# Patient Record
Sex: MALE | Race: WHITE | Employment: OTHER | URBAN - METROPOLITAN AREA
[De-identification: names, ages, dates, MRNs, and addresses within clinical notes are randomized per-mention and may not be internally consistent; named-entity substitution may affect disease eponyms.]

---

## 2017-01-02 ENCOUNTER — TELEPHONE (OUTPATIENT)
Dept: FAMILY MEDICINE CLINIC | Age: 67
End: 2017-01-02

## 2017-01-04 ENCOUNTER — OFFICE VISIT (OUTPATIENT)
Dept: FAMILY MEDICINE CLINIC | Age: 67
End: 2017-01-04

## 2017-01-04 VITALS
WEIGHT: 315 LBS | BODY MASS INDEX: 40.43 KG/M2 | DIASTOLIC BLOOD PRESSURE: 80 MMHG | HEIGHT: 74 IN | HEART RATE: 68 BPM | SYSTOLIC BLOOD PRESSURE: 150 MMHG | RESPIRATION RATE: 14 BRPM

## 2017-01-04 DIAGNOSIS — E11.42 DIABETIC POLYNEUROPATHY ASSOCIATED WITH TYPE 2 DIABETES MELLITUS (HCC): ICD-10-CM

## 2017-01-04 DIAGNOSIS — E11.42 DIABETIC PERIPHERAL NEUROPATHY (HCC): ICD-10-CM

## 2017-01-04 DIAGNOSIS — E66.01 MORBID OBESITY WITH BMI OF 45.0-49.9, ADULT (HCC): ICD-10-CM

## 2017-01-04 DIAGNOSIS — I42.9 SECONDARY CARDIOMYOPATHY, UNSPECIFIED: ICD-10-CM

## 2017-01-04 DIAGNOSIS — E66.01 OBESITY, CLASS III, BMI 40-49.9 (MORBID OBESITY) (HCC): ICD-10-CM

## 2017-01-04 DIAGNOSIS — I15.0 RENOVASCULAR HYPERTENSION: ICD-10-CM

## 2017-01-04 DIAGNOSIS — I42.9 CARDIOMYOPATHY (HCC): ICD-10-CM

## 2017-01-04 DIAGNOSIS — G89.29 CHRONIC MIDLINE LOW BACK PAIN WITH SCIATICA, SCIATICA LATERALITY UNSPECIFIED: ICD-10-CM

## 2017-01-04 DIAGNOSIS — M54.40 CHRONIC MIDLINE LOW BACK PAIN WITH SCIATICA, SCIATICA LATERALITY UNSPECIFIED: ICD-10-CM

## 2017-01-04 LAB
GLUCOSE BLD-MCNC: 142 MG/DL
HBA1C MFR BLD: 6 %

## 2017-01-04 PROCEDURE — 99213 OFFICE O/P EST LOW 20 MIN: CPT | Performed by: FAMILY MEDICINE

## 2017-01-04 PROCEDURE — 82962 GLUCOSE BLOOD TEST: CPT | Performed by: FAMILY MEDICINE

## 2017-01-04 PROCEDURE — 83036 HEMOGLOBIN GLYCOSYLATED A1C: CPT | Performed by: FAMILY MEDICINE

## 2017-01-04 ASSESSMENT — PATIENT HEALTH QUESTIONNAIRE - PHQ9
SUM OF ALL RESPONSES TO PHQ QUESTIONS 1-9: 0
2. FEELING DOWN, DEPRESSED OR HOPELESS: 0
SUM OF ALL RESPONSES TO PHQ9 QUESTIONS 1 & 2: 0
1. LITTLE INTEREST OR PLEASURE IN DOING THINGS: 0

## 2017-01-04 ASSESSMENT — ENCOUNTER SYMPTOMS
SHORTNESS OF BREATH: 0
ABDOMINAL PAIN: 0
ORTHOPNEA: 0
BACK PAIN: 0
NAUSEA: 0
EYE PAIN: 0
HOARSE VOICE: 0
CHEST TIGHTNESS: 0
COUGH: 0
CONSTIPATION: 0
TROUBLE SWALLOWING: 0
SORE THROAT: 0
BLOOD IN STOOL: 0

## 2017-01-19 DIAGNOSIS — I42.9 CARDIOMYOPATHY (HCC): ICD-10-CM

## 2017-01-20 ENCOUNTER — NURSE TRIAGE (OUTPATIENT)
Dept: ADMINISTRATIVE | Age: 67
End: 2017-01-20

## 2017-01-20 RX ORDER — PANTOPRAZOLE SODIUM 40 MG/1
40 TABLET, DELAYED RELEASE ORAL DAILY
Qty: 90 TABLET | Refills: 1 | Status: SHIPPED | OUTPATIENT
Start: 2017-01-20 | End: 2017-06-06 | Stop reason: SDUPTHER

## 2017-01-20 RX ORDER — ATORVASTATIN CALCIUM 10 MG/1
10 TABLET, FILM COATED ORAL DAILY
Qty: 90 TABLET | Refills: 1 | Status: SHIPPED | OUTPATIENT
Start: 2017-01-20 | End: 2017-06-06 | Stop reason: SDUPTHER

## 2017-01-20 RX ORDER — TERBINAFINE HYDROCHLORIDE 250 MG/1
250 TABLET ORAL DAILY
Qty: 30 TABLET | Refills: 1 | Status: SHIPPED | OUTPATIENT
Start: 2017-01-20 | End: 2017-04-10

## 2017-01-20 RX ORDER — AMLODIPINE BESYLATE 5 MG/1
TABLET ORAL
Qty: 90 TABLET | Refills: 1 | Status: SHIPPED | OUTPATIENT
Start: 2017-01-20 | End: 2017-06-06 | Stop reason: SDUPTHER

## 2017-01-25 ENCOUNTER — CARE COORDINATION (OUTPATIENT)
Dept: CARE COORDINATION | Age: 67
End: 2017-01-25

## 2017-02-21 ENCOUNTER — CARE COORDINATION (OUTPATIENT)
Dept: CARE COORDINATION | Age: 67
End: 2017-02-21

## 2017-03-24 ENCOUNTER — NURSE TRIAGE (OUTPATIENT)
Dept: ADMINISTRATIVE | Age: 67
End: 2017-03-24

## 2017-03-24 ENCOUNTER — CARE COORDINATION (OUTPATIENT)
Dept: CARE COORDINATION | Age: 67
End: 2017-03-24

## 2017-03-27 ENCOUNTER — CARE COORDINATION (OUTPATIENT)
Dept: CARE COORDINATION | Age: 67
End: 2017-03-27

## 2017-04-04 ENCOUNTER — NURSE TRIAGE (OUTPATIENT)
Dept: ADMINISTRATIVE | Age: 67
End: 2017-04-04

## 2017-04-10 ENCOUNTER — OFFICE VISIT (OUTPATIENT)
Dept: FAMILY MEDICINE CLINIC | Age: 67
End: 2017-04-10

## 2017-04-10 VITALS
DIASTOLIC BLOOD PRESSURE: 70 MMHG | HEART RATE: 80 BPM | HEIGHT: 74 IN | RESPIRATION RATE: 18 BRPM | BODY MASS INDEX: 40.43 KG/M2 | SYSTOLIC BLOOD PRESSURE: 130 MMHG | WEIGHT: 315 LBS

## 2017-04-10 DIAGNOSIS — I15.0 RENOVASCULAR HYPERTENSION: Primary | ICD-10-CM

## 2017-04-10 DIAGNOSIS — G89.29 CHRONIC MIDLINE LOW BACK PAIN WITH SCIATICA, SCIATICA LATERALITY UNSPECIFIED: ICD-10-CM

## 2017-04-10 DIAGNOSIS — M15.9 PRIMARY OSTEOARTHRITIS INVOLVING MULTIPLE JOINTS: ICD-10-CM

## 2017-04-10 DIAGNOSIS — I25.10 ASHD (ARTERIOSCLEROTIC HEART DISEASE): ICD-10-CM

## 2017-04-10 DIAGNOSIS — E78.00 HYPERCHOLESTEREMIA: ICD-10-CM

## 2017-04-10 DIAGNOSIS — M54.40 CHRONIC MIDLINE LOW BACK PAIN WITH SCIATICA, SCIATICA LATERALITY UNSPECIFIED: ICD-10-CM

## 2017-04-10 PROCEDURE — 99213 OFFICE O/P EST LOW 20 MIN: CPT | Performed by: FAMILY MEDICINE

## 2017-04-10 ASSESSMENT — ENCOUNTER SYMPTOMS
ABDOMINAL PAIN: 0
NAUSEA: 0
BACK PAIN: 0
BLOOD IN STOOL: 0
CHEST TIGHTNESS: 0
ORTHOPNEA: 0
SHORTNESS OF BREATH: 0
TROUBLE SWALLOWING: 0
COUGH: 0
EYE PAIN: 0
SORE THROAT: 0
CONSTIPATION: 0

## 2017-04-13 ENCOUNTER — NURSE TRIAGE (OUTPATIENT)
Dept: ADMINISTRATIVE | Age: 67
End: 2017-04-13

## 2017-04-17 ENCOUNTER — NURSE TRIAGE (OUTPATIENT)
Dept: ADMINISTRATIVE | Age: 67
End: 2017-04-17

## 2017-04-18 ENCOUNTER — CARE COORDINATION (OUTPATIENT)
Dept: CARE COORDINATION | Age: 67
End: 2017-04-18

## 2017-04-24 ENCOUNTER — TELEPHONE (OUTPATIENT)
Dept: INTERNAL MEDICINE | Age: 67
End: 2017-04-24

## 2017-04-25 ENCOUNTER — CARE COORDINATION (OUTPATIENT)
Dept: CARE COORDINATION | Age: 67
End: 2017-04-25

## 2017-05-08 ENCOUNTER — CARE COORDINATION (OUTPATIENT)
Dept: CARE COORDINATION | Age: 67
End: 2017-05-08

## 2017-05-11 ENCOUNTER — CARE COORDINATION (OUTPATIENT)
Dept: CARE COORDINATION | Age: 67
End: 2017-05-11

## 2017-05-14 ENCOUNTER — NURSE TRIAGE (OUTPATIENT)
Dept: ADMINISTRATIVE | Age: 67
End: 2017-05-14

## 2017-05-15 ENCOUNTER — CARE COORDINATION (OUTPATIENT)
Dept: CARE COORDINATION | Age: 67
End: 2017-05-15

## 2017-05-17 ENCOUNTER — CARE COORDINATION (OUTPATIENT)
Dept: FAMILY MEDICINE CLINIC | Age: 67
End: 2017-05-17

## 2017-05-21 ENCOUNTER — NURSE TRIAGE (OUTPATIENT)
Dept: ADMINISTRATIVE | Age: 67
End: 2017-05-21

## 2017-05-23 ENCOUNTER — NURSE TRIAGE (OUTPATIENT)
Dept: ADMINISTRATIVE | Age: 67
End: 2017-05-23

## 2017-05-24 ENCOUNTER — CARE COORDINATION (OUTPATIENT)
Dept: FAMILY MEDICINE CLINIC | Age: 67
End: 2017-05-24

## 2017-05-28 ENCOUNTER — NURSE TRIAGE (OUTPATIENT)
Dept: ADMINISTRATIVE | Age: 67
End: 2017-05-28

## 2017-05-31 ENCOUNTER — CARE COORDINATION (OUTPATIENT)
Dept: CARE COORDINATION | Age: 67
End: 2017-05-31

## 2017-06-06 ENCOUNTER — OFFICE VISIT (OUTPATIENT)
Dept: FAMILY MEDICINE CLINIC | Age: 67
End: 2017-06-06

## 2017-06-06 VITALS
RESPIRATION RATE: 14 BRPM | WEIGHT: 315 LBS | SYSTOLIC BLOOD PRESSURE: 120 MMHG | BODY MASS INDEX: 40.43 KG/M2 | HEIGHT: 74 IN | DIASTOLIC BLOOD PRESSURE: 72 MMHG | HEART RATE: 76 BPM

## 2017-06-06 DIAGNOSIS — E78.00 HYPERCHOLESTEREMIA: ICD-10-CM

## 2017-06-06 DIAGNOSIS — E11.42 DIABETIC PERIPHERAL NEUROPATHY (HCC): ICD-10-CM

## 2017-06-06 DIAGNOSIS — E66.01 MORBID OBESITY DUE TO EXCESS CALORIES (HCC): ICD-10-CM

## 2017-06-06 DIAGNOSIS — I15.0 RENOVASCULAR HYPERTENSION: ICD-10-CM

## 2017-06-06 DIAGNOSIS — I25.708 CORONARY ARTERY DISEASE OF BYPASS GRAFT OF NATIVE HEART WITH STABLE ANGINA PECTORIS (HCC): ICD-10-CM

## 2017-06-06 DIAGNOSIS — M54.6 ACUTE BILATERAL THORACIC BACK PAIN: Primary | ICD-10-CM

## 2017-06-06 DIAGNOSIS — K21.9 GASTROESOPHAGEAL REFLUX DISEASE WITHOUT ESOPHAGITIS: ICD-10-CM

## 2017-06-06 LAB
GLUCOSE BLD-MCNC: 76 MG/DL
HBA1C MFR BLD: 7.2 %

## 2017-06-06 PROCEDURE — 99496 TRANSJ CARE MGMT HIGH F2F 7D: CPT | Performed by: FAMILY MEDICINE

## 2017-06-06 PROCEDURE — 83036 HEMOGLOBIN GLYCOSYLATED A1C: CPT | Performed by: FAMILY MEDICINE

## 2017-06-06 PROCEDURE — 82962 GLUCOSE BLOOD TEST: CPT | Performed by: FAMILY MEDICINE

## 2017-06-06 RX ORDER — PANTOPRAZOLE SODIUM 40 MG/1
40 TABLET, DELAYED RELEASE ORAL DAILY
Qty: 90 TABLET | Refills: 1 | Status: SHIPPED | OUTPATIENT
Start: 2017-06-06 | End: 2017-12-08 | Stop reason: SDUPTHER

## 2017-06-06 RX ORDER — AMLODIPINE BESYLATE 5 MG/1
TABLET ORAL
Qty: 90 TABLET | Refills: 1 | Status: SHIPPED | OUTPATIENT
Start: 2017-06-06 | End: 2017-12-08 | Stop reason: SDUPTHER

## 2017-06-06 RX ORDER — ENALAPRIL MALEATE 10 MG/1
TABLET ORAL
Qty: 180 TABLET | Refills: 1 | Status: SHIPPED | OUTPATIENT
Start: 2017-06-06 | End: 2018-01-31 | Stop reason: SDUPTHER

## 2017-06-06 RX ORDER — ATORVASTATIN CALCIUM 10 MG/1
10 TABLET, FILM COATED ORAL DAILY
Qty: 90 TABLET | Refills: 1 | Status: SHIPPED | OUTPATIENT
Start: 2017-06-06 | End: 2017-12-08 | Stop reason: SDUPTHER

## 2017-06-06 ASSESSMENT — ENCOUNTER SYMPTOMS
SHORTNESS OF BREATH: 0
TROUBLE SWALLOWING: 0
NAUSEA: 0
EYE PAIN: 0
CONSTIPATION: 0
BACK PAIN: 1
BLOOD IN STOOL: 0
CHEST TIGHTNESS: 0
COUGH: 0
ABDOMINAL PAIN: 0
SORE THROAT: 0

## 2017-06-06 ASSESSMENT — PATIENT HEALTH QUESTIONNAIRE - PHQ9
2. FEELING DOWN, DEPRESSED OR HOPELESS: 0
1. LITTLE INTEREST OR PLEASURE IN DOING THINGS: 0
SUM OF ALL RESPONSES TO PHQ QUESTIONS 1-9: 0
SUM OF ALL RESPONSES TO PHQ9 QUESTIONS 1 & 2: 0

## 2017-06-07 ENCOUNTER — CARE COORDINATOR VISIT (OUTPATIENT)
Dept: FAMILY MEDICINE CLINIC | Age: 67
End: 2017-06-07

## 2017-06-08 ENCOUNTER — NURSE TRIAGE (OUTPATIENT)
Dept: ADMINISTRATIVE | Age: 67
End: 2017-06-08

## 2017-06-12 ENCOUNTER — NURSE TRIAGE (OUTPATIENT)
Dept: ADMINISTRATIVE | Age: 67
End: 2017-06-12

## 2017-06-14 ENCOUNTER — CARE COORDINATOR VISIT (OUTPATIENT)
Dept: FAMILY MEDICINE CLINIC | Age: 67
End: 2017-06-14

## 2017-06-20 ENCOUNTER — CARE COORDINATION (OUTPATIENT)
Dept: FAMILY MEDICINE CLINIC | Age: 67
End: 2017-06-20

## 2017-06-23 ENCOUNTER — NURSE TRIAGE (OUTPATIENT)
Dept: ADMINISTRATIVE | Age: 67
End: 2017-06-23

## 2017-06-29 ENCOUNTER — CARE COORDINATION (OUTPATIENT)
Dept: CARE COORDINATION | Age: 67
End: 2017-06-29

## 2017-06-29 RX ORDER — FUROSEMIDE 20 MG/1
TABLET ORAL
Qty: 90 TABLET | Refills: 1 | Status: CANCELLED | OUTPATIENT
Start: 2017-06-29

## 2017-06-30 ENCOUNTER — TELEPHONE (OUTPATIENT)
Dept: FAMILY MEDICINE CLINIC | Age: 67
End: 2017-06-30

## 2017-06-30 DIAGNOSIS — M54.6 THORACIC SPINE PAIN: Primary | ICD-10-CM

## 2017-07-01 RX ORDER — FUROSEMIDE 20 MG/1
TABLET ORAL
Qty: 90 TABLET | Refills: 1 | Status: SHIPPED | OUTPATIENT
Start: 2017-07-01 | End: 2018-01-10 | Stop reason: SDUPTHER

## 2017-07-05 RX ORDER — HYDROCODONE BITARTRATE AND ACETAMINOPHEN 5; 325 MG/1; MG/1
1 TABLET ORAL EVERY 6 HOURS PRN
Qty: 40 TABLET | Refills: 0 | Status: SHIPPED | OUTPATIENT
Start: 2017-07-05 | End: 2017-07-12

## 2017-07-06 ENCOUNTER — CARE COORDINATION (OUTPATIENT)
Dept: CARE COORDINATION | Age: 67
End: 2017-07-06

## 2017-07-06 ENCOUNTER — NURSE TRIAGE (OUTPATIENT)
Dept: ADMINISTRATIVE | Age: 67
End: 2017-07-06

## 2017-07-10 ENCOUNTER — CARE COORDINATION (OUTPATIENT)
Dept: CARE COORDINATION | Age: 67
End: 2017-07-10

## 2017-07-11 ENCOUNTER — TELEPHONE (OUTPATIENT)
Dept: FAMILY MEDICINE CLINIC | Age: 67
End: 2017-07-11

## 2017-07-19 ENCOUNTER — OFFICE VISIT (OUTPATIENT)
Dept: CARDIOLOGY CLINIC | Age: 67
End: 2017-07-19
Payer: MEDICARE

## 2017-07-19 VITALS
DIASTOLIC BLOOD PRESSURE: 70 MMHG | HEART RATE: 73 BPM | WEIGHT: 315 LBS | SYSTOLIC BLOOD PRESSURE: 156 MMHG | BODY MASS INDEX: 40.43 KG/M2 | HEIGHT: 74 IN

## 2017-07-19 DIAGNOSIS — I10 ESSENTIAL HYPERTENSION: ICD-10-CM

## 2017-07-19 DIAGNOSIS — Z95.1 S/P CABG (CORONARY ARTERY BYPASS GRAFT): Primary | ICD-10-CM

## 2017-07-19 DIAGNOSIS — E11.40 TYPE 2 DIABETES MELLITUS WITH DIABETIC NEUROPATHY, UNSPECIFIED LONG TERM INSULIN USE STATUS: ICD-10-CM

## 2017-07-19 DIAGNOSIS — E78.5 DYSLIPIDEMIA: ICD-10-CM

## 2017-07-19 DIAGNOSIS — I25.5 ISCHEMIC CARDIOMYOPATHY: ICD-10-CM

## 2017-07-19 PROCEDURE — 99213 OFFICE O/P EST LOW 20 MIN: CPT | Performed by: PHYSICIAN ASSISTANT

## 2017-07-19 PROCEDURE — 93000 ELECTROCARDIOGRAM COMPLETE: CPT | Performed by: PHYSICIAN ASSISTANT

## 2017-07-19 RX ORDER — ISOSORBIDE MONONITRATE 30 MG/1
30 TABLET, EXTENDED RELEASE ORAL DAILY
Qty: 30 TABLET | Refills: 3 | Status: ON HOLD | OUTPATIENT
Start: 2017-07-19 | End: 2020-02-13

## 2017-07-19 RX ORDER — HYDROCODONE BITARTRATE AND ACETAMINOPHEN 5; 325 MG/1; MG/1
1 TABLET ORAL EVERY 6 HOURS PRN
COMMUNITY
End: 2017-08-05 | Stop reason: CLARIF

## 2017-07-20 ENCOUNTER — NURSE TRIAGE (OUTPATIENT)
Dept: ADMINISTRATIVE | Age: 67
End: 2017-07-20

## 2017-07-26 RX ORDER — INSULIN GLARGINE 100 [IU]/ML
INJECTION, SOLUTION SUBCUTANEOUS
Qty: 15 PEN | Refills: 1 | Status: ON HOLD | OUTPATIENT
Start: 2017-07-26 | End: 2017-08-08 | Stop reason: HOSPADM

## 2017-08-05 ENCOUNTER — APPOINTMENT (OUTPATIENT)
Dept: GENERAL RADIOLOGY | Age: 67
DRG: 247 | End: 2017-08-05
Payer: MEDICARE

## 2017-08-05 ENCOUNTER — HOSPITAL ENCOUNTER (INPATIENT)
Age: 67
LOS: 3 days | Discharge: HOME | DRG: 247 | End: 2017-08-08
Attending: EMERGENCY MEDICINE | Admitting: FAMILY MEDICINE
Payer: MEDICARE

## 2017-08-05 DIAGNOSIS — R07.9 CHEST PAIN WITH HIGH RISK FOR CARDIAC ETIOLOGY: Primary | ICD-10-CM

## 2017-08-05 LAB
ANION GAP SERPL CALCULATED.3IONS-SCNC: 9 MEQ/L (ref 8–16)
BASOPHILS # BLD: 0.5 %
BASOPHILS ABSOLUTE: 0 THOU/MM3 (ref 0–0.1)
BUN BLDV-MCNC: 19 MG/DL (ref 7–22)
CALCIUM SERPL-MCNC: 9 MG/DL (ref 8.5–10.5)
CHLORIDE BLD-SCNC: 105 MEQ/L (ref 98–111)
CO2: 25 MEQ/L (ref 23–33)
CREAT SERPL-MCNC: 0.9 MG/DL (ref 0.4–1.2)
EOSINOPHIL # BLD: 4 %
EOSINOPHILS ABSOLUTE: 0.3 THOU/MM3 (ref 0–0.4)
GFR SERPL CREATININE-BSD FRML MDRD: 84 ML/MIN/1.73M2
GLUCOSE BLD-MCNC: 151 MG/DL (ref 70–108)
GLUCOSE BLD-MCNC: 178 MG/DL (ref 70–108)
GLUCOSE BLD-MCNC: 185 MG/DL (ref 70–108)
GLUCOSE BLD-MCNC: 202 MG/DL (ref 70–108)
HCT VFR BLD CALC: 33.4 % (ref 42–52)
HEMOGLOBIN: 11.6 GM/DL (ref 14–18)
LYMPHOCYTES # BLD: 27.2 %
LYMPHOCYTES ABSOLUTE: 1.8 THOU/MM3 (ref 1–4.8)
MCH RBC QN AUTO: 31.3 PG (ref 27–31)
MCHC RBC AUTO-ENTMCNC: 34.6 GM/DL (ref 33–37)
MCV RBC AUTO: 90.5 FL (ref 80–94)
MONOCYTES # BLD: 9.2 %
MONOCYTES ABSOLUTE: 0.6 THOU/MM3 (ref 0.4–1.3)
NUCLEATED RED BLOOD CELLS: 0 /100 WBC
OSMOLALITY CALCULATION: 285.5 MOSMOL/KG (ref 275–300)
PDW BLD-RTO: 13.3 % (ref 11.5–14.5)
PLATELET # BLD: 143 THOU/MM3 (ref 130–400)
PMV BLD AUTO: 7.5 MCM (ref 7.4–10.4)
POTASSIUM SERPL-SCNC: 4.2 MEQ/L (ref 3.5–5.2)
PRO-BNP: 241.1 PG/ML (ref 0–900)
RBC # BLD: 3.69 MILL/MM3 (ref 4.7–6.1)
RBC # BLD: NORMAL 10*6/UL
SEG NEUTROPHILS: 59.1 %
SEGMENTED NEUTROPHILS ABSOLUTE COUNT: 3.8 THOU/MM3 (ref 1.8–7.7)
SODIUM BLD-SCNC: 139 MEQ/L (ref 135–145)
TROPONIN T: < 0.01 NG/ML
WBC # BLD: 6.5 THOU/MM3 (ref 4.8–10.8)

## 2017-08-05 PROCEDURE — 36415 COLL VENOUS BLD VENIPUNCTURE: CPT

## 2017-08-05 PROCEDURE — 71010 XR CHEST PORTABLE: CPT

## 2017-08-05 PROCEDURE — 6360000002 HC RX W HCPCS: Performed by: EMERGENCY MEDICINE

## 2017-08-05 PROCEDURE — 6370000000 HC RX 637 (ALT 250 FOR IP): Performed by: FAMILY MEDICINE

## 2017-08-05 PROCEDURE — 96375 TX/PRO/DX INJ NEW DRUG ADDON: CPT

## 2017-08-05 PROCEDURE — 82948 REAGENT STRIP/BLOOD GLUCOSE: CPT

## 2017-08-05 PROCEDURE — 2500000003 HC RX 250 WO HCPCS: Performed by: EMERGENCY MEDICINE

## 2017-08-05 PROCEDURE — 80048 BASIC METABOLIC PNL TOTAL CA: CPT

## 2017-08-05 PROCEDURE — 2580000003 HC RX 258: Performed by: EMERGENCY MEDICINE

## 2017-08-05 PROCEDURE — 83880 ASSAY OF NATRIURETIC PEPTIDE: CPT

## 2017-08-05 PROCEDURE — 84484 ASSAY OF TROPONIN QUANT: CPT

## 2017-08-05 PROCEDURE — 1200000003 HC TELEMETRY R&B

## 2017-08-05 PROCEDURE — 6360000002 HC RX W HCPCS: Performed by: INTERNAL MEDICINE

## 2017-08-05 PROCEDURE — 85025 COMPLETE CBC W/AUTO DIFF WBC: CPT

## 2017-08-05 PROCEDURE — 6370000000 HC RX 637 (ALT 250 FOR IP): Performed by: INTERNAL MEDICINE

## 2017-08-05 PROCEDURE — 96365 THER/PROPH/DIAG IV INF INIT: CPT

## 2017-08-05 PROCEDURE — 93005 ELECTROCARDIOGRAM TRACING: CPT

## 2017-08-05 PROCEDURE — 99285 EMERGENCY DEPT VISIT HI MDM: CPT

## 2017-08-05 PROCEDURE — 99222 1ST HOSP IP/OBS MODERATE 55: CPT | Performed by: FAMILY MEDICINE

## 2017-08-05 PROCEDURE — 96366 THER/PROPH/DIAG IV INF ADDON: CPT

## 2017-08-05 PROCEDURE — 6360000002 HC RX W HCPCS: Performed by: FAMILY MEDICINE

## 2017-08-05 RX ORDER — DEXTROSE MONOHYDRATE 50 MG/ML
100 INJECTION, SOLUTION INTRAVENOUS PRN
Status: DISCONTINUED | OUTPATIENT
Start: 2017-08-05 | End: 2017-08-08 | Stop reason: HOSPADM

## 2017-08-05 RX ORDER — NITROGLYCERIN 20 MG/100ML
10 INJECTION INTRAVENOUS CONTINUOUS
Status: DISCONTINUED | OUTPATIENT
Start: 2017-08-05 | End: 2017-08-07

## 2017-08-05 RX ORDER — ASPIRIN 81 MG/1
324 TABLET, CHEWABLE ORAL ONCE
Status: DISCONTINUED | OUTPATIENT
Start: 2017-08-05 | End: 2017-08-05

## 2017-08-05 RX ORDER — NITROGLYCERIN 0.4 MG/1
0.4 TABLET SUBLINGUAL EVERY 5 MIN PRN
Status: DISCONTINUED | OUTPATIENT
Start: 2017-08-05 | End: 2017-08-08 | Stop reason: HOSPADM

## 2017-08-05 RX ORDER — HYDROCODONE BITARTRATE AND ACETAMINOPHEN 5; 325 MG/1; MG/1
1 TABLET ORAL EVERY 6 HOURS PRN
Status: DISCONTINUED | OUTPATIENT
Start: 2017-08-05 | End: 2017-08-08 | Stop reason: HOSPADM

## 2017-08-05 RX ORDER — AMLODIPINE BESYLATE 5 MG/1
5 TABLET ORAL DAILY
Status: DISCONTINUED | OUTPATIENT
Start: 2017-08-05 | End: 2017-08-08 | Stop reason: HOSPADM

## 2017-08-05 RX ORDER — ATORVASTATIN CALCIUM 10 MG/1
10 TABLET, FILM COATED ORAL NIGHTLY
Status: DISCONTINUED | OUTPATIENT
Start: 2017-08-05 | End: 2017-08-08 | Stop reason: HOSPADM

## 2017-08-05 RX ORDER — ACETAMINOPHEN 500 MG
500 TABLET ORAL NIGHTLY PRN
Status: DISCONTINUED | OUTPATIENT
Start: 2017-08-05 | End: 2017-08-08 | Stop reason: HOSPADM

## 2017-08-05 RX ORDER — ENALAPRIL MALEATE 10 MG/1
10 TABLET ORAL DAILY
Status: DISCONTINUED | OUTPATIENT
Start: 2017-08-05 | End: 2017-08-08 | Stop reason: HOSPADM

## 2017-08-05 RX ORDER — DIPHENHYDRAMINE HCL 25 MG
25 TABLET ORAL NIGHTLY PRN
Status: DISCONTINUED | OUTPATIENT
Start: 2017-08-05 | End: 2017-08-08 | Stop reason: HOSPADM

## 2017-08-05 RX ORDER — FUROSEMIDE 40 MG/1
20 TABLET ORAL DAILY
Status: DISCONTINUED | OUTPATIENT
Start: 2017-08-05 | End: 2017-08-05

## 2017-08-05 RX ORDER — INSULIN GLARGINE 100 [IU]/ML
20 INJECTION, SOLUTION SUBCUTANEOUS NIGHTLY
Status: DISCONTINUED | OUTPATIENT
Start: 2017-08-05 | End: 2017-08-08 | Stop reason: HOSPADM

## 2017-08-05 RX ORDER — NICOTINE POLACRILEX 4 MG
15 LOZENGE BUCCAL PRN
Status: DISCONTINUED | OUTPATIENT
Start: 2017-08-05 | End: 2017-08-08 | Stop reason: HOSPADM

## 2017-08-05 RX ORDER — LOPERAMIDE HYDROCHLORIDE 2 MG/1
2 CAPSULE ORAL 4 TIMES DAILY PRN
Status: DISCONTINUED | OUTPATIENT
Start: 2017-08-05 | End: 2017-08-08 | Stop reason: HOSPADM

## 2017-08-05 RX ORDER — ASPIRIN 325 MG
325 TABLET ORAL DAILY
Status: DISCONTINUED | OUTPATIENT
Start: 2017-08-05 | End: 2017-08-07

## 2017-08-05 RX ORDER — SODIUM CHLORIDE 9 MG/ML
INJECTION, SOLUTION INTRAVENOUS CONTINUOUS
Status: DISCONTINUED | OUTPATIENT
Start: 2017-08-05 | End: 2017-08-05

## 2017-08-05 RX ORDER — CARVEDILOL 3.12 MG/1
3.12 TABLET ORAL 2 TIMES DAILY WITH MEALS
Status: DISCONTINUED | OUTPATIENT
Start: 2017-08-05 | End: 2017-08-08 | Stop reason: HOSPADM

## 2017-08-05 RX ORDER — DILTIAZEM HYDROCHLORIDE 120 MG/1
120 CAPSULE, COATED, EXTENDED RELEASE ORAL DAILY
Status: DISCONTINUED | OUTPATIENT
Start: 2017-08-05 | End: 2017-08-05

## 2017-08-05 RX ORDER — PANTOPRAZOLE SODIUM 40 MG/1
40 TABLET, DELAYED RELEASE ORAL DAILY
Status: DISCONTINUED | OUTPATIENT
Start: 2017-08-05 | End: 2017-08-08 | Stop reason: HOSPADM

## 2017-08-05 RX ORDER — MORPHINE SULFATE 2 MG/ML
2 INJECTION, SOLUTION INTRAMUSCULAR; INTRAVENOUS ONCE
Status: COMPLETED | OUTPATIENT
Start: 2017-08-05 | End: 2017-08-05

## 2017-08-05 RX ORDER — FUROSEMIDE 10 MG/ML
60 INJECTION INTRAMUSCULAR; INTRAVENOUS 2 TIMES DAILY
Status: COMPLETED | OUTPATIENT
Start: 2017-08-05 | End: 2017-08-06

## 2017-08-05 RX ORDER — ONDANSETRON 2 MG/ML
4 INJECTION INTRAMUSCULAR; INTRAVENOUS EVERY 30 MIN PRN
Status: DISCONTINUED | OUTPATIENT
Start: 2017-08-05 | End: 2017-08-08 | Stop reason: HOSPADM

## 2017-08-05 RX ORDER — DEXTROSE MONOHYDRATE 25 G/50ML
12.5 INJECTION, SOLUTION INTRAVENOUS PRN
Status: DISCONTINUED | OUTPATIENT
Start: 2017-08-05 | End: 2017-08-08 | Stop reason: HOSPADM

## 2017-08-05 RX ADMIN — METFORMIN HYDROCHLORIDE 500 MG: 500 TABLET ORAL at 09:46

## 2017-08-05 RX ADMIN — ONDANSETRON 4 MG: 2 INJECTION INTRAMUSCULAR; INTRAVENOUS at 04:10

## 2017-08-05 RX ADMIN — ATORVASTATIN CALCIUM 10 MG: 10 TABLET, FILM COATED ORAL at 21:51

## 2017-08-05 RX ADMIN — Medication 1 UNITS: at 21:51

## 2017-08-05 RX ADMIN — MORPHINE SULFATE 2 MG: 2 INJECTION, SOLUTION INTRAMUSCULAR; INTRAVENOUS at 04:10

## 2017-08-05 RX ADMIN — FUROSEMIDE 20 MG: 40 TABLET ORAL at 09:46

## 2017-08-05 RX ADMIN — ASPIRIN 325 MG: 325 TABLET ORAL at 09:51

## 2017-08-05 RX ADMIN — CARVEDILOL 3.12 MG: 3.12 TABLET, FILM COATED ORAL at 17:16

## 2017-08-05 RX ADMIN — Medication 2 UNITS: at 12:02

## 2017-08-05 RX ADMIN — INSULIN GLARGINE 20 UNITS: 100 INJECTION, SOLUTION SUBCUTANEOUS at 21:51

## 2017-08-05 RX ADMIN — NITROGLYCERIN 10 MCG/MIN: 20 INJECTION INTRAVENOUS at 04:10

## 2017-08-05 RX ADMIN — SODIUM CHLORIDE: 9 INJECTION, SOLUTION INTRAVENOUS at 06:50

## 2017-08-05 RX ADMIN — FUROSEMIDE 60 MG: 10 INJECTION, SOLUTION INTRAMUSCULAR; INTRAVENOUS at 17:16

## 2017-08-05 RX ADMIN — PANTOPRAZOLE SODIUM 40 MG: 40 TABLET, DELAYED RELEASE ORAL at 09:46

## 2017-08-05 RX ADMIN — ENOXAPARIN SODIUM 40 MG: 40 INJECTION SUBCUTANEOUS at 12:01

## 2017-08-05 RX ADMIN — ENALAPRIL MALEATE 10 MG: 10 TABLET ORAL at 09:46

## 2017-08-05 RX ADMIN — Medication 2 UNITS: at 17:19

## 2017-08-05 RX ADMIN — AMLODIPINE BESYLATE 5 MG: 5 TABLET ORAL at 09:51

## 2017-08-05 ASSESSMENT — ENCOUNTER SYMPTOMS
DIARRHEA: 0
BLOOD IN STOOL: 0
VOMITING: 0
BACK PAIN: 1
ABDOMINAL PAIN: 0
RHINORRHEA: 0
COUGH: 0
WHEEZING: 0
NAUSEA: 0
SHORTNESS OF BREATH: 0
SORE THROAT: 0

## 2017-08-05 ASSESSMENT — PAIN SCALES - GENERAL
PAINLEVEL_OUTOF10: 1
PAINLEVEL_OUTOF10: 1
PAINLEVEL_OUTOF10: 0

## 2017-08-05 ASSESSMENT — PAIN DESCRIPTION - PAIN TYPE: TYPE: ACUTE PAIN

## 2017-08-05 ASSESSMENT — PAIN DESCRIPTION - LOCATION: LOCATION: CHEST

## 2017-08-05 ASSESSMENT — PAIN DESCRIPTION - FREQUENCY: FREQUENCY: CONTINUOUS

## 2017-08-05 ASSESSMENT — PAIN DESCRIPTION - DESCRIPTORS: DESCRIPTORS: PRESSURE

## 2017-08-05 ASSESSMENT — PAIN DESCRIPTION - ORIENTATION: ORIENTATION: LEFT

## 2017-08-06 LAB
ANION GAP SERPL CALCULATED.3IONS-SCNC: 13 MEQ/L (ref 8–16)
BUN BLDV-MCNC: 20 MG/DL (ref 7–22)
CALCIUM SERPL-MCNC: 9 MG/DL (ref 8.5–10.5)
CHLORIDE BLD-SCNC: 100 MEQ/L (ref 98–111)
CO2: 25 MEQ/L (ref 23–33)
CREAT SERPL-MCNC: 1 MG/DL (ref 0.4–1.2)
EKG ATRIAL RATE: 78 BPM
EKG P AXIS: 74 DEGREES
EKG P-R INTERVAL: 194 MS
EKG Q-T INTERVAL: 398 MS
EKG QRS DURATION: 132 MS
EKG QTC CALCULATION (BAZETT): 453 MS
EKG R AXIS: -29 DEGREES
EKG T AXIS: 86 DEGREES
EKG VENTRICULAR RATE: 78 BPM
GFR SERPL CREATININE-BSD FRML MDRD: 74 ML/MIN/1.73M2
GLUCOSE BLD-MCNC: 131 MG/DL (ref 70–108)
GLUCOSE BLD-MCNC: 154 MG/DL (ref 70–108)
GLUCOSE BLD-MCNC: 165 MG/DL (ref 70–108)
GLUCOSE BLD-MCNC: 192 MG/DL (ref 70–108)
GLUCOSE BLD-MCNC: 197 MG/DL (ref 70–108)
POTASSIUM SERPL-SCNC: 4.4 MEQ/L (ref 3.5–5.2)
SODIUM BLD-SCNC: 138 MEQ/L (ref 135–145)

## 2017-08-06 PROCEDURE — 6360000002 HC RX W HCPCS: Performed by: PHYSICIAN ASSISTANT

## 2017-08-06 PROCEDURE — 1200000003 HC TELEMETRY R&B

## 2017-08-06 PROCEDURE — 36415 COLL VENOUS BLD VENIPUNCTURE: CPT

## 2017-08-06 PROCEDURE — 6370000000 HC RX 637 (ALT 250 FOR IP): Performed by: INTERNAL MEDICINE

## 2017-08-06 PROCEDURE — 93005 ELECTROCARDIOGRAM TRACING: CPT

## 2017-08-06 PROCEDURE — 99231 SBSQ HOSP IP/OBS SF/LOW 25: CPT | Performed by: FAMILY MEDICINE

## 2017-08-06 PROCEDURE — 6370000000 HC RX 637 (ALT 250 FOR IP): Performed by: FAMILY MEDICINE

## 2017-08-06 PROCEDURE — 82948 REAGENT STRIP/BLOOD GLUCOSE: CPT

## 2017-08-06 PROCEDURE — 6360000002 HC RX W HCPCS: Performed by: FAMILY MEDICINE

## 2017-08-06 PROCEDURE — 80048 BASIC METABOLIC PNL TOTAL CA: CPT

## 2017-08-06 PROCEDURE — 6360000002 HC RX W HCPCS: Performed by: INTERNAL MEDICINE

## 2017-08-06 PROCEDURE — 99232 SBSQ HOSP IP/OBS MODERATE 35: CPT | Performed by: PHYSICIAN ASSISTANT

## 2017-08-06 RX ADMIN — ENOXAPARIN SODIUM 40 MG: 40 INJECTION SUBCUTANEOUS at 08:21

## 2017-08-06 RX ADMIN — FUROSEMIDE 60 MG: 10 INJECTION, SOLUTION INTRAMUSCULAR; INTRAVENOUS at 08:22

## 2017-08-06 RX ADMIN — Medication 2 UNITS: at 17:35

## 2017-08-06 RX ADMIN — ENALAPRIL MALEATE 10 MG: 10 TABLET ORAL at 08:21

## 2017-08-06 RX ADMIN — ATORVASTATIN CALCIUM 10 MG: 10 TABLET, FILM COATED ORAL at 21:14

## 2017-08-06 RX ADMIN — AMLODIPINE BESYLATE 5 MG: 5 TABLET ORAL at 08:21

## 2017-08-06 RX ADMIN — CARVEDILOL 3.12 MG: 3.12 TABLET, FILM COATED ORAL at 17:35

## 2017-08-06 RX ADMIN — INSULIN GLARGINE 20 UNITS: 100 INJECTION, SOLUTION SUBCUTANEOUS at 21:15

## 2017-08-06 RX ADMIN — CARVEDILOL 3.12 MG: 3.12 TABLET, FILM COATED ORAL at 08:21

## 2017-08-06 RX ADMIN — ASPIRIN 325 MG: 325 TABLET ORAL at 08:21

## 2017-08-06 RX ADMIN — METFORMIN HYDROCHLORIDE 500 MG: 500 TABLET ORAL at 08:21

## 2017-08-06 RX ADMIN — Medication 2 UNITS: at 12:08

## 2017-08-06 RX ADMIN — Medication 1 UNITS: at 21:15

## 2017-08-06 RX ADMIN — PANTOPRAZOLE SODIUM 40 MG: 40 TABLET, DELAYED RELEASE ORAL at 08:21

## 2017-08-06 RX ADMIN — FUROSEMIDE 60 MG: 10 INJECTION, SOLUTION INTRAMUSCULAR; INTRAVENOUS at 17:34

## 2017-08-06 ASSESSMENT — PAIN SCALES - GENERAL
PAINLEVEL_OUTOF10: 0
PAINLEVEL_OUTOF10: 0

## 2017-08-07 ENCOUNTER — APPOINTMENT (OUTPATIENT)
Dept: CARDIAC CATH/INVASIVE PROCEDURES | Age: 67
DRG: 247 | End: 2017-08-07
Payer: MEDICARE

## 2017-08-07 LAB
ABO: NORMAL
ANION GAP SERPL CALCULATED.3IONS-SCNC: 14 MEQ/L (ref 8–16)
ANTIBODY SCREEN: NORMAL
BUN BLDV-MCNC: 24 MG/DL (ref 7–22)
CALCIUM SERPL-MCNC: 9 MG/DL (ref 8.5–10.5)
CHLORIDE BLD-SCNC: 99 MEQ/L (ref 98–111)
CO2: 26 MEQ/L (ref 23–33)
CREAT SERPL-MCNC: 1.1 MG/DL (ref 0.4–1.2)
EKG ATRIAL RATE: 65 BPM
EKG ATRIAL RATE: 68 BPM
EKG ATRIAL RATE: 69 BPM
EKG P AXIS: 29 DEGREES
EKG P AXIS: 85 DEGREES
EKG P AXIS: 9 DEGREES
EKG P-R INTERVAL: 192 MS
EKG P-R INTERVAL: 198 MS
EKG P-R INTERVAL: 202 MS
EKG Q-T INTERVAL: 422 MS
EKG Q-T INTERVAL: 428 MS
EKG Q-T INTERVAL: 440 MS
EKG QRS DURATION: 116 MS
EKG QRS DURATION: 118 MS
EKG QRS DURATION: 132 MS
EKG QTC CALCULATION (BAZETT): 445 MS
EKG QTC CALCULATION (BAZETT): 452 MS
EKG QTC CALCULATION (BAZETT): 467 MS
EKG R AXIS: -28 DEGREES
EKG R AXIS: -33 DEGREES
EKG R AXIS: -34 DEGREES
EKG T AXIS: 35 DEGREES
EKG T AXIS: 70 DEGREES
EKG T AXIS: 75 DEGREES
EKG VENTRICULAR RATE: 65 BPM
EKG VENTRICULAR RATE: 68 BPM
EKG VENTRICULAR RATE: 69 BPM
GFR SERPL CREATININE-BSD FRML MDRD: 66 ML/MIN/1.73M2
GLUCOSE BLD-MCNC: 158 MG/DL (ref 70–108)
GLUCOSE BLD-MCNC: 160 MG/DL (ref 70–108)
GLUCOSE BLD-MCNC: 160 MG/DL (ref 70–108)
GLUCOSE BLD-MCNC: 195 MG/DL (ref 70–108)
GLUCOSE BLD-MCNC: 207 MG/DL (ref 70–108)
HCT VFR BLD CALC: 33.8 % (ref 42–52)
HEMOGLOBIN: 12 GM/DL (ref 14–18)
INR BLD: 1.03 (ref 0.85–1.13)
MCH RBC QN AUTO: 31.9 PG (ref 27–31)
MCHC RBC AUTO-ENTMCNC: 35.3 GM/DL (ref 33–37)
MCV RBC AUTO: 90.4 FL (ref 80–94)
PDW BLD-RTO: 13.1 % (ref 11.5–14.5)
PLATELET # BLD: 146 THOU/MM3 (ref 130–400)
PMV BLD AUTO: 7.9 MCM (ref 7.4–10.4)
POTASSIUM SERPL-SCNC: 4 MEQ/L (ref 3.5–5.2)
RBC # BLD: 3.74 MILL/MM3 (ref 4.7–6.1)
RH FACTOR: NORMAL
SODIUM BLD-SCNC: 139 MEQ/L (ref 135–145)
WBC # BLD: 7 THOU/MM3 (ref 4.8–10.8)

## 2017-08-07 PROCEDURE — C1887 CATHETER, GUIDING: HCPCS

## 2017-08-07 PROCEDURE — C1769 GUIDE WIRE: HCPCS

## 2017-08-07 PROCEDURE — B2181ZZ FLUOROSCOPY OF LEFT INTERNAL MAMMARY BYPASS GRAFT USING LOW OSMOLAR CONTRAST: ICD-10-PCS | Performed by: NUCLEAR MEDICINE

## 2017-08-07 PROCEDURE — 2500000003 HC RX 250 WO HCPCS: Performed by: EMERGENCY MEDICINE

## 2017-08-07 PROCEDURE — 85610 PROTHROMBIN TIME: CPT

## 2017-08-07 PROCEDURE — 2580000003 HC RX 258

## 2017-08-07 PROCEDURE — 93459 L HRT ART/GRFT ANGIO: CPT | Performed by: NUCLEAR MEDICINE

## 2017-08-07 PROCEDURE — A6258 TRANSPARENT FILM >16<=48 IN: HCPCS

## 2017-08-07 PROCEDURE — 6370000000 HC RX 637 (ALT 250 FOR IP): Performed by: INTERNAL MEDICINE

## 2017-08-07 PROCEDURE — 027034Z DILATION OF CORONARY ARTERY, ONE ARTERY WITH DRUG-ELUTING INTRALUMINAL DEVICE, PERCUTANEOUS APPROACH: ICD-10-PCS | Performed by: INTERNAL MEDICINE

## 2017-08-07 PROCEDURE — 92928 PRQ TCAT PLMT NTRAC ST 1 LES: CPT | Performed by: NUCLEAR MEDICINE

## 2017-08-07 PROCEDURE — 93005 ELECTROCARDIOGRAM TRACING: CPT

## 2017-08-07 PROCEDURE — 86900 BLOOD TYPING SEROLOGIC ABO: CPT

## 2017-08-07 PROCEDURE — 6370000000 HC RX 637 (ALT 250 FOR IP): Performed by: FAMILY MEDICINE

## 2017-08-07 PROCEDURE — 80048 BASIC METABOLIC PNL TOTAL CA: CPT

## 2017-08-07 PROCEDURE — 36415 COLL VENOUS BLD VENIPUNCTURE: CPT

## 2017-08-07 PROCEDURE — 2500000003 HC RX 250 WO HCPCS

## 2017-08-07 PROCEDURE — 2720000010 HC SURG SUPPLY STERILE

## 2017-08-07 PROCEDURE — B2121ZZ FLUOROSCOPY OF SINGLE CORONARY ARTERY BYPASS GRAFT USING LOW OSMOLAR CONTRAST: ICD-10-PCS | Performed by: NUCLEAR MEDICINE

## 2017-08-07 PROCEDURE — A4216 STERILE WATER/SALINE, 10 ML: HCPCS

## 2017-08-07 PROCEDURE — B2151ZZ FLUOROSCOPY OF LEFT HEART USING LOW OSMOLAR CONTRAST: ICD-10-PCS | Performed by: NUCLEAR MEDICINE

## 2017-08-07 PROCEDURE — B2111ZZ FLUOROSCOPY OF MULTIPLE CORONARY ARTERIES USING LOW OSMOLAR CONTRAST: ICD-10-PCS | Performed by: NUCLEAR MEDICINE

## 2017-08-07 PROCEDURE — 86850 RBC ANTIBODY SCREEN: CPT

## 2017-08-07 PROCEDURE — 82948 REAGENT STRIP/BLOOD GLUCOSE: CPT

## 2017-08-07 PROCEDURE — 99231 SBSQ HOSP IP/OBS SF/LOW 25: CPT | Performed by: FAMILY MEDICINE

## 2017-08-07 PROCEDURE — 2580000003 HC RX 258: Performed by: PHYSICIAN ASSISTANT

## 2017-08-07 PROCEDURE — 6370000000 HC RX 637 (ALT 250 FOR IP)

## 2017-08-07 PROCEDURE — 85027 COMPLETE CBC AUTOMATED: CPT

## 2017-08-07 PROCEDURE — 6360000002 HC RX W HCPCS

## 2017-08-07 PROCEDURE — 92928 PRQ TCAT PLMT NTRAC ST 1 LES: CPT | Performed by: INTERNAL MEDICINE

## 2017-08-07 PROCEDURE — 86901 BLOOD TYPING SEROLOGIC RH(D): CPT

## 2017-08-07 PROCEDURE — 4A023N7 MEASUREMENT OF CARDIAC SAMPLING AND PRESSURE, LEFT HEART, PERCUTANEOUS APPROACH: ICD-10-PCS | Performed by: NUCLEAR MEDICINE

## 2017-08-07 PROCEDURE — 1200000003 HC TELEMETRY R&B

## 2017-08-07 PROCEDURE — 2780000010 HC IMPLANT OTHER

## 2017-08-07 PROCEDURE — C1874 STENT, COATED/COV W/DEL SYS: HCPCS

## 2017-08-07 PROCEDURE — C1894 INTRO/SHEATH, NON-LASER: HCPCS

## 2017-08-07 PROCEDURE — 6370000000 HC RX 637 (ALT 250 FOR IP): Performed by: NUCLEAR MEDICINE

## 2017-08-07 PROCEDURE — C1725 CATH, TRANSLUMIN NON-LASER: HCPCS

## 2017-08-07 PROCEDURE — 2580000003 HC RX 258: Performed by: INTERNAL MEDICINE

## 2017-08-07 RX ORDER — SODIUM CHLORIDE 0.9 % (FLUSH) 0.9 %
10 SYRINGE (ML) INJECTION PRN
Status: DISCONTINUED | OUTPATIENT
Start: 2017-08-07 | End: 2017-08-08 | Stop reason: HOSPADM

## 2017-08-07 RX ORDER — MORPHINE SULFATE 2 MG/ML
2 INJECTION, SOLUTION INTRAMUSCULAR; INTRAVENOUS
Status: ACTIVE | OUTPATIENT
Start: 2017-08-07 | End: 2017-08-07

## 2017-08-07 RX ORDER — DIPHENHYDRAMINE HYDROCHLORIDE 50 MG/ML
25 INJECTION INTRAMUSCULAR; INTRAVENOUS ONCE
Status: DISCONTINUED | OUTPATIENT
Start: 2017-08-07 | End: 2017-08-08 | Stop reason: HOSPADM

## 2017-08-07 RX ORDER — ISOSORBIDE MONONITRATE 30 MG/1
30 TABLET, EXTENDED RELEASE ORAL DAILY
Status: DISCONTINUED | OUTPATIENT
Start: 2017-08-07 | End: 2017-08-08 | Stop reason: HOSPADM

## 2017-08-07 RX ORDER — SODIUM CHLORIDE 0.9 % (FLUSH) 0.9 %
10 SYRINGE (ML) INJECTION PRN
Status: DISCONTINUED | OUTPATIENT
Start: 2017-08-07 | End: 2017-08-07 | Stop reason: SDUPTHER

## 2017-08-07 RX ORDER — ACETAMINOPHEN 325 MG/1
650 TABLET ORAL EVERY 4 HOURS PRN
Status: DISCONTINUED | OUTPATIENT
Start: 2017-08-07 | End: 2017-08-07 | Stop reason: SDUPTHER

## 2017-08-07 RX ORDER — SODIUM CHLORIDE 9 MG/ML
75 INJECTION, SOLUTION INTRAVENOUS CONTINUOUS
Status: ACTIVE | OUTPATIENT
Start: 2017-08-07 | End: 2017-08-08

## 2017-08-07 RX ORDER — SODIUM CHLORIDE 0.9 % (FLUSH) 0.9 %
10 SYRINGE (ML) INJECTION EVERY 12 HOURS SCHEDULED
Status: DISCONTINUED | OUTPATIENT
Start: 2017-08-07 | End: 2017-08-07 | Stop reason: SDUPTHER

## 2017-08-07 RX ORDER — ACETAMINOPHEN 500 MG
1000 TABLET ORAL PRN
COMMUNITY

## 2017-08-07 RX ORDER — SODIUM CHLORIDE 9 MG/ML
INJECTION, SOLUTION INTRAVENOUS CONTINUOUS
Status: DISCONTINUED | OUTPATIENT
Start: 2017-08-07 | End: 2017-08-07 | Stop reason: SDUPTHER

## 2017-08-07 RX ORDER — ATROPINE SULFATE 0.4 MG/ML
0.5 AMPUL (ML) INJECTION
Status: ACTIVE | OUTPATIENT
Start: 2017-08-07 | End: 2017-08-07

## 2017-08-07 RX ORDER — SODIUM CHLORIDE 0.9 % (FLUSH) 0.9 %
10 SYRINGE (ML) INJECTION EVERY 12 HOURS SCHEDULED
Status: DISCONTINUED | OUTPATIENT
Start: 2017-08-07 | End: 2017-08-08 | Stop reason: HOSPADM

## 2017-08-07 RX ADMIN — ASPIRIN 325 MG: 325 TABLET ORAL at 07:25

## 2017-08-07 RX ADMIN — Medication 2 UNITS: at 20:30

## 2017-08-07 RX ADMIN — ENALAPRIL MALEATE 10 MG: 10 TABLET ORAL at 09:48

## 2017-08-07 RX ADMIN — TICAGRELOR 90 MG: 90 TABLET ORAL at 20:32

## 2017-08-07 RX ADMIN — AMLODIPINE BESYLATE 5 MG: 5 TABLET ORAL at 09:48

## 2017-08-07 RX ADMIN — INSULIN GLARGINE 20 UNITS: 100 INJECTION, SOLUTION SUBCUTANEOUS at 20:29

## 2017-08-07 RX ADMIN — Medication 2 UNITS: at 12:59

## 2017-08-07 RX ADMIN — ISOSORBIDE MONONITRATE 30 MG: 30 TABLET ORAL at 19:24

## 2017-08-07 RX ADMIN — Medication 2 UNITS: at 17:21

## 2017-08-07 RX ADMIN — ATORVASTATIN CALCIUM 10 MG: 10 TABLET, FILM COATED ORAL at 20:33

## 2017-08-07 RX ADMIN — PANTOPRAZOLE SODIUM 40 MG: 40 TABLET, DELAYED RELEASE ORAL at 09:48

## 2017-08-07 RX ADMIN — SODIUM CHLORIDE: 9 INJECTION, SOLUTION INTRAVENOUS at 09:48

## 2017-08-07 RX ADMIN — SODIUM CHLORIDE 75 ML/HR: 9 INJECTION, SOLUTION INTRAVENOUS at 20:29

## 2017-08-07 RX ADMIN — CARVEDILOL 3.12 MG: 3.12 TABLET, FILM COATED ORAL at 09:48

## 2017-08-07 RX ADMIN — CARVEDILOL 3.12 MG: 3.12 TABLET, FILM COATED ORAL at 17:21

## 2017-08-07 RX ADMIN — NITROGLYCERIN 15 MCG/MIN: 20 INJECTION INTRAVENOUS at 09:48

## 2017-08-07 ASSESSMENT — PAIN SCALES - GENERAL
PAINLEVEL_OUTOF10: 0
PAINLEVEL_OUTOF10: 0

## 2017-08-08 ENCOUNTER — TELEPHONE (OUTPATIENT)
Dept: CARDIOLOGY CLINIC | Age: 67
End: 2017-08-08

## 2017-08-08 VITALS
SYSTOLIC BLOOD PRESSURE: 132 MMHG | BODY MASS INDEX: 40.43 KG/M2 | TEMPERATURE: 97.9 F | OXYGEN SATURATION: 97 % | WEIGHT: 315 LBS | RESPIRATION RATE: 20 BRPM | HEIGHT: 74 IN | DIASTOLIC BLOOD PRESSURE: 60 MMHG | HEART RATE: 66 BPM

## 2017-08-08 LAB
GLUCOSE BLD-MCNC: 179 MG/DL (ref 70–108)
GLUCOSE BLD-MCNC: 208 MG/DL (ref 70–108)

## 2017-08-08 PROCEDURE — 6360000002 HC RX W HCPCS: Performed by: FAMILY MEDICINE

## 2017-08-08 PROCEDURE — 82948 REAGENT STRIP/BLOOD GLUCOSE: CPT

## 2017-08-08 PROCEDURE — 99238 HOSP IP/OBS DSCHRG MGMT 30/<: CPT | Performed by: FAMILY MEDICINE

## 2017-08-08 PROCEDURE — 6370000000 HC RX 637 (ALT 250 FOR IP): Performed by: INTERNAL MEDICINE

## 2017-08-08 PROCEDURE — 99232 SBSQ HOSP IP/OBS MODERATE 35: CPT | Performed by: NURSE PRACTITIONER

## 2017-08-08 PROCEDURE — 6370000000 HC RX 637 (ALT 250 FOR IP): Performed by: FAMILY MEDICINE

## 2017-08-08 PROCEDURE — 6370000000 HC RX 637 (ALT 250 FOR IP): Performed by: NUCLEAR MEDICINE

## 2017-08-08 RX ORDER — INSULIN GLARGINE 100 [IU]/ML
20 INJECTION, SOLUTION SUBCUTANEOUS NIGHTLY
Qty: 1 VIAL | Refills: 3 | Status: SHIPPED | OUTPATIENT
Start: 2017-08-08 | End: 2017-08-08 | Stop reason: HOSPADM

## 2017-08-08 RX ORDER — CARVEDILOL 3.12 MG/1
3.12 TABLET ORAL 2 TIMES DAILY WITH MEALS
Qty: 60 TABLET | Refills: 11 | Status: SHIPPED | OUTPATIENT
Start: 2017-08-08 | End: 2018-08-31 | Stop reason: SDUPTHER

## 2017-08-08 RX ADMIN — CARVEDILOL 3.12 MG: 3.12 TABLET, FILM COATED ORAL at 08:22

## 2017-08-08 RX ADMIN — ISOSORBIDE MONONITRATE 30 MG: 30 TABLET ORAL at 08:22

## 2017-08-08 RX ADMIN — PANTOPRAZOLE SODIUM 40 MG: 40 TABLET, DELAYED RELEASE ORAL at 08:22

## 2017-08-08 RX ADMIN — TICAGRELOR 90 MG: 90 TABLET ORAL at 08:22

## 2017-08-08 RX ADMIN — AMLODIPINE BESYLATE 5 MG: 5 TABLET ORAL at 08:22

## 2017-08-08 RX ADMIN — Medication 4 UNITS: at 14:35

## 2017-08-08 RX ADMIN — ENALAPRIL MALEATE 10 MG: 10 TABLET ORAL at 08:22

## 2017-08-08 RX ADMIN — ENOXAPARIN SODIUM 40 MG: 40 INJECTION SUBCUTANEOUS at 09:26

## 2017-08-08 RX ADMIN — Medication 2 UNITS: at 08:23

## 2017-08-08 ASSESSMENT — PAIN SCALES - GENERAL
PAINLEVEL_OUTOF10: 0

## 2017-08-09 ENCOUNTER — TELEPHONE (OUTPATIENT)
Dept: PHARMACY | Age: 67
End: 2017-08-09

## 2017-08-09 ENCOUNTER — CARE COORDINATION (OUTPATIENT)
Dept: CASE MANAGEMENT | Age: 67
End: 2017-08-09

## 2017-08-09 ENCOUNTER — HOSPITAL ENCOUNTER (OUTPATIENT)
Age: 67
Discharge: HOME | End: 2017-08-09
Payer: MEDICARE

## 2017-08-09 DIAGNOSIS — R07.9 CHEST PAIN WITH HIGH RISK FOR CARDIAC ETIOLOGY: ICD-10-CM

## 2017-08-09 LAB
BUN BLDV-MCNC: 29 MG/DL (ref 7–22)
CREAT SERPL-MCNC: 1 MG/DL (ref 0.4–1.2)
GFR SERPL CREATININE-BSD FRML MDRD: 74 ML/MIN/1.73M2

## 2017-08-09 PROCEDURE — 84520 ASSAY OF UREA NITROGEN: CPT

## 2017-08-09 PROCEDURE — 82565 ASSAY OF CREATININE: CPT

## 2017-08-09 PROCEDURE — 36415 COLL VENOUS BLD VENIPUNCTURE: CPT

## 2017-08-11 ENCOUNTER — APPOINTMENT (OUTPATIENT)
Dept: GENERAL RADIOLOGY | Age: 67
End: 2017-08-11
Payer: MEDICARE

## 2017-08-11 ENCOUNTER — HOSPITAL ENCOUNTER (EMERGENCY)
Age: 67
Discharge: HOME | End: 2017-08-11
Payer: MEDICARE

## 2017-08-11 ENCOUNTER — NURSE TRIAGE (OUTPATIENT)
Dept: ADMINISTRATIVE | Age: 67
End: 2017-08-11

## 2017-08-11 ENCOUNTER — CARE COORDINATION (OUTPATIENT)
Dept: CASE MANAGEMENT | Age: 67
End: 2017-08-11

## 2017-08-11 VITALS
SYSTOLIC BLOOD PRESSURE: 139 MMHG | TEMPERATURE: 98.2 F | OXYGEN SATURATION: 98 % | HEART RATE: 68 BPM | RESPIRATION RATE: 18 BRPM | DIASTOLIC BLOOD PRESSURE: 76 MMHG

## 2017-08-11 DIAGNOSIS — R07.9 CHEST PAIN, UNSPECIFIED TYPE: Primary | ICD-10-CM

## 2017-08-11 LAB
ALBUMIN SERPL-MCNC: 3.7 G/DL (ref 3.5–5.1)
ALP BLD-CCNC: 67 U/L (ref 38–126)
ALT SERPL-CCNC: 13 U/L (ref 11–66)
ANION GAP SERPL CALCULATED.3IONS-SCNC: 9 MEQ/L (ref 8–16)
APTT: 37.5 SECONDS (ref 22–38)
AST SERPL-CCNC: 15 U/L (ref 5–40)
BASOPHILS # BLD: 0.4 %
BASOPHILS ABSOLUTE: 0 THOU/MM3 (ref 0–0.1)
BILIRUB SERPL-MCNC: 0.4 MG/DL (ref 0.3–1.2)
BILIRUBIN DIRECT: < 0.2 MG/DL (ref 0–0.3)
BUN BLDV-MCNC: 35 MG/DL (ref 7–22)
CALCIUM SERPL-MCNC: 9.2 MG/DL (ref 8.5–10.5)
CHLORIDE BLD-SCNC: 107 MEQ/L (ref 98–111)
CO2: 26 MEQ/L (ref 23–33)
CREAT SERPL-MCNC: 1.2 MG/DL (ref 0.4–1.2)
EKG ATRIAL RATE: 70 BPM
EKG ATRIAL RATE: 71 BPM
EKG P AXIS: 72 DEGREES
EKG P AXIS: 91 DEGREES
EKG P-R INTERVAL: 194 MS
EKG P-R INTERVAL: 204 MS
EKG Q-T INTERVAL: 416 MS
EKG Q-T INTERVAL: 422 MS
EKG QRS DURATION: 140 MS
EKG QRS DURATION: 140 MS
EKG QTC CALCULATION (BAZETT): 449 MS
EKG QTC CALCULATION (BAZETT): 458 MS
EKG R AXIS: -25 DEGREES
EKG R AXIS: -29 DEGREES
EKG T AXIS: 81 DEGREES
EKG T AXIS: 84 DEGREES
EKG VENTRICULAR RATE: 70 BPM
EKG VENTRICULAR RATE: 71 BPM
EOSINOPHIL # BLD: 3.6 %
EOSINOPHILS ABSOLUTE: 0.2 THOU/MM3 (ref 0–0.4)
GFR SERPL CREATININE-BSD FRML MDRD: 60 ML/MIN/1.73M2
GLUCOSE BLD-MCNC: 170 MG/DL (ref 70–108)
GLUCOSE BLD-MCNC: 198 MG/DL (ref 70–108)
GLUCOSE BLD-MCNC: 223 MG/DL
GLUCOSE BLD-MCNC: 223 MG/DL (ref 70–108)
HCT VFR BLD CALC: 30.8 % (ref 42–52)
HEMOGLOBIN: 10.6 GM/DL (ref 14–18)
INR BLD: 0.97 (ref 0.85–1.13)
LIPASE: 34 U/L (ref 5.6–51.3)
LYMPHOCYTES # BLD: 27 %
LYMPHOCYTES ABSOLUTE: 1.6 THOU/MM3 (ref 1–4.8)
MAGNESIUM: 2.2 MG/DL (ref 1.6–2.4)
MCH RBC QN AUTO: 31.4 PG (ref 27–31)
MCHC RBC AUTO-ENTMCNC: 34.6 GM/DL (ref 33–37)
MCV RBC AUTO: 90.9 FL (ref 80–94)
MONOCYTES # BLD: 8.9 %
MONOCYTES ABSOLUTE: 0.5 THOU/MM3 (ref 0.4–1.3)
NUCLEATED RED BLOOD CELLS: 0 /100 WBC
OSMOLALITY CALCULATION: 296.6 MOSMOL/KG (ref 275–300)
PDW BLD-RTO: 13.7 % (ref 11.5–14.5)
PLATELET # BLD: 154 THOU/MM3 (ref 130–400)
PMV BLD AUTO: 7.6 MCM (ref 7.4–10.4)
POTASSIUM SERPL-SCNC: 4.4 MEQ/L (ref 3.5–5.2)
POTASSIUM SERPL-SCNC: 5.4 MEQ/L (ref 3.5–5.2)
PRO-BNP: 309.8 PG/ML (ref 0–900)
RBC # BLD: 3.39 MILL/MM3 (ref 4.7–6.1)
RBC # BLD: NORMAL 10*6/UL
SEG NEUTROPHILS: 60.1 %
SEGMENTED NEUTROPHILS ABSOLUTE COUNT: 3.6 THOU/MM3 (ref 1.8–7.7)
SODIUM BLD-SCNC: 142 MEQ/L (ref 135–145)
TOTAL PROTEIN: 6.3 G/DL (ref 6.1–8)
TROPONIN T: < 0.01 NG/ML
TROPONIN T: < 0.01 NG/ML
WBC # BLD: 6 THOU/MM3 (ref 4.8–10.8)

## 2017-08-11 PROCEDURE — 83880 ASSAY OF NATRIURETIC PEPTIDE: CPT

## 2017-08-11 PROCEDURE — 96374 THER/PROPH/DIAG INJ IV PUSH: CPT

## 2017-08-11 PROCEDURE — 6370000000 HC RX 637 (ALT 250 FOR IP): Performed by: NURSE PRACTITIONER

## 2017-08-11 PROCEDURE — 36415 COLL VENOUS BLD VENIPUNCTURE: CPT

## 2017-08-11 PROCEDURE — 85610 PROTHROMBIN TIME: CPT

## 2017-08-11 PROCEDURE — 84484 ASSAY OF TROPONIN QUANT: CPT

## 2017-08-11 PROCEDURE — 85730 THROMBOPLASTIN TIME PARTIAL: CPT

## 2017-08-11 PROCEDURE — 83690 ASSAY OF LIPASE: CPT

## 2017-08-11 PROCEDURE — 85025 COMPLETE CBC W/AUTO DIFF WBC: CPT

## 2017-08-11 PROCEDURE — 83735 ASSAY OF MAGNESIUM: CPT

## 2017-08-11 PROCEDURE — 80053 COMPREHEN METABOLIC PANEL: CPT

## 2017-08-11 PROCEDURE — 84132 ASSAY OF SERUM POTASSIUM: CPT

## 2017-08-11 PROCEDURE — 93005 ELECTROCARDIOGRAM TRACING: CPT

## 2017-08-11 PROCEDURE — 2580000003 HC RX 258: Performed by: NURSE PRACTITIONER

## 2017-08-11 PROCEDURE — 82948 REAGENT STRIP/BLOOD GLUCOSE: CPT

## 2017-08-11 PROCEDURE — 82248 BILIRUBIN DIRECT: CPT

## 2017-08-11 PROCEDURE — 71010 XR CHEST PORTABLE: CPT

## 2017-08-11 PROCEDURE — 99285 EMERGENCY DEPT VISIT HI MDM: CPT

## 2017-08-11 PROCEDURE — 96375 TX/PRO/DX INJ NEW DRUG ADDON: CPT

## 2017-08-11 RX ORDER — HYDROCODONE BITARTRATE AND ACETAMINOPHEN 5; 325 MG/1; MG/1
1 TABLET ORAL ONCE
Status: COMPLETED | OUTPATIENT
Start: 2017-08-11 | End: 2017-08-11

## 2017-08-11 RX ORDER — DEXTROSE MONOHYDRATE 25 G/50ML
12.5 INJECTION, SOLUTION INTRAVENOUS ONCE
Status: COMPLETED | OUTPATIENT
Start: 2017-08-11 | End: 2017-08-11

## 2017-08-11 RX ADMIN — DEXTROSE MONOHYDRATE 12.5 G: 25 INJECTION, SOLUTION INTRAVENOUS at 20:35

## 2017-08-11 RX ADMIN — HYDROCODONE BITARTRATE AND ACETAMINOPHEN 1 TABLET: 5; 325 TABLET ORAL at 20:34

## 2017-08-11 RX ADMIN — INSULIN HUMAN 10 UNITS: 100 INJECTION, SOLUTION PARENTERAL at 20:36

## 2017-08-11 ASSESSMENT — ENCOUNTER SYMPTOMS
RHINORRHEA: 0
DIARRHEA: 0
COUGH: 0
BACK PAIN: 0
EYE REDNESS: 0
SHORTNESS OF BREATH: 1
VOMITING: 0
WHEEZING: 0
EYE DISCHARGE: 0
NAUSEA: 0
SORE THROAT: 0
ABDOMINAL PAIN: 0

## 2017-08-11 ASSESSMENT — PAIN SCALES - GENERAL
PAINLEVEL_OUTOF10: 1
PAINLEVEL_OUTOF10: 1
PAINLEVEL_OUTOF10: 3
PAINLEVEL_OUTOF10: 1

## 2017-08-11 ASSESSMENT — PAIN DESCRIPTION - DESCRIPTORS: DESCRIPTORS: DULL

## 2017-08-11 ASSESSMENT — PAIN DESCRIPTION - ORIENTATION
ORIENTATION: RIGHT
ORIENTATION: RIGHT

## 2017-08-11 ASSESSMENT — PAIN DESCRIPTION - LOCATION
LOCATION: CHEST

## 2017-08-11 ASSESSMENT — PAIN DESCRIPTION - FREQUENCY: FREQUENCY: CONTINUOUS

## 2017-08-14 ENCOUNTER — CARE COORDINATION (OUTPATIENT)
Dept: CARE COORDINATION | Age: 67
End: 2017-08-14

## 2017-08-14 ENCOUNTER — OFFICE VISIT (OUTPATIENT)
Dept: FAMILY MEDICINE CLINIC | Age: 67
End: 2017-08-14

## 2017-08-14 ENCOUNTER — CARE COORDINATION (OUTPATIENT)
Dept: FAMILY MEDICINE CLINIC | Age: 67
End: 2017-08-14

## 2017-08-14 VITALS
BODY MASS INDEX: 40.43 KG/M2 | WEIGHT: 315 LBS | SYSTOLIC BLOOD PRESSURE: 118 MMHG | HEART RATE: 80 BPM | HEIGHT: 74 IN | RESPIRATION RATE: 12 BRPM | DIASTOLIC BLOOD PRESSURE: 54 MMHG

## 2017-08-14 DIAGNOSIS — E11.59 TYPE 2 DIABETES MELLITUS WITH OTHER CIRCULATORY COMPLICATION, UNSPECIFIED LONG TERM INSULIN USE STATUS: ICD-10-CM

## 2017-08-14 DIAGNOSIS — H61.23 EXCESSIVE CERUMEN IN BOTH EAR CANALS: ICD-10-CM

## 2017-08-14 DIAGNOSIS — I25.708 CORONARY ARTERY DISEASE OF BYPASS GRAFT OF NATIVE HEART WITH STABLE ANGINA PECTORIS (HCC): Primary | ICD-10-CM

## 2017-08-14 DIAGNOSIS — M75.51 CHRONIC SHOULDER BURSITIS, RIGHT: ICD-10-CM

## 2017-08-14 DIAGNOSIS — E11.42 DIABETIC PERIPHERAL NEUROPATHY (HCC): ICD-10-CM

## 2017-08-14 DIAGNOSIS — Z98.61 S/P PERCUTANEOUS TRANSLUMINAL CORONARY ANGIOPLASTY: ICD-10-CM

## 2017-08-14 PROCEDURE — 99496 TRANSJ CARE MGMT HIGH F2F 7D: CPT | Performed by: FAMILY MEDICINE

## 2017-08-14 RX ORDER — NITROGLYCERIN 0.4 MG/1
0.4 TABLET SUBLINGUAL EVERY 5 MIN PRN
Qty: 25 TABLET | Refills: 3 | Status: SHIPPED | OUTPATIENT
Start: 2017-08-14 | End: 2019-08-14 | Stop reason: SDUPTHER

## 2017-08-14 ASSESSMENT — ENCOUNTER SYMPTOMS
COUGH: 0
BACK PAIN: 0
CONSTIPATION: 0
CHEST TIGHTNESS: 0
EYE PAIN: 0
BLOOD IN STOOL: 0
SHORTNESS OF BREATH: 0
NAUSEA: 0
ABDOMINAL PAIN: 0
SORE THROAT: 0
TROUBLE SWALLOWING: 0

## 2017-08-18 ENCOUNTER — CARE COORDINATION (OUTPATIENT)
Dept: CASE MANAGEMENT | Age: 67
End: 2017-08-18

## 2017-08-20 ENCOUNTER — NURSE TRIAGE (OUTPATIENT)
Dept: ADMINISTRATIVE | Age: 67
End: 2017-08-20

## 2017-08-21 ENCOUNTER — CARE COORDINATION (OUTPATIENT)
Dept: CARE COORDINATION | Age: 67
End: 2017-08-21

## 2017-08-22 ENCOUNTER — CARE COORDINATION (OUTPATIENT)
Dept: CASE MANAGEMENT | Age: 67
End: 2017-08-22

## 2017-08-25 ENCOUNTER — CARE COORDINATION (OUTPATIENT)
Dept: CARE COORDINATION | Age: 67
End: 2017-08-25

## 2017-08-26 ENCOUNTER — NURSE TRIAGE (OUTPATIENT)
Dept: ADMINISTRATIVE | Age: 67
End: 2017-08-26

## 2017-08-28 ENCOUNTER — CARE COORDINATION (OUTPATIENT)
Dept: CARE COORDINATION | Age: 67
End: 2017-08-28

## 2017-08-29 ENCOUNTER — NURSE TRIAGE (OUTPATIENT)
Dept: ADMINISTRATIVE | Age: 67
End: 2017-08-29

## 2017-09-07 ENCOUNTER — CARE COORDINATION (OUTPATIENT)
Dept: CARE COORDINATION | Age: 67
End: 2017-09-07

## 2017-09-11 ENCOUNTER — TELEPHONE (OUTPATIENT)
Dept: FAMILY MEDICINE CLINIC | Age: 67
End: 2017-09-11

## 2017-09-13 ENCOUNTER — OFFICE VISIT (OUTPATIENT)
Dept: FAMILY MEDICINE CLINIC | Age: 67
End: 2017-09-13

## 2017-09-13 VITALS
RESPIRATION RATE: 18 BRPM | SYSTOLIC BLOOD PRESSURE: 132 MMHG | BODY MASS INDEX: 40.43 KG/M2 | WEIGHT: 315 LBS | HEIGHT: 74 IN | HEART RATE: 64 BPM | DIASTOLIC BLOOD PRESSURE: 64 MMHG

## 2017-09-13 DIAGNOSIS — E11.42 DIABETIC PERIPHERAL NEUROPATHY (HCC): ICD-10-CM

## 2017-09-13 DIAGNOSIS — I25.5 ISCHEMIC CARDIOMYOPATHY: ICD-10-CM

## 2017-09-13 DIAGNOSIS — I25.708 CORONARY ARTERY DISEASE OF BYPASS GRAFT OF NATIVE HEART WITH STABLE ANGINA PECTORIS (HCC): ICD-10-CM

## 2017-09-13 DIAGNOSIS — E78.00 HYPERCHOLESTEREMIA: ICD-10-CM

## 2017-09-13 PROCEDURE — 99213 OFFICE O/P EST LOW 20 MIN: CPT | Performed by: FAMILY MEDICINE

## 2017-09-13 ASSESSMENT — ENCOUNTER SYMPTOMS
COUGH: 0
CHEST TIGHTNESS: 0
ORTHOPNEA: 0
CONSTIPATION: 0
ABDOMINAL PAIN: 0
TROUBLE SWALLOWING: 0
SORE THROAT: 0
BLOOD IN STOOL: 0
EYE PAIN: 0
SHORTNESS OF BREATH: 0
NAUSEA: 0
BACK PAIN: 0

## 2017-10-05 RX ORDER — INSULIN ASPART 100 [IU]/ML
INJECTION, SOLUTION INTRAVENOUS; SUBCUTANEOUS
Qty: 15 PEN | Refills: 3 | Status: SHIPPED | OUTPATIENT
Start: 2017-10-05 | End: 2018-11-05 | Stop reason: SDUPTHER

## 2017-10-09 ENCOUNTER — CARE COORDINATION (OUTPATIENT)
Dept: CARE COORDINATION | Age: 67
End: 2017-10-09

## 2017-10-09 NOTE — CARE COORDINATION
Ambulatory Care Coordination Note  10/9/2017  CM Risk Score: 2  Mallory Mortality Risk Score: 5.13    ACC: Alvino Arroyo, RN    Summary Note: spoke with [facility]. Discussed that his blood sugars are running around . We discussed the importance of eating at least 3 meals per day as he states he is just not hungry. States he has lost 22# but that is therapuetic weight loss for him. Discussed importance of monitoring his blood sugars before meals and taking that meal time insulin. States he is eating his meals and keeps candy by his bedside incase he goes too low. Discussed the importance of eating before he does to bed. States that he will keep an eye on them. Educated him to call me if he continues to have any low readings so that further interventions can be put in. He states he doesn't want his medication adjusted at this time as he feels he can he manage his blood sugars with just diet. Educated him to use Zone Management tools as references. Diabetes Assessment    Medic Alert ID:  No  Meal Planning:  Avoidance of concentrated sweets   How often do you test your blood sugar?:  Meals   Do you have barriers with adherence to non-pharmacologic self-management interventions?  (Nutrition/Exercise/Self-Monitoring):  Yes   Have you ever had to go to the ED for symptoms of low blood sugar?:  No       Symptoms of Low Blood Sugar                Care Coordination Interventions    Program Enrollment:  Rising Risk  Referral from Primary Care Provider:  Yes  Suggested Interventions and Community Resources  Fall Risk Prevention:  Completed  Meals on Wheels:  Completed  Medication Assistance Program:  Completed  Pharmacist:  Declined  Senior Services:  Completed  Social Work:  Completed  Other Services:  Completed  Transportation Support:  Completed  Zone Management Tools:  Completed  Other Services or Interventions:  [place] on [facility], Meals on [employer], SW, ACJFS         Goals Addressed             Most Recent    Guilherme Lopez VI test strips (ROYA CONTOUR TEST) strip Use to check blood sugar 3x daily, Dx: E11.9 7/1/17  Yes Naveen Solano MD   amLODIPine (NORVASC) 5 MG tablet TAKE ONE TABLET BY MOUTH ONCE DAILY 6/6/17  Yes Naveen Solano MD   atorvastatin (LIPITOR) 10 MG tablet Take 1 tablet by mouth daily 6/6/17  Yes Naveen Solano MD   enalapril (VASOTEC) 10 MG tablet TAKE ONE TABLET BY MOUTH TWICE DAILY 6/6/17  Yes Naveen Solano MD   pantoprazole (PROTONIX) 40 MG tablet Take 1 tablet by mouth daily 6/6/17  Yes Naveen Solano MD   loperamide (IMODIUM) 2 MG capsule Take 2 mg by mouth as needed for Diarrhea    Yes Historical Provider, MD       Future Appointments  Date Time Provider Anel Long   11/9/2017 12:15 PM Jaz De La Torre MD [address] [facility] [place]   12/13/2017 2:10 PM Naveen Solano MD [address] [place] [address]

## 2017-10-18 ENCOUNTER — CARE COORDINATION (OUTPATIENT)
Dept: CARE COORDINATION | Age: 67
End: 2017-10-18

## 2017-10-18 NOTE — CARE COORDINATION
Ambulatory Care Coordination Note  10/18/2017  CM Risk Score: 2  Mallory Mortality Risk Score: 5.13    ACC: Kristen Cuevas, RN    Summary Note: spoke with [facility]. He called and wanted to know if instant coffee or regular coffee was better for him. Discussed blood sugars trends and states he does have some lows 1-2 x month with a reading of 60. Discussed not taking insulin when blood sugars are that low until he eats and he checks it again. Verbalized understanding. Discussed getting flu shot and he states he will get it done at PCP office visit. States most recent blood sugar was 106. Continues with Meals on Wheels. Able to afford medications right now with insurance coverage. Discussed to use Zone management tool to use as resource and to call PCP office for treatment before seeking ED. Diabetes Assessment    Medic Alert ID:  No  Meal Planning:  Avoidance of concentrated sweets   How often do you test your blood sugar?:  Meals   Do you have barriers with adherence to non-pharmacologic self-management interventions?  (Nutrition/Exercise/Self-Monitoring):  Yes   Have you ever had to go to the ED for symptoms of low blood sugar?:  No       Increase or Decrease trend in Blood Sugars   Do you have hyperglycemia symptoms?:  No   Do you have hypoglycemia symptoms?:  Yes   Frequency of Episodes:  2 Per:  Month   Last Blood Sugar Value:  106   Blood Sugar Monitoring Regimen:  Before Meals, At Bedtime   Blood Sugar Trends:  Fluctuating                   Care Coordination Interventions    Program Enrollment:  Rising Risk  Referral from Primary Care Provider:  Yes  Suggested Interventions and Community Resources  Fall Risk Prevention:  Completed  Meals on Wheels:  Completed  Medication Assistance Program:  Completed  Pharmacist:  Declined  Senior Services:  Completed  Social Work:  Completed  Other Services:  Completed  Transportation Support:  Completed  Zone Management Tools:  Completed  Other Services or

## 2017-10-21 ENCOUNTER — NURSE TRIAGE (OUTPATIENT)
Dept: ADMINISTRATIVE | Age: 67
End: 2017-10-21

## 2017-10-21 NOTE — TELEPHONE ENCOUNTER
Caller states that he states has been having nausea since he got up at 1030A, Vomited about a cup worth. Of liquid this morning, Took some beef broth and it seemed better. Seems to happen each time he eats pork. Denies chest or shortness of breath, has no transportation get some crackers or 7 up type thing to settle his stomach,. No meds of any kind or tums. Thinks that he might be alble to call someone to get these simple things for him.  Will come to the Er if he is not able to settle this down, or started into chest pain,

## 2017-11-16 ENCOUNTER — CARE COORDINATION (OUTPATIENT)
Dept: CARE COORDINATION | Age: 67
End: 2017-11-16

## 2017-11-16 NOTE — CARE COORDINATION
Ambulatory Care Coordination Note  11/16/2017  CM Risk Score: 2  Mallory Mortality Risk Score: 5.13    ACC: Deondre Parmar RN    Summary Note: spoke with [facility]. States he is at baseline. Called to reschedule his appt with PCP due to being out of town December 9th through the 23rd. Rescheduled appt with PCP office. States his blood sugars are running good. Encouraged him to call [place] [facility] to see if they would help him with some craw space issues. States he is going to call them and [facility] regarding fire wood. Encouraged him to call PCP office or myself with chronic conditions before seeking ED treatment for issues we can manage at home. Diabetes Assessment    Medic Alert ID:  No  Meal Planning:  Avoidance of concentrated sweets   How often do you test your blood sugar?:  Meals   Do you have barriers with adherence to non-pharmacologic self-management interventions? (Nutrition/Exercise/Self-Monitoring):  Yes   Have you ever had to go to the ED for symptoms of low blood sugar?:  No       No patient-reported symptoms                Care Coordination Interventions    Program Enrollment:  Rising Risk  Referral from Primary Care Provider:  Yes  Suggested Interventions and Community Resources  Fall Risk Prevention:  Completed  Meals on Wheels:  Completed  Medication Assistance Program:  Completed  Pharmacist:  Declined  Senior Services:  Completed  Social Work:  Completed  Other Services:  Completed  Transportation Support:  Completed  Zone Management Tools:  Completed  Other Services or Interventions:  [place] [facility], Meals on [employer], EITAN, ACJFS         Goals Addressed             Most Recent     Conditions and Symptoms   On track (11/16/2017)             I will notify my provider of any symptoms that indicate a worsening of my condition.     Barriers: financial  Plan for overcoming my barriers: HH support, CC support  Confidence: 9/10  Anticipated Goal Completion Date: 1-18-18         Self Monitoring   On track (11/16/2017)             Self-Monitored Blood Glucose - I will check my blood sugar Other: AC,HS and before meals    Patient Reported Blood Glucose No flowsheet data found. Barriers: none  Plan for overcoming my barriers: N/A  Confidence: 8/10  Anticipated Goal Completion Date: 1-18-8              Prior to Admission medications    Medication Sig Start Date End Date Taking?  Authorizing Provider   NOVOLOG FLEXPEN 100 UNIT/ML injection pen INJECT 12 UNITS SUBCUTANEOUSLY 3 TIMES A DAY BEFORE MEALS 10/5/17  Yes Shana Rodriguez MD   nitroGLYCERIN (NITROSTAT) 0.4 MG SL tablet Place 1 tablet under the tongue every 5 minutes as needed for Chest pain 8/14/17  Yes Shana Rodriguez MD   aspirin 81 MG tablet Take 1 tablet by mouth daily 8/8/17  Yes Mary Torres MD   carvedilol (COREG) 3.125 MG tablet Take 1 tablet by mouth 2 times daily (with meals) 8/8/17  Yes Mary Torres MD   ticagrelor (BRILINTA) 90 MG TABS tablet Take 1 tablet by mouth 2 times daily 8/8/17  Yes Mary Torres MD   insulin glargine (LANTUS SOLOSTAR) 100 UNIT/ML injection pen Inject 20 Units into the skin nightly 8/8/17  Yes Mary Torres MD   acetaminophen (TYLENOL) 500 MG tablet Take 500 mg by mouth as needed for Pain   Yes Historical Provider, MD   Multiple Vitamins-Minerals (DAILY MULTI VITAMIN/MINERALS PO) Take 1 tablet by mouth daily   Yes Historical Provider, MD   isosorbide mononitrate (IMDUR) 30 MG extended release tablet Take 1 tablet by mouth daily 7/19/17  Yes Rodrigo Lutz PA-C   furosemide (LASIX) 20 MG tablet TAKE ONE TABLET BY MOUTH ONCE DAILY 7/1/17  Yes Shana Rodriguez MD   glucose blood VI test strips (ROYA CONTOUR TEST) strip Use to check blood sugar 3x daily, Dx: E11.9 7/1/17  Yes Shana Rodriguez MD   amLODIPine (NORVASC) 5 MG tablet TAKE ONE TABLET BY MOUTH ONCE DAILY 6/6/17  Yes Shana Rodriguez MD   atorvastatin (LIPITOR) 10 MG tablet Take 1 tablet by mouth daily 6/6/17  Yes Kaycee Butler MD [place]   enalapril (VASOTEC) 10 MG tablet TAKE ONE TABLET BY MOUTH TWICE DAILY 6/6/17  Yes Saw Diallo MD   pantoprazole (PROTONIX) 40 MG tablet Take 1 tablet by mouth daily 6/6/17  Yes Saw Diallo MD   loperamide (IMODIUM) 2 MG capsule Take 2 mg by mouth as needed for Diarrhea    Yes Historical Provider, MD       Future Appointments  Date Time Provider Anel Long   12/27/2017 12:00 PM Saw Diallo  [facility]   1/10/2018 12:00 PM Darcy Desir MD [address] [facility] [place]

## 2017-11-27 ENCOUNTER — CARE COORDINATION (OUTPATIENT)
Dept: CARE COORDINATION | Age: 67
End: 2017-11-27

## 2017-11-27 NOTE — CARE COORDINATION
Ambulatory Care Coordination Note  11/27/2017  CM Risk Score: 2  Mallory Mortality Risk Score: 5.13    ACC: Zac Jauregui, RN    Summary Note: spoke with [facility]. He called stating he fell at his sisters house and fell in the driveway while there. States he landed on his rt shoulder. Denies serious injury but states it does hurt. States he is taking Ibuprofen and is applying Icy Hot to try to help. Discussed that is pain did not resolve or improve to call myself or PCP office. Educated him that it should start feeling each day and if it doesn't improve he needs to call PCP office to seek treatment. States his blood sugars are running good with readings of 106-108. Discussed food options as he received several pounds of free food from the food panty. Diabetes Assessment    Medic Alert ID:  No  Meal Planning:  Avoidance of concentrated sweets   How often do you test your blood sugar?:  Meals   Do you have barriers with adherence to non-pharmacologic self-management interventions? (Nutrition/Exercise/Self-Monitoring):  Yes   Have you ever had to go to the ED for symptoms of low blood sugar?:  No                    Care Coordination Interventions    Program Enrollment:  Rising Risk  Referral from Primary Care Provider:  Yes  Suggested Interventions and Community Resources  Fall Risk Prevention:  Completed  Meals on Wheels:  Completed  Medication Assistance Program:  Completed  Pharmacist:  Declined  Senior Services:  Completed  Social Work:  Completed  Other Services:  Completed  Transportation Support:  Completed  Zone Management Tools:  Completed  Other Services or Interventions:  [place] [facility], Meals on [employer], SW, ACJFS         Goals Addressed             Most Recent     Conditions and Symptoms   On track (11/27/2017)             I will notify my provider of any symptoms that indicate a worsening of my condition.     Barriers: financial  Plan for overcoming my barriers: HH support, CC support  Confidence:

## 2017-12-07 ENCOUNTER — NURSE TRIAGE (OUTPATIENT)
Dept: ADMINISTRATIVE | Age: 67
End: 2017-12-07

## 2017-12-08 DIAGNOSIS — K21.9 GASTROESOPHAGEAL REFLUX DISEASE WITHOUT ESOPHAGITIS: ICD-10-CM

## 2017-12-08 DIAGNOSIS — E78.00 HYPERCHOLESTEREMIA: ICD-10-CM

## 2017-12-08 DIAGNOSIS — I15.0 RENOVASCULAR HYPERTENSION: ICD-10-CM

## 2017-12-08 RX ORDER — AMLODIPINE BESYLATE 5 MG/1
TABLET ORAL
Qty: 90 TABLET | Refills: 1 | Status: SHIPPED | OUTPATIENT
Start: 2017-12-08 | End: 2018-06-12 | Stop reason: SDUPTHER

## 2017-12-08 RX ORDER — PANTOPRAZOLE SODIUM 40 MG/1
40 TABLET, DELAYED RELEASE ORAL DAILY
Qty: 90 TABLET | Refills: 1 | Status: SHIPPED | OUTPATIENT
Start: 2017-12-08 | End: 2018-06-12 | Stop reason: SDUPTHER

## 2017-12-08 RX ORDER — ATORVASTATIN CALCIUM 10 MG/1
10 TABLET, FILM COATED ORAL DAILY
Qty: 90 TABLET | Refills: 1 | Status: SHIPPED | OUTPATIENT
Start: 2017-12-08 | End: 2018-06-12 | Stop reason: SDUPTHER

## 2017-12-08 NOTE — TELEPHONE ENCOUNTER
Lazarus Reyes called requesting a refill of their:    pantoprazole (PROTONIX) 40 MG tablet qd  atorvastatin (LIPITOR) 10 MG tablet qd  amLODIPine (NORVASC) 5 MG tablet qd    Send 90 day supply to [facility] on [address]    Pt is leaving tomorrow morning for 2 weeks. He would like to  TODAY.

## 2017-12-27 ENCOUNTER — TELEPHONE (OUTPATIENT)
Dept: FAMILY MEDICINE CLINIC | Age: 67
End: 2017-12-27

## 2017-12-27 NOTE — TELEPHONE ENCOUNTER
Pt informed of missed appointment and has been fighting with diarrhea and forgot about appointment. Will call back when he feels better.

## 2018-01-02 ENCOUNTER — CARE COORDINATION (OUTPATIENT)
Dept: CARE COORDINATION | Age: 68
End: 2018-01-02

## 2018-01-02 NOTE — CARE COORDINATION
notify my provider of any symptoms that indicate a worsening of my condition. Barriers: financial  Plan for overcoming my barriers: HH support, CC support  Confidence: 9/10  Anticipated Goal Completion Date: 1-18-18         Self Monitoring   On track (1/2/2018)             Self-Monitored Blood Glucose - I will check my blood sugar Other: AC,HS and before meals    Patient Reported Blood Glucose No flowsheet data found. Barriers: none  Plan for overcoming my barriers: N/A  Confidence: 8/10  Anticipated Goal Completion Date: 1-18-8              Prior to Admission medications    Medication Sig Start Date End Date Taking?  Authorizing Provider   pantoprazole (PROTONIX) 40 MG tablet Take 1 tablet by mouth daily 12/8/17   Matilde Lr MD   amLODIPine (NORVASC) 5 MG tablet TAKE ONE TABLET BY MOUTH ONCE DAILY 12/8/17   Matilde Lr MD   atorvastatin (LIPITOR) 10 MG tablet Take 1 tablet by mouth daily 12/8/17   Matilde Lr MD   NOVOLOG FLEXPEN 100 UNIT/ML injection pen INJECT 12 UNITS SUBCUTANEOUSLY 3 TIMES A DAY BEFORE MEALS 10/5/17   Matilde Lr MD   nitroGLYCERIN (NITROSTAT) 0.4 MG SL tablet Place 1 tablet under the tongue every 5 minutes as needed for Chest pain 8/14/17   Matilde Lr MD   aspirin 81 MG tablet Take 1 tablet by mouth daily 8/8/17   Miles Coyle MD   carvedilol (COREG) 3.125 MG tablet Take 1 tablet by mouth 2 times daily (with meals) 8/8/17   Miles Coyle MD   ticagrelor (BRILINTA) 90 MG TABS tablet Take 1 tablet by mouth 2 times daily 8/8/17   Miles Coyle MD   insulin glargine (LANTUS SOLOSTAR) 100 UNIT/ML injection pen Inject 20 Units into the skin nightly 8/8/17   Miles Coyle MD   acetaminophen (TYLENOL) 500 MG tablet Take 500 mg by mouth as needed for Pain    Historical Provider, MD   Multiple Vitamins-Minerals (DAILY MULTI VITAMIN/MINERALS PO) Take 1 tablet by mouth daily    Historical Provider, MD   isosorbide mononitrate (IMDUR) 30 MG extended release tablet Take 1 tablet by mouth daily 7/19/17   Earle Bloch, PA-C   furosemide (LASIX) 20 MG tablet TAKE ONE TABLET BY MOUTH ONCE DAILY 7/1/17   Alfredo Mitchell MD   glucose blood VI test strips (ROYA CONTOUR TEST) strip Use to check blood sugar 3x daily, Dx: E11.9 7/1/17   Alfredo Mitchell MD   enalapril (VASOTEC) 10 MG tablet TAKE ONE TABLET BY MOUTH TWICE DAILY 6/6/17   Alfredo Mitchell MD   loperamide (IMODIUM) 2 MG capsule Take 2 mg by mouth as needed for Diarrhea     Historical Provider, MD       Future Appointments  Date Time Provider Anel Mercedes   1/10/2018 12:00 PM Fabricio Fletcher MD [address] [facility]

## 2018-01-08 ENCOUNTER — TELEPHONE (OUTPATIENT)
Dept: FAMILY MEDICINE CLINIC | Age: 68
End: 2018-01-08

## 2018-01-08 ENCOUNTER — CARE COORDINATION (OUTPATIENT)
Dept: CARE COORDINATION | Age: 68
End: 2018-01-08

## 2018-01-08 RX ORDER — AMOXICILLIN AND CLAVULANATE POTASSIUM 875; 125 MG/1; MG/1
1 TABLET, FILM COATED ORAL 2 TIMES DAILY
Qty: 20 TABLET | Refills: 0 | Status: SHIPPED | OUTPATIENT
Start: 2018-01-08 | End: 2018-01-18

## 2018-01-08 NOTE — CARE COORDINATION
Appt made for this week Thursday for mouse bite. His pharmacy is [facility] [email or web address]. PCP states he will be ordering antibiotic and tetanus shot. Please advise. Thank you.

## 2018-01-08 NOTE — TELEPHONE ENCOUNTER
Sharla Sánchez RN 1 hour ago (10:03 AM)      Appt made for this week Thursday for mouse bite. His pharmacy is [facility] [address]. PCP states he will be ordering antibiotic and tetanus shot. Please advise. Thank you. Unsigned   Documentation       Sharla Sánchez, RN contacted Miriam Malcolm. Mo 1 hour ago (9:57 AM)      Sharla Sánchez RN 1 hour ago (9:55 AM)      Janet Minaia called and states he was sitting in his recliner when a mouse climbed up his pants and bit him in the leg. States the bite is behind his leg so he is unable to see it. Did see active bleeding. States he dressed it and put antibiotic ointment on it. Spoke with PCP and Dilma Cabrera. States to make PCP appt. Appt made for this week Thursday. Informed him that an antibiotic was going to be called in as well as a tetanus shot that he will need to get while at pharmacy.

## 2018-01-09 ENCOUNTER — TELEPHONE (OUTPATIENT)
Dept: FAMILY MEDICINE CLINIC | Age: 68
End: 2018-01-09

## 2018-01-10 ENCOUNTER — OFFICE VISIT (OUTPATIENT)
Dept: CARDIOLOGY CLINIC | Age: 68
End: 2018-01-10
Payer: MEDICARE

## 2018-01-10 VITALS
DIASTOLIC BLOOD PRESSURE: 68 MMHG | BODY MASS INDEX: 40.43 KG/M2 | HEART RATE: 72 BPM | SYSTOLIC BLOOD PRESSURE: 124 MMHG | HEIGHT: 74 IN | WEIGHT: 315 LBS

## 2018-01-10 DIAGNOSIS — E78.01 FAMILIAL HYPERCHOLESTEROLEMIA: ICD-10-CM

## 2018-01-10 DIAGNOSIS — I25.708 CORONARY ARTERY DISEASE OF BYPASS GRAFT OF NATIVE HEART WITH STABLE ANGINA PECTORIS (HCC): ICD-10-CM

## 2018-01-10 DIAGNOSIS — I10 ESSENTIAL HYPERTENSION: Primary | ICD-10-CM

## 2018-01-10 PROCEDURE — 4040F PNEUMOC VAC/ADMIN/RCVD: CPT | Performed by: NUCLEAR MEDICINE

## 2018-01-10 PROCEDURE — G8484 FLU IMMUNIZE NO ADMIN: HCPCS | Performed by: NUCLEAR MEDICINE

## 2018-01-10 PROCEDURE — 1123F ACP DISCUSS/DSCN MKR DOCD: CPT | Performed by: NUCLEAR MEDICINE

## 2018-01-10 PROCEDURE — G8598 ASA/ANTIPLAT THER USED: HCPCS | Performed by: NUCLEAR MEDICINE

## 2018-01-10 PROCEDURE — 1036F TOBACCO NON-USER: CPT | Performed by: NUCLEAR MEDICINE

## 2018-01-10 PROCEDURE — G8427 DOCREV CUR MEDS BY ELIG CLIN: HCPCS | Performed by: NUCLEAR MEDICINE

## 2018-01-10 PROCEDURE — G8417 CALC BMI ABV UP PARAM F/U: HCPCS | Performed by: NUCLEAR MEDICINE

## 2018-01-10 PROCEDURE — 99213 OFFICE O/P EST LOW 20 MIN: CPT | Performed by: NUCLEAR MEDICINE

## 2018-01-10 PROCEDURE — 3017F COLORECTAL CA SCREEN DOC REV: CPT | Performed by: NUCLEAR MEDICINE

## 2018-01-10 RX ORDER — FUROSEMIDE 20 MG/1
TABLET ORAL
Qty: 90 TABLET | Refills: 1
Start: 2018-01-10 | End: 2018-01-15 | Stop reason: SDUPTHER

## 2018-01-10 NOTE — PROGRESS NOTES
[facility] [address]  [address]  [place] [address]  Dept: [phone]  Dept Fax: [phone]  Loc: [phone]    Visit Date: 1/10/2018    Alice Hall is a 69 y.o. male who presents today for:  Chief Complaint   Patient presents with    3 Month Follow-Up    Hypertension    Coronary Artery Disease    Hyperlipidemia     Stent to native LAD in summer 2017  Previous CABG   No chest pain  No changes in breathing  Obesity   BP is stable     HPI:  HPI  Past Medical History:   Diagnosis Date    ASHD (arteriosclerotic heart disease) 2005 2006    stent  baki    Depression     Diabetic peripheral neuropathy ([facility] [address] 2014    Fracture Oct 1984    left lower extremity     HTN (hypertension)     Hypercholesteremia     IDDM (insulin dependent diabetes mellitus) ([facility] [address]     Low back pain     Obesity     Osteoarthritis       Past Surgical History:   Procedure Laterality Date    AMPUTATION Left 9/10/14    partial versus complete left hallux amputation, left great toe amputation    APPENDECTOMY      CARDIAC SURGERY      CERVICAL DISC SURGERY  2000    fusion of C5-C6    CHOLECYSTECTOMY      COLONOSCOPY  2007 and 2011    colonn polyps  lorenzo    CORONARY ANGIOPLASTY WITH STENT PLACEMENT  08/07/2017    Dr Kayce [facility]redondo [facility]   lad    CORONARY ARTERY BYPASS GRAFT  2015 august     dox    EKG 12-LEAD  8/14/2015         FRACTURE SURGERY      left leg    JOINT REPLACEMENT      bilateral knees gurvinder    ROTATOR CUFF REPAIR      SPINE SURGERY  2010    fumich    TONSILLECTOMY      VASCULAR SURGERY      cabg harvests from left leg     Family History   Problem Relation Age of Onset    Cancer Mother      uterine     Social History   Substance Use Topics    Smoking status: Never Smoker    Smokeless tobacco: Never Used    Alcohol use Yes      Comment: rarely      Current Outpatient Prescriptions   Medication Sig Dispense Refill    furosemide (LASIX) 20 MG tablet TAKE 2 medications. All patient questions answered. Pt voiced understanding. Instructed to continue current medications, diet and exercise. Continue risk factor modification and medical management. Patient agreed with treatment plan. Follow up as directed.     Electronically signed by Yuridia Hudson MD on 1/10/2018 at 12:29 PM

## 2018-01-11 ENCOUNTER — OFFICE VISIT (OUTPATIENT)
Dept: FAMILY MEDICINE CLINIC | Age: 68
End: 2018-01-11

## 2018-01-11 VITALS
HEIGHT: 74 IN | RESPIRATION RATE: 16 BRPM | SYSTOLIC BLOOD PRESSURE: 102 MMHG | WEIGHT: 315 LBS | BODY MASS INDEX: 40.43 KG/M2 | DIASTOLIC BLOOD PRESSURE: 64 MMHG | HEART RATE: 72 BPM

## 2018-01-11 DIAGNOSIS — G89.29 CHRONIC MIDLINE LOW BACK PAIN WITH SCIATICA, SCIATICA LATERALITY UNSPECIFIED: ICD-10-CM

## 2018-01-11 DIAGNOSIS — E11.42 DIABETIC PERIPHERAL NEUROPATHY (HCC): ICD-10-CM

## 2018-01-11 DIAGNOSIS — I25.708 CORONARY ARTERY DISEASE OF BYPASS GRAFT OF NATIVE HEART WITH STABLE ANGINA PECTORIS (HCC): ICD-10-CM

## 2018-01-11 DIAGNOSIS — S81.852A ANIMAL BITE OF LEFT LOWER LEG, INITIAL ENCOUNTER: ICD-10-CM

## 2018-01-11 DIAGNOSIS — M54.40 CHRONIC MIDLINE LOW BACK PAIN WITH SCIATICA, SCIATICA LATERALITY UNSPECIFIED: ICD-10-CM

## 2018-01-11 DIAGNOSIS — I15.9 SECONDARY HYPERTENSION: ICD-10-CM

## 2018-01-11 LAB
CREATININE URINE POCT: ABNORMAL
GLUCOSE BLD-MCNC: 56 MG/DL
GLUCOSE BLD-MCNC: 57 MG/DL
HBA1C MFR BLD: 6.7 %
MICROALBUMIN/CREAT 24H UR: 100 MG/G{CREAT}
MICROALBUMIN/CREAT UR-RTO: ABNORMAL

## 2018-01-11 PROCEDURE — 83036 HEMOGLOBIN GLYCOSYLATED A1C: CPT | Performed by: FAMILY MEDICINE

## 2018-01-11 PROCEDURE — 99213 OFFICE O/P EST LOW 20 MIN: CPT | Performed by: FAMILY MEDICINE

## 2018-01-11 PROCEDURE — 82962 GLUCOSE BLOOD TEST: CPT | Performed by: FAMILY MEDICINE

## 2018-01-11 PROCEDURE — 82044 UR ALBUMIN SEMIQUANTITATIVE: CPT | Performed by: FAMILY MEDICINE

## 2018-01-11 ASSESSMENT — ENCOUNTER SYMPTOMS
NAUSEA: 0
CHEST TIGHTNESS: 0
EYE PAIN: 0
SHORTNESS OF BREATH: 0
ABDOMINAL PAIN: 0
COUGH: 0
TROUBLE SWALLOWING: 0
BACK PAIN: 0
SORE THROAT: 0
BLOOD IN STOOL: 0
CONSTIPATION: 0

## 2018-01-11 NOTE — PROGRESS NOTES
Re-checked blood sugar which resulted at 57. Patient states his vision is better and feels better. No complaints. Dr Anupam Stenr informed and per verbal order gave patient a Mug Rootbeer to drink to help raise sugar.

## 2018-01-11 NOTE — PROGRESS NOTES
cough, chest tightness and shortness of breath. Cardiovascular: Negative for chest pain, palpitations and leg swelling. Gastrointestinal: Negative for abdominal pain, blood in stool, constipation and nausea. Genitourinary: Negative for difficulty urinating, frequency and urgency. Musculoskeletal: Negative for arthralgias, back pain, joint swelling and neck stiffness. Skin: Negative for rash. Neurological: Negative for dizziness, focal weakness, seizures, loss of consciousness, weakness, light-headedness and headaches. Hematological: Negative for adenopathy. Does not bruise/bleed easily. Psychiatric/Behavioral: Negative for behavioral problems, dysphoric mood and sleep disturbance. /64 (Site: Right Arm, Position: Sitting, Cuff Size: Large Adult)   Pulse 72   Resp 16   Ht 6' 2\" (1.88 m)   Wt (!) 353 lb 6 oz (160.3 kg)   BMI 45.37 kg/m²   Objective:   Physical Exam   Constitutional: He is oriented to person, place, and time. He appears well-developed and well-nourished. HENT:   Head: Normocephalic and atraumatic. Right Ear: External ear normal.   Left Ear: External ear normal.   Nose: Nose normal.   Mouth/Throat: Oropharynx is clear and moist.   Eyes: Conjunctivae and EOM are normal. Pupils are equal, round, and reactive to light. Fundi nl   Neck: Normal range of motion. Neck supple. No thyromegaly present. Cardiovascular: Normal rate, regular rhythm, normal heart sounds and intact distal pulses. Pulmonary/Chest: Effort normal and breath sounds normal. He has no wheezes. He has no rales. Abdominal: Soft. Bowel sounds are normal. He exhibits no mass. There is no tenderness. Musculoskeletal: Normal range of motion. Lymphadenopathy:     He has no cervical adenopathy. Neurological: He is alert and oriented to person, place, and time. He has normal reflexes. No cranial nerve deficit. Pain in lower back area.  In addition still is neuropathy of feet ankle lower leg   Skin: acetaminophen (TYLENOL) 500 MG tablet Take 500 mg by mouth as needed for Pain      Multiple Vitamins-Minerals (DAILY MULTI VITAMIN/MINERALS PO) Take 1 tablet by mouth daily      isosorbide mononitrate (IMDUR) 30 MG extended release tablet Take 1 tablet by mouth daily 30 tablet 3    glucose blood VI test strips (ROYA CONTOUR TEST) strip Use to check blood sugar 3x daily, Dx: E11.9 300 each 3    enalapril (VASOTEC) 10 MG tablet TAKE ONE TABLET BY MOUTH TWICE DAILY 180 tablet 1     No current facility-administered medications for this visit. Results for orders placed or performed in visit on 01/11/18   POCT glycosylated hemoglobin (Hb A1C)   Result Value Ref Range    Hemoglobin A1C 6.7 %   POCT Glucose   Result Value Ref Range    Glucose 56 mg/dL   POCT Glucose   Result Value Ref Range    Glucose 57 mg/dL     Denisse Carty received counseling on the following healthy behaviors: nutrition and exercise    Patient given educational materials on Diabetes    I have instructed Denisse Carty to complete a self tracking handout on Blood Sugars  and Blood Pressures  and instructed them to bring it with them to his next appointment. Discussed use, benefit, and side effects of prescribed medications. Barriers to medication compliance addressed. All patient questions answered. Pt voiced understanding.           See in  3mths   Bite  stabkle   Stop  augmentin after  7 days

## 2018-01-15 ENCOUNTER — NURSE TRIAGE (OUTPATIENT)
Dept: ADMINISTRATIVE | Age: 68
End: 2018-01-15

## 2018-01-15 ENCOUNTER — TELEPHONE (OUTPATIENT)
Dept: FAMILY MEDICINE CLINIC | Age: 68
End: 2018-01-15

## 2018-01-15 ENCOUNTER — CARE COORDINATION (OUTPATIENT)
Dept: CARE COORDINATION | Age: 68
End: 2018-01-15

## 2018-01-15 RX ORDER — FUROSEMIDE 20 MG/1
TABLET ORAL
Qty: 180 TABLET | Refills: 1 | Status: SHIPPED | OUTPATIENT
Start: 2018-01-15 | End: 2018-07-31 | Stop reason: SDUPTHER

## 2018-01-15 NOTE — TELEPHONE ENCOUNTER
Date of last visit:  1/11/2018  Date of next visit:  4/13/2018    Requested Prescriptions     Signed Prescriptions Disp Refills    furosemide (LASIX) 20 MG tablet 180 tablet 1     Sig: TAKE 2 TABLET BY MOUTH ONCE DAILY     Authorizing Provider: Latoya Elise     Ordering User: Zachariah Davis

## 2018-01-16 NOTE — TELEPHONE ENCOUNTER
Reason for Disposition   Low blood sugar definition and treatment, questions about    Answer Assessment - Initial Assessment Questions  1. SYMPTOMS: \"What symptoms are you concerned about? \"      BS 73 now. (BS Was 62 earlier, so he eat some Carbohydrates)    2. ONSET:  \"When did the symptoms start? \"      Now   3. BLOOD GLUCOSE: \"What is your blood glucose level? \"       73  4. USUAL RANGE: \"What is your blood glucose level usually? \" (e.g., usual fasting morning value, usual evening value)      Usually pretty controlled. 5. TYPE 1 or 2:  \"Do you know what type of diabetes you have? \"  (e.g., Type 1, Type 2, Gestational; doesn't know)      unknown   6. INSULIN: \"Do you take insulin? \"       Yes   7. DIABETES PILLS: \"Do you take any pills for your diabetes? \"      Yes.   8. OTHER SYMPTOMS: \"Do you have any symptoms? \" (e.g., fever, frequent urination, difficulty breathing, vomiting)      Blurred Vision   9. LOW BLOOD GLUCOSE TREATMENT: \"What have you done so far to treat the low blood glucose level? \"     Peanut butter and juice. 10. ALONE: \"Are you alone right now or is someone with you? \"       Yes. 11. PREGNANCY: \"Is there any chance you are pregnant? \" \"When was your last menstrual period? \"      Male    Protocols used: DIABETES - [facility]OW [facility]LOOD [facility]

## 2018-01-22 ENCOUNTER — CARE COORDINATION (OUTPATIENT)
Dept: CARE COORDINATION | Age: 68
End: 2018-01-22

## 2018-01-31 DIAGNOSIS — I15.0 RENOVASCULAR HYPERTENSION: ICD-10-CM

## 2018-01-31 NOTE — TELEPHONE ENCOUNTER
Date of last visit:  1/11/2018  Date of next visit:  4/13/2018    Requested Prescriptions     Pending Prescriptions Disp Refills    enalapril (VASOTEC) 10 MG tablet [Pharmacy Med Name: ENALAPRIL 10MG      TAB] 180 tablet 1     Sig: TAKE ONE TABLET BY MOUTH TWICE DAILY

## 2018-02-01 RX ORDER — ENALAPRIL MALEATE 10 MG/1
TABLET ORAL
Qty: 180 TABLET | Refills: 1 | Status: SHIPPED | OUTPATIENT
Start: 2018-02-01 | End: 2018-09-24 | Stop reason: SDUPTHER

## 2018-02-09 ENCOUNTER — NURSE TRIAGE (OUTPATIENT)
Dept: ADMINISTRATIVE | Age: 68
End: 2018-02-09

## 2018-02-12 ENCOUNTER — NURSE TRIAGE (OUTPATIENT)
Dept: ADMINISTRATIVE | Age: 68
End: 2018-02-12

## 2018-02-13 ENCOUNTER — CARE COORDINATION (OUTPATIENT)
Dept: CARE COORDINATION | Age: 68
End: 2018-02-13

## 2018-02-13 NOTE — CARE COORDINATION
Ambulatory Care Coordination Note  2/13/2018  CM Risk Score: 9  Mallory Mortality Risk Score: 5.13    ACC: Jareth Abbasi, RN    Summary Note: spoke with [facility]. He called explaining that he was bending over picking up something and strained his back. States he took some aspirin and fell asleep. When he woke up, he was cold and clammy and sweaty. States he checked his blood sugar when he woke up and it was 59. States he ate some crackers and drank some juice and got it back up to 97. Discussed his eating patterns during the day and he states he has been eating 2-3 meals per day. States that he doesn't usually have low blood sugars and his normal range is 130. Discussed that if he continues to see a low blood sugar trend, to contact me and let me know so I can notify PCP of trend and possible need for medication adjustment. Verbalized understanding. Educated on the importance of diet and exercise and taking his medication as ordered to help manage his blood sugars. Diabetes Assessment    Medic Alert ID:  No  Meal Planning:  Avoidance of concentrated sweets   How often do you test your blood sugar?:  Meals   Do you have barriers with adherence to non-pharmacologic self-management interventions?  (Nutrition/Exercise/Self-Monitoring):  Yes   Have you ever had to go to the ED for symptoms of low blood sugar?:  No       Do you have hyperglycemia symptoms?:  No   Do you have hypoglycemia symptoms?:  Yes   Frequency of Episodes:  1 Per:  Month   Last Blood Sugar Value:  59   Blood Sugar Monitoring Regimen:  Morning Fasting, Before Meals, At Bedtime   Blood Sugar Trends:  Fluctuating                Care Coordination Interventions    Program Enrollment:  Complex Care  Referral from Primary Care Provider:  Yes  Suggested Interventions and Community Resources  Fall Risk Prevention:  Completed  Meals on Wheels:  Completed  Medication Assistance Program:  Completed  Pharmacist:  [facility]  Senior Services: Completed  Social Work:  Completed  Other Services:  Completed  Transportation Support:  Completed  Zone Management Tools:  Completed  Other Services or Interventions:  [place] on Aging, Meals on [employer], SW, ACJFS         Goals Addressed             Most Recent     Conditions and Symptoms   On track (2/13/2018)             I will notify my provider of any symptoms that indicate a worsening of my condition. Barriers: financial  Plan for overcoming my barriers: HH support, CC support  Confidence: 9/10  Anticipated Goal Completion Date: 1-18-18         Self Monitoring   On track (2/13/2018)             Self-Monitored Blood Glucose - I will check my blood sugar Other: AC,HS and before meals    Patient Reported Blood Glucose No flowsheet data found. Barriers: none  Plan for overcoming my barriers: N/A  Confidence: 8/10  Anticipated Goal Completion Date: 1-18-8              Prior to Admission medications    Medication Sig Start Date End Date Taking?  Authorizing Provider   enalapril (VASOTEC) 10 MG tablet TAKE ONE TABLET BY MOUTH TWICE DAILY 2/1/18  Yes Stephenie Tran MD   metFORMIN (GLUCOPHAGE) 500 MG tablet Take 2 tablets by mouth 2 times daily (with meals) 2/1/18  Yes Stephenie Tran MD   furosemide (LASIX) 20 MG tablet TAKE 2 TABLET BY MOUTH ONCE DAILY 1/15/18  Yes Stephenie Tran MD   pantoprazole (PROTONIX) 40 MG tablet Take 1 tablet by mouth daily 12/8/17  Yes Stephenie Tran MD   amLODIPine (NORVASC) 5 MG tablet TAKE ONE TABLET BY MOUTH ONCE DAILY 12/8/17  Yes Stephenie Tran MD   atorvastatin (LIPITOR) 10 MG tablet Take 1 tablet by mouth daily 12/8/17  Yes Stephenie Tran MD   NOVOLOG FLEXPEN 100 UNIT/ML injection pen INJECT 12 UNITS SUBCUTANEOUSLY 3 TIMES A DAY BEFORE MEALS 10/5/17  Yes Stephenie Tran MD   nitroGLYCERIN (NITROSTAT) 0.4 MG SL tablet Place 1 tablet under the tongue every 5 minutes as needed for Chest pain 8/14/17  Yes Stephenie Tran MD   aspirin 81 MG tablet Take 1 tablet by mouth daily 8/8/17  Yes Deepali Tay MD   carvedilol (COREG) 3.125 MG tablet Take 1 tablet by mouth 2 times daily (with meals) 8/8/17  Yes Deepali Tay MD   ticagrelor (BRILINTA) 90 MG TABS tablet Take 1 tablet by mouth 2 times daily 8/8/17  Yes Deepali Tay MD   insulin glargine (LANTUS SOLOSTAR) 100 UNIT/ML injection pen Inject 20 Units into the skin nightly 8/8/17  Yes Deepali Tay MD   acetaminophen (TYLENOL) 500 MG tablet Take 500 mg by mouth as needed for Pain   Yes Historical Provider, MD   Multiple Vitamins-Minerals (DAILY MULTI VITAMIN/MINERALS PO) Take 1 tablet by mouth daily   Yes Historical Provider, MD   isosorbide mononitrate (IMDUR) 30 MG extended release tablet Take 1 tablet by mouth daily 7/19/17  Yes Lisbeth Lutz PA-C   glucose blood VI test strips (ROYA CONTOUR TEST) strip Use to check blood sugar 3x daily, Dx: E11.9 7/1/17  Yes Amador Avila MD       Future Appointments  Date Time Provider Anel Long   4/13/2018 2:10 PM Amador Avila MD [facility]   9/12/2018 11:00 AM Cindy Alanis MD [address] [facility]

## 2018-02-19 ENCOUNTER — CARE COORDINATION (OUTPATIENT)
Dept: CARE COORDINATION | Age: 68
End: 2018-02-19

## 2018-02-19 NOTE — CARE COORDINATION
Julia Mae called and states he has a small area on the side of his rt leg that is burning. Was not able to describe the area stating he cannot see it. I encouraged him to get a mirror and see if he could see it that way. He states it is not open, no draining, no fever, denies warmth. States he is putting a \"medical\" maryan on it from the hospital and it is making it feel better. His main question for me was whether or not he should wear his boots right now. I discussed with him to monitor the area for the next 24-48 hours, avoid wearing his boots and if it changed or worsened to call me back and I would seek PCP treatment. Sean verbalized understanding and states he will call me back to let me know if it's gone or not. I reminded him to call me if it changed or became worse.

## 2018-02-22 ENCOUNTER — CARE COORDINATION (OUTPATIENT)
Dept: CARE COORDINATION | Age: 68
End: 2018-02-22

## 2018-03-01 ENCOUNTER — HOSPITAL ENCOUNTER (EMERGENCY)
Age: 68
Discharge: HOME | End: 2018-03-01
Attending: EMERGENCY MEDICINE
Payer: MEDICARE

## 2018-03-01 ENCOUNTER — APPOINTMENT (OUTPATIENT)
Dept: GENERAL RADIOLOGY | Age: 68
End: 2018-03-01
Payer: MEDICARE

## 2018-03-01 ENCOUNTER — NURSE TRIAGE (OUTPATIENT)
Dept: ADMINISTRATIVE | Age: 68
End: 2018-03-01

## 2018-03-01 VITALS
HEART RATE: 77 BPM | OXYGEN SATURATION: 99 % | DIASTOLIC BLOOD PRESSURE: 85 MMHG | SYSTOLIC BLOOD PRESSURE: 149 MMHG | TEMPERATURE: 98.1 F | HEIGHT: 74 IN | BODY MASS INDEX: 40.43 KG/M2 | RESPIRATION RATE: 20 BRPM | WEIGHT: 315 LBS

## 2018-03-01 DIAGNOSIS — R07.89 CHEST WALL PAIN: Primary | ICD-10-CM

## 2018-03-01 DIAGNOSIS — I10 PRIMARY HYPERTENSION: ICD-10-CM

## 2018-03-01 LAB
ALBUMIN SERPL-MCNC: 3.9 G/DL (ref 3.5–5.1)
ALP BLD-CCNC: 68 U/L (ref 38–126)
ALT SERPL-CCNC: 21 U/L (ref 11–66)
ANION GAP SERPL CALCULATED.3IONS-SCNC: 13 MEQ/L (ref 8–16)
AST SERPL-CCNC: 17 U/L (ref 5–40)
BASOPHILS # BLD: 0.5 %
BASOPHILS ABSOLUTE: 0 THOU/MM3 (ref 0–0.1)
BILIRUB SERPL-MCNC: 0.5 MG/DL (ref 0.3–1.2)
BUN BLDV-MCNC: 31 MG/DL (ref 7–22)
CALCIUM SERPL-MCNC: 9.5 MG/DL (ref 8.5–10.5)
CHLORIDE BLD-SCNC: 102 MEQ/L (ref 98–111)
CO2: 26 MEQ/L (ref 23–33)
CREAT SERPL-MCNC: 1.1 MG/DL (ref 0.4–1.2)
EKG ATRIAL RATE: 80 BPM
EKG P AXIS: -9 DEGREES
EKG P-R INTERVAL: 188 MS
EKG Q-T INTERVAL: 394 MS
EKG QRS DURATION: 122 MS
EKG QTC CALCULATION (BAZETT): 454 MS
EKG R AXIS: -43 DEGREES
EKG T AXIS: 80 DEGREES
EKG VENTRICULAR RATE: 80 BPM
EOSINOPHIL # BLD: 3.2 %
EOSINOPHILS ABSOLUTE: 0.2 THOU/MM3 (ref 0–0.4)
GFR SERPL CREATININE-BSD FRML MDRD: 66 ML/MIN/1.73M2
GLUCOSE BLD-MCNC: 132 MG/DL (ref 70–108)
HCT VFR BLD CALC: 34.8 % (ref 42–52)
HEMOGLOBIN: 11.9 GM/DL (ref 14–18)
LYMPHOCYTES # BLD: 24.2 %
LYMPHOCYTES ABSOLUTE: 1.8 THOU/MM3 (ref 1–4.8)
MCH RBC QN AUTO: 30.6 PG (ref 27–31)
MCHC RBC AUTO-ENTMCNC: 34 GM/DL (ref 33–37)
MCV RBC AUTO: 90 FL (ref 80–94)
MONOCYTES # BLD: 8.2 %
MONOCYTES ABSOLUTE: 0.6 THOU/MM3 (ref 0.4–1.3)
NUCLEATED RED BLOOD CELLS: 0 /100 WBC
OSMOLALITY CALCULATION: 289.7 MOSMOL/KG (ref 275–300)
PDW BLD-RTO: 14 % (ref 11.5–14.5)
PLATELET # BLD: 171 THOU/MM3 (ref 130–400)
PMV BLD AUTO: 7.8 FL (ref 7.4–10.4)
POTASSIUM SERPL-SCNC: 5.4 MEQ/L (ref 3.5–5.2)
RBC # BLD: 3.87 MILL/MM3 (ref 4.7–6.1)
SEG NEUTROPHILS: 63.9 %
SEGMENTED NEUTROPHILS ABSOLUTE COUNT: 4.8 THOU/MM3 (ref 1.8–7.7)
SODIUM BLD-SCNC: 141 MEQ/L (ref 135–145)
TOTAL PROTEIN: 6.6 G/DL (ref 6.1–8)
TROPONIN T: < 0.01 NG/ML
WBC # BLD: 7.5 THOU/MM3 (ref 4.8–10.8)

## 2018-03-01 PROCEDURE — 99285 EMERGENCY DEPT VISIT HI MDM: CPT

## 2018-03-01 PROCEDURE — 71046 X-RAY EXAM CHEST 2 VIEWS: CPT

## 2018-03-01 PROCEDURE — 84484 ASSAY OF TROPONIN QUANT: CPT

## 2018-03-01 PROCEDURE — 80053 COMPREHEN METABOLIC PANEL: CPT

## 2018-03-01 PROCEDURE — 85025 COMPLETE CBC W/AUTO DIFF WBC: CPT

## 2018-03-01 PROCEDURE — 36415 COLL VENOUS BLD VENIPUNCTURE: CPT

## 2018-03-01 PROCEDURE — 93005 ELECTROCARDIOGRAM TRACING: CPT | Performed by: EMERGENCY MEDICINE

## 2018-03-01 RX ORDER — CYCLOBENZAPRINE HCL 10 MG
10 TABLET ORAL 3 TIMES DAILY PRN
Qty: 20 TABLET | Refills: 0 | Status: SHIPPED | OUTPATIENT
Start: 2018-03-01 | End: 2018-03-11

## 2018-03-01 ASSESSMENT — ENCOUNTER SYMPTOMS
NAUSEA: 0
ABDOMINAL PAIN: 0
ABDOMINAL DISTENTION: 0
EYE DISCHARGE: 0
COUGH: 0
VOMITING: 0
EYE ITCHING: 0
WHEEZING: 0
RHINORRHEA: 0
SHORTNESS OF BREATH: 0
DIARRHEA: 0

## 2018-03-02 PROCEDURE — 93010 ELECTROCARDIOGRAM REPORT: CPT | Performed by: NUCLEAR MEDICINE

## 2018-03-02 NOTE — TELEPHONE ENCOUNTER
Reason for Disposition   [1] Chest pain lasts > 5 minutes AND [2] age > 48    Answer Assessment - Initial Assessment Questions  1. LOCATION: \"Where does it hurt? \"        Upper chest left side. Cramping feeling. 2. RADIATION: \"Does the pain go anywhere else? \" (e.g., into neck, jaw, arms, back)      No radiation. 3. ONSET: \"When did the chest pain begin? \" (Minutes, hours or days)      Onset 20 minutes. 4. PATTERN \"Does the pain come and go, or has it been constant since it started? \"  \"Does it get worse with exertion? \"       Constant. 5. DURATION: \"How long does it last\" (e.g., seconds, minutes, hours)      15 minutes. 6. SEVERITY: \"How bad is the pain? \"  (e.g., Scale 1-10; mild, moderate, or severe)     - MILD (1-3): doesn't interfere with normal activities      - MODERATE (4-7): interferes with normal activities or awakens from sleep     - SEVERE (8-10): excruciating pain, unable to do any normal activities        Pain is 2 out 10 scale. 7. CARDIAC RISK FACTORS: \"Do you have any history of heart problems or risk factors for heart disease? \" (e.g., prior heart attack, angina; high blood pressure, diabetes, being overweight, high cholesterol, smoking, or strong family history of heart disease)      Double bypass and five stents. 8. PULMONARY RISK FACTORS: \"Do you have any history of lung disease? \"  (e.g., blood clots in lung, asthma, emphysema, birth control pills)      No.   9. CAUSE: \"What do you think is causing the chest pain? \"      Unknown   10. OTHER SYMPTOMS: \"Do you have any other symptoms? \" (e.g., dizziness, nausea, vomiting, sweating, fever, difficulty breathing, cough)      Dizziness when standing up. 11. PREGNANCY: \"Is there any chance you are pregnant? \" \"When was your last menstrual period? \"     Male    Protocols used: CHEST PAIN-ADULT-

## 2018-03-02 NOTE — ED PROVIDER NOTES
stiffness. Skin: Negative for rash and wound. Neurological: Negative for dizziness and weakness. Psychiatric/Behavioral: Negative for agitation and suicidal ideas. PAST MEDICAL HISTORY    has a past medical history of ASHD (arteriosclerotic heart disease); Depression; Diabetic peripheral neuropathy ([facility] [address] Fracture; HTN (hypertension); Hypercholesteremia; IDDM (insulin dependent diabetes mellitus) ([facility] [address] Low back pain; Obesity; and Osteoarthritis. SURGICAL HISTORY      has a past surgical history that includes Cholecystectomy; Rotator cuff repair; Cervical disc surgery (2000); Appendectomy; Spine surgery (2010); Colonoscopy (2007 and 2011); joint replacement; Tonsillectomy; amputation (Left, 9/10/14); EKG 12 Lead (8/14/2015); Coronary artery bypass graft (2015 august ); Cardiac surgery; vascular surgery; fracture surgery; and Coronary angioplasty with stent (08/07/2017).     CURRENT MEDICATIONS       Previous Medications    ACETAMINOPHEN (TYLENOL) 500 MG TABLET    Take 500 mg by mouth as needed for Pain    AMLODIPINE (NORVASC) 5 MG TABLET    TAKE ONE TABLET BY MOUTH ONCE DAILY    ASPIRIN 81 MG TABLET    Take 1 tablet by mouth daily    ATORVASTATIN (LIPITOR) 10 MG TABLET    Take 1 tablet by mouth daily    CARVEDILOL (COREG) 3.125 MG TABLET    Take 1 tablet by mouth 2 times daily (with meals)    ENALAPRIL (VASOTEC) 10 MG TABLET    TAKE ONE TABLET BY MOUTH TWICE DAILY    FUROSEMIDE (LASIX) 20 MG TABLET    TAKE 2 TABLET BY MOUTH ONCE DAILY    GLUCOSE BLOOD VI TEST STRIPS (ROYA CONTOUR TEST) STRIP    Use to check blood sugar 3x daily, Dx: E11.9    INSULIN GLARGINE (LANTUS SOLOSTAR) 100 UNIT/ML INJECTION PEN    Inject 20 Units into the skin nightly    ISOSORBIDE MONONITRATE (IMDUR) 30 MG EXTENDED RELEASE TABLET    Take 1 tablet by mouth daily    METFORMIN (GLUCOPHAGE) 500 MG TABLET    Take 2 tablets by mouth 2 times daily (with meals)    MULTIPLE VITAMINS-MINERALS (DAILY MULTI VITAMIN/MINERALS PO) following:     Est, Glom Filt Rate 66 (*)     All other components within normal limits   TROPONIN   ANION GAP   OSMOLALITY       EMERGENCY DEPARTMENT COURSE:   Vitals:    Vitals:    03/01/18 2116 03/01/18 2210 03/01/18 2254   BP: (!) 141/90 (!) 149/85    Pulse: 83 83 77   Resp: 20  20   Temp: 98.1 °F (36.7 °C)     TempSrc: Oral     SpO2: 99% 98% 99%   Weight: (!) 347 lb (157.4 kg)     Height: 6' 2\" (1.88 m)         9:27 PM    Patient is seen and evaluated in a timely fashion. 10:58 PM    Pain is not getting worse on re-evaluation. EKG has no acute changes. Normal troponin. Chest xray has no acute changes. Given he has reproducible and persistent left chest wall pain for two days, EKG showing no acute change, and normal troponin, no suspicion this is ACS. Discharged with PCP follow up in 3-5 days. Blood pressure screening not performed due to known history of HTN. CRITICAL CARE:   None    CONSULTS:  None    PROCEDURES:  None    FINAL IMPRESSION      1. Chest wall pain    2.  Primary hypertension          DISPOSITION/PLAN   Home    PATIENT REFERRED TO:  Ernesto Louis MD  [address]  [phone]    In 3 days        DISCHARGE MEDICATIONS:  New Prescriptions    CYCLOBENZAPRINE (FLEXERIL) 10 MG TABLET    Take 1 tablet by mouth 3 times daily as needed for Muscle spasms       (Please note that portions of this note were completed with a voice recognition program.  Efforts were made to edit the dictations but occasionally words are mis-transcribed.)    MD Ange Palomo MD  03/01/18 6640

## 2018-03-02 NOTE — ED TRIAGE NOTES
Pt presents to ER with chest pain that started yesterday and hypertension. Pt states at home his SBP was 190s.  Pt describes the pain as a cramp located in his left side of his chest.

## 2018-03-05 ENCOUNTER — CARE COORDINATION (OUTPATIENT)
Dept: CARE COORDINATION | Age: 68
End: 2018-03-05

## 2018-03-05 NOTE — CARE COORDINATION
Follow up call made to [facility] from ED visit. States he is feeling better. States he bent down to get something and feels like he pulled a muscle in his chest.  States it didn't go away for 2 days. States he called the Call A Nurse and was told because of his heart history he needed to go to ED for evaluation. States his blood pressure was around 190/90 at home but was WNL in the ED. Denied shortness of breath. States he was started on Flexeril and has been taking that medication since discharge. Denies further chest pain. Encouraged and offered PCP appt for follow up which he declined. Educated him on the importance of early symptom recognition and reporting to prevent hospital admissions and ED visits.

## 2018-03-06 ENCOUNTER — CARE COORDINATION (OUTPATIENT)
Dept: CARE COORDINATION | Age: 68
End: 2018-03-06

## 2018-03-06 RX ORDER — INSULIN GLARGINE 100 [IU]/ML
INJECTION, SOLUTION SUBCUTANEOUS
Qty: 15 PEN | Refills: 1 | Status: SHIPPED | OUTPATIENT
Start: 2018-03-06 | End: 2018-07-31 | Stop reason: SDUPTHER

## 2018-03-12 ENCOUNTER — CARE COORDINATION (OUTPATIENT)
Dept: CARE COORDINATION | Age: 68
End: 2018-03-12

## 2018-03-12 NOTE — CARE COORDINATION
Sean called and states he has lost 28# in 3 weeks. States he went from 368# to 340#. States that his belt is much looser fitting and he had to make new holes in his belt to make it tighter. States he has been vomiting more than usual as well. I asked him if he feels like there might be something wrong with his scale and he believes it to be accurate. I asked him if he feels physically like he has lost that much weight and he states \"No, not really\". States blood sugars are running . Stopped taking Flexeril before completing all of the treatment because it made him feel dizzy. He is worried about this weight loss and would like PCP suggestions? Please advise. Thank you.

## 2018-03-14 ENCOUNTER — NURSE ONLY (OUTPATIENT)
Dept: FAMILY MEDICINE CLINIC | Age: 68
End: 2018-03-14

## 2018-03-14 ENCOUNTER — NURSE TRIAGE (OUTPATIENT)
Dept: ADMINISTRATIVE | Age: 68
End: 2018-03-14

## 2018-03-14 VITALS — BODY MASS INDEX: 43.91 KG/M2 | WEIGHT: 315 LBS

## 2018-03-15 ENCOUNTER — CARE COORDINATION (OUTPATIENT)
Dept: CARE COORDINATION | Age: 68
End: 2018-03-15

## 2018-03-15 NOTE — CARE COORDINATION
Lisann  called and states he had some questions about his blood sugars. [facility] and ate a cheesebuger with onion rings. States he went to get up from chair and felt dizzy. Reports that staff gave him some OJ thinking he had a low blood sugar and his blood sugar went up to 210. Dicussed that his eating options were not the best and discussed better food options to avoid the highs and lows. He verbalized understanding.

## 2018-03-24 ENCOUNTER — NURSE TRIAGE (OUTPATIENT)
Dept: ADMINISTRATIVE | Age: 68
End: 2018-03-24

## 2018-04-10 ENCOUNTER — CARE COORDINATION (OUTPATIENT)
Dept: CARE COORDINATION | Age: 68
End: 2018-04-10

## 2018-04-11 ENCOUNTER — CARE COORDINATION (OUTPATIENT)
Dept: CARE COORDINATION | Age: 68
End: 2018-04-11

## 2018-04-13 ENCOUNTER — OFFICE VISIT (OUTPATIENT)
Dept: FAMILY MEDICINE CLINIC | Age: 68
End: 2018-04-13

## 2018-04-13 VITALS
SYSTOLIC BLOOD PRESSURE: 110 MMHG | HEART RATE: 72 BPM | DIASTOLIC BLOOD PRESSURE: 66 MMHG | WEIGHT: 315 LBS | HEIGHT: 74 IN | RESPIRATION RATE: 10 BRPM | BODY MASS INDEX: 40.43 KG/M2

## 2018-04-13 DIAGNOSIS — E78.00 HYPERCHOLESTEREMIA: ICD-10-CM

## 2018-04-13 DIAGNOSIS — I25.708 CORONARY ARTERY DISEASE OF BYPASS GRAFT OF NATIVE HEART WITH STABLE ANGINA PECTORIS (HCC): ICD-10-CM

## 2018-04-13 DIAGNOSIS — M54.40 CHRONIC MIDLINE LOW BACK PAIN WITH SCIATICA, SCIATICA LATERALITY UNSPECIFIED: ICD-10-CM

## 2018-04-13 DIAGNOSIS — G89.29 CHRONIC MIDLINE LOW BACK PAIN WITH SCIATICA, SCIATICA LATERALITY UNSPECIFIED: ICD-10-CM

## 2018-04-13 DIAGNOSIS — I15.9 SECONDARY HYPERTENSION: ICD-10-CM

## 2018-04-13 DIAGNOSIS — E11.42 DIABETIC PERIPHERAL NEUROPATHY (HCC): ICD-10-CM

## 2018-04-13 DIAGNOSIS — I25.10 ASHD (ARTERIOSCLEROTIC HEART DISEASE): ICD-10-CM

## 2018-04-13 PROCEDURE — 99213 OFFICE O/P EST LOW 20 MIN: CPT | Performed by: FAMILY MEDICINE

## 2018-04-13 ASSESSMENT — ENCOUNTER SYMPTOMS
BACK PAIN: 0
SHORTNESS OF BREATH: 0
CHEST TIGHTNESS: 0
CONSTIPATION: 0
TROUBLE SWALLOWING: 0
NAUSEA: 0
ABDOMINAL PAIN: 0
COUGH: 0
SORE THROAT: 0
EYE PAIN: 0
BLOOD IN STOOL: 0

## 2018-04-24 ENCOUNTER — NURSE TRIAGE (OUTPATIENT)
Dept: ADMINISTRATIVE | Age: 68
End: 2018-04-24

## 2018-04-25 RX ORDER — BLOOD SUGAR DIAGNOSTIC
STRIP MISCELLANEOUS
Qty: 100 EACH | Refills: 11 | Status: SHIPPED | OUTPATIENT
Start: 2018-04-25 | End: 2019-06-18 | Stop reason: SDUPTHER

## 2018-05-15 ENCOUNTER — CARE COORDINATION (OUTPATIENT)
Dept: CARE COORDINATION | Age: 68
End: 2018-05-15

## 2018-06-03 ENCOUNTER — NURSE TRIAGE (OUTPATIENT)
Dept: ADMINISTRATIVE | Age: 68
End: 2018-06-03

## 2018-06-08 ENCOUNTER — NURSE TRIAGE (OUTPATIENT)
Dept: ADMINISTRATIVE | Age: 68
End: 2018-06-08

## 2018-06-12 DIAGNOSIS — I15.0 RENOVASCULAR HYPERTENSION: ICD-10-CM

## 2018-06-12 DIAGNOSIS — E78.00 HYPERCHOLESTEREMIA: ICD-10-CM

## 2018-06-12 DIAGNOSIS — K21.9 GASTROESOPHAGEAL REFLUX DISEASE WITHOUT ESOPHAGITIS: ICD-10-CM

## 2018-06-13 RX ORDER — PANTOPRAZOLE SODIUM 40 MG/1
40 TABLET, DELAYED RELEASE ORAL DAILY
Qty: 90 TABLET | Refills: 1 | Status: SHIPPED | OUTPATIENT
Start: 2018-06-13 | End: 2018-12-12 | Stop reason: SDUPTHER

## 2018-06-13 RX ORDER — AMLODIPINE BESYLATE 5 MG/1
TABLET ORAL
Qty: 90 TABLET | Refills: 1 | Status: SHIPPED | OUTPATIENT
Start: 2018-06-13 | End: 2018-12-12 | Stop reason: SDUPTHER

## 2018-06-13 RX ORDER — ATORVASTATIN CALCIUM 10 MG/1
10 TABLET, FILM COATED ORAL DAILY
Qty: 90 TABLET | Refills: 1 | Status: SHIPPED | OUTPATIENT
Start: 2018-06-13 | End: 2019-07-01 | Stop reason: SDUPTHER

## 2018-06-15 ENCOUNTER — NURSE TRIAGE (OUTPATIENT)
Dept: ADMINISTRATIVE | Age: 68
End: 2018-06-15

## 2018-06-21 ENCOUNTER — CARE COORDINATION (OUTPATIENT)
Dept: CARE COORDINATION | Age: 68
End: 2018-06-21

## 2018-07-05 ENCOUNTER — NURSE TRIAGE (OUTPATIENT)
Dept: ADMINISTRATIVE | Age: 68
End: 2018-07-05

## 2018-07-10 ENCOUNTER — CARE COORDINATION (OUTPATIENT)
Dept: CARE COORDINATION | Age: 68
End: 2018-07-10

## 2018-07-13 ENCOUNTER — OFFICE VISIT (OUTPATIENT)
Dept: FAMILY MEDICINE CLINIC | Age: 68
End: 2018-07-13

## 2018-07-13 VITALS
WEIGHT: 315 LBS | RESPIRATION RATE: 14 BRPM | SYSTOLIC BLOOD PRESSURE: 128 MMHG | DIASTOLIC BLOOD PRESSURE: 64 MMHG | HEIGHT: 74 IN | HEART RATE: 76 BPM | BODY MASS INDEX: 40.43 KG/M2

## 2018-07-13 DIAGNOSIS — I25.708 CORONARY ARTERY DISEASE OF BYPASS GRAFT OF NATIVE HEART WITH STABLE ANGINA PECTORIS (HCC): Primary | ICD-10-CM

## 2018-07-13 DIAGNOSIS — F33.41 RECURRENT MAJOR DEPRESSIVE DISORDER, IN PARTIAL REMISSION (HCC): ICD-10-CM

## 2018-07-13 DIAGNOSIS — I25.5 ISCHEMIC CARDIOMYOPATHY: ICD-10-CM

## 2018-07-13 DIAGNOSIS — H61.23 BILATERAL HEARING LOSS DUE TO CERUMEN IMPACTION: ICD-10-CM

## 2018-07-13 DIAGNOSIS — E78.00 HYPERCHOLESTEREMIA: ICD-10-CM

## 2018-07-13 DIAGNOSIS — H60.503 ACUTE OTITIS EXTERNA OF BOTH EARS, UNSPECIFIED TYPE: ICD-10-CM

## 2018-07-13 DIAGNOSIS — I70.90 ARTERIOSCLEROTIC VASCULAR DISEASE: ICD-10-CM

## 2018-07-13 DIAGNOSIS — E11.42 DIABETIC PERIPHERAL NEUROPATHY (HCC): ICD-10-CM

## 2018-07-13 DIAGNOSIS — I15.9 SECONDARY HYPERTENSION: ICD-10-CM

## 2018-07-13 LAB
GLUCOSE BLD-MCNC: 104 MG/DL
HBA1C MFR BLD: 6.3 %

## 2018-07-13 PROCEDURE — 83036 HEMOGLOBIN GLYCOSYLATED A1C: CPT | Performed by: FAMILY MEDICINE

## 2018-07-13 PROCEDURE — 99213 OFFICE O/P EST LOW 20 MIN: CPT | Performed by: FAMILY MEDICINE

## 2018-07-13 PROCEDURE — 1101F PT FALLS ASSESS-DOCD LE1/YR: CPT | Performed by: FAMILY MEDICINE

## 2018-07-13 PROCEDURE — 82962 GLUCOSE BLOOD TEST: CPT | Performed by: FAMILY MEDICINE

## 2018-07-13 PROCEDURE — 69210 REMOVE IMPACTED EAR WAX UNI: CPT | Performed by: FAMILY MEDICINE

## 2018-07-13 ASSESSMENT — ENCOUNTER SYMPTOMS
CONSTIPATION: 0
SORE THROAT: 0
SHORTNESS OF BREATH: 0
CHEST TIGHTNESS: 0
COUGH: 0
TROUBLE SWALLOWING: 0
NAUSEA: 0
EYE PAIN: 0
ORTHOPNEA: 0
BLOOD IN STOOL: 0
ABDOMINAL PAIN: 0
BACK PAIN: 0

## 2018-07-13 NOTE — PROGRESS NOTES
back pain     Obesity     Osteoarthritis        Review of Systems   Constitutional: Negative for fatigue and fever. HENT: Negative for congestion, ear pain, postnasal drip, sore throat and trouble swallowing. Eyes: Negative for pain. Respiratory: Negative for cough, chest tightness and shortness of breath. Cardiovascular: Negative for chest pain, palpitations, orthopnea and leg swelling. Gastrointestinal: Negative for abdominal pain, blood in stool, constipation and nausea. Genitourinary: Negative for difficulty urinating, frequency and urgency. Musculoskeletal: Negative for arthralgias, back pain, joint swelling and neck stiffness. Skin: Negative for rash. Neurological: Negative for dizziness, weakness and headaches. Hematological: Negative for adenopathy. Does not bruise/bleed easily. Psychiatric/Behavioral: Negative for behavioral problems, dysphoric mood and sleep disturbance. /64 (Site: Right Arm, Position: Sitting, Cuff Size: Large Adult)   Pulse 76   Resp 14   Ht 6' 2\" (1.88 m)   Wt (!) 343 lb 6 oz (155.8 kg)   BMI 44.09 kg/m²   Objective:   Physical Exam   Constitutional: He is oriented to person, place, and time. He appears well-developed and well-nourished. HENT:   Head: Normocephalic and atraumatic. Right Ear: External ear normal.   Left Ear: External ear normal.   Nose: Nose normal.   Mouth/Throat: Oropharynx is clear and moist.     Cerumen  Impaction   Both  ears   Eyes: Conjunctivae and EOM are normal. Pupils are equal, round, and reactive to light. Fundi nl   Neck: Normal range of motion. Neck supple. No thyromegaly present. Cardiovascular: Normal rate, regular rhythm, normal heart sounds and intact distal pulses. Pulmonary/Chest: Effort normal and breath sounds normal. He has no wheezes. He has no rales. Abdominal: Soft. Bowel sounds are normal. He exhibits no mass. There is no tenderness. Musculoskeletal: Normal range of motion. Lymphadenopathy:     He has no cervical adenopathy. Neurological: He is alert and oriented to person, place, and time. He has normal reflexes. No cranial nerve deficit. Skin: Skin is warm and dry. No rash noted. Psychiatric: He has a normal mood and affect. Nursing note and vitals reviewed. irrigatio  Of both  Ears     Assessment:       Diagnosis Orders   1. Coronary artery disease of bypass graft of native heart with stable angina pectoris ([facility]     2. IDDM (insulin dependent diabetes mellitus) ([facility]     3. Ischemic cardiomyopathy     4. Diabetic peripheral neuropathy ([facility]     5. Secondary hypertension     6. Hypercholesteremia     7. Recurrent major depressive disorder, in partial remission (HCC)     8. Class 3 obesity without serious comorbidity with body mass index (BMI) of 40.0 to 44.9 in adult, unspecified obesity type ([facility]     9. Arteriosclerotic vascular disease     10.  Bilateral hearing loss due to cerumen impaction  DE REMOVAL IMPACTED CERUMEN INSTRUMENTATION UNILAT         Plan:      Current Outpatient Prescriptions   Medication Sig Dispense Refill    metFORMIN (GLUCOPHAGE) 500 MG tablet Take 2 tablets by mouth daily (with breakfast) 360 tablet 1    atorvastatin (LIPITOR) 10 MG tablet Take 1 tablet by mouth daily 90 tablet 1    pantoprazole (PROTONIX) 40 MG tablet Take 1 tablet by mouth daily 90 tablet 1    ACCU-CHEK SMARTVIEW strip Use to test Blood Sugar 3x daily, Dx: E11.9 100 each 11    LANTUS SOLOSTAR 100 UNIT/ML injection pen INJECT 30 UNITS SUBCUTANEOUSLY ONCE DAILY 15 pen 1    enalapril (VASOTEC) 10 MG tablet TAKE ONE TABLET BY MOUTH TWICE DAILY 180 tablet 1    furosemide (LASIX) 20 MG tablet TAKE 2 TABLET BY MOUTH ONCE DAILY 180 tablet 1    NOVOLOG FLEXPEN 100 UNIT/ML injection pen INJECT 12 UNITS SUBCUTANEOUSLY 3 TIMES A DAY BEFORE MEALS 15 Pen 3    nitroGLYCERIN (NITROSTAT) 0.4 MG SL tablet Place 1 tablet under the tongue every 5 minutes as needed for Chest pain 25 tablet 3    aspirin 81 MG tablet Take 1 tablet by mouth daily      carvedilol (COREG) 3.125 MG tablet Take 1 tablet by mouth 2 times daily (with meals) 60 tablet 11    ticagrelor (BRILINTA) 90 MG TABS tablet Take 1 tablet by mouth 2 times daily 60 tablet 11    acetaminophen (TYLENOL) 500 MG tablet Take 500 mg by mouth as needed for Pain      Multiple Vitamins-Minerals (DAILY MULTI VITAMIN/MINERALS PO) Take 1 tablet by mouth daily      isosorbide mononitrate (IMDUR) 30 MG extended release tablet Take 1 tablet by mouth daily 30 tablet 3    glucose blood VI test strips (ROYA CONTOUR TEST) strip Use to check blood sugar 3x daily, Dx: E11.9 300 each 3    amLODIPine (NORVASC) 5 MG tablet TAKE ONE TABLET BY MOUTH ONCE DAILY 90 tablet 1     No current facility-administered medications for this visit. Orders Placed This Encounter   Procedures    IA REMOVAL IMPACTED CERUMEN INSTRUMENTATION UNILAT     No results found for this visit on 07/13/18. Eboni Zarcoi received counseling on the following healthy behaviors: nutrition and exercise    Patient given educational materials on Diabetes and Hyperlipidemia    I have instructed Eboni Holm to complete a self tracking handout on Blood Sugars  and instructed them to bring it with them to his next appointment. Discussed use, benefit, and side effects of prescribed medications. Barriers to medication compliance addressed. All patient questions answered. Pt voiced understanding.

## 2018-07-31 NOTE — TELEPHONE ENCOUNTER
The pharmacy is requesting a refill of the below medication which has been pended for you:     Requested Prescriptions     Pending Prescriptions Disp Refills    LANTUS SOLOSTAR 100 UNIT/ML injection pen [Pharmacy Med Name: Nevin Brodya     INJ]  1     Sig: INJECT 30 UNITS SUBCUTANEOUSLY ONCE DAILY    furosemide (LASIX) 20 MG tablet [Pharmacy Med Name: FUROSEMIDE 20MG     TAB] 180 tablet 1     Sig: TAKE TWO TABLETS BY MOUTH ONCE DAILY       Last Appointment Date: 7/13/2018  Next Appointment Date: 10/15/2018    Allergies   Allergen Reactions    Horse-Derived Products

## 2018-08-01 RX ORDER — FUROSEMIDE 20 MG/1
TABLET ORAL
Qty: 180 TABLET | Refills: 1 | Status: SHIPPED | OUTPATIENT
Start: 2018-08-01 | End: 2019-02-12 | Stop reason: SDUPTHER

## 2018-08-01 RX ORDER — INSULIN GLARGINE 100 [IU]/ML
INJECTION, SOLUTION SUBCUTANEOUS
Qty: 15 PEN | Refills: 1 | Status: SHIPPED | OUTPATIENT
Start: 2018-08-01 | End: 2018-12-11 | Stop reason: SDUPTHER

## 2018-08-09 ENCOUNTER — CARE COORDINATION (OUTPATIENT)
Dept: CARE COORDINATION | Age: 68
End: 2018-08-09

## 2018-08-09 NOTE — CARE COORDINATION
Yes Chaney Nageotte, MD   furosemide (LASIX) 20 MG tablet TAKE TWO TABLETS BY MOUTH ONCE DAILY 8/1/18  Yes Chaney Nageotte, MD   metFORMIN (GLUCOPHAGE) 500 MG tablet Take 2 tablets by mouth daily (with breakfast) 7/13/18  Yes Chaney Nageotte, MD   neomycin-polymyxin-hydrocortisone (CORTISPORIN) 3.5-00891-3 otic solution Use  4  gtts  Qid  Both  Ears   For  5 days 7/13/18  Yes Chaney Nageotte, MD   amLODIPine (NORVASC) 5 MG tablet TAKE ONE TABLET BY MOUTH ONCE DAILY 6/13/18  Yes Chaney Nageotte, MD   atorvastatin (LIPITOR) 10 MG tablet Take 1 tablet by mouth daily 6/13/18  Yes Chaney Nageotte, MD   pantoprazole (PROTONIX) 40 MG tablet Take 1 tablet by mouth daily 6/13/18  Yes Chaney Nageotte, MD   ACCU-CHEK SMARTVIEW strip Use to test Blood Sugar 3x daily, Dx: E11.9 4/25/18  Yes Chaney Nageotte, MD   enalapril (VASOTEC) 10 MG tablet TAKE ONE TABLET BY MOUTH TWICE DAILY 2/1/18  Yes Chaney Nageotte, MD   NOVOLOG FLEXPEN 100 UNIT/ML injection pen INJECT 12 UNITS SUBCUTANEOUSLY 3 TIMES A DAY BEFORE MEALS 10/5/17  Yes Chaney Nageotte, MD   nitroGLYCERIN (NITROSTAT) 0.4 MG SL tablet Place 1 tablet under the tongue every 5 minutes as needed for Chest pain 8/14/17  Yes Chaney Nageotte, MD   aspirin 81 MG tablet Take 1 tablet by mouth daily 8/8/17  Yes Jose Alfredo Wallace MD   carvedilol (COREG) 3.125 MG tablet Take 1 tablet by mouth 2 times daily (with meals) 8/8/17  Yes Jose Alfredo Wallace MD   ticagrelor (BRILINTA) 90 MG TABS tablet Take 1 tablet by mouth 2 times daily 8/8/17  Yes Jose Alfredo Wallace MD   acetaminophen (TYLENOL) 500 MG tablet Take 500 mg by mouth as needed for Pain   Yes Historical Provider, MD   Multiple Vitamins-Minerals (DAILY MULTI VITAMIN/MINERALS PO) Take 1 tablet by mouth daily   Yes Historical Provider, MD   isosorbide mononitrate (IMDUR) 30 MG extended release tablet Take 1 tablet by mouth daily 7/19/17  Yes Rodrigo Lutz PA-C   glucose blood VI test strips (ROYA

## 2018-08-18 ENCOUNTER — NURSE TRIAGE (OUTPATIENT)
Dept: ADMINISTRATIVE | Age: 68
End: 2018-08-18

## 2018-08-20 ENCOUNTER — CARE COORDINATION (OUTPATIENT)
Dept: CARE COORDINATION | Age: 68
End: 2018-08-20

## 2018-09-01 RX ORDER — CARVEDILOL 3.12 MG/1
3.12 TABLET ORAL 2 TIMES DAILY WITH MEALS
Qty: 60 TABLET | Refills: 11 | Status: ON HOLD | OUTPATIENT
Start: 2018-09-01 | End: 2019-01-01

## 2018-09-04 NOTE — TELEPHONE ENCOUNTER
Date of last visit:  7-  Date of next visit:  10/15/2018    Requested Prescriptions     Pending Prescriptions Disp Refills    carvedilol (COREG) 3.125 MG tablet [Pharmacy Med Name: CARVEDILOL 3.125MG  TAB] 60 tablet 11     Sig: TAKE ONE TABLET BY MOUTH TWICE DAILY WITH MEALS    BRILINTA 90 MG TABS tablet [Pharmacy Med Name: Wynette Brendan       TAB] 60 tablet 11     Sig: TAKE ONE TABLET BY MOUTH TWICE DAILY

## 2018-09-05 RX ORDER — TICAGRELOR 90 MG/1
TABLET ORAL
Qty: 60 TABLET | Refills: 11 | Status: SHIPPED | OUTPATIENT
Start: 2018-09-05 | End: 2019-10-21 | Stop reason: SDUPTHER

## 2018-09-05 RX ORDER — CARVEDILOL 3.12 MG/1
TABLET ORAL
Qty: 60 TABLET | Refills: 11 | Status: ON HOLD | OUTPATIENT
Start: 2018-09-05 | End: 2019-01-01 | Stop reason: HOSPADM

## 2018-09-06 ENCOUNTER — CARE COORDINATION (OUTPATIENT)
Dept: CARE COORDINATION | Age: 68
End: 2018-09-06

## 2018-09-06 NOTE — CARE COORDINATION
SW, ACJFS         Goals Addressed             Most Recent     Conditions and Symptoms   On track (9/6/2018)             I will notify my provider of any symptoms that indicate a worsening of my condition. Barriers: financial  Plan for overcoming my barriers: HH support, CC support  Confidence: 9/10  Anticipated Goal Completion Date: 1-18-18         Self Monitoring   On track (9/6/2018)             Self-Monitored Blood Glucose - I will check my blood sugar Other: AC,HS and before meals    Patient Reported Blood Glucose No flowsheet data found. Barriers: none  Plan for overcoming my barriers: N/A  Confidence: 8/10  Anticipated Goal Completion Date: 1-18-8              Prior to Admission medications    Medication Sig Start Date End Date Taking?  Authorizing Provider   carvedilol (COREG) 3.125 MG tablet TAKE ONE TABLET BY MOUTH TWICE DAILY WITH MEALS 9/5/18  Yes Robert Guzmán MD   BRILINTA 90 MG TABS tablet TAKE ONE TABLET BY MOUTH TWICE DAILY 9/5/18  Yes Robert Guzmán MD   carvedilol (COREG) 3.125 MG tablet Take 1 tablet by mouth 2 times daily (with meals) 9/1/18  Yes Robert Guzmán MD   ticagrelor (BRILINTA) 90 MG TABS tablet Take 1 tablet by mouth 2 times daily 9/1/18  Yes Robert Guzmán MD   metFORMIN (GLUCOPHAGE) 500 MG tablet TAKE TWO TABLETS BY MOUTH TWICE DAILY WITH MEALS 8/8/18  Yes Robert Guzmán MD   LANTUS SOLOSTAR 100 UNIT/ML injection pen INJECT 30 UNITS SUBCUTANEOUSLY ONCE DAILY 8/1/18  Yes Robert Guzmán MD   furosemide (LASIX) 20 MG tablet TAKE TWO TABLETS BY MOUTH ONCE DAILY 8/1/18  Yes Robert Guzmán MD   metFORMIN (GLUCOPHAGE) 500 MG tablet Take 2 tablets by mouth daily (with breakfast) 7/13/18  Yes Robert Guzmán MD   neomycin-polymyxin-hydrocortisone (CORTISPORIN) 3.5-72872-1 otic solution Use  4  gtts  Qid  Both  Ears   For  5 days 7/13/18  Yes Robert Guzmán MD   amLODIPine (NORVASC) 5 MG tablet TAKE ONE TABLET BY MOUTH ONCE DAILY 6/13/18  Yes Joyce Avitia MD   atorvastatin (LIPITOR) 10 MG tablet Take 1 tablet by mouth daily 6/13/18  Yes Joyce Avitia MD   pantoprazole (PROTONIX) 40 MG tablet Take 1 tablet by mouth daily 6/13/18  Yes Joyce Avitia MD   ACCU-CHEK SMARTVIEW strip Use to test Blood Sugar 3x daily, Dx: E11.9 4/25/18  Yes Joyce Avitia MD   enalapril (VASOTEC) 10 MG tablet TAKE ONE TABLET BY MOUTH TWICE DAILY 2/1/18  Yes Joyce Avitia MD   NOVOLOG FLEXPEN 100 UNIT/ML injection pen INJECT 12 UNITS SUBCUTANEOUSLY 3 TIMES A DAY BEFORE MEALS 10/5/17  Yes Joyce Avitia MD   nitroGLYCERIN (NITROSTAT) 0.4 MG SL tablet Place 1 tablet under the tongue every 5 minutes as needed for Chest pain 8/14/17  Yes Joyce Avitia MD   aspirin 81 MG tablet Take 1 tablet by mouth daily 8/8/17  Yes Perri Stauffer MD   acetaminophen (TYLENOL) 500 MG tablet Take 500 mg by mouth as needed for Pain   Yes Historical Provider, MD   Multiple Vitamins-Minerals (DAILY MULTI VITAMIN/MINERALS PO) Take 1 tablet by mouth daily   Yes Historical Provider, MD   isosorbide mononitrate (IMDUR) 30 MG extended release tablet Take 1 tablet by mouth daily 7/19/17  Yes Prieto Lutz PA-C   glucose blood VI test strips (ROYA CONTOUR TEST) strip Use to check blood sugar 3x daily, Dx: E11.9 7/1/17  Yes Joyce Avitia MD       Future Appointments  Date Time Provider Anel Quiñonesi   9/12/2018 11:00 AM Radha Martinez MD [id number]   10/15/2018 2:00 PM Joyce Avitia MD [facility]

## 2018-09-14 ENCOUNTER — NURSE TRIAGE (OUTPATIENT)
Dept: ADMINISTRATIVE | Age: 68
End: 2018-09-14

## 2018-09-14 ENCOUNTER — TELEPHONE (OUTPATIENT)
Dept: FAMILY MEDICINE CLINIC | Age: 68
End: 2018-09-14

## 2018-09-14 NOTE — TELEPHONE ENCOUNTER
Reason for Disposition   [1] MODERATE dizziness (e.g., interferes with normal activities) AND [2] has NOT been evaluated by physician for this  (Exception: dizziness caused by heat exposure, sudden standing, or poor fluid intake)    Answer Assessment - Initial Assessment Questions  1. DESCRIPTION: \"Describe your dizziness. \"      Lightheaded in the store this morning,had to sit in the [place] for a little while,ate a candy bar thinking that the blood sugar was low, but did not improve,  2. LIGHTHEADED: \"Do you feel lightheaded? \" (e.g., somewhat faint, woozy, weak upon standing)   yes  3. VERTIGO: \"Do you feel like either you or the room is spinning or tilting? \" (i.e. vertigo)      No   4. SEVERITY: \"How bad is it? \"  \"Do you feel like you are going to faint? \" \"Can you stand and walk? \"    - MILD - walking normally    - MODERATE - interferes with normal activities (e.g., work, school)     - SEVERE - unable to stand, requires support to walk, feels like passing out now. Mod Checked blood sugar this morning, was 97, BP was 113/67- states that the 113 is low, but 67 is about normal  5. ONSET:  \"When did the dizziness begin? \"     This morning  6. AGGRAVATING FACTORS: \"Does anything make it worse? \" (e.g., standing, change in head position)      None  7. HEART RATE: \"Can you tell me your heart rate? \" \"How many beats in 15 seconds? \"  (Note: not all patients can do this)        70  8. CAUSE: \"What do you think is causing the dizziness? \"     none  9. RECURRENT SYMPTOM: \"Have you had dizziness before? \" If so, ask: \"When was the last time? \" \"What happened that time? \"      none  10. OTHER SYMPTOMS: \"Do you have any other symptoms? \" (e.g., fever, chest pain, vomiting, diarrhea, bleeding)       None- breathing is a little fast when he gets up and gets around  11. PREGNANCY: \"Is there any chance you are pregnant? \" \"When was your last menstrual period? \"        na    Protocols used: [facility]

## 2018-09-14 NOTE — TELEPHONE ENCOUNTER
Patient stated is Pulse is 70, Blood Pressure 113/67, and Blood Sugar is 97- he wanted to know if he was ok. Explained vitals and Sugar were normal, he was having slight dizziness but he decreased his fluid intake. Encouraged the need to drink more fluids and stay hydrated- stated if he gets any worse he will go to the ER.

## 2018-09-24 ENCOUNTER — CARE COORDINATION (OUTPATIENT)
Dept: CARE COORDINATION | Age: 68
End: 2018-09-24

## 2018-09-24 DIAGNOSIS — I15.0 RENOVASCULAR HYPERTENSION: ICD-10-CM

## 2018-09-25 RX ORDER — ENALAPRIL MALEATE 10 MG/1
TABLET ORAL
Qty: 180 TABLET | Refills: 1 | Status: ON HOLD | OUTPATIENT
Start: 2018-09-25 | End: 2019-01-01

## 2018-09-28 ENCOUNTER — CARE COORDINATION (OUTPATIENT)
Dept: CARE COORDINATION | Age: 68
End: 2018-09-28

## 2018-09-29 ENCOUNTER — CARE COORDINATION (OUTPATIENT)
Dept: CASE MANAGEMENT | Age: 68
End: 2018-09-29

## 2018-10-02 ENCOUNTER — CARE COORDINATION (OUTPATIENT)
Dept: CARE COORDINATION | Age: 68
End: 2018-10-02

## 2018-10-04 ENCOUNTER — CARE COORDINATION (OUTPATIENT)
Dept: CARE COORDINATION | Age: 68
End: 2018-10-04

## 2018-10-09 ENCOUNTER — CARE COORDINATION (OUTPATIENT)
Dept: CARE COORDINATION | Age: 68
End: 2018-10-09

## 2018-10-09 NOTE — CARE COORDINATION
Writer called pt to see if he could meet me at the [address] on 10/10 at 11:00 but pt has [employer] Whe[employer] delivered at that time so we will hopefully meet at 11:30 if he Magda lala is fixed.

## 2018-10-10 ENCOUNTER — CARE COORDINATION (OUTPATIENT)
Dept: CARE COORDINATION | Age: 68
End: 2018-10-10

## 2018-10-11 ENCOUNTER — CARE COORDINATION (OUTPATIENT)
Dept: CARE COORDINATION | Age: 68
End: 2018-10-11

## 2018-10-11 NOTE — CARE COORDINATION
Writer spoke with pt today on the phone to encourage him to call the bank to see about his mortgage payment $480 that is being automatically deducted from his SS check every month. Also encouraged him to contact a  incase he has to file bankrupty. Pt discussed what furniture and household items he wants when he moves. I believe pt is looking forward to this even though it will be really hard for him to leave his home that he has lived in 40yrs.

## 2018-10-12 ENCOUNTER — CARE COORDINATION (OUTPATIENT)
Dept: CARE COORDINATION | Age: 68
End: 2018-10-12

## 2018-10-13 NOTE — CARE COORDINATION
Writer spoke with pt today. He reported he dropped off more paperwork to Yaima Michelle at the [employer] and that he spoke with someone in regards to his house pmt. He stated he informed them that he prefers the deed in [place] and if they won't do that he will have to file bankrupty. He stated he is going though things in his home and informed neighbors of his decision to move. Pt reported that he had eaten 7 bananas since 4pm. Writer discussed with pt the amt of carbs in that many bananas. Pt stated he didn't have much else to eat. Discussed the fact that pt would have an additional $400 if he could get rid of house pmt and move to apartment. Will f/u next week.

## 2018-10-14 ENCOUNTER — NURSE TRIAGE (OUTPATIENT)
Dept: ADMINISTRATIVE | Age: 68
End: 2018-10-14

## 2018-10-15 ENCOUNTER — OFFICE VISIT (OUTPATIENT)
Dept: FAMILY MEDICINE CLINIC | Age: 68
End: 2018-10-15

## 2018-10-15 VITALS
HEIGHT: 74 IN | SYSTOLIC BLOOD PRESSURE: 112 MMHG | HEART RATE: 72 BPM | WEIGHT: 315 LBS | DIASTOLIC BLOOD PRESSURE: 64 MMHG | BODY MASS INDEX: 40.43 KG/M2 | RESPIRATION RATE: 14 BRPM

## 2018-10-15 DIAGNOSIS — I15.9 SECONDARY HYPERTENSION: ICD-10-CM

## 2018-10-15 DIAGNOSIS — E78.00 HYPERCHOLESTEREMIA: ICD-10-CM

## 2018-10-15 DIAGNOSIS — F33.41 RECURRENT MAJOR DEPRESSIVE DISORDER, IN PARTIAL REMISSION (HCC): ICD-10-CM

## 2018-10-15 DIAGNOSIS — I25.10 ASHD (ARTERIOSCLEROTIC HEART DISEASE): ICD-10-CM

## 2018-10-15 DIAGNOSIS — M54.40 CHRONIC MIDLINE LOW BACK PAIN WITH SCIATICA, SCIATICA LATERALITY UNSPECIFIED: ICD-10-CM

## 2018-10-15 DIAGNOSIS — G89.29 CHRONIC MIDLINE LOW BACK PAIN WITH SCIATICA, SCIATICA LATERALITY UNSPECIFIED: ICD-10-CM

## 2018-10-15 DIAGNOSIS — I25.708 CORONARY ARTERY DISEASE OF BYPASS GRAFT OF NATIVE HEART WITH STABLE ANGINA PECTORIS (HCC): ICD-10-CM

## 2018-10-15 DIAGNOSIS — E11.42 DIABETIC PERIPHERAL NEUROPATHY (HCC): ICD-10-CM

## 2018-10-15 PROCEDURE — 1101F PT FALLS ASSESS-DOCD LE1/YR: CPT | Performed by: FAMILY MEDICINE

## 2018-10-15 PROCEDURE — 99213 OFFICE O/P EST LOW 20 MIN: CPT | Performed by: FAMILY MEDICINE

## 2018-10-15 ASSESSMENT — PATIENT HEALTH QUESTIONNAIRE - PHQ9
2. FEELING DOWN, DEPRESSED OR HOPELESS: 0
SUM OF ALL RESPONSES TO PHQ9 QUESTIONS 1 & 2: 0
1. LITTLE INTEREST OR PLEASURE IN DOING THINGS: 0
SUM OF ALL RESPONSES TO PHQ QUESTIONS 1-9: 0
SUM OF ALL RESPONSES TO PHQ QUESTIONS 1-9: 0

## 2018-10-15 ASSESSMENT — ENCOUNTER SYMPTOMS
SORE THROAT: 0
CONSTIPATION: 0
NAUSEA: 0
COUGH: 0
SHORTNESS OF BREATH: 0
TROUBLE SWALLOWING: 0
ABDOMINAL PAIN: 0
BLOOD IN STOOL: 0
CHEST TIGHTNESS: 0
ORTHOPNEA: 0
BACK PAIN: 0
EYE PAIN: 0

## 2018-10-18 LAB
ABSOLUTE BASO #: 0.1 K/UL (ref 0–0.1)
ABSOLUTE EOS #: 0.2 K/UL (ref 0.1–0.4)
ABSOLUTE LYMPH #: 2.1 K/UL (ref 0.8–5.2)
ABSOLUTE MONO #: 0.5 K/UL (ref 0.1–0.9)
ABSOLUTE NEUT #: 3.9 K/UL (ref 1.3–9.1)
ALT SERPL-CCNC: 13 U/L (ref 5–50)
ANION GAP SERPL CALCULATED.3IONS-SCNC: 16 MEQ/L (ref 10–19)
AST SERPL-CCNC: 13 U/L (ref 9–50)
AVERAGE GLUCOSE: 131 MG/DL (ref 66–114)
BASOPHILS RELATIVE PERCENT: 0.7 %
BUN BLDV-MCNC: 24 MG/DL (ref 8–23)
CALCIUM SERPL-MCNC: 9.3 MG/DL (ref 8.5–10.5)
CHLORIDE BLD-SCNC: 105 MEQ/L (ref 95–107)
CHOLESTEROL/HDL RATIO: 3.3
CHOLESTEROL: 119 MG/DL
CO2: 23 MEQ/L (ref 19–31)
CREAT SERPL-MCNC: 1.2 MG/DL (ref 0.8–1.4)
EGFR AFRICAN AMERICAN: 70.6 ML/MIN/1.73 M2
EGFR IF NONAFRICAN AMERICAN: 60.9 ML/MIN/1.73 M2
EOSINOPHILS RELATIVE PERCENT: 2.9 %
GLUCOSE: 125 MG/DL (ref 70–99)
HBA1C MFR BLD: 6.2 % (ref 4.2–5.8)
HCT VFR BLD CALC: 31.8 % (ref 41.4–51)
HDLC SERPL-MCNC: 35.6 MG/DL
HEMOGLOBIN: 11.2 G/DL (ref 13.8–17)
LDL CHOLESTEROL CALCULATED: 56 MG/DL
LDL/HDL RATIO: 1.6
LYMPHOCYTE %: 31 %
MCH RBC QN AUTO: 31.8 PG (ref 27–34)
MCHC RBC AUTO-ENTMCNC: 35.2 G/DL (ref 31–36)
MCV RBC AUTO: 90.3 FL (ref 80–100)
MONOCYTES # BLD: 7.7 %
NEUTROPHILS RELATIVE PERCENT: 57.6 %
PDW BLD-RTO: 12.7 % (ref 10.8–14.8)
PLATELETS: 168 K/UL (ref 150–450)
POTASSIUM SERPL-SCNC: 4.6 MEQ/L (ref 3.5–5.4)
RBC: 3.52 M/UL (ref 4–5.5)
SODIUM BLD-SCNC: 144 MEQ/L (ref 135–146)
TRIGL SERPL-MCNC: 135 MG/DL
VLDLC SERPL CALC-MCNC: 27 MG/DL
WBC: 6.8 K/UL (ref 3.7–10.8)

## 2018-10-29 ENCOUNTER — CARE COORDINATION (OUTPATIENT)
Dept: CARE COORDINATION | Age: 68
End: 2018-10-29

## 2018-11-02 ENCOUNTER — CARE COORDINATION (OUTPATIENT)
Dept: CARE COORDINATION | Age: 68
End: 2018-11-02

## 2018-11-02 NOTE — CARE COORDINATION
Writer called pt to see if he moved yet  Pt stated he has to wait till 11/14 to get his check. He stated he called SS and asked them to send him his check directly, which they will do \"temporarily. \"  He stated this way they won't take his house payment out and he will be able to pay for his first months rent. Pt is in process of changing [place]. Pt plans to move to the [address] around 11/14. He states his daughter volunteered to help and get him a recliner. Pt appreciative.

## 2018-11-04 RX ORDER — INSULIN ASPART 100 [IU]/ML
INJECTION, SOLUTION INTRAVENOUS; SUBCUTANEOUS
Refills: 11 | Status: CANCELLED | OUTPATIENT
Start: 2018-11-04

## 2018-11-05 NOTE — TELEPHONE ENCOUNTER
Pt called said that he is completely out of Novolog.     Leo Cadet)    Told pt to check pharmacy this afternoon after 3 pm

## 2018-11-07 ENCOUNTER — CARE COORDINATION (OUTPATIENT)
Dept: CARE COORDINATION | Age: 68
End: 2018-11-07

## 2018-11-08 ENCOUNTER — CARE COORDINATION (OUTPATIENT)
Dept: CARE COORDINATION | Age: 68
End: 2018-11-08

## 2018-11-08 NOTE — CARE COORDINATION
Ambulatory Care Coordination Note  11/8/2018  CM Risk Score: 2  Mallory Mortality Risk Score: 4.18    ACC: Marcie Rollins, TOMA    Summary Note: Spoke with Xiomy Riojas. Discussed his upcoming move into the [facility] apartments. He is starting to accept the idea of his move and realizes it is for the better. States he will be happy to have working appliances, heat and most importantly running water which is something he has not had since June. [facility] his family is supporting him and helping him with this move. He is very grateful for the assistance from [employer], [address]. [facility] he is planning on moving in next week. [facility] he has called [employer] and informed them of his address change. States physically he is feeling okay although does admit to some back pain as he is packing and moving boxes. Encouraged him to call with any questions or concerns. Will continue to follow up. Diabetes Assessment    Medic Alert ID:  No  Meal Planning:  Avoidance of concentrated sweets   How often do you test your blood sugar?:  Meals   Do you have barriers with adherence to non-pharmacologic self-management interventions?  (Nutrition/Exercise/Self-Monitoring):  Yes   Have you ever had to go to the ED for symptoms of low blood sugar?:  No       No patient-reported symptoms                Care Coordination Interventions    Program Enrollment:  Complex Care  Referral from Primary Care Provider:  Yes  Suggested Interventions and Community Resources  Fall Risk Prevention:  Completed  Meals on Wheels:  Completed  Medication Assistance Program:  Completed  Pharmacist:  Declined  Senior Services:  Completed  Social Work:  Completed  Other Services:  Completed  Transportation Support:  Completed  Zone Management Tools:  Completed  Other Services or Interventions:  [place] on Aging, Meals on [employer], SW, ACJFS         Goals Addressed             Most Recent     Conditions and Symptoms   On track (11/8/2018)             I will

## 2018-11-16 ENCOUNTER — CARE COORDINATION (OUTPATIENT)
Dept: CARE COORDINATION | Age: 68
End: 2018-11-16

## 2018-11-16 NOTE — CARE COORDINATION
Writer called pt today to see if he is doing ok with the move. Pt stated he was just then picking up a truck. Writer asked if he had help lined up and he stated he does. He stated he will Be moving tomorrow and Sunday. Then asked if he needed anything and pt stated, \"I think I have everything I need. \" Writer praised him for the follow through and encouraged him to call with any needs. Pt was appreciative of phone call.

## 2018-11-20 ENCOUNTER — OFFICE VISIT (OUTPATIENT)
Dept: FAMILY MEDICINE CLINIC | Age: 68
End: 2018-11-20

## 2018-11-20 VITALS
DIASTOLIC BLOOD PRESSURE: 72 MMHG | WEIGHT: 315 LBS | HEIGHT: 74 IN | RESPIRATION RATE: 18 BRPM | BODY MASS INDEX: 40.43 KG/M2 | HEART RATE: 64 BPM | SYSTOLIC BLOOD PRESSURE: 136 MMHG

## 2018-11-20 DIAGNOSIS — M53.3 SACROILIAC JOINT DYSFUNCTION OF RIGHT SIDE: Primary | ICD-10-CM

## 2018-11-20 DIAGNOSIS — I10 ESSENTIAL HYPERTENSION: ICD-10-CM

## 2018-11-20 PROCEDURE — 1101F PT FALLS ASSESS-DOCD LE1/YR: CPT | Performed by: EMERGENCY MEDICINE

## 2018-11-20 PROCEDURE — 99213 OFFICE O/P EST LOW 20 MIN: CPT | Performed by: EMERGENCY MEDICINE

## 2018-11-20 PROCEDURE — 96372 THER/PROPH/DIAG INJ SC/IM: CPT | Performed by: EMERGENCY MEDICINE

## 2018-11-20 RX ORDER — TIZANIDINE 4 MG/1
4 TABLET ORAL 3 TIMES DAILY PRN
Qty: 24 TABLET | Refills: 0 | Status: SHIPPED | OUTPATIENT
Start: 2018-11-20 | End: 2019-08-21 | Stop reason: ALTCHOICE

## 2018-11-20 RX ORDER — HYDROCODONE BITARTRATE AND ACETAMINOPHEN 5; 325 MG/1; MG/1
1 TABLET ORAL EVERY 6 HOURS PRN
Qty: 20 TABLET | Refills: 0 | Status: SHIPPED | OUTPATIENT
Start: 2018-11-20 | End: 2018-11-25

## 2018-11-20 RX ORDER — KETOROLAC TROMETHAMINE 30 MG/ML
30 INJECTION, SOLUTION INTRAMUSCULAR; INTRAVENOUS ONCE
Status: COMPLETED | OUTPATIENT
Start: 2018-11-20 | End: 2018-11-20

## 2018-11-20 RX ADMIN — KETOROLAC TROMETHAMINE 30 MG: 30 INJECTION, SOLUTION INTRAMUSCULAR; INTRAVENOUS at 16:59

## 2018-11-20 ASSESSMENT — ENCOUNTER SYMPTOMS
COUGH: 0
TROUBLE SWALLOWING: 0
WHEEZING: 0
BACK PAIN: 1
CONSTIPATION: 0
CHEST TIGHTNESS: 0
DIARRHEA: 0
SINUS PRESSURE: 0
SHORTNESS OF BREATH: 0
SORE THROAT: 0
ABDOMINAL PAIN: 0
VOICE CHANGE: 0
NAUSEA: 0
VOMITING: 0
RHINORRHEA: 0

## 2018-11-20 NOTE — PROGRESS NOTES
Visit Date: 11/20/2018    Subjective:    Kevin Herrera is a 77 y.o.male who presents today for:  Chief Complaint   Patient presents with    Back Pain     lower right side         HPI:     HPI     3 days right low back pain, patient been doing moving from [facility]. No fall , no injury    CurrentHome Medications:  Current Outpatient Prescriptions   Medication Sig Dispense Refill    tiZANidine (ZANAFLEX) 4 MG tablet Take 1 tablet by mouth 3 times daily as needed (lumbar pain) 24 tablet 0    HYDROcodone-acetaminophen (NORCO) 5-325 MG per tablet Take 1 tablet by mouth every 6 hours as needed for Pain for up to 5 days. Intended supply: 5 days. Take lowest dose possible to manage pain.  20 tablet 0    insulin aspart (NOVOLOG FLEXPEN) 100 UNIT/ML injection pen INJECT 12 UNITS SUBCUTANEOUSLY 3 TIMES A DAY BEFORE MEALS 15 pen 3    enalapril (VASOTEC) 10 MG tablet TAKE ONE TABLET BY MOUTH TWICE DAILY 180 tablet 1    carvedilol (COREG) 3.125 MG tablet TAKE ONE TABLET BY MOUTH TWICE DAILY WITH MEALS 60 tablet 11    BRILINTA 90 MG TABS tablet TAKE ONE TABLET BY MOUTH TWICE DAILY 60 tablet 11    carvedilol (COREG) 3.125 MG tablet Take 1 tablet by mouth 2 times daily (with meals) 60 tablet 11    ticagrelor (BRILINTA) 90 MG TABS tablet Take 1 tablet by mouth 2 times daily 60 tablet 11    metFORMIN (GLUCOPHAGE) 500 MG tablet TAKE TWO TABLETS BY MOUTH TWICE DAILY WITH MEALS 360 tablet 1    LANTUS SOLOSTAR 100 UNIT/ML injection pen INJECT 30 UNITS SUBCUTANEOUSLY ONCE DAILY 15 pen 1    furosemide (LASIX) 20 MG tablet TAKE TWO TABLETS BY MOUTH ONCE DAILY 180 tablet 1    neomycin-polymyxin-hydrocortisone (CORTISPORIN) 3.5-28911-4 otic solution Use  4  gtts  Qid  Both  Ears   For  5 days 1 Bottle 0    amLODIPine (NORVASC) 5 MG tablet TAKE ONE TABLET BY MOUTH ONCE DAILY 90 tablet 1    atorvastatin (LIPITOR) 10 MG tablet Take 1 tablet by mouth daily 90 tablet 1    pantoprazole (PROTONIX) 40 MG tablet Take 1 11/20/18 136/72   10/15/18 112/64   07/13/18 128/64     Wt Readings from Last 3 Encounters:   11/20/18 (!) 358 lb (162.4 kg)   10/15/18 (!) 355 lb 2 oz (161.1 kg)   07/13/18 (!) 343 lb 6 oz (155.8 kg)       Physical Exam   Constitutional: He is oriented to person, place, and time. He appears well-developed and well-nourished. He is cooperative. HENT:   Head: Normocephalic and atraumatic. Right Ear: External ear normal.   Left Ear: External ear normal.   Nose: Nose normal.   Mouth/Throat: Oropharynx is clear and moist.   Eyes: Pupils are equal, round, and reactive to light. Conjunctivae and EOM are normal. No scleral icterus. Neck: Normal range of motion. Neck supple. No JVD present. No thyromegaly present. Cardiovascular: Normal rate, regular rhythm and intact distal pulses. Exam reveals no friction rub. No murmur heard. Pulmonary/Chest: Effort normal and breath sounds normal. He has no wheezes. He has no rales. He exhibits no tenderness. Abdominal: Soft. Bowel sounds are normal. He exhibits no mass. There is no tenderness. Musculoskeletal: He exhibits tenderness (right lumbar and SI joint). He exhibits no edema. Lymphadenopathy:     He has no cervical adenopathy. Neurological: He is alert and oriented to person, place, and time. Skin: No rash noted. Vitals reviewed. Assessment:         Diagnosis Orders   1. Sacroiliac joint dysfunction of right side  HYDROcodone-acetaminophen (NORCO) 5-325 MG per tablet   2. Essential hypertension         Plan:      Medications Prescribed:  Orders Placed This Encounter   Medications    ketorolac (TORADOL) injection 30 mg    tiZANidine (ZANAFLEX) 4 MG tablet     Sig: Take 1 tablet by mouth 3 times daily as needed (lumbar pain)     Dispense:  24 tablet     Refill:  0    HYDROcodone-acetaminophen (NORCO) 5-325 MG per tablet     Sig: Take 1 tablet by mouth every 6 hours as needed for Pain for up to 5 days. Intended supply: 5 days.  Take lowest dose possible to manage pain. Dispense:  20 tablet     Refill:  0     Orders Placed:  No orders of the defined types were placed in this encounter. Controlled Substances Monitoring:     RX Monitoring 11/20/2018   Attestation The Prescription Monitoring Report for this patient was reviewed today. Documentation No signs of potential drug abuse or diversion identified. Return for Dr Rozina Taylor. Discussed use, benefit, and side effects of prescribedmedications. All patient questions answered. Pt voiced understanding. Instructedto continue current medications, diet and exercise. Patient agreed with treatmentplan.

## 2018-11-26 ENCOUNTER — CARE COORDINATION (OUTPATIENT)
Dept: CARE COORDINATION | Age: 68
End: 2018-11-26

## 2018-12-10 ENCOUNTER — CARE COORDINATION (OUTPATIENT)
Dept: CARE COORDINATION | Age: 68
End: 2018-12-10

## 2018-12-10 NOTE — CARE COORDINATION
Writer visited pt in his home on 12/5. He stated he is very happy there. His family helped him move in and  bought him a recliner, TV,  a new mattress along with a kitchen table, new curtains, a shower curtain and bathroom rug. Demario Rosales Pt was excited to show me the dishes in his cupboard that his wife used and he \"cleaned em all up. \"  He also found some family pictures that he said he is \"anxious to hang. \"  Pt stated it is \"very quiet\" in the building and he feels safe. He is on the 2nd floor of the [address] [address], [place]. Pt will call with any new concerns.

## 2018-12-12 DIAGNOSIS — K21.9 GASTROESOPHAGEAL REFLUX DISEASE WITHOUT ESOPHAGITIS: ICD-10-CM

## 2018-12-12 DIAGNOSIS — I15.0 RENOVASCULAR HYPERTENSION: ICD-10-CM

## 2018-12-13 RX ORDER — INSULIN GLARGINE 100 [IU]/ML
INJECTION, SOLUTION SUBCUTANEOUS
Qty: 15 PEN | Refills: 1 | Status: SHIPPED | OUTPATIENT
Start: 2018-12-13 | End: 2019-10-29 | Stop reason: SDUPTHER

## 2018-12-13 RX ORDER — AMLODIPINE BESYLATE 5 MG/1
TABLET ORAL
Qty: 90 TABLET | Refills: 1 | Status: ON HOLD | OUTPATIENT
Start: 2018-12-13 | End: 2020-02-13

## 2018-12-13 RX ORDER — PANTOPRAZOLE SODIUM 40 MG/1
40 TABLET, DELAYED RELEASE ORAL DAILY
Qty: 90 TABLET | Refills: 1 | Status: SHIPPED | OUTPATIENT
Start: 2018-12-13 | End: 2019-08-21

## 2018-12-17 ENCOUNTER — NURSE TRIAGE (OUTPATIENT)
Dept: ADMINISTRATIVE | Age: 68
End: 2018-12-17

## 2018-12-19 ENCOUNTER — APPOINTMENT (OUTPATIENT)
Dept: GENERAL RADIOLOGY | Age: 68
DRG: 460 | End: 2018-12-19
Payer: MEDICARE

## 2018-12-19 ENCOUNTER — HOSPITAL ENCOUNTER (INPATIENT)
Age: 68
LOS: 12 days | Discharge: HOME HEALTH | DRG: 460 | End: 2019-01-01
Attending: FAMILY MEDICINE | Admitting: FAMILY MEDICINE
Payer: MEDICARE

## 2018-12-19 ENCOUNTER — APPOINTMENT (OUTPATIENT)
Dept: CT IMAGING | Age: 68
DRG: 460 | End: 2018-12-19
Payer: MEDICARE

## 2018-12-19 DIAGNOSIS — M54.6 ACUTE MIDLINE THORACIC BACK PAIN: Primary | ICD-10-CM

## 2018-12-19 DIAGNOSIS — S23.3XXA SPRAIN OF LIGAMENTS OF THORACIC SPINE, INITIAL ENCOUNTER: ICD-10-CM
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
## 2018-12-19 DIAGNOSIS — I15.0 RENOVASCULAR HYPERTENSION: ICD-10-CM

## 2018-12-19 PROBLEM — M54.9 INTRACTABLE BACK PAIN: Status: ACTIVE | Noted: 2018-12-19

## 2018-12-19 LAB
ALBUMIN SERPL-MCNC: 3.9 G/DL (ref 3.5–5.1)
ALP BLD-CCNC: 66 U/L (ref 38–126)
ALT SERPL-CCNC: 34 U/L (ref 11–66)
ANION GAP SERPL CALCULATED.3IONS-SCNC: 11 MEQ/L (ref 8–16)
AST SERPL-CCNC: 18 U/L (ref 5–40)
BACTERIA: ABNORMAL
BASOPHILS # BLD: 0.4 %
BASOPHILS ABSOLUTE: 0 THOU/MM3 (ref 0–0.1)
BILIRUB SERPL-MCNC: 0.4 MG/DL (ref 0.3–1.2)
BILIRUBIN URINE: NEGATIVE
BLOOD, URINE: ABNORMAL
BUN BLDV-MCNC: 32 MG/DL (ref 7–22)
CALCIUM SERPL-MCNC: 9.2 MG/DL (ref 8.5–10.5)
CASTS: ABNORMAL /LPF
CHARACTER, URINE: CLEAR
CHLORIDE BLD-SCNC: 103 MEQ/L (ref 98–111)
CO2: 25 MEQ/L (ref 23–33)
COLOR: YELLOW
CREAT SERPL-MCNC: 1.1 MG/DL (ref 0.4–1.2)
CRYSTALS: ABNORMAL
EOSINOPHIL # BLD: 1.9 %
EOSINOPHILS ABSOLUTE: 0.1 THOU/MM3 (ref 0–0.4)
EPITHELIAL CELLS, UA: ABNORMAL /HPF
ERYTHROCYTE [DISTWIDTH] IN BLOOD BY AUTOMATED COUNT: 12.9 % (ref 11.5–14.5)
ERYTHROCYTE [DISTWIDTH] IN BLOOD BY AUTOMATED COUNT: 44.5 FL (ref 35–45)
GFR SERPL CREATININE-BSD FRML MDRD: 66 ML/MIN/1.73M2
GLUCOSE BLD-MCNC: 196 MG/DL (ref 70–108)
GLUCOSE, URINE: 100 MG/DL
HCT VFR BLD CALC: 32.4 % (ref 42–52)
HEMOGLOBIN: 10.8 GM/DL (ref 14–18)
IMMATURE GRANS (ABS): 0.02 THOU/MM3 (ref 0–0.07)
IMMATURE GRANULOCYTES: 0.3 %
KETONES, URINE: NEGATIVE
LEUKOCYTE ESTERASE, URINE: NEGATIVE
LYMPHOCYTES # BLD: 17.1 %
LYMPHOCYTES ABSOLUTE: 1.3 THOU/MM3 (ref 1–4.8)
MAGNESIUM: 2 MG/DL (ref 1.6–2.4)
MCH RBC QN AUTO: 31.7 PG (ref 26–33)
MCHC RBC AUTO-ENTMCNC: 33.3 GM/DL (ref 32.2–35.5)
MCV RBC AUTO: 95 FL (ref 80–94)
MONOCYTES # BLD: 7.9 %
MONOCYTES ABSOLUTE: 0.6 THOU/MM3 (ref 0.4–1.3)
NITRITE, URINE: NEGATIVE
NUCLEATED RED BLOOD CELLS: 0 /100 WBC
OSMOLALITY CALCULATION: 289.9 MOSMOL/KG (ref 275–300)
PH UA: 5.5
PLATELET # BLD: 141 THOU/MM3 (ref 130–400)
PMV BLD AUTO: 9.9 FL (ref 9.4–12.4)
POTASSIUM SERPL-SCNC: 4.8 MEQ/L (ref 3.5–5.2)
PROTEIN UA: 30 MG/DL
RBC # BLD: 3.41 MILL/MM3 (ref 4.7–6.1)
RBC URINE: ABNORMAL /HPF
RENAL EPITHELIAL, UA: ABNORMAL
SEG NEUTROPHILS: 72.4 %
SEGMENTED NEUTROPHILS ABSOLUTE COUNT: 5.4 THOU/MM3 (ref 1.8–7.7)
SODIUM BLD-SCNC: 139 MEQ/L (ref 135–145)
SPECIFIC GRAVITY UA: 1.01 (ref 1–1.03)
TOTAL PROTEIN: 6.3 G/DL (ref 6.1–8)
TROPONIN T: < 0.01 NG/ML
UROBILINOGEN, URINE: 0.2 EU/DL
WBC # BLD: 7.4 THOU/MM3 (ref 4.8–10.8)
WBC UA: ABNORMAL /HPF
YEAST: ABNORMAL

## 2018-12-19 PROCEDURE — 72128 CT CHEST SPINE W/O DYE: CPT

## 2018-12-19 PROCEDURE — 73523 X-RAY EXAM HIPS BI 5/> VIEWS: CPT

## 2018-12-19 PROCEDURE — 96374 THER/PROPH/DIAG INJ IV PUSH: CPT

## 2018-12-19 PROCEDURE — 80053 COMPREHEN METABOLIC PANEL: CPT

## 2018-12-19 PROCEDURE — 84484 ASSAY OF TROPONIN QUANT: CPT

## 2018-12-19 PROCEDURE — 6370000000 HC RX 637 (ALT 250 FOR IP): Performed by: PHYSICIAN ASSISTANT

## 2018-12-19 PROCEDURE — 81001 URINALYSIS AUTO W/SCOPE: CPT

## 2018-12-19 PROCEDURE — 85025 COMPLETE CBC W/AUTO DIFF WBC: CPT

## 2018-12-19 PROCEDURE — G0378 HOSPITAL OBSERVATION PER HR: HCPCS

## 2018-12-19 PROCEDURE — 71045 X-RAY EXAM CHEST 1 VIEW: CPT

## 2018-12-19 PROCEDURE — 72125 CT NECK SPINE W/O DYE: CPT

## 2018-12-19 PROCEDURE — 36415 COLL VENOUS BLD VENIPUNCTURE: CPT

## 2018-12-19 PROCEDURE — 70450 CT HEAD/BRAIN W/O DYE: CPT

## 2018-12-19 PROCEDURE — 83735 ASSAY OF MAGNESIUM: CPT

## 2018-12-19 PROCEDURE — 72131 CT LUMBAR SPINE W/O DYE: CPT

## 2018-12-19 PROCEDURE — 99285 EMERGENCY DEPT VISIT HI MDM: CPT

## 2018-12-19 PROCEDURE — 6360000002 HC RX W HCPCS: Performed by: PHYSICIAN ASSISTANT

## 2018-12-19 PROCEDURE — 93005 ELECTROCARDIOGRAM TRACING: CPT | Performed by: PHYSICIAN ASSISTANT

## 2018-12-19 RX ORDER — METHOCARBAMOL 500 MG/1
1000 TABLET, FILM COATED ORAL ONCE
Status: DISCONTINUED | OUTPATIENT
Start: 2018-12-19 | End: 2018-12-19

## 2018-12-19 RX ORDER — LIDOCAINE 4 G/G
1 PATCH TOPICAL DAILY
Status: DISCONTINUED | OUTPATIENT
Start: 2018-12-20 | End: 2018-12-19

## 2018-12-19 RX ORDER — CARVEDILOL 3.12 MG/1
3.12 TABLET ORAL 2 TIMES DAILY WITH MEALS
Status: DISCONTINUED | OUTPATIENT
Start: 2018-12-20 | End: 2018-12-31

## 2018-12-19 RX ORDER — ASPIRIN 81 MG/1
81 TABLET, CHEWABLE ORAL DAILY
Status: DISCONTINUED | OUTPATIENT
Start: 2018-12-20 | End: 2018-12-23

## 2018-12-19 RX ORDER — INSULIN GLARGINE 100 [IU]/ML
30 INJECTION, SOLUTION SUBCUTANEOUS DAILY
Status: DISCONTINUED | OUTPATIENT
Start: 2018-12-20 | End: 2019-01-01 | Stop reason: HOSPADM

## 2018-12-19 RX ORDER — ENALAPRIL MALEATE 10 MG/1
10 TABLET ORAL 2 TIMES DAILY
Status: DISCONTINUED | OUTPATIENT
Start: 2018-12-20 | End: 2018-12-31

## 2018-12-19 RX ORDER — ISOSORBIDE MONONITRATE 30 MG/1
30 TABLET, EXTENDED RELEASE ORAL DAILY
Status: DISCONTINUED | OUTPATIENT
Start: 2018-12-20 | End: 2019-01-01 | Stop reason: HOSPADM

## 2018-12-19 RX ORDER — ORPHENADRINE CITRATE 30 MG/ML
60 INJECTION INTRAMUSCULAR; INTRAVENOUS ONCE
Status: DISCONTINUED | OUTPATIENT
Start: 2018-12-19 | End: 2018-12-19

## 2018-12-19 RX ORDER — FUROSEMIDE 20 MG/1
20 TABLET ORAL DAILY
Status: DISCONTINUED | OUTPATIENT
Start: 2018-12-20 | End: 2018-12-20

## 2018-12-19 RX ORDER — CYCLOBENZAPRINE HCL 10 MG
10 TABLET ORAL 3 TIMES DAILY
Status: DISCONTINUED | OUTPATIENT
Start: 2018-12-20 | End: 2019-01-01 | Stop reason: HOSPADM

## 2018-12-19 RX ORDER — AMLODIPINE BESYLATE 5 MG/1
5 TABLET ORAL DAILY
Status: DISCONTINUED | OUTPATIENT
Start: 2018-12-20 | End: 2019-01-01 | Stop reason: HOSPADM

## 2018-12-19 RX ORDER — ATORVASTATIN CALCIUM 10 MG/1
10 TABLET, FILM COATED ORAL NIGHTLY
Status: DISCONTINUED | OUTPATIENT
Start: 2018-12-20 | End: 2019-01-01 | Stop reason: HOSPADM

## 2018-12-19 RX ORDER — ORPHENADRINE CITRATE 100 MG/1
100 TABLET, EXTENDED RELEASE ORAL ONCE
Status: COMPLETED | OUTPATIENT
Start: 2018-12-19 | End: 2018-12-19

## 2018-12-19 RX ORDER — LIDOCAINE 4 G/G
1 PATCH TOPICAL ONCE
Status: COMPLETED | OUTPATIENT
Start: 2018-12-19 | End: 2018-12-20

## 2018-12-19 RX ORDER — MULTIVITAMIN WITH FOLIC ACID 400 MCG
1 TABLET ORAL DAILY
Status: DISCONTINUED | OUTPATIENT
Start: 2018-12-20 | End: 2019-01-01 | Stop reason: HOSPADM

## 2018-12-19 RX ORDER — FENTANYL CITRATE 50 UG/ML
50 INJECTION, SOLUTION INTRAMUSCULAR; INTRAVENOUS ONCE
Status: COMPLETED | OUTPATIENT
Start: 2018-12-19 | End: 2018-12-19

## 2018-12-19 RX ORDER — ACETAMINOPHEN 500 MG
500 TABLET ORAL EVERY 6 HOURS PRN
Status: DISCONTINUED | OUTPATIENT
Start: 2018-12-19 | End: 2019-01-01 | Stop reason: HOSPADM

## 2018-12-19 RX ORDER — PANTOPRAZOLE SODIUM 40 MG/1
40 TABLET, DELAYED RELEASE ORAL
Status: DISCONTINUED | OUTPATIENT
Start: 2018-12-20 | End: 2019-01-01 | Stop reason: HOSPADM

## 2018-12-19 RX ADMIN — FENTANYL CITRATE 50 MCG: 50 INJECTION INTRAMUSCULAR; INTRAVENOUS at 21:54

## 2018-12-19 RX ADMIN — ORPHENADRINE CITRATE 100 MG: 100 TABLET, EXTENDED RELEASE ORAL at 20:54

## 2018-12-19 ASSESSMENT — PAIN DESCRIPTION - ORIENTATION
ORIENTATION: MID

## 2018-12-19 ASSESSMENT — ENCOUNTER SYMPTOMS
BACK PAIN: 1
WHEEZING: 0
COUGH: 0
RHINORRHEA: 0
VOMITING: 0
SHORTNESS OF BREATH: 0
EYE DISCHARGE: 0
DIARRHEA: 0
ABDOMINAL PAIN: 0
SORE THROAT: 0
EYE REDNESS: 0
NAUSEA: 0

## 2018-12-19 ASSESSMENT — PAIN DESCRIPTION - LOCATION
LOCATION: BACK

## 2018-12-19 ASSESSMENT — PAIN DESCRIPTION - FREQUENCY
FREQUENCY: CONTINUOUS

## 2018-12-19 ASSESSMENT — PAIN SCALES - GENERAL
PAINLEVEL_OUTOF10: 10
PAINLEVEL_OUTOF10: 9
PAINLEVEL_OUTOF10: 10

## 2018-12-19 ASSESSMENT — PAIN DESCRIPTION - PAIN TYPE
TYPE: ACUTE PAIN

## 2018-12-19 ASSESSMENT — PAIN DESCRIPTION - DESCRIPTORS
DESCRIPTORS: STABBING
DESCRIPTORS: STABBING

## 2018-12-20 ENCOUNTER — APPOINTMENT (OUTPATIENT)
Dept: MRI IMAGING | Age: 68
DRG: 460 | End: 2018-12-20
Payer: MEDICARE

## 2018-12-20 PROBLEM — M54.14 THORACIC RADICULOPATHY: Status: ACTIVE | Noted: 2018-12-20

## 2018-12-20 LAB
EKG ATRIAL RATE: 74 BPM
EKG ATRIAL RATE: 80 BPM
EKG P AXIS: 80 DEGREES
EKG P-R INTERVAL: 212 MS
EKG Q-T INTERVAL: 412 MS
EKG Q-T INTERVAL: 418 MS
EKG QRS DURATION: 126 MS
EKG QRS DURATION: 128 MS
EKG QTC CALCULATION (BAZETT): 466 MS
EKG QTC CALCULATION (BAZETT): 475 MS
EKG R AXIS: -33 DEGREES
EKG R AXIS: -36 DEGREES
EKG T AXIS: 51 DEGREES
EKG T AXIS: 74 DEGREES
EKG VENTRICULAR RATE: 75 BPM
EKG VENTRICULAR RATE: 80 BPM
GLUCOSE BLD-MCNC: 138 MG/DL (ref 70–108)
GLUCOSE BLD-MCNC: 181 MG/DL (ref 70–108)
GLUCOSE BLD-MCNC: 189 MG/DL (ref 70–108)
GLUCOSE BLD-MCNC: 221 MG/DL (ref 70–108)

## 2018-12-20 PROCEDURE — 6370000000 HC RX 637 (ALT 250 FOR IP): Performed by: FAMILY MEDICINE

## 2018-12-20 PROCEDURE — 72146 MRI CHEST SPINE W/O DYE: CPT

## 2018-12-20 PROCEDURE — 93010 ELECTROCARDIOGRAM REPORT: CPT | Performed by: INTERNAL MEDICINE

## 2018-12-20 PROCEDURE — 6360000002 HC RX W HCPCS: Performed by: FAMILY MEDICINE

## 2018-12-20 PROCEDURE — 1200000000 HC SEMI PRIVATE

## 2018-12-20 PROCEDURE — 82948 REAGENT STRIP/BLOOD GLUCOSE: CPT

## 2018-12-20 PROCEDURE — 1200000003 HC TELEMETRY R&B

## 2018-12-20 RX ORDER — FUROSEMIDE 10 MG/ML
40 INJECTION INTRAMUSCULAR; INTRAVENOUS DAILY
Status: DISCONTINUED | OUTPATIENT
Start: 2018-12-20 | End: 2019-01-01 | Stop reason: HOSPADM

## 2018-12-20 RX ADMIN — THERA TABS 1 TABLET: TAB at 11:34

## 2018-12-20 RX ADMIN — INSULIN LISPRO 12 UNITS: 100 INJECTION, SOLUTION INTRAVENOUS; SUBCUTANEOUS at 17:31

## 2018-12-20 RX ADMIN — Medication 2 UNITS: at 02:12

## 2018-12-20 RX ADMIN — ASPIRIN 81 MG 81 MG: 81 TABLET ORAL at 11:37

## 2018-12-20 RX ADMIN — HYDROMORPHONE HYDROCHLORIDE 1 MG: 1 INJECTION, SOLUTION INTRAMUSCULAR; INTRAVENOUS; SUBCUTANEOUS at 23:50

## 2018-12-20 RX ADMIN — HYDROMORPHONE HYDROCHLORIDE 1 MG: 1 INJECTION, SOLUTION INTRAMUSCULAR; INTRAVENOUS; SUBCUTANEOUS at 15:49

## 2018-12-20 RX ADMIN — ENALAPRIL MALEATE 10 MG: 10 TABLET ORAL at 11:33

## 2018-12-20 RX ADMIN — TICAGRELOR 90 MG: 90 TABLET ORAL at 20:54

## 2018-12-20 RX ADMIN — CYCLOBENZAPRINE HYDROCHLORIDE 10 MG: 10 TABLET, FILM COATED ORAL at 20:54

## 2018-12-20 RX ADMIN — AMLODIPINE BESYLATE 5 MG: 5 TABLET ORAL at 11:33

## 2018-12-20 RX ADMIN — FUROSEMIDE 40 MG: 10 INJECTION, SOLUTION INTRAMUSCULAR; INTRAVENOUS at 11:36

## 2018-12-20 RX ADMIN — INSULIN LISPRO 12 UNITS: 100 INJECTION, SOLUTION INTRAVENOUS; SUBCUTANEOUS at 11:38

## 2018-12-20 RX ADMIN — CARVEDILOL 3.12 MG: 3.12 TABLET, FILM COATED ORAL at 11:33

## 2018-12-20 RX ADMIN — HYDROMORPHONE HYDROCHLORIDE 1 MG: 1 INJECTION, SOLUTION INTRAMUSCULAR; INTRAVENOUS; SUBCUTANEOUS at 10:17

## 2018-12-20 RX ADMIN — TICAGRELOR 90 MG: 90 TABLET ORAL at 11:37

## 2018-12-20 RX ADMIN — PANTOPRAZOLE SODIUM 40 MG: 40 TABLET, DELAYED RELEASE ORAL at 06:35

## 2018-12-20 RX ADMIN — ISOSORBIDE MONONITRATE 30 MG: 30 TABLET ORAL at 11:33

## 2018-12-20 RX ADMIN — METFORMIN HYDROCHLORIDE 1000 MG: 500 TABLET ORAL at 17:31

## 2018-12-20 RX ADMIN — CYCLOBENZAPRINE HYDROCHLORIDE 10 MG: 10 TABLET, FILM COATED ORAL at 17:31

## 2018-12-20 RX ADMIN — ENALAPRIL MALEATE 10 MG: 10 TABLET ORAL at 02:08

## 2018-12-20 RX ADMIN — CYCLOBENZAPRINE HYDROCHLORIDE 10 MG: 10 TABLET, FILM COATED ORAL at 11:37

## 2018-12-20 RX ADMIN — HYDROMORPHONE HYDROCHLORIDE 1 MG: 1 INJECTION, SOLUTION INTRAMUSCULAR; INTRAVENOUS; SUBCUTANEOUS at 20:54

## 2018-12-20 RX ADMIN — ATORVASTATIN CALCIUM 10 MG: 10 TABLET, FILM COATED ORAL at 20:54

## 2018-12-20 RX ADMIN — METFORMIN HYDROCHLORIDE 1000 MG: 500 TABLET ORAL at 11:33

## 2018-12-20 RX ADMIN — CARVEDILOL 3.12 MG: 3.12 TABLET, FILM COATED ORAL at 17:31

## 2018-12-20 RX ADMIN — TICAGRELOR 90 MG: 90 TABLET ORAL at 02:09

## 2018-12-20 RX ADMIN — INSULIN GLARGINE 30 UNITS: 100 INJECTION, SOLUTION SUBCUTANEOUS at 11:36

## 2018-12-20 RX ADMIN — INSULIN LISPRO 2 UNITS: 100 INJECTION, SOLUTION INTRAVENOUS; SUBCUTANEOUS at 08:10

## 2018-12-20 RX ADMIN — INSULIN LISPRO 2 UNITS: 100 INJECTION, SOLUTION INTRAVENOUS; SUBCUTANEOUS at 11:36

## 2018-12-20 ASSESSMENT — PAIN DESCRIPTION - PROGRESSION
CLINICAL_PROGRESSION: GRADUALLY WORSENING
CLINICAL_PROGRESSION: GRADUALLY IMPROVING
CLINICAL_PROGRESSION: GRADUALLY WORSENING

## 2018-12-20 ASSESSMENT — PAIN DESCRIPTION - LOCATION
LOCATION: BACK

## 2018-12-20 ASSESSMENT — PAIN DESCRIPTION - PAIN TYPE
TYPE: ACUTE PAIN

## 2018-12-20 ASSESSMENT — PAIN DESCRIPTION - ORIENTATION
ORIENTATION: UPPER
ORIENTATION: MID
ORIENTATION: UPPER

## 2018-12-20 ASSESSMENT — PAIN SCALES - GENERAL
PAINLEVEL_OUTOF10: 5
PAINLEVEL_OUTOF10: 2
PAINLEVEL_OUTOF10: 7
PAINLEVEL_OUTOF10: 3
PAINLEVEL_OUTOF10: 9
PAINLEVEL_OUTOF10: 4
PAINLEVEL_OUTOF10: 6
PAINLEVEL_OUTOF10: 10

## 2018-12-20 ASSESSMENT — PAIN DESCRIPTION - DESCRIPTORS
DESCRIPTORS: ACHING
DESCRIPTORS: STABBING
DESCRIPTORS: ACHING

## 2018-12-20 ASSESSMENT — PAIN DESCRIPTION - FREQUENCY: FREQUENCY: CONTINUOUS

## 2018-12-20 ASSESSMENT — PAIN DESCRIPTION - ONSET: ONSET: ON-GOING

## 2018-12-21 LAB
GLUCOSE BLD-MCNC: 131 MG/DL (ref 70–108)
GLUCOSE BLD-MCNC: 154 MG/DL (ref 70–108)
GLUCOSE BLD-MCNC: 159 MG/DL (ref 70–108)
GLUCOSE BLD-MCNC: 90 MG/DL (ref 70–108)

## 2018-12-21 PROCEDURE — 97530 THERAPEUTIC ACTIVITIES: CPT

## 2018-12-21 PROCEDURE — 1200000000 HC SEMI PRIVATE

## 2018-12-21 PROCEDURE — 2580000003 HC RX 258: Performed by: FAMILY MEDICINE

## 2018-12-21 PROCEDURE — 6370000000 HC RX 637 (ALT 250 FOR IP): Performed by: FAMILY MEDICINE

## 2018-12-21 PROCEDURE — G8978 MOBILITY CURRENT STATUS: HCPCS

## 2018-12-21 PROCEDURE — 82948 REAGENT STRIP/BLOOD GLUCOSE: CPT

## 2018-12-21 PROCEDURE — 97110 THERAPEUTIC EXERCISES: CPT

## 2018-12-21 PROCEDURE — 97161 PT EVAL LOW COMPLEX 20 MIN: CPT

## 2018-12-21 PROCEDURE — G8987 SELF CARE CURRENT STATUS: HCPCS

## 2018-12-21 PROCEDURE — G8979 MOBILITY GOAL STATUS: HCPCS

## 2018-12-21 PROCEDURE — 1200000003 HC TELEMETRY R&B

## 2018-12-21 PROCEDURE — 2709999900 HC NON-CHARGEABLE SUPPLY

## 2018-12-21 PROCEDURE — 97535 SELF CARE MNGMENT TRAINING: CPT

## 2018-12-21 PROCEDURE — 6360000002 HC RX W HCPCS: Performed by: FAMILY MEDICINE

## 2018-12-21 PROCEDURE — 97166 OT EVAL MOD COMPLEX 45 MIN: CPT

## 2018-12-21 PROCEDURE — G8988 SELF CARE GOAL STATUS: HCPCS

## 2018-12-21 RX ORDER — NICOTINE POLACRILEX 4 MG
15 LOZENGE BUCCAL PRN
Status: DISCONTINUED | OUTPATIENT
Start: 2018-12-21 | End: 2019-01-01 | Stop reason: HOSPADM

## 2018-12-21 RX ORDER — OXYCODONE AND ACETAMINOPHEN 7.5; 325 MG/1; MG/1
1 TABLET ORAL EVERY 6 HOURS
Status: DISCONTINUED | OUTPATIENT
Start: 2018-12-21 | End: 2019-01-01 | Stop reason: HOSPADM

## 2018-12-21 RX ORDER — OXYCODONE HYDROCHLORIDE AND ACETAMINOPHEN 5; 325 MG/1; MG/1
1 TABLET ORAL EVERY 4 HOURS PRN
Status: DISCONTINUED | OUTPATIENT
Start: 2018-12-21 | End: 2019-01-01 | Stop reason: HOSPADM

## 2018-12-21 RX ORDER — 0.9 % SODIUM CHLORIDE 0.9 %
250 INTRAVENOUS SOLUTION INTRAVENOUS ONCE
Status: COMPLETED | OUTPATIENT
Start: 2018-12-21 | End: 2018-12-21

## 2018-12-21 RX ORDER — DEXTROSE MONOHYDRATE 50 MG/ML
100 INJECTION, SOLUTION INTRAVENOUS PRN
Status: DISCONTINUED | OUTPATIENT
Start: 2018-12-21 | End: 2019-01-01 | Stop reason: HOSPADM

## 2018-12-21 RX ORDER — DEXTROSE MONOHYDRATE 25 G/50ML
12.5 INJECTION, SOLUTION INTRAVENOUS PRN
Status: DISCONTINUED | OUTPATIENT
Start: 2018-12-21 | End: 2019-01-01 | Stop reason: HOSPADM

## 2018-12-21 RX ADMIN — CYCLOBENZAPRINE HYDROCHLORIDE 10 MG: 10 TABLET, FILM COATED ORAL at 20:45

## 2018-12-21 RX ADMIN — SODIUM CHLORIDE 250 ML: 9 INJECTION, SOLUTION INTRAVENOUS at 14:15

## 2018-12-21 RX ADMIN — CYCLOBENZAPRINE HYDROCHLORIDE 10 MG: 10 TABLET, FILM COATED ORAL at 10:35

## 2018-12-21 RX ADMIN — METFORMIN HYDROCHLORIDE 1000 MG: 500 TABLET ORAL at 10:36

## 2018-12-21 RX ADMIN — INSULIN LISPRO 12 UNITS: 100 INJECTION, SOLUTION INTRAVENOUS; SUBCUTANEOUS at 12:09

## 2018-12-21 RX ADMIN — ASPIRIN 81 MG 81 MG: 81 TABLET ORAL at 10:35

## 2018-12-21 RX ADMIN — INSULIN LISPRO 12 UNITS: 100 INJECTION, SOLUTION INTRAVENOUS; SUBCUTANEOUS at 17:18

## 2018-12-21 RX ADMIN — AMLODIPINE BESYLATE 5 MG: 5 TABLET ORAL at 10:36

## 2018-12-21 RX ADMIN — CARVEDILOL 3.12 MG: 3.12 TABLET, FILM COATED ORAL at 10:35

## 2018-12-21 RX ADMIN — PANTOPRAZOLE SODIUM 40 MG: 40 TABLET, DELAYED RELEASE ORAL at 06:12

## 2018-12-21 RX ADMIN — HYDROMORPHONE HYDROCHLORIDE 1 MG: 1 INJECTION, SOLUTION INTRAMUSCULAR; INTRAVENOUS; SUBCUTANEOUS at 06:12

## 2018-12-21 RX ADMIN — TICAGRELOR 90 MG: 90 TABLET ORAL at 10:35

## 2018-12-21 RX ADMIN — FUROSEMIDE 40 MG: 10 INJECTION, SOLUTION INTRAMUSCULAR; INTRAVENOUS at 10:35

## 2018-12-21 RX ADMIN — INSULIN GLARGINE 30 UNITS: 100 INJECTION, SOLUTION SUBCUTANEOUS at 10:37

## 2018-12-21 RX ADMIN — METFORMIN HYDROCHLORIDE 1000 MG: 500 TABLET ORAL at 17:23

## 2018-12-21 RX ADMIN — ENALAPRIL MALEATE 10 MG: 10 TABLET ORAL at 10:36

## 2018-12-21 RX ADMIN — THERA TABS 1 TABLET: TAB at 10:36

## 2018-12-21 RX ADMIN — INSULIN LISPRO 2 UNITS: 100 INJECTION, SOLUTION INTRAVENOUS; SUBCUTANEOUS at 07:38

## 2018-12-21 RX ADMIN — ENOXAPARIN SODIUM 40 MG: 40 INJECTION SUBCUTANEOUS at 10:35

## 2018-12-21 RX ADMIN — TICAGRELOR 90 MG: 90 TABLET ORAL at 20:45

## 2018-12-21 RX ADMIN — INSULIN LISPRO 12 UNITS: 100 INJECTION, SOLUTION INTRAVENOUS; SUBCUTANEOUS at 07:39

## 2018-12-21 RX ADMIN — ENALAPRIL MALEATE 10 MG: 10 TABLET ORAL at 20:45

## 2018-12-21 RX ADMIN — OXYCODONE HYDROCHLORIDE AND ACETAMINOPHEN 1 TABLET: 7.5; 325 TABLET ORAL at 20:00

## 2018-12-21 RX ADMIN — OXYCODONE HYDROCHLORIDE AND ACETAMINOPHEN 1 TABLET: 7.5; 325 TABLET ORAL at 10:35

## 2018-12-21 RX ADMIN — INSULIN LISPRO 2 UNITS: 100 INJECTION, SOLUTION INTRAVENOUS; SUBCUTANEOUS at 17:22

## 2018-12-21 RX ADMIN — ISOSORBIDE MONONITRATE 30 MG: 30 TABLET ORAL at 10:35

## 2018-12-21 RX ADMIN — ATORVASTATIN CALCIUM 10 MG: 10 TABLET, FILM COATED ORAL at 20:45

## 2018-12-21 RX ADMIN — ENOXAPARIN SODIUM 40 MG: 40 INJECTION SUBCUTANEOUS at 20:45

## 2018-12-21 ASSESSMENT — PAIN DESCRIPTION - LOCATION
LOCATION: BACK
LOCATION: BACK

## 2018-12-21 ASSESSMENT — PAIN SCALES - GENERAL
PAINLEVEL_OUTOF10: 1
PAINLEVEL_OUTOF10: 7
PAINLEVEL_OUTOF10: 5
PAINLEVEL_OUTOF10: 0
PAINLEVEL_OUTOF10: 7
PAINLEVEL_OUTOF10: 5
PAINLEVEL_OUTOF10: 1
PAINLEVEL_OUTOF10: 3

## 2018-12-21 ASSESSMENT — PAIN DESCRIPTION - FREQUENCY
FREQUENCY: CONTINUOUS
FREQUENCY: CONTINUOUS

## 2018-12-21 ASSESSMENT — PAIN DESCRIPTION - ORIENTATION
ORIENTATION: MID
ORIENTATION: MID

## 2018-12-21 ASSESSMENT — PAIN DESCRIPTION - PAIN TYPE
TYPE: ACUTE PAIN
TYPE: ACUTE PAIN

## 2018-12-21 ASSESSMENT — PAIN DESCRIPTION - DESCRIPTORS
DESCRIPTORS: ACHING
DESCRIPTORS: ACHING

## 2018-12-22 PROBLEM — S23.3XXA SPRAIN OF LIGAMENTS OF THORACIC SPINE: Status: ACTIVE | Noted: 2018-12-22

## 2018-12-22 LAB
GLUCOSE BLD-MCNC: 107 MG/DL (ref 70–108)
GLUCOSE BLD-MCNC: 114 MG/DL (ref 70–108)
GLUCOSE BLD-MCNC: 148 MG/DL (ref 70–108)
GLUCOSE BLD-MCNC: 63 MG/DL (ref 70–108)
GLUCOSE BLD-MCNC: 69 MG/DL (ref 70–108)
GLUCOSE BLD-MCNC: 89 MG/DL (ref 70–108)

## 2018-12-22 PROCEDURE — 97110 THERAPEUTIC EXERCISES: CPT

## 2018-12-22 PROCEDURE — 6360000002 HC RX W HCPCS: Performed by: FAMILY MEDICINE

## 2018-12-22 PROCEDURE — 97116 GAIT TRAINING THERAPY: CPT

## 2018-12-22 PROCEDURE — 6370000000 HC RX 637 (ALT 250 FOR IP): Performed by: FAMILY MEDICINE

## 2018-12-22 PROCEDURE — 97530 THERAPEUTIC ACTIVITIES: CPT

## 2018-12-22 PROCEDURE — 1200000003 HC TELEMETRY R&B

## 2018-12-22 PROCEDURE — 1200000000 HC SEMI PRIVATE

## 2018-12-22 PROCEDURE — 82948 REAGENT STRIP/BLOOD GLUCOSE: CPT

## 2018-12-22 RX ADMIN — INSULIN LISPRO 12 UNITS: 100 INJECTION, SOLUTION INTRAVENOUS; SUBCUTANEOUS at 12:10

## 2018-12-22 RX ADMIN — AMLODIPINE BESYLATE 5 MG: 5 TABLET ORAL at 08:48

## 2018-12-22 RX ADMIN — ENALAPRIL MALEATE 10 MG: 10 TABLET ORAL at 20:26

## 2018-12-22 RX ADMIN — OXYCODONE HYDROCHLORIDE AND ACETAMINOPHEN 1 TABLET: 7.5; 325 TABLET ORAL at 01:40

## 2018-12-22 RX ADMIN — CYCLOBENZAPRINE HYDROCHLORIDE 10 MG: 10 TABLET, FILM COATED ORAL at 20:26

## 2018-12-22 RX ADMIN — INSULIN LISPRO 12 UNITS: 100 INJECTION, SOLUTION INTRAVENOUS; SUBCUTANEOUS at 08:49

## 2018-12-22 RX ADMIN — ATORVASTATIN CALCIUM 10 MG: 10 TABLET, FILM COATED ORAL at 20:26

## 2018-12-22 RX ADMIN — ENALAPRIL MALEATE 10 MG: 10 TABLET ORAL at 08:48

## 2018-12-22 RX ADMIN — INSULIN GLARGINE 30 UNITS: 100 INJECTION, SOLUTION SUBCUTANEOUS at 08:56

## 2018-12-22 RX ADMIN — Medication 1 UNITS: at 20:24

## 2018-12-22 RX ADMIN — TICAGRELOR 90 MG: 90 TABLET ORAL at 08:47

## 2018-12-22 RX ADMIN — ENOXAPARIN SODIUM 40 MG: 40 INJECTION SUBCUTANEOUS at 08:48

## 2018-12-22 RX ADMIN — METFORMIN HYDROCHLORIDE 1000 MG: 500 TABLET ORAL at 17:57

## 2018-12-22 RX ADMIN — PANTOPRAZOLE SODIUM 40 MG: 40 TABLET, DELAYED RELEASE ORAL at 06:11

## 2018-12-22 RX ADMIN — TICAGRELOR 90 MG: 90 TABLET ORAL at 20:26

## 2018-12-22 RX ADMIN — ENOXAPARIN SODIUM 40 MG: 40 INJECTION SUBCUTANEOUS at 20:25

## 2018-12-22 RX ADMIN — CARVEDILOL 3.12 MG: 3.12 TABLET, FILM COATED ORAL at 17:57

## 2018-12-22 RX ADMIN — CARVEDILOL 3.12 MG: 3.12 TABLET, FILM COATED ORAL at 08:48

## 2018-12-22 RX ADMIN — FUROSEMIDE 40 MG: 10 INJECTION, SOLUTION INTRAMUSCULAR; INTRAVENOUS at 08:49

## 2018-12-22 RX ADMIN — CYCLOBENZAPRINE HYDROCHLORIDE 10 MG: 10 TABLET, FILM COATED ORAL at 14:58

## 2018-12-22 RX ADMIN — OXYCODONE HYDROCHLORIDE AND ACETAMINOPHEN 1 TABLET: 7.5; 325 TABLET ORAL at 08:47

## 2018-12-22 RX ADMIN — ISOSORBIDE MONONITRATE 30 MG: 30 TABLET ORAL at 08:48

## 2018-12-22 RX ADMIN — ASPIRIN 81 MG 81 MG: 81 TABLET ORAL at 08:48

## 2018-12-22 RX ADMIN — OXYCODONE HYDROCHLORIDE AND ACETAMINOPHEN 1 TABLET: 7.5; 325 TABLET ORAL at 20:25

## 2018-12-22 RX ADMIN — CYCLOBENZAPRINE HYDROCHLORIDE 10 MG: 10 TABLET, FILM COATED ORAL at 08:48

## 2018-12-22 RX ADMIN — OXYCODONE HYDROCHLORIDE AND ACETAMINOPHEN 1 TABLET: 7.5; 325 TABLET ORAL at 14:58

## 2018-12-22 RX ADMIN — THERA TABS 1 TABLET: TAB at 08:47

## 2018-12-22 RX ADMIN — METFORMIN HYDROCHLORIDE 1000 MG: 500 TABLET ORAL at 08:48

## 2018-12-22 ASSESSMENT — PAIN DESCRIPTION - FREQUENCY
FREQUENCY: CONTINUOUS
FREQUENCY: INTERMITTENT

## 2018-12-22 ASSESSMENT — PAIN SCALES - GENERAL
PAINLEVEL_OUTOF10: 0
PAINLEVEL_OUTOF10: 6
PAINLEVEL_OUTOF10: 0
PAINLEVEL_OUTOF10: 2
PAINLEVEL_OUTOF10: 6
PAINLEVEL_OUTOF10: 8
PAINLEVEL_OUTOF10: 3
PAINLEVEL_OUTOF10: 2
PAINLEVEL_OUTOF10: 6
PAINLEVEL_OUTOF10: 2
PAINLEVEL_OUTOF10: 0
PAINLEVEL_OUTOF10: 2

## 2018-12-22 ASSESSMENT — PAIN DESCRIPTION - LOCATION
LOCATION: BACK
LOCATION: BACK

## 2018-12-22 ASSESSMENT — PAIN DESCRIPTION - ONSET
ONSET: ON-GOING
ONSET: ON-GOING

## 2018-12-22 ASSESSMENT — PAIN DESCRIPTION - PROGRESSION
CLINICAL_PROGRESSION: NOT CHANGED
CLINICAL_PROGRESSION: NOT CHANGED

## 2018-12-22 ASSESSMENT — PAIN DESCRIPTION - DESCRIPTORS
DESCRIPTORS: ACHING
DESCRIPTORS: ACHING

## 2018-12-22 ASSESSMENT — PAIN DESCRIPTION - PAIN TYPE
TYPE: ACUTE PAIN
TYPE: ACUTE PAIN

## 2018-12-22 ASSESSMENT — PAIN DESCRIPTION - ORIENTATION
ORIENTATION: MID
ORIENTATION: MID

## 2018-12-23 LAB
GLUCOSE BLD-MCNC: 118 MG/DL (ref 70–108)
GLUCOSE BLD-MCNC: 164 MG/DL (ref 70–108)
GLUCOSE BLD-MCNC: 170 MG/DL (ref 70–108)
GLUCOSE BLD-MCNC: 170 MG/DL (ref 70–108)

## 2018-12-23 PROCEDURE — 1200000003 HC TELEMETRY R&B

## 2018-12-23 PROCEDURE — 6370000000 HC RX 637 (ALT 250 FOR IP): Performed by: FAMILY MEDICINE

## 2018-12-23 PROCEDURE — 82948 REAGENT STRIP/BLOOD GLUCOSE: CPT

## 2018-12-23 PROCEDURE — 99223 1ST HOSP IP/OBS HIGH 75: CPT | Performed by: INTERNAL MEDICINE

## 2018-12-23 PROCEDURE — 6360000002 HC RX W HCPCS: Performed by: FAMILY MEDICINE

## 2018-12-23 PROCEDURE — 1200000000 HC SEMI PRIVATE

## 2018-12-23 RX ADMIN — ATORVASTATIN CALCIUM 10 MG: 10 TABLET, FILM COATED ORAL at 20:19

## 2018-12-23 RX ADMIN — INSULIN LISPRO 2 UNITS: 100 INJECTION, SOLUTION INTRAVENOUS; SUBCUTANEOUS at 11:20

## 2018-12-23 RX ADMIN — CARVEDILOL 3.12 MG: 3.12 TABLET, FILM COATED ORAL at 08:17

## 2018-12-23 RX ADMIN — INSULIN LISPRO 2 UNITS: 100 INJECTION, SOLUTION INTRAVENOUS; SUBCUTANEOUS at 16:11

## 2018-12-23 RX ADMIN — CYCLOBENZAPRINE HYDROCHLORIDE 10 MG: 10 TABLET, FILM COATED ORAL at 20:19

## 2018-12-23 RX ADMIN — PANTOPRAZOLE SODIUM 40 MG: 40 TABLET, DELAYED RELEASE ORAL at 08:17

## 2018-12-23 RX ADMIN — THERA TABS 1 TABLET: TAB at 08:17

## 2018-12-23 RX ADMIN — OXYCODONE HYDROCHLORIDE AND ACETAMINOPHEN 1 TABLET: 7.5; 325 TABLET ORAL at 08:17

## 2018-12-23 RX ADMIN — FUROSEMIDE 40 MG: 10 INJECTION, SOLUTION INTRAMUSCULAR; INTRAVENOUS at 08:16

## 2018-12-23 RX ADMIN — OXYCODONE HYDROCHLORIDE AND ACETAMINOPHEN 1 TABLET: 7.5; 325 TABLET ORAL at 12:59

## 2018-12-23 RX ADMIN — ENALAPRIL MALEATE 10 MG: 10 TABLET ORAL at 20:19

## 2018-12-23 RX ADMIN — METFORMIN HYDROCHLORIDE 1000 MG: 500 TABLET ORAL at 16:10

## 2018-12-23 RX ADMIN — CARVEDILOL 3.12 MG: 3.12 TABLET, FILM COATED ORAL at 16:09

## 2018-12-23 RX ADMIN — CYCLOBENZAPRINE HYDROCHLORIDE 10 MG: 10 TABLET, FILM COATED ORAL at 08:17

## 2018-12-23 RX ADMIN — ENOXAPARIN SODIUM 40 MG: 40 INJECTION SUBCUTANEOUS at 08:16

## 2018-12-23 RX ADMIN — ISOSORBIDE MONONITRATE 30 MG: 30 TABLET ORAL at 08:17

## 2018-12-23 RX ADMIN — OXYCODONE HYDROCHLORIDE AND ACETAMINOPHEN 1 TABLET: 7.5; 325 TABLET ORAL at 01:31

## 2018-12-23 RX ADMIN — ENALAPRIL MALEATE 10 MG: 10 TABLET ORAL at 08:17

## 2018-12-23 RX ADMIN — ENOXAPARIN SODIUM 40 MG: 40 INJECTION SUBCUTANEOUS at 20:18

## 2018-12-23 RX ADMIN — METFORMIN HYDROCHLORIDE 1000 MG: 500 TABLET ORAL at 08:17

## 2018-12-23 RX ADMIN — INSULIN GLARGINE 30 UNITS: 100 INJECTION, SOLUTION SUBCUTANEOUS at 08:21

## 2018-12-23 RX ADMIN — AMLODIPINE BESYLATE 5 MG: 5 TABLET ORAL at 08:17

## 2018-12-23 RX ADMIN — CYCLOBENZAPRINE HYDROCHLORIDE 10 MG: 10 TABLET, FILM COATED ORAL at 12:59

## 2018-12-23 RX ADMIN — OXYCODONE HYDROCHLORIDE AND ACETAMINOPHEN 1 TABLET: 7.5; 325 TABLET ORAL at 20:19

## 2018-12-23 RX ADMIN — Medication 1 UNITS: at 20:19

## 2018-12-23 ASSESSMENT — PAIN - FUNCTIONAL ASSESSMENT: PAIN_FUNCTIONAL_ASSESSMENT: 0-10

## 2018-12-23 ASSESSMENT — PAIN SCALES - GENERAL
PAINLEVEL_OUTOF10: 2
PAINLEVEL_OUTOF10: 2
PAINLEVEL_OUTOF10: 3
PAINLEVEL_OUTOF10: 4
PAINLEVEL_OUTOF10: 0
PAINLEVEL_OUTOF10: 1
PAINLEVEL_OUTOF10: 1
PAINLEVEL_OUTOF10: 0

## 2018-12-23 ASSESSMENT — PAIN DESCRIPTION - LOCATION: LOCATION: BACK

## 2018-12-23 ASSESSMENT — PAIN DESCRIPTION - ORIENTATION: ORIENTATION: MID

## 2018-12-23 ASSESSMENT — PAIN DESCRIPTION - DESCRIPTORS
DESCRIPTORS: ACHING
DESCRIPTORS: STABBING

## 2018-12-23 ASSESSMENT — PAIN DESCRIPTION - ONSET: ONSET: ON-GOING

## 2018-12-23 ASSESSMENT — PAIN DESCRIPTION - PAIN TYPE: TYPE: ACUTE PAIN

## 2018-12-23 ASSESSMENT — PAIN DESCRIPTION - PROGRESSION: CLINICAL_PROGRESSION: GRADUALLY IMPROVING

## 2018-12-23 ASSESSMENT — PAIN DESCRIPTION - FREQUENCY: FREQUENCY: INTERMITTENT

## 2018-12-24 PROBLEM — I87.2 VENOUS STASIS DERMATITIS OF BOTH LOWER EXTREMITIES: Status: ACTIVE | Noted: 2018-12-24

## 2018-12-24 LAB
GLUCOSE BLD-MCNC: 123 MG/DL (ref 70–108)
GLUCOSE BLD-MCNC: 124 MG/DL (ref 70–108)
GLUCOSE BLD-MCNC: 144 MG/DL (ref 70–108)
GLUCOSE BLD-MCNC: 178 MG/DL (ref 70–108)

## 2018-12-24 PROCEDURE — 97110 THERAPEUTIC EXERCISES: CPT

## 2018-12-24 PROCEDURE — 1200000003 HC TELEMETRY R&B

## 2018-12-24 PROCEDURE — 97535 SELF CARE MNGMENT TRAINING: CPT

## 2018-12-24 PROCEDURE — 1200000000 HC SEMI PRIVATE

## 2018-12-24 PROCEDURE — 97116 GAIT TRAINING THERAPY: CPT

## 2018-12-24 PROCEDURE — 6370000000 HC RX 637 (ALT 250 FOR IP): Performed by: FAMILY MEDICINE

## 2018-12-24 PROCEDURE — 82948 REAGENT STRIP/BLOOD GLUCOSE: CPT

## 2018-12-24 PROCEDURE — 6360000002 HC RX W HCPCS: Performed by: FAMILY MEDICINE

## 2018-12-24 PROCEDURE — 97530 THERAPEUTIC ACTIVITIES: CPT

## 2018-12-24 RX ADMIN — OXYCODONE HYDROCHLORIDE AND ACETAMINOPHEN 1 TABLET: 7.5; 325 TABLET ORAL at 15:34

## 2018-12-24 RX ADMIN — CYCLOBENZAPRINE HYDROCHLORIDE 10 MG: 10 TABLET, FILM COATED ORAL at 21:09

## 2018-12-24 RX ADMIN — AMLODIPINE BESYLATE 5 MG: 5 TABLET ORAL at 08:41

## 2018-12-24 RX ADMIN — OXYCODONE HYDROCHLORIDE AND ACETAMINOPHEN 1 TABLET: 7.5; 325 TABLET ORAL at 08:42

## 2018-12-24 RX ADMIN — ATORVASTATIN CALCIUM 10 MG: 10 TABLET, FILM COATED ORAL at 21:10

## 2018-12-24 RX ADMIN — ENOXAPARIN SODIUM 40 MG: 40 INJECTION SUBCUTANEOUS at 08:42

## 2018-12-24 RX ADMIN — INSULIN LISPRO 2 UNITS: 100 INJECTION, SOLUTION INTRAVENOUS; SUBCUTANEOUS at 08:42

## 2018-12-24 RX ADMIN — ENALAPRIL MALEATE 10 MG: 10 TABLET ORAL at 21:09

## 2018-12-24 RX ADMIN — ENOXAPARIN SODIUM 40 MG: 40 INJECTION SUBCUTANEOUS at 21:09

## 2018-12-24 RX ADMIN — ISOSORBIDE MONONITRATE 30 MG: 30 TABLET ORAL at 08:41

## 2018-12-24 RX ADMIN — CYCLOBENZAPRINE HYDROCHLORIDE 10 MG: 10 TABLET, FILM COATED ORAL at 08:42

## 2018-12-24 RX ADMIN — METFORMIN HYDROCHLORIDE 1000 MG: 500 TABLET ORAL at 08:41

## 2018-12-24 RX ADMIN — THERA TABS 1 TABLET: TAB at 08:41

## 2018-12-24 RX ADMIN — CARVEDILOL 3.12 MG: 3.12 TABLET, FILM COATED ORAL at 08:41

## 2018-12-24 RX ADMIN — PANTOPRAZOLE SODIUM 40 MG: 40 TABLET, DELAYED RELEASE ORAL at 05:05

## 2018-12-24 RX ADMIN — OXYCODONE HYDROCHLORIDE AND ACETAMINOPHEN 1 TABLET: 7.5; 325 TABLET ORAL at 21:09

## 2018-12-24 RX ADMIN — INSULIN GLARGINE 30 UNITS: 100 INJECTION, SOLUTION SUBCUTANEOUS at 08:42

## 2018-12-24 RX ADMIN — OXYCODONE HYDROCHLORIDE AND ACETAMINOPHEN 1 TABLET: 7.5; 325 TABLET ORAL at 01:51

## 2018-12-24 RX ADMIN — FUROSEMIDE 40 MG: 10 INJECTION, SOLUTION INTRAMUSCULAR; INTRAVENOUS at 08:47

## 2018-12-24 RX ADMIN — ENALAPRIL MALEATE 10 MG: 10 TABLET ORAL at 08:41

## 2018-12-24 RX ADMIN — CYCLOBENZAPRINE HYDROCHLORIDE 10 MG: 10 TABLET, FILM COATED ORAL at 15:34

## 2018-12-24 ASSESSMENT — PAIN DESCRIPTION - ORIENTATION
ORIENTATION: UPPER

## 2018-12-24 ASSESSMENT — PAIN DESCRIPTION - LOCATION
LOCATION: BACK

## 2018-12-24 ASSESSMENT — PAIN DESCRIPTION - PAIN TYPE
TYPE: ACUTE PAIN

## 2018-12-24 ASSESSMENT — PAIN DESCRIPTION - DESCRIPTORS
DESCRIPTORS: ACHING;DISCOMFORT
DESCRIPTORS: ACHING;DISCOMFORT

## 2018-12-24 ASSESSMENT — PAIN SCALES - GENERAL
PAINLEVEL_OUTOF10: 5
PAINLEVEL_OUTOF10: 3
PAINLEVEL_OUTOF10: 0
PAINLEVEL_OUTOF10: 4
PAINLEVEL_OUTOF10: 3
PAINLEVEL_OUTOF10: 2
PAINLEVEL_OUTOF10: 3
PAINLEVEL_OUTOF10: 2
PAINLEVEL_OUTOF10: 3
PAINLEVEL_OUTOF10: 2

## 2018-12-24 ASSESSMENT — PAIN DESCRIPTION - FREQUENCY
FREQUENCY: INTERMITTENT
FREQUENCY: INTERMITTENT

## 2018-12-25 LAB
GLUCOSE BLD-MCNC: 131 MG/DL (ref 70–108)
GLUCOSE BLD-MCNC: 146 MG/DL (ref 70–108)
GLUCOSE BLD-MCNC: 156 MG/DL (ref 70–108)
GLUCOSE BLD-MCNC: 156 MG/DL (ref 70–108)

## 2018-12-25 PROCEDURE — 6370000000 HC RX 637 (ALT 250 FOR IP): Performed by: FAMILY MEDICINE

## 2018-12-25 PROCEDURE — 6360000002 HC RX W HCPCS: Performed by: FAMILY MEDICINE

## 2018-12-25 PROCEDURE — 1200000003 HC TELEMETRY R&B

## 2018-12-25 PROCEDURE — 1200000000 HC SEMI PRIVATE

## 2018-12-25 PROCEDURE — 82948 REAGENT STRIP/BLOOD GLUCOSE: CPT

## 2018-12-25 RX ADMIN — CYCLOBENZAPRINE HYDROCHLORIDE 10 MG: 10 TABLET, FILM COATED ORAL at 15:36

## 2018-12-25 RX ADMIN — AMLODIPINE BESYLATE 5 MG: 5 TABLET ORAL at 08:04

## 2018-12-25 RX ADMIN — INSULIN GLARGINE 30 UNITS: 100 INJECTION, SOLUTION SUBCUTANEOUS at 09:14

## 2018-12-25 RX ADMIN — OXYCODONE HYDROCHLORIDE AND ACETAMINOPHEN 1 TABLET: 7.5; 325 TABLET ORAL at 21:20

## 2018-12-25 RX ADMIN — ENALAPRIL MALEATE 10 MG: 10 TABLET ORAL at 08:04

## 2018-12-25 RX ADMIN — METFORMIN HYDROCHLORIDE 1000 MG: 500 TABLET ORAL at 08:03

## 2018-12-25 RX ADMIN — ATORVASTATIN CALCIUM 10 MG: 10 TABLET, FILM COATED ORAL at 21:20

## 2018-12-25 RX ADMIN — CARVEDILOL 3.12 MG: 3.12 TABLET, FILM COATED ORAL at 16:29

## 2018-12-25 RX ADMIN — Medication 1 UNITS: at 21:25

## 2018-12-25 RX ADMIN — ENALAPRIL MALEATE 10 MG: 10 TABLET ORAL at 21:20

## 2018-12-25 RX ADMIN — CYCLOBENZAPRINE HYDROCHLORIDE 10 MG: 10 TABLET, FILM COATED ORAL at 08:04

## 2018-12-25 RX ADMIN — METFORMIN HYDROCHLORIDE 1000 MG: 500 TABLET ORAL at 16:29

## 2018-12-25 RX ADMIN — ENOXAPARIN SODIUM 40 MG: 40 INJECTION SUBCUTANEOUS at 09:15

## 2018-12-25 RX ADMIN — OXYCODONE HYDROCHLORIDE AND ACETAMINOPHEN 1 TABLET: 7.5; 325 TABLET ORAL at 08:04

## 2018-12-25 RX ADMIN — THERA TABS 1 TABLET: TAB at 08:04

## 2018-12-25 RX ADMIN — PANTOPRAZOLE SODIUM 40 MG: 40 TABLET, DELAYED RELEASE ORAL at 06:25

## 2018-12-25 RX ADMIN — INSULIN LISPRO 2 UNITS: 100 INJECTION, SOLUTION INTRAVENOUS; SUBCUTANEOUS at 11:38

## 2018-12-25 RX ADMIN — OXYCODONE HYDROCHLORIDE AND ACETAMINOPHEN 1 TABLET: 7.5; 325 TABLET ORAL at 02:02

## 2018-12-25 RX ADMIN — INSULIN LISPRO 2 UNITS: 100 INJECTION, SOLUTION INTRAVENOUS; SUBCUTANEOUS at 16:28

## 2018-12-25 RX ADMIN — OXYCODONE HYDROCHLORIDE AND ACETAMINOPHEN 1 TABLET: 7.5; 325 TABLET ORAL at 15:36

## 2018-12-25 RX ADMIN — FUROSEMIDE 40 MG: 10 INJECTION, SOLUTION INTRAMUSCULAR; INTRAVENOUS at 09:15

## 2018-12-25 RX ADMIN — CYCLOBENZAPRINE HYDROCHLORIDE 10 MG: 10 TABLET, FILM COATED ORAL at 21:20

## 2018-12-25 RX ADMIN — ENOXAPARIN SODIUM 40 MG: 40 INJECTION SUBCUTANEOUS at 21:20

## 2018-12-25 RX ADMIN — ISOSORBIDE MONONITRATE 30 MG: 30 TABLET ORAL at 08:03

## 2018-12-25 RX ADMIN — CARVEDILOL 3.12 MG: 3.12 TABLET, FILM COATED ORAL at 08:04

## 2018-12-25 ASSESSMENT — PAIN SCALES - GENERAL
PAINLEVEL_OUTOF10: 0
PAINLEVEL_OUTOF10: 2
PAINLEVEL_OUTOF10: 2
PAINLEVEL_OUTOF10: 0
PAINLEVEL_OUTOF10: 1

## 2018-12-25 ASSESSMENT — PAIN DESCRIPTION - LOCATION: LOCATION: BACK

## 2018-12-25 ASSESSMENT — PAIN DESCRIPTION - DESCRIPTORS: DESCRIPTORS: DISCOMFORT

## 2018-12-25 ASSESSMENT — PAIN DESCRIPTION - PAIN TYPE: TYPE: ACUTE PAIN

## 2018-12-25 ASSESSMENT — PAIN DESCRIPTION - ORIENTATION: ORIENTATION: UPPER

## 2018-12-26 LAB
GLUCOSE BLD-MCNC: 127 MG/DL (ref 70–108)
GLUCOSE BLD-MCNC: 136 MG/DL (ref 70–108)
GLUCOSE BLD-MCNC: 153 MG/DL (ref 70–108)
GLUCOSE BLD-MCNC: 160 MG/DL (ref 70–108)
LV EF: 55 %
LVEF MODALITY: NORMAL

## 2018-12-26 PROCEDURE — 93306 TTE W/DOPPLER COMPLETE: CPT

## 2018-12-26 PROCEDURE — 6370000000 HC RX 637 (ALT 250 FOR IP): Performed by: FAMILY MEDICINE

## 2018-12-26 PROCEDURE — 1200000003 HC TELEMETRY R&B

## 2018-12-26 PROCEDURE — 82948 REAGENT STRIP/BLOOD GLUCOSE: CPT

## 2018-12-26 PROCEDURE — 6360000002 HC RX W HCPCS: Performed by: FAMILY MEDICINE

## 2018-12-26 PROCEDURE — 97116 GAIT TRAINING THERAPY: CPT

## 2018-12-26 PROCEDURE — 97535 SELF CARE MNGMENT TRAINING: CPT

## 2018-12-26 PROCEDURE — 97110 THERAPEUTIC EXERCISES: CPT

## 2018-12-26 RX ADMIN — Medication 1 UNITS: at 20:08

## 2018-12-26 RX ADMIN — METFORMIN HYDROCHLORIDE 1000 MG: 500 TABLET ORAL at 08:54

## 2018-12-26 RX ADMIN — METFORMIN HYDROCHLORIDE 1000 MG: 500 TABLET ORAL at 16:29

## 2018-12-26 RX ADMIN — ATORVASTATIN CALCIUM 10 MG: 10 TABLET, FILM COATED ORAL at 20:04

## 2018-12-26 RX ADMIN — FUROSEMIDE 40 MG: 10 INJECTION, SOLUTION INTRAMUSCULAR; INTRAVENOUS at 08:54

## 2018-12-26 RX ADMIN — CARVEDILOL 3.12 MG: 3.12 TABLET, FILM COATED ORAL at 08:54

## 2018-12-26 RX ADMIN — INSULIN LISPRO 2 UNITS: 100 INJECTION, SOLUTION INTRAVENOUS; SUBCUTANEOUS at 09:04

## 2018-12-26 RX ADMIN — CARVEDILOL 3.12 MG: 3.12 TABLET, FILM COATED ORAL at 16:29

## 2018-12-26 RX ADMIN — ENOXAPARIN SODIUM 40 MG: 40 INJECTION SUBCUTANEOUS at 20:04

## 2018-12-26 RX ADMIN — OXYCODONE HYDROCHLORIDE AND ACETAMINOPHEN 1 TABLET: 7.5; 325 TABLET ORAL at 08:54

## 2018-12-26 RX ADMIN — PANTOPRAZOLE SODIUM 40 MG: 40 TABLET, DELAYED RELEASE ORAL at 08:54

## 2018-12-26 RX ADMIN — OXYCODONE HYDROCHLORIDE AND ACETAMINOPHEN 1 TABLET: 7.5; 325 TABLET ORAL at 03:16

## 2018-12-26 RX ADMIN — ENALAPRIL MALEATE 10 MG: 10 TABLET ORAL at 08:55

## 2018-12-26 RX ADMIN — CYCLOBENZAPRINE HYDROCHLORIDE 10 MG: 10 TABLET, FILM COATED ORAL at 08:54

## 2018-12-26 RX ADMIN — ENOXAPARIN SODIUM 40 MG: 40 INJECTION SUBCUTANEOUS at 08:54

## 2018-12-26 RX ADMIN — CYCLOBENZAPRINE HYDROCHLORIDE 10 MG: 10 TABLET, FILM COATED ORAL at 13:30

## 2018-12-26 RX ADMIN — CYCLOBENZAPRINE HYDROCHLORIDE 10 MG: 10 TABLET, FILM COATED ORAL at 22:20

## 2018-12-26 RX ADMIN — AMLODIPINE BESYLATE 5 MG: 5 TABLET ORAL at 08:54

## 2018-12-26 RX ADMIN — INSULIN GLARGINE 30 UNITS: 100 INJECTION, SOLUTION SUBCUTANEOUS at 09:04

## 2018-12-26 RX ADMIN — THERA TABS 1 TABLET: TAB at 08:54

## 2018-12-26 RX ADMIN — ENALAPRIL MALEATE 10 MG: 10 TABLET ORAL at 20:05

## 2018-12-26 RX ADMIN — ISOSORBIDE MONONITRATE 30 MG: 30 TABLET ORAL at 08:54

## 2018-12-26 RX ADMIN — OXYCODONE HYDROCHLORIDE AND ACETAMINOPHEN 1 TABLET: 7.5; 325 TABLET ORAL at 15:59

## 2018-12-26 ASSESSMENT — PAIN SCALES - GENERAL
PAINLEVEL_OUTOF10: 1
PAINLEVEL_OUTOF10: 3
PAINLEVEL_OUTOF10: 0
PAINLEVEL_OUTOF10: 1

## 2018-12-26 ASSESSMENT — PAIN DESCRIPTION - ORIENTATION
ORIENTATION: UPPER;RIGHT
ORIENTATION: LOWER
ORIENTATION: LOWER

## 2018-12-26 ASSESSMENT — PAIN DESCRIPTION - LOCATION
LOCATION: BACK
LOCATION: BACK;SHOULDER

## 2018-12-26 ASSESSMENT — PAIN DESCRIPTION - PAIN TYPE
TYPE: ACUTE PAIN

## 2018-12-26 ASSESSMENT — PAIN DESCRIPTION - DESCRIPTORS
DESCRIPTORS: ACHING;DULL
DESCRIPTORS: ACHING
DESCRIPTORS: SORE

## 2018-12-27 ENCOUNTER — CARE COORDINATION (OUTPATIENT)
Dept: CARE COORDINATION | Age: 68
End: 2018-12-27

## 2018-12-27 LAB
GLUCOSE BLD-MCNC: 137 MG/DL (ref 70–108)
GLUCOSE BLD-MCNC: 147 MG/DL (ref 70–108)
GLUCOSE BLD-MCNC: 153 MG/DL (ref 70–108)
GLUCOSE BLD-MCNC: 165 MG/DL (ref 70–108)

## 2018-12-27 PROCEDURE — 97535 SELF CARE MNGMENT TRAINING: CPT

## 2018-12-27 PROCEDURE — 1200000003 HC TELEMETRY R&B

## 2018-12-27 PROCEDURE — 82948 REAGENT STRIP/BLOOD GLUCOSE: CPT

## 2018-12-27 PROCEDURE — 6370000000 HC RX 637 (ALT 250 FOR IP): Performed by: FAMILY MEDICINE

## 2018-12-27 PROCEDURE — 97110 THERAPEUTIC EXERCISES: CPT

## 2018-12-27 PROCEDURE — 97116 GAIT TRAINING THERAPY: CPT

## 2018-12-27 PROCEDURE — 6360000002 HC RX W HCPCS: Performed by: FAMILY MEDICINE

## 2018-12-27 PROCEDURE — 97530 THERAPEUTIC ACTIVITIES: CPT

## 2018-12-27 RX ADMIN — AMLODIPINE BESYLATE 5 MG: 5 TABLET ORAL at 09:09

## 2018-12-27 RX ADMIN — OXYCODONE HYDROCHLORIDE AND ACETAMINOPHEN 1 TABLET: 7.5; 325 TABLET ORAL at 09:09

## 2018-12-27 RX ADMIN — CARVEDILOL 3.12 MG: 3.12 TABLET, FILM COATED ORAL at 20:14

## 2018-12-27 RX ADMIN — OXYCODONE HYDROCHLORIDE AND ACETAMINOPHEN 1 TABLET: 7.5; 325 TABLET ORAL at 21:02

## 2018-12-27 RX ADMIN — FUROSEMIDE 40 MG: 10 INJECTION, SOLUTION INTRAMUSCULAR; INTRAVENOUS at 09:14

## 2018-12-27 RX ADMIN — CYCLOBENZAPRINE HYDROCHLORIDE 10 MG: 10 TABLET, FILM COATED ORAL at 09:09

## 2018-12-27 RX ADMIN — CARVEDILOL 3.12 MG: 3.12 TABLET, FILM COATED ORAL at 09:09

## 2018-12-27 RX ADMIN — ENOXAPARIN SODIUM 40 MG: 40 INJECTION SUBCUTANEOUS at 09:09

## 2018-12-27 RX ADMIN — CYCLOBENZAPRINE HYDROCHLORIDE 10 MG: 10 TABLET, FILM COATED ORAL at 21:02

## 2018-12-27 RX ADMIN — ENALAPRIL MALEATE 10 MG: 10 TABLET ORAL at 22:04

## 2018-12-27 RX ADMIN — ENALAPRIL MALEATE 10 MG: 10 TABLET ORAL at 11:47

## 2018-12-27 RX ADMIN — INSULIN LISPRO 2 UNITS: 100 INJECTION, SOLUTION INTRAVENOUS; SUBCUTANEOUS at 11:47

## 2018-12-27 RX ADMIN — CYCLOBENZAPRINE HYDROCHLORIDE 10 MG: 10 TABLET, FILM COATED ORAL at 14:31

## 2018-12-27 RX ADMIN — ATORVASTATIN CALCIUM 10 MG: 10 TABLET, FILM COATED ORAL at 22:05

## 2018-12-27 RX ADMIN — THERA TABS 1 TABLET: TAB at 09:09

## 2018-12-27 RX ADMIN — PANTOPRAZOLE SODIUM 40 MG: 40 TABLET, DELAYED RELEASE ORAL at 09:09

## 2018-12-27 RX ADMIN — OXYCODONE HYDROCHLORIDE AND ACETAMINOPHEN 1 TABLET: 7.5; 325 TABLET ORAL at 01:55

## 2018-12-27 RX ADMIN — INSULIN GLARGINE 30 UNITS: 100 INJECTION, SOLUTION SUBCUTANEOUS at 09:10

## 2018-12-27 RX ADMIN — Medication 1 UNITS: at 21:02

## 2018-12-27 RX ADMIN — OXYCODONE HYDROCHLORIDE AND ACETAMINOPHEN 1 TABLET: 7.5; 325 TABLET ORAL at 14:31

## 2018-12-27 RX ADMIN — METFORMIN HYDROCHLORIDE 1000 MG: 500 TABLET ORAL at 09:09

## 2018-12-27 RX ADMIN — ISOSORBIDE MONONITRATE 30 MG: 30 TABLET ORAL at 09:09

## 2018-12-27 ASSESSMENT — PAIN SCALES - GENERAL
PAINLEVEL_OUTOF10: 3
PAINLEVEL_OUTOF10: 6
PAINLEVEL_OUTOF10: 0
PAINLEVEL_OUTOF10: 2
PAINLEVEL_OUTOF10: 2
PAINLEVEL_OUTOF10: 4
PAINLEVEL_OUTOF10: 4

## 2018-12-27 ASSESSMENT — PAIN DESCRIPTION - ORIENTATION: ORIENTATION: LOWER

## 2018-12-27 ASSESSMENT — PAIN DESCRIPTION - FREQUENCY
FREQUENCY: INTERMITTENT
FREQUENCY: INTERMITTENT

## 2018-12-27 ASSESSMENT — PAIN DESCRIPTION - DESCRIPTORS
DESCRIPTORS: ACHING
DESCRIPTORS: DISCOMFORT
DESCRIPTORS: ACHING;DISCOMFORT
DESCRIPTORS: ACHING

## 2018-12-27 ASSESSMENT — PAIN DESCRIPTION - PAIN TYPE
TYPE: ACUTE PAIN

## 2018-12-27 ASSESSMENT — PAIN DESCRIPTION - LOCATION
LOCATION: BACK

## 2018-12-27 NOTE — CARE COORDINATION
Spoke with [employer]. He remains admitted to hospital and is scheduled to have surgery tomorrow. Discussed his plans for discharge and I offered support as he needs it. States he will call me with any issues or concerns.

## 2018-12-28 ENCOUNTER — ANESTHESIA (OUTPATIENT)
Dept: OPERATING ROOM | Age: 68
DRG: 460 | End: 2018-12-28
Payer: MEDICARE

## 2018-12-28 ENCOUNTER — APPOINTMENT (OUTPATIENT)
Dept: GENERAL RADIOLOGY | Age: 68
DRG: 460 | End: 2018-12-28
Payer: MEDICARE

## 2018-12-28 ENCOUNTER — ANESTHESIA EVENT (OUTPATIENT)
Dept: OPERATING ROOM | Age: 68
DRG: 460 | End: 2018-12-28
Payer: MEDICARE

## 2018-12-28 VITALS
RESPIRATION RATE: 39 BRPM | TEMPERATURE: 98.1 F | OXYGEN SATURATION: 100 % | DIASTOLIC BLOOD PRESSURE: 60 MMHG | SYSTOLIC BLOOD PRESSURE: 110 MMHG

## 2018-12-28 LAB
GLUCOSE BLD-MCNC: 119 MG/DL (ref 70–108)
GLUCOSE BLD-MCNC: 139 MG/DL (ref 70–108)
GLUCOSE BLD-MCNC: 145 MG/DL (ref 70–108)
GLUCOSE BLD-MCNC: 168 MG/DL (ref 70–108)

## 2018-12-28 PROCEDURE — 2580000003 HC RX 258: Performed by: PHYSICIAN ASSISTANT

## 2018-12-28 PROCEDURE — 6370000000 HC RX 637 (ALT 250 FOR IP): Performed by: FAMILY MEDICINE

## 2018-12-28 PROCEDURE — P9045 ALBUMIN (HUMAN), 5%, 250 ML: HCPCS

## 2018-12-28 PROCEDURE — C1713 ANCHOR/SCREW BN/BN,TIS/BN: HCPCS | Performed by: ORTHOPAEDIC SURGERY

## 2018-12-28 PROCEDURE — 72072 X-RAY EXAM THORAC SPINE 3VWS: CPT

## 2018-12-28 PROCEDURE — 6360000002 HC RX W HCPCS: Performed by: ANESTHESIOLOGY

## 2018-12-28 PROCEDURE — 6360000002 HC RX W HCPCS: Performed by: PHYSICIAN ASSISTANT

## 2018-12-28 PROCEDURE — 2580000003 HC RX 258: Performed by: ORTHOPAEDIC SURGERY

## 2018-12-28 PROCEDURE — 3700000001 HC ADD 15 MINUTES (ANESTHESIA): Performed by: ORTHOPAEDIC SURGERY

## 2018-12-28 PROCEDURE — 97116 GAIT TRAINING THERAPY: CPT

## 2018-12-28 PROCEDURE — 6360000002 HC RX W HCPCS

## 2018-12-28 PROCEDURE — 2500000003 HC RX 250 WO HCPCS: Performed by: ORTHOPAEDIC SURGERY

## 2018-12-28 PROCEDURE — 0RG7071 FUSION OF 2 TO 7 THORACIC VERTEBRAL JOINTS WITH AUTOLOGOUS TISSUE SUBSTITUTE, POSTERIOR APPROACH, POSTERIOR COLUMN, OPEN APPROACH: ICD-10-PCS | Performed by: ORTHOPAEDIC SURGERY

## 2018-12-28 PROCEDURE — 82948 REAGENT STRIP/BLOOD GLUCOSE: CPT

## 2018-12-28 PROCEDURE — 2500000003 HC RX 250 WO HCPCS

## 2018-12-28 PROCEDURE — 97110 THERAPEUTIC EXERCISES: CPT

## 2018-12-28 PROCEDURE — 97530 THERAPEUTIC ACTIVITIES: CPT

## 2018-12-28 PROCEDURE — 6360000002 HC RX W HCPCS: Performed by: ORTHOPAEDIC SURGERY

## 2018-12-28 PROCEDURE — 6370000000 HC RX 637 (ALT 250 FOR IP): Performed by: ORTHOPAEDIC SURGERY

## 2018-12-28 PROCEDURE — 3600000015 HC SURGERY LEVEL 5 ADDTL 15MIN: Performed by: ORTHOPAEDIC SURGERY

## 2018-12-28 PROCEDURE — 7100000000 HC PACU RECOVERY - FIRST 15 MIN: Performed by: ORTHOPAEDIC SURGERY

## 2018-12-28 PROCEDURE — 2709999900 HC NON-CHARGEABLE SUPPLY: Performed by: ORTHOPAEDIC SURGERY

## 2018-12-28 PROCEDURE — 6360000002 HC RX W HCPCS: Performed by: FAMILY MEDICINE

## 2018-12-28 PROCEDURE — 7100000001 HC PACU RECOVERY - ADDTL 15 MIN: Performed by: ORTHOPAEDIC SURGERY

## 2018-12-28 PROCEDURE — 3700000000 HC ANESTHESIA ATTENDED CARE: Performed by: ORTHOPAEDIC SURGERY

## 2018-12-28 PROCEDURE — 2580000003 HC RX 258

## 2018-12-28 PROCEDURE — 3600000005 HC SURGERY LEVEL 5 BASE: Performed by: ORTHOPAEDIC SURGERY

## 2018-12-28 PROCEDURE — 2720000010 HC SURG SUPPLY STERILE: Performed by: ORTHOPAEDIC SURGERY

## 2018-12-28 PROCEDURE — 1200000003 HC TELEMETRY R&B

## 2018-12-28 PROCEDURE — 3209999900 FLUORO FOR SURGICAL PROCEDURES

## 2018-12-28 DEVICE — TIM-TISSUE CANCELLOUS 30.0CC CRUSHED: Type: IMPLANTABLE DEVICE | Site: SPINE LUMBAR | Status: FUNCTIONAL

## 2018-12-28 DEVICE — ROD 8690100 CDH PBNT 5.5X100 TI
Type: IMPLANTABLE DEVICE | Site: SPINE LUMBAR | Status: FUNCTIONAL
Brand: CD HORIZON® SPINAL SYSTEM

## 2018-12-28 DEVICE — ALLOGRAFT STRP DBM PLF GRFT 2.5CM X 5CM: Type: IMPLANTABLE DEVICE | Site: SPINE LUMBAR | Status: FUNCTIONAL

## 2018-12-28 RX ORDER — FENTANYL CITRATE 50 UG/ML
50 INJECTION, SOLUTION INTRAMUSCULAR; INTRAVENOUS EVERY 5 MIN PRN
Status: DISCONTINUED | OUTPATIENT
Start: 2018-12-28 | End: 2018-12-28 | Stop reason: HOSPADM

## 2018-12-28 RX ORDER — SODIUM CHLORIDE 0.9 % (FLUSH) 0.9 %
10 SYRINGE (ML) INJECTION EVERY 12 HOURS SCHEDULED
Status: DISCONTINUED | OUTPATIENT
Start: 2018-12-28 | End: 2019-01-01 | Stop reason: HOSPADM

## 2018-12-28 RX ORDER — SODIUM CHLORIDE 9 MG/ML
INJECTION, SOLUTION INTRAVENOUS CONTINUOUS PRN
Status: DISCONTINUED | OUTPATIENT
Start: 2018-12-28 | End: 2018-12-28 | Stop reason: SDUPTHER

## 2018-12-28 RX ORDER — ONDANSETRON 2 MG/ML
4 INJECTION INTRAMUSCULAR; INTRAVENOUS
Status: DISCONTINUED | OUTPATIENT
Start: 2018-12-28 | End: 2018-12-28 | Stop reason: HOSPADM

## 2018-12-28 RX ORDER — SODIUM CHLORIDE 9 MG/ML
INJECTION, SOLUTION INTRAVENOUS CONTINUOUS
Status: DISCONTINUED | OUTPATIENT
Start: 2018-12-28 | End: 2018-12-30

## 2018-12-28 RX ORDER — FENTANYL CITRATE 50 UG/ML
INJECTION, SOLUTION INTRAMUSCULAR; INTRAVENOUS PRN
Status: DISCONTINUED | OUTPATIENT
Start: 2018-12-28 | End: 2018-12-28 | Stop reason: SDUPTHER

## 2018-12-28 RX ORDER — CEFAZOLIN SODIUM 1 G/3ML
INJECTION, POWDER, FOR SOLUTION INTRAMUSCULAR; INTRAVENOUS PRN
Status: DISCONTINUED | OUTPATIENT
Start: 2018-12-28 | End: 2018-12-28 | Stop reason: SDUPTHER

## 2018-12-28 RX ORDER — SODIUM CHLORIDE 0.9 % (FLUSH) 0.9 %
10 SYRINGE (ML) INJECTION PRN
Status: DISCONTINUED | OUTPATIENT
Start: 2018-12-28 | End: 2019-01-01 | Stop reason: HOSPADM

## 2018-12-28 RX ORDER — BISACODYL 10 MG
10 SUPPOSITORY, RECTAL RECTAL DAILY PRN
Status: DISCONTINUED | OUTPATIENT
Start: 2018-12-28 | End: 2019-01-01 | Stop reason: HOSPADM

## 2018-12-28 RX ORDER — LIDOCAINE HYDROCHLORIDE 20 MG/ML
INJECTION, SOLUTION EPIDURAL; INFILTRATION; INTRACAUDAL; PERINEURAL PRN
Status: DISCONTINUED | OUTPATIENT
Start: 2018-12-28 | End: 2018-12-28 | Stop reason: SDUPTHER

## 2018-12-28 RX ORDER — ONDANSETRON 2 MG/ML
4 INJECTION INTRAMUSCULAR; INTRAVENOUS EVERY 6 HOURS PRN
Status: DISCONTINUED | OUTPATIENT
Start: 2018-12-28 | End: 2019-01-01 | Stop reason: HOSPADM

## 2018-12-28 RX ORDER — GLYCOPYRROLATE 1 MG/5 ML
SYRINGE (ML) INTRAVENOUS PRN
Status: DISCONTINUED | OUTPATIENT
Start: 2018-12-28 | End: 2018-12-28 | Stop reason: SDUPTHER

## 2018-12-28 RX ORDER — ONDANSETRON 2 MG/ML
INJECTION INTRAMUSCULAR; INTRAVENOUS PRN
Status: DISCONTINUED | OUTPATIENT
Start: 2018-12-28 | End: 2018-12-28 | Stop reason: SDUPTHER

## 2018-12-28 RX ORDER — MIDAZOLAM HYDROCHLORIDE 1 MG/ML
INJECTION INTRAMUSCULAR; INTRAVENOUS PRN
Status: DISCONTINUED | OUTPATIENT
Start: 2018-12-28 | End: 2018-12-28 | Stop reason: SDUPTHER

## 2018-12-28 RX ORDER — DEXAMETHASONE SODIUM PHOSPHATE 4 MG/ML
INJECTION, SOLUTION INTRA-ARTICULAR; INTRALESIONAL; INTRAMUSCULAR; INTRAVENOUS; SOFT TISSUE PRN
Status: DISCONTINUED | OUTPATIENT
Start: 2018-12-28 | End: 2018-12-28 | Stop reason: SDUPTHER

## 2018-12-28 RX ORDER — DOCUSATE SODIUM 100 MG/1
100 CAPSULE, LIQUID FILLED ORAL 2 TIMES DAILY
Status: DISCONTINUED | OUTPATIENT
Start: 2018-12-28 | End: 2019-01-01 | Stop reason: HOSPADM

## 2018-12-28 RX ORDER — NEOSTIGMINE METHYLSULFATE 5 MG/5 ML
SYRINGE (ML) INTRAVENOUS PRN
Status: DISCONTINUED | OUTPATIENT
Start: 2018-12-28 | End: 2018-12-28 | Stop reason: SDUPTHER

## 2018-12-28 RX ORDER — MEPERIDINE HYDROCHLORIDE 25 MG/ML
12.5 INJECTION INTRAMUSCULAR; INTRAVENOUS; SUBCUTANEOUS EVERY 5 MIN PRN
Status: DISCONTINUED | OUTPATIENT
Start: 2018-12-28 | End: 2018-12-28 | Stop reason: HOSPADM

## 2018-12-28 RX ORDER — ACETAMINOPHEN 650 MG/1
650 SUPPOSITORY RECTAL EVERY 4 HOURS PRN
Status: DISCONTINUED | OUTPATIENT
Start: 2018-12-28 | End: 2019-01-01 | Stop reason: HOSPADM

## 2018-12-28 RX ORDER — LABETALOL HYDROCHLORIDE 5 MG/ML
5 INJECTION, SOLUTION INTRAVENOUS EVERY 10 MIN PRN
Status: DISCONTINUED | OUTPATIENT
Start: 2018-12-28 | End: 2018-12-28 | Stop reason: HOSPADM

## 2018-12-28 RX ORDER — ALBUMIN, HUMAN INJ 5% 5 %
SOLUTION INTRAVENOUS PRN
Status: DISCONTINUED | OUTPATIENT
Start: 2018-12-28 | End: 2018-12-28 | Stop reason: SDUPTHER

## 2018-12-28 RX ORDER — KETOROLAC TROMETHAMINE 30 MG/ML
INJECTION, SOLUTION INTRAMUSCULAR; INTRAVENOUS PRN
Status: DISCONTINUED | OUTPATIENT
Start: 2018-12-28 | End: 2018-12-28 | Stop reason: SDUPTHER

## 2018-12-28 RX ORDER — PROPOFOL 10 MG/ML
INJECTION, EMULSION INTRAVENOUS CONTINUOUS PRN
Status: DISCONTINUED | OUTPATIENT
Start: 2018-12-28 | End: 2018-12-28 | Stop reason: SDUPTHER

## 2018-12-28 RX ORDER — PROPOFOL 10 MG/ML
INJECTION, EMULSION INTRAVENOUS PRN
Status: DISCONTINUED | OUTPATIENT
Start: 2018-12-28 | End: 2018-12-28 | Stop reason: SDUPTHER

## 2018-12-28 RX ORDER — VANCOMYCIN HYDROCHLORIDE 1 G/20ML
INJECTION, POWDER, LYOPHILIZED, FOR SOLUTION INTRAVENOUS PRN
Status: DISCONTINUED | OUTPATIENT
Start: 2018-12-28 | End: 2018-12-28 | Stop reason: HOSPADM

## 2018-12-28 RX ORDER — ROCURONIUM BROMIDE 10 MG/ML
INJECTION, SOLUTION INTRAVENOUS PRN
Status: DISCONTINUED | OUTPATIENT
Start: 2018-12-28 | End: 2018-12-28 | Stop reason: SDUPTHER

## 2018-12-28 RX ADMIN — OXYCODONE HYDROCHLORIDE AND ACETAMINOPHEN 1 TABLET: 7.5; 325 TABLET ORAL at 02:07

## 2018-12-28 RX ADMIN — ENALAPRIL MALEATE 10 MG: 10 TABLET ORAL at 09:06

## 2018-12-28 RX ADMIN — KETOROLAC TROMETHAMINE 30 MG: 30 INJECTION, SOLUTION INTRAMUSCULAR; INTRAVENOUS at 14:08

## 2018-12-28 RX ADMIN — CYCLOBENZAPRINE HYDROCHLORIDE 10 MG: 10 TABLET, FILM COATED ORAL at 21:47

## 2018-12-28 RX ADMIN — FENTANYL CITRATE 100 MCG: 50 INJECTION INTRAMUSCULAR; INTRAVENOUS at 13:30

## 2018-12-28 RX ADMIN — AMLODIPINE BESYLATE 5 MG: 5 TABLET ORAL at 09:06

## 2018-12-28 RX ADMIN — ISOSORBIDE MONONITRATE 30 MG: 30 TABLET ORAL at 09:06

## 2018-12-28 RX ADMIN — ALBUMIN (HUMAN) 25 G: 12.5 INJECTION, SOLUTION INTRAVENOUS at 14:11

## 2018-12-28 RX ADMIN — DEXAMETHASONE SODIUM PHOSPHATE 8 MG: 4 INJECTION, SOLUTION INTRAMUSCULAR; INTRAVENOUS at 14:08

## 2018-12-28 RX ADMIN — CARVEDILOL 3.12 MG: 3.12 TABLET, FILM COATED ORAL at 09:06

## 2018-12-28 RX ADMIN — ROCURONIUM BROMIDE 50 MG: 10 INJECTION INTRAVENOUS at 13:36

## 2018-12-28 RX ADMIN — Medication 3 G: at 21:48

## 2018-12-28 RX ADMIN — ATORVASTATIN CALCIUM 10 MG: 10 TABLET, FILM COATED ORAL at 21:47

## 2018-12-28 RX ADMIN — PROPOFOL 200 MG: 10 INJECTION, EMULSION INTRAVENOUS at 13:35

## 2018-12-28 RX ADMIN — HYDROMORPHONE HYDROCHLORIDE 0.25 MG: 1 INJECTION, SOLUTION INTRAMUSCULAR; INTRAVENOUS; SUBCUTANEOUS at 16:39

## 2018-12-28 RX ADMIN — OXYCODONE HYDROCHLORIDE AND ACETAMINOPHEN 1 TABLET: 7.5; 325 TABLET ORAL at 21:47

## 2018-12-28 RX ADMIN — ONDANSETRON HYDROCHLORIDE 4 MG: 4 INJECTION, SOLUTION INTRAMUSCULAR; INTRAVENOUS at 15:41

## 2018-12-28 RX ADMIN — Medication 10 ML: at 21:48

## 2018-12-28 RX ADMIN — FENTANYL CITRATE 50 MCG: 50 INJECTION INTRAMUSCULAR; INTRAVENOUS at 14:52

## 2018-12-28 RX ADMIN — FUROSEMIDE 40 MG: 10 INJECTION, SOLUTION INTRAMUSCULAR; INTRAVENOUS at 09:10

## 2018-12-28 RX ADMIN — PROPOFOL 100 MCG/KG/MIN: 10 INJECTION, EMULSION INTRAVENOUS at 14:32

## 2018-12-28 RX ADMIN — PHENYLEPHRINE HYDROCHLORIDE 300 MCG: 10 INJECTION INTRAVENOUS at 13:45

## 2018-12-28 RX ADMIN — INSULIN GLARGINE 15 UNITS: 100 INJECTION, SOLUTION SUBCUTANEOUS at 08:59

## 2018-12-28 RX ADMIN — SODIUM CHLORIDE: 9 INJECTION, SOLUTION INTRAVENOUS at 13:30

## 2018-12-28 RX ADMIN — Medication 0.8 MG: at 15:46

## 2018-12-28 RX ADMIN — CEFAZOLIN 3000 MG: 1 INJECTION, POWDER, FOR SOLUTION INTRAMUSCULAR; INTRAVENOUS; PARENTERAL at 14:10

## 2018-12-28 RX ADMIN — CARVEDILOL 3.12 MG: 3.12 TABLET, FILM COATED ORAL at 21:47

## 2018-12-28 RX ADMIN — MIDAZOLAM HYDROCHLORIDE 2 MG: 1 INJECTION, SOLUTION INTRAMUSCULAR; INTRAVENOUS at 13:30

## 2018-12-28 RX ADMIN — SODIUM CHLORIDE: 9 INJECTION, SOLUTION INTRAVENOUS at 14:40

## 2018-12-28 RX ADMIN — ENALAPRIL MALEATE 10 MG: 10 TABLET ORAL at 21:47

## 2018-12-28 RX ADMIN — LIDOCAINE HYDROCHLORIDE 80 MG: 20 INJECTION, SOLUTION EPIDURAL; INFILTRATION; INTRACAUDAL; PERINEURAL at 13:31

## 2018-12-28 RX ADMIN — THERA TABS 1 TABLET: TAB at 09:06

## 2018-12-28 RX ADMIN — SODIUM CHLORIDE: 9 INJECTION, SOLUTION INTRAVENOUS at 13:50

## 2018-12-28 RX ADMIN — FENTANYL CITRATE 100 MCG: 50 INJECTION INTRAMUSCULAR; INTRAVENOUS at 13:55

## 2018-12-28 RX ADMIN — Medication 1 UNITS: at 21:47

## 2018-12-28 RX ADMIN — INSULIN LISPRO 2 UNITS: 100 INJECTION, SOLUTION INTRAVENOUS; SUBCUTANEOUS at 09:00

## 2018-12-28 RX ADMIN — HYDROMORPHONE HYDROCHLORIDE 1 MG: 1 INJECTION, SOLUTION INTRAMUSCULAR; INTRAVENOUS; SUBCUTANEOUS at 17:47

## 2018-12-28 RX ADMIN — Medication 5 MG: at 15:46

## 2018-12-28 RX ADMIN — ONDANSETRON HYDROCHLORIDE 4 MG: 4 INJECTION, SOLUTION INTRAMUSCULAR; INTRAVENOUS at 14:08

## 2018-12-28 ASSESSMENT — PULMONARY FUNCTION TESTS
PIF_VALUE: 18
PIF_VALUE: 18
PIF_VALUE: 26
PIF_VALUE: 19
PIF_VALUE: 18
PIF_VALUE: 19
PIF_VALUE: 18
PIF_VALUE: 5
PIF_VALUE: 19
PIF_VALUE: 19
PIF_VALUE: 17
PIF_VALUE: 18
PIF_VALUE: 19
PIF_VALUE: 18
PIF_VALUE: 19
PIF_VALUE: 19
PIF_VALUE: 18
PIF_VALUE: 0
PIF_VALUE: 18
PIF_VALUE: 18
PIF_VALUE: 17
PIF_VALUE: 18
PIF_VALUE: 24
PIF_VALUE: 19
PIF_VALUE: 18
PIF_VALUE: 17
PIF_VALUE: 18
PIF_VALUE: 19
PIF_VALUE: 18
PIF_VALUE: 18
PIF_VALUE: 22
PIF_VALUE: 18
PIF_VALUE: 19
PIF_VALUE: 18
PIF_VALUE: 18
PIF_VALUE: 20
PIF_VALUE: 21
PIF_VALUE: 18
PIF_VALUE: 19
PIF_VALUE: 23
PIF_VALUE: 18
PIF_VALUE: 18
PIF_VALUE: 19
PIF_VALUE: 18
PIF_VALUE: 16
PIF_VALUE: 18
PIF_VALUE: 18
PIF_VALUE: 17
PIF_VALUE: 18
PIF_VALUE: 18
PIF_VALUE: 22
PIF_VALUE: 21
PIF_VALUE: 18
PIF_VALUE: 17
PIF_VALUE: 19
PIF_VALUE: 38
PIF_VALUE: 18
PIF_VALUE: 17
PIF_VALUE: 21
PIF_VALUE: 18
PIF_VALUE: 18
PIF_VALUE: 19
PIF_VALUE: 25
PIF_VALUE: 17
PIF_VALUE: 1
PIF_VALUE: 18
PIF_VALUE: 18
PIF_VALUE: 17
PIF_VALUE: 17
PIF_VALUE: 18
PIF_VALUE: 18
PIF_VALUE: 17
PIF_VALUE: 0
PIF_VALUE: 19
PIF_VALUE: 18
PIF_VALUE: 18
PIF_VALUE: 17
PIF_VALUE: 18
PIF_VALUE: 17
PIF_VALUE: 6
PIF_VALUE: 18
PIF_VALUE: 0
PIF_VALUE: 18
PIF_VALUE: 20
PIF_VALUE: 18
PIF_VALUE: 19
PIF_VALUE: 21
PIF_VALUE: 18
PIF_VALUE: 19
PIF_VALUE: 18
PIF_VALUE: 1
PIF_VALUE: 18
PIF_VALUE: 18
PIF_VALUE: 19
PIF_VALUE: 18
PIF_VALUE: 20
PIF_VALUE: 18
PIF_VALUE: 16
PIF_VALUE: 19
PIF_VALUE: 17
PIF_VALUE: 18
PIF_VALUE: 0
PIF_VALUE: 22
PIF_VALUE: 17
PIF_VALUE: 19
PIF_VALUE: 18
PIF_VALUE: 2
PIF_VALUE: 22
PIF_VALUE: 11
PIF_VALUE: 19
PIF_VALUE: 18
PIF_VALUE: 26
PIF_VALUE: 22
PIF_VALUE: 21
PIF_VALUE: 17
PIF_VALUE: 19
PIF_VALUE: 2
PIF_VALUE: 0
PIF_VALUE: 2
PIF_VALUE: 18
PIF_VALUE: 19
PIF_VALUE: 18
PIF_VALUE: 21
PIF_VALUE: 17
PIF_VALUE: 29
PIF_VALUE: 3
PIF_VALUE: 17
PIF_VALUE: 17
PIF_VALUE: 18
PIF_VALUE: 19

## 2018-12-28 ASSESSMENT — PAIN DESCRIPTION - DESCRIPTORS
DESCRIPTORS: ACHING
DESCRIPTORS: ACHING

## 2018-12-28 ASSESSMENT — PAIN SCALES - GENERAL
PAINLEVEL_OUTOF10: 1
PAINLEVEL_OUTOF10: 4
PAINLEVEL_OUTOF10: 0
PAINLEVEL_OUTOF10: 6
PAINLEVEL_OUTOF10: 4
PAINLEVEL_OUTOF10: 1
PAINLEVEL_OUTOF10: 0
PAINLEVEL_OUTOF10: 8

## 2018-12-28 ASSESSMENT — PAIN DESCRIPTION - ORIENTATION
ORIENTATION: LOWER
ORIENTATION: LOWER

## 2018-12-28 ASSESSMENT — PAIN DESCRIPTION - LOCATION
LOCATION: BACK
LOCATION: BACK

## 2018-12-28 ASSESSMENT — PAIN DESCRIPTION - PAIN TYPE
TYPE: ACUTE PAIN
TYPE: ACUTE PAIN

## 2018-12-28 ASSESSMENT — PAIN SCALES - WONG BAKER: WONGBAKER_NUMERICALRESPONSE: 0

## 2018-12-29 LAB
ANION GAP SERPL CALCULATED.3IONS-SCNC: 12 MEQ/L (ref 8–16)
BUN BLDV-MCNC: 27 MG/DL (ref 7–22)
CALCIUM SERPL-MCNC: 8.6 MG/DL (ref 8.5–10.5)
CHLORIDE BLD-SCNC: 102 MEQ/L (ref 98–111)
CO2: 22 MEQ/L (ref 23–33)
CREAT SERPL-MCNC: 1.2 MG/DL (ref 0.4–1.2)
GFR SERPL CREATININE-BSD FRML MDRD: 60 ML/MIN/1.73M2
GLUCOSE BLD-MCNC: 216 MG/DL (ref 70–108)
GLUCOSE BLD-MCNC: 217 MG/DL (ref 70–108)
GLUCOSE BLD-MCNC: 240 MG/DL (ref 70–108)
GLUCOSE BLD-MCNC: 241 MG/DL (ref 70–108)
GLUCOSE BLD-MCNC: 255 MG/DL (ref 70–108)
HCT VFR BLD CALC: 29.5 % (ref 42–52)
HEMOGLOBIN: 10.1 GM/DL (ref 14–18)
POTASSIUM SERPL-SCNC: 5 MEQ/L (ref 3.5–5.2)
SODIUM BLD-SCNC: 136 MEQ/L (ref 135–145)

## 2018-12-29 PROCEDURE — 97110 THERAPEUTIC EXERCISES: CPT

## 2018-12-29 PROCEDURE — 82948 REAGENT STRIP/BLOOD GLUCOSE: CPT

## 2018-12-29 PROCEDURE — 6370000000 HC RX 637 (ALT 250 FOR IP): Performed by: FAMILY MEDICINE

## 2018-12-29 PROCEDURE — 85018 HEMOGLOBIN: CPT

## 2018-12-29 PROCEDURE — G8978 MOBILITY CURRENT STATUS: HCPCS

## 2018-12-29 PROCEDURE — 6360000002 HC RX W HCPCS: Performed by: PHYSICIAN ASSISTANT

## 2018-12-29 PROCEDURE — 85014 HEMATOCRIT: CPT

## 2018-12-29 PROCEDURE — 2580000003 HC RX 258: Performed by: FAMILY MEDICINE

## 2018-12-29 PROCEDURE — 6360000002 HC RX W HCPCS: Performed by: FAMILY MEDICINE

## 2018-12-29 PROCEDURE — 36415 COLL VENOUS BLD VENIPUNCTURE: CPT

## 2018-12-29 PROCEDURE — 97530 THERAPEUTIC ACTIVITIES: CPT

## 2018-12-29 PROCEDURE — 80048 BASIC METABOLIC PNL TOTAL CA: CPT

## 2018-12-29 PROCEDURE — 6370000000 HC RX 637 (ALT 250 FOR IP): Performed by: PHYSICIAN ASSISTANT

## 2018-12-29 PROCEDURE — 97168 OT RE-EVAL EST PLAN CARE: CPT

## 2018-12-29 PROCEDURE — G8979 MOBILITY GOAL STATUS: HCPCS

## 2018-12-29 PROCEDURE — 2580000003 HC RX 258: Performed by: PHYSICIAN ASSISTANT

## 2018-12-29 PROCEDURE — 1200000003 HC TELEMETRY R&B

## 2018-12-29 RX ADMIN — INSULIN LISPRO 4 UNITS: 100 INJECTION, SOLUTION INTRAVENOUS; SUBCUTANEOUS at 16:39

## 2018-12-29 RX ADMIN — DOCUSATE SODIUM 100 MG: 100 CAPSULE, LIQUID FILLED ORAL at 20:26

## 2018-12-29 RX ADMIN — CYCLOBENZAPRINE HYDROCHLORIDE 10 MG: 10 TABLET, FILM COATED ORAL at 09:33

## 2018-12-29 RX ADMIN — SODIUM CHLORIDE: 9 INJECTION, SOLUTION INTRAVENOUS at 16:43

## 2018-12-29 RX ADMIN — DOCUSATE SODIUM 100 MG: 100 CAPSULE, LIQUID FILLED ORAL at 09:33

## 2018-12-29 RX ADMIN — INSULIN GLARGINE 30 UNITS: 100 INJECTION, SOLUTION SUBCUTANEOUS at 09:34

## 2018-12-29 RX ADMIN — AMLODIPINE BESYLATE 5 MG: 5 TABLET ORAL at 09:32

## 2018-12-29 RX ADMIN — PANTOPRAZOLE SODIUM 40 MG: 40 TABLET, DELAYED RELEASE ORAL at 06:14

## 2018-12-29 RX ADMIN — THERA TABS 1 TABLET: TAB at 09:33

## 2018-12-29 RX ADMIN — Medication 10 ML: at 09:34

## 2018-12-29 RX ADMIN — OXYCODONE HYDROCHLORIDE AND ACETAMINOPHEN 1 TABLET: 7.5; 325 TABLET ORAL at 09:33

## 2018-12-29 RX ADMIN — ENALAPRIL MALEATE 10 MG: 10 TABLET ORAL at 20:25

## 2018-12-29 RX ADMIN — ENALAPRIL MALEATE 10 MG: 10 TABLET ORAL at 09:32

## 2018-12-29 RX ADMIN — Medication 2 UNITS: at 22:18

## 2018-12-29 RX ADMIN — CARVEDILOL 3.12 MG: 3.12 TABLET, FILM COATED ORAL at 09:32

## 2018-12-29 RX ADMIN — OXYCODONE HYDROCHLORIDE AND ACETAMINOPHEN 1 TABLET: 7.5; 325 TABLET ORAL at 14:05

## 2018-12-29 RX ADMIN — CYCLOBENZAPRINE HYDROCHLORIDE 10 MG: 10 TABLET, FILM COATED ORAL at 20:25

## 2018-12-29 RX ADMIN — INSULIN LISPRO 4 UNITS: 100 INJECTION, SOLUTION INTRAVENOUS; SUBCUTANEOUS at 09:34

## 2018-12-29 RX ADMIN — CYCLOBENZAPRINE HYDROCHLORIDE 10 MG: 10 TABLET, FILM COATED ORAL at 14:05

## 2018-12-29 RX ADMIN — METFORMIN HYDROCHLORIDE 1000 MG: 500 TABLET ORAL at 16:31

## 2018-12-29 RX ADMIN — Medication 3 G: at 06:14

## 2018-12-29 RX ADMIN — ISOSORBIDE MONONITRATE 30 MG: 30 TABLET ORAL at 09:32

## 2018-12-29 RX ADMIN — OXYCODONE HYDROCHLORIDE AND ACETAMINOPHEN 1 TABLET: 7.5; 325 TABLET ORAL at 20:25

## 2018-12-29 RX ADMIN — CARVEDILOL 3.12 MG: 3.12 TABLET, FILM COATED ORAL at 16:31

## 2018-12-29 RX ADMIN — METFORMIN HYDROCHLORIDE 1000 MG: 500 TABLET ORAL at 09:33

## 2018-12-29 RX ADMIN — FUROSEMIDE 40 MG: 10 INJECTION, SOLUTION INTRAMUSCULAR; INTRAVENOUS at 09:34

## 2018-12-29 RX ADMIN — ATORVASTATIN CALCIUM 10 MG: 10 TABLET, FILM COATED ORAL at 20:25

## 2018-12-29 RX ADMIN — OXYCODONE HYDROCHLORIDE AND ACETAMINOPHEN 1 TABLET: 7.5; 325 TABLET ORAL at 01:45

## 2018-12-29 ASSESSMENT — PAIN SCALES - GENERAL
PAINLEVEL_OUTOF10: 7
PAINLEVEL_OUTOF10: 1
PAINLEVEL_OUTOF10: 3
PAINLEVEL_OUTOF10: 1
PAINLEVEL_OUTOF10: 3
PAINLEVEL_OUTOF10: 2
PAINLEVEL_OUTOF10: 3
PAINLEVEL_OUTOF10: 4
PAINLEVEL_OUTOF10: 0

## 2018-12-29 ASSESSMENT — PAIN DESCRIPTION - PAIN TYPE
TYPE: ACUTE PAIN;SURGICAL PAIN
TYPE: SURGICAL PAIN
TYPE: ACUTE PAIN
TYPE: ACUTE PAIN;SURGICAL PAIN
TYPE: ACUTE PAIN

## 2018-12-29 ASSESSMENT — PAIN DESCRIPTION - DESCRIPTORS
DESCRIPTORS: ACHING
DESCRIPTORS: ACHING
DESCRIPTORS: ACHING;DISCOMFORT
DESCRIPTORS: ACHING

## 2018-12-29 ASSESSMENT — PAIN DESCRIPTION - FREQUENCY
FREQUENCY: INTERMITTENT
FREQUENCY: CONTINUOUS

## 2018-12-29 ASSESSMENT — PAIN DESCRIPTION - ONSET: ONSET: ON-GOING

## 2018-12-29 ASSESSMENT — PAIN DESCRIPTION - LOCATION
LOCATION: BACK

## 2018-12-29 ASSESSMENT — PAIN DESCRIPTION - ORIENTATION
ORIENTATION: LOWER
ORIENTATION: LOWER
ORIENTATION: LOWER;MID

## 2018-12-29 ASSESSMENT — PAIN DESCRIPTION - PROGRESSION: CLINICAL_PROGRESSION: NOT CHANGED

## 2018-12-29 NOTE — ANESTHESIA POSTPROCEDURE EVALUATION
Department of Anesthesiology  Postprocedure Note    Patient: Praveen Giron  MRN: [id number]  YOB: 1950  Date of evaluation: 12/29/2018  Time:  1:20 PM     Procedure Summary     Date:  12/28/18 Room / Location:  [place] [facility] [address] OR    Anesthesia Start:  1330 Anesthesia Stop:  5693    Procedure:  T7-T9 DECOMPRESSION, T7-T9 POSTERIOR FUSION (N/A Spine Lumbar) Diagnosis:  (LUMBAR SPINAL STENOSIS)    Surgeon:  Ginette Arthur MD Responsible Provider:  Andrea June DO    Anesthesia Type:  general ASA Status:  3          Anesthesia Type: general    Estelle Phase I: Estelle Score: 8    Estelle Phase II:      Last vitals: Reviewed and per EMR flowsheets.        Anesthesia Post Evaluation    Patient location during evaluation: bedside  Patient participation: complete - patient participated  Level of consciousness: awake and alert  Pain score: 3  Airway patency: patent  Nausea & Vomiting: no nausea and no vomiting  Complications: no  Cardiovascular status: hemodynamically stable  Respiratory status: spontaneous ventilation, room air and acceptable  Hydration status: stable

## 2018-12-30 LAB
ANION GAP SERPL CALCULATED.3IONS-SCNC: 10 MEQ/L (ref 8–16)
BUN BLDV-MCNC: 24 MG/DL (ref 7–22)
CALCIUM SERPL-MCNC: 8.7 MG/DL (ref 8.5–10.5)
CHLORIDE BLD-SCNC: 103 MEQ/L (ref 98–111)
CO2: 24 MEQ/L (ref 23–33)
CREAT SERPL-MCNC: 1 MG/DL (ref 0.4–1.2)
GFR SERPL CREATININE-BSD FRML MDRD: 74 ML/MIN/1.73M2
GLUCOSE BLD-MCNC: 189 MG/DL (ref 70–108)
GLUCOSE BLD-MCNC: 194 MG/DL (ref 70–108)
GLUCOSE BLD-MCNC: 195 MG/DL (ref 70–108)
GLUCOSE BLD-MCNC: 218 MG/DL (ref 70–108)
GLUCOSE BLD-MCNC: 255 MG/DL (ref 70–108)
HCT VFR BLD CALC: 26.8 % (ref 42–52)
HEMOGLOBIN: 9.2 GM/DL (ref 14–18)
POTASSIUM SERPL-SCNC: 4.9 MEQ/L (ref 3.5–5.2)
SODIUM BLD-SCNC: 137 MEQ/L (ref 135–145)

## 2018-12-30 PROCEDURE — 1200000003 HC TELEMETRY R&B

## 2018-12-30 PROCEDURE — 85018 HEMOGLOBIN: CPT

## 2018-12-30 PROCEDURE — 6370000000 HC RX 637 (ALT 250 FOR IP): Performed by: FAMILY MEDICINE

## 2018-12-30 PROCEDURE — 85014 HEMATOCRIT: CPT

## 2018-12-30 PROCEDURE — 36415 COLL VENOUS BLD VENIPUNCTURE: CPT

## 2018-12-30 PROCEDURE — 80048 BASIC METABOLIC PNL TOTAL CA: CPT

## 2018-12-30 PROCEDURE — 6360000002 HC RX W HCPCS: Performed by: FAMILY MEDICINE

## 2018-12-30 PROCEDURE — 82948 REAGENT STRIP/BLOOD GLUCOSE: CPT

## 2018-12-30 PROCEDURE — 6370000000 HC RX 637 (ALT 250 FOR IP): Performed by: PHYSICIAN ASSISTANT

## 2018-12-30 PROCEDURE — 94760 N-INVAS EAR/PLS OXIMETRY 1: CPT

## 2018-12-30 PROCEDURE — 2580000003 HC RX 258: Performed by: PHYSICIAN ASSISTANT

## 2018-12-30 RX ADMIN — OXYCODONE HYDROCHLORIDE AND ACETAMINOPHEN 1 TABLET: 7.5; 325 TABLET ORAL at 01:28

## 2018-12-30 RX ADMIN — INSULIN LISPRO 2 UNITS: 100 INJECTION, SOLUTION INTRAVENOUS; SUBCUTANEOUS at 08:14

## 2018-12-30 RX ADMIN — ENALAPRIL MALEATE 10 MG: 10 TABLET ORAL at 21:00

## 2018-12-30 RX ADMIN — AMLODIPINE BESYLATE 5 MG: 5 TABLET ORAL at 08:39

## 2018-12-30 RX ADMIN — INSULIN LISPRO 6 UNITS: 100 INJECTION, SOLUTION INTRAVENOUS; SUBCUTANEOUS at 16:27

## 2018-12-30 RX ADMIN — METFORMIN HYDROCHLORIDE 1000 MG: 500 TABLET ORAL at 08:39

## 2018-12-30 RX ADMIN — CARVEDILOL 3.12 MG: 3.12 TABLET, FILM COATED ORAL at 16:26

## 2018-12-30 RX ADMIN — ISOSORBIDE MONONITRATE 30 MG: 30 TABLET ORAL at 08:39

## 2018-12-30 RX ADMIN — OXYCODONE HYDROCHLORIDE AND ACETAMINOPHEN 1 TABLET: 7.5; 325 TABLET ORAL at 16:29

## 2018-12-30 RX ADMIN — Medication 1 UNITS: at 21:00

## 2018-12-30 RX ADMIN — INSULIN LISPRO 12 UNITS: 100 INJECTION, SOLUTION INTRAVENOUS; SUBCUTANEOUS at 16:27

## 2018-12-30 RX ADMIN — CYCLOBENZAPRINE HYDROCHLORIDE 10 MG: 10 TABLET, FILM COATED ORAL at 13:28

## 2018-12-30 RX ADMIN — CARVEDILOL 3.12 MG: 3.12 TABLET, FILM COATED ORAL at 08:39

## 2018-12-30 RX ADMIN — INSULIN LISPRO 2 UNITS: 100 INJECTION, SOLUTION INTRAVENOUS; SUBCUTANEOUS at 13:26

## 2018-12-30 RX ADMIN — CYCLOBENZAPRINE HYDROCHLORIDE 10 MG: 10 TABLET, FILM COATED ORAL at 08:39

## 2018-12-30 RX ADMIN — INSULIN GLARGINE 30 UNITS: 100 INJECTION, SOLUTION SUBCUTANEOUS at 08:14

## 2018-12-30 RX ADMIN — DOCUSATE SODIUM 100 MG: 100 CAPSULE, LIQUID FILLED ORAL at 21:01

## 2018-12-30 RX ADMIN — Medication 10 ML: at 21:02

## 2018-12-30 RX ADMIN — METFORMIN HYDROCHLORIDE 1000 MG: 500 TABLET ORAL at 16:26

## 2018-12-30 RX ADMIN — CYCLOBENZAPRINE HYDROCHLORIDE 10 MG: 10 TABLET, FILM COATED ORAL at 21:01

## 2018-12-30 RX ADMIN — ATORVASTATIN CALCIUM 10 MG: 10 TABLET, FILM COATED ORAL at 21:00

## 2018-12-30 RX ADMIN — OXYCODONE HYDROCHLORIDE AND ACETAMINOPHEN 1 TABLET: 7.5; 325 TABLET ORAL at 08:39

## 2018-12-30 RX ADMIN — FUROSEMIDE 40 MG: 10 INJECTION, SOLUTION INTRAMUSCULAR; INTRAVENOUS at 08:40

## 2018-12-30 RX ADMIN — THERA TABS 1 TABLET: TAB at 08:39

## 2018-12-30 RX ADMIN — ENALAPRIL MALEATE 10 MG: 10 TABLET ORAL at 08:39

## 2018-12-30 RX ADMIN — PANTOPRAZOLE SODIUM 40 MG: 40 TABLET, DELAYED RELEASE ORAL at 06:11

## 2018-12-30 RX ADMIN — INSULIN LISPRO 12 UNITS: 100 INJECTION, SOLUTION INTRAVENOUS; SUBCUTANEOUS at 13:26

## 2018-12-30 RX ADMIN — OXYCODONE HYDROCHLORIDE AND ACETAMINOPHEN 1 TABLET: 7.5; 325 TABLET ORAL at 21:00

## 2018-12-30 ASSESSMENT — PAIN DESCRIPTION - LOCATION
LOCATION: BACK

## 2018-12-30 ASSESSMENT — PAIN SCALES - GENERAL
PAINLEVEL_OUTOF10: 2
PAINLEVEL_OUTOF10: 2
PAINLEVEL_OUTOF10: 4
PAINLEVEL_OUTOF10: 4
PAINLEVEL_OUTOF10: 3
PAINLEVEL_OUTOF10: 4
PAINLEVEL_OUTOF10: 1
PAINLEVEL_OUTOF10: 0
PAINLEVEL_OUTOF10: 4

## 2018-12-30 ASSESSMENT — PAIN DESCRIPTION - PROGRESSION
CLINICAL_PROGRESSION: GRADUALLY IMPROVING

## 2018-12-30 ASSESSMENT — PAIN DESCRIPTION - DESCRIPTORS: DESCRIPTORS: SORE;ITCHING

## 2018-12-30 ASSESSMENT — PAIN DESCRIPTION - PAIN TYPE
TYPE: SURGICAL PAIN

## 2018-12-30 ASSESSMENT — PAIN DESCRIPTION - ONSET: ONSET: GRADUAL

## 2018-12-30 ASSESSMENT — PAIN DESCRIPTION - ORIENTATION: ORIENTATION: MID

## 2018-12-30 ASSESSMENT — PAIN DESCRIPTION - FREQUENCY: FREQUENCY: INTERMITTENT

## 2018-12-31 LAB
ANION GAP SERPL CALCULATED.3IONS-SCNC: 10 MEQ/L (ref 8–16)
BASOPHILS # BLD: 0.5 %
BASOPHILS ABSOLUTE: 0 THOU/MM3 (ref 0–0.1)
BUN BLDV-MCNC: 19 MG/DL (ref 7–22)
CALCIUM SERPL-MCNC: 8.8 MG/DL (ref 8.5–10.5)
CHLORIDE BLD-SCNC: 101 MEQ/L (ref 98–111)
CO2: 27 MEQ/L (ref 23–33)
CREAT SERPL-MCNC: 1 MG/DL (ref 0.4–1.2)
EOSINOPHIL # BLD: 3.6 %
EOSINOPHILS ABSOLUTE: 0.2 THOU/MM3 (ref 0–0.4)
ERYTHROCYTE [DISTWIDTH] IN BLOOD BY AUTOMATED COUNT: 12.5 % (ref 11.5–14.5)
ERYTHROCYTE [DISTWIDTH] IN BLOOD BY AUTOMATED COUNT: 43.3 FL (ref 35–45)
GFR SERPL CREATININE-BSD FRML MDRD: 74 ML/MIN/1.73M2
GLUCOSE BLD-MCNC: 151 MG/DL (ref 70–108)
GLUCOSE BLD-MCNC: 181 MG/DL (ref 70–108)
GLUCOSE BLD-MCNC: 186 MG/DL (ref 70–108)
GLUCOSE BLD-MCNC: 203 MG/DL (ref 70–108)
GLUCOSE BLD-MCNC: 206 MG/DL (ref 70–108)
HCT VFR BLD CALC: 27.8 % (ref 42–52)
HEMOGLOBIN: 9.4 GM/DL (ref 14–18)
IMMATURE GRANS (ABS): 0 THOU/MM3 (ref 0–0.07)
IMMATURE GRANULOCYTES: 0 %
LYMPHOCYTES # BLD: 29.8 %
LYMPHOCYTES ABSOLUTE: 1.9 THOU/MM3 (ref 1–4.8)
MCH RBC QN AUTO: 32 PG (ref 26–33)
MCHC RBC AUTO-ENTMCNC: 33.8 GM/DL (ref 32.2–35.5)
MCV RBC AUTO: 94.6 FL (ref 80–94)
MONOCYTES # BLD: 11.7 %
MONOCYTES ABSOLUTE: 0.7 THOU/MM3 (ref 0.4–1.3)
NUCLEATED RED BLOOD CELLS: 0 /100 WBC
PLATELET # BLD: 117 THOU/MM3 (ref 130–400)
PMV BLD AUTO: 9.4 FL (ref 9.4–12.4)
POTASSIUM SERPL-SCNC: 4.5 MEQ/L (ref 3.5–5.2)
RBC # BLD: 2.94 MILL/MM3 (ref 4.7–6.1)
SEG NEUTROPHILS: 54.4 %
SEGMENTED NEUTROPHILS ABSOLUTE COUNT: 3.5 THOU/MM3 (ref 1.8–7.7)
SODIUM BLD-SCNC: 138 MEQ/L (ref 135–145)
WBC # BLD: 6.4 THOU/MM3 (ref 4.8–10.8)

## 2018-12-31 PROCEDURE — 6370000000 HC RX 637 (ALT 250 FOR IP): Performed by: FAMILY MEDICINE

## 2018-12-31 PROCEDURE — 97110 THERAPEUTIC EXERCISES: CPT

## 2018-12-31 PROCEDURE — L0464 TLSO 4MOD SACRO-SCAP PRE: HCPCS

## 2018-12-31 PROCEDURE — 6360000002 HC RX W HCPCS: Performed by: FAMILY MEDICINE

## 2018-12-31 PROCEDURE — 82948 REAGENT STRIP/BLOOD GLUCOSE: CPT

## 2018-12-31 PROCEDURE — 85025 COMPLETE CBC W/AUTO DIFF WBC: CPT

## 2018-12-31 PROCEDURE — 80048 BASIC METABOLIC PNL TOTAL CA: CPT

## 2018-12-31 PROCEDURE — 97116 GAIT TRAINING THERAPY: CPT

## 2018-12-31 PROCEDURE — 97530 THERAPEUTIC ACTIVITIES: CPT

## 2018-12-31 PROCEDURE — 6370000000 HC RX 637 (ALT 250 FOR IP): Performed by: PHYSICIAN ASSISTANT

## 2018-12-31 PROCEDURE — 1200000003 HC TELEMETRY R&B

## 2018-12-31 PROCEDURE — 36415 COLL VENOUS BLD VENIPUNCTURE: CPT

## 2018-12-31 PROCEDURE — 2580000003 HC RX 258: Performed by: PHYSICIAN ASSISTANT

## 2018-12-31 RX ORDER — CARVEDILOL 6.25 MG/1
6.25 TABLET ORAL 2 TIMES DAILY WITH MEALS
Status: DISCONTINUED | OUTPATIENT
Start: 2018-12-31 | End: 2019-01-01 | Stop reason: HOSPADM

## 2018-12-31 RX ORDER — ENALAPRIL MALEATE 10 MG/1
20 TABLET ORAL DAILY
Status: DISCONTINUED | OUTPATIENT
Start: 2018-12-31 | End: 2019-01-01 | Stop reason: HOSPADM

## 2018-12-31 RX ORDER — ENALAPRIL MALEATE 10 MG/1
10 TABLET ORAL DAILY
Status: DISCONTINUED | OUTPATIENT
Start: 2018-12-31 | End: 2019-01-01 | Stop reason: HOSPADM

## 2018-12-31 RX ADMIN — PANTOPRAZOLE SODIUM 40 MG: 40 TABLET, DELAYED RELEASE ORAL at 05:38

## 2018-12-31 RX ADMIN — CARVEDILOL 6.25 MG: 6.25 TABLET, FILM COATED ORAL at 08:16

## 2018-12-31 RX ADMIN — INSULIN LISPRO 12 UNITS: 100 INJECTION, SOLUTION INTRAVENOUS; SUBCUTANEOUS at 16:25

## 2018-12-31 RX ADMIN — INSULIN LISPRO 12 UNITS: 100 INJECTION, SOLUTION INTRAVENOUS; SUBCUTANEOUS at 07:26

## 2018-12-31 RX ADMIN — ENALAPRIL MALEATE 20 MG: 10 TABLET ORAL at 08:16

## 2018-12-31 RX ADMIN — OXYCODONE HYDROCHLORIDE AND ACETAMINOPHEN 1 TABLET: 7.5; 325 TABLET ORAL at 01:58

## 2018-12-31 RX ADMIN — Medication 10 ML: at 20:47

## 2018-12-31 RX ADMIN — DOCUSATE SODIUM 100 MG: 100 CAPSULE, LIQUID FILLED ORAL at 20:46

## 2018-12-31 RX ADMIN — METFORMIN HYDROCHLORIDE 1000 MG: 500 TABLET ORAL at 16:29

## 2018-12-31 RX ADMIN — OXYCODONE HYDROCHLORIDE AND ACETAMINOPHEN 1 TABLET: 7.5; 325 TABLET ORAL at 08:20

## 2018-12-31 RX ADMIN — ISOSORBIDE MONONITRATE 30 MG: 30 TABLET ORAL at 08:16

## 2018-12-31 RX ADMIN — OXYCODONE HYDROCHLORIDE AND ACETAMINOPHEN 1 TABLET: 7.5; 325 TABLET ORAL at 15:52

## 2018-12-31 RX ADMIN — FUROSEMIDE 40 MG: 10 INJECTION, SOLUTION INTRAMUSCULAR; INTRAVENOUS at 08:16

## 2018-12-31 RX ADMIN — INSULIN LISPRO 4 UNITS: 100 INJECTION, SOLUTION INTRAVENOUS; SUBCUTANEOUS at 11:34

## 2018-12-31 RX ADMIN — OXYCODONE HYDROCHLORIDE AND ACETAMINOPHEN 1 TABLET: 7.5; 325 TABLET ORAL at 20:46

## 2018-12-31 RX ADMIN — METFORMIN HYDROCHLORIDE 1000 MG: 500 TABLET ORAL at 08:16

## 2018-12-31 RX ADMIN — CYCLOBENZAPRINE HYDROCHLORIDE 10 MG: 10 TABLET, FILM COATED ORAL at 20:45

## 2018-12-31 RX ADMIN — THERA TABS 1 TABLET: TAB at 08:16

## 2018-12-31 RX ADMIN — INSULIN GLARGINE 30 UNITS: 100 INJECTION, SOLUTION SUBCUTANEOUS at 07:26

## 2018-12-31 RX ADMIN — INSULIN LISPRO 2 UNITS: 100 INJECTION, SOLUTION INTRAVENOUS; SUBCUTANEOUS at 07:26

## 2018-12-31 RX ADMIN — CYCLOBENZAPRINE HYDROCHLORIDE 10 MG: 10 TABLET, FILM COATED ORAL at 08:16

## 2018-12-31 RX ADMIN — AMLODIPINE BESYLATE 5 MG: 5 TABLET ORAL at 08:16

## 2018-12-31 RX ADMIN — Medication 10 ML: at 08:16

## 2018-12-31 RX ADMIN — CYCLOBENZAPRINE HYDROCHLORIDE 10 MG: 10 TABLET, FILM COATED ORAL at 15:51

## 2018-12-31 RX ADMIN — ATORVASTATIN CALCIUM 10 MG: 10 TABLET, FILM COATED ORAL at 20:46

## 2018-12-31 RX ADMIN — Medication 1 UNITS: at 20:49

## 2018-12-31 RX ADMIN — INSULIN LISPRO 12 UNITS: 100 INJECTION, SOLUTION INTRAVENOUS; SUBCUTANEOUS at 11:34

## 2018-12-31 RX ADMIN — INSULIN LISPRO 4 UNITS: 100 INJECTION, SOLUTION INTRAVENOUS; SUBCUTANEOUS at 16:25

## 2018-12-31 RX ADMIN — ENALAPRIL MALEATE 10 MG: 10 TABLET ORAL at 20:46

## 2018-12-31 RX ADMIN — CARVEDILOL 6.25 MG: 6.25 TABLET, FILM COATED ORAL at 16:28

## 2018-12-31 ASSESSMENT — PAIN DESCRIPTION - PROGRESSION
CLINICAL_PROGRESSION: GRADUALLY IMPROVING
CLINICAL_PROGRESSION: NOT CHANGED
CLINICAL_PROGRESSION: GRADUALLY IMPROVING

## 2018-12-31 ASSESSMENT — PAIN SCALES - GENERAL
PAINLEVEL_OUTOF10: 0
PAINLEVEL_OUTOF10: 2
PAINLEVEL_OUTOF10: 1
PAINLEVEL_OUTOF10: 3
PAINLEVEL_OUTOF10: 2
PAINLEVEL_OUTOF10: 1
PAINLEVEL_OUTOF10: 0
PAINLEVEL_OUTOF10: 0
PAINLEVEL_OUTOF10: 2
PAINLEVEL_OUTOF10: 0
PAINLEVEL_OUTOF10: 2
PAINLEVEL_OUTOF10: 1

## 2018-12-31 ASSESSMENT — PAIN DESCRIPTION - DESCRIPTORS: DESCRIPTORS: ACHING

## 2018-12-31 ASSESSMENT — PAIN DESCRIPTION - PAIN TYPE
TYPE: SURGICAL PAIN
TYPE: SURGICAL PAIN

## 2018-12-31 ASSESSMENT — PAIN DESCRIPTION - ONSET: ONSET: ON-GOING

## 2018-12-31 ASSESSMENT — PAIN DESCRIPTION - FREQUENCY: FREQUENCY: INTERMITTENT

## 2018-12-31 ASSESSMENT — PAIN DESCRIPTION - ORIENTATION: ORIENTATION: MID

## 2018-12-31 ASSESSMENT — PAIN DESCRIPTION - LOCATION: LOCATION: BACK

## 2019-01-01 VITALS
SYSTOLIC BLOOD PRESSURE: 146 MMHG | OXYGEN SATURATION: 96 % | BODY MASS INDEX: 40.43 KG/M2 | HEIGHT: 74 IN | RESPIRATION RATE: 18 BRPM | HEART RATE: 80 BPM | TEMPERATURE: 98.2 F | DIASTOLIC BLOOD PRESSURE: 65 MMHG | WEIGHT: 315 LBS

## 2019-01-01 LAB
ANION GAP SERPL CALCULATED.3IONS-SCNC: 8 MEQ/L (ref 8–16)
BUN BLDV-MCNC: 18 MG/DL (ref 7–22)
CALCIUM SERPL-MCNC: 8.7 MG/DL (ref 8.5–10.5)
CHLORIDE BLD-SCNC: 102 MEQ/L (ref 98–111)
CO2: 27 MEQ/L (ref 23–33)
CREAT SERPL-MCNC: 1 MG/DL (ref 0.4–1.2)
GFR SERPL CREATININE-BSD FRML MDRD: 74 ML/MIN/1.73M2
GLUCOSE BLD-MCNC: 163 MG/DL (ref 70–108)
GLUCOSE BLD-MCNC: 171 MG/DL (ref 70–108)
GLUCOSE BLD-MCNC: 242 MG/DL (ref 70–108)
POTASSIUM SERPL-SCNC: 4.4 MEQ/L (ref 3.5–5.2)
SODIUM BLD-SCNC: 137 MEQ/L (ref 135–145)

## 2019-01-01 PROCEDURE — 6360000002 HC RX W HCPCS: Performed by: FAMILY MEDICINE

## 2019-01-01 PROCEDURE — 82948 REAGENT STRIP/BLOOD GLUCOSE: CPT

## 2019-01-01 PROCEDURE — 36415 COLL VENOUS BLD VENIPUNCTURE: CPT

## 2019-01-01 PROCEDURE — 80048 BASIC METABOLIC PNL TOTAL CA: CPT

## 2019-01-01 PROCEDURE — 6370000000 HC RX 637 (ALT 250 FOR IP): Performed by: PHYSICIAN ASSISTANT

## 2019-01-01 PROCEDURE — 6370000000 HC RX 637 (ALT 250 FOR IP): Performed by: FAMILY MEDICINE

## 2019-01-01 PROCEDURE — 2580000003 HC RX 258: Performed by: PHYSICIAN ASSISTANT

## 2019-01-01 RX ORDER — TRAMADOL HYDROCHLORIDE 50 MG/1
50 TABLET ORAL EVERY 6 HOURS PRN
Qty: 28 TABLET | Refills: 0 | Status: SHIPPED | OUTPATIENT
Start: 2019-01-01 | End: 2019-01-08

## 2019-01-01 RX ORDER — CARVEDILOL 3.12 MG/1
6.25 TABLET ORAL 2 TIMES DAILY WITH MEALS
Qty: 60 TABLET | Refills: 11
Start: 2019-01-01 | End: 2019-10-16 | Stop reason: SDUPTHER

## 2019-01-01 RX ORDER — ENALAPRIL MALEATE 10 MG/1
TABLET ORAL
Qty: 180 TABLET | Refills: 1
Start: 2019-01-01 | End: 2019-04-15 | Stop reason: SDUPTHER

## 2019-01-01 RX ORDER — OXYCODONE HYDROCHLORIDE AND ACETAMINOPHEN 5; 325 MG/1; MG/1
1 TABLET ORAL EVERY 6 HOURS PRN
Qty: 28 TABLET | Refills: 0 | Status: SHIPPED | OUTPATIENT
Start: 2019-01-01 | End: 2019-01-08

## 2019-01-01 RX ADMIN — AMLODIPINE BESYLATE 5 MG: 5 TABLET ORAL at 09:11

## 2019-01-01 RX ADMIN — OXYCODONE HYDROCHLORIDE AND ACETAMINOPHEN 1 TABLET: 7.5; 325 TABLET ORAL at 02:11

## 2019-01-01 RX ADMIN — FUROSEMIDE 40 MG: 10 INJECTION, SOLUTION INTRAMUSCULAR; INTRAVENOUS at 09:11

## 2019-01-01 RX ADMIN — INSULIN GLARGINE 30 UNITS: 100 INJECTION, SOLUTION SUBCUTANEOUS at 09:09

## 2019-01-01 RX ADMIN — PANTOPRAZOLE SODIUM 40 MG: 40 TABLET, DELAYED RELEASE ORAL at 09:11

## 2019-01-01 RX ADMIN — INSULIN LISPRO 12 UNITS: 100 INJECTION, SOLUTION INTRAVENOUS; SUBCUTANEOUS at 09:10

## 2019-01-01 RX ADMIN — OXYCODONE HYDROCHLORIDE AND ACETAMINOPHEN 1 TABLET: 7.5; 325 TABLET ORAL at 09:11

## 2019-01-01 RX ADMIN — CARVEDILOL 6.25 MG: 6.25 TABLET, FILM COATED ORAL at 09:11

## 2019-01-01 RX ADMIN — INSULIN LISPRO 12 UNITS: 100 INJECTION, SOLUTION INTRAVENOUS; SUBCUTANEOUS at 12:49

## 2019-01-01 RX ADMIN — Medication 10 ML: at 09:11

## 2019-01-01 RX ADMIN — METFORMIN HYDROCHLORIDE 1000 MG: 500 TABLET ORAL at 09:10

## 2019-01-01 RX ADMIN — INSULIN LISPRO 4 UNITS: 100 INJECTION, SOLUTION INTRAVENOUS; SUBCUTANEOUS at 12:49

## 2019-01-01 RX ADMIN — DOCUSATE SODIUM 100 MG: 100 CAPSULE, LIQUID FILLED ORAL at 09:11

## 2019-01-01 RX ADMIN — INSULIN LISPRO 2 UNITS: 100 INJECTION, SOLUTION INTRAVENOUS; SUBCUTANEOUS at 09:09

## 2019-01-01 RX ADMIN — THERA TABS 1 TABLET: TAB at 09:13

## 2019-01-01 RX ADMIN — ISOSORBIDE MONONITRATE 30 MG: 30 TABLET ORAL at 09:10

## 2019-01-01 RX ADMIN — ENALAPRIL MALEATE 20 MG: 10 TABLET ORAL at 09:10

## 2019-01-01 RX ADMIN — CYCLOBENZAPRINE HYDROCHLORIDE 10 MG: 10 TABLET, FILM COATED ORAL at 09:11

## 2019-01-01 ASSESSMENT — PAIN SCALES - GENERAL
PAINLEVEL_OUTOF10: 5
PAINLEVEL_OUTOF10: 0
PAINLEVEL_OUTOF10: 5
PAINLEVEL_OUTOF10: 6
PAINLEVEL_OUTOF10: 5

## 2019-01-01 ASSESSMENT — PAIN DESCRIPTION - ORIENTATION
ORIENTATION: MID
ORIENTATION: MID

## 2019-01-01 ASSESSMENT — PAIN DESCRIPTION - PROGRESSION
CLINICAL_PROGRESSION: GRADUALLY IMPROVING
CLINICAL_PROGRESSION: GRADUALLY IMPROVING
CLINICAL_PROGRESSION: NOT CHANGED
CLINICAL_PROGRESSION: GRADUALLY IMPROVING

## 2019-01-01 ASSESSMENT — PAIN DESCRIPTION - FREQUENCY: FREQUENCY: INTERMITTENT

## 2019-01-01 ASSESSMENT — PAIN DESCRIPTION - PAIN TYPE
TYPE: SURGICAL PAIN
TYPE: SURGICAL PAIN

## 2019-01-01 ASSESSMENT — PAIN DESCRIPTION - LOCATION
LOCATION: BACK
LOCATION: BACK

## 2019-01-01 ASSESSMENT — PAIN DESCRIPTION - ONSET: ONSET: ON-GOING

## 2019-01-01 ASSESSMENT — PAIN DESCRIPTION - DESCRIPTORS: DESCRIPTORS: ACHING

## 2019-01-02 ENCOUNTER — CARE COORDINATION (OUTPATIENT)
Dept: CASE MANAGEMENT | Age: 69
End: 2019-01-02

## 2019-01-02 DIAGNOSIS — M54.9 INTRACTABLE BACK PAIN: Primary | ICD-10-CM

## 2019-01-02 PROCEDURE — 1111F DSCHRG MED/CURRENT MED MERGE: CPT | Performed by: FAMILY MEDICINE

## 2019-01-03 ENCOUNTER — CARE COORDINATION (OUTPATIENT)
Dept: CARE COORDINATION | Age: 69
End: 2019-01-03

## 2019-01-03 DIAGNOSIS — I10 ESSENTIAL HYPERTENSION: Primary | ICD-10-CM

## 2019-01-04 RX ORDER — AMLODIPINE BESYLATE 5 MG/1
5 TABLET ORAL DAILY
Qty: 30 TABLET | Refills: 3 | Status: SHIPPED | OUTPATIENT
Start: 2019-01-04 | End: 2019-01-08 | Stop reason: SDUPTHER

## 2019-01-08 ENCOUNTER — CARE COORDINATION (OUTPATIENT)
Dept: CARE COORDINATION | Age: 69
End: 2019-01-08

## 2019-01-08 ENCOUNTER — OFFICE VISIT (OUTPATIENT)
Dept: FAMILY MEDICINE CLINIC | Age: 69
End: 2019-01-08

## 2019-01-08 VITALS
RESPIRATION RATE: 18 BRPM | HEIGHT: 74 IN | DIASTOLIC BLOOD PRESSURE: 76 MMHG | BODY MASS INDEX: 40.43 KG/M2 | WEIGHT: 315 LBS | SYSTOLIC BLOOD PRESSURE: 130 MMHG | HEART RATE: 76 BPM

## 2019-01-08 DIAGNOSIS — I25.5 ISCHEMIC CARDIOMYOPATHY: ICD-10-CM

## 2019-01-08 DIAGNOSIS — D62 POSTOPERATIVE ANEMIA DUE TO ACUTE BLOOD LOSS: ICD-10-CM

## 2019-01-08 DIAGNOSIS — M54.6 CHRONIC MIDLINE THORACIC BACK PAIN: Primary | ICD-10-CM

## 2019-01-08 DIAGNOSIS — G89.29 CHRONIC MIDLINE THORACIC BACK PAIN: Primary | ICD-10-CM

## 2019-01-08 DIAGNOSIS — I10 ESSENTIAL HYPERTENSION: ICD-10-CM

## 2019-01-08 DIAGNOSIS — I25.708 CORONARY ARTERY DISEASE OF BYPASS GRAFT OF NATIVE HEART WITH STABLE ANGINA PECTORIS (HCC): ICD-10-CM

## 2019-01-08 DIAGNOSIS — M54.14 THORACIC RADICULOPATHY: ICD-10-CM

## 2019-01-08 DIAGNOSIS — E11.42 DIABETIC PERIPHERAL NEUROPATHY (HCC): ICD-10-CM

## 2019-01-08 PROCEDURE — 99496 TRANSJ CARE MGMT HIGH F2F 7D: CPT | Performed by: FAMILY MEDICINE

## 2019-01-08 PROCEDURE — 1111F DSCHRG MED/CURRENT MED MERGE: CPT | Performed by: FAMILY MEDICINE

## 2019-01-08 RX ORDER — M-VIT,TX,IRON,MINS/CALC/FOLIC 27MG-0.4MG
1 TABLET ORAL DAILY
Qty: 30 TABLET | Refills: 11 | Status: ON HOLD | OUTPATIENT
Start: 2019-01-08 | End: 2022-02-08

## 2019-01-08 RX ORDER — FERROUS SULFATE 325(65) MG
325 TABLET ORAL 2 TIMES DAILY
Qty: 60 TABLET | Refills: 2 | Status: SHIPPED | OUTPATIENT
Start: 2019-01-08 | End: 2021-04-23 | Stop reason: ALTCHOICE

## 2019-01-08 ASSESSMENT — ENCOUNTER SYMPTOMS
TROUBLE SWALLOWING: 0
ABDOMINAL PAIN: 0
CHEST TIGHTNESS: 0
CONSTIPATION: 0
SORE THROAT: 0
COUGH: 0
BACK PAIN: 1
EYE PAIN: 0
SHORTNESS OF BREATH: 0
NAUSEA: 0
BLOOD IN STOOL: 0

## 2019-01-13 ENCOUNTER — NURSE TRIAGE (OUTPATIENT)
Dept: ADMINISTRATIVE | Age: 69
End: 2019-01-13

## 2019-01-19 ENCOUNTER — NURSE TRIAGE (OUTPATIENT)
Dept: ADMINISTRATIVE | Age: 69
End: 2019-01-19

## 2019-01-30 ENCOUNTER — NURSE TRIAGE (OUTPATIENT)
Dept: ADMINISTRATIVE | Age: 69
End: 2019-01-30

## 2019-01-30 ENCOUNTER — CARE COORDINATION (OUTPATIENT)
Dept: CARE COORDINATION | Age: 69
End: 2019-01-30

## 2019-02-06 ENCOUNTER — OFFICE VISIT (OUTPATIENT)
Dept: CARDIOLOGY CLINIC | Age: 69
End: 2019-02-06
Payer: MEDICARE

## 2019-02-06 VITALS
WEIGHT: 315 LBS | DIASTOLIC BLOOD PRESSURE: 74 MMHG | HEIGHT: 74 IN | SYSTOLIC BLOOD PRESSURE: 126 MMHG | BODY MASS INDEX: 40.43 KG/M2 | HEART RATE: 60 BPM

## 2019-02-06 DIAGNOSIS — I10 ESSENTIAL HYPERTENSION: ICD-10-CM

## 2019-02-06 DIAGNOSIS — E78.01 FAMILIAL HYPERCHOLESTEROLEMIA: ICD-10-CM

## 2019-02-06 DIAGNOSIS — I25.810 CORONARY ARTERY DISEASE INVOLVING CORONARY BYPASS GRAFT OF NATIVE HEART WITHOUT ANGINA PECTORIS: Primary | ICD-10-CM

## 2019-02-06 PROCEDURE — G8598 ASA/ANTIPLAT THER USED: HCPCS | Performed by: NUCLEAR MEDICINE

## 2019-02-06 PROCEDURE — G8427 DOCREV CUR MEDS BY ELIG CLIN: HCPCS | Performed by: NUCLEAR MEDICINE

## 2019-02-06 PROCEDURE — 1036F TOBACCO NON-USER: CPT | Performed by: NUCLEAR MEDICINE

## 2019-02-06 PROCEDURE — 4040F PNEUMOC VAC/ADMIN/RCVD: CPT | Performed by: NUCLEAR MEDICINE

## 2019-02-06 PROCEDURE — G8484 FLU IMMUNIZE NO ADMIN: HCPCS | Performed by: NUCLEAR MEDICINE

## 2019-02-06 PROCEDURE — G8417 CALC BMI ABV UP PARAM F/U: HCPCS | Performed by: NUCLEAR MEDICINE

## 2019-02-06 PROCEDURE — 1101F PT FALLS ASSESS-DOCD LE1/YR: CPT | Performed by: NUCLEAR MEDICINE

## 2019-02-06 PROCEDURE — 99213 OFFICE O/P EST LOW 20 MIN: CPT | Performed by: NUCLEAR MEDICINE

## 2019-02-06 PROCEDURE — 3017F COLORECTAL CA SCREEN DOC REV: CPT | Performed by: NUCLEAR MEDICINE

## 2019-02-06 PROCEDURE — 1123F ACP DISCUSS/DSCN MKR DOCD: CPT | Performed by: NUCLEAR MEDICINE

## 2019-02-07 ENCOUNTER — CARE COORDINATION (OUTPATIENT)
Dept: CARE COORDINATION | Age: 69
End: 2019-02-07

## 2019-02-08 ENCOUNTER — APPOINTMENT (OUTPATIENT)
Dept: INTERVENTIONAL RADIOLOGY/VASCULAR | Age: 69
End: 2019-02-08
Payer: MEDICARE

## 2019-02-08 ENCOUNTER — NURSE TRIAGE (OUTPATIENT)
Dept: ADMINISTRATIVE | Age: 69
End: 2019-02-08

## 2019-02-08 ENCOUNTER — HOSPITAL ENCOUNTER (EMERGENCY)
Age: 69
Discharge: HOME | End: 2019-02-09
Attending: EMERGENCY MEDICINE
Payer: MEDICARE

## 2019-02-08 ENCOUNTER — APPOINTMENT (OUTPATIENT)
Dept: GENERAL RADIOLOGY | Age: 69
End: 2019-02-08
Payer: MEDICARE

## 2019-02-08 DIAGNOSIS — R60.9 PERIPHERAL EDEMA: Primary | ICD-10-CM

## 2019-02-08 DIAGNOSIS — M79.601 PAIN AND SWELLING OF RIGHT UPPER EXTREMITY: ICD-10-CM

## 2019-02-08 DIAGNOSIS — M79.89 PAIN AND SWELLING OF RIGHT UPPER EXTREMITY: ICD-10-CM

## 2019-02-08 DIAGNOSIS — R03.0 ELEVATED BLOOD PRESSURE READING: ICD-10-CM

## 2019-02-08 LAB
ALBUMIN SERPL-MCNC: 4 G/DL (ref 3.5–5.1)
ALP BLD-CCNC: 82 U/L (ref 38–126)
ALT SERPL-CCNC: 24 U/L (ref 11–66)
ANION GAP SERPL CALCULATED.3IONS-SCNC: 13 MEQ/L (ref 8–16)
APTT: 38.5 SECONDS (ref 22–38)
AST SERPL-CCNC: 19 U/L (ref 5–40)
BASOPHILS # BLD: 0.5 %
BASOPHILS ABSOLUTE: 0 THOU/MM3 (ref 0–0.1)
BILIRUB SERPL-MCNC: 0.3 MG/DL (ref 0.3–1.2)
BUN BLDV-MCNC: 30 MG/DL (ref 7–22)
CALCIUM SERPL-MCNC: 9.1 MG/DL (ref 8.5–10.5)
CHLORIDE BLD-SCNC: 100 MEQ/L (ref 98–111)
CO2: 24 MEQ/L (ref 23–33)
CREAT SERPL-MCNC: 1.2 MG/DL (ref 0.4–1.2)
EKG ATRIAL RATE: 70 BPM
EKG P AXIS: 70 DEGREES
EKG P-R INTERVAL: 196 MS
EKG Q-T INTERVAL: 416 MS
EKG QRS DURATION: 130 MS
EKG QTC CALCULATION (BAZETT): 449 MS
EKG R AXIS: -25 DEGREES
EKG T AXIS: 31 DEGREES
EKG VENTRICULAR RATE: 70 BPM
EOSINOPHIL # BLD: 3.3 %
EOSINOPHILS ABSOLUTE: 0.2 THOU/MM3 (ref 0–0.4)
ERYTHROCYTE [DISTWIDTH] IN BLOOD BY AUTOMATED COUNT: 13.2 % (ref 11.5–14.5)
ERYTHROCYTE [DISTWIDTH] IN BLOOD BY AUTOMATED COUNT: 45.2 FL (ref 35–45)
GFR SERPL CREATININE-BSD FRML MDRD: 60 ML/MIN/1.73M2
GLUCOSE BLD-MCNC: 139 MG/DL (ref 70–108)
HCT VFR BLD CALC: 32 % (ref 42–52)
HEMOGLOBIN: 10.6 GM/DL (ref 14–18)
IMMATURE GRANS (ABS): 0.01 THOU/MM3 (ref 0–0.07)
IMMATURE GRANULOCYTES: 0.2 %
INR BLD: 1 (ref 0.85–1.13)
LYMPHOCYTES # BLD: 30.3 %
LYMPHOCYTES ABSOLUTE: 2 THOU/MM3 (ref 1–4.8)
MCH RBC QN AUTO: 31.1 PG (ref 26–33)
MCHC RBC AUTO-ENTMCNC: 33.1 GM/DL (ref 32.2–35.5)
MCV RBC AUTO: 93.8 FL (ref 80–94)
MONOCYTES # BLD: 8.6 %
MONOCYTES ABSOLUTE: 0.6 THOU/MM3 (ref 0.4–1.3)
NUCLEATED RED BLOOD CELLS: 0 /100 WBC
OSMOLALITY CALCULATION: 282.3 MOSMOL/KG (ref 275–300)
PLATELET # BLD: 162 THOU/MM3 (ref 130–400)
PMV BLD AUTO: 9.6 FL (ref 9.4–12.4)
POTASSIUM SERPL-SCNC: 4.6 MEQ/L (ref 3.5–5.2)
PRO-BNP: 216.3 PG/ML (ref 0–900)
RBC # BLD: 3.41 MILL/MM3 (ref 4.7–6.1)
SEG NEUTROPHILS: 57.1 %
SEGMENTED NEUTROPHILS ABSOLUTE COUNT: 3.8 THOU/MM3 (ref 1.8–7.7)
SODIUM BLD-SCNC: 137 MEQ/L (ref 135–145)
TOTAL PROTEIN: 6.7 G/DL (ref 6.1–8)
TROPONIN T: < 0.01 NG/ML
WBC # BLD: 6.6 THOU/MM3 (ref 4.8–10.8)

## 2019-02-08 PROCEDURE — 93971 EXTREMITY STUDY: CPT

## 2019-02-08 PROCEDURE — 85730 THROMBOPLASTIN TIME PARTIAL: CPT

## 2019-02-08 PROCEDURE — 93005 ELECTROCARDIOGRAM TRACING: CPT | Performed by: EMERGENCY MEDICINE

## 2019-02-08 PROCEDURE — 71045 X-RAY EXAM CHEST 1 VIEW: CPT

## 2019-02-08 PROCEDURE — 80053 COMPREHEN METABOLIC PANEL: CPT

## 2019-02-08 PROCEDURE — 36415 COLL VENOUS BLD VENIPUNCTURE: CPT

## 2019-02-08 PROCEDURE — 84484 ASSAY OF TROPONIN QUANT: CPT

## 2019-02-08 PROCEDURE — 93970 EXTREMITY STUDY: CPT

## 2019-02-08 PROCEDURE — 83880 ASSAY OF NATRIURETIC PEPTIDE: CPT

## 2019-02-08 PROCEDURE — 85025 COMPLETE CBC W/AUTO DIFF WBC: CPT

## 2019-02-08 PROCEDURE — 85610 PROTHROMBIN TIME: CPT

## 2019-02-08 PROCEDURE — 99285 EMERGENCY DEPT VISIT HI MDM: CPT

## 2019-02-08 ASSESSMENT — ENCOUNTER SYMPTOMS
DIARRHEA: 0
NAUSEA: 0
EYE DISCHARGE: 0
VOMITING: 0
ABDOMINAL DISTENTION: 0
EYE ITCHING: 0
SHORTNESS OF BREATH: 0
ABDOMINAL PAIN: 0
WHEEZING: 0
RHINORRHEA: 0
COUGH: 0

## 2019-02-09 VITALS
SYSTOLIC BLOOD PRESSURE: 151 MMHG | DIASTOLIC BLOOD PRESSURE: 67 MMHG | WEIGHT: 315 LBS | RESPIRATION RATE: 19 BRPM | OXYGEN SATURATION: 97 % | TEMPERATURE: 97.7 F | HEART RATE: 80 BPM | BODY MASS INDEX: 40.43 KG/M2 | HEIGHT: 74 IN

## 2019-02-09 PROCEDURE — 6360000002 HC RX W HCPCS: Performed by: EMERGENCY MEDICINE

## 2019-02-09 PROCEDURE — 96374 THER/PROPH/DIAG INJ IV PUSH: CPT

## 2019-02-09 PROCEDURE — 93010 ELECTROCARDIOGRAM REPORT: CPT | Performed by: INTERNAL MEDICINE

## 2019-02-09 RX ORDER — FUROSEMIDE 10 MG/ML
60 INJECTION INTRAMUSCULAR; INTRAVENOUS ONCE
Status: COMPLETED | OUTPATIENT
Start: 2019-02-09 | End: 2019-02-09

## 2019-02-09 RX ADMIN — FUROSEMIDE 60 MG: 10 INJECTION, SOLUTION INTRAMUSCULAR; INTRAVENOUS at 01:08

## 2019-02-12 ENCOUNTER — CARE COORDINATION (OUTPATIENT)
Dept: CARE COORDINATION | Age: 69
End: 2019-02-12

## 2019-02-12 ENCOUNTER — OFFICE VISIT (OUTPATIENT)
Dept: FAMILY MEDICINE CLINIC | Age: 69
End: 2019-02-12

## 2019-02-12 VITALS
WEIGHT: 315 LBS | SYSTOLIC BLOOD PRESSURE: 124 MMHG | BODY MASS INDEX: 40.43 KG/M2 | RESPIRATION RATE: 14 BRPM | HEIGHT: 74 IN | DIASTOLIC BLOOD PRESSURE: 76 MMHG | HEART RATE: 72 BPM

## 2019-02-12 DIAGNOSIS — S23.3XXS SPRAIN OF LIGAMENTS OF THORACIC SPINE, SEQUELA: ICD-10-CM

## 2019-02-12 DIAGNOSIS — I87.2 VENOUS STASIS DERMATITIS OF BOTH LOWER EXTREMITIES: ICD-10-CM

## 2019-02-12 DIAGNOSIS — M15.9 PRIMARY OSTEOARTHRITIS INVOLVING MULTIPLE JOINTS: ICD-10-CM

## 2019-02-12 DIAGNOSIS — F33.41 RECURRENT MAJOR DEPRESSIVE DISORDER, IN PARTIAL REMISSION (HCC): ICD-10-CM

## 2019-02-12 DIAGNOSIS — E11.42 DIABETIC PERIPHERAL NEUROPATHY (HCC): ICD-10-CM

## 2019-02-12 DIAGNOSIS — I25.708 CORONARY ARTERY DISEASE OF BYPASS GRAFT OF NATIVE HEART WITH STABLE ANGINA PECTORIS (HCC): ICD-10-CM

## 2019-02-12 DIAGNOSIS — E78.00 HYPERCHOLESTEREMIA: ICD-10-CM

## 2019-02-12 DIAGNOSIS — I10 ESSENTIAL HYPERTENSION: ICD-10-CM

## 2019-02-12 PROBLEM — M54.9 INTRACTABLE BACK PAIN: Status: RESOLVED | Noted: 2018-12-19 | Resolved: 2019-02-12

## 2019-02-12 PROBLEM — M54.14 THORACIC RADICULOPATHY: Status: RESOLVED | Noted: 2018-12-20 | Resolved: 2019-02-12

## 2019-02-12 PROCEDURE — 99213 OFFICE O/P EST LOW 20 MIN: CPT | Performed by: FAMILY MEDICINE

## 2019-02-12 PROCEDURE — 1101F PT FALLS ASSESS-DOCD LE1/YR: CPT | Performed by: FAMILY MEDICINE

## 2019-02-12 PROCEDURE — 3017F COLORECTAL CA SCREEN DOC REV: CPT | Performed by: FAMILY MEDICINE

## 2019-02-12 PROCEDURE — 1036F TOBACCO NON-USER: CPT | Performed by: FAMILY MEDICINE

## 2019-02-12 PROCEDURE — G8427 DOCREV CUR MEDS BY ELIG CLIN: HCPCS | Performed by: FAMILY MEDICINE

## 2019-02-12 PROCEDURE — G8417 CALC BMI ABV UP PARAM F/U: HCPCS | Performed by: FAMILY MEDICINE

## 2019-02-12 PROCEDURE — 1123F ACP DISCUSS/DSCN MKR DOCD: CPT | Performed by: FAMILY MEDICINE

## 2019-02-12 PROCEDURE — G8598 ASA/ANTIPLAT THER USED: HCPCS | Performed by: FAMILY MEDICINE

## 2019-02-12 PROCEDURE — 4040F PNEUMOC VAC/ADMIN/RCVD: CPT | Performed by: FAMILY MEDICINE

## 2019-02-12 RX ORDER — FUROSEMIDE 20 MG/1
TABLET ORAL
Qty: 180 TABLET | Refills: 1 | Status: SHIPPED | OUTPATIENT
Start: 2019-02-12 | End: 2019-04-30 | Stop reason: DRUGHIGH

## 2019-02-12 ASSESSMENT — ENCOUNTER SYMPTOMS
ABDOMINAL PAIN: 0
CONSTIPATION: 0
BLOOD IN STOOL: 0
COUGH: 0
CHEST TIGHTNESS: 0
TROUBLE SWALLOWING: 0
SORE THROAT: 0
BACK PAIN: 0
SHORTNESS OF BREATH: 0
ORTHOPNEA: 0
EYE PAIN: 0
NAUSEA: 0

## 2019-02-22 ENCOUNTER — SOCIAL WORK (OUTPATIENT)
Dept: CARE COORDINATION | Age: 69
End: 2019-02-22

## 2019-03-04 ENCOUNTER — NURSE TRIAGE (OUTPATIENT)
Dept: ADMINISTRATIVE | Age: 69
End: 2019-03-04

## 2019-03-07 ENCOUNTER — CARE COORDINATION (OUTPATIENT)
Dept: CARE COORDINATION | Age: 69
End: 2019-03-07

## 2019-03-11 ENCOUNTER — NURSE TRIAGE (OUTPATIENT)
Dept: ADMINISTRATIVE | Age: 69
End: 2019-03-11

## 2019-03-24 ENCOUNTER — NURSE TRIAGE (OUTPATIENT)
Dept: ADMINISTRATIVE | Age: 69
End: 2019-03-24

## 2019-04-01 ENCOUNTER — CARE COORDINATION (OUTPATIENT)
Dept: CARE COORDINATION | Age: 69
End: 2019-04-01

## 2019-04-09 ENCOUNTER — NURSE TRIAGE (OUTPATIENT)
Dept: ADMINISTRATIVE | Age: 69
End: 2019-04-09

## 2019-04-14 DIAGNOSIS — I15.0 RENOVASCULAR HYPERTENSION: ICD-10-CM

## 2019-04-15 ENCOUNTER — OFFICE VISIT (OUTPATIENT)
Dept: FAMILY MEDICINE CLINIC | Age: 69
End: 2019-04-15

## 2019-04-15 VITALS
DIASTOLIC BLOOD PRESSURE: 64 MMHG | HEIGHT: 74 IN | RESPIRATION RATE: 14 BRPM | HEART RATE: 72 BPM | BODY MASS INDEX: 40.43 KG/M2 | SYSTOLIC BLOOD PRESSURE: 124 MMHG | WEIGHT: 315 LBS

## 2019-04-15 DIAGNOSIS — I15.0 RENOVASCULAR HYPERTENSION: ICD-10-CM

## 2019-04-15 DIAGNOSIS — I87.2 VENOUS INSUFFICIENCY OF BOTH LOWER EXTREMITIES: Primary | ICD-10-CM

## 2019-04-15 PROCEDURE — G8598 ASA/ANTIPLAT THER USED: HCPCS | Performed by: FAMILY MEDICINE

## 2019-04-15 PROCEDURE — 4040F PNEUMOC VAC/ADMIN/RCVD: CPT | Performed by: FAMILY MEDICINE

## 2019-04-15 PROCEDURE — 3017F COLORECTAL CA SCREEN DOC REV: CPT | Performed by: FAMILY MEDICINE

## 2019-04-15 PROCEDURE — G8417 CALC BMI ABV UP PARAM F/U: HCPCS | Performed by: FAMILY MEDICINE

## 2019-04-15 PROCEDURE — 1123F ACP DISCUSS/DSCN MKR DOCD: CPT | Performed by: FAMILY MEDICINE

## 2019-04-15 PROCEDURE — 99213 OFFICE O/P EST LOW 20 MIN: CPT | Performed by: FAMILY MEDICINE

## 2019-04-15 PROCEDURE — G8427 DOCREV CUR MEDS BY ELIG CLIN: HCPCS | Performed by: FAMILY MEDICINE

## 2019-04-15 PROCEDURE — 1036F TOBACCO NON-USER: CPT | Performed by: FAMILY MEDICINE

## 2019-04-15 RX ORDER — ENALAPRIL MALEATE 10 MG/1
TABLET ORAL
Qty: 180 TABLET | Refills: 1 | Status: SHIPPED | OUTPATIENT
Start: 2019-04-15 | End: 2019-07-24 | Stop reason: SDUPTHER

## 2019-04-15 RX ORDER — METOLAZONE 2.5 MG/1
2.5 TABLET ORAL DAILY
Qty: 5 TABLET | Refills: 0 | Status: SHIPPED | OUTPATIENT
Start: 2019-04-15 | End: 2019-04-30 | Stop reason: SDUPTHER

## 2019-04-15 ASSESSMENT — ENCOUNTER SYMPTOMS
TROUBLE SWALLOWING: 0
SHORTNESS OF BREATH: 0
SORE THROAT: 0
CONSTIPATION: 0
COUGH: 0
ORTHOPNEA: 0
CHEST TIGHTNESS: 0
ABDOMINAL PAIN: 0
BACK PAIN: 0
BLOOD IN STOOL: 0
NAUSEA: 0
EYE PAIN: 0

## 2019-04-15 NOTE — PATIENT INSTRUCTIONS
Lasix  3  Tabs  For  5  Days and  Labs  On  4-19-19       Lasix  60 mg  For  5  days and   zaroxlyn  for  5  Days   Labs  On  Fridays

## 2019-04-15 NOTE — TELEPHONE ENCOUNTER
Date of last visit:  2/12/2019  Date of next visit:  4/15/2019    Requested Prescriptions     Pending Prescriptions Disp Refills    enalapril (VASOTEC) 10 MG tablet [Pharmacy Med Name: ENALAPRIL 10MG      TAB] 180 tablet 1     Sig: TAKE 1 TABLET BY MOUTH TWICE DAILY

## 2019-04-15 NOTE — PROGRESS NOTES
Subjective:      Patient ID: Rosetta Friedman is a 77 y.o. male. Venous  insuff and not  Using  Wraps      ashd  Stable  And  gerd stable      obesity  Noted     Diabetes   He presents for his follow-up diabetic visit. He has type 2 diabetes mellitus. His disease course has been stable. There are no hypoglycemic associated symptoms. Pertinent negatives for hypoglycemia include no dizziness or headaches. There are no diabetic associated symptoms. Pertinent negatives for diabetes include no chest pain, no fatigue and no weakness. There are no hypoglycemic complications. Symptoms are stable. There are no diabetic complications. Current diabetic treatment includes insulin injections and oral agent (monotherapy). He is compliant with treatment all of the time. His weight is stable. He is following a diabetic diet. Meal planning includes avoidance of concentrated sweets. There is no change in his home blood glucose trend. His breakfast blood glucose is taken between 8-9 am. His breakfast blood glucose range is generally 130-140 mg/dl. An ACE inhibitor/angiotensin II receptor blocker is being taken. Hypertension   This is a chronic problem. The current episode started more than 1 year ago. The problem has been resolved since onset. The problem is controlled. Associated symptoms include peripheral edema. Pertinent negatives include no chest pain, headaches, orthopnea, palpitations or shortness of breath. The current treatment provides significant improvement. There are no compliance problems.       Past Medical History:   Diagnosis Date    ASHD (arteriosclerotic heart disease) 2005 2006    stent  baki    Depression     Diabetic peripheral neuropathy [facility]) 2014    Fracture Oct 1984    left lower extremity     HTN (hypertension)     Hypercholesteremia     [facility] dependent [facility] mellitus) [facility]     Low back pain     Morbid obesity with BMI of 45.0-49.9, adult ([facility]     Osteoarthritis      Review of Systems   Constitutional: Negative for fatigue and fever. HENT: Negative for congestion, ear pain, postnasal drip, sore throat and trouble swallowing. Eyes: Negative for pain. Respiratory: Negative for cough, chest tightness and shortness of breath. Cardiovascular: Positive for leg swelling. Negative for chest pain, palpitations and orthopnea. Gastrointestinal: Negative for abdominal pain, blood in stool, constipation and nausea. Genitourinary: Negative for difficulty urinating, frequency and urgency. Musculoskeletal: Negative for arthralgias, back pain, joint swelling and neck stiffness. Skin: Negative for rash. Neurological: Negative for dizziness, weakness and headaches. Hematological: Negative for adenopathy. Does not bruise/bleed easily. Psychiatric/Behavioral: Negative for behavioral problems, dysphoric mood and sleep disturbance. /64 (Site: Right Upper Arm, Position: Sitting, Cuff Size: Large Adult)   Pulse 72   Resp 14   Ht 6' 2\" (1.88 m)   Wt (!) 381 lb (172.8 kg)   BMI 48.92 kg/m²   Objective:   Physical Exam   Constitutional: He is oriented to person, place, and time. He appears well-developed and well-nourished. HENT:   Head: Normocephalic and atraumatic. Right Ear: External ear normal.   Left Ear: External ear normal.   Nose: Nose normal.   Mouth/Throat: Oropharynx is clear and moist.   Eyes: Pupils are equal, round, and reactive to light. Conjunctivae and EOM are normal.   Fundi nl   Neck: Normal range of motion. Neck supple. No thyromegaly present. Cardiovascular: Normal rate, S1 normal, S2 normal, normal heart sounds and intact distal pulses. An irregular rhythm present. No murmur heard. Pulmonary/Chest: Effort normal and breath sounds normal. He has no wheezes. He has no rales. Abdominal: Soft. Bowel sounds are normal. He exhibits no mass. There is no tenderness. Musculoskeletal: Normal range of motion. Bilateral lower leg edema right worse left. Legs both are approximately 3-4+ with swelling skin on the right leg with a lot of increased area of about 5 x 5 cm around the inner aspect of the leg with a thickening and lichenification changes present there is some redness similar on the left leg much less just some redness been present   Lymphadenopathy:     He has no cervical adenopathy. Neurological: He is alert and oriented to person, place, and time. He has normal reflexes. No cranial nerve deficit. Skin: Skin is warm and dry. No rash noted. Psychiatric: He has a normal mood and affect. Nursing note and vitals reviewed. Assessment:       Diagnosis Orders   1. Venous insufficiency of both lower extremities  metolazone (ZAROXOLYN) 2.5 MG tablet    BUN & Creatinine    Electrolyte Panel   2.  Renovascular hypertension  enalapril (VASOTEC) 10 MG tablet         Plan:      Orders Placed This Encounter   Procedures    BUN & Creatinine     Standing Status:   Future     Standing Expiration Date:   4/14/2020    Electrolyte Panel     Standing Status:   Future     Standing Expiration Date:   4/14/2020         Current Outpatient Medications   Medication Sig Dispense Refill    enalapril (VASOTEC) 10 MG tablet TAKE  two TABLET BY MOUTH TWICE DAILY 180 tablet 1    metolazone (ZAROXOLYN) 2.5 MG tablet Take 1 tablet by mouth daily 5 tablet 0    metFORMIN (GLUCOPHAGE) 500 MG tablet TAKE TWO TABLETS BY MOUTH TWICE DAILY WITH MEALS 360 tablet 1    furosemide (LASIX) 20 MG tablet TAKE TWO TABLETS BY MOUTH ONCE DAILY 180 tablet 1    ferrous sulfate 325 (65 Fe) MG tablet Take 1 tablet by mouth 2 times daily 60 tablet 2    Multiple Vitamins-Minerals (THERAPEUTIC MULTIVITAMIN-MINERALS) tablet Take 1 tablet by mouth daily 30 tablet 11    carvedilol (COREG) 3.125 MG tablet Take 2 tablets by mouth 2 times daily (with meals) 60 tablet 11    LANTUS SOLOSTAR 100 UNIT/ML injection pen INJECT 30 UNITS SUBCUTANEOUSLY ONCE DAILY 15 pen 1    amLODIPine (NORVASC) 5 MG tablet TAKE ONE TABLET BY MOUTH ONCE DAILY 90 tablet 1    pantoprazole (PROTONIX) 40 MG tablet Take 1 tablet by mouth daily 90 tablet 1    tiZANidine (ZANAFLEX) 4 MG tablet Take 1 tablet by mouth 3 times daily as needed (lumbar pain) 24 tablet 0    insulin aspart (NOVOLOG FLEXPEN) 100 UNIT/ML injection pen INJECT 12 UNITS SUBCUTANEOUSLY 3 TIMES A DAY BEFORE MEALS 15 pen 3    BRILINTA 90 MG TABS tablet TAKE ONE TABLET BY MOUTH TWICE DAILY 60 tablet 11    atorvastatin (LIPITOR) 10 MG tablet Take 1 tablet by mouth daily 90 tablet 1    ACCU-CHEK SMARTVIEW strip Use to test Blood Sugar 3x daily, Dx: E11.9 100 each 11    nitroGLYCERIN (NITROSTAT) 0.4 MG SL tablet Place 1 tablet under the tongue every 5 minutes as needed for Chest pain 25 tablet 3    aspirin 81 MG tablet Take 1 tablet by mouth daily      acetaminophen (TYLENOL) 500 MG tablet Take 500 mg by mouth as needed for Pain      isosorbide mononitrate (IMDUR) 30 MG extended release tablet Take 1 tablet by mouth daily 30 tablet 3    glucose blood VI test strips (ROYA CONTOUR TEST) strip Use to check blood sugar 3x daily, Dx: E11.9 300 each 3     No current facility-administered medications for this visit.             Lasix  60 mg  For  5  days and   zaroxlyn  for  5  Days   Labs  On  Fridays and   See in one  Week   Arcelia Heath MD

## 2019-04-16 LAB
ANION GAP SERPL CALCULATED.3IONS-SCNC: 9 MMOL/L (ref 4–12)
BUN BLDV-MCNC: 36 MG/DL (ref 5–27)
CHLORIDE BLD-SCNC: 104 MMOL/L (ref 98–109)
CO2: 27 MMOL/L (ref 22–32)
CREAT SERPL-MCNC: 1.24 MG/DL (ref 0.6–1.3)
EGFR AFRICAN AMERICAN: >60 ML/MIN/1.73SQ.M
EGFR IF NONAFRICAN AMERICAN: 58 ML/MIN/1.73SQ.M
POTASSIUM SERPL-SCNC: 4.4 MMOL/L (ref 3.5–5)
SODIUM BLD-SCNC: 140 MMOL/L (ref 134–146)

## 2019-04-16 RX ORDER — ENALAPRIL MALEATE 10 MG/1
TABLET ORAL
Qty: 180 TABLET | Refills: 1 | Status: SHIPPED | OUTPATIENT
Start: 2019-04-16 | End: 2019-06-18

## 2019-04-17 ENCOUNTER — TELEPHONE (OUTPATIENT)
Dept: FAMILY MEDICINE CLINIC | Age: 69
End: 2019-04-17

## 2019-04-23 ENCOUNTER — NURSE TRIAGE (OUTPATIENT)
Dept: ADMINISTRATIVE | Age: 69
End: 2019-04-23

## 2019-04-24 NOTE — TELEPHONE ENCOUNTER
Summary: diabetic - grapefruit    Being a diabetic, can he eat a grapefruit for a nite snack          Reason for Disposition   Health Information question, no triage required and triager able to answer question    Answer Assessment - Initial Assessment Questions  1. REASON FOR CALL or QUESTION: \"What is your reason for calling today? \" or \"How can I best help you? \" or \"What question do you have that I can help answer? \"      Kevin Bui wanted to know if he eating a grapefruit would be a good night time snack for a diabetic?     Protocols used: INFORMATION ONLY CALL-ADULT-

## 2019-04-30 ENCOUNTER — OFFICE VISIT (OUTPATIENT)
Dept: FAMILY MEDICINE CLINIC | Age: 69
End: 2019-04-30

## 2019-04-30 VITALS
BODY MASS INDEX: 40.43 KG/M2 | HEIGHT: 74 IN | DIASTOLIC BLOOD PRESSURE: 84 MMHG | SYSTOLIC BLOOD PRESSURE: 132 MMHG | RESPIRATION RATE: 13 BRPM | WEIGHT: 315 LBS | HEART RATE: 72 BPM

## 2019-04-30 DIAGNOSIS — I87.2 VENOUS INSUFFICIENCY OF BOTH LOWER EXTREMITIES: ICD-10-CM

## 2019-04-30 DIAGNOSIS — I87.2 VENOUS STASIS DERMATITIS OF BOTH LOWER EXTREMITIES: ICD-10-CM

## 2019-04-30 PROCEDURE — G8417 CALC BMI ABV UP PARAM F/U: HCPCS | Performed by: FAMILY MEDICINE

## 2019-04-30 PROCEDURE — G8427 DOCREV CUR MEDS BY ELIG CLIN: HCPCS | Performed by: FAMILY MEDICINE

## 2019-04-30 PROCEDURE — 1123F ACP DISCUSS/DSCN MKR DOCD: CPT | Performed by: FAMILY MEDICINE

## 2019-04-30 PROCEDURE — 1036F TOBACCO NON-USER: CPT | Performed by: FAMILY MEDICINE

## 2019-04-30 PROCEDURE — G8598 ASA/ANTIPLAT THER USED: HCPCS | Performed by: FAMILY MEDICINE

## 2019-04-30 PROCEDURE — 99213 OFFICE O/P EST LOW 20 MIN: CPT | Performed by: FAMILY MEDICINE

## 2019-04-30 PROCEDURE — 4040F PNEUMOC VAC/ADMIN/RCVD: CPT | Performed by: FAMILY MEDICINE

## 2019-04-30 PROCEDURE — 3017F COLORECTAL CA SCREEN DOC REV: CPT | Performed by: FAMILY MEDICINE

## 2019-04-30 RX ORDER — FUROSEMIDE 20 MG/1
TABLET ORAL
Qty: 180 TABLET | Refills: 1 | Status: SHIPPED | OUTPATIENT
Start: 2019-04-30 | End: 2019-06-07 | Stop reason: DRUGHIGH

## 2019-04-30 RX ORDER — CALCIPOTRIENE 50 UG/G
CREAM TOPICAL
COMMUNITY
Start: 2019-04-16 | End: 2020-02-13

## 2019-04-30 RX ORDER — METOLAZONE 2.5 MG/1
TABLET ORAL
Qty: 20 TABLET | Refills: 2 | Status: SHIPPED | OUTPATIENT
Start: 2019-04-30 | End: 2019-05-22 | Stop reason: DRUGHIGH

## 2019-04-30 ASSESSMENT — ENCOUNTER SYMPTOMS
CONSTIPATION: 0
BACK PAIN: 0
CHEST TIGHTNESS: 0
ORTHOPNEA: 0
EYE PAIN: 0
BLOOD IN STOOL: 0
COUGH: 0
SORE THROAT: 0
NAUSEA: 0
TROUBLE SWALLOWING: 0
SHORTNESS OF BREATH: 0
ABDOMINAL PAIN: 0

## 2019-04-30 NOTE — TELEPHONE ENCOUNTER
Date of last visit:  4/30/2019  Date of next visit:  5/30/2019    Requested Prescriptions     Pending Prescriptions Disp Refills    insulin aspart (NOVOLOG FLEXPEN) 100 UNIT/ML injection pen [Pharmacy Med Name: Linh Gaytan 100UNIT/ML INJ] 15 pen 3     Sig: INJECT 12 UNITS SUBCUTANEOUSLY 3 [address]

## 2019-04-30 NOTE — PROGRESS NOTES
Subjective:      Patient ID: Arturo Mendez is a 77 y.o. male. Back  Now    Better  As  Upper  Back  Better and  Still low  Back pain       niddm  Stable   Chronic       ashd stble       Obesity  stable         Hypertension   This is a chronic problem. The current episode started more than 1 year ago. Pertinent negatives include no chest pain, headaches, orthopnea, palpitations, peripheral edema or shortness of breath. There are no compliance problems. Past Medical History:   Diagnosis Date    ASHD (arteriosclerotic heart disease) 2005 2006    stent  baki    Depression     Diabetic peripheral neuropathy [facility]) 2014    Fracture Oct 1984    left lower extremity     HTN (hypertension)     Hypercholesteremia     [facility]DM (insulin dependent diabetes mellitus) [facility] [address]     Low back pain     [facility]bid [facility] with BMI of 45.0-49.9, [facility]     Osteoarthritis      Review of Systems   Constitutional: Negative for fatigue and fever. HENT: Negative for congestion, ear pain, postnasal drip, sore throat and trouble swallowing. Eyes: Negative for pain. Respiratory: Negative for cough, chest tightness and shortness of breath. Cardiovascular: Negative for chest pain, palpitations, orthopnea and leg swelling. Gastrointestinal: Negative for abdominal pain, blood in stool, constipation and nausea. Genitourinary: Negative for difficulty urinating, frequency and urgency. Musculoskeletal: Negative for arthralgias, back pain, joint swelling and neck stiffness. Skin: Negative for rash. Neurological: Negative for dizziness, weakness and headaches. Hematological: Negative for adenopathy. Does not bruise/bleed easily. Psychiatric/Behavioral: Negative for behavioral problems, dysphoric mood and sleep disturbance.      /84 (Site: Right Upper Arm, Position: Sitting, Cuff Size: Large Adult)   Pulse 72   Resp 13   Ht 6' 2\" (1.88 m)   Wt (!) 373 lb 6 oz (169.4 kg)   BMI 47.94 kg/m²   Objective: Physical Exam   Constitutional: He is oriented to person, place, and time. He appears well-developed and well-nourished. HENT:   Head: Normocephalic and atraumatic. Right Ear: External ear normal.   Left Ear: External ear normal.   Nose: Nose normal.   Mouth/Throat: Oropharynx is clear and moist.   Eyes: Pupils are equal, round, and reactive to light. Conjunctivae and EOM are normal.   Fundi nl   Neck: Normal range of motion. Neck supple. No thyromegaly present. Cardiovascular: Normal rate, regular rhythm, normal heart sounds and intact distal pulses. Pulmonary/Chest: Effort normal and breath sounds normal. He has no wheezes. He has no rales. Abdominal: Soft. Bowel sounds are normal. He exhibits no mass. There is no tenderness. Musculoskeletal: Normal range of motion. He exhibits edema. 3 to  4 +  edema   Lymphadenopathy:     He has no cervical adenopathy. Neurological: He is alert and oriented to person, place, and time. He has normal reflexes. No cranial nerve deficit. Skin: Skin is warm and dry. No rash noted. Psychiatric: He has a normal mood and affect. Nursing note and vitals reviewed. Assessment:       Diagnosis Orders   1. IDDM (insulin dependent diabetes mellitus) ([facility]     2. Venous insufficiency of both lower extremities     3.  Venous stasis dermatitis of both lower extremities  furosemide (LASIX) 20 MG tablet         Plan:      Current Outpatient Medications   Medication Sig Dispense Refill    calcipotriene (DOVONEX) 0.005 % cream       furosemide (LASIX) 20 MG tablet TAKE  3  TABLETS BY MOUTH ONCE DAILY 180 tablet 1    metolazone (ZAROXOLYN) 2.5 MG tablet One  Pill on  Mondays and    thursdays 20 tablet 2    enalapril (VASOTEC) 10 MG tablet TAKE 1 TABLET BY MOUTH TWICE DAILY 180 tablet 1    enalapril (VASOTEC) 10 MG tablet TAKE  two TABLET BY MOUTH TWICE DAILY 180 tablet 1    metFORMIN (GLUCOPHAGE) 500 MG tablet TAKE TWO TABLETS BY MOUTH TWICE DAILY WITH MEALS 360 4/29/2020        see in  [address] [facility] MD [place]

## 2019-05-06 ENCOUNTER — CARE COORDINATION (OUTPATIENT)
Dept: CARE COORDINATION | Age: 69
End: 2019-05-06

## 2019-05-13 ENCOUNTER — NURSE TRIAGE (OUTPATIENT)
Dept: ADMINISTRATIVE | Age: 69
End: 2019-05-13

## 2019-05-14 ENCOUNTER — CARE COORDINATION (OUTPATIENT)
Dept: CARE COORDINATION | Age: 69
End: 2019-05-14

## 2019-05-14 NOTE — TELEPHONE ENCOUNTER
Summary: blisters on legs    Has some spots on his leg that feel like blisters but they are hard.  At times he gets certain spots that turn into blisters and when they pop open they are sores.  Please advise. Reason for Disposition   [1] Looks infected (spreading redness, pus) AND [2] no fever    Answer Assessment - Initial Assessment Questions  1. APPEARANCE of LESION: \"What does it look like? \"       Looks like a bunch of blisters. Not fluid filled but hard. Soraay Kramer stated he has been treated for cellulitis in the past.   2. SIZE: \"How big is it? \" (e.g., compare to size of pinhead, tip of pen, eraser, coin, pea, grape, ping pong ball; or size in cms or inches)        3 in wide, 2/3 around the right shine       2 in wide. 6 in long. On left shine running diagonal.   3. COLOR: \"What color is it? \" \"Is there more than one color? \"      dark purple. 4. SHAPE: \"What shape is it? \" (e.g., round, irregular)      All irregular in shape. 5. RAISED: \"Does it stick up above the skin or is it flat? \" (e.g., raised or elevated)      Raised. 6. TENDER: \"Does it hurt when you touch it? \"  (Scale 1-10; or mild, moderate, severe)     No pain   7. LOCATION: \"Where is it located? \"       Bilateral legs. 8. ONSET: \"When did it first appear? \"       Onset two months or longer. 9. NUMBER: \"Is there just one? \" or \"Are there others? \"      Several   10. CAUSE: \"What do you think it is? \"       Unknown   11. OTHER SYMPTOMS: \"Do you have any other symptoms? \" (e.g., fever)        No fever. 12. PREGNANCY: \"Is there any chance you are pregnant? \" \"When was your last menstrual period? \"        Male.     Protocols used: SKIN LESION - MOLES OR GROWTHS-ADULT-

## 2019-05-15 ENCOUNTER — OFFICE VISIT (OUTPATIENT)
Dept: FAMILY MEDICINE CLINIC | Age: 69
End: 2019-05-15

## 2019-05-15 VITALS
WEIGHT: 315 LBS | HEIGHT: 74 IN | DIASTOLIC BLOOD PRESSURE: 78 MMHG | RESPIRATION RATE: 16 BRPM | SYSTOLIC BLOOD PRESSURE: 128 MMHG | BODY MASS INDEX: 40.43 KG/M2 | HEART RATE: 66 BPM

## 2019-05-15 DIAGNOSIS — L30.9 DERMATITIS: Primary | ICD-10-CM

## 2019-05-15 DIAGNOSIS — E66.01 CLASS 3 SEVERE OBESITY WITHOUT SERIOUS COMORBIDITY WITH BODY MASS INDEX (BMI) OF 45.0 TO 49.9 IN ADULT, UNSPECIFIED OBESITY TYPE (HCC): ICD-10-CM

## 2019-05-15 DIAGNOSIS — I87.2 VENOUS INSUFFICIENCY OF BOTH LOWER EXTREMITIES: ICD-10-CM

## 2019-05-15 LAB
CHP ED QC CHECK: ABNORMAL
GLUCOSE BLD-MCNC: 146 MG/DL

## 2019-05-15 PROCEDURE — 4040F PNEUMOC VAC/ADMIN/RCVD: CPT | Performed by: FAMILY MEDICINE

## 2019-05-15 PROCEDURE — 1036F TOBACCO NON-USER: CPT | Performed by: FAMILY MEDICINE

## 2019-05-15 PROCEDURE — 99213 OFFICE O/P EST LOW 20 MIN: CPT | Performed by: FAMILY MEDICINE

## 2019-05-15 PROCEDURE — 1123F ACP DISCUSS/DSCN MKR DOCD: CPT | Performed by: FAMILY MEDICINE

## 2019-05-15 PROCEDURE — 82962 GLUCOSE BLOOD TEST: CPT | Performed by: FAMILY MEDICINE

## 2019-05-15 PROCEDURE — G8417 CALC BMI ABV UP PARAM F/U: HCPCS | Performed by: FAMILY MEDICINE

## 2019-05-15 PROCEDURE — G8427 DOCREV CUR MEDS BY ELIG CLIN: HCPCS | Performed by: FAMILY MEDICINE

## 2019-05-15 PROCEDURE — G8598 ASA/ANTIPLAT THER USED: HCPCS | Performed by: FAMILY MEDICINE

## 2019-05-15 PROCEDURE — 3017F COLORECTAL CA SCREEN DOC REV: CPT | Performed by: FAMILY MEDICINE

## 2019-05-15 RX ORDER — BETAMETHASONE DIPROPIONATE 0.5 MG/G
CREAM TOPICAL
Qty: 45 G | Refills: 1 | Status: SHIPPED | OUTPATIENT
Start: 2019-05-15 | End: 2019-06-14

## 2019-05-15 ASSESSMENT — ENCOUNTER SYMPTOMS
ABDOMINAL PAIN: 0
CHEST TIGHTNESS: 0
SHORTNESS OF BREATH: 0
NAUSEA: 0
TROUBLE SWALLOWING: 0
BLOOD IN STOOL: 0
COUGH: 0
CONSTIPATION: 0
BACK PAIN: 0
EYE PAIN: 0
SORE THROAT: 0

## 2019-05-15 NOTE — PROGRESS NOTES
BP Readings from Last 2 Encounters:   05/15/19 128/78   04/30/19 132/84     Hemoglobin A1C (%)   Date Value   10/17/2018 6.2 (H)     LDL Calculated (mg/dL)   Date Value   10/17/2018 56              Tobacco use:  Patient  reports that he has never smoked. He has never used smokeless tobacco.    If Smoker - Cessation materials given? No    Is Daily aspirin on medication list?:  Yes    Diabetic retinal exam done? No   If yes, document in Health Maintenance. Monofilament placed on counter? No    Shoes and socks removed? No    BP taken with correct size cuff? Yes   Repeated if > 140/90 Yes     Is patient taking any medications for diabetes    Yes   If yes, see medication list    Is the patient reporting any side effects of diabetic medications? No    Microalbumin performed if applicable? No      Is patient taking any over the counter medications    Yes   If yes, see medication list      Patient Self-Management Goal for Chronic Condition  Goal: I will take all medications as prescribed by my doctor, and I will call the office if I am having any medication problems. Barriers to success: stress  Plan for overcoming my barriers: N/A     Confidence: 6/10  Date goal set: 5/15/19  Date goal attained:     Have you seen any other physician or provider since your last visit no    Have you had any other diagnostic tests since your last visit? no    Have you changed or stopped any medications since your last visit including any over-the-counter medicines, vitamins, or herbal medicines? no     Are you taking all your prescribed medications?  Yes    If NO, why?

## 2019-05-15 NOTE — PROGRESS NOTES
Swelling  Of  Leg   Bilateral  Noted    Skin: Positive for rash. Neurological: Negative for dizziness, weakness and headaches. Hematological: Negative for adenopathy. Does not bruise/bleed easily. Psychiatric/Behavioral: Negative for behavioral problems, dysphoric mood and sleep disturbance. /78 (Site: Right Upper Arm, Position: Sitting, Cuff Size: Large Adult)   Pulse 66   Resp 16   Ht 6' 2\" (1.88 m)   Wt (!) 376 lb 2 oz (170.6 kg)   BMI 48.29 kg/m²   Objective:   Physical Exam   Cardiovascular: Normal rate, regular rhythm, normal heart sounds and intact distal pulses. No murmur heard. Pulmonary/Chest: Effort normal and breath sounds normal. No respiratory distress. Abdominal: Soft. Bowel sounds are normal. He exhibits no distension. Skin: Rash noted. right lower leg area about 3+ peripheral  Edema   for the most circumferential rash about with of 5-6 cm. Width and  Of the left leg smaller patch 3-5 cm not as raised some skin breakdown but there is no cellulitis the  Peripheral   Edema  3  To  4 +       Assessment:       Diagnosis Orders   1. Dermatitis     2. IDDM (insulin dependent diabetes mellitus) (HCC)  POCT Glucose    POCT glycosylated hemoglobin (Hb A1C)   3. Venous insufficiency of both lower extremities     4. Class 3 severe obesity without serious comorbidity with body mass index (BMI) of 45.0 to 49.9 in adult, unspecified obesity type ([facility]           Plan:      Current Outpatient Medications   Medication Sig Dispense Refill    augmented betamethasone dipropionate (DIPROLENE AF) 0.05 % cream Apply topically 2 times daily. 45 g 1    mupirocin (BACTROBAN) 2 % ointment Apply 2 times daily.   To  Left lower  Leg 15 g 1    insulin aspart (NOVOLOG FLEXPEN) 100 UNIT/ML injection pen INJECT 12 UNITS SUBCUTANEOUSLY 3 TIMES DAILY BEFORE MEALS 15 pen 3    calcipotriene (DOVONEX) 0.005 % cream       furosemide (LASIX) 20 MG tablet TAKE  3  TABLETS BY MOUTH ONCE DAILY 180 tablet 1    metolazone (ZAROXOLYN) 2.5 MG tablet One  Pill on  Mondays and    thursdays 20 tablet 2    enalapril (VASOTEC) 10 MG tablet TAKE 1 TABLET BY MOUTH TWICE DAILY 180 tablet 1    enalapril (VASOTEC) 10 MG tablet TAKE  two TABLET BY MOUTH TWICE DAILY 180 tablet 1    metFORMIN (GLUCOPHAGE) 500 MG tablet TAKE TWO TABLETS BY MOUTH TWICE DAILY WITH MEALS 360 tablet 1    ferrous sulfate 325 (65 Fe) MG tablet Take 1 tablet by mouth 2 times daily 60 tablet 2    Multiple Vitamins-Minerals (THERAPEUTIC MULTIVITAMIN-MINERALS) tablet Take 1 tablet by mouth daily 30 tablet 11    carvedilol (COREG) 3.125 MG tablet Take 2 tablets by mouth 2 times daily (with meals) 60 tablet 11    LANTUS SOLOSTAR 100 UNIT/ML injection pen INJECT 30 UNITS SUBCUTANEOUSLY ONCE DAILY 15 pen 1    amLODIPine (NORVASC) 5 MG tablet TAKE ONE TABLET BY MOUTH ONCE DAILY 90 tablet 1    pantoprazole (PROTONIX) 40 MG tablet Take 1 tablet by mouth daily 90 tablet 1    tiZANidine (ZANAFLEX) 4 MG tablet Take 1 tablet by mouth 3 times daily as needed (lumbar pain) 24 tablet 0    BRILINTA 90 MG TABS tablet TAKE ONE TABLET BY MOUTH TWICE DAILY 60 tablet 11    atorvastatin (LIPITOR) 10 MG tablet Take 1 tablet by mouth daily 90 tablet 1    ACCU-CHEK SMARTVIEW strip Use to test Blood Sugar 3x daily, Dx: E11.9 100 each 11    nitroGLYCERIN (NITROSTAT) 0.4 MG SL tablet Place 1 tablet under the tongue every 5 minutes as needed for Chest pain 25 tablet 3    aspirin 81 MG tablet Take 1 tablet by mouth daily      acetaminophen (TYLENOL) 500 MG tablet Take 500 mg by mouth as needed for Pain      isosorbide mononitrate (IMDUR) 30 MG extended release tablet Take 1 tablet by mouth daily 30 tablet 3    glucose blood VI test strips (ROYA CONTOUR TEST) strip Use to check blood sugar 3x daily, Dx: E11.9 300 each 3     No current facility-administered medications for this visit.           Orders Placed This Encounter   Procedures   Taz Eduardo MD, Dermatology, [address] [facility]     Referral Priority:   Routine     Referral Type:   Eval and Treat     Referral Reason:   Specialty Services Required     Referred to Provider:   Estella Wahl MD     Requested Specialty:   Dermatology     Number of Visits Requested:   1    POCT Glucose    POCT glycosylated hemoglobin (Hb A1C)     Results for orders placed or performed in visit on 05/15/19   POCT Glucose   Result Value Ref Range    Glucose 146 mg/dL    QC OK?        Ricardo Nicolas MD

## 2019-05-16 NOTE — PROGRESS NOTES
Appt with Dr Hayes Rhodes on Friday 5- at 8:30am, Eleonora Anderson to bring to appt: Drivers License, Jacque Tony, List of Meds, Co-Pay and arrive 15 mins early to fill out paperwork. Eleonora Anderson informed by Phone.

## 2019-05-21 ENCOUNTER — CARE COORDINATION (OUTPATIENT)
Dept: CARE COORDINATION | Age: 69
End: 2019-05-21

## 2019-05-21 ENCOUNTER — TELEPHONE (OUTPATIENT)
Dept: FAMILY MEDICINE CLINIC | Age: 69
End: 2019-05-21

## 2019-05-21 NOTE — CARE COORDINATION
Carlos A Gama called and states he is having chest pain going around his chest cavity, abdominal cavity and both legs. States he has had it for the past 2 days. Denies shortness of breath. States it goes away when he sits down. Informed him that I felt like he needed to be evaluated in the ED due to his history of heart issues. He states he doesn't think it is heart related but just muscle spasms. States he has not taken anything for the discomfort. I again encouraged him to go to ED. He states he knows his body and if he starts to develop worsening of symptoms, shortness of breath or change in chest pain he will go to ED. I encouraged him not to wait because he may be having heart related issues and waiting could delay needed treatment. He verbalized understanding.

## 2019-05-21 NOTE — TELEPHONE ENCOUNTER
Pt called C/O spasms around chest cavity and SOB. Also having some problems with walking. Advised him would probably be a good idea that he get this checked out at ER.

## 2019-05-22 ENCOUNTER — TELEPHONE (OUTPATIENT)
Dept: FAMILY MEDICINE CLINIC | Age: 69
End: 2019-05-22

## 2019-05-22 DIAGNOSIS — I87.2 VENOUS INSUFFICIENCY OF BOTH LOWER EXTREMITIES: ICD-10-CM

## 2019-05-22 LAB
ANION GAP SERPL CALCULATED.3IONS-SCNC: 13 MMOL/L (ref 4–12)
AVERAGE GLUCOSE: 166 MG/DL (ref 66–114)
BUN BLDV-MCNC: 52 MG/DL (ref 5–27)
CHLORIDE BLD-SCNC: 100 MMOL/L (ref 98–109)
CO2: 26 MMOL/L (ref 22–32)
CREAT SERPL-MCNC: 1.8 MG/DL (ref 0.6–1.3)
EGFR AFRICAN AMERICAN: 45 ML/MIN/1.73SQ.M
EGFR IF NONAFRICAN AMERICAN: 37 ML/MIN/1.73SQ.M
HBA1C MFR BLD: 7.4 %
POTASSIUM SERPL-SCNC: 4.7 MMOL/L (ref 3.5–5)
SODIUM BLD-SCNC: 139 MMOL/L (ref 134–146)

## 2019-05-22 RX ORDER — METOLAZONE 2.5 MG/1
TABLET ORAL
Qty: 20 TABLET | Refills: 2 | Status: SHIPPED | OUTPATIENT
Start: 2019-05-22 | End: 2020-02-13

## 2019-05-29 ENCOUNTER — NURSE TRIAGE (OUTPATIENT)
Dept: ADMINISTRATIVE | Age: 69
End: 2019-05-29

## 2019-05-30 ENCOUNTER — NURSE TRIAGE (OUTPATIENT)
Dept: ADMINISTRATIVE | Age: 69
End: 2019-05-30

## 2019-05-30 NOTE — TELEPHONE ENCOUNTER
Wanted to let you know that his sugar is better today - up to 121. Update on blood sugar. Reason for Disposition   Health Information question, no triage required and triager able to answer question    Answer Assessment - Initial Assessment Questions  1. REASON FOR CALL or QUESTION: \"What is your reason for calling today? \" or \"How can I best help you? \" or \"What question do you have that I can help answer? \"      Update on the blood sugar    Protocols used: INFORMATION ONLY CALL-ADULT-

## 2019-05-30 NOTE — TELEPHONE ENCOUNTER
Sugar bottomed out to 78.  Vision went blurry.  Got it back up and then fell asleep for about an hour or an hour and a half.  Is that normal?  He is out of test strips and can't recheck it.  Feels ok now.  Please advise. Reason for Disposition   Low blood sugar definition and treatment, questions about    Answer Assessment - Initial Assessment Questions  1. SYMPTOMS: \"What symptoms are you concerned about? \"      Low blood sugar  2. ONSET:  \"When did the symptoms start? \"      About one and 1/2 hour ago  3. BLOOD GLUCOSE: \"What is your blood glucose level? \"       Was 78  4. USUAL RANGE: \"What is your blood glucose level usually? \" (e.g., usual fasting morning value, usual evening value)      Usually higher than this  5. TYPE 1 or 2:  \"Do you know what type of diabetes you have? \"  (e.g., Type 1, Type 2, Gestational; doesn't know)       Type 2  6. INSULIN: \"Do you take insulin? \"       charted  7. DIABETES PILLS: \"Do you take any pills for your diabetes? \"      yes  8. OTHER SYMPTOMS: \"Do you have any symptoms? \" (e.g., fever, frequent urination, difficulty breathing, vomiting)      denies  9. LOW BLOOD GLUCOSE TREATMENT: \"What have you done so far to treat the low blood glucose level? \"      Had protein to eat, fluids  10. ALONE: Mary Putty you alone right now or is someone with you? \"         alone  11. PREGNANCY: \"Is there any chance you are pregnant? \" \"When was your last menstrual period? \"        male    Protocols used: DIABETES - LOW BLOOD SUGAR-ADULT-

## 2019-06-06 ENCOUNTER — TELEPHONE (OUTPATIENT)
Dept: FAMILY MEDICINE CLINIC | Age: 69
End: 2019-06-06

## 2019-06-06 DIAGNOSIS — I87.2 VENOUS STASIS DERMATITIS OF BOTH LOWER EXTREMITIES: ICD-10-CM

## 2019-06-07 RX ORDER — FUROSEMIDE 20 MG/1
TABLET ORAL
Qty: 180 TABLET | Refills: 1 | Status: SHIPPED | OUTPATIENT
Start: 2019-06-07 | End: 2019-07-08 | Stop reason: SDUPTHER

## 2019-06-07 NOTE — TELEPHONE ENCOUNTER
Jamir Briggs informed by Phone. Explained he should use the Cream that was prescribed by Dr Brett Sultana NP and he stated understanding.

## 2019-06-18 ENCOUNTER — OFFICE VISIT (OUTPATIENT)
Dept: FAMILY MEDICINE CLINIC | Age: 69
End: 2019-06-18

## 2019-06-18 VITALS
RESPIRATION RATE: 14 BRPM | HEIGHT: 74 IN | HEART RATE: 60 BPM | DIASTOLIC BLOOD PRESSURE: 78 MMHG | BODY MASS INDEX: 40.43 KG/M2 | WEIGHT: 315 LBS | SYSTOLIC BLOOD PRESSURE: 128 MMHG

## 2019-06-18 DIAGNOSIS — I25.708 CORONARY ARTERY DISEASE OF BYPASS GRAFT OF NATIVE HEART WITH STABLE ANGINA PECTORIS (HCC): ICD-10-CM

## 2019-06-18 DIAGNOSIS — I87.2 VENOUS STASIS DERMATITIS OF BOTH LOWER EXTREMITIES: Primary | ICD-10-CM

## 2019-06-18 DIAGNOSIS — I10 ESSENTIAL HYPERTENSION: ICD-10-CM

## 2019-06-18 PROCEDURE — 4040F PNEUMOC VAC/ADMIN/RCVD: CPT | Performed by: FAMILY MEDICINE

## 2019-06-18 PROCEDURE — G8417 CALC BMI ABV UP PARAM F/U: HCPCS | Performed by: FAMILY MEDICINE

## 2019-06-18 PROCEDURE — G8598 ASA/ANTIPLAT THER USED: HCPCS | Performed by: FAMILY MEDICINE

## 2019-06-18 PROCEDURE — G8427 DOCREV CUR MEDS BY ELIG CLIN: HCPCS | Performed by: FAMILY MEDICINE

## 2019-06-18 PROCEDURE — 99213 OFFICE O/P EST LOW 20 MIN: CPT | Performed by: FAMILY MEDICINE

## 2019-06-18 PROCEDURE — 3017F COLORECTAL CA SCREEN DOC REV: CPT | Performed by: FAMILY MEDICINE

## 2019-06-18 PROCEDURE — 1036F TOBACCO NON-USER: CPT | Performed by: FAMILY MEDICINE

## 2019-06-18 PROCEDURE — 1123F ACP DISCUSS/DSCN MKR DOCD: CPT | Performed by: FAMILY MEDICINE

## 2019-06-18 RX ORDER — BLOOD SUGAR DIAGNOSTIC
STRIP MISCELLANEOUS
Qty: 100 EACH | Refills: 11 | Status: SHIPPED | OUTPATIENT
Start: 2019-06-18 | End: 2020-01-21

## 2019-06-18 RX ORDER — CEPHALEXIN 500 MG/1
500 CAPSULE ORAL 3 TIMES DAILY
COMMUNITY
Start: 2019-06-17 | End: 2019-06-27

## 2019-06-18 ASSESSMENT — ENCOUNTER SYMPTOMS
SHORTNESS OF BREATH: 0
SORE THROAT: 0
COUGH: 0
BACK PAIN: 1
ORTHOPNEA: 0
TROUBLE SWALLOWING: 0
CONSTIPATION: 0
BLOOD IN STOOL: 0
CHEST TIGHTNESS: 0
NAUSEA: 0
ABDOMINAL PAIN: 0
EYE PAIN: 0

## 2019-06-18 NOTE — PROGRESS NOTES
Subjective:      Patient ID: Birtha Habermann is a 77 y.o. male. ashd  Stable      back pain  Stable   T6  Area      chf  And  Venous  Stasis  Of the  Legs   Legs  Better and lasix   At  80  Mg      lipids  stable    Hypertension   This is a chronic problem. The current episode started more than 1 year ago. The problem has been resolved since onset. The problem is controlled. Pertinent negatives include no chest pain, headaches, orthopnea, palpitations, peripheral edema or shortness of breath. The current treatment provides significant improvement. There are no compliance problems. Diabetes   He presents for his follow-up diabetic visit. He has type 2 diabetes mellitus. His disease course has been stable. Pertinent negatives for hypoglycemia include no dizziness or headaches. Pertinent negatives for diabetes include no chest pain, no fatigue and no weakness. Symptoms are stable. Current diabetic treatment includes insulin injections. His weight is stable. Meal planning includes avoidance of concentrated sweets. His breakfast blood glucose is taken between 7-8 am. His breakfast blood glucose range is generally 110-130 mg/dl. An ACE inhibitor/angiotensin II receptor blocker is being taken. Past Medical History:   Diagnosis Date    ASHD (arteriosclerotic heart disease) 2005 2006    stent  baki    Depression     Diabetic peripheral neuropathy [facility]) 2014    Fracture Oct 1984    left lower extremity     HTN (hypertension)     Hypercholesteremia     [facility]DM [facility]insulin dependent [facility] mellitus) [facility]     Low back pain     Morbid obesity with BMI of 45.0-49.9, adult ([facility]     Osteoarthritis      Review of Systems   Constitutional: Negative for fatigue and fever. HENT: Negative for congestion, ear pain, postnasal drip, sore throat and trouble swallowing. Eyes: Negative for pain. Respiratory: Negative for cough, chest tightness and shortness of breath. Cardiovascular: Positive for leg swelling. Negative for chest pain, palpitations and orthopnea. Leg  Swelling  Better and  Rash    Gastrointestinal: Negative for abdominal pain, blood in stool, constipation and nausea. Genitourinary: Negative for difficulty urinating, frequency and urgency. Musculoskeletal: Positive for back pain. Negative for arthralgias, joint swelling and neck stiffness. Skin: Negative for rash. Neurological: Negative for dizziness, weakness and headaches. Hematological: Negative for adenopathy. Does not bruise/bleed easily. Psychiatric/Behavioral: Negative for behavioral problems, dysphoric mood and sleep disturbance. /78 (Site: Right Upper Arm, Position: Sitting, Cuff Size: Medium Adult)   Pulse 60   Resp 14   Ht 6' 2\" (1.88 m)   Wt (!) 370 lb 8 oz (168.1 kg)   BMI 47.57 kg/m²   Objective:   Physical Exam   Constitutional: He is oriented to person, place, and time. He appears well-developed and well-nourished. HENT:   Head: Normocephalic and atraumatic. Right Ear: External ear normal.   Left Ear: External ear normal.   Nose: Nose normal.   Mouth/Throat: Oropharynx is clear and moist.   Eyes: Pupils are equal, round, and reactive to light. Conjunctivae and EOM are normal.   Fundi nl   Neck: Normal range of motion. Neck supple. No thyromegaly present. Cardiovascular: Normal rate, regular rhythm, normal heart sounds and intact distal pulses. Pulmonary/Chest: Effort normal and breath sounds normal. He has no wheezes. He has no rales. Abdominal: Soft. Bowel sounds are normal. He exhibits no mass. There is no tenderness. Musculoskeletal: Normal range of motion. Mid    Back pain  Better    Lymphadenopathy:     He has no cervical adenopathy. Neurological: He is alert and oriented to person, place, and time. He has normal reflexes. No cranial nerve deficit. Skin: Skin is warm and dry. No rash noted.     Leg  dermatitis   50%  Better  As  Less red and  Raised    Psychiatric: He has a normal mood and affect. Nursing note and vitals reviewed. Assessment:       Diagnosis Orders   1. Venous stasis dermatitis of both lower extremities     2. IDDM (insulin dependent diabetes mellitus) ([facility])  ACCU-CHEK SMARTVIEW strip   3. Coronary artery disease of bypass graft of native heart with stable angina pectoris ([facility]     4.  Essential hypertension           Plan:     Current Outpatient Medications   Medication Sig Dispense Refill    cephALEXin (KEFLEX) 500 MG capsule Take 500 mg by mouth 3 times daily       ACCU-CHEK SMARTVIEW strip Use to test Blood Sugar 3x daily, Dx: E11.9 100 each 11    furosemide (LASIX) 20 MG tablet TAKE  4  TABLETS BY MOUTH ONCE DAILY 180 tablet 1    metolazone (ZAROXOLYN) 2.5 MG tablet One  Pill on  Mondays only 20 tablet 2    insulin aspart (NOVOLOG FLEXPEN) 100 UNIT/ML injection pen INJECT 12 UNITS SUBCUTANEOUSLY 3 TIMES DAILY BEFORE MEALS 15 pen 3    calcipotriene (DOVONEX) 0.005 % cream       enalapril (VASOTEC) 10 MG tablet TAKE  two TABLET BY MOUTH TWICE DAILY 180 tablet 1    metFORMIN (GLUCOPHAGE) 500 MG tablet TAKE TWO TABLETS BY MOUTH TWICE DAILY WITH MEALS 360 tablet 1    ferrous sulfate 325 (65 Fe) MG tablet Take 1 tablet by mouth 2 times daily 60 tablet 2    Multiple Vitamins-Minerals (THERAPEUTIC MULTIVITAMIN-MINERALS) tablet Take 1 tablet by mouth daily 30 tablet 11    carvedilol (COREG) 3.125 MG tablet Take 2 tablets by mouth 2 times daily (with meals) 60 tablet 11    LANTUS SOLOSTAR 100 UNIT/ML injection pen INJECT 30 UNITS SUBCUTANEOUSLY ONCE DAILY 15 pen 1    amLODIPine (NORVASC) 5 MG tablet TAKE ONE TABLET BY MOUTH ONCE DAILY 90 tablet 1    pantoprazole (PROTONIX) 40 MG tablet Take 1 tablet by mouth daily 90 tablet 1    tiZANidine (ZANAFLEX) 4 MG tablet Take 1 tablet by mouth 3 times daily as needed (lumbar pain) 24 tablet 0    BRILINTA 90 MG TABS tablet TAKE ONE TABLET BY MOUTH TWICE DAILY 60 tablet 11    atorvastatin (LIPITOR) 10 MG tablet Take 1 tablet by mouth daily 90 tablet 1    nitroGLYCERIN (NITROSTAT) 0.4 MG SL tablet Place 1 tablet under the tongue every 5 minutes as needed for Chest pain 25 tablet 3    aspirin 81 MG tablet Take 1 tablet by mouth daily      acetaminophen (TYLENOL) 500 MG tablet Take 500 mg by mouth as needed for Pain      isosorbide mononitrate (IMDUR) 30 MG extended release tablet Take 1 tablet by mouth daily 30 tablet 3     No current facility-administered medications for this visit. No orders of the defined types were placed in this encounter. No results found for this visit on 06/18/19. Fabienne Dunlap received counseling on the following healthy behaviors: nutrition and exercise    Patient given educational materials on Diabetes and Hyperlipidemia    I have instructed Fabienne Dunlap to complete a self tracking handout on Blood Sugars  and Blood Pressures  and instructed them to bring it with them to his next appointment. Discussed use, benefit, and side effects of prescribed medications. Barriers to medication compliance addressed. All patient questions answered. Pt voiced understanding.         better and  Keep  Lasix  At  Dose  Of  80 mg  Alberta Avelar MD

## 2019-06-19 ENCOUNTER — TELEPHONE (OUTPATIENT)
Dept: FAMILY MEDICINE CLINIC | Age: 69
End: 2019-06-19

## 2019-06-19 LAB
ANION GAP SERPL CALCULATED.3IONS-SCNC: 13 MMOL/L (ref 4–12)
BUN BLDV-MCNC: 48 MG/DL (ref 5–27)
CHLORIDE BLD-SCNC: 100 MMOL/L (ref 98–109)
CO2: 26 MMOL/L (ref 22–32)
CREAT SERPL-MCNC: 1.64 MG/DL (ref 0.6–1.3)
EGFR AFRICAN AMERICAN: 51 ML/MIN/1.73SQ.M
EGFR IF NONAFRICAN AMERICAN: 42 ML/MIN/1.73SQ.M
POTASSIUM SERPL-SCNC: 5.1 MMOL/L (ref 3.5–5)
SODIUM BLD-SCNC: 139 MMOL/L (ref 134–146)

## 2019-06-28 ENCOUNTER — TELEPHONE (OUTPATIENT)
Dept: FAMILY MEDICINE CLINIC | Age: 69
End: 2019-06-28

## 2019-07-01 DIAGNOSIS — E78.00 HYPERCHOLESTEREMIA: ICD-10-CM

## 2019-07-02 RX ORDER — ATORVASTATIN CALCIUM 10 MG/1
TABLET, FILM COATED ORAL
Qty: 90 TABLET | Refills: 1 | Status: SHIPPED | OUTPATIENT
Start: 2019-07-02 | End: 2019-12-30 | Stop reason: SDUPTHER

## 2019-07-08 ENCOUNTER — TELEPHONE (OUTPATIENT)
Dept: FAMILY MEDICINE CLINIC | Age: 69
End: 2019-07-08

## 2019-07-08 DIAGNOSIS — I87.2 VENOUS STASIS DERMATITIS OF BOTH LOWER EXTREMITIES: ICD-10-CM

## 2019-07-08 RX ORDER — FUROSEMIDE 40 MG/1
TABLET ORAL
Qty: 180 TABLET | Refills: 1 | Status: SHIPPED | OUTPATIENT
Start: 2019-07-08 | End: 2020-01-15 | Stop reason: SDUPTHER

## 2019-07-08 NOTE — TELEPHONE ENCOUNTER
Elizabeth Michelle called requesting a refill of their:    furosemide (LASIX) 20 MG tablet 4 tabs daily    Send 30 day supply to [facility] on [address]    Pt is completely out and does not have any for today. He is asking if he would be able to  today.

## 2019-07-22 ENCOUNTER — CARE COORDINATION (OUTPATIENT)
Dept: CARE COORDINATION | Age: 69
End: 2019-07-22

## 2019-07-24 DIAGNOSIS — I15.0 RENOVASCULAR HYPERTENSION: ICD-10-CM

## 2019-07-24 RX ORDER — ENALAPRIL MALEATE 10 MG/1
TABLET ORAL
Qty: 180 TABLET | Refills: 0 | Status: SHIPPED | OUTPATIENT
Start: 2019-07-24 | End: 2019-09-09 | Stop reason: SDUPTHER

## 2019-07-24 NOTE — TELEPHONE ENCOUNTER
The pharmacy is  requesting a refill of the below medication which has been pended for you:     Requested Prescriptions     Pending Prescriptions Disp Refills    enalapril (VASOTEC) 10 MG tablet [Pharmacy Med Name: ENALAPRIL 10MG      TAB] 180 tablet 1     Sig: TAKE 2 TABLETS BY MOUTH TWICE DAILY       Last Appointment Date: 6/18/2019  Next Appointment Date: 8/21/2019    Allergies   Allergen Reactions    Horse-Derived Products        Per verbal order Dr Walker Cons sent over Rx to pharmacy

## 2019-08-13 DIAGNOSIS — I25.708 CORONARY ARTERY DISEASE OF BYPASS GRAFT OF NATIVE HEART WITH STABLE ANGINA PECTORIS (HCC): ICD-10-CM

## 2019-08-14 RX ORDER — NITROGLYCERIN 0.4 MG/1
0.4 TABLET SUBLINGUAL EVERY 5 MIN PRN
Qty: 25 TABLET | Refills: 3 | Status: SHIPPED | OUTPATIENT
Start: 2019-08-14 | End: 2022-09-02

## 2019-08-21 ENCOUNTER — OFFICE VISIT (OUTPATIENT)
Dept: FAMILY MEDICINE CLINIC | Age: 69
End: 2019-08-21

## 2019-08-21 ENCOUNTER — CARE COORDINATION (OUTPATIENT)
Dept: CARE COORDINATION | Age: 69
End: 2019-08-21

## 2019-08-21 VITALS
BODY MASS INDEX: 40.43 KG/M2 | RESPIRATION RATE: 14 BRPM | SYSTOLIC BLOOD PRESSURE: 138 MMHG | HEART RATE: 72 BPM | HEIGHT: 74 IN | WEIGHT: 315 LBS | DIASTOLIC BLOOD PRESSURE: 80 MMHG

## 2019-08-21 DIAGNOSIS — I25.708 CORONARY ARTERY DISEASE OF BYPASS GRAFT OF NATIVE HEART WITH STABLE ANGINA PECTORIS (HCC): ICD-10-CM

## 2019-08-21 DIAGNOSIS — I10 ESSENTIAL HYPERTENSION: Primary | ICD-10-CM

## 2019-08-21 DIAGNOSIS — Z98.61 S/P PERCUTANEOUS TRANSLUMINAL CORONARY ANGIOPLASTY: ICD-10-CM

## 2019-08-21 DIAGNOSIS — M15.9 PRIMARY OSTEOARTHRITIS INVOLVING MULTIPLE JOINTS: ICD-10-CM

## 2019-08-21 DIAGNOSIS — E11.42 DIABETIC PERIPHERAL NEUROPATHY (HCC): ICD-10-CM

## 2019-08-21 DIAGNOSIS — E78.00 HYPERCHOLESTEREMIA: ICD-10-CM

## 2019-08-21 DIAGNOSIS — I70.90 ARTERIOSCLEROTIC VASCULAR DISEASE: ICD-10-CM

## 2019-08-21 DIAGNOSIS — I87.2 VENOUS STASIS DERMATITIS OF BOTH LOWER EXTREMITIES: ICD-10-CM

## 2019-08-21 PROCEDURE — 4040F PNEUMOC VAC/ADMIN/RCVD: CPT | Performed by: FAMILY MEDICINE

## 2019-08-21 PROCEDURE — 99213 OFFICE O/P EST LOW 20 MIN: CPT | Performed by: FAMILY MEDICINE

## 2019-08-21 PROCEDURE — G8598 ASA/ANTIPLAT THER USED: HCPCS | Performed by: FAMILY MEDICINE

## 2019-08-21 PROCEDURE — 1036F TOBACCO NON-USER: CPT | Performed by: FAMILY MEDICINE

## 2019-08-21 PROCEDURE — G8427 DOCREV CUR MEDS BY ELIG CLIN: HCPCS | Performed by: FAMILY MEDICINE

## 2019-08-21 PROCEDURE — 1123F ACP DISCUSS/DSCN MKR DOCD: CPT | Performed by: FAMILY MEDICINE

## 2019-08-21 PROCEDURE — G8417 CALC BMI ABV UP PARAM F/U: HCPCS | Performed by: FAMILY MEDICINE

## 2019-08-21 PROCEDURE — 3017F COLORECTAL CA SCREEN DOC REV: CPT | Performed by: FAMILY MEDICINE

## 2019-08-21 ASSESSMENT — ENCOUNTER SYMPTOMS
SORE THROAT: 0
EYE PAIN: 0
NAUSEA: 0
BACK PAIN: 0
TROUBLE SWALLOWING: 0
ABDOMINAL PAIN: 0
CONSTIPATION: 0
SHORTNESS OF BREATH: 0
BLOOD IN STOOL: 0
CHEST TIGHTNESS: 0
COUGH: 0

## 2019-08-21 NOTE — PROGRESS NOTES
for pain. Respiratory: Negative for cough, chest tightness and shortness of breath. Cardiovascular: Negative for chest pain, palpitations and leg swelling. Gastrointestinal: Negative for abdominal pain, blood in stool, constipation and nausea. Genitourinary: Negative for difficulty urinating, frequency and urgency. Musculoskeletal: Negative for arthralgias, back pain, joint swelling and neck stiffness. Skin: Negative for rash. Right  Belt  Line  Skin  Inflamed    Neurological: Negative for dizziness, weakness and headaches. Hematological: Negative for adenopathy. Does not bruise/bleed easily. Psychiatric/Behavioral: Negative for behavioral problems, dysphoric mood and sleep disturbance. /80 (Site: Right Upper Arm, Position: Sitting, Cuff Size: Medium Adult)   Pulse 72   Resp 14   Ht 6' 2\" (1.88 m)   Wt (!) 377 lb (171 kg)   BMI 48.40 kg/m²   Objective:   Physical Exam   Constitutional: He is oriented to person, place, and time. He appears well-developed and well-nourished. HENT:   Head: Normocephalic and atraumatic. Right Ear: External ear normal.   Left Ear: External ear normal.   Nose: Nose normal.   Mouth/Throat: Oropharynx is clear and moist.   Eyes: Pupils are equal, round, and reactive to light. Conjunctivae and EOM are normal.   Fundi nl   Neck: Normal range of motion. Neck supple. No thyromegaly present. Cardiovascular: Normal rate, regular rhythm, normal heart sounds and intact distal pulses. Pulmonary/Chest: Effort normal and breath sounds normal. He has no wheezes. He has no rales. Abdominal: Soft. Bowel sounds are normal. He exhibits no mass. There is no tenderness. Musculoskeletal: Normal range of motion. He exhibits edema. bilateral  Leg  Swelling  Noted    Lymphadenopathy:     He has no cervical adenopathy. Neurological: He is alert and oriented to person, place, and time. He has normal reflexes. No cranial nerve deficit.    Skin: Skin is warm and dry. No rash noted. Psychiatric: He has a normal mood and affect. Nursing note and vitals reviewed. Assessment:       Diagnosis Orders   1. Essential hypertension     2. Coronary artery disease of bypass graft of native heart with stable angina pectoris ([facility]     3. S/P percutaneous transluminal coronary angioplasty     4. IDDM (insulin dependent diabetes mellitus) [facility]     5. Venous stasis dermatitis of both lower extremities     6. Diabetic peripheral neuropathy ([facility]     7. Hypercholesteremia     8. Primary osteoarthritis involving multiple joints     9. Arteriosclerotic vascular disease     10.  Toe amputation status, left ([facility])           Plan:      Current Outpatient Medications   Medication Sig Dispense Refill    nitroGLYCERIN (NITROSTAT) 0.4 MG SL tablet Place 1 tablet under the tongue every 5 minutes as needed for Chest pain 25 tablet 3    enalapril (VASOTEC) 10 MG tablet TAKE 2 TABLETS BY MOUTH TWICE DAILY 180 tablet 0    furosemide (LASIX) 40 MG tablet TAKE  2  TABLETS BY MOUTH ONCE DAILY (Patient taking differently: TAKE  2  TABLETS BY MOUTH TWICE DAILY) 180 tablet 1    atorvastatin (LIPITOR) 10 MG tablet TAKE 1 TABLET BY MOUTH ONCE DAILY 90 tablet 1    ACCU-CHEK SMARTVIEW strip Use to test Blood Sugar 3x daily, Dx: E11.9 100 each 11    metolazone (ZAROXOLYN) 2.5 MG tablet One  Pill on  Mondays only 20 tablet 2    insulin aspart (NOVOLOG FLEXPEN) 100 UNIT/ML injection pen INJECT 12 UNITS SUBCUTANEOUSLY 3 TIMES DAILY BEFORE MEALS 15 pen 3    calcipotriene (DOVONEX) 0.005 % cream       metFORMIN (GLUCOPHAGE) 500 MG tablet TAKE TWO TABLETS BY MOUTH TWICE DAILY WITH MEALS 360 tablet 1    ferrous sulfate 325 (65 Fe) MG tablet Take 1 tablet by mouth 2 times daily 60 tablet 2    Multiple Vitamins-Minerals (THERAPEUTIC MULTIVITAMIN-MINERALS) tablet Take 1 tablet by mouth daily 30 tablet 11    carvedilol (COREG) 3.125 MG tablet Take 2 tablets by mouth 2 times daily (with meals) 60 tablet

## 2019-08-21 NOTE — CARE COORDINATION
Chay Pradhan called with question regarding increased weakness in his hands with fine tremors. Chay Pradhan has PCP appt today and states he will discuss it with PCP today.

## 2019-08-27 LAB
AVERAGE GLUCOSE: 174 MG/DL
HBA1C MFR BLD: 7.7 % (ref 4.4–6.4)

## 2019-09-09 ENCOUNTER — TELEPHONE (OUTPATIENT)
Dept: FAMILY MEDICINE CLINIC | Age: 69
End: 2019-09-09

## 2019-09-09 DIAGNOSIS — I15.0 RENOVASCULAR HYPERTENSION: ICD-10-CM

## 2019-09-09 RX ORDER — ENALAPRIL MALEATE 10 MG/1
TABLET ORAL
Qty: 180 TABLET | Refills: 0 | Status: SHIPPED | OUTPATIENT
Start: 2019-09-09 | End: 2019-10-29 | Stop reason: SDUPTHER

## 2019-10-16 RX ORDER — CARVEDILOL 3.12 MG/1
6.25 TABLET ORAL 2 TIMES DAILY WITH MEALS
Qty: 120 TABLET | Refills: 1 | Status: SHIPPED | OUTPATIENT
Start: 2019-10-16 | End: 2020-01-08 | Stop reason: SDUPTHER

## 2019-10-18 ENCOUNTER — CARE COORDINATION (OUTPATIENT)
Dept: CARE COORDINATION | Age: 69
End: 2019-10-18

## 2019-10-22 ENCOUNTER — HOSPITAL ENCOUNTER (OUTPATIENT)
Dept: PHYSICAL THERAPY | Age: 69
Setting detail: THERAPIES SERIES
Discharge: HOME | End: 2019-10-22
Payer: MEDICARE

## 2019-10-22 PROCEDURE — 97162 PT EVAL MOD COMPLEX 30 MIN: CPT

## 2019-10-22 PROCEDURE — 97140 MANUAL THERAPY 1/> REGIONS: CPT

## 2019-10-22 ASSESSMENT — PAIN DESCRIPTION - LOCATION: LOCATION: LEG

## 2019-10-22 ASSESSMENT — PAIN DESCRIPTION - PAIN TYPE: TYPE: CHRONIC PAIN

## 2019-10-22 ASSESSMENT — PAIN SCALES - GENERAL: PAINLEVEL_OUTOF10: 5

## 2019-10-22 ASSESSMENT — PAIN DESCRIPTION - DESCRIPTORS: DESCRIPTORS: NUMBNESS;TINGLING

## 2019-10-22 ASSESSMENT — PAIN DESCRIPTION - ORIENTATION: ORIENTATION: RIGHT;LEFT

## 2019-10-23 ENCOUNTER — HOSPITAL ENCOUNTER (OUTPATIENT)
Dept: PHYSICAL THERAPY | Age: 69
Setting detail: THERAPIES SERIES
Discharge: HOME | End: 2019-10-23
Payer: MEDICARE

## 2019-10-23 PROCEDURE — 97530 THERAPEUTIC ACTIVITIES: CPT

## 2019-10-23 PROCEDURE — 97140 MANUAL THERAPY 1/> REGIONS: CPT

## 2019-10-23 ASSESSMENT — PAIN SCALES - GENERAL: PAINLEVEL_OUTOF10: 3

## 2019-10-23 ASSESSMENT — PAIN DESCRIPTION - ORIENTATION: ORIENTATION: LEFT;RIGHT

## 2019-10-23 ASSESSMENT — PAIN DESCRIPTION - PAIN TYPE: TYPE: CHRONIC PAIN

## 2019-10-23 ASSESSMENT — PAIN DESCRIPTION - DESCRIPTORS: DESCRIPTORS: TINGLING;NUMBNESS

## 2019-10-23 ASSESSMENT — PAIN DESCRIPTION - LOCATION: LOCATION: FOOT

## 2019-10-29 DIAGNOSIS — I15.0 RENOVASCULAR HYPERTENSION: ICD-10-CM

## 2019-10-30 RX ORDER — ENALAPRIL MALEATE 10 MG/1
TABLET ORAL
Qty: 360 TABLET | Refills: 1 | Status: ON HOLD
Start: 2019-10-30 | End: 2020-02-16 | Stop reason: HOSPADM

## 2019-11-08 ENCOUNTER — APPOINTMENT (OUTPATIENT)
Dept: PHYSICAL THERAPY | Age: 69
End: 2019-11-08
Payer: MEDICARE

## 2019-11-11 ENCOUNTER — HOSPITAL ENCOUNTER (OUTPATIENT)
Dept: PHYSICAL THERAPY | Age: 69
Setting detail: THERAPIES SERIES
End: 2019-11-11
Payer: MEDICARE

## 2019-11-14 ENCOUNTER — HOSPITAL ENCOUNTER (OUTPATIENT)
Dept: PHYSICAL THERAPY | Age: 69
Setting detail: THERAPIES SERIES
Discharge: HOME | End: 2019-11-14
Payer: MEDICARE

## 2019-11-14 PROCEDURE — 97140 MANUAL THERAPY 1/> REGIONS: CPT

## 2019-11-14 ASSESSMENT — PAIN DESCRIPTION - PAIN TYPE: TYPE: CHRONIC PAIN

## 2019-11-14 ASSESSMENT — PAIN DESCRIPTION - LOCATION: LOCATION: FOOT

## 2019-11-14 ASSESSMENT — PAIN DESCRIPTION - DESCRIPTORS: DESCRIPTORS: CONSTANT;TINGLING;NUMBNESS

## 2019-11-14 ASSESSMENT — PAIN SCALES - GENERAL: PAINLEVEL_OUTOF10: 3

## 2019-11-14 ASSESSMENT — PAIN DESCRIPTION - ORIENTATION: ORIENTATION: LEFT;RIGHT

## 2019-11-19 ENCOUNTER — HOSPITAL ENCOUNTER (OUTPATIENT)
Dept: PHYSICAL THERAPY | Age: 69
Setting detail: THERAPIES SERIES
Discharge: HOME | End: 2019-11-19
Payer: MEDICARE

## 2019-11-19 PROCEDURE — 97140 MANUAL THERAPY 1/> REGIONS: CPT

## 2019-11-19 ASSESSMENT — PAIN DESCRIPTION - DESCRIPTORS: DESCRIPTORS: CONSTANT;NUMBNESS;TINGLING

## 2019-11-19 ASSESSMENT — PAIN DESCRIPTION - ORIENTATION: ORIENTATION: LEFT;RIGHT

## 2019-11-19 ASSESSMENT — PAIN DESCRIPTION - PAIN TYPE: TYPE: CHRONIC PAIN

## 2019-11-19 ASSESSMENT — PAIN SCALES - GENERAL: PAINLEVEL_OUTOF10: 2

## 2019-11-19 ASSESSMENT — PAIN DESCRIPTION - LOCATION: LOCATION: FOOT

## 2019-11-22 ENCOUNTER — APPOINTMENT (OUTPATIENT)
Dept: PHYSICAL THERAPY | Age: 69
End: 2019-11-22
Payer: MEDICARE

## 2019-11-26 ENCOUNTER — HOSPITAL ENCOUNTER (OUTPATIENT)
Dept: PHYSICAL THERAPY | Age: 69
Setting detail: THERAPIES SERIES
Discharge: HOME | End: 2019-11-26
Payer: MEDICARE

## 2019-11-26 PROCEDURE — 97140 MANUAL THERAPY 1/> REGIONS: CPT

## 2019-11-26 ASSESSMENT — PAIN SCALES - GENERAL: PAINLEVEL_OUTOF10: 4

## 2019-11-26 ASSESSMENT — PAIN DESCRIPTION - ORIENTATION: ORIENTATION: LOWER

## 2019-11-26 ASSESSMENT — PAIN DESCRIPTION - DESCRIPTORS: DESCRIPTORS: THROBBING;SHOOTING;CONSTANT

## 2019-11-26 ASSESSMENT — PAIN DESCRIPTION - LOCATION: LOCATION: BACK

## 2019-11-26 ASSESSMENT — PAIN DESCRIPTION - PAIN TYPE: TYPE: CHRONIC PAIN

## 2019-12-03 ENCOUNTER — OFFICE VISIT (OUTPATIENT)
Dept: FAMILY MEDICINE CLINIC | Age: 69
End: 2019-12-03

## 2019-12-03 ENCOUNTER — HOSPITAL ENCOUNTER (OUTPATIENT)
Dept: PHYSICAL THERAPY | Age: 69
Setting detail: THERAPIES SERIES
Discharge: HOME | End: 2019-12-03
Payer: MEDICARE

## 2019-12-03 VITALS
HEART RATE: 68 BPM | RESPIRATION RATE: 10 BRPM | WEIGHT: 315 LBS | SYSTOLIC BLOOD PRESSURE: 120 MMHG | DIASTOLIC BLOOD PRESSURE: 72 MMHG | HEIGHT: 74 IN | BODY MASS INDEX: 40.43 KG/M2

## 2019-12-03 DIAGNOSIS — M15.9 PRIMARY OSTEOARTHRITIS INVOLVING MULTIPLE JOINTS: ICD-10-CM

## 2019-12-03 DIAGNOSIS — I10 ESSENTIAL HYPERTENSION: ICD-10-CM

## 2019-12-03 DIAGNOSIS — F33.41 RECURRENT MAJOR DEPRESSIVE DISORDER, IN PARTIAL REMISSION (HCC): ICD-10-CM

## 2019-12-03 DIAGNOSIS — E11.42 TYPE 2 DIABETES MELLITUS WITH DIABETIC POLYNEUROPATHY, WITH LONG-TERM CURRENT USE OF INSULIN (HCC): Primary | ICD-10-CM

## 2019-12-03 DIAGNOSIS — E11.42 DIABETIC PERIPHERAL NEUROPATHY (HCC): ICD-10-CM

## 2019-12-03 DIAGNOSIS — I87.2 VENOUS STASIS DERMATITIS OF BOTH LOWER EXTREMITIES: ICD-10-CM

## 2019-12-03 DIAGNOSIS — Z79.4 TYPE 2 DIABETES MELLITUS WITH DIABETIC POLYNEUROPATHY, WITH LONG-TERM CURRENT USE OF INSULIN (HCC): Primary | ICD-10-CM

## 2019-12-03 DIAGNOSIS — E78.00 HYPERCHOLESTEREMIA: ICD-10-CM

## 2019-12-03 DIAGNOSIS — I25.708 CORONARY ARTERY DISEASE OF BYPASS GRAFT OF NATIVE HEART WITH STABLE ANGINA PECTORIS (HCC): ICD-10-CM

## 2019-12-03 PROCEDURE — G8417 CALC BMI ABV UP PARAM F/U: HCPCS | Performed by: FAMILY MEDICINE

## 2019-12-03 PROCEDURE — 3017F COLORECTAL CA SCREEN DOC REV: CPT | Performed by: FAMILY MEDICINE

## 2019-12-03 PROCEDURE — 4040F PNEUMOC VAC/ADMIN/RCVD: CPT | Performed by: FAMILY MEDICINE

## 2019-12-03 PROCEDURE — 1036F TOBACCO NON-USER: CPT | Performed by: FAMILY MEDICINE

## 2019-12-03 PROCEDURE — 1123F ACP DISCUSS/DSCN MKR DOCD: CPT | Performed by: FAMILY MEDICINE

## 2019-12-03 PROCEDURE — G8427 DOCREV CUR MEDS BY ELIG CLIN: HCPCS | Performed by: FAMILY MEDICINE

## 2019-12-03 PROCEDURE — 97140 MANUAL THERAPY 1/> REGIONS: CPT

## 2019-12-03 PROCEDURE — G8598 ASA/ANTIPLAT THER USED: HCPCS | Performed by: FAMILY MEDICINE

## 2019-12-03 PROCEDURE — 99213 OFFICE O/P EST LOW 20 MIN: CPT | Performed by: FAMILY MEDICINE

## 2019-12-03 ASSESSMENT — ENCOUNTER SYMPTOMS
NAUSEA: 0
SORE THROAT: 0
BACK PAIN: 1
TROUBLE SWALLOWING: 0
COUGH: 0
SHORTNESS OF BREATH: 0
CONSTIPATION: 0
BLOOD IN STOOL: 0
CHEST TIGHTNESS: 0
ABDOMINAL PAIN: 0
EYE PAIN: 0

## 2019-12-03 ASSESSMENT — PAIN SCALES - GENERAL: PAINLEVEL_OUTOF10: 4

## 2019-12-03 ASSESSMENT — PAIN DESCRIPTION - DESCRIPTORS: DESCRIPTORS: CONSTANT;ACHING;THROBBING

## 2019-12-03 ASSESSMENT — PAIN DESCRIPTION - LOCATION: LOCATION: BACK

## 2019-12-03 ASSESSMENT — PAIN DESCRIPTION - ORIENTATION: ORIENTATION: LOWER

## 2019-12-03 ASSESSMENT — PAIN DESCRIPTION - PAIN TYPE: TYPE: CHRONIC PAIN

## 2019-12-06 LAB
AVERAGE GLUCOSE: 180 MG/DL
HBA1C MFR BLD: 7.9 % (ref 4.4–6.4)

## 2019-12-11 ENCOUNTER — HOSPITAL ENCOUNTER (OUTPATIENT)
Dept: PHYSICAL THERAPY | Age: 69
Setting detail: THERAPIES SERIES
Discharge: HOME | End: 2019-12-11
Payer: MEDICARE

## 2019-12-11 PROCEDURE — 97140 MANUAL THERAPY 1/> REGIONS: CPT

## 2019-12-11 ASSESSMENT — PAIN DESCRIPTION - LOCATION: LOCATION: BACK

## 2019-12-11 ASSESSMENT — PAIN DESCRIPTION - PAIN TYPE: TYPE: CHRONIC PAIN

## 2019-12-11 ASSESSMENT — PAIN DESCRIPTION - DESCRIPTORS: DESCRIPTORS: CONSTANT;ACHING;THROBBING

## 2019-12-11 ASSESSMENT — PAIN DESCRIPTION - ORIENTATION: ORIENTATION: LOWER

## 2019-12-11 ASSESSMENT — PAIN SCALES - GENERAL: PAINLEVEL_OUTOF10: 3

## 2019-12-17 ENCOUNTER — APPOINTMENT (OUTPATIENT)
Dept: PHYSICAL THERAPY | Age: 69
End: 2019-12-17
Payer: MEDICARE

## 2019-12-19 ENCOUNTER — OFFICE VISIT (OUTPATIENT)
Dept: PHYSICAL MEDICINE AND REHAB | Age: 69
End: 2019-12-19
Payer: MEDICARE

## 2019-12-19 ENCOUNTER — TELEPHONE (OUTPATIENT)
Dept: PHYSICAL MEDICINE AND REHAB | Age: 69
End: 2019-12-19

## 2019-12-19 VITALS
SYSTOLIC BLOOD PRESSURE: 126 MMHG | HEIGHT: 74 IN | BODY MASS INDEX: 40.43 KG/M2 | WEIGHT: 315 LBS | DIASTOLIC BLOOD PRESSURE: 84 MMHG

## 2019-12-19 DIAGNOSIS — G89.4 CHRONIC PAIN SYNDROME: ICD-10-CM

## 2019-12-19 DIAGNOSIS — M47.816 LUMBAR SPONDYLOSIS: Primary | ICD-10-CM

## 2019-12-19 DIAGNOSIS — M47.816 LUMBAR FACET ARTHROPATHY: ICD-10-CM

## 2019-12-19 DIAGNOSIS — G62.9 NEUROPATHY: ICD-10-CM

## 2019-12-19 PROCEDURE — 4040F PNEUMOC VAC/ADMIN/RCVD: CPT | Performed by: NURSE PRACTITIONER

## 2019-12-19 PROCEDURE — G8427 DOCREV CUR MEDS BY ELIG CLIN: HCPCS | Performed by: NURSE PRACTITIONER

## 2019-12-19 PROCEDURE — 99205 OFFICE O/P NEW HI 60 MIN: CPT | Performed by: NURSE PRACTITIONER

## 2019-12-19 PROCEDURE — G8484 FLU IMMUNIZE NO ADMIN: HCPCS | Performed by: NURSE PRACTITIONER

## 2019-12-19 PROCEDURE — G8417 CALC BMI ABV UP PARAM F/U: HCPCS | Performed by: NURSE PRACTITIONER

## 2019-12-19 PROCEDURE — 3017F COLORECTAL CA SCREEN DOC REV: CPT | Performed by: NURSE PRACTITIONER

## 2019-12-19 PROCEDURE — 1123F ACP DISCUSS/DSCN MKR DOCD: CPT | Performed by: NURSE PRACTITIONER

## 2019-12-19 PROCEDURE — G8598 ASA/ANTIPLAT THER USED: HCPCS | Performed by: NURSE PRACTITIONER

## 2019-12-19 PROCEDURE — 1036F TOBACCO NON-USER: CPT | Performed by: NURSE PRACTITIONER

## 2019-12-19 ASSESSMENT — ENCOUNTER SYMPTOMS
RHINORRHEA: 0
ABDOMINAL PAIN: 0
COUGH: 0
PHOTOPHOBIA: 0
COLOR CHANGE: 0
EYE PAIN: 0
CHEST TIGHTNESS: 0
SORE THROAT: 0
SINUS PRESSURE: 0
WHEEZING: 0
BACK PAIN: 1
SHORTNESS OF BREATH: 0
VOMITING: 0
DIARRHEA: 0
NAUSEA: 0
CONSTIPATION: 0

## 2019-12-20 ENCOUNTER — HOSPITAL ENCOUNTER (OUTPATIENT)
Dept: PHYSICAL THERAPY | Age: 69
Setting detail: THERAPIES SERIES
Discharge: HOME | End: 2019-12-20
Payer: MEDICARE

## 2019-12-20 ENCOUNTER — TELEPHONE (OUTPATIENT)
Dept: CARDIOLOGY CLINIC | Age: 69
End: 2019-12-20

## 2019-12-20 PROCEDURE — 97140 MANUAL THERAPY 1/> REGIONS: CPT

## 2019-12-20 ASSESSMENT — PAIN DESCRIPTION - ORIENTATION: ORIENTATION: LOWER

## 2019-12-20 ASSESSMENT — PAIN DESCRIPTION - LOCATION: LOCATION: KNEE

## 2019-12-20 ASSESSMENT — PAIN DESCRIPTION - PAIN TYPE: TYPE: CHRONIC PAIN

## 2019-12-20 ASSESSMENT — PAIN SCALES - GENERAL: PAINLEVEL_OUTOF10: 5

## 2019-12-20 ASSESSMENT — PAIN DESCRIPTION - DESCRIPTORS: DESCRIPTORS: CONSTANT;ACHING;THROBBING;SORE

## 2019-12-23 ENCOUNTER — HOSPITAL ENCOUNTER (OUTPATIENT)
Dept: PHYSICAL THERAPY | Age: 69
Setting detail: THERAPIES SERIES
Discharge: HOME | End: 2019-12-23
Payer: MEDICARE

## 2019-12-23 PROCEDURE — 97140 MANUAL THERAPY 1/> REGIONS: CPT

## 2019-12-23 ASSESSMENT — PAIN SCALES - GENERAL: PAINLEVEL_OUTOF10: 5

## 2019-12-23 ASSESSMENT — PAIN DESCRIPTION - LOCATION: LOCATION: KNEE;BACK

## 2019-12-23 ASSESSMENT — PAIN DESCRIPTION - PAIN TYPE: TYPE: CHRONIC PAIN

## 2019-12-23 ASSESSMENT — PAIN DESCRIPTION - ORIENTATION: ORIENTATION: LOWER

## 2019-12-23 ASSESSMENT — PAIN DESCRIPTION - DESCRIPTORS: DESCRIPTORS: CONSTANT;ACHING;SORE

## 2019-12-30 DIAGNOSIS — E78.00 HYPERCHOLESTEREMIA: ICD-10-CM

## 2019-12-30 RX ORDER — ATORVASTATIN CALCIUM 10 MG/1
TABLET, FILM COATED ORAL
Qty: 30 TABLET | Refills: 0 | Status: SHIPPED | OUTPATIENT
Start: 2019-12-30 | End: 2020-02-19 | Stop reason: SDUPTHER

## 2020-01-08 ENCOUNTER — HOSPITAL ENCOUNTER (OUTPATIENT)
Dept: PHYSICAL THERAPY | Age: 70
Setting detail: THERAPIES SERIES
Discharge: HOME | End: 2020-01-08
Payer: MEDICARE

## 2020-01-08 PROCEDURE — 97140 MANUAL THERAPY 1/> REGIONS: CPT

## 2020-01-08 ASSESSMENT — PAIN DESCRIPTION - LOCATION: LOCATION: BACK;KNEE

## 2020-01-08 ASSESSMENT — PAIN DESCRIPTION - PAIN TYPE: TYPE: CHRONIC PAIN

## 2020-01-08 ASSESSMENT — PAIN SCALES - GENERAL: PAINLEVEL_OUTOF10: 4

## 2020-01-08 ASSESSMENT — PAIN DESCRIPTION - DESCRIPTORS: DESCRIPTORS: CONSTANT;ACHING

## 2020-01-08 NOTE — TELEPHONE ENCOUNTER
Date of last visit:  12/3/2019  Date of next visit:  3/3/2020    Requested Prescriptions     Pending Prescriptions Disp Refills    carvedilol (COREG) 3.125 MG tablet 120 tablet 5     Sig: Take 2 tablets by mouth 2 times daily (with meals)

## 2020-01-08 NOTE — PROGRESS NOTES
[address] [facility]  LYMPHEDEMA SERVICES  DAILY NOTE    Date: 2020   Patient Name: Michele Payne        CSN: [id number]   : 1950  (68 y.o.)  Gender: male   Referring Practitioner: Aneta Duval CNP  Diagnosis: VENOUS STASIS DERMATITIS (I87.2); LOWER LEG BILATERAL LYMPHEDEMA (I89.0)  Referral Date : 19        PT Visit Information  PT Insurance Information: [employer] MEDICARE (NO VISIT LIMIT: BASED ON MEDICAL NECESSITY)  Total # of Visits to Date: 10  Plan of Care/Certification Expiration Date: 20  No Show: 0  Canceled Appointment: 0  Progress Note Counter: 10    Restrictions/Precautions  Fall risk, Universal precautions,Patient ambulates with support of straight cane. Pain  Patient Currently in Pain: Yes  Pain Assessment  Pain Assessment: 0-10  Pain Level: 4  Pain Type: Chronic pain  Pain Location: Back, Knee  Pain Descriptors: Constant, Aching    SUBJECTIVE     2020  The patient presented to the Lymphedema clinic on this date with compression bandages on bilateral lower extremities. He denied pain from the bandages.      TREATMENT SESSION  Manual Lymph Drainage  Bilateral Lower Extremity Manual Lymph Drainage (MLD):     Trunk:   Effleurage, Profundus, Terminus, Axillary Lnn and (IA) Anterior Inguinal to Axillary (3 steps)       Lower Extremity:  Thigh (Anterior, Posterior, Lateral, Medial to Lateral), Knee (Anterior, Posterior, Lateral, Medial, Lower Leg (Anterior,Posterior), Ankle (Anterior, Posterior, Lateral, Medial and Foot/Toes (Anterior, Posterior)       Rework  Lower Extremity (Toes to Groin), Axillary Lnn, (IA) Anterior Inguinal to Axillary, Terminus, Profundus and Effleurage    Skin Care Measures  Washed involved extremity/body part and applied Original Eucerin lotion to moisturize skin  -Gentle removal of thickened, dry skin (Primarily on the posterior and medial aspect of the right lower leg.)  Compression Bandaging  Location: Bilateral lower extremity (Metatarsal heads to knee joint line)  Compression Gradient: Moderate to Minimal  Supplies (per involved extremity):   Stockinette: Size: 4 inches, Thickness: Thick, soft   Transelast none   10 cm Artiflex Cotton 1 roll   15 cm Artiflex Cotton none   4 cm Rosidal K none   6 cm Rosidal K 1 roll   8 cm Rosidal K 1 roll   10 cm Rosidal K none   12 cm Rosidal K none   Rosidal Soft 1 roll   -Medigrip: Size E compression stockinette from Metatarsal heads to knee joint line and second layer Size G compression stockinette from ankle to knee joint line. Decongestive/Therapeutic Exercise  The patient was able to complete Decongestive Therapy exercises (x 10 reps) including bilateral ankle pumps (x 2 sets). The exercises were completed with compression bandages on, without complaints of pain from the patient. The Decongestive Therapy exercises assist with decreasing the swelling by increasing the muscle pumping action of the lymph collectors and by increasing the fluid uptake in the lymphatic system. Patient Education  Education Provided:   -12/23/2019: Reviewed goals. -12/20/2019: Reviewed plan of care (waiting on new compression garments) and recommendations for sequential compression pump for home use to keep the swelling down in bilateral lower extremities and lower truncal region. -12/11/2019: Reviewed precautions, recommendations for compression garments with velcro. -12/03/2019: Reviewed goals (Short term goal # 1 met).  -11/26/2019: Reviewed goals and precautions. -11/19/2019: Reviewed plan of care and goals. -11/14/2019: Reviewed plan of care and precautions.  -10/23/2019: Reviewed plan of care, goals, compression bandaging precautions and exercises.    Learner:patient  Method: demonstration, explanation and handout       Outcome: acknowledged understanding of goals/plan of care/ compression garment precautions/exericses, demonstrated understanding and asked questions     GOALS   SHORT-TERM GOALS:  to be met in 6 weeks

## 2020-01-09 RX ORDER — CARVEDILOL 3.12 MG/1
6.25 TABLET ORAL 2 TIMES DAILY WITH MEALS
Qty: 120 TABLET | Refills: 5 | Status: SHIPPED | OUTPATIENT
Start: 2020-01-09 | End: 2020-06-10 | Stop reason: SDUPTHER

## 2020-01-15 RX ORDER — FUROSEMIDE 40 MG/1
TABLET ORAL
Qty: 360 TABLET | Refills: 1 | Status: ON HOLD
Start: 2020-01-15 | End: 2020-02-16 | Stop reason: HOSPADM

## 2020-01-15 NOTE — TELEPHONE ENCOUNTER
Tresa Colvin called requesting a refill of their:     furosemide (LASIX) 20 MG tablet 4 tabs daily     Leo Cummins

## 2020-01-16 ENCOUNTER — APPOINTMENT (OUTPATIENT)
Dept: PHYSICAL THERAPY | Age: 70
End: 2020-01-16
Payer: MEDICARE

## 2020-01-20 ENCOUNTER — PREP FOR PROCEDURE (OUTPATIENT)
Dept: PHYSICAL MEDICINE AND REHAB | Age: 70
End: 2020-01-20

## 2020-01-20 NOTE — H&P
Earnestine Malloy is an 68 y.o.  male. ChiefComplaint: Low back pain             Lumbar CT 10/2019                                                        The patient is allergic to horse-derived products.     PastMedical History  Keely Ocampo  has a past medical history of ASHD (arteriosclerotic heart disease), Depression, Diabetic peripheral neuropathy ([facility] Fracture, HTN (hypertension), Hypercholesteremia, IDDM (insulin dependent diabetes mellitus) ([facility] Low back pain, Morbid obesity with BMI of 45.0-49.9, adult ([facility] and Osteoarthritis.     Past Surgical History  The patient  has a past surgical history that includes Cholecystectomy; Rotator cuff repair; Cervical disc surgery (2000); Appendectomy; Spine surgery (2010); Colonoscopy (2007 and 2011); joint replacement; Tonsillectomy; amputation (Left, 9/10/14); EKG 12 Lead (8/14/2015); Coronary artery bypass graft (2015 august ); Cardiac surgery; vascular surgery; fracture surgery; Coronary angioplasty with stent (08/07/2017); and POSTERIOR FUSION THORACIC SPINE (N/A, 12/28/2018).    Family History  This patient's family history includes Cancer in his mother.  [address]  reports that he has never smoked. He has never used smokeless tobacco. He reports current alcohol use.  He reports that he does not use drugs.     Medications    Current Medication      Current Outpatient Medications:     enalapril (VASOTEC) 10 MG tablet, TAKE 2 TABLETS BY MOUTH TWICE DAILY, Disp: 360 tablet, Rfl: 1    insulin glargine (LANTUS SOLOSTAR) 100 UNIT/ML injection pen, INJECT 30 UNITS SUBCUTANEOUSLY ONCE DAILY, Disp: 15 pen, Rfl: 1    ticagrelor (BRILINTA) 90 MG TABS tablet, TAKE ONE TABLET BY MOUTH TWICE DAILY, Disp: 60 tablet, Rfl: 11    carvedilol (COREG) 3.125 MG tablet, Take 2 tablets by mouth 2 times daily (with meals), Disp: 120 tablet, Rfl: 1    nitroGLYCERIN (NITROSTAT) 0.4 MG SL tablet, Place 1 tablet under the tongue every 5 minutes as needed for Chest Negative for chest pain and palpitations. CABG HTN MI  Has BLE lymphedema   Gastrointestinal: Negative for abdominal pain, constipation, diarrhea, nausea and vomiting. Endocrine: Negative for cold intolerance, heat intolerance, polydipsia, polyphagia and polyuria. DM   Genitourinary: Negative for decreased urine volume, difficulty urinating, frequency and hematuria. Musculoskeletal: Positive for arthralgias, back pain, gait problem, myalgias, neck pain and neck stiffness. Negative for joint swelling. Skin: Negative for color change and rash. Allergic/Immunologic: Negative for food allergies and immunocompromised state. Neurological: Positive for weakness and numbness. Negative for dizziness, tremors, syncope, facial asymmetry, speech difficulty and light-headedness. Hematological: Does not bruise/bleed easily. Psychiatric/Behavioral: Negative for agitation, behavioral problems, confusion, decreased concentration, dysphoric mood, hallucinations, self-injury, sleep disturbance and suicidal ideas. The patient is not nervous/anxious and is not hyperactive. Depression          Objective:      Vitals                              Weight: (!) 373 lb 7.4 oz (169.4 kg)   Height: 6' 2.02\" (1.88 m)            Physical Exam  Vitals signs and nursing note reviewed. Constitutional:       General: He is not in acute distress. Appearance: He is well-developed. He is not diaphoretic. HENT:      Head: Normocephalic and atraumatic. Right Ear: External ear normal.      Left Ear: External ear normal.      Nose: Nose normal.      Mouth/Throat:      Pharynx: No oropharyngeal exudate. Eyes:      General: No scleral icterus. Right eye: No discharge. Left eye: No discharge. Conjunctiva/sclera: Conjunctivae normal.      Pupils: Pupils are equal, round, and reactive to light.    Neck:      Musculoskeletal: Full passive range of motion without pain, normal range of motion and neck supple. Normal range of motion. No edema, erythema, neck rigidity or muscular tenderness. Thyroid: No thyromegaly. Cardiovascular:      Rate and Rhythm: Normal rate and regular rhythm. Heart sounds: Normal heart sounds. No murmur. No friction rub. No gallop. Pulmonary:      Effort: Pulmonary effort is normal. No respiratory distress. Breath sounds: Normal breath sounds. No wheezing or rales. Chest:      Chest wall: No tenderness. Abdominal:      General: Bowel sounds are normal. There is no distension. Palpations: Abdomen is soft. Tenderness: There is no tenderness. There is no guarding or rebound. Musculoskeletal:         General: Tenderness present. Right shoulder: He exhibits tenderness. Left shoulder: He exhibits tenderness. Right hip: He exhibits decreased range of motion, decreased strength, tenderness and bony tenderness. Left hip: He exhibits decreased range of motion, decreased strength, tenderness and bony tenderness. Right knee: He exhibits decreased range of motion. Tenderness found. Left knee: He exhibits decreased range of motion. Tenderness found. Right ankle: He exhibits swelling. Left ankle: He exhibits swelling. Cervical back: He exhibits decreased range of motion, tenderness and bony tenderness. Thoracic back: He exhibits decreased range of motion, tenderness and bony tenderness. Lumbar back: He exhibits decreased range of motion, tenderness, bony tenderness, pain and spasm. Back:       Right upper leg: He exhibits tenderness. Left upper leg: He exhibits tenderness. Right lower leg: Edema present. Left lower leg: Edema present. Right foot: Tenderness and swelling present. Left foot: Tenderness and swelling present.       Comments: H/O Cervical surgery C5-6    T6-T9 PSF Lumbar Nj Decompression L2-S1  Right shoulder rotator cuff surgery  BTKR  Had 3 steel rods in LLE but

## 2020-01-20 NOTE — H&P (VIEW-ONLY)
Rachid Lou is an 68 y.o.  male. ChiefComplaint: Low back pain             Lumbar CT 10/2019                                                        The patient is allergic to horse-derived products.     PastMedical History  Tony Torre  has a past medical history of ASHD (arteriosclerotic heart disease), Depression, Diabetic peripheral neuropathy ([facility] [address] Fracture, HTN (hypertension), Hypercholesteremia, IDDM (insulin dependent diabetes mellitus) ([facility] [address] Low back pain, Morbid obesity with BMI of 45.0-49.9, adult ([facility] [address] and Osteoarthritis.     Past Surgical History  The patient  has a past surgical history that includes Cholecystectomy; Rotator cuff repair; Cervical disc surgery (2000); Appendectomy; Spine surgery (2010); Colonoscopy (2007 and 2011); joint replacement; Tonsillectomy; amputation (Left, 9/10/14); EKG 12 Lead (8/14/2015); Coronary artery bypass graft (2015 august ); Cardiac surgery; vascular surgery; fracture surgery; Coronary angioplasty with stent (08/07/2017); and POSTERIOR FUSION THORACIC SPINE (N/A, 12/28/2018).    Family History  This patient's family history includes Cancer in his mother.  [address]  reports that he has never smoked. He has never used smokeless tobacco. He reports current alcohol use.  He reports that he does not use drugs.     Medications    Current Medication      Current Outpatient Medications:     enalapril (VASOTEC) 10 MG tablet, TAKE 2 TABLETS BY MOUTH TWICE DAILY, Disp: 360 tablet, Rfl: 1    insulin glargine (LANTUS SOLOSTAR) 100 UNIT/ML injection pen, INJECT 30 UNITS SUBCUTANEOUSLY ONCE DAILY, Disp: 15 pen, Rfl: 1    ticagrelor (BRILINTA) 90 MG TABS tablet, TAKE ONE TABLET BY MOUTH TWICE DAILY, Disp: 60 tablet, Rfl: 11    carvedilol (COREG) 3.125 MG tablet, Take 2 tablets by mouth 2 times daily (with meals), Disp: 120 tablet, Rfl: 1    nitroGLYCERIN (NITROSTAT) 0.4 MG SL tablet, Place 1 tablet under the tongue every 5 minutes as needed for Chest Negative for chest pain and palpitations. CABG HTN MI  Has BLE lymphedema   Gastrointestinal: Negative for abdominal pain, constipation, diarrhea, nausea and vomiting. Endocrine: Negative for cold intolerance, heat intolerance, polydipsia, polyphagia and polyuria. DM   Genitourinary: Negative for decreased urine volume, difficulty urinating, frequency and hematuria. Musculoskeletal: Positive for arthralgias, back pain, gait problem, myalgias, neck pain and neck stiffness. Negative for joint swelling. Skin: Negative for color change and rash. Allergic/Immunologic: Negative for food allergies and immunocompromised state. Neurological: Positive for weakness and numbness. Negative for dizziness, tremors, syncope, facial asymmetry, speech difficulty and light-headedness. Hematological: Does not bruise/bleed easily. Psychiatric/Behavioral: Negative for agitation, behavioral problems, confusion, decreased concentration, dysphoric mood, hallucinations, self-injury, sleep disturbance and suicidal ideas. The patient is not nervous/anxious and is not hyperactive. Depression          Objective:      Vitals                              Weight: (!) 373 lb 7.4 oz (169.4 kg)   Height: 6' 2.02\" (1.88 m)            Physical Exam  Vitals signs and nursing note reviewed. Constitutional:       General: He is not in acute distress. Appearance: He is well-developed. He is not diaphoretic. HENT:      Head: Normocephalic and atraumatic. Right Ear: External ear normal.      Left Ear: External ear normal.      Nose: Nose normal.      Mouth/Throat:      Pharynx: No oropharyngeal exudate. Eyes:      General: No scleral icterus. Right eye: No discharge. Left eye: No discharge. Conjunctiva/sclera: Conjunctivae normal.      Pupils: Pupils are equal, round, and reactive to light.    Neck:      Musculoskeletal: Full passive range of motion without pain, normal range of motion and neck supple. Normal range of motion. No edema, erythema, neck rigidity or muscular tenderness. Thyroid: No thyromegaly. Cardiovascular:      Rate and Rhythm: Normal rate and regular rhythm. Heart sounds: Normal heart sounds. No murmur. No friction rub. No gallop. Pulmonary:      Effort: Pulmonary effort is normal. No respiratory distress. Breath sounds: Normal breath sounds. No wheezing or rales. Chest:      Chest wall: No tenderness. Abdominal:      General: Bowel sounds are normal. There is no distension. Palpations: Abdomen is soft. Tenderness: There is no tenderness. There is no guarding or rebound. Musculoskeletal:         General: Tenderness present. Right shoulder: He exhibits tenderness. Left shoulder: He exhibits tenderness. Right hip: He exhibits decreased range of motion, decreased strength, tenderness and bony tenderness. Left hip: He exhibits decreased range of motion, decreased strength, tenderness and bony tenderness. Right knee: He exhibits decreased range of motion. Tenderness found. Left knee: He exhibits decreased range of motion. Tenderness found. Right ankle: He exhibits swelling. Left ankle: He exhibits swelling. Cervical back: He exhibits decreased range of motion, tenderness and bony tenderness. Thoracic back: He exhibits decreased range of motion, tenderness and bony tenderness. Lumbar back: He exhibits decreased range of motion, tenderness, bony tenderness, pain and spasm. Back:       Right upper leg: He exhibits tenderness. Left upper leg: He exhibits tenderness. Right lower leg: Edema present. Left lower leg: Edema present. Right foot: Tenderness and swelling present. Left foot: Tenderness and swelling present.       Comments: H/O Cervical surgery C5-6    T6-T9 PSF Lumbar Nj Decompression L2-S1  Right shoulder rotator cuff surgery  BTKR  Had 3 steel rods in LLE but hardware was removed. BLE lymphedema    Skin:     General: Skin is warm. Coloration: Skin is not pale. Findings: No erythema or rash. Neurological:      Mental Status: He is alert and oriented to person, place, and time. He is not disoriented. Cranial Nerves: No cranial nerve deficit. Sensory: No sensory deficit. Motor: No atrophy or abnormal muscle tone. Coordination: Coordination normal.      Gait: Gait normal.      Deep Tendon Reflexes: Reflexes are normal and symmetric. Babinski sign absent on the right side. Psychiatric:         Attention and Perception: Attention normal. He is attentive. Mood and Affect: Mood normal. Mood is not anxious or depressed. Affect is not labile, blunt, angry or inappropriate. Speech: Speech normal. He is communicative. Speech is not rapid and pressured, delayed, slurred or tangential.         Behavior: Behavior normal. Behavior is not agitated, slowed, aggressive, withdrawn, hyperactive or combative. Thought Content: Thought content normal. Thought content is not paranoid or delusional. Thought content does not include homicidal or suicidal ideation. Thought content does not include homicidal or suicidal plan. Cognition and Memory: Cognition normal. Memory is not impaired. He does not exhibit impaired recent memory or impaired remote memory. Judgment: Judgment normal. Judgment is not impulsive or inappropriate.         ANNETTA test:+  Yeoman's test:+  Gaenslen test: +  Assessment:      1. Lumbar spondylosis    2. Lumbar facet arthropathy    3. Neuropathy    4.  Chronic pain syndrome          Plan:  Lumbar Facet MBB#1 @ L3-4,4-5,5-S1 Bilateral       ABHISHEK Deng - CNP  1/20/2020

## 2020-01-21 NOTE — PROGRESS NOTES
NPO after midnight  [employer] and drivers license  Wear comfortable clean clothing  Do not bring jewelry  Shower night before and morning of surgery with a liquid antibacterial soap  Bring list of medications with dosage and how often taken  Follow all instructions given by your physician   needed at discharge  Please call Dr. Liborio Waite office if you develop and illness, have a fever, or start antibiotics or steroids  Call [phone] for any questions

## 2020-01-21 NOTE — PROGRESS NOTES
NPO after midnight  [employer] and drivers license  Wear comfortable clean clothing  Do not bring jewelry  Shower night before and morning of surgery with a liquid antibacterial soap  Bring list of medications with dosage and how often taken  Follow all instructions given by your physician   needed at discharge  Please call Dr. Goff Ice office if you develop and illness, have a fever, or start antibiotics or steroids  Call [phone] for any questions

## 2020-01-23 ENCOUNTER — HOSPITAL ENCOUNTER (OUTPATIENT)
Dept: PHYSICAL THERAPY | Age: 70
Setting detail: THERAPIES SERIES
Discharge: HOME | End: 2020-01-23
Payer: MEDICARE

## 2020-01-23 PROCEDURE — 97760 ORTHOTIC MGMT&TRAING 1ST ENC: CPT

## 2020-01-23 PROCEDURE — 97140 MANUAL THERAPY 1/> REGIONS: CPT

## 2020-01-23 ASSESSMENT — PAIN DESCRIPTION - ORIENTATION: ORIENTATION: LOWER

## 2020-01-23 ASSESSMENT — PAIN DESCRIPTION - DESCRIPTORS: DESCRIPTORS: ACHING

## 2020-01-23 ASSESSMENT — PAIN DESCRIPTION - PAIN TYPE: TYPE: CHRONIC PAIN

## 2020-01-23 ASSESSMENT — PAIN SCALES - GENERAL: PAINLEVEL_OUTOF10: 1

## 2020-01-23 ASSESSMENT — PAIN DESCRIPTION - LOCATION: LOCATION: BACK

## 2020-01-23 NOTE — PROGRESS NOTES
application, laundering, wearing schedule. -OTHER: Patient was able to ambulate with compression garments on and he denied pain. \"They feel pretty good\" per patient. He was able to ambulate without loss of balance or reports of pain. Decongestive/Therapeutic Exercise  The patient was able to complete Decongestive Therapy exercises (x 10 reps) including bilateral ankle pumps (x 2 sets). The exercises were completed with compression bandages on, without complaints of pain from the patient. The Decongestive Therapy exercises assist with decreasing the swelling by increasing the muscle pumping action of the lymph collectors and by increasing the fluid uptake in the lymphatic system. Patient Education  Education Provided:   -01/23/2020: Refer to Compression garment assessment/fitting above. -12/23/2019: Reviewed goals. -12/20/2019: Reviewed plan of care (waiting on new compression garments) and recommendations for sequential compression pump for home use to keep the swelling down in bilateral lower extremities and lower truncal region. -12/11/2019: Reviewed precautions, recommendations for compression garments with velcro. -12/03/2019: Reviewed goals (Short term goal # 1 met).  -11/26/2019: Reviewed goals and precautions. -11/19/2019: Reviewed plan of care and goals. -11/14/2019: Reviewed plan of care and precautions.  -10/23/2019: Reviewed plan of care, goals, compression bandaging precautions and exercises.    Learner:patient  Method: demonstration, explanation and handout       Outcome: acknowledged understanding of goals/plan of care/ compression garment precautions/exericses, demonstrated understanding and asked questions     GOALS   SHORT-TERM GOALS:  to be met in 6 weeks   X  MET  (Bilateral LE)  Patient will demonstrate a decrease in circumferential measurements of the affected extremity by 10 cm working towards the lymphedema swelling stabilizing and patient being able to get measured and

## 2020-01-28 ENCOUNTER — APPOINTMENT (OUTPATIENT)
Dept: GENERAL RADIOLOGY | Age: 70
End: 2020-01-28
Attending: PAIN MEDICINE
Payer: MEDICARE

## 2020-01-28 ENCOUNTER — HOSPITAL ENCOUNTER (OUTPATIENT)
Age: 70
Setting detail: OUTPATIENT SURGERY
Discharge: HOME | End: 2020-01-28
Attending: PAIN MEDICINE | Admitting: PAIN MEDICINE
Payer: MEDICARE

## 2020-01-28 ENCOUNTER — ANESTHESIA EVENT (OUTPATIENT)
Dept: OPERATING ROOM | Age: 70
End: 2020-01-28
Payer: MEDICARE

## 2020-01-28 ENCOUNTER — ANESTHESIA (OUTPATIENT)
Dept: OPERATING ROOM | Age: 70
End: 2020-01-28
Payer: MEDICARE

## 2020-01-28 VITALS
DIASTOLIC BLOOD PRESSURE: 56 MMHG | WEIGHT: 315 LBS | OXYGEN SATURATION: 96 % | HEIGHT: 74 IN | TEMPERATURE: 97.3 F | RESPIRATION RATE: 18 BRPM | BODY MASS INDEX: 40.43 KG/M2 | SYSTOLIC BLOOD PRESSURE: 116 MMHG | HEART RATE: 75 BPM

## 2020-01-28 VITALS
RESPIRATION RATE: 4 BRPM | OXYGEN SATURATION: 99 % | DIASTOLIC BLOOD PRESSURE: 60 MMHG | SYSTOLIC BLOOD PRESSURE: 132 MMHG

## 2020-01-28 LAB — GLUCOSE BLD-MCNC: 262 MG/DL (ref 70–108)

## 2020-01-28 PROCEDURE — 2709999900 HC NON-CHARGEABLE SUPPLY: Performed by: PAIN MEDICINE

## 2020-01-28 PROCEDURE — 3209999900 FLUORO FOR SURGICAL PROCEDURES

## 2020-01-28 PROCEDURE — 6360000002 HC RX W HCPCS: Performed by: PAIN MEDICINE

## 2020-01-28 PROCEDURE — 3600000054 HC PAIN LEVEL 3 BASE: Performed by: PAIN MEDICINE

## 2020-01-28 PROCEDURE — 3700000001 HC ADD 15 MINUTES (ANESTHESIA): Performed by: PAIN MEDICINE

## 2020-01-28 PROCEDURE — 3700000000 HC ANESTHESIA ATTENDED CARE: Performed by: PAIN MEDICINE

## 2020-01-28 PROCEDURE — 64493 INJ PARAVERT F JNT L/S 1 LEV: CPT | Performed by: PAIN MEDICINE

## 2020-01-28 PROCEDURE — 64494 INJ PARAVERT F JNT L/S 2 LEV: CPT | Performed by: PAIN MEDICINE

## 2020-01-28 PROCEDURE — 7100000010 HC PHASE II RECOVERY - FIRST 15 MIN: Performed by: PAIN MEDICINE

## 2020-01-28 PROCEDURE — 82948 REAGENT STRIP/BLOOD GLUCOSE: CPT

## 2020-01-28 PROCEDURE — 3600000055 HC PAIN LEVEL 3 ADDL 15 MIN: Performed by: PAIN MEDICINE

## 2020-01-28 PROCEDURE — 7100000011 HC PHASE II RECOVERY - ADDTL 15 MIN: Performed by: PAIN MEDICINE

## 2020-01-28 PROCEDURE — 64495 INJ PARAVERT F JNT L/S 3 LEV: CPT | Performed by: PAIN MEDICINE

## 2020-01-28 PROCEDURE — 2500000003 HC RX 250 WO HCPCS: Performed by: PAIN MEDICINE

## 2020-01-28 PROCEDURE — 6360000002 HC RX W HCPCS: Performed by: ANESTHESIOLOGY

## 2020-01-28 RX ORDER — LIDOCAINE HYDROCHLORIDE 10 MG/ML
INJECTION, SOLUTION INFILTRATION; PERINEURAL PRN
Status: DISCONTINUED | OUTPATIENT
Start: 2020-01-28 | End: 2020-01-28 | Stop reason: ALTCHOICE

## 2020-01-28 RX ORDER — METHYLPREDNISOLONE ACETATE 80 MG/ML
INJECTION, SUSPENSION INTRA-ARTICULAR; INTRALESIONAL; INTRAMUSCULAR; SOFT TISSUE PRN
Status: DISCONTINUED | OUTPATIENT
Start: 2020-01-28 | End: 2020-01-28 | Stop reason: ALTCHOICE

## 2020-01-28 RX ORDER — BUPIVACAINE HYDROCHLORIDE 2.5 MG/ML
INJECTION, SOLUTION EPIDURAL; INFILTRATION; INTRACAUDAL PRN
Status: DISCONTINUED | OUTPATIENT
Start: 2020-01-28 | End: 2020-01-28 | Stop reason: ALTCHOICE

## 2020-01-28 RX ORDER — PROPOFOL 10 MG/ML
INJECTION, EMULSION INTRAVENOUS PRN
Status: DISCONTINUED | OUTPATIENT
Start: 2020-01-28 | End: 2020-01-28 | Stop reason: SDUPTHER

## 2020-01-28 RX ADMIN — PROPOFOL 20 MG: 10 INJECTION, EMULSION INTRAVENOUS at 12:30

## 2020-01-28 RX ADMIN — PROPOFOL 20 MG: 10 INJECTION, EMULSION INTRAVENOUS at 12:33

## 2020-01-28 RX ADMIN — PROPOFOL 40 MG: 10 INJECTION, EMULSION INTRAVENOUS at 12:27

## 2020-01-28 RX ADMIN — PROPOFOL 20 MG: 10 INJECTION, EMULSION INTRAVENOUS at 12:35

## 2020-01-28 ASSESSMENT — PAIN SCALES - GENERAL: PAINLEVEL_OUTOF10: 0

## 2020-01-28 ASSESSMENT — PULMONARY FUNCTION TESTS
PIF_VALUE: 0

## 2020-01-28 ASSESSMENT — PAIN DESCRIPTION - DESCRIPTORS: DESCRIPTORS: ACHING;CONSTANT

## 2020-01-28 ASSESSMENT — PAIN - FUNCTIONAL ASSESSMENT: PAIN_FUNCTIONAL_ASSESSMENT: 0-10

## 2020-01-28 NOTE — ANESTHESIA POSTPROCEDURE EVALUATION
Department of Anesthesiology  Postprocedure Note    Patient: Andrey Martinez  MRN: [id number]  YOB: 1950  Date of evaluation: 1/28/2020  Time:  12:49 PM     Procedure Summary     Date:  01/28/20 Room / Location:  [address] [place] [address] 03 / 138 [facility]    Anesthesia Start:  4934 Anesthesia Stop:  7924    Procedure:  Lumbar Facet MBB @ L3-4,4-5,5-S1 bilateral #1 LUMBAR FACET (Bilateral Back) Diagnosis:  (Lumbar spondylosis)    Surgeon:  Sena Ba MD Responsible Provider:  José Manuel Ortiz DO    Anesthesia Type:  MAC ASA Status:  3          Anesthesia Type: MAC    Estelle Phase I:      Estelle Phase II: Estelle Score: 9    Last vitals: Reviewed and per EMR flowsheets.        Anesthesia Post Evaluation    Patient location during evaluation: floor  Patient participation: complete - patient participated  Level of consciousness: awake  Airway patency: patent  Nausea & Vomiting: no vomiting and no nausea  Cardiovascular status: hemodynamically stable  Respiratory status: acceptable  Hydration status: stable

## 2020-01-28 NOTE — ANESTHESIA PRE PROCEDURE
Department of Anesthesiology  Preprocedure Note       Name:  Bianca Lopez   Age:  68 y.o.  :  1950                                          MRN:  [id number]         Date:  2020      Surgeon: Benetta Nissen):  Gurjit Martinez MD    Procedure: Lumbar Facet MBB @ L3-4,4-5,5-S1 bilateral #1 LUMBAR FACET (Bilateral Back)    Medications prior to admission:   Prior to Admission medications    Medication Sig Start Date End Date Taking?  Authorizing Provider   furosemide (LASIX) 40 MG tablet TAKE  2  TABLETS BY MOUTH TWICE DAILY 1/15/20  Yes Anjel Ford MD   carvedilol (COREG) 3.125 MG tablet Take 2 tablets by mouth 2 times daily (with meals) 20  Yes Anjel Ford MD   atorvastatin (LIPITOR) 10 MG tablet TAKE 1 TABLET BY MOUTH ONCE DAILY 19  Yes Brittany Benson MD   enalapril (VASOTEC) 10 MG tablet TAKE 2 TABLETS BY MOUTH TWICE DAILY 10/30/19  Yes Anjel Ford MD   insulin glargine (LANTUS SOLOSTAR) 100 UNIT/ML injection pen INJECT 30 UNITS SUBCUTANEOUSLY ONCE DAILY  Patient taking differently: Inject into the skin nightly INJECT 30 UNITS SUBCUTANEOUSLY ONCE DAILY 10/30/19  Yes Anjel Ford MD   ticagrelor (BRILINTA) 90 MG TABS tablet TAKE ONE TABLET BY MOUTH TWICE DAILY 10/22/19  Yes Anjel Ford MD   metolazone (ZAROXOLYN) 2.5 MG tablet One  Pill on   only 19  Yes Anjel Ford MD   insulin aspart (NOVOLOG FLEXPEN) 100 UNIT/ML injection pen INJECT 12 UNITS SUBCUTANEOUSLY 3 TIMES DAILY BEFORE MEALS 19  Yes Anjel Ford MD   calcipotriene (DOVONEX) 0.005 % cream  19  Yes Historical Provider, MD   metFORMIN (GLUCOPHAGE) 500 MG tablet TAKE TWO TABLETS BY MOUTH TWICE DAILY WITH MEALS 3/25/19  Yes Anjel Ford MD   ferrous sulfate 325 (65 Fe) MG tablet Take 1 tablet by mouth 2 times daily 19  Yes Anjel Ford MD   Multiple Vitamins-Minerals (THERAPEUTIC MULTIVITAMIN-MINERALS) tablet Take 1 tablet by mouth daily

## 2020-01-28 NOTE — PROGRESS NOTES
Blood sugar 262. Pt states that he vomited liquid up at 0400 this morning but has not since that time. Dr. Gt Constantino notified.

## 2020-01-29 ENCOUNTER — CARE COORDINATION (OUTPATIENT)
Dept: CARE COORDINATION | Age: 70
End: 2020-01-29

## 2020-01-29 NOTE — CARE COORDINATION
Dr. Shawanda Estevez called and states he has been having a cough that he can't get rid of. He is asking for recommendations for over the counter cough syrup he should take? He states he is worried to take just anything because of his heart history and blood sugars. Please advise.   Thank you

## 2020-01-30 NOTE — CARE COORDINATION
delsym should be ok but can check with pharmacist as should say sugar free and Otc no issue  .  Can call in tessalon perles if like

## 2020-02-06 ENCOUNTER — OFFICE VISIT (OUTPATIENT)
Dept: CARDIOLOGY CLINIC | Age: 70
End: 2020-02-06
Payer: MEDICARE

## 2020-02-06 ENCOUNTER — CARE COORDINATION (OUTPATIENT)
Dept: CARE COORDINATION | Age: 70
End: 2020-02-06

## 2020-02-06 VITALS
HEART RATE: 71 BPM | WEIGHT: 315 LBS | BODY MASS INDEX: 40.43 KG/M2 | DIASTOLIC BLOOD PRESSURE: 74 MMHG | HEIGHT: 74 IN | SYSTOLIC BLOOD PRESSURE: 138 MMHG

## 2020-02-06 PROCEDURE — G8417 CALC BMI ABV UP PARAM F/U: HCPCS | Performed by: NUCLEAR MEDICINE

## 2020-02-06 PROCEDURE — G8484 FLU IMMUNIZE NO ADMIN: HCPCS | Performed by: NUCLEAR MEDICINE

## 2020-02-06 PROCEDURE — G8427 DOCREV CUR MEDS BY ELIG CLIN: HCPCS | Performed by: NUCLEAR MEDICINE

## 2020-02-06 PROCEDURE — 1036F TOBACCO NON-USER: CPT | Performed by: NUCLEAR MEDICINE

## 2020-02-06 PROCEDURE — 4040F PNEUMOC VAC/ADMIN/RCVD: CPT | Performed by: NUCLEAR MEDICINE

## 2020-02-06 PROCEDURE — 99213 OFFICE O/P EST LOW 20 MIN: CPT | Performed by: NUCLEAR MEDICINE

## 2020-02-06 PROCEDURE — 93000 ELECTROCARDIOGRAM COMPLETE: CPT | Performed by: NUCLEAR MEDICINE

## 2020-02-06 PROCEDURE — 1123F ACP DISCUSS/DSCN MKR DOCD: CPT | Performed by: NUCLEAR MEDICINE

## 2020-02-06 PROCEDURE — 3017F COLORECTAL CA SCREEN DOC REV: CPT | Performed by: NUCLEAR MEDICINE

## 2020-02-06 NOTE — PROGRESS NOTES
Pt here for a 1 yr f/u    EKG done today    Pt c/o chest cramps, sob and lightheadedness    Pt denies PHIL and palpitations     Pt did not bring a medication list. Pt states nothing has changed.

## 2020-02-06 NOTE — PROGRESS NOTES
performed by Remington Velazquez MD at [address]  2010    fumich    TONSILLECTOMY      VASCULAR SURGERY      cabg harvests from left leg     Family History   Problem Relation Age of Onset    Cancer Mother         uterine     Social History     Tobacco Use    Smoking status: Never Smoker    Smokeless tobacco: Never Used   Substance Use Topics    Alcohol use: Yes     Comment: rarely      Current Outpatient Medications   Medication Sig Dispense Refill    insulin aspart (NOVOLOG FLEXPEN) 100 UNIT/ML injection pen INJECT 12 UNITS SUBCUTANEOUSLY 3 TIMES DAILY BEFORE MEALS 15 pen 3    furosemide (LASIX) 40 MG tablet TAKE  2  TABLETS BY MOUTH TWICE DAILY 360 tablet 1    carvedilol (COREG) 3.125 MG tablet Take 2 tablets by mouth 2 times daily (with meals) 120 tablet 5    atorvastatin (LIPITOR) 10 MG tablet TAKE 1 TABLET BY MOUTH ONCE DAILY 30 tablet 0    enalapril (VASOTEC) 10 MG tablet TAKE 2 TABLETS BY MOUTH TWICE DAILY 360 tablet 1    insulin glargine (LANTUS SOLOSTAR) 100 UNIT/ML injection pen INJECT 30 UNITS SUBCUTANEOUSLY ONCE DAILY (Patient taking differently: Inject into the skin nightly INJECT 30 UNITS SUBCUTANEOUSLY ONCE DAILY) 15 pen 1    ticagrelor (BRILINTA) 90 MG TABS tablet TAKE ONE TABLET BY MOUTH TWICE DAILY 60 tablet 11    nitroGLYCERIN (NITROSTAT) 0.4 MG SL tablet Place 1 tablet under the tongue every 5 minutes as needed for Chest pain 25 tablet 3    metolazone (ZAROXOLYN) 2.5 MG tablet One  Pill on  Mondays only 20 tablet 2    calcipotriene (DOVONEX) 0.005 % cream       metFORMIN (GLUCOPHAGE) 500 MG tablet TAKE TWO TABLETS BY MOUTH TWICE DAILY WITH MEALS 360 tablet 1    ferrous sulfate 325 (65 Fe) MG tablet Take 1 tablet by mouth 2 times daily 60 tablet 2    amLODIPine (NORVASC) 5 MG tablet TAKE ONE TABLET BY MOUTH ONCE DAILY 90 tablet 1    aspirin 81 MG tablet Take 1 tablet by mouth daily      acetaminophen (TYLENOL) 500 MG tablet Take 500 mg good peripheral pulses  Neurological:   Awake, alert, oriented. No obvious focal deficits  Musculoskelatal:  No obvious deformities    Assessment:      Diagnosis Orders   1. 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 Essential hypertension  EKG 12 Lead   3. SOB (shortness of breath)  EKG 12 Lead   some symptoms as above  Higher risk patient   Limited patient     Plan:  No follow-ups on file. As above  Non invasive cardiac testing will be ordered to further evaluate for any ischemic or structural heart disease as a cause of the patient symptoms. We will proceed with a Stress Cardiolite test and echo soon. Continue risk factor modification and medical management  Thank you for allowing me to participate in the care of your patient. Please don't hesitate to contact me regarding any further issues related to the patient care    Orders Placed:  Orders Placed This Encounter   Procedures    EKG 12 Lead     Order Specific Question:   Reason for Exam?     Answer: Other       Medications Prescribed:  No orders of the defined types were placed in this encounter. Discussed use, benefit, and side effects of prescribed medications. All patient questions answered. Pt voicedunderstanding. Instructed to continue current medications, diet and exercise. Continue risk factor modification and medical management. Patient agreed with treatment plan. Follow up as directed.     Electronically signedby Yolie Galvez MD on 2/6/2020 at 10:19 AM

## 2020-02-13 ENCOUNTER — HOSPITAL ENCOUNTER (INPATIENT)
Age: 70
LOS: 3 days | Discharge: HOME | DRG: 683 | End: 2020-02-16
Attending: FAMILY MEDICINE | Admitting: FAMILY MEDICINE
Payer: MEDICARE

## 2020-02-13 PROBLEM — N17.9 ACUTE KIDNEY INJURY (HCC): Status: ACTIVE | Noted: 2020-02-13

## 2020-02-13 PROBLEM — N17.9 AKI (ACUTE KIDNEY INJURY) (HCC): Status: ACTIVE | Noted: 2020-02-13

## 2020-02-13 LAB
ALBUMIN SERPL-MCNC: 4.4 G/DL (ref 3.5–5.1)
ALP BLD-CCNC: 64 U/L (ref 38–126)
ALT SERPL-CCNC: 12 U/L (ref 11–66)
ANION GAP SERPL CALCULATED.3IONS-SCNC: 16 MEQ/L (ref 8–16)
ANION GAP SERPL CALCULATED.3IONS-SCNC: 17 MEQ/L (ref 8–16)
AST SERPL-CCNC: 10 U/L (ref 5–40)
BASOPHILS # BLD: 0.6 %
BASOPHILS ABSOLUTE: 0 THOU/MM3 (ref 0–0.1)
BILIRUB SERPL-MCNC: 0.5 MG/DL (ref 0.3–1.2)
BUN BLDV-MCNC: 100 MG/DL (ref 7–22)
BUN BLDV-MCNC: 109 MG/DL (ref 7–22)
CALCIUM SERPL-MCNC: 9.2 MG/DL (ref 8.5–10.5)
CALCIUM SERPL-MCNC: 9.9 MG/DL (ref 8.5–10.5)
CHLORIDE BLD-SCNC: 96 MEQ/L (ref 98–111)
CHLORIDE BLD-SCNC: 97 MEQ/L (ref 98–111)
CO2: 21 MEQ/L (ref 23–33)
CO2: 22 MEQ/L (ref 23–33)
CREAT SERPL-MCNC: 2.4 MG/DL (ref 0.4–1.2)
CREAT SERPL-MCNC: 2.6 MG/DL (ref 0.4–1.2)
EOSINOPHIL # BLD: 2.9 %
EOSINOPHILS ABSOLUTE: 0.2 THOU/MM3 (ref 0–0.4)
ERYTHROCYTE [DISTWIDTH] IN BLOOD BY AUTOMATED COUNT: 12.6 % (ref 11.5–14.5)
ERYTHROCYTE [DISTWIDTH] IN BLOOD BY AUTOMATED COUNT: 41.8 FL (ref 35–45)
GFR SERPL CREATININE-BSD FRML MDRD: 24 ML/MIN/1.73M2
GFR SERPL CREATININE-BSD FRML MDRD: 27 ML/MIN/1.73M2
GLUCOSE BLD-MCNC: 202 MG/DL (ref 70–108)
GLUCOSE BLD-MCNC: 206 MG/DL (ref 70–108)
GLUCOSE BLD-MCNC: 241 MG/DL (ref 70–108)
GLUCOSE BLD-MCNC: 272 MG/DL (ref 70–108)
HCT VFR BLD CALC: 34.8 % (ref 42–52)
HEMOGLOBIN: 11.7 GM/DL (ref 14–18)
IMMATURE GRANS (ABS): 0.02 THOU/MM3 (ref 0–0.07)
IMMATURE GRANULOCYTES: 0.2 %
LYMPHOCYTES # BLD: 21.8 %
LYMPHOCYTES ABSOLUTE: 1.8 THOU/MM3 (ref 1–4.8)
MCH RBC QN AUTO: 31 PG (ref 26–33)
MCHC RBC AUTO-ENTMCNC: 33.6 GM/DL (ref 32.2–35.5)
MCV RBC AUTO: 92.3 FL (ref 80–94)
MONOCYTES # BLD: 8.5 %
MONOCYTES ABSOLUTE: 0.7 THOU/MM3 (ref 0.4–1.3)
NUCLEATED RED BLOOD CELLS: 0 /100 WBC
OSMOLALITY CALCULATION: 310.3 MOSMOL/KG (ref 275–300)
PLATELET # BLD: 160 THOU/MM3 (ref 130–400)
PMV BLD AUTO: 9.9 FL (ref 9.4–12.4)
POTASSIUM REFLEX MAGNESIUM: 5.6 MEQ/L (ref 3.5–5.2)
POTASSIUM SERPL-SCNC: 5.8 MEQ/L (ref 3.5–5.2)
PRO-BNP: 154.1 PG/ML (ref 0–900)
RBC # BLD: 3.77 MILL/MM3 (ref 4.7–6.1)
SEG NEUTROPHILS: 66 %
SEGMENTED NEUTROPHILS ABSOLUTE COUNT: 5.5 THOU/MM3 (ref 1.8–7.7)
SODIUM BLD-SCNC: 134 MEQ/L (ref 135–145)
SODIUM BLD-SCNC: 135 MEQ/L (ref 135–145)
TOTAL PROTEIN: 7.3 G/DL (ref 6.1–8)
TROPONIN T: 0.02 NG/ML
TROPONIN T: < 0.01 NG/ML
WBC # BLD: 8.3 THOU/MM3 (ref 4.8–10.8)

## 2020-02-13 PROCEDURE — 6370000000 HC RX 637 (ALT 250 FOR IP): Performed by: INTERNAL MEDICINE

## 2020-02-13 PROCEDURE — 6360000002 HC RX W HCPCS: Performed by: FAMILY MEDICINE

## 2020-02-13 PROCEDURE — 1200000003 HC TELEMETRY R&B

## 2020-02-13 PROCEDURE — 96361 HYDRATE IV INFUSION ADD-ON: CPT

## 2020-02-13 PROCEDURE — 80053 COMPREHEN METABOLIC PANEL: CPT

## 2020-02-13 PROCEDURE — 2580000003 HC RX 258: Performed by: FAMILY MEDICINE

## 2020-02-13 PROCEDURE — 80048 BASIC METABOLIC PNL TOTAL CA: CPT

## 2020-02-13 PROCEDURE — 83880 ASSAY OF NATRIURETIC PEPTIDE: CPT

## 2020-02-13 PROCEDURE — 82948 REAGENT STRIP/BLOOD GLUCOSE: CPT

## 2020-02-13 PROCEDURE — 96374 THER/PROPH/DIAG INJ IV PUSH: CPT

## 2020-02-13 PROCEDURE — 6370000000 HC RX 637 (ALT 250 FOR IP): Performed by: FAMILY MEDICINE

## 2020-02-13 PROCEDURE — 85025 COMPLETE CBC W/AUTO DIFF WBC: CPT

## 2020-02-13 PROCEDURE — 84484 ASSAY OF TROPONIN QUANT: CPT

## 2020-02-13 PROCEDURE — 99284 EMERGENCY DEPT VISIT MOD MDM: CPT

## 2020-02-13 PROCEDURE — 93005 ELECTROCARDIOGRAM TRACING: CPT | Performed by: FAMILY MEDICINE

## 2020-02-13 PROCEDURE — 36415 COLL VENOUS BLD VENIPUNCTURE: CPT

## 2020-02-13 RX ORDER — SODIUM CHLORIDE 0.9 % (FLUSH) 0.9 %
10 SYRINGE (ML) INJECTION PRN
Status: DISCONTINUED | OUTPATIENT
Start: 2020-02-13 | End: 2020-02-16 | Stop reason: HOSPADM

## 2020-02-13 RX ORDER — SODIUM CHLORIDE 0.9 % (FLUSH) 0.9 %
10 SYRINGE (ML) INJECTION EVERY 12 HOURS SCHEDULED
Status: DISCONTINUED | OUTPATIENT
Start: 2020-02-13 | End: 2020-02-16 | Stop reason: HOSPADM

## 2020-02-13 RX ORDER — CARVEDILOL 6.25 MG/1
6.25 TABLET ORAL 2 TIMES DAILY WITH MEALS
Status: DISCONTINUED | OUTPATIENT
Start: 2020-02-13 | End: 2020-02-16 | Stop reason: HOSPADM

## 2020-02-13 RX ORDER — 0.9 % SODIUM CHLORIDE 0.9 %
1000 INTRAVENOUS SOLUTION INTRAVENOUS ONCE
Status: COMPLETED | OUTPATIENT
Start: 2020-02-13 | End: 2020-02-13

## 2020-02-13 RX ORDER — SODIUM CHLORIDE 9 MG/ML
INJECTION, SOLUTION INTRAVENOUS CONTINUOUS
Status: DISCONTINUED | OUTPATIENT
Start: 2020-02-13 | End: 2020-02-16

## 2020-02-13 RX ORDER — AMLODIPINE BESYLATE 5 MG/1
5 TABLET ORAL DAILY
Status: DISCONTINUED | OUTPATIENT
Start: 2020-02-13 | End: 2020-02-13

## 2020-02-13 RX ORDER — NITROGLYCERIN 0.4 MG/1
0.4 TABLET SUBLINGUAL EVERY 5 MIN PRN
Status: DISCONTINUED | OUTPATIENT
Start: 2020-02-13 | End: 2020-02-16 | Stop reason: HOSPADM

## 2020-02-13 RX ORDER — ENALAPRIL MALEATE 10 MG/1
10 TABLET ORAL 2 TIMES DAILY
Status: DISCONTINUED | OUTPATIENT
Start: 2020-02-13 | End: 2020-02-13

## 2020-02-13 RX ORDER — ACETAMINOPHEN 500 MG
500 TABLET ORAL EVERY 6 HOURS PRN
Status: DISCONTINUED | OUTPATIENT
Start: 2020-02-13 | End: 2020-02-16 | Stop reason: HOSPADM

## 2020-02-13 RX ORDER — DEXTROSE MONOHYDRATE 50 MG/ML
100 INJECTION, SOLUTION INTRAVENOUS PRN
Status: DISCONTINUED | OUTPATIENT
Start: 2020-02-13 | End: 2020-02-16 | Stop reason: HOSPADM

## 2020-02-13 RX ORDER — NICOTINE POLACRILEX 4 MG
15 LOZENGE BUCCAL PRN
Status: DISCONTINUED | OUTPATIENT
Start: 2020-02-13 | End: 2020-02-16 | Stop reason: HOSPADM

## 2020-02-13 RX ORDER — ONDANSETRON 2 MG/ML
4 INJECTION INTRAMUSCULAR; INTRAVENOUS ONCE
Status: COMPLETED | OUTPATIENT
Start: 2020-02-13 | End: 2020-02-13

## 2020-02-13 RX ORDER — DEXTROSE MONOHYDRATE 25 G/50ML
12.5 INJECTION, SOLUTION INTRAVENOUS PRN
Status: DISCONTINUED | OUTPATIENT
Start: 2020-02-13 | End: 2020-02-16 | Stop reason: HOSPADM

## 2020-02-13 RX ORDER — INSULIN GLARGINE 100 [IU]/ML
30 INJECTION, SOLUTION SUBCUTANEOUS NIGHTLY
Status: DISCONTINUED | OUTPATIENT
Start: 2020-02-14 | End: 2020-02-16 | Stop reason: HOSPADM

## 2020-02-13 RX ORDER — SODIUM POLYSTYRENE SULFONATE 15 G/60ML
15 SUSPENSION ORAL; RECTAL ONCE
Status: COMPLETED | OUTPATIENT
Start: 2020-02-13 | End: 2020-02-13

## 2020-02-13 RX ORDER — FERROUS SULFATE 325(65) MG
325 TABLET ORAL 2 TIMES DAILY
Status: DISCONTINUED | OUTPATIENT
Start: 2020-02-13 | End: 2020-02-16 | Stop reason: HOSPADM

## 2020-02-13 RX ORDER — ISOSORBIDE MONONITRATE 30 MG/1
30 TABLET, EXTENDED RELEASE ORAL DAILY
Status: DISCONTINUED | OUTPATIENT
Start: 2020-02-14 | End: 2020-02-13

## 2020-02-13 RX ORDER — ATORVASTATIN CALCIUM 10 MG/1
10 TABLET, FILM COATED ORAL NIGHTLY
Status: DISCONTINUED | OUTPATIENT
Start: 2020-02-13 | End: 2020-02-16 | Stop reason: HOSPADM

## 2020-02-13 RX ORDER — ONDANSETRON 2 MG/ML
4 INJECTION INTRAMUSCULAR; INTRAVENOUS EVERY 6 HOURS PRN
Status: DISCONTINUED | OUTPATIENT
Start: 2020-02-13 | End: 2020-02-16 | Stop reason: HOSPADM

## 2020-02-13 RX ORDER — POTASSIUM CHLORIDE 7.45 MG/ML
10 INJECTION INTRAVENOUS PRN
Status: DISCONTINUED | OUTPATIENT
Start: 2020-02-13 | End: 2020-02-16 | Stop reason: HOSPADM

## 2020-02-13 RX ORDER — ASPIRIN 81 MG/1
81 TABLET ORAL DAILY
Status: DISCONTINUED | OUTPATIENT
Start: 2020-02-14 | End: 2020-02-16 | Stop reason: HOSPADM

## 2020-02-13 RX ADMIN — SODIUM CHLORIDE: 9 INJECTION, SOLUTION INTRAVENOUS at 17:50

## 2020-02-13 RX ADMIN — INSULIN LISPRO 2 UNITS: 100 INJECTION, SOLUTION INTRAVENOUS; SUBCUTANEOUS at 21:30

## 2020-02-13 RX ADMIN — SODIUM CHLORIDE 1000 ML: 9 INJECTION, SOLUTION INTRAVENOUS at 15:33

## 2020-02-13 RX ADMIN — CARVEDILOL 6.25 MG: 6.25 TABLET, FILM COATED ORAL at 21:27

## 2020-02-13 RX ADMIN — ONDANSETRON 4 MG: 2 INJECTION INTRAMUSCULAR; INTRAVENOUS at 15:26

## 2020-02-13 RX ADMIN — FERROUS SULFATE TAB 325 MG (65 MG ELEMENTAL FE) 325 MG: 325 (65 FE) TAB at 21:27

## 2020-02-13 RX ADMIN — TICAGRELOR 90 MG: 90 TABLET ORAL at 21:29

## 2020-02-13 RX ADMIN — SODIUM POLYSTYRENE SULFONATE 15 G: 15 SUSPENSION ORAL; RECTAL at 23:50

## 2020-02-13 RX ADMIN — ATORVASTATIN CALCIUM 10 MG: 10 TABLET, FILM COATED ORAL at 21:27

## 2020-02-13 ASSESSMENT — PAIN SCALES - GENERAL: PAINLEVEL_OUTOF10: 0

## 2020-02-13 NOTE — ED PROVIDER NOTES
[address] COMPLAINT   Chief Complaint   Patient presents with    Dizziness    Emesis        HPI   Nigel Dumont is a 68 y.o. male who presents after near-syncope. Context was pt was getting physical therapy with [facility]. He was being worked on in his legs for a while while supine. Once he got up, he felt severely lightheaded, pale and almost passed out. He did vomit some, and stated he vomited 2 other times this past week. No exacerbating factors noted. His symptoms did get somewhat worse when standing up. He denies headache, vision loss or  Neck pain. Also denies chest pain, abdominal pain or sob. REVIEW OF SYSTEMS   Cardiac: No Chest Pain, denies palpitations  Neurologic: +near syncope; +dizziness; denies vision loss or Headache  Respiratory: No SOB  GI: No abdominal pain or vomiting   General: Denies Fever  All other review of systems otherwise negative.      PAST MEDICAL & SURGICAL HISTORY   Past Medical History:   Diagnosis Date    ASHD (arteriosclerotic heart disease) 2005 2006    stent  baki    Depression     Diabetic peripheral neuropathy ([facility] [address] 2014    Fracture Oct 1984    left lower extremity     HTN (hypertension)     Hypercholesteremia     IDDM (insulin dependent diabetes mellitus) ([facility] [address]     Low back pain     Morbid obesity with BMI of 45.0-49.9, adult ([facility] [address]     Osteoarthritis      Past Surgical History:   Procedure Laterality Date    AMPUTATION Left 9/10/14    partial versus complete left hallux amputation, left great toe amputation    APPENDECTOMY      CARDIAC SURGERY      CERVICAL DISC SURGERY  2000    fusion of C5-C6    CHOLECYSTECTOMY      COLONOSCOPY  2007 and 2011    colonn polyps  lorenzo    CORONARY ANGIOPLASTY WITH STENT PLACEMENT  08/07/2017    [facility] Nayla [facility]   lad    CORONARY ARTERY BYPASS GRAFT  2015 august     dox    EKG 12-LEAD  8/14/2015         FRACTURE SURGERY      left leg    JOINT REPLACEMENT      bilateral knees gurvinder  PAIN MANAGEMENT PROCEDURE Bilateral 1/28/2020    Lumbar Facet MBB @ L3-4,4-5,5-S1 bilateral #1 LUMBAR FACET performed by Sena Ba MD at [address] [facility] N/A 12/28/2018    T7-T9 DECOMPRESSION, T7-T9 POSTERIOR FUSION performed by Rohit Cornejo MD at [place] [address]      VASCULAR SURGERY      cabg harvests from left leg      CURRENT MEDICATIONS   Current Outpatient Rx   Medication Sig Dispense Refill    insulin aspart (NOVOLOG FLEXPEN) 100 UNIT/ML injection pen INJECT 12 UNITS SUBCUTANEOUSLY 3 TIMES DAILY BEFORE MEALS 15 pen 3    furosemide (LASIX) 40 MG tablet TAKE  2  TABLETS BY MOUTH TWICE DAILY 360 tablet 1    enalapril (VASOTEC) 10 MG tablet TAKE 2 TABLETS BY MOUTH TWICE DAILY 360 tablet 1    insulin glargine (LANTUS SOLOSTAR) 100 UNIT/ML injection pen INJECT 30 UNITS SUBCUTANEOUSLY ONCE DAILY (Patient taking differently: Inject into the skin nightly INJECT 30 UNITS SUBCUTANEOUSLY ONCE DAILY) 15 pen 1    ticagrelor (BRILINTA) 90 MG TABS tablet TAKE ONE TABLET BY MOUTH TWICE DAILY 60 tablet 11    metFORMIN (GLUCOPHAGE) 500 MG tablet TAKE TWO TABLETS BY MOUTH TWICE DAILY WITH MEALS 360 tablet 1    ferrous sulfate 325 (65 Fe) MG tablet Take 1 tablet by mouth 2 times daily 60 tablet 2    Multiple Vitamins-Minerals (THERAPEUTIC MULTIVITAMIN-MINERALS) tablet Take 1 tablet by mouth daily 30 tablet 11    aspirin 81 MG tablet Take 1 tablet by mouth daily      isosorbide mononitrate (IMDUR) 30 MG extended release tablet Take 1 tablet by mouth daily 30 tablet 3    carvedilol (COREG) 3.125 MG tablet Take 2 tablets by mouth 2 times daily (with meals) 120 tablet 5    atorvastatin (LIPITOR) 10 MG tablet TAKE 1 TABLET BY MOUTH ONCE DAILY 30 tablet 0    nitroGLYCERIN (NITROSTAT) 0.4 MG SL tablet Place 1 tablet under the tongue every 5 minutes as needed for Chest pain 25 tablet 3   

## 2020-02-14 ENCOUNTER — APPOINTMENT (OUTPATIENT)
Dept: ULTRASOUND IMAGING | Age: 70
DRG: 683 | End: 2020-02-14
Payer: MEDICARE

## 2020-02-14 LAB
ANION GAP SERPL CALCULATED.3IONS-SCNC: 13 MEQ/L (ref 8–16)
ANION GAP SERPL CALCULATED.3IONS-SCNC: 15 MEQ/L (ref 8–16)
BASOPHILS # BLD: 0.5 %
BASOPHILS ABSOLUTE: 0 THOU/MM3 (ref 0–0.1)
BUN BLDV-MCNC: 85 MG/DL (ref 7–22)
CALCIUM SERPL-MCNC: 9 MG/DL (ref 8.5–10.5)
CHLORIDE BLD-SCNC: 100 MEQ/L (ref 98–111)
CHLORIDE BLD-SCNC: 99 MEQ/L (ref 98–111)
CO2: 23 MEQ/L (ref 23–33)
CO2: 25 MEQ/L (ref 23–33)
CREAT SERPL-MCNC: 2 MG/DL (ref 0.4–1.2)
CREATININE URINE: 50.8 MG/DL
EKG ATRIAL RATE: 73 BPM
EKG P AXIS: 73 DEGREES
EKG P-R INTERVAL: 214 MS
EKG Q-T INTERVAL: 418 MS
EKG QRS DURATION: 140 MS
EKG QTC CALCULATION (BAZETT): 460 MS
EKG R AXIS: -38 DEGREES
EKG T AXIS: 68 DEGREES
EKG VENTRICULAR RATE: 73 BPM
EOSINOPHIL # BLD: 3 %
EOSINOPHILS ABSOLUTE: 0.2 THOU/MM3 (ref 0–0.4)
ERYTHROCYTE [DISTWIDTH] IN BLOOD BY AUTOMATED COUNT: 12.4 % (ref 11.5–14.5)
ERYTHROCYTE [DISTWIDTH] IN BLOOD BY AUTOMATED COUNT: 42 FL (ref 35–45)
GFR SERPL CREATININE-BSD FRML MDRD: 33 ML/MIN/1.73M2
GLUCOSE BLD-MCNC: 196 MG/DL (ref 70–108)
GLUCOSE BLD-MCNC: 199 MG/DL (ref 70–108)
GLUCOSE BLD-MCNC: 240 MG/DL (ref 70–108)
GLUCOSE BLD-MCNC: 268 MG/DL (ref 70–108)
GLUCOSE BLD-MCNC: 333 MG/DL (ref 70–108)
HCT VFR BLD CALC: 32.5 % (ref 42–52)
HEMOGLOBIN: 10.8 GM/DL (ref 14–18)
IMMATURE GRANS (ABS): 0.03 THOU/MM3 (ref 0–0.07)
IMMATURE GRANULOCYTES: 0.4 %
LV EF: 58 %
LVEF MODALITY: NORMAL
LYMPHOCYTES # BLD: 26.8 %
LYMPHOCYTES ABSOLUTE: 2 THOU/MM3 (ref 1–4.8)
MCH RBC QN AUTO: 30.9 PG (ref 26–33)
MCHC RBC AUTO-ENTMCNC: 33.2 GM/DL (ref 32.2–35.5)
MCV RBC AUTO: 92.9 FL (ref 80–94)
MONOCYTES # BLD: 8 %
MONOCYTES ABSOLUTE: 0.6 THOU/MM3 (ref 0.4–1.3)
NUCLEATED RED BLOOD CELLS: 0 /100 WBC
PLATELET # BLD: 154 THOU/MM3 (ref 130–400)
PMV BLD AUTO: 10 FL (ref 9.4–12.4)
POTASSIUM REFLEX MAGNESIUM: 5 MEQ/L (ref 3.5–5.2)
POTASSIUM REFLEX MAGNESIUM: 5.5 MEQ/L (ref 3.5–5.2)
PROT/CREAT RATIO, UR: 0.08
PROTEIN, URINE: 4.2 MG/DL
RBC # BLD: 3.5 MILL/MM3 (ref 4.7–6.1)
SEG NEUTROPHILS: 61.3 %
SEGMENTED NEUTROPHILS ABSOLUTE COUNT: 4.7 THOU/MM3 (ref 1.8–7.7)
SODIUM BLD-SCNC: 137 MEQ/L (ref 135–145)
SODIUM BLD-SCNC: 138 MEQ/L (ref 135–145)
WBC # BLD: 7.6 THOU/MM3 (ref 4.8–10.8)

## 2020-02-14 PROCEDURE — 36415 COLL VENOUS BLD VENIPUNCTURE: CPT

## 2020-02-14 PROCEDURE — 93005 ELECTROCARDIOGRAM TRACING: CPT | Performed by: FAMILY MEDICINE

## 2020-02-14 PROCEDURE — 93010 ELECTROCARDIOGRAM REPORT: CPT | Performed by: INTERNAL MEDICINE

## 2020-02-14 PROCEDURE — 82570 ASSAY OF URINE CREATININE: CPT

## 2020-02-14 PROCEDURE — 99223 1ST HOSP IP/OBS HIGH 75: CPT | Performed by: INTERNAL MEDICINE

## 2020-02-14 PROCEDURE — 84156 ASSAY OF PROTEIN URINE: CPT

## 2020-02-14 PROCEDURE — 80048 BASIC METABOLIC PNL TOTAL CA: CPT

## 2020-02-14 PROCEDURE — 6360000002 HC RX W HCPCS: Performed by: FAMILY MEDICINE

## 2020-02-14 PROCEDURE — 96372 THER/PROPH/DIAG INJ SC/IM: CPT

## 2020-02-14 PROCEDURE — 76770 US EXAM ABDO BACK WALL COMP: CPT

## 2020-02-14 PROCEDURE — 2580000003 HC RX 258: Performed by: FAMILY MEDICINE

## 2020-02-14 PROCEDURE — 6370000000 HC RX 637 (ALT 250 FOR IP): Performed by: FAMILY MEDICINE

## 2020-02-14 PROCEDURE — 85025 COMPLETE CBC W/AUTO DIFF WBC: CPT

## 2020-02-14 PROCEDURE — 90686 IIV4 VACC NO PRSV 0.5 ML IM: CPT | Performed by: FAMILY MEDICINE

## 2020-02-14 PROCEDURE — 93306 TTE W/DOPPLER COMPLETE: CPT

## 2020-02-14 PROCEDURE — G0008 ADMIN INFLUENZA VIRUS VAC: HCPCS | Performed by: FAMILY MEDICINE

## 2020-02-14 PROCEDURE — 99222 1ST HOSP IP/OBS MODERATE 55: CPT | Performed by: INTERNAL MEDICINE

## 2020-02-14 PROCEDURE — 1200000003 HC TELEMETRY R&B

## 2020-02-14 PROCEDURE — 80051 ELECTROLYTE PANEL: CPT

## 2020-02-14 PROCEDURE — 82948 REAGENT STRIP/BLOOD GLUCOSE: CPT

## 2020-02-14 RX ADMIN — TICAGRELOR 90 MG: 90 TABLET ORAL at 20:45

## 2020-02-14 RX ADMIN — ATORVASTATIN CALCIUM 10 MG: 10 TABLET, FILM COATED ORAL at 20:45

## 2020-02-14 RX ADMIN — FERROUS SULFATE TAB 325 MG (65 MG ELEMENTAL FE) 325 MG: 325 (65 FE) TAB at 20:45

## 2020-02-14 RX ADMIN — TICAGRELOR 90 MG: 90 TABLET ORAL at 08:38

## 2020-02-14 RX ADMIN — ENOXAPARIN SODIUM 40 MG: 30 INJECTION SUBCUTANEOUS at 20:45

## 2020-02-14 RX ADMIN — CARVEDILOL 6.25 MG: 6.25 TABLET, FILM COATED ORAL at 08:34

## 2020-02-14 RX ADMIN — ASPIRIN 81 MG: 81 TABLET ORAL at 08:38

## 2020-02-14 RX ADMIN — INSULIN GLARGINE 30 UNITS: 100 INJECTION, SOLUTION SUBCUTANEOUS at 20:46

## 2020-02-14 RX ADMIN — CARVEDILOL 6.25 MG: 6.25 TABLET, FILM COATED ORAL at 17:52

## 2020-02-14 RX ADMIN — INSULIN LISPRO 2 UNITS: 100 INJECTION, SOLUTION INTRAVENOUS; SUBCUTANEOUS at 12:39

## 2020-02-14 RX ADMIN — INSULIN LISPRO 2 UNITS: 100 INJECTION, SOLUTION INTRAVENOUS; SUBCUTANEOUS at 20:45

## 2020-02-14 RX ADMIN — SODIUM CHLORIDE: 9 INJECTION, SOLUTION INTRAVENOUS at 07:14

## 2020-02-14 RX ADMIN — INSULIN LISPRO 3 UNITS: 100 INJECTION, SOLUTION INTRAVENOUS; SUBCUTANEOUS at 17:52

## 2020-02-14 RX ADMIN — SODIUM CHLORIDE: 9 INJECTION, SOLUTION INTRAVENOUS at 20:46

## 2020-02-14 RX ADMIN — INSULIN LISPRO 1 UNITS: 100 INJECTION, SOLUTION INTRAVENOUS; SUBCUTANEOUS at 08:40

## 2020-02-14 RX ADMIN — FERROUS SULFATE TAB 325 MG (65 MG ELEMENTAL FE) 325 MG: 325 (65 FE) TAB at 08:34

## 2020-02-14 RX ADMIN — INFLUENZA A VIRUS A/BRISBANE/02/2018 IVR-190 (H1N1) ANTIGEN (PROPIOLACTONE INACTIVATED), INFLUENZA A VIRUS A/KANSAS/14/2017 X-327 (H3N2) ANTIGEN (PROPIOLACTONE INACTIVATED), INFLUENZA B VIRUS B/MARYLAND/15/2016 ANTIGEN (PROPIOLACTONE INACTIVATED), INFLUENZA B VIRUS B/PHUKET/3073/2013 BVR-1B ANTIGEN (PROPIOLACTONE INACTIVATED) 0.5 ML: 15; 15; 15; 15 INJECTION, SUSPENSION INTRAMUSCULAR at 08:38

## 2020-02-14 ASSESSMENT — PAIN SCALES - GENERAL
PAINLEVEL_OUTOF10: 0
PAINLEVEL_OUTOF10: 0

## 2020-02-14 NOTE — PROGRESS NOTES
Pt admitted to  6K23 from ED. Complaints: Dizziness. IV none infusing into the forearm right, condition patent and no redness. IV site free of s/s of infection or infiltration. Vital signs obtained. Assessment and data collection initiated. Two nurse skin assessment performed by Colin Stein and \[facility] [place] [facility]\"". Oriented to room. Policies and procedures for 6K explained. Neema CHUA discussed hourly rounding with patient addressing 5 P's. Fall prevention and safety brochure discussed with patient. Bed alarm on. Call light in reach. Explained patients right to have family, representative or physician notified of their admission. Patient has Requested for physician to be notified. Patient has Declined for family/representative to be notified. All questions answered with no further questions at this time.

## 2020-02-14 NOTE — PROGRESS NOTES
Nutrition Assessment (Low Risk)    Type and Reason for Visit: Initial, Positive Nutrition Screen, Patient Education(unplanned weight loss, poor apppetite)    Nutrition Recommendations:   Continue current diet. Nutrition Assessment:  Patient assessed for nutritional risk. Deemed to be at low risk at this time. Pt reports good appetite prior to admit and now. Diuretic increased ~ 3 months ago so weight loss was intentional.  Diet recommendations reinforced. BUN 85, Creatinine 2, Glucose 199, HgbA1c 7.9%. Hx: CHF, DB, CABG, chronic lymphedema, suspected CKD per nephrologist's note. Will continue to monitor for changes in status.       Malnutrition Assessment:  · Malnutrition Status: No malnutrition    Nutrition Risk Level   Risk Level: Low    Nutrition Diagnosis:   · Problem: Food and nutrition-related knowledge deficit  · Etiology: Lack of prior nutrition-related education    Signs and symptoms: Patient report of    Nutrition Intervention:  Food and/or Delivery: Continue current diet  Nutrition Education/Counseling/Coordination of Care:  Continued Inpatient Monitoring, Education Completed, Coordination of Care(Carb controlled, cardiac, renal, CHF diet recommendations, handouts provided as well 2/14/20)      Electronically signed by Rin Stallings RD, LD on 2/14/20 at 3:33 PM    Contact Number: [phone]

## 2020-02-14 NOTE — PROGRESS NOTES
See history and physical     IMPRESSION     acute kidney injury     moderate dehydration     ashd with elevated troponin     htn     niddm with long term insulin       venous insufficiency      chronic  Back pain    PLAN   Will admit and hydrate and  Use sliding scale  Of insulin.  Hold ace and diuretic and ask renal and  cardio to see

## 2020-02-14 NOTE — CARE COORDINATION
2/14/20, 8:56 AM  DISCHARGE PLANNING EVALUATION:    Malachi Gonzalez       Admitted from: ER 2/13/2020/ [address] day: 1   Location: [facility]- Reason for admit: RAUL (acute kidney injury) ([facility] [facility] [N17.9]  Acute kidney injury ([facility] [address] [N17.9] Status: IP  Admit order signed?: yes  PMH:  has a past medical history of ASHD (arteriosclerotic heart disease), Depression, Diabetic peripheral neuropathy ([facility] [address] Fracture, HTN (hypertension), Hypercholesteremia, IDDM (insulin dependent diabetes mellitus) ([facility] [address] Low back pain, Morbid obesity with BMI of 45.0-49.9, adult ([facility] [address] and Osteoarthritis. Medications:  Scheduled Meds:   aspirin  81 mg Oral Daily    atorvastatin  10 mg Oral Nightly    carvedilol  6.25 mg Oral BID WC    ferrous sulfate  325 mg Oral BID    insulin glargine  30 Units Subcutaneous Nightly    ticagrelor  90 mg Oral BID    sodium chloride flush  10 mL Intravenous 2 times per day    enoxaparin  30 mg Subcutaneous Q24H    insulin lispro  0-6 Units Subcutaneous TID WC    insulin lispro  0-3 Units Subcutaneous Nightly     Continuous Infusions:   sodium chloride 75 mL/hr at 02/14/20 0714    dextrose        Pertinent Info/Orders/Treatment Plan:  K+ 5.5, creat 2.0. Nephrology consult pending. Patient had one elev trop, cardiology consult pending. Diet: DIET CARDIAC; No Added Salt (3-4 GM); Low Potassium   Smoking status:  reports that he has never smoked.  He has never used smokeless tobacco.   PCP: Gia Steele MD  Readmission 30 days or less: no  Readmission Risk Score: 13%    Discharge Planning Evaluation  Current Residence:  Private Residence  Living Arrangements:  Alone   Support Systems:  Family Members  Current Services PTA:     Potential Assistance Needed:  N/A  Potential Assistance Purchasing Medications:  No  Does patient want to participate in local refill/ meds to beds program?  Yes  Type of Home Care Services:  None  Patient expects to be discharged to:  Home alone  Expected

## 2020-02-14 NOTE — FLOWSHEET NOTE
Pt was alone at the time of the visit. He was dealing with acute kidney injury. He was in good mood and was hopeful. He told me about his wife death nine years ago and how he cared for her. He wanted prayer to cope and heal and he was anointed. 02/14/20 1809   Encounter Summary   Services provided to: Patient   Referral/Consult From: [facility] Family members   Place [facility]   Continue Visiting Yes  (2/14 )   Complexity of Encounter Moderate   Length of Encounter 15 minutes   Spiritual/Religion   Type Spiritual support   Assessment Approachable;Calm   Intervention Nurtured hope;Prayer; Active listening;Empowerment;Sustaining presence/ Ministry of presence   Outcome Connection/belonging;Expressed gratitude;Encouraged; Hopeful;Receptive; Expressed regrets   Sacraments   Sacrament of Sick-Anointing Anointed

## 2020-02-14 NOTE — CONSULTS
[facility]    [address]  2/14/2020 9:00 AM    Pt Name:    Deepali Daniel  MRN:     [id number]   [id number]  YOB: 1950  Admit Date:    2/13/2020  2:57 PM  Primary Care Physician:  Azam Walton MD        Reason for Consult:  RAUL    History:   The patient is a 68 y.o. male with history of diabetes, hypertension, coronary artery disease status post CABG, lymphedema, probable CKD presented to the hospital with presyncopal episode. Patient was at his outpatient lymphedema clinic where he states he stood up and almost passed out he came to the emergency department. No documented hypotensive episodes. Labs in the ER showed a BUN of 109 and a serum creatinine of 2.6 and a serum potassium of 5.6. His labs from May and June showed a serum creatinine of 1.6 to 1.8 mg/dL. He has never seen a nephrologist before    He has increased his Lasix in the past 3 months to 80 mg twice daily to help his chronic lymphedema which he states has helped. He does have intermittent chronic diarrhea. Denies NSAID use or recent antibiotics or IV contrast.  Was started on IV fluids, his ACE inhibitor and diuretics were held, and renal function is improving. Potassium still borderline elevated at 5.5.     Past Medical History:  Past Medical History:   Diagnosis Date    ASHD (arteriosclerotic heart disease) 2005 2006    stent  baki    Depression     Diabetic peripheral neuropathy ([facility] [address] [facility] 2014    Fracture Oct 1984    left lower extremity     HTN (hypertension)     Hypercholesteremia     IDDM (insulin dependent diabetes mellitus) ([facility] [facility]     Low back pain     Morbid obesity with BMI of 45.0-49.9, adult ([facility] [address] [facility]     Osteoarthritis        Past Surgical History:  Past Surgical History:   Procedure Laterality Date    AMPUTATION Left 9/10/14    partial versus complete left hallux amputation, left great toe amputation    APPENDECTOMY [facility] SURGERY  2000    fusion of C5-C6    CHOLECYSTECTOMY      COLONOSCOPY  2007 and 2011    colonn polyps  lorenzo    CORONARY ANGIOPLASTY WITH STENT PLACEMENT  08/07/2017    Dr Karen Monroe @ [facility]   lad    CORONARY ARTERY BYPASS GRAFT  2015 august     dox    EKG 12-LEAD  8/14/2015         FRACTURE SURGERY      left leg    JOINT REPLACEMENT      bilateral knees gurvinder    PAIN MANAGEMENT PROCEDURE Bilateral 1/28/2020    Lumbar Facet MBB @ L3-4,4-5,5-S1 bilateral #1 LUMBAR FACET performed by Magdalena Banuelos MD at [facility] N/A 12/28/2018    T7-T9 DECOMPRESSION, T7-T9 POSTERIOR FUSION performed by Jacqueline Obregon MD at [address] [place] [address] SURGERY  2010    fumich    TONSILLECTOMY      VASCULAR SURGERY      cabg harvests from left leg       Family History:  Family History   Problem Relation Age of Onset    Cancer Mother         uterine       Social History:  Social History     Socioeconomic History    Marital status:       Spouse name: Not on file    Number of children: 2    Years of education: Not on file    Highest education level: Not on file   Occupational History    Not on file   Social Needs    Financial resource strain: Not on file    Food insecurity:     Worry: Not on file     Inability: Not on file    Transportation needs:     Medical: Not on file     Non-medical: Not on file   Tobacco Use    Smoking status: Never Smoker    Smokeless tobacco: Never Used   Substance and Sexual Activity    Alcohol use: Yes     Comment: rarely    Drug use: No    Sexual activity: Never   Lifestyle    Physical activity:     Days per week: Not on file     Minutes per session: Not on file    Stress: Not on file   Relationships    Social connections:     Talks on phone: Not on file     Gets together: Not on file     Attends Pent[employer]al service: Not on file     Active member of club or organization: Not on file     Attends meetings of clubs or organizations: Not on file     Relationship status: Not on file    Intimate partner violence:     Fear of current or ex partner: Not on file     Emotionally abused: Not on file     Physically abused: Not on file     Forced sexual activity: Not on file   Other Topics Concern    Not on file   Social History Narrative    Not on file       Home Meds:  Prior to Admission medications    Medication Sig Start Date End Date Taking?  Authorizing Provider   insulin aspart (NOVOLOG FLEXPEN) 100 UNIT/ML injection pen INJECT 12 UNITS SUBCUTANEOUSLY 3 TIMES DAILY BEFORE MEALS 2/4/20  Yes Azam Walton MD   furosemide (LASIX) 40 MG tablet TAKE  2  TABLETS BY MOUTH TWICE DAILY 1/15/20  Yes Azam Walton MD   carvedilol (COREG) 3.125 MG tablet Take 2 tablets by mouth 2 times daily (with meals) 1/9/20  Yes Azam Walton MD   atorvastatin (LIPITOR) 10 MG tablet TAKE 1 TABLET BY MOUTH ONCE DAILY 12/30/19  Yes Julio Cesar Moya MD   enalapril (VASOTEC) 10 MG tablet TAKE 2 TABLETS BY MOUTH TWICE DAILY 10/30/19  Yes Azam Walton MD   insulin glargine (LANTUS SOLOSTAR) 100 UNIT/ML injection pen INJECT 30 UNITS SUBCUTANEOUSLY ONCE DAILY  Patient taking differently: Inject into the skin nightly INJECT 30 UNITS SUBCUTANEOUSLY ONCE DAILY 10/30/19  Yes Azam Walton MD   ticagrelor (BRILINTA) 90 MG TABS tablet TAKE ONE TABLET BY MOUTH TWICE DAILY 10/22/19  Yes Azam Walton MD   metFORMIN (GLUCOPHAGE) 500 MG tablet TAKE TWO TABLETS BY MOUTH TWICE DAILY WITH MEALS 3/25/19  Yes Azam Walton MD   ferrous sulfate 325 (65 Fe) MG tablet Take 1 tablet by mouth 2 times daily 1/8/19  Yes Azam Walton MD   Multiple Vitamins-Minerals (THERAPEUTIC MULTIVITAMIN-MINERALS) tablet Take 1 tablet by mouth daily 1/8/19 2/13/20 Yes Azam Walton MD   aspirin 81 MG tablet Take 1 tablet by mouth daily 8/8/17  Yes Julio Cesar Moya MD   nitroGLYCERIN (NITROSTAT) 0.4 MG SL tablet Place 1 tablet under the tongue every 5 minutes as needed for Chest pain 8/14/19   Clearnce Merlin, MD   acetaminophen (TYLENOL) 500 MG tablet Take 500 mg by mouth as needed for Pain    Historical Provider, MD       Review of Systems:  Constitutional: no fever or chills +presyncopal episode  Head: No headaches  Eyes: no blurry vision, no discharge  Ears: no ear pain or hearing changes  Nose: no runny nose or epistaxis  Respiratory: +dyspnea on exertion  Cardiovascular: no chest pain, chronic lymphedema  GI: no nausea, no vomiting, chronic diarrhea  : denies any hematuria, no flank pain  Skin: no rash  Musculoskeletal: no joint pain, moves all ext  Neuro: no tremor, no slurred speech  Psychiatric: stable mood, no depression or insomnia    Current Meds:  Infusion:    sodium chloride 75 mL/hr at 02/14/20 0714    dextrose       Meds:    aspirin  81 mg Oral Daily    atorvastatin  10 mg Oral Nightly    carvedilol  6.25 mg Oral BID WC    ferrous sulfate  325 mg Oral BID    insulin glargine  30 Units Subcutaneous Nightly    ticagrelor  90 mg Oral BID    sodium chloride flush  10 mL Intravenous 2 times per day    enoxaparin  30 mg Subcutaneous Q24H    insulin lispro  0-6 Units Subcutaneous TID WC    insulin lispro  0-3 Units Subcutaneous Nightly     Meds prn: acetaminophen, nitroGLYCERIN, sodium chloride flush, potassium chloride, magnesium hydroxide, ondansetron, glucose, dextrose, glucagon (rDNA), dextrose     Allergies/Intolerances: ALLERGIES: Horse-derived products    24HR INTAKE/OUTPUT:      Intake/Output Summary (Last 24 hours) at 2/14/2020 0900  Last data filed at 2/14/2020 0530  Gross per 24 hour   Intake 4104.3 ml   Output 2275 ml   Net 1829.3 ml     I/O last 3 completed shifts: In: 4104.3 [P.O.:2300; I.V.:804.3; IV Piggyback:1000]  Out: 2275 [Urine:2275]  No intake/output data recorded.   Admission weight: (!) 336 lb (152.4 kg)  Wt Readings from Last 3 Encounters:   02/14/20 (!) 345 lb 9.6 oz (156.8 kg)   02/06/20 (!) 348 lb 6.4 oz (158 kg)   01/28/20 (!) 365 lb (165.6 kg)     Body mass index is 44.37 kg/m². Physical Examination:  VITALS:  BP (!) 111/57   Pulse 70   Temp 98.3 °F (36.8 °C) (Oral)   Resp 18   Ht 6' 2\" (1.88 m)   Wt (!) 345 lb 9.6 oz (156.8 kg)   SpO2 98%   BMI 44.37 kg/m²   Weight:   Wt Readings from Last 3 Encounters:   02/14/20 (!) 345 lb 9.6 oz (156.8 kg)   02/06/20 (!) 348 lb 6.4 oz (158 kg)   01/28/20 (!) 365 lb (165.6 kg)     Constitutional and General Appearance: alert and cooperative with exam, appears comfortable, no distress, obese  Eyes: no icteric sclera, no pallor conjunctiva, no discharge seen from either eye  Ears and Nose: normal external appearance of left and right ear and nose. No active drainage from nose. Oral: moist oral mucus membranes  Neck: No jugular venous distention, appears symmetric, good ROM  Lungs: Air entry B/L, no crackles or rales, no use of accessory muscles  Heart: regular rate, S1, S2  Extremities: chronic lymphedema  GI: soft, non-tender, no guarding  Skin: no rash seen on exposed extremities  Musculo: moves all extremities  Neuro: no slurred speech, no facial drooping, no asterixis  Psychiatric: Awake, alert, Oriented    Lab Data  CBC:   Recent Labs     02/13/20  1515 02/14/20  0625   WBC 8.3 7.6   HGB 11.7* 10.8*   HCT 34.8* 32.5*    154     BMP:  Recent Labs     02/13/20  1515 02/13/20 2034 02/14/20  0625    134* 138   K 5.6* 5.8* 5.5*   CL 97* 96* 100   CO2 21* 22* 23   * 100* 85*   CREATININE 2.6* 2.4* 2.0*   GLUCOSE 202* 241* 199*   CALCIUM 9.9 9.2 9.0     PTH: @PTH@  TSH: No results for input(s): TSH in the last 72 hours. HgBa1c: No results for input(s): LABA1C in the last 72 hours.   Hepatic:   Recent Labs     02/13/20  1515   LABALBU 4.4   AST 10   ALT 12   BILITOT 0.5   ALKPHOS 64     ABGs: No results found for: PHART, PO2ART, WRL8ACF  Troponin: No results for input(s): TROPONINI in the last 72 hours.  BNP: No results for input(s): BNP in the last 72 hours. Old labs reviewed. Impression and Plan:  1. RAUL on probable CKD III due to volume depletion from diuretics, diarrhea compounded by ACE-I: improving  2. Hyperkalemia due to RAUL, ACE-I  3. CAD/CABG  4. Elevated troponin  5. HTN: stable  6. DM  7. Chronic lymphedema with venous insufficiency    Continue to hold diuretic and ACE-I. Continue with IV fluids. Recheck potassium level at noon. Low potassium diet. Will check baseline renal US as it does appear he has some underlying CKD. Will also check for proteinuria due to hx of DM. Thank you for allowing me to participate in the care of this patient. Please feel free to call me if you have any questions.      Electronically signed by Shaun Arteaga DO on 2/14/20 at 9:00 AM    [facility]

## 2020-02-14 NOTE — PROCEDURES
Ekg was given to [facility] Have you had a colonoscopy LESS THAN 3 years ago?   * If YES, answer these questions*:       NO    1. Did patient have a prior colonic polyp in a previous surveillance/diagnostic colonoscopy and is 18 years or older on date of encounter?       YES  2. Documentation of < 3 year interval since the patients last colonoscopy due to medical reasons (eg., last colonoscopy incomplete, last colonoscopy had inadequate prep, piecemeal removal of adenomas, or last colonoscopy found > 10 adenomas) ?               LAST COLONOSCOPY 2012

## 2020-02-14 NOTE — H&P
troponin level was normal.  However, electrolytes noted sodium was  134, potassium was high at 5.8, chloride 96, CO2 22, glucose was high at  241, his BUN was 100, and creatinine was 2.4. Because of the above, the  patient now admitted for further evaluation and treatment at this time. PAST MEDICAL HISTORY:  MEDICATIONS:  Notes medications prior to admission included NovoLog Pen  along with Lantus. He is on Lasix 40 mg a day, apparently he was taking  two tabs twice a day or 80 mg twice a day. On Coreg 3.125 mg one tab  twice a day, Lipitor 10 mg a day, Vasotec 10 mg a day, Lantus 35 units  at night along with Brilinta, metformin, multivitamin, iron supplement,  aspirin, and sublingual nitroglycerin. ALLERGIES:  Are to HORSE-DERIVED SERUM VACCINES. SURGERIES:  Notes he has history of coronary artery bypass grafting; T  and A; spine surgery, lower back; rotator cuff surgery; thoracic spine  surgery in 2018, T7, T9; joint replacement, both knees; EKG; bypass  surgery without significant problems; laparoscopic cholecystectomy;  underlying some cervical disk surgery; appendectomy; and amputation  being present. HOSPITALIZATIONS:  For all the above. ILLNESSES:  Does have history of non-insulin-dependent diabetes with  long-term use of insulin, hypertension, hyperlipidemia, peripheral  neuropathy, underlying depression, morbid obesity, NIDDM with long term use  Of insulin, and generalized arthritis. SOCIAL HISTORY:  Notes the patient never smoked, nonuse of tobacco  products, has used some alcohol on occasion. FAMILY HISTORY:  Notes unsure of cause of death of his father. Mother  has some underlying cancer, although not sure of type, but otherwise  stable. REVIEW OF SYSTEMS:  CONSTITUTIONAL:  Notes from a constitutional standpoint, he has had  increased amount of weakness and lightheadedness being present. EYES:  Without any blurred vision.   EARS, NOSE, AND THROAT:  Notes no excessive congestion symptoms. Denies  ear pain. No nasal congestion symptoms being present. NECK:  Without complaints of pain or discomfort. CARDIAC:  No chest pressure, discomfort. Has sensed sometimes some  palpitations slightly increased, but did not feel excessive chest  pressure or discomfort. Peripheral edema noticed. PULMONARY:  No shortness of breath or cough being present. GI:  Some loose stools, not excessive. Has not seemed to have excessive  change in stools, maybe a little bit loose. No significant heartburn or  indigestion. RENAL:  Without voiding problems. SKIN:  Without lesions. MUSCULOSKELETAL:  Otherwise, all changes noted. Review of systems, otherwise no other change noted. PHYSICAL EXAMINATION:  VITAL SIGNS:  Notes temperature is 96.3, pulse 67, respiratory rate 18,  blood pressure 141/76, and pulse ox 97%. GENERAL:  Notes a middle-aged male with generalized weakness being  present at this time. HEENT:  Head:  Atraumatic, normocephalic. Eyes:  PERRL and EOMI with  normal sclerae. Ears:  Normal TMs. Nose:  Clear. Throat: Without exudate. NECK:  Supple without adenopathy or increased thyroid. LUNGS:  Clear. CARDIAC:  Regular rate and rhythm. Normal S1 and S2 without murmur,  gallop, or rub noted. EXTREMITIES:  Peripheral pulses 2+. Unna boot was taken off. No  significant edema present in the lower leg areas and skin is all  healing. Leg is now 0 to 1+ peripheral edema noted. Full range of  motion. No CVA tenderness. Lower leg area where the ulcerations in the  lower legs, have healed with no significant changes or problems noted. ABDOMEN:  Soft. Positive bowel sounds. Nontender. No mass. NEURO:  Alert and oriented x3. Cranial nerves II through XII intact. There is some numbness in the lower leg areas. Sensory is intact  otherwise. IMPRESSION:  1. Acute kidney injury. 2.  Moderate dehydration.   3.  Atherosclerotic heart disease, slight elevation in troponin. 4.  Hypertension. 5.  Non-insulin-dependent diabetes with long-term use of insulin. 6.  Venous insufficiency. 7.  Chronic back pain. PLAN:  At this time, hydrate. Sliding-scale insulin. Hold ACE and  diuretics, and continue with IV fluids. Consultation with Renal, also  consultation with Cardiac because of the elevated troponin level. We  will await for inputs at this time.         Pina Cha M.D.    D: 02/14/2020 0:46:57       T: 02/14/2020 3:31:01     [email or web address]  Job#: [id number]     Doc#: [id number]    CC:

## 2020-02-15 LAB
ANION GAP SERPL CALCULATED.3IONS-SCNC: 11 MEQ/L (ref 8–16)
BUN BLDV-MCNC: 51 MG/DL (ref 7–22)
CALCIUM SERPL-MCNC: 9 MG/DL (ref 8.5–10.5)
CHLORIDE BLD-SCNC: 104 MEQ/L (ref 98–111)
CO2: 24 MEQ/L (ref 23–33)
CREAT SERPL-MCNC: 1.3 MG/DL (ref 0.4–1.2)
EKG ATRIAL RATE: 68 BPM
EKG P AXIS: 75 DEGREES
EKG P-R INTERVAL: 218 MS
EKG Q-T INTERVAL: 424 MS
EKG QRS DURATION: 134 MS
EKG QTC CALCULATION (BAZETT): 450 MS
EKG R AXIS: -35 DEGREES
EKG T AXIS: 61 DEGREES
EKG VENTRICULAR RATE: 68 BPM
GFR SERPL CREATININE-BSD FRML MDRD: 54 ML/MIN/1.73M2
GLUCOSE BLD-MCNC: 162 MG/DL (ref 70–108)
GLUCOSE BLD-MCNC: 222 MG/DL (ref 70–108)
GLUCOSE BLD-MCNC: 229 MG/DL (ref 70–108)
GLUCOSE BLD-MCNC: 269 MG/DL (ref 70–108)
GLUCOSE BLD-MCNC: 330 MG/DL (ref 70–108)
POTASSIUM SERPL-SCNC: 5.1 MEQ/L (ref 3.5–5.2)
SODIUM BLD-SCNC: 139 MEQ/L (ref 135–145)

## 2020-02-15 PROCEDURE — 6360000002 HC RX W HCPCS: Performed by: FAMILY MEDICINE

## 2020-02-15 PROCEDURE — 36415 COLL VENOUS BLD VENIPUNCTURE: CPT

## 2020-02-15 PROCEDURE — 93017 CV STRESS TEST TRACING ONLY: CPT | Performed by: INTERNAL MEDICINE

## 2020-02-15 PROCEDURE — 36430 TRANSFUSION BLD/BLD COMPNT: CPT

## 2020-02-15 PROCEDURE — 80048 BASIC METABOLIC PNL TOTAL CA: CPT

## 2020-02-15 PROCEDURE — A9500 TC99M SESTAMIBI: HCPCS | Performed by: FAMILY MEDICINE

## 2020-02-15 PROCEDURE — 3430000000 HC RX DIAGNOSTIC RADIOPHARMACEUTICAL: Performed by: FAMILY MEDICINE

## 2020-02-15 PROCEDURE — 96372 THER/PROPH/DIAG INJ SC/IM: CPT

## 2020-02-15 PROCEDURE — 6370000000 HC RX 637 (ALT 250 FOR IP): Performed by: FAMILY MEDICINE

## 2020-02-15 PROCEDURE — 78452 HT MUSCLE IMAGE SPECT MULT: CPT

## 2020-02-15 PROCEDURE — 82948 REAGENT STRIP/BLOOD GLUCOSE: CPT

## 2020-02-15 PROCEDURE — 99232 SBSQ HOSP IP/OBS MODERATE 35: CPT | Performed by: INTERNAL MEDICINE

## 2020-02-15 PROCEDURE — 93010 ELECTROCARDIOGRAM REPORT: CPT | Performed by: INTERNAL MEDICINE

## 2020-02-15 PROCEDURE — 2580000003 HC RX 258: Performed by: FAMILY MEDICINE

## 2020-02-15 PROCEDURE — 6360000002 HC RX W HCPCS

## 2020-02-15 PROCEDURE — 1200000003 HC TELEMETRY R&B

## 2020-02-15 RX ADMIN — FERROUS SULFATE TAB 325 MG (65 MG ELEMENTAL FE) 325 MG: 325 (65 FE) TAB at 10:43

## 2020-02-15 RX ADMIN — INSULIN LISPRO 3 UNITS: 100 INJECTION, SOLUTION INTRAVENOUS; SUBCUTANEOUS at 16:26

## 2020-02-15 RX ADMIN — FERROUS SULFATE TAB 325 MG (65 MG ELEMENTAL FE) 325 MG: 325 (65 FE) TAB at 20:38

## 2020-02-15 RX ADMIN — INSULIN LISPRO 2 UNITS: 100 INJECTION, SOLUTION INTRAVENOUS; SUBCUTANEOUS at 10:52

## 2020-02-15 RX ADMIN — TICAGRELOR 90 MG: 90 TABLET ORAL at 10:50

## 2020-02-15 RX ADMIN — INSULIN GLARGINE 30 UNITS: 100 INJECTION, SOLUTION SUBCUTANEOUS at 20:39

## 2020-02-15 RX ADMIN — INSULIN LISPRO 12 UNITS: 100 INJECTION, SOLUTION INTRAVENOUS; SUBCUTANEOUS at 16:26

## 2020-02-15 RX ADMIN — ASPIRIN 81 MG: 81 TABLET ORAL at 10:50

## 2020-02-15 RX ADMIN — INSULIN LISPRO 1 UNITS: 100 INJECTION, SOLUTION INTRAVENOUS; SUBCUTANEOUS at 20:40

## 2020-02-15 RX ADMIN — CARVEDILOL 6.25 MG: 6.25 TABLET, FILM COATED ORAL at 10:44

## 2020-02-15 RX ADMIN — ATORVASTATIN CALCIUM 10 MG: 10 TABLET, FILM COATED ORAL at 20:38

## 2020-02-15 RX ADMIN — Medication 35 MILLICURIE: at 09:15

## 2020-02-15 RX ADMIN — Medication 10 ML: at 10:37

## 2020-02-15 RX ADMIN — ENOXAPARIN SODIUM 40 MG: 30 INJECTION SUBCUTANEOUS at 20:39

## 2020-02-15 RX ADMIN — INSULIN LISPRO 4 UNITS: 100 INJECTION, SOLUTION INTRAVENOUS; SUBCUTANEOUS at 13:13

## 2020-02-15 RX ADMIN — INSULIN LISPRO 12 UNITS: 100 INJECTION, SOLUTION INTRAVENOUS; SUBCUTANEOUS at 11:40

## 2020-02-15 RX ADMIN — TICAGRELOR 90 MG: 90 TABLET ORAL at 20:38

## 2020-02-15 RX ADMIN — Medication 10.9 MILLICURIE: at 07:50

## 2020-02-15 RX ADMIN — CARVEDILOL 6.25 MG: 6.25 TABLET, FILM COATED ORAL at 16:26

## 2020-02-15 ASSESSMENT — PAIN SCALES - GENERAL
PAINLEVEL_OUTOF10: 0
PAINLEVEL_OUTOF10: 0

## 2020-02-15 NOTE — PLAN OF CARE
Problem: Falls - Risk of:  Goal: Will remain free from falls  Description  Will remain free from falls  Outcome: Met This Shift  Note:   Pt has slight weakness and uses cane to ambulate. Bad and chair alarm in place and call light within reach. Problem: Risk for Impaired Skin Integrity  Goal: Tissue integrity - skin and mucous membranes  Description  Structural intactness and normal physiological function of skin and  mucous membranes. Outcome: Met This Shift  Note:   Pt has blanchable redness on bottom. Ambulates frequently and turns self as needed. Problem: Pain:  Goal: Pain level will decrease  Description  Pain level will decrease  Outcome: Met This Shift  Note:   Pt denies pain at this time. Problem: Fluid Volume - Imbalance:  Goal: Absence of imbalanced fluid volume signs and symptoms  Description  Absence of imbalanced fluid volume signs and symptoms  Outcome: Met This Shift  Note:   Pt has adequate urine output. Sufficient fluid intake. Lungs clear with absence of crackles. Problem: Discharge Planning:  Goal: Discharged to appropriate level of care  Description  Discharged to appropriate level of care  Outcome: Ongoing  Note:   Planning d/c to home when medically stable. Stress test completed and negative. Problem: Discharge Planning:  Goal: Discharged to appropriate level of care  Description  Discharged to appropriate level of care  Outcome: Ongoing  Note:   Planning d/c to home when medically stable. Stress test completed and negative. Care plan reviewed with patient and family. Patient and family verbalize understanding of the plan of care and contribute to goal setting.

## 2020-02-15 NOTE — PROGRESS NOTES
neuropathy  Noted      ashd stable      lower  Back pain stable          ). Plan:   Out of bed and up to chair. Consults: renal.  Advance diet as tolerated. Administer medications as ordered. ( Continue  With fluids  Noted     hold  Diuretics      follow  labs).        Caleb Vernon MD  2/15/2020

## 2020-02-15 NOTE — PLAN OF CARE
Problem: Falls - Risk of:  Goal: Will remain free from falls  Description  Will remain free from falls  Outcome: Ongoing  Call light within reach. Side rails up x2. Bed alarm on. Non skid slippers available. Problem: Falls - Risk of:  Goal: Absence of physical injury  Description  Absence of physical injury  Outcome: Ongoing     Problem: Risk for Impaired Skin Integrity  Goal: Tissue integrity - skin and mucous membranes  Description  Structural intactness and normal physiological function of skin and  mucous membranes. Outcome: Ongoing  Patient free from skin breakdown. Patient turns self and makes frequent positional changes. Will continue to monitor. Problem: Discharge Planning:  Goal: Discharged to appropriate level of care  Description  Discharged to appropriate level of care  Outcome: Ongoing  Patient plans to be discharged home when medically stable. Problem: Pain:  Goal: Pain level will decrease  Description  Pain level will decrease  Outcome: Ongoing  Patient free from pain this shift. Pain rated on 0-10 pain rating scale. Will continue to reassess. Problem: Fluid Volume - Imbalance:  Goal: Absence of imbalanced fluid volume signs and symptoms  Description  Absence of imbalanced fluid volume signs and symptoms  Outcome: Ongoing  Nephro consulted for RAUL. Creatinine improving- 2.0 last check, awaiting AM labwork. 0.9% at 75/hr. Accurate I&O in progress. Care plan reviewed with patient. Patient verbalize understanding of the plan of care and contribute to goal setting.

## 2020-02-15 NOTE — PROGRESS NOTES
Renal Progress Note    Assessment and Plan:    1. Acute kidney injury improved  2. Diabetes mellitus type 2  3. Dehydration  4. Status post a presyncope episode  5. Degenerative joint disease  6. Hypertension primary    PLAN:   Labs reviewed  Serum creatinine is improved  Medications reviewed  No changes  Continue current intravenous fluid  Discussed with the patient  Labs in the morning. We will follow      Patient Active Problem List:     HTN (hypertension)     IDDM (insulin dependent diabetes mellitus) ([facility]     Hypercholesteremia     Osteoarthritis     Depression     Diabetic peripheral neuropathy ([facility] [facility]     Coronary artery disease of bypass graft of native heart with stable angina pectoris (HCC)     S/P percutaneous transluminal coronary angioplasty     Sprain/Injury of anterior ligaments of thoracic spine     Venous stasis dermatitis of both lower extremities     Lumbar spondylosis     Lumbar facet arthropathy     RAUL (acute kidney injury) ([facility] [address]     Acute kidney injury ([facility] [address]      Subjective:   Admit Date: 2/13/2020    Interval History:   Seen for acute kidney injury  Awake and alert  Feels good  No complaint  Stable blood pressure.     Very good urine output  Admitted for presyncopal episode thought to be due to dehydration        Medications:   Scheduled Meds:   insulin lispro  12 Units Subcutaneous TID WC    enoxaparin  40 mg Subcutaneous BID    aspirin  81 mg Oral Daily    atorvastatin  10 mg Oral Nightly    carvedilol  6.25 mg Oral BID WC    ferrous sulfate  325 mg Oral BID    insulin glargine  30 Units Subcutaneous Nightly    ticagrelor  90 mg Oral BID    sodium chloride flush  10 mL Intravenous 2 times per day    insulin lispro  0-6 Units Subcutaneous TID WC    insulin lispro  0-3 Units Subcutaneous Nightly     Continuous Infusions:   sodium chloride 75 mL/hr at 02/14/20 2046    dextrose         CBC:   Recent Labs     02/13/20  1515 02/14/20  0625   WBC 8.3 7.6   HGB 11.7* 10.8*   PLT 160 154     CMP:    Recent Labs     02/13/20  2034 02/14/20  0625 02/14/20  1151 02/15/20  0642   * 138 137 139   K 5.8* 5.5* 5.0 5.1   CL 96* 100 99 104   CO2 22* 23 25 24   * 85*  --  51*   CREATININE 2.4* 2.0*  --  1.3*   GLUCOSE 241* 199*  --  222*   CALCIUM 9.2 9.0  --  9.0   LABGLOM 27* 33*  --  54*     Troponin: No results for input(s): TROPONINI in the last 72 hours. BNP: No results for input(s): BNP in the last 72 hours. INR: No results for input(s): INR in the last 72 hours. Lipids: No results for input(s): CHOL, LDLDIRECT, TRIG, HDL, AMYLASE, LIPASE in the last 72 hours. Liver:   Recent Labs     02/13/20  1515   AST 10   ALT 12   ALKPHOS 64   PROT 7.3   LABALBU 4.4   BILITOT 0.5     Iron:  No results for input(s): IRONS, FERRITIN in the last 72 hours. Invalid input(s): LABIRONS  US RENAL COMPLETE   Final Result      Kidneys are grossly unremarkable. Urinary bladder was not visible. **This report has been created using voice recognition software. It may contain minor errors which are inherent in voice recognition technology. **      Final report electronically signed by Dr. Valery Drummond on 2/14/2020 5:25 PM            Objective:   Vitals: BP (!) 143/66   Pulse 70   Temp 97.5 °F (36.4 °C) (Oral)   Resp 18   Ht 6' 2\" (1.88 m)   Wt (!) 344 lb 8 oz (156.3 kg)   SpO2 98%   BMI 44.23 kg/m²    Wt Readings from Last 3 Encounters:   02/15/20 (!) 344 lb 8 oz (156.3 kg)   02/06/20 (!) 348 lb 6.4 oz (158 kg)   01/28/20 (!) 365 lb (165.6 kg)      24HR INTAKE/OUTPUT:      Intake/Output Summary (Last 24 hours) at 2/15/2020 1424  Last data filed at 2/15/2020 1140  Gross per 24 hour   Intake 2604.15 ml   Output 4125 ml   Net -1520.85 ml       Constitutional:  Alert, awake, no apparent distress   Skin:normal with no rash or lesions. HEENT:Pupils are reactive . Throat is clear . Oral mucosa is moist   Neck:supple with no thyromegaly or carotid bruit  Cardiovascular:  S1, S2 without murmur or rubs   Respiratory:  Clear to ausculation without wheezes, rhonchi or rales. Abdomen: +bs, soft, no tenderness to palpation and no palpable mass. No abdominal bruit   Ext: 1-2+ bilateral LE edema  Musculoskeletal:Intact  Neuro:Alert and oriented with no focal deficit. Speech is normal      Electronically signed by Jerman Saab MD on 2/15/2020 at 2:24 PM

## 2020-02-15 NOTE — PROGRESS NOTES
Patient: Valentine Hodges  Unit/Bed: 0T-54/189-T  YOB: 1950  MRN: [id number] Acct: [de-identified]   Admitting Diagnosis: RAUL (acute kidney injury) ([facility] [address] [N17.9]  Acute kidney injury ([facility] [address] [N17.9]  Admit Date:  2/13/2020  Hospital Day: 2    Assessment:     Active Problems:    RAUL (acute kidney injury) ([facility] [address]    Acute kidney injury ([facility] [address]  Resolved Problems:    * No resolved hospital problems. *      Plan: Will follow the glucose now that he is feeling better and on the home doses of insulin. Continue off the metformin        Subjective:     Patient has no complaint of CP, SOB, or GI upset. .   Medication side effects: none    Scheduled Meds:   insulin lispro  12 Units Subcutaneous TID WC    enoxaparin  40 mg Subcutaneous BID    aspirin  81 mg Oral Daily    atorvastatin  10 mg Oral Nightly    carvedilol  6.25 mg Oral BID WC    ferrous sulfate  325 mg Oral BID    insulin glargine  30 Units Subcutaneous Nightly    ticagrelor  90 mg Oral BID    sodium chloride flush  10 mL Intravenous 2 times per day    insulin lispro  0-6 Units Subcutaneous TID WC    insulin lispro  0-3 Units Subcutaneous Nightly     Continuous Infusions:   sodium chloride 75 mL/hr at 02/14/20 2046    dextrose       PRN Meds:acetaminophen, nitroGLYCERIN, sodium chloride flush, potassium chloride, magnesium hydroxide, ondansetron, glucose, dextrose, glucagon (rDNA), dextrose    Review of Systems  Pertinent items are noted in HPI. Objective:     Patient Vitals for the past 8 hrs:   BP Temp Temp src Pulse Resp SpO2   02/15/20 1123 (!) 143/66 97.5 °F (36.4 °C) Oral 70 18 98 %   02/15/20 1030 (!) 149/74 97.7 °F (36.5 °C) Oral 71 18 98 %     I/O last 3 completed shifts: In: 5503.8 [P.O.:2730;  I.V.:2773.8]  Out: 4800 [Urine:4800]  I/O this shift:  In: -   Out: 625 [Urine:625]    BP (!) 143/66   Pulse 70   Temp 97.5 °F (36.4 °C) (Oral)   Resp 18   Ht 6' 2\" (1.88 m)   Wt (!) 344 lb 8 oz (156.3 kg)   SpO2 98%   BMI 44.23 kg/m² BP (!) 143/66   Pulse 70   Temp 97.5 °F (36.4 °C) (Oral)   Resp 18   Ht 6' 2\" (1.88 m)   Wt (!) 344 lb 8 oz (156.3 kg)   SpO2 98%   BMI 44.23 kg/m²   General appearance: alert, appears stated age and cooperative  Head: Normocephalic, without obvious abnormality, atraumatic  Lungs: clear to auscultation bilaterally  Heart: regular rate and rhythm, S1, S2 normal, no murmur, click, rub or gallop  Abdomen: soft, non-tender; bowel sounds normal; no masses,  no organomegaly  Extremities: edema 1-2+ bilaterally  Skin: Skin color, texture, turgor normal. No rashes or lesions  Neurologic: Grossly normal    Data Review:   Results for Arleen Armas" (MRN [id number]) as of 2/15/2020 14:20   Ref.  Range 2/14/2020 18:55 2/14/2020 20:13 2/15/2020 06:41 2/15/2020 06:42 2/15/2020 11:25   POC Glucose Latest Ref Range: 70 - 108 mg/dl  333 (H) 229 (H)  330 (H)       Electronically signed by Amos Brice MD on 2/15/2020 at 2:18 PM

## 2020-02-15 NOTE — CONSULTS
troponin before and the  subsequent troponin less than 0.01. His kidney function was worsened. On admission, BUN of 109, creatinine of 2.6, GFR of 24 and his baseline  BUN is 48, creatinine 1.6 and GFR was 42. So, he went to stage IV  kidney disease and also there is an acute worsening, however, _____ on  hydration now. BUN is 82, creatinine is 2 and GFR of 33, so it is  coming up and potassium is still 5.5, 5.6. So in view of that, he is on  hydration and Cardiology evaluation was sought for further evaluation  and management in view of his underlying significant coronary artery  disease and his elevation of troponin. REVIEW OF SYSTEMS:  Negative except the above-mentioned ones. PAST MEDICAL HISTORY:  Includes coronary artery disease, status post  previous bypass and subsequent stent, last stent was in 2017; diabetes  with diabetic neuropathy; depression; hypertension; hyperlipidemia;  morbid obesity; osteoarthritis; chronic kidney disease; congestive heart  failure. PAST SURGICAL HISTORY:  History of vascular surgery, tonsillectomy,  spine surgery, rotator cuff repair, coronary artery bypass and  angioplasty, cholecystectomy, cervical disk surgery, appendectomy,  amputation. FAMILY HISTORY:  Positive for cancer. SOCIAL HISTORY:  Never smoked. Occasional alcohol consumption. No drug  use. ALLERGIES:  He is allergic to HORSE DERIVATIVES. HOME MEDICATIONS:  Prior to admission:  Acetaminophen; aspirin; Lipitor  10; Coreg 6.25 twice a day; Vasotec; ferrous sulfate; Lasix, he is  getting 80 twice a day, he used to be on basically taking two tablets by  mouth 80 mg twice a day and so was recently increased in the last two  months; Lantus; NovoLog; Glucophage; Nitrostat and Brilinta. PHYSICAL EXAMINATION:  GENERAL:  Looks sick, morbidly obese. The patient does not appear to be  in any form of distress.   VITAL SIGNS:  Blood pressure 111/57, pulse rate 70, respiratory rate 18,  temperature T: 02/14/2020 15:17:09     NING  Job#: [id number]     Doc#: [id number]    CC:

## 2020-02-16 VITALS
HEIGHT: 74 IN | SYSTOLIC BLOOD PRESSURE: 142 MMHG | OXYGEN SATURATION: 97 % | DIASTOLIC BLOOD PRESSURE: 64 MMHG | TEMPERATURE: 98.1 F | BODY MASS INDEX: 40.43 KG/M2 | HEART RATE: 69 BPM | WEIGHT: 315 LBS | RESPIRATION RATE: 16 BRPM

## 2020-02-16 LAB
ANION GAP SERPL CALCULATED.3IONS-SCNC: 9 MEQ/L (ref 8–16)
BUN BLDV-MCNC: 27 MG/DL (ref 7–22)
CALCIUM SERPL-MCNC: 8.5 MG/DL (ref 8.5–10.5)
CHLORIDE BLD-SCNC: 108 MEQ/L (ref 98–111)
CO2: 23 MEQ/L (ref 23–33)
CREAT SERPL-MCNC: 1.1 MG/DL (ref 0.4–1.2)
GFR SERPL CREATININE-BSD FRML MDRD: 66 ML/MIN/1.73M2
GLUCOSE BLD-MCNC: 168 MG/DL (ref 70–108)
GLUCOSE BLD-MCNC: 170 MG/DL (ref 70–108)
GLUCOSE BLD-MCNC: 241 MG/DL (ref 70–108)
POTASSIUM SERPL-SCNC: 4.6 MEQ/L (ref 3.5–5.2)
SODIUM BLD-SCNC: 140 MEQ/L (ref 135–145)

## 2020-02-16 PROCEDURE — 6370000000 HC RX 637 (ALT 250 FOR IP): Performed by: FAMILY MEDICINE

## 2020-02-16 PROCEDURE — 94760 N-INVAS EAR/PLS OXIMETRY 1: CPT

## 2020-02-16 PROCEDURE — 82948 REAGENT STRIP/BLOOD GLUCOSE: CPT

## 2020-02-16 PROCEDURE — 6360000002 HC RX W HCPCS: Performed by: FAMILY MEDICINE

## 2020-02-16 PROCEDURE — 99231 SBSQ HOSP IP/OBS SF/LOW 25: CPT | Performed by: INTERNAL MEDICINE

## 2020-02-16 PROCEDURE — 96372 THER/PROPH/DIAG INJ SC/IM: CPT

## 2020-02-16 PROCEDURE — 80048 BASIC METABOLIC PNL TOTAL CA: CPT

## 2020-02-16 PROCEDURE — 36415 COLL VENOUS BLD VENIPUNCTURE: CPT

## 2020-02-16 PROCEDURE — APPSS30 APP SPLIT SHARED TIME 16-30 MINUTES: Performed by: NURSE PRACTITIONER

## 2020-02-16 PROCEDURE — 99232 SBSQ HOSP IP/OBS MODERATE 35: CPT | Performed by: PHYSICIAN ASSISTANT

## 2020-02-16 RX ORDER — FUROSEMIDE 40 MG/1
40 TABLET ORAL 2 TIMES DAILY
COMMUNITY
Start: 2020-02-16 | End: 2020-02-27 | Stop reason: DRUGHIGH

## 2020-02-16 RX ADMIN — INSULIN LISPRO 1 UNITS: 100 INJECTION, SOLUTION INTRAVENOUS; SUBCUTANEOUS at 09:45

## 2020-02-16 RX ADMIN — ASPIRIN 81 MG: 81 TABLET ORAL at 09:41

## 2020-02-16 RX ADMIN — TICAGRELOR 90 MG: 90 TABLET ORAL at 09:43

## 2020-02-16 RX ADMIN — ENOXAPARIN SODIUM 40 MG: 30 INJECTION SUBCUTANEOUS at 09:41

## 2020-02-16 RX ADMIN — INSULIN LISPRO 12 UNITS: 100 INJECTION, SOLUTION INTRAVENOUS; SUBCUTANEOUS at 11:56

## 2020-02-16 RX ADMIN — INSULIN LISPRO 12 UNITS: 100 INJECTION, SOLUTION INTRAVENOUS; SUBCUTANEOUS at 09:45

## 2020-02-16 RX ADMIN — FERROUS SULFATE TAB 325 MG (65 MG ELEMENTAL FE) 325 MG: 325 (65 FE) TAB at 09:42

## 2020-02-16 RX ADMIN — CARVEDILOL 6.25 MG: 6.25 TABLET, FILM COATED ORAL at 09:42

## 2020-02-16 RX ADMIN — INSULIN LISPRO 2 UNITS: 100 INJECTION, SOLUTION INTRAVENOUS; SUBCUTANEOUS at 11:56

## 2020-02-16 ASSESSMENT — PAIN SCALES - GENERAL
PAINLEVEL_OUTOF10: 0
PAINLEVEL_OUTOF10: 0

## 2020-02-16 NOTE — PROGRESS NOTES
Acct: [de-identified]  Admit Date:  2/13/2020  Primary Cardiologist: Divya Hart MD    Chief Complaint/Reason for Consultation:   Per Dr Erica Horta consult note  \"HISTORY OF PRESENT ILLNESS:  This is a 75-year-old  gentleman  who is known to have diabetes; hypertension; hypercholesterolemia;  congestive heart failure; coronary artery disease, status post previous  bypass and stent placement; diabetic peripheral neuropathy; morbid  obesity; and chronic back pain, was at pain clinic yesterday when  started to have sudden onset of dizziness and marked ones about to pass  out. The patient was feeling dizzy, lightheaded, was about to pass out,  but did not pass out, did not fall and so in view of that, they called  911 and the patient was sent to the emergency room. He has been having  history of chronic kidney disease and congestive heart failure, and he  has been on large amount of diuretics, and he has been taking Lasix of a  significant dose and in the last two months has been increased. He was  having marked leg swelling, totally resolved, but now he end up having  this significant dizziness, generalized body weakness and fatigue. On  arrival to the emergency room, he was found to have acute kidney injury  on chronic kidney disease. Hence, the patient was admitted. Now, the  patient is on hydration already for the last 24 hours. I do not see any  orthostatic check by the bedside. Troponin was minimally elevated and  the first troponin was 0.021. He has a normal troponin before and the  subsequent troponin less than 0.01. His kidney function was worsened. On admission, BUN of 109, creatinine of 2.6, GFR of 24 and his baseline  BUN is 48, creatinine 1.6 and GFR was 42. So, he went to stage IV  kidney disease and also there is an acute worsening, however, _____ on  hydration now. BUN is 82, creatinine is 2 and GFR of 33, so it is  coming up and potassium is still 5.5, 5.6.   So in view of that, he is on  hydration and Cardiology evaluation was sought for further evaluation  and management in view of his underlying significant coronary artery  disease and his elevation of troponin. \"  Subjective (Events in last 24 hours):   States he is feeling much better. CR back to normal.  Denies any CP or SOB.         Objective:   /60   Pulse 65   Temp 98.3 °F (36.8 °C) (Oral)   Resp 16   Ht 6' 2\" (1.88 m)   Wt (!) 350 lb 4.8 oz (158.9 kg)   SpO2 97%   BMI 44.98 kg/m²        TELEMETRY: NSR    Physical Exam:  General Appearance: alert and oriented to person, place and time, in no acute distress  Cardiovascular: normal rate, regular rhythm, normal S1 and S2, no murmurs, rubs, clicks, or gallops, distal pulses intact, no carotid bruits, no JVD  Pulmonary/Chest: clear to auscultation bilaterally- no wheezes, rales or rhonchi, normal air movement, no respiratory distress  Abdomen: soft, non-tender, non-distended, normal bowel sounds, no masses Extremities: no cyanosis, clubbing or edema, pulse   Skin: warm and dry, no rash or erythema  Head: normocephalic and atraumatic  Eyes: pupils equal, round, and reactive to light  Neck: supple and non-tender without mass, no thyromegaly   Musculoskeletal: normal range of motion, no joint swelling, deformity or tenderness  Neurological: alert, oriented, normal speech, no focal findings or movement disorder noted    Medications:    insulin lispro  12 Units Subcutaneous TID WC    enoxaparin  40 mg Subcutaneous BID    aspirin  81 mg Oral Daily    atorvastatin  10 mg Oral Nightly    carvedilol  6.25 mg Oral BID WC    ferrous sulfate  325 mg Oral BID    insulin glargine  30 Units Subcutaneous Nightly    ticagrelor  90 mg Oral BID    sodium chloride flush  10 mL Intravenous 2 times per day    insulin lispro  0-6 Units Subcutaneous TID WC    insulin lispro  0-3 Units Subcutaneous Nightly      dextrose       acetaminophen, 500 mg, Q6H PRN  nitroGLYCERIN, 0.4 mg, Q5 Min PRN  sodium chloride flush, 10 mL, PRN  potassium chloride, 10 mEq, PRN  magnesium hydroxide, 30 mL, Daily PRN  ondansetron, 4 mg, Q6H PRN  glucose, 15 g, PRN  dextrose, 12.5 g, PRN  glucagon (rDNA), 1 mg, PRN  dextrose, 100 mL/hr, PRN        Diagnostics:  EKG:  NSR    Echo: 2/14/2020  Conclusions      Summary   Technically difficult examination. Normal left ventricle size and systolic function. Ejection fraction was   estimated at 55 to 60 %. There were no regional left ventricular wall   motion abnormalities and wall thickness was within normal limits. Doppler parameters were consistent with abnormal left ventricular   relaxation (grade 1 diastolic dysfunction). The left atrium is Mildly dilated. Stress test:2/15/2020  Conclusions      Summary   Lexiscan EKG stress test is not suggestive for ischemia. The nuclear images is not suggestive for myocardial ischemia. Lab Data:    Cardiac Enzymes:  No results for input(s): CKTOTAL, CKMB, CKMBINDEX, TROPONINI in the last 72 hours.     CBC:   Lab Results   Component Value Date    WBC 7.6 02/14/2020    RBC 3.50 02/14/2020    RBC 3.52 10/17/2018    HGB 10.8 02/14/2020    HCT 32.5 02/14/2020     02/14/2020       CMP:    Lab Results   Component Value Date     02/16/2020    K 4.6 02/16/2020    K 5.0 02/14/2020     02/16/2020    CO2 23 02/16/2020    BUN 27 02/16/2020    CREATININE 1.1 02/16/2020    LABGLOM 66 02/16/2020    GLUCOSE 168 02/16/2020    GLUCOSE 125 10/17/2018    CALCIUM 8.5 02/16/2020       Hepatic Function Panel:    Lab Results   Component Value Date    ALKPHOS 64 02/13/2020    ALT 12 02/13/2020    AST 10 02/13/2020    PROT 7.3 02/13/2020    BILITOT 0.5 02/13/2020    BILIDIR <0.2 08/11/2017    LABALBU 4.4 02/13/2020    LABALBU 4.4 09/18/2011       Magnesium:    Lab Results   Component Value Date    MG 2.0 12/19/2018       PT/INR:    Lab Results   Component Value Date    INR 1.00 02/08/2019       HgBA1c:    Lab Results   Component Value Date    LABA1C 7.9 12/05/2019       FLP:    Lab Results   Component Value Date    TRIG 135 10/17/2018    HDL 35.6 10/17/2018    LDLCALC 56 10/17/2018    LABVLDL 27 10/17/2018       TSH:  No results found for: TSH      Assessment:  · Near syncope  · Dehydration-resolved  · AKT  · Chronic Diastolic CHF  · HTN  · HLD  · Type II DM  · Morbid obesity  · H/O PCI  · Venous insufficiency      Plan:  · Start Lasix 40 mg bid on discharge  · Continue other medications  · 2 gm Na diet and 2L fluid restriction  · Continue compression stockings  · OK to discharge from a cardiology standpoint         Electronically signed by Jeronimo Moscoso PA-C on 2/16/2020 at 8:35 AM

## 2020-02-16 NOTE — PROGRESS NOTES
Discharge teaching and instructions for diagnosis/procedure of RAUL completed with patient using teachback method. AVS reviewed. Printed prescriptions given to patient. Patient voiced understanding regarding prescriptions, follow up appointments, and care of self at home.  Discharged in a wheelchair to  home with support per self

## 2020-02-16 NOTE — PLAN OF CARE
Problem: Falls - Risk of:  Goal: Will remain free from falls  Description  Will remain free from falls  Outcome: Ongoing  Call light within reach. Side rails up x2. Bed alarm on. Non skid slippers available. Problem: Falls - Risk of:  Goal: Absence of physical injury  Description  Absence of physical injury  Outcome: Ongoing    Problem: Risk for Impaired Skin Integrity  Goal: Tissue integrity - skin and mucous membranes  Description  Structural intactness and normal physiological function of skin and  mucous membranes. Outcome: Ongoing  Patient free from skin breakdown. Patient turns self and makes frequent positional changes. Will continue to monitor. Problem: Discharge Planning:  Goal: Discharged to appropriate level of care  Description  Discharged to appropriate level of care  Outcome: Ongoing  Patient plans to be discharged home when medically stable. Probable discharge tomorrow. Problem: Pain:  Goal: Pain level will decrease  Description  Pain level will decrease  Outcome: Ongoing  Patient free from pain this shift. Pain rated on 0-10 pain rating scale. Will continue to reassess. Problem: Fluid Volume - Imbalance:  Goal: Absence of imbalanced fluid volume signs and symptoms  Description  Absence of imbalanced fluid volume signs and symptoms  Outcome: Ongoing  Nephro consulted for RAUL. Resolved per nephrology. 0.9% at 75/hr. Accurate I&O in progress. Care plan reviewed with patient. Patient verbalize understanding of the plan of care and contribute to goal setting.

## 2020-02-16 NOTE — PROGRESS NOTES
Nephrology Progress Note    Patient Sanjeev Mackenzie   MRN -  [id number]   Acct # - [de-identified]      - 1950    68 y.o. Admit Date: 2020  Hospital Day: 3  Location: -023-A  Date of evaluation -  2020    Subjective:   CC: near syncope  Denies shortness of breath on Room air   UOP 1300/24h  States that he drinks a gallon of coffee/fluids a day  Edema \"a lot better\"  BP Range: Systolic (88HSE), JOSE:176 , Min:135 , IJW:001      Diastolic (40ASQ), EEU:83, Min:60, Max:74    Objective:   VITALS:  /60   Pulse 65   Temp 98.3 °F (36.8 °C) (Oral)   Resp 16   Ht 6' 2\" (1.88 m)   Wt (!) 350 lb 4.8 oz (158.9 kg)   SpO2 97%   BMI 44.98 kg/m²    Patient Vitals for the past 24 hrs:   BP Temp Temp src Pulse Resp SpO2 Weight   20 0802 -- -- -- -- -- 97 % --   20 0423 -- -- -- -- -- -- (!) 350 lb 4.8 oz (158.9 kg)   20 0418 136/60 98.3 °F (36.8 °C) Oral 65 16 98 % --   02/15/20 2319 (!) 166/73 97.8 °F (36.6 °C) Oral 62 18 97 % --   02/15/20 2030 135/60 97.6 °F (36.4 °C) Oral 66 18 98 % --   02/15/20 1604 (!) 163/70 97.8 °F (36.6 °C) Oral 87 18 99 % --   02/15/20 1123 (!) 143/66 97.5 °F (36.4 °C) Oral 70 18 98 % --   02/15/20 1030 (!) 149/74 97.7 °F (36.5 °C) Oral 71 18 98 % --            Intake/Output Summary (Last 24 hours) at 2020 0813  Last data filed at 2020 0421  Gross per 24 hour   Intake 3638.14 ml   Output 1325 ml   Net 2313.14 ml       Admission weight: (!) 336 lb (152.4 kg)  Patient Vitals for the past 96 hrs (Last 3 readings):   Weight   20 0423 (!) 350 lb 4.8 oz (158.9 kg)   02/15/20 0515 (!) 344 lb 8 oz (156.3 kg)   20 0530 (!) 345 lb 9.6 oz (156.8 kg)       EXAM:  CONSTITUTIONAL:  No acute distress. Pleasant  HEENT:  Head is normocephalic, Extraocular movement intact. Neck is supple. Voice is clear. CARDIOVASCULAR:  S1, S2  regular rate and rhythm. RESPIRATORY: Clear to ausculation bilaterally. Equal breath sounds. No wheezes.  No shortness of thickness was within normal limits. Doppler parameters were consistent with abnormal left ventricular   relaxation (grade 1 diastolic dysfunction). The left atrium is Mildly dilated. Signature  Us Renal Complete  Result Date: 2/14/2020  Kidneys are grossly unremarkable. Urinary bladder was not visible    ASSESSMENT:  · Acute Kidney Injury, resolving, Stop IV fluids. [facility] for discharge from renal aspect. FU with Dr Leandra Browning with BMP  · Chronic Kidney Disease Stage III, baseline creatinine 1.6-1.8, UP/UC 0.08 gm  · Diabetes Mellitus Type II with nephrosclerosis with long term use of insulin   · HFpEF 55-60%, Suggest resuming Lasix 40 mg 2x/d rather than 80 mg 2x/d. Along with 0761-4558 ml fluid restriction  · Essential Hypertension with nephrosclerosis. OK to resume ACEI after discharge   · Coronary artery disease, hx CABG  · Chronic lymphedema with venous insufficiency    Active Problems:    RAUL (acute kidney injury) ([address]    Acute kidney injury ([address]  Resolved Problems:    * No resolved hospital problems.  Brennan Lou, ABHISHEK - CNP 8:13 AM 2/16/2020

## 2020-02-18 ENCOUNTER — OFFICE VISIT (OUTPATIENT)
Dept: PHYSICAL MEDICINE AND REHAB | Age: 70
End: 2020-02-18
Payer: MEDICARE

## 2020-02-18 VITALS
SYSTOLIC BLOOD PRESSURE: 134 MMHG | WEIGHT: 315 LBS | HEART RATE: 88 BPM | BODY MASS INDEX: 40.43 KG/M2 | DIASTOLIC BLOOD PRESSURE: 82 MMHG | HEIGHT: 74 IN

## 2020-02-18 PROCEDURE — 99213 OFFICE O/P EST LOW 20 MIN: CPT | Performed by: NURSE PRACTITIONER

## 2020-02-18 PROCEDURE — 1123F ACP DISCUSS/DSCN MKR DOCD: CPT | Performed by: NURSE PRACTITIONER

## 2020-02-18 PROCEDURE — G8417 CALC BMI ABV UP PARAM F/U: HCPCS | Performed by: NURSE PRACTITIONER

## 2020-02-18 PROCEDURE — 1111F DSCHRG MED/CURRENT MED MERGE: CPT | Performed by: NURSE PRACTITIONER

## 2020-02-18 PROCEDURE — 3017F COLORECTAL CA SCREEN DOC REV: CPT | Performed by: NURSE PRACTITIONER

## 2020-02-18 PROCEDURE — G8482 FLU IMMUNIZE ORDER/ADMIN: HCPCS | Performed by: NURSE PRACTITIONER

## 2020-02-18 PROCEDURE — 4040F PNEUMOC VAC/ADMIN/RCVD: CPT | Performed by: NURSE PRACTITIONER

## 2020-02-18 PROCEDURE — G8427 DOCREV CUR MEDS BY ELIG CLIN: HCPCS | Performed by: NURSE PRACTITIONER

## 2020-02-18 PROCEDURE — 1036F TOBACCO NON-USER: CPT | Performed by: NURSE PRACTITIONER

## 2020-02-18 ASSESSMENT — ENCOUNTER SYMPTOMS
ABDOMINAL PAIN: 0
SORE THROAT: 0
DIARRHEA: 0
COUGH: 0
SINUS PRESSURE: 0
NAUSEA: 0
CHEST TIGHTNESS: 0
WHEEZING: 0
COLOR CHANGE: 0
PHOTOPHOBIA: 0
EYE PAIN: 0
SHORTNESS OF BREATH: 0
VOMITING: 0
BACK PAIN: 1
RHINORRHEA: 0
CONSTIPATION: 0

## 2020-02-18 NOTE — PROGRESS NOTES
nitroGLYCERIN (NITROSTAT) 0.4 MG SL tablet, Place 1 tablet under the tongue every 5 minutes as needed for Chest pain, Disp: 25 tablet, Rfl: 3    metFORMIN (GLUCOPHAGE) 500 MG tablet, TAKE TWO TABLETS BY MOUTH TWICE DAILY WITH MEALS, Disp: 360 tablet, Rfl: 1    ferrous sulfate 325 (65 Fe) MG tablet, Take 1 tablet by mouth 2 times daily, Disp: 60 tablet, Rfl: 2    Multiple Vitamins-Minerals (THERAPEUTIC MULTIVITAMIN-MINERALS) tablet, Take 1 tablet by mouth daily, Disp: 30 tablet, Rfl: 11    aspirin 81 MG tablet, Take 1 tablet by mouth daily, Disp: , Rfl:     acetaminophen (TYLENOL) 500 MG tablet, Take 500 mg by mouth as needed for Pain, Disp: , Rfl:     Subjective:      Review of Systems   Constitutional: Positive for activity change. Negative for appetite change, chills, diaphoresis, fatigue, fever and unexpected weight change. HENT: Negative for congestion, ear pain, hearing loss, mouth sores, nosebleeds, rhinorrhea, sinus pressure and sore throat. Eyes: Negative for photophobia, pain and visual disturbance. Respiratory: Negative for cough, chest tightness, shortness of breath and wheezing. Cardiovascular: Positive for leg swelling. Negative for chest pain and palpitations. CABG HTN MI  Has BLE lymphedema   Gastrointestinal: Negative for abdominal pain, constipation, diarrhea, nausea and vomiting. Endocrine: Negative for cold intolerance, heat intolerance, polydipsia, polyphagia and polyuria. DM   Genitourinary: Negative for decreased urine volume, difficulty urinating, frequency and hematuria. Musculoskeletal: Positive for arthralgias, back pain, gait problem, myalgias, neck pain and neck stiffness. Negative for joint swelling. Skin: Negative for color change and rash. Allergic/Immunologic: Negative for food allergies and immunocompromised state. Neurological: Positive for weakness and numbness.  Negative for dizziness, tremors, syncope, facial asymmetry, speech difficulty and Right shoulder: He exhibits tenderness. Left shoulder: He exhibits tenderness. Right hip: He exhibits decreased range of motion, decreased strength, tenderness and bony tenderness. Left hip: He exhibits decreased range of motion, decreased strength, tenderness and bony tenderness. Right knee: He exhibits decreased range of motion. Tenderness found. Left knee: He exhibits decreased range of motion. Tenderness found. Right ankle: He exhibits swelling. Left ankle: He exhibits swelling. Cervical back: He exhibits decreased range of motion, tenderness and bony tenderness. Thoracic back: He exhibits decreased range of motion, tenderness and bony tenderness. Lumbar back: He exhibits decreased range of motion, tenderness, bony tenderness, pain and spasm. Back:       Right upper leg: He exhibits tenderness. Left upper leg: He exhibits tenderness. Right lower leg: Edema present. Left lower leg: Edema present. Right foot: Tenderness and swelling present. Left foot: Tenderness and swelling present. Comments: H/O Cervical surgery C5-6    T6-T9 PSF Lumbar Nj Decompression L2-S1  Right shoulder rotator cuff surgery  BTKR  Had 3 steel rods in LLE but hardware was removed. BLE lymphedema    Skin:     General: Skin is warm. Coloration: Skin is not pale. Findings: No erythema or rash. Neurological:      Mental Status: He is alert and oriented to person, place, and time. He is not disoriented. Cranial Nerves: Cranial nerves are intact. No cranial nerve deficit. Sensory: Sensation is intact. No sensory deficit. Motor: Weakness present. No atrophy or abnormal muscle tone. Coordination: Coordination is intact. Coordination normal.      Gait: Gait abnormal.      Deep Tendon Reflexes: Reflexes are normal and symmetric. Babinski sign absent on the right side.       Reflex Scores:       Tricep reflexes are 2+ on the right side and 2+ on the left side. Bicep reflexes are 2+ on the right side and 2+ on the left side. Brachioradialis reflexes are 2+ on the right side and 2+ on the left side. Patellar reflexes are 2+ on the right side and 2+ on the left side. Achilles reflexes are 2+ on the right side and 2+ on the left side. Psychiatric:         Attention and Perception: Attention normal. He is attentive. Mood and Affect: Mood normal. Mood is not anxious or depressed. Affect is not labile, blunt, angry or inappropriate. Speech: Speech normal. He is communicative. Speech is not rapid and pressured, delayed, slurred or tangential.         Behavior: Behavior normal. Behavior is not agitated, slowed, aggressive, withdrawn, hyperactive or combative. Thought Content: Thought content normal. Thought content is not paranoid or delusional. Thought content does not include homicidal or suicidal ideation. Thought content does not include homicidal or suicidal plan. Cognition and Memory: Cognition normal. Memory is not impaired. He does not exhibit impaired recent memory or impaired remote memory. Judgment: Judgment normal. Judgment is not impulsive or inappropriate. ANNETTA test:+  Yeomans test: +  Gaenslen test: +     Assessment:     1. Lumbar spondylosis    2. Lumbar facet arthropathy    3. Neuropathy    4. Chronic pain syndrome            Plan:      · OARRS reviewed. Current MED: 0  · Patient was not offered naloxone for home. · Discussed long term side effects of medications, tolerance, dependency and addiction. · Previous UDS reviewed  · UDS preformed today for compliance. · Patient told can not receive any pain medications from any other source. · No evidence of abuse, diversion or aberrant behavior.    Medications and/or procedures to improve function and quality of life- patient understanding with this and that may not be pain free   Discussed with patient about safe storage of medications at home   Discussed possible weaning of medication dosing dependent on treatment/procedure results.  Testing: none   Procedures: Lumbar Facet MBB #2 @ L3-4,4-5,5-S1 Bilateral    Discussed with patient about risks with procedure including infection, reaction to medication, increased pain, or bleeding.  Medications:none      Meds. Prescribed:   No orders of the defined types were placed in this encounter. Return for Lumbar Facet MBB @L3-4,4-5,5-S1 bilateral #2, Follow up after procedure.          Electronically signed by ABHISHEK Ashton CNP on2/18/2020 at 2:02 PM

## 2020-02-19 ENCOUNTER — CARE COORDINATION (OUTPATIENT)
Dept: CARE COORDINATION | Age: 70
End: 2020-02-19

## 2020-02-19 NOTE — TELEPHONE ENCOUNTER
Pt called requesting 90 day Rx for Atorvastatin 10 mg one tablet daily    [facility]

## 2020-02-19 NOTE — CARE COORDINATION
Reyes Hoa called and wanted to know why his stools are black following his hospitalizations. He informed me that he did receive iron during his hospitalization and I informed him that it is more than likely the cause of his black stools. He has appt with PCP this Friday and I confirmed date and time during phone call.

## 2020-02-21 ENCOUNTER — OFFICE VISIT (OUTPATIENT)
Dept: FAMILY MEDICINE CLINIC | Age: 70
End: 2020-02-21

## 2020-02-21 VITALS
DIASTOLIC BLOOD PRESSURE: 78 MMHG | WEIGHT: 315 LBS | BODY MASS INDEX: 40.43 KG/M2 | HEIGHT: 74 IN | RESPIRATION RATE: 14 BRPM | SYSTOLIC BLOOD PRESSURE: 128 MMHG | HEART RATE: 72 BPM

## 2020-02-21 PROCEDURE — 1123F ACP DISCUSS/DSCN MKR DOCD: CPT | Performed by: FAMILY MEDICINE

## 2020-02-21 PROCEDURE — 1036F TOBACCO NON-USER: CPT | Performed by: FAMILY MEDICINE

## 2020-02-21 PROCEDURE — 4040F PNEUMOC VAC/ADMIN/RCVD: CPT | Performed by: FAMILY MEDICINE

## 2020-02-21 PROCEDURE — 3017F COLORECTAL CA SCREEN DOC REV: CPT | Performed by: FAMILY MEDICINE

## 2020-02-21 PROCEDURE — G8427 DOCREV CUR MEDS BY ELIG CLIN: HCPCS | Performed by: FAMILY MEDICINE

## 2020-02-21 PROCEDURE — 1111F DSCHRG MED/CURRENT MED MERGE: CPT | Performed by: FAMILY MEDICINE

## 2020-02-21 PROCEDURE — 99213 OFFICE O/P EST LOW 20 MIN: CPT | Performed by: FAMILY MEDICINE

## 2020-02-21 PROCEDURE — G8417 CALC BMI ABV UP PARAM F/U: HCPCS | Performed by: FAMILY MEDICINE

## 2020-02-21 RX ORDER — ATORVASTATIN CALCIUM 10 MG/1
TABLET, FILM COATED ORAL
Qty: 90 TABLET | Refills: 1 | Status: SHIPPED | OUTPATIENT
Start: 2020-02-21 | End: 2020-09-16 | Stop reason: SDUPTHER

## 2020-02-21 ASSESSMENT — ENCOUNTER SYMPTOMS
TROUBLE SWALLOWING: 0
SORE THROAT: 0
ABDOMINAL PAIN: 0
NAUSEA: 0
SHORTNESS OF BREATH: 0
COUGH: 0
BACK PAIN: 0
BLOOD IN STOOL: 0
CONSTIPATION: 0
CHEST TIGHTNESS: 0
EYE PAIN: 0

## 2020-02-21 NOTE — PROGRESS NOTES
ferrous sulfate 325 (65 Fe) MG tablet  Take 1 tablet by mouth 2 times daily             furosemide (LASIX) 40 MG tablet  Take 1 tablet by mouth 2 times daily             insulin aspart (NOVOLOG FLEXPEN) 100 UNIT/ML injection pen  INJECT 12 UNITS SUBCUTANEOUSLY 3 TIMES DAILY BEFORE MEALS             insulin glargine (LANTUS SOLOSTAR) 100 UNIT/ML injection pen  INJECT 30 UNITS SUBCUTANEOUSLY ONCE DAILY             metFORMIN (GLUCOPHAGE) 500 MG tablet  TAKE TWO TABLETS BY MOUTH TWICE DAILY WITH MEALS             Multiple Vitamins-Minerals (THERAPEUTIC MULTIVITAMIN-MINERALS) tablet  Take 1 tablet by mouth daily             nitroGLYCERIN (NITROSTAT) 0.4 MG SL tablet  Place 1 tablet under the tongue every 5 minutes as needed for Chest pain             ticagrelor (BRILINTA) 90 MG TABS tablet  TAKE ONE TABLET BY MOUTH TWICE DAILY                   Medications marked \"taking\" at this time  Outpatient Medications Marked as Taking for the 2/21/20 encounter (Office Visit) with Herman Fulton MD   Medication Sig Dispense Refill    atorvastatin (LIPITOR) 10 MG tablet TAKE 1 TABLET BY MOUTH ONCE DAILY 90 tablet 1    furosemide (LASIX) 40 MG tablet Take 1 tablet by mouth 2 times daily      insulin aspart (NOVOLOG FLEXPEN) 100 UNIT/ML injection pen INJECT 12 UNITS SUBCUTANEOUSLY 3 TIMES DAILY BEFORE MEALS 15 pen 3    carvedilol (COREG) 3.125 MG tablet Take 2 tablets by mouth 2 times daily (with meals) 120 tablet 5    insulin glargine (LANTUS SOLOSTAR) 100 UNIT/ML injection pen INJECT 30 UNITS SUBCUTANEOUSLY ONCE DAILY (Patient taking differently: Inject into the skin nightly INJECT 30 UNITS SUBCUTANEOUSLY ONCE DAILY) 15 pen 1    ticagrelor (BRILINTA) 90 MG TABS tablet TAKE ONE TABLET BY MOUTH TWICE DAILY 60 tablet 11    nitroGLYCERIN (NITROSTAT) 0.4 MG SL tablet Place 1 tablet under the tongue every 5 minutes as needed for Chest pain 25 tablet 3    metFORMIN (GLUCOPHAGE) 500 MG tablet TAKE TWO TABLETS BY MOUTH TWICE DAILY WITH MEALS 360 tablet 1    ferrous sulfate 325 (65 Fe) MG tablet Take 1 tablet by mouth 2 times daily 60 tablet 2    Multiple Vitamins-Minerals (THERAPEUTIC MULTIVITAMIN-MINERALS) tablet Take 1 tablet by mouth daily 30 tablet 11    aspirin 81 MG tablet Take 1 tablet by mouth daily      acetaminophen (TYLENOL) 500 MG tablet Take 500 mg by mouth as needed for Pain          Medications patient taking as of now reconciled against medications ordered at time of hospital discharge: Yes    Chief Complaint   Patient presents with    Follow-Up from Hospital       HPI    Inpatient course: Discharge summary reviewed- see chart. Niddm  With  Long  Term  Insulin stable      ashd  stable     htn   stable    Interval history/Current status: overall feeling admitted because of dehydration as  hjgher   Dose  Of lasix . The kidney injury noted his BUN was up to 109 and at discharge was back down  To  35  and the creatinine was elevated to 2.5 and  Back   Down  To 1.5 and overall much better. Patient was treated and  adjustment of diuretic    Review of Systems   Constitutional: Negative for fatigue and fever. HENT: Negative for congestion, ear pain, postnasal drip, sore throat and trouble swallowing. Eyes: Negative for pain. Respiratory: Negative for cough, chest tightness and shortness of breath. Cardiovascular: Negative for chest pain, palpitations and leg swelling. Gastrointestinal: Negative for abdominal pain, blood in stool, constipation and nausea. Genitourinary: Negative for difficulty urinating, frequency and urgency. Musculoskeletal: Negative for arthralgias, back pain, joint swelling and neck stiffness. Skin: Negative for rash. Neurological: Negative for dizziness, weakness and headaches. Hematological: Negative for adenopathy. Does not bruise/bleed easily. Psychiatric/Behavioral: Negative for behavioral problems, dysphoric mood and sleep disturbance.        Vitals:    02/21/20 1336 BP: 128/78   Site: Right Upper Arm   Position: Sitting   Cuff Size: Medium Adult   Pulse: 72   Resp: 14   Weight: (!) 358 lb 2 oz (162.4 kg)   Height: 6' 2\" (1.88 m)     Body mass index is 45.98 kg/m². Wt Readings from Last 3 Encounters:   02/21/20 (!) 358 lb 2 oz (162.4 kg)   02/18/20 (!) 348 lb 5.2 oz (158 kg)   02/16/20 (!) 350 lb 4.8 oz (158.9 kg)     BP Readings from Last 3 Encounters:   02/21/20 128/78   02/18/20 134/82   02/16/20 (!) 142/64       Physical Exam  Vitals signs and nursing note reviewed. Constitutional:       Appearance: He is well-developed. HENT:      Head: Normocephalic and atraumatic. Right Ear: External ear normal.      Left Ear: External ear normal.      Nose: Nose normal.   Eyes:      Conjunctiva/sclera: Conjunctivae normal.      Pupils: Pupils are equal, round, and reactive to light. Comments: Fundi nl   Neck:      Musculoskeletal: Normal range of motion and neck supple. Thyroid: No thyromegaly. Cardiovascular:      Rate and Rhythm: Normal rate and regular rhythm. Heart sounds: Normal heart sounds. Pulmonary:      Effort: Pulmonary effort is normal.      Breath sounds: Normal breath sounds. No wheezing or rales. Abdominal:      General: Bowel sounds are normal.      Palpations: Abdomen is soft. There is no mass. Tenderness: There is no abdominal tenderness. Musculoskeletal: Normal range of motion. Lymphadenopathy:      Cervical: No cervical adenopathy. Skin:     General: Skin is warm and dry. Findings: No rash. Neurological:      Mental Status: He is alert and oriented to person, place, and time. Cranial Nerves: No cranial nerve deficit. Deep Tendon Reflexes: Reflexes are normal and symmetric. Assessment/Plan:  There are no diagnoses linked to this encounter. Medical Decision Making: moderate complexity      Stress  Test done  wnl   Echo  With  Diastolic  dysfunctioi          Diagnosis Orders   1.  Coronary Metabolic Panel     Standing Status:   Future     Standing Expiration Date:   2/20/2021    CBC Auto Differential     Standing Status:   Future     Standing Expiration Date:   2/21/2021          Stable and  See in   One month and

## 2020-02-27 ENCOUNTER — TELEPHONE (OUTPATIENT)
Dept: FAMILY MEDICINE CLINIC | Age: 70
End: 2020-02-27

## 2020-02-27 RX ORDER — FUROSEMIDE 40 MG/1
40 TABLET ORAL DAILY
Qty: 60 TABLET | Refills: 0 | Status: SHIPPED
Start: 2020-02-27 | End: 2020-12-07 | Stop reason: SDUPTHER

## 2020-02-27 NOTE — TELEPHONE ENCOUNTER
----- Message from Kin Frazier MD sent at 2/27/2020 12:40 AM EST -----  Bun up to 52 and creat slight up at 1.5 so no lasix on 2-27 and starting 2-28 lasix 40 mg once day and on 3-2-20 get lab on printer

## 2020-03-03 ENCOUNTER — PREP FOR PROCEDURE (OUTPATIENT)
Dept: PHYSICAL MEDICINE AND REHAB | Age: 70
End: 2020-03-03

## 2020-03-03 NOTE — PROGRESS NOTES
Pat Spangler is a 68 y. o.oldmale came for follow up regarding his Acute Kidney Injury and chronic kidney disease, Stage III, of several year duration. I am seeing this pt for Dr Julieta Hedrick. Her last Note and the hospital Notes were reviewed. Tuan Hassan's most recent creatinine is 1.5. He had recent Acute Kidney Injury 2nd to hypotension and diuretic with volume depletion with creatinine up to 2.6 on 2/13 and was 1.1 at discharge 2/16/2020. His baseline creatinine was 1.6-1.8 in May and June 2019. States doing well. State compliance with meds and denies adverse effects. The pt does not check home BP. Denies dysuria. He follows at Lymphedema clinic. Edema \"way down\"  Drinks approx 96 oz per day, Trying to decrease fluid intake. Denies new issues with Diabetes. Reports BS bounce around, up to 225 this AM. The pt denies use of NSAIDs. Has a good appetite and is remaining active. Amb with cane. K up to 5.0. Eats lots of tomatoes, and potatoes. Drinks OJ occasionally. Recent Wts and BP Noted and Reviewed  Wt Readings from Last 3 Encounters:   03/04/20 (!) 366 lb (166 kg)   02/21/20 (!) 358 lb 2 oz (162.4 kg)   02/18/20 (!) 348 lb 5.2 oz (158 kg)     BP Readings from Last 3 Encounters:   03/04/20 125/67   02/21/20 128/78   02/18/20 134/82     Body mass index is 46.99 kg/m².       PAST MEDICAL HISTORY:       Diagnosis Date    ASHD (arteriosclerotic heart disease) 2005 2006    stent  baki    Depression     Diabetic peripheral neuropathy [facility]) 2014    Fracture Oct 1984    left lower extremity     HTN (hypertension)     Hypercholesteremia     [facility] dependent [facility] mellitus) [facility] [address]     Low back pain     Morbid obesity with BMI of 45.0-49.9, adult ([facility] [address]     Osteoarthritis      SURGICAL HISTORY:    Past Surgical History:   Procedure Laterality Date    AMPUTATION Left 9/10/14    partial versus complete left hallux amputation, left great toe amputation    APPENDECTOMY      CARDIAC SURGERY [facility]    fusion of C5-C6    CHOLECYSTECTOMY      COLONOSCOPY  2007 and 2011    colonn polyps  lorenzo    CORONARY ANGIOPLASTY WITH STENT PLACEMENT  08/07/2017    Dr Teddy Villagran @ [id number] CORONARY ARTERY BYPASS GRAFT  2015 august dox    EKG 12-LEAD  8/14/2015         FRACTURE SURGERY      left leg    JOINT REPLACEMENT      bilateral knees gurvinder    PAIN MANAGEMENT PROCEDURE Bilateral 1/28/2020    Lumbar Facet MBB @ L3-4,4-5,5-S1 bilateral #1 LUMBAR FACET performed by Nikky Wallace MD at [address] [facility] N/A 12/28/2018    T7-T9 DECOMPRESSION, T7-T9 POSTERIOR FUSION performed by Remington Douglas MD at [address] [place] [address] SURGERY  2010    fumich    TONSILLECTOMY      VASCULAR SURGERY      cabg harvests from left leg     FAMILY HISTORY:       Problem Relation Age of Onset    Cancer Mother         uterine     ALLERGIES:    Allergies   Allergen Reactions    Horse-Derived Products      Social and Occupational History:    TOBACCO:   reports that he has never smoked. He has never used smokeless tobacco.  ETOH:   reports current alcohol use. REVIEW OF SYSTEMS:   GENERAL: Usual state of health. Stable weight. Pleasant  HEENT: Denies recent vision change, Denies Upper respiratory complaints  CARDIOVASCULAR: Denies chest pain/angina. RESPIRATORY:Denies sob, cough, wheezing  ABDOMEN: Denies nausea, vomiting, diarrhea  CNS:Denies headache, dizziness  MUSCULOSKELETAL: Reports joint arthralgia  SKIN: Denies rash, pruritis  HEMATOLOGIC: Denies bruising  ENDOCRINE:  Negative for heat or cold intolerance     EXAM:  VITALS:  /67 (Site: Right Upper Arm, Position: Sitting, Cuff Size: Large Adult)   Pulse 68   Ht 6' 2\" (1.88 m)   Wt (!) 366 lb (166 kg)   SpO2 98%   BMI 46.99 kg/m²    Body mass index is 46.99 kg/m². CONSTITUTIONAL:  No acute distress.  Sitting comfortable in chair  HEENT:  Head is visit.        Diagnostic Data:  Lab Results   Component Value Date    CREATININE 1.5 02/26/2020    CREATININE 1.1 02/16/2020    CREATININE 1.3 02/15/2020    CREATININE 2.0 02/14/2020    CREATININE 2.4 02/13/2020    CREATININE 2.6 02/13/2020     Lab Results   Component Value Date     02/26/2020    K 5.0 02/26/2020    K 5.0 02/14/2020     02/26/2020    CO2 24 02/26/2020    BUN 52 02/26/2020    CREATININE 1.5 02/26/2020    LABGLOM 46 02/26/2020    GLUCOSE 185 02/26/2020    GLUCOSE 125 10/17/2018     Lab Results   Component Value Date    ANIONGAP 13.0 02/26/2020     Lab Results   Component Value Date    CALCIUM 10.3 02/26/2020     Lab Results   Component Value Date    WBC 7.0 02/26/2020    HGB 10.9 (L) 02/26/2020    HCT 33.2 (L) 02/26/2020    MCV 95.1 (H) 02/26/2020    MCH 31.2 02/26/2020     02/26/2020     Lab Results   Component Value Date    LABA1C 7.9 (H) 12/05/2019         Renal US 2/14/2020  Kidneys are grossly unremarkable. Urinary bladder was not visible. Assessment:    · Acute Kidney Injury resolved  · Chronic Kidney Disease Stage III, likely 2nd to DM and HTN. At baseline. UP/UC 0.08 gm, Renal US ok. Previously on Enalapril. Will not resume due to hyperkalemia. · Borderline hyperkalemia. Not on ACEI. Likely 2nd to high potassium intake. 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 Advised decrease fluid intake to approx 48 -64 oz per day. · Essential Hypertension with nephrosclerosis. At control  · Coronary artery disease, hx CABG  · Chronic lymphedema with venous insufficiency   · Lumbar spondylosis with chronic pain.  Follows at Pain Clinic   · Coronary artery disease hx CABG      Orders Placed This Encounter   Procedures    Basic Metabolic Panel    Protein / creatinine ratio, urine       Pt asked to check home BP and record BP readings and bring to office appts  Avoid nonsteroidal anti inflammatory drugs such as Ibuprofen, Aleve, Advil, Motrin. Schedule for follow up appt in 3 months. Pt asked to bring pill bottles to next office visit. Please make early appointment if needed and to call office for questions, concerns, persistent, changing or worsening symptoms.   Discussed with the patient and answered their questions   ABHISHEK Downs CNP      Future Appointments   Date Time Provider [facility]   3/23/2020  2:30 PM Mariza Huntley MD [facility]   3/24/2020  3:00 PM ABHISHEK Castañeda CNP [facility] - [place]   2/4/2021  1:00 PM Yanira Tapia MD [address] [place] [facility] - [place]

## 2020-03-03 NOTE — H&P
and rash. Allergic/Immunologic: Negative for food allergies and immunocompromised state. Neurological: Positive for weakness and numbness. Negative for dizziness, tremors, syncope, facial asymmetry, speech difficulty and light-headedness. Hematological: Does not bruise/bleed easily. Psychiatric/Behavioral: Negative for agitation, behavioral problems, confusion, decreased concentration, dysphoric mood, hallucinations, self-injury, sleep disturbance and suicidal ideas. The patient is not nervous/anxious and is not hyperactive. Depression          Objective:      Vitals                                  Weight: (!) 348 lb 5.2 oz (158 kg)   Height: 6' 2\" (1.88 m)            Physical Exam  Vitals signs and nursing note reviewed. Constitutional:       General: He is not in acute distress. Appearance: He is well-developed. He is not diaphoretic. HENT:      Head: Normocephalic and atraumatic. Right Ear: External ear normal.      Left Ear: External ear normal.      Nose: Nose normal.      Mouth/Throat:      Pharynx: No oropharyngeal exudate. Eyes:      General: No scleral icterus. Right eye: No discharge. Left eye: No discharge. Conjunctiva/sclera: Conjunctivae normal.      Pupils: Pupils are equal, round, and reactive to light. Neck:      Musculoskeletal: Full passive range of motion without pain, normal range of motion and neck supple. Normal range of motion. No edema, erythema, neck rigidity or muscular tenderness. Thyroid: No thyromegaly. Cardiovascular:      Rate and Rhythm: Normal rate and regular rhythm. Heart sounds: Normal heart sounds. No murmur. No friction rub. No gallop. Pulmonary:      Effort: Pulmonary effort is normal. No respiratory distress. Breath sounds: Normal breath sounds. No wheezing or rales. Chest:      Chest wall: No tenderness. Abdominal:      General: Bowel sounds are normal. There is no distension.       Palpations:

## 2020-03-04 ENCOUNTER — OFFICE VISIT (OUTPATIENT)
Dept: NEPHROLOGY | Age: 70
End: 2020-03-04
Payer: MEDICARE

## 2020-03-04 ENCOUNTER — NURSE ONLY (OUTPATIENT)
Dept: LAB | Age: 70
End: 2020-03-04

## 2020-03-04 ENCOUNTER — TELEPHONE (OUTPATIENT)
Dept: CARDIOLOGY CLINIC | Age: 70
End: 2020-03-04

## 2020-03-04 VITALS
OXYGEN SATURATION: 98 % | HEART RATE: 68 BPM | DIASTOLIC BLOOD PRESSURE: 67 MMHG | WEIGHT: 315 LBS | SYSTOLIC BLOOD PRESSURE: 125 MMHG | BODY MASS INDEX: 40.43 KG/M2 | HEIGHT: 74 IN

## 2020-03-04 LAB
ANION GAP SERPL CALCULATED.3IONS-SCNC: 14 MEQ/L (ref 8–16)
CHLORIDE BLD-SCNC: 100 MEQ/L (ref 98–111)
CO2: 25 MEQ/L (ref 23–33)
POTASSIUM SERPL-SCNC: 5.5 MEQ/L (ref 3.5–5.2)
SODIUM BLD-SCNC: 139 MEQ/L (ref 135–145)

## 2020-03-04 PROCEDURE — 1036F TOBACCO NON-USER: CPT | Performed by: NURSE PRACTITIONER

## 2020-03-04 PROCEDURE — 99213 OFFICE O/P EST LOW 20 MIN: CPT | Performed by: NURSE PRACTITIONER

## 2020-03-04 PROCEDURE — G8417 CALC BMI ABV UP PARAM F/U: HCPCS | Performed by: NURSE PRACTITIONER

## 2020-03-04 PROCEDURE — G8482 FLU IMMUNIZE ORDER/ADMIN: HCPCS | Performed by: NURSE PRACTITIONER

## 2020-03-04 PROCEDURE — G8427 DOCREV CUR MEDS BY ELIG CLIN: HCPCS | Performed by: NURSE PRACTITIONER

## 2020-03-04 PROCEDURE — 4040F PNEUMOC VAC/ADMIN/RCVD: CPT | Performed by: NURSE PRACTITIONER

## 2020-03-04 PROCEDURE — 1111F DSCHRG MED/CURRENT MED MERGE: CPT | Performed by: NURSE PRACTITIONER

## 2020-03-04 PROCEDURE — 3017F COLORECTAL CA SCREEN DOC REV: CPT | Performed by: NURSE PRACTITIONER

## 2020-03-04 PROCEDURE — 1123F ACP DISCUSS/DSCN MKR DOCD: CPT | Performed by: NURSE PRACTITIONER

## 2020-03-04 NOTE — PATIENT INSTRUCTIONS
raw one medium 212   Almonds, dry roasted 1 oz (24 nuts) 202   Apricot, canned, juice pack ½ C 202   Asparagus, cooked ½ C 202   [place] squash, cooked ½ C 201   Apple, raw with skin one medium 195   Honeydew ½ C 194   Carrots, cooked ½ C 183   Onions, cooked ½ C 175   Spinach, raw 1 C 167   Corn, sweet yellow ½ C 163   Red mtz pepper ½ C 157   Kale, cooked ½ C 150   Cabbage, cooked ½ C 147   Mustard greens, cooked ½ C 142   [place] ½ C 139   Figs, dried two figs 134   Summer squash, cooked ½ C 126   Grapes 10 grapes 120   Okra, cooked ½ C 108   Bamboo shoots, canned 1 C 108   Celery, raw one stalk 105   Peanut butter, creamy 1 Tbsp 104   Green beans, cooked ½ C 104   Cauliflower, cooked ½ C 91   Mushrooms, shitake, cooked ½ C 88   Watermelon ½ C 85   Cucumber, peeled ½ C 88   Iceberg lettuce 1 C 81   Tomato, sun dried one piece 80   Eggplant, cooked ½ C 69   Pickle one pickle 61   Ketchup 1 Tbsp 60   Radishes one radish 57     5   C=cup, mg=milligrams, oz=ounces, Tbsp=tablespoon    Reference   [employer], [address] [place] [address], [employer] Database for Standard Reference, Release 25: Potassium, K (mg) content of selected foods per common measure      How to Limit Your Fluids     One Cup == 8 fluid oz ==250 ml  Four Cups == 32 fluid oz == 1,000 ml or 1 Liter/Quart  Six Cups == 48 fluid oz == 1,500 ml or 1.5 Liter/Quart     How to Track your fluid intake  Allow one 8 oz cup with each meal.  Three meals == 24 oz/day  Fill a jug with another 24 oz for total of 48 fluid oz per day     To determine how many fluid oz in your usual drinking glass: Fill your glass with water and then pour it into a measuring cup.      Other sources of fluids   Ice: 1 cup == 4 fluid oz   Ice cream: 1 cup == 4 fluid oz   Soup   Juicy fruit, watermelon, grapes, apple, pineapple pear   Popsicles: 3 fluid oz   Jello: 1 cup == 4 fluid oz    Quench your thirst:   Space liquids throughout

## 2020-03-09 ENCOUNTER — TELEPHONE (OUTPATIENT)
Dept: PHYSICAL MEDICINE AND REHAB | Age: 70
End: 2020-03-09

## 2020-03-10 ENCOUNTER — TELEPHONE (OUTPATIENT)
Dept: PHYSICAL MEDICINE AND REHAB | Age: 70
End: 2020-03-10

## 2020-03-13 NOTE — TELEPHONE ENCOUNTER
Date of last visit:  2/21/2020  Date of next visit:  3/23/2020    Requested Prescriptions     Pending Prescriptions Disp Refills    insulin glargine (LANTUS SOLOSTAR) 100 UNIT/ML injection pen 15 pen 1     Sig: INJECT 30 UNITS SUBCUTANEOUSLY ONCE DAILY

## 2020-03-14 PROBLEM — I95.1 ORTHOSTATIC HYPOTENSION AFTER EXERCISE: Status: ACTIVE | Noted: 2020-03-14

## 2020-03-14 RX ORDER — INSULIN GLARGINE 100 [IU]/ML
INJECTION, SOLUTION SUBCUTANEOUS
Qty: 15 PEN | Refills: 1 | Status: SHIPPED | OUTPATIENT
Start: 2020-03-14 | End: 2020-11-24 | Stop reason: SDUPTHER

## 2020-03-16 ENCOUNTER — TELEPHONE (OUTPATIENT)
Dept: PHYSICAL MEDICINE AND REHAB | Age: 70
End: 2020-03-16

## 2020-03-16 ENCOUNTER — TELEPHONE (OUTPATIENT)
Dept: FAMILY MEDICINE CLINIC | Age: 70
End: 2020-03-16

## 2020-03-16 NOTE — TELEPHONE ENCOUNTER
Patient informed and will go to [facility] and get the labs drawn. Spoke to [place] at [facility] and they will draw the BUN, Creatinine and Potassium tomorrow when he comes in. She was able to get the orders out of Epic.

## 2020-03-17 ENCOUNTER — NURSE ONLY (OUTPATIENT)
Dept: LAB | Age: 70
End: 2020-03-17

## 2020-03-17 LAB
BUN BLDV-MCNC: 32 MG/DL (ref 7–22)
CREAT SERPL-MCNC: 1.1 MG/DL (ref 0.4–1.2)
GFR SERPL CREATININE-BSD FRML MDRD: 66 ML/MIN/1.73M2
POTASSIUM SERPL-SCNC: 5.3 MEQ/L (ref 3.5–5.2)

## 2020-03-18 NOTE — DISCHARGE SUMMARY
measured and fitted for a new compression garment to be worn daily to keep swelling down. X  MET  Patient will tolerate wearing the compression bandages from one treatment session until the next treatment session working towards meeting short-term goal #1. X  MET Patient/family/caregiver will demonstrate and verbalize 3-5 skin care precautionary measures to decrease dry skin and risk of infection. X  MET  (Velcro wraps) Patient/family/caregiver will demonstrate applying compression bandages with no greater than moderate assist and verbal cues from the therapist working towards being modified independent with applying the compression bandages for night time swelling. LONG-TERM GOALS:  to be met in 12 Weeks   X  MET 1. Lymphedema swelling will stabilize as noted by total circumferential changes being no greater than 3.0-5.0 cm in preparation for being measured for new compression garment(s) to be worn daily to keep swelling down. X  MET  (Velcro wraps) 2. Patient/family/caregiver will demonstrate applying compression bandages with modified independence to apply at night to keep swelling down overnight to be able to randy compression stockings in the morning. X  MET   (Velcro wraps) 3. Patient/family/caregiver will demonstrate donning/doffing compression garments with modified independence to wear compression garments daily to keep swelling down. X  MET 4. Patient/family/caregiver will verbalize a good understanding regarding proper wearing and replacement schedule, precautions and care of garment(s).      -ASSESSMENT:  Assessment:       The patient is a pleasant and cooperative 68year old male who has made good progress with the Complete Decongestive Therapy/Manual Lymph Drainage (CDT/MLD) treatments as noted by the followin) The patient has demonstrated a decrease in total circumferential measurements in comparison to previous measurements by 105.2 cm and 52.1 cm from the right and left lower

## 2020-03-19 ENCOUNTER — TELEPHONE (OUTPATIENT)
Dept: FAMILY MEDICINE CLINIC | Age: 70
End: 2020-03-19

## 2020-03-19 NOTE — TELEPHONE ENCOUNTER
----- Message from Duane Ricardo MD sent at 3/19/2020  5:37 AM EDT -----  Labs much better and keep drinking some fluids and keep diuretics the same

## 2020-03-23 ENCOUNTER — OFFICE VISIT (OUTPATIENT)
Dept: FAMILY MEDICINE CLINIC | Age: 70
End: 2020-03-23

## 2020-03-23 ENCOUNTER — NURSE ONLY (OUTPATIENT)
Dept: LAB | Age: 70
End: 2020-03-23

## 2020-03-23 VITALS
RESPIRATION RATE: 14 BRPM | HEIGHT: 74 IN | SYSTOLIC BLOOD PRESSURE: 130 MMHG | BODY MASS INDEX: 40.43 KG/M2 | WEIGHT: 315 LBS | HEART RATE: 74 BPM | DIASTOLIC BLOOD PRESSURE: 95 MMHG

## 2020-03-23 PROBLEM — N17.9 AKI (ACUTE KIDNEY INJURY) (HCC): Status: RESOLVED | Noted: 2020-02-13 | Resolved: 2020-03-23

## 2020-03-23 PROBLEM — N17.9 ACUTE KIDNEY INJURY (HCC): Status: RESOLVED | Noted: 2020-02-13 | Resolved: 2020-03-23

## 2020-03-23 LAB
AVERAGE GLUCOSE: 183 MG/DL (ref 70–126)
HBA1C MFR BLD: 8.1 % (ref 4.4–6.4)

## 2020-03-23 PROCEDURE — 3017F COLORECTAL CA SCREEN DOC REV: CPT | Performed by: FAMILY MEDICINE

## 2020-03-23 PROCEDURE — G8417 CALC BMI ABV UP PARAM F/U: HCPCS | Performed by: FAMILY MEDICINE

## 2020-03-23 PROCEDURE — 4040F PNEUMOC VAC/ADMIN/RCVD: CPT | Performed by: FAMILY MEDICINE

## 2020-03-23 PROCEDURE — 99213 OFFICE O/P EST LOW 20 MIN: CPT | Performed by: FAMILY MEDICINE

## 2020-03-23 PROCEDURE — 1123F ACP DISCUSS/DSCN MKR DOCD: CPT | Performed by: FAMILY MEDICINE

## 2020-03-23 PROCEDURE — G8427 DOCREV CUR MEDS BY ELIG CLIN: HCPCS | Performed by: FAMILY MEDICINE

## 2020-03-23 PROCEDURE — 1036F TOBACCO NON-USER: CPT | Performed by: FAMILY MEDICINE

## 2020-03-23 ASSESSMENT — ENCOUNTER SYMPTOMS
TROUBLE SWALLOWING: 0
ORTHOPNEA: 0
BLOOD IN STOOL: 0
BACK PAIN: 0
SORE THROAT: 0
NAUSEA: 0
CHEST TIGHTNESS: 0
SHORTNESS OF BREATH: 0
COUGH: 0
CONSTIPATION: 0
EYE PAIN: 0
ABDOMINAL PAIN: 0

## 2020-03-23 NOTE — PROGRESS NOTES
Subjective:      Patient ID: Sandy Sheikh is a 68 y.o. male. ashd  stable         Acute  Kidney  injury and  Dehydration and  Better as  Less  Diuretic and    Bun  32 and    Creat 1.1         Compression  At  Night      peripheral  Neuropathy   Noted       Diabetes  Stable     Hypertension   This is a chronic problem. The current episode started more than 1 year ago. The problem has been resolved since onset. The problem is controlled. Pertinent negatives include no chest pain, headaches, orthopnea, palpitations, peripheral edema or shortness of breath. The current treatment provides significant improvement. There are no compliance problems. Past Medical History:   Diagnosis Date    ASHD (arteriosclerotic heart disease) 2005 2006    stent  baki    Depression     Diabetic peripheral neuropathy [facility]) 2014    Fracture Oct 1984    left lower extremity     HTN (hypertension)     Hypercholesteremia     [facility] dependent [facility] mellitus) [facility]     Low back pain     Morbid obesity with BMI of 45.0-49.9, adult ([facility] [address] [facility]     Osteoarthritis        Review of Systems   Constitutional: Negative for fatigue and fever. HENT: Negative for congestion, ear pain, postnasal drip, sore throat and trouble swallowing. Eyes: Negative for pain. Respiratory: Negative for cough, chest tightness and shortness of breath. Cardiovascular: Positive for leg swelling. Negative for chest pain, palpitations and orthopnea. Gastrointestinal: Negative for abdominal pain, blood in stool, constipation and nausea. Genitourinary: Negative for difficulty urinating, frequency and urgency. Musculoskeletal: Negative for arthralgias, back pain, joint swelling and neck stiffness. Skin: Negative for rash. Neurological: Negative for dizziness, weakness and headaches. Hematological: Negative for adenopathy. Does not bruise/bleed easily.    Psychiatric/Behavioral: Negative for behavioral problems, dysphoric mood and sleep disturbance. BP (!) 130/95 (Site: Right Upper Arm, Position: Sitting, Cuff Size: Medium Adult)   Pulse 74   Resp 14   Ht 6' 2\" (1.88 m)   Wt (!) 375 lb (170.1 kg)   BMI 48.15 kg/m²   Objective:   Physical Exam  Vitals signs and nursing note reviewed. Constitutional:       Appearance: He is well-developed. HENT:      Head: Normocephalic and atraumatic. Right Ear: External ear normal.      Left Ear: External ear normal.      Nose: Nose normal.   Eyes:      Conjunctiva/sclera: Conjunctivae normal.      Pupils: Pupils are equal, round, and reactive to light. Comments: Fundi nl   Neck:      Musculoskeletal: Normal range of motion and neck supple. Thyroid: No thyromegaly. Cardiovascular:      Rate and Rhythm: Normal rate and regular rhythm. Heart sounds: Normal heart sounds. Pulmonary:      Effort: Pulmonary effort is normal.      Breath sounds: Normal breath sounds. No wheezing or rales. Abdominal:      General: Bowel sounds are normal.      Palpations: Abdomen is soft. There is no mass. Tenderness: There is no abdominal tenderness. Musculoskeletal: Normal range of motion. General: Swelling present. Right lower leg: No edema. Left lower leg: No edema. Comments:   Edema  Less    Lymphadenopathy:      Cervical: No cervical adenopathy. Skin:     General: Skin is warm and dry. Findings: No rash. Neurological:      Mental Status: He is alert and oriented to person, place, and time. Cranial Nerves: No cranial nerve deficit. Deep Tendon Reflexes: Reflexes are normal and symmetric. Assessment:       Diagnosis Orders   1. Venous stasis dermatitis of both lower extremities     2. IDDM (insulin dependent diabetes mellitus) ([facility])  metFORMIN (GLUCOPHAGE) 500 MG tablet   3. Lumbar spondylosis     4. Coronary artery disease of bypass graft of native heart with stable angina pectoris ([address]     5.  Diabetic peripheral neuropathy ([address] 6. Essential hypertension     7. Hypercholesteremia           Plan:      Current Outpatient Medications   Medication Sig Dispense Refill    metFORMIN (GLUCOPHAGE) 500 MG tablet TAKE TWO TABLETS BY MOUTH TWICE DAILY WITH MEALS 360 tablet 1    insulin glargine (LANTUS SOLOSTAR) 100 UNIT/ML injection pen INJECT 30 UNITS SUBCUTANEOUSLY ONCE DAILY 15 pen 1    furosemide (LASIX) 40 MG tablet Take 1 tablet by mouth daily 60 tablet 0    atorvastatin (LIPITOR) 10 MG tablet TAKE 1 TABLET BY MOUTH ONCE DAILY 90 tablet 1    insulin aspart (NOVOLOG FLEXPEN) 100 UNIT/ML injection pen INJECT 12 UNITS SUBCUTANEOUSLY 3 TIMES DAILY BEFORE MEALS 15 pen 3    carvedilol (COREG) 3.125 MG tablet Take 2 tablets by mouth 2 times daily (with meals) 120 tablet 5    ticagrelor (BRILINTA) 90 MG TABS tablet TAKE ONE TABLET BY MOUTH TWICE DAILY 60 tablet 11    nitroGLYCERIN (NITROSTAT) 0.4 MG SL tablet Place 1 tablet under the tongue every 5 minutes as needed for Chest pain 25 tablet 3    ferrous sulfate 325 (65 Fe) MG tablet Take 1 tablet by mouth 2 times daily 60 tablet 2    Multiple Vitamins-Minerals (THERAPEUTIC MULTIVITAMIN-MINERALS) tablet Take 1 tablet by mouth daily 30 tablet 11    aspirin 81 MG tablet Take 1 tablet by mouth daily      acetaminophen (TYLENOL) 500 MG tablet Take 500 mg by mouth as needed for Pain       No current facility-administered medications for this visit. Orders Placed This Encounter   Procedures    Hemoglobin A1C     Standing Status:   Future     Standing Expiration Date:   3/23/2021     No orders of the defined types were placed in this encounter. No results found for this visit on 03/23/20.   Alannah Briseno received counseling on the following healthy behaviors: nutrition and exercise    Patient given educational materials on Diabetes and Hyperlipidemia    I have instructed Alannah Briseno to complete a self tracking handout on Blood Sugars  and Blood Pressures  and instructed them to bring it with them to his next appointment. Discussed use, benefit, and side effects of prescribed medications. Barriers to medication compliance addressed. All patient questions answered. Pt voiced understanding.          See in    3mths    [address] MD [place]

## 2020-03-25 PROBLEM — N17.9 AKI (ACUTE KIDNEY INJURY) (HCC): Status: RESOLVED | Noted: 2020-02-13 | Resolved: 2020-03-24

## 2020-03-27 NOTE — DISCHARGE SUMMARY
night, Brilinta,  metformin, iron supplement. SURGERIES:  Coronary artery bypass graft in the past, T and A, low back  spine surgery, rotator cuff surgery being present. He had surgery  involving T7, T9 back in 2018. He had joint replacement, both knees;  laparoscopic cholecystectomy; cervical disk surgery; appendectomy being  present in addition. PHYSICAL EXAMINATION:  VITAL SIGNS:  On admission note his temp is 96.3, pulse 67, respiratory  rate 18, blood pressure 141/76, pulse ox 97%. GENERAL:  Notes a middle-aged male with generalized weakness. HEENT:  Within normal limits. NECK:  Supple without adenopathy. LUNGS:  Clear. CARDIAC:  Regular rate and rhythm. Normal S1 and S2 without murmur,  gallop, or rub. Pulse 2+. ABDOMEN:  Soft. Positive bowel sounds. Nontender. EXTREMITIES:  Peripheral pulses 2+. Unna boot was taken off. No  significant edema of the lower leg areas being present. The leg edema  now only is 0 to 1+. Full range of motion being present. Ulcerations  of lower leg areas have healed. NEUROLOGIC:  Alert and oriented x3. Gross motor and sensory being  present at this time. HOSPITAL COURSE:  Because of the above, the patient was admitted. It  was felt he had underlying acute kidney injury with moderate dehydration  being present, this was prior felt to be due to the increased diuresis  being present. As stated, initial blood work noted his BUN with a level  of 100, creatinine of 2.4, glucose slight elevation at 241. Actually,  when he was first admitted on 02/13/2020 to the ER, his BUN was 109 with  creatinine of 2.6 and following hydration six hours later, his BUN was  down to 100 with creatinine of 2.4. His BNP was only 154. Troponin  level with minimal elevation at 0.021, felt to be due to the kidney  function, on repeat was 0.010 and less. Liver functions normal.  His  hemoglobin was 11.7; hematocrit 34.8; white count 8300 with platelet  count of 074,851.   His EKG lymphedema. 8.  Chronic back pain. CONDITION ON DISCHARGE:  Stable. DIET:  Will be a diabetic diet along with low sodium. DISCHARGE MEDICATIONS:  1. Aspirin 81 mg a day. 2.  Humalog 12 units with his meals along with sliding scale. 3.  Lantus 30 units nightly. 4.  Brilinta 90 mg b.i.d.  5.  Ferrous sulfate 325 b.i.d.  6.  Coreg 6.25 mg b.i.d.  7.  Lipitor 10 mg daily. 8.  Diuretic, he will resume at 40 mg twice a day at this time with  plans to repeat electrolytes within the next week. The patient will be seen in the office in one week with Dr. Florian Rinne as  scheduled with Renal and with Cardiology in addition, condition  improved.         Lillian Bender M.D.    D: 03/26/2020 19:31:31       T: 03/26/2020 22:03:57     [email or web address]  Job#: [id number]     Doc#: [id number]    CC:

## 2020-04-01 ENCOUNTER — CARE COORDINATION (OUTPATIENT)
Dept: CARE COORDINATION | Age: 70
End: 2020-04-01

## 2020-04-01 NOTE — CARE COORDINATION
Called to check on pt during this crisis. Pt stated he has plenty of food and is receiving 5 frozen MOW on Mondays. He is trying to stay in his apt. Athough he did go out for groceries yesterday. Pt stated his family is \"doing well and I am having a new great granddaughter this month (April 22). \"  Pt watching [place] and movies to pass the time. Encouraged pt to call if he has any needs or concerns.      Plan of care:  Support pt during crisis

## 2020-04-22 ENCOUNTER — CARE COORDINATION (OUTPATIENT)
Dept: CARE COORDINATION | Age: 70
End: 2020-04-22

## 2020-04-22 NOTE — CARE COORDINATION
Hossein Snow called and states he is having some fluid in his ears. Encouraged him to take an anti histamine to see if that would help. Discussed video visit with PCP but he doesn't have a smart device. States he will try anti histamine first and will notify me with any changes.

## 2020-05-12 ENCOUNTER — TELEPHONE (OUTPATIENT)
Dept: PHYSICAL MEDICINE AND REHAB | Age: 70
End: 2020-05-12

## 2020-05-18 NOTE — TELEPHONE ENCOUNTER
Niall Jenkins called requesting a refill of the below medication which has been pended for you:     Requested Prescriptions     Pending Prescriptions Disp Refills    enalapril (VASOTEC) 10 MG tablet 360 tablet 1     Sig: TAKE 2 TABLETS BY MOUTH TWICE DAILY       Last Appointment Date: 3/23/2020  Next Appointment Date: 6/29/2020    Allergies   Allergen Reactions    Horse-Derived Products      Please send to :    [facility]    Saw that this was discontinued in 10/2019. Should pt be taking this medication?     Please call pt to advise

## 2020-05-19 ENCOUNTER — HOSPITAL ENCOUNTER (OUTPATIENT)
Age: 70
Discharge: HOME | End: 2020-05-19
Payer: MEDICARE

## 2020-05-19 PROCEDURE — U0002 COVID-19 LAB TEST NON-CDC: HCPCS

## 2020-05-19 RX ORDER — ENALAPRIL MALEATE 10 MG/1
TABLET ORAL
Qty: 360 TABLET | Refills: 1 | Status: SHIPPED | OUTPATIENT
Start: 2020-05-19 | End: 2020-12-08 | Stop reason: SDUPTHER

## 2020-05-20 LAB
PERFORMING LAB: NORMAL
REPORT: NORMAL
SARS-COV-2: NOT DETECTED

## 2020-05-21 ENCOUNTER — TELEPHONE (OUTPATIENT)
Dept: PHYSICAL MEDICINE AND REHAB | Age: 70
End: 2020-05-21

## 2020-05-21 NOTE — PROGRESS NOTES
Patient contacted regarding COVID-19 screen. Was tested for COVID 19 was not detected. 5/19  Following questions asked: In the last month, have you been in contact with someone who was confirmed or suspected to have Coronavirus/COVID-19:  Patient stated NO  pt states was never tested positive for COVID 19  Do you or family members have any of the following symptoms:  Cough-no   Muscle pain-no   Shortness of breath-no   Fever-no   Weakness-no  Severe headache-no   Sore throat-no   Respiratory symptoms-no    Have you traveled internationally in the last month?  No     Have you been to the emergency room recently-no

## 2020-05-22 ENCOUNTER — HOSPITAL ENCOUNTER (OUTPATIENT)
Age: 70
Setting detail: OUTPATIENT SURGERY
Discharge: HOME | End: 2020-05-22
Attending: PAIN MEDICINE | Admitting: PAIN MEDICINE
Payer: MEDICARE

## 2020-05-22 ENCOUNTER — APPOINTMENT (OUTPATIENT)
Dept: GENERAL RADIOLOGY | Age: 70
End: 2020-05-22
Attending: PAIN MEDICINE
Payer: MEDICARE

## 2020-05-22 VITALS
TEMPERATURE: 97.4 F | OXYGEN SATURATION: 98 % | DIASTOLIC BLOOD PRESSURE: 86 MMHG | WEIGHT: 315 LBS | HEART RATE: 78 BPM | BODY MASS INDEX: 40.43 KG/M2 | RESPIRATION RATE: 16 BRPM | HEIGHT: 74 IN | SYSTOLIC BLOOD PRESSURE: 180 MMHG

## 2020-05-22 PROCEDURE — 64494 INJ PARAVERT F JNT L/S 2 LEV: CPT | Performed by: PAIN MEDICINE

## 2020-05-22 PROCEDURE — 6360000002 HC RX W HCPCS: Performed by: PAIN MEDICINE

## 2020-05-22 PROCEDURE — 7100000010 HC PHASE II RECOVERY - FIRST 15 MIN: Performed by: PAIN MEDICINE

## 2020-05-22 PROCEDURE — 2709999900 HC NON-CHARGEABLE SUPPLY: Performed by: PAIN MEDICINE

## 2020-05-22 PROCEDURE — 3209999900 FLUORO FOR SURGICAL PROCEDURES

## 2020-05-22 PROCEDURE — 64493 INJ PARAVERT F JNT L/S 1 LEV: CPT | Performed by: PAIN MEDICINE

## 2020-05-22 PROCEDURE — 3600000055 HC PAIN LEVEL 3 ADDL 15 MIN: Performed by: PAIN MEDICINE

## 2020-05-22 PROCEDURE — 3600000054 HC PAIN LEVEL 3 BASE: Performed by: PAIN MEDICINE

## 2020-05-22 PROCEDURE — 2500000003 HC RX 250 WO HCPCS: Performed by: PAIN MEDICINE

## 2020-05-22 RX ORDER — DEXAMETHASONE SODIUM PHOSPHATE 4 MG/ML
INJECTION, SOLUTION INTRA-ARTICULAR; INTRALESIONAL; INTRAMUSCULAR; INTRAVENOUS; SOFT TISSUE PRN
Status: DISCONTINUED | OUTPATIENT
Start: 2020-05-22 | End: 2020-05-22 | Stop reason: ALTCHOICE

## 2020-05-22 RX ORDER — LIDOCAINE HYDROCHLORIDE 10 MG/ML
INJECTION, SOLUTION INFILTRATION; PERINEURAL PRN
Status: DISCONTINUED | OUTPATIENT
Start: 2020-05-22 | End: 2020-05-22 | Stop reason: ALTCHOICE

## 2020-05-22 RX ORDER — BUPIVACAINE HYDROCHLORIDE 2.5 MG/ML
INJECTION, SOLUTION EPIDURAL; INFILTRATION; INTRACAUDAL PRN
Status: DISCONTINUED | OUTPATIENT
Start: 2020-05-22 | End: 2020-05-22 | Stop reason: ALTCHOICE

## 2020-05-22 ASSESSMENT — PAIN DESCRIPTION - DESCRIPTORS: DESCRIPTORS: CONSTANT;POUNDING

## 2020-05-22 ASSESSMENT — PAIN SCALES - GENERAL: PAINLEVEL_OUTOF10: 0

## 2020-05-22 ASSESSMENT — PAIN - FUNCTIONAL ASSESSMENT: PAIN_FUNCTIONAL_ASSESSMENT: 0-10

## 2020-05-22 NOTE — OP NOTE
Operative Note    Pre-Procedure Note    Patient Name: Jarrod Vernon   YOB: 1950  Room/Bed: [facility] ELIDA Serrano (General) [place] [facility]  Medical Record Number: [id number]  Date: 5/22/20      Indication:  Lower back pain  Consent: On file. Vital Signs:   Vitals:    05/22/20 1040   BP: (!) 142/65   Pulse: 83   Resp: 18   Temp: 96.2 °F (35.7 °C)   SpO2: 94%       Pre-Sedation Documentation and Exam:   Vital signs have been reviewed (see flow sheet for vitals). Sedation/ Anesthesia Plan:   LOCAL    Patient is an appropriate candidate for plan of sedation: yes      Preoperative Diagnosis:  L-spondylosis, L-facet arthropathy     Post-Op Dx: as above        Procedure Performed:  Diagnostic/Confirmatory median branch blocks at the levels of L3-4 AND L4-5  bilateral under fluoroscopic guidance # 2.     Indication for the Procedure: The patient has a history of chronic low back pain that is not responding well to the conservative treatment. The patient's pain is mostly axial in nature. Pain is interfering with the activities of daily living. Physical examination revealed facet tenderness and facet loading is positive. The procedure and risks  were discussed with the patient and an informed consent was obtained.     Procedure: Bilateral     Patient is placed in prone position and skin over  the back was prepped and draped in sterile manner. Then using fluoroscopy the junction of the transverse process of the vertebra with the superior process of the facet joint was observed and the view was optimized. The skin and deep tissues posterior were infiltrated with 18 ml of 2% lidocaine. Then a #22-gauge  5-1/2 inch spinal needle was introduced through the skin wheal under fluoroscopy guidance such that the tip of the needle lies at the junction of the transverse process with the superior processes of the facet joint.   Then after negative aspiration a total of 8 ml of 0.25% Marcaine and depomedrol (total 80 mg) was injected

## 2020-05-22 NOTE — H&P
[address] [address]  History and Physical Update    Pt Name: Lisandra Albright  MRN: [id number]  YOB: 1950  Date of evaluation: 5/22/2020      I have examined the patient and reviewed the H&P/Consult and there are no changes to the patient or plans.         Electronically signed by Antonio Ferris MD on 5/22/2020 at 10:27 AM

## 2020-06-04 ENCOUNTER — CARE COORDINATION (OUTPATIENT)
Dept: CARE COORDINATION | Age: 70
End: 2020-06-04

## 2020-06-10 ENCOUNTER — TELEPHONE (OUTPATIENT)
Dept: NEPHROLOGY | Age: 70
End: 2020-06-10

## 2020-06-10 ENCOUNTER — NURSE ONLY (OUTPATIENT)
Dept: LAB | Age: 70
End: 2020-06-10

## 2020-06-10 ENCOUNTER — OFFICE VISIT (OUTPATIENT)
Dept: NEPHROLOGY | Age: 70
End: 2020-06-10
Payer: MEDICARE

## 2020-06-10 VITALS
HEART RATE: 80 BPM | DIASTOLIC BLOOD PRESSURE: 78 MMHG | TEMPERATURE: 97.9 F | SYSTOLIC BLOOD PRESSURE: 155 MMHG | BODY MASS INDEX: 49.43 KG/M2 | OXYGEN SATURATION: 97 % | WEIGHT: 315 LBS

## 2020-06-10 LAB
ANION GAP SERPL CALCULATED.3IONS-SCNC: 6 MEQ/L (ref 8–16)
BASOPHILS # BLD: 0.4 %
BASOPHILS ABSOLUTE: 0 THOU/MM3 (ref 0–0.1)
BUN BLDV-MCNC: 20 MG/DL (ref 7–22)
CALCIUM SERPL-MCNC: 9.1 MG/DL (ref 8.5–10.5)
CHLORIDE BLD-SCNC: 104 MEQ/L (ref 98–111)
CO2: 30 MEQ/L (ref 23–33)
CREAT SERPL-MCNC: 1.1 MG/DL (ref 0.4–1.2)
EOSINOPHIL # BLD: 3 %
EOSINOPHILS ABSOLUTE: 0.2 THOU/MM3 (ref 0–0.4)
ERYTHROCYTE [DISTWIDTH] IN BLOOD BY AUTOMATED COUNT: 13.2 % (ref 11.5–14.5)
ERYTHROCYTE [DISTWIDTH] IN BLOOD BY AUTOMATED COUNT: 46.3 FL (ref 35–45)
GFR SERPL CREATININE-BSD FRML MDRD: 66 ML/MIN/1.73M2
GLUCOSE BLD-MCNC: 105 MG/DL (ref 70–108)
HCT VFR BLD CALC: 34.6 % (ref 42–52)
HEMOGLOBIN: 11.2 GM/DL (ref 14–18)
IMMATURE GRANS (ABS): 0.01 THOU/MM3 (ref 0–0.07)
IMMATURE GRANULOCYTES: 0.1 %
LYMPHOCYTES # BLD: 28 %
LYMPHOCYTES ABSOLUTE: 1.9 THOU/MM3 (ref 1–4.8)
MCH RBC QN AUTO: 31 PG (ref 26–33)
MCHC RBC AUTO-ENTMCNC: 32.4 GM/DL (ref 32.2–35.5)
MCV RBC AUTO: 95.8 FL (ref 80–94)
MONOCYTES # BLD: 9 %
MONOCYTES ABSOLUTE: 0.6 THOU/MM3 (ref 0.4–1.3)
NUCLEATED RED BLOOD CELLS: 0 /100 WBC
PLATELET # BLD: 157 THOU/MM3 (ref 130–400)
PMV BLD AUTO: 9.6 FL (ref 9.4–12.4)
POTASSIUM SERPL-SCNC: 4.6 MEQ/L (ref 3.5–5.2)
RBC # BLD: 3.61 MILL/MM3 (ref 4.7–6.1)
SEG NEUTROPHILS: 59.5 %
SEGMENTED NEUTROPHILS ABSOLUTE COUNT: 4 THOU/MM3 (ref 1.8–7.7)
SODIUM BLD-SCNC: 140 MEQ/L (ref 135–145)
WBC # BLD: 6.8 THOU/MM3 (ref 4.8–10.8)

## 2020-06-10 PROCEDURE — 3017F COLORECTAL CA SCREEN DOC REV: CPT | Performed by: INTERNAL MEDICINE

## 2020-06-10 PROCEDURE — G8427 DOCREV CUR MEDS BY ELIG CLIN: HCPCS | Performed by: INTERNAL MEDICINE

## 2020-06-10 PROCEDURE — 4040F PNEUMOC VAC/ADMIN/RCVD: CPT | Performed by: INTERNAL MEDICINE

## 2020-06-10 PROCEDURE — 1123F ACP DISCUSS/DSCN MKR DOCD: CPT | Performed by: INTERNAL MEDICINE

## 2020-06-10 PROCEDURE — 1036F TOBACCO NON-USER: CPT | Performed by: INTERNAL MEDICINE

## 2020-06-10 PROCEDURE — 99213 OFFICE O/P EST LOW 20 MIN: CPT | Performed by: INTERNAL MEDICINE

## 2020-06-10 PROCEDURE — G8417 CALC BMI ABV UP PARAM F/U: HCPCS | Performed by: INTERNAL MEDICINE

## 2020-06-10 RX ORDER — CARVEDILOL 12.5 MG/1
12.5 TABLET ORAL 2 TIMES DAILY
Qty: 180 TABLET | Refills: 1 | Status: SHIPPED | OUTPATIENT
Start: 2020-06-10 | End: 2021-01-05 | Stop reason: SDUPTHER

## 2020-06-10 NOTE — PROGRESS NOTES
TABLETS BY MOUTH TWICE DAILY 360 tablet 1    metFORMIN (GLUCOPHAGE) 500 MG tablet TAKE TWO TABLETS BY MOUTH TWICE DAILY WITH MEALS 360 tablet 1    insulin glargine (LANTUS SOLOSTAR) 100 UNIT/ML injection pen INJECT 30 UNITS SUBCUTANEOUSLY ONCE DAILY 15 pen 1    furosemide (LASIX) 40 MG tablet Take 1 tablet by mouth daily 60 tablet 0    atorvastatin (LIPITOR) 10 MG tablet TAKE 1 TABLET BY MOUTH ONCE DAILY 90 tablet 1    insulin aspart (NOVOLOG FLEXPEN) 100 UNIT/ML injection pen INJECT 12 UNITS SUBCUTANEOUSLY 3 TIMES DAILY BEFORE MEALS 15 pen 3    ticagrelor (BRILINTA) 90 MG TABS tablet TAKE ONE TABLET BY MOUTH TWICE DAILY 60 tablet 11    nitroGLYCERIN (NITROSTAT) 0.4 MG SL tablet Place 1 tablet under the tongue every 5 minutes as needed for Chest pain 25 tablet 3    ferrous sulfate 325 (65 Fe) MG tablet Take 1 tablet by mouth 2 times daily 60 tablet 2    Multiple Vitamins-Minerals (THERAPEUTIC MULTIVITAMIN-MINERALS) tablet Take 1 tablet by mouth daily 30 tablet 11    aspirin 81 MG tablet Take 1 tablet by mouth daily      acetaminophen (TYLENOL) 500 MG tablet Take 500 mg by mouth as needed for Pain       No current facility-administered medications for this visit.          Laboratory & Diagnostics:  CBC:   Lab Results   Component Value Date    WBC 6.8 06/10/2020    HGB 11.2 (L) 06/10/2020    HCT 34.6 (L) 06/10/2020    MCV 95.8 (H) 06/10/2020     06/10/2020     BMP:    Lab Results   Component Value Date     03/04/2020     02/26/2020     02/16/2020    K 5.3 (H) 03/17/2020    K 5.5 (H) 03/04/2020    K 5.0 02/26/2020     03/04/2020     02/26/2020     02/16/2020    CO2 25 03/04/2020    CO2 24 02/26/2020    CO2 23 02/16/2020    BUN 32 (H) 03/17/2020    BUN 52 (H) 02/26/2020    BUN 27 (H) 02/16/2020    CREATININE 1.1 03/17/2020    CREATININE 1.5 (H) 02/26/2020    CREATININE 1.1 02/16/2020    GLUCOSE 185 (H) 02/26/2020    GLUCOSE 168 (H) 02/16/2020    GLUCOSE 222 (H) 02/15/2020      Hepatic:   Lab Results   Component Value Date    AST 10 02/13/2020    AST 19 02/08/2019    AST 18 12/19/2018    ALT 12 02/13/2020    ALT 24 02/08/2019    ALT 34 12/19/2018    BILITOT 0.5 02/13/2020    BILITOT 0.3 02/08/2019    BILITOT 0.4 12/19/2018    ALKPHOS 64 02/13/2020    ALKPHOS 82 02/08/2019    ALKPHOS 66 12/19/2018     BNP: No results found for: BNP  Lipids:   Lab Results   Component Value Date    CHOL 119 10/17/2018    HDL 35.6 (L) 10/17/2018     INR:   Lab Results   Component Value Date    INR 1.00 02/08/2019    INR 0.97 08/11/2017    INR 1.03 08/07/2017     URINE:   Lab Results   Component Value Date    PROTUR 4.2 02/14/2020     Lab Results   Component Value Date    NITRU NEGATIVE 12/19/2018    COLORU YELLOW 12/19/2018    PHUR 5.5 12/19/2018    LABCAST 0-4 HYALINE 12/19/2018    WBCUA 0-2 12/19/2018    RBCUA 0-2 12/19/2018    YEAST NONE SEEN 12/19/2018    BACTERIA NONE 12/19/2018    SPECGRAV 1.015 12/19/2018    LEUKOCYTESUR NEGATIVE 12/19/2018    UROBILINOGEN 0.2 12/19/2018    BILIRUBINUR NEGATIVE 12/19/2018    BLOODU TRACE 12/19/2018    KETUA NEGATIVE 12/19/2018      Microalbumen/Creatinine ratio:  No components found for: RUCREAT        Impression/Plan:   1. 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 S/p RAUL from overdiuresis. Goals of care include slowing rate of progression by controlling blood pressure, blood glucoses and albuminuria and by avoiding nephrotoxins such as NSAIDs and IV contrast.  2. Hyperkalemia need to check repeat labs since he is back on Enalapril  3. HTN: uncontrolled, increase Coreg to 12.5 mg BID  4. Lymphedema: On lasix 40 mg daily advised low sodium diet  5. DM  6. CAD/CABG        Bloodwork and medications were reviewed and plan of care discussed with the patient. Return to clinic in 6 months  or sooner if the need arises.       Terry Bey DO  [facility]

## 2020-06-18 ENCOUNTER — OFFICE VISIT (OUTPATIENT)
Dept: PHYSICAL MEDICINE AND REHAB | Age: 70
End: 2020-06-18
Payer: MEDICARE

## 2020-06-18 VITALS
WEIGHT: 315 LBS | DIASTOLIC BLOOD PRESSURE: 78 MMHG | SYSTOLIC BLOOD PRESSURE: 138 MMHG | HEIGHT: 74 IN | BODY MASS INDEX: 40.43 KG/M2 | HEART RATE: 97 BPM

## 2020-06-18 PROCEDURE — 3017F COLORECTAL CA SCREEN DOC REV: CPT | Performed by: NURSE PRACTITIONER

## 2020-06-18 PROCEDURE — 99213 OFFICE O/P EST LOW 20 MIN: CPT | Performed by: NURSE PRACTITIONER

## 2020-06-18 PROCEDURE — 1036F TOBACCO NON-USER: CPT | Performed by: NURSE PRACTITIONER

## 2020-06-18 PROCEDURE — 1123F ACP DISCUSS/DSCN MKR DOCD: CPT | Performed by: NURSE PRACTITIONER

## 2020-06-18 PROCEDURE — G8427 DOCREV CUR MEDS BY ELIG CLIN: HCPCS | Performed by: NURSE PRACTITIONER

## 2020-06-18 PROCEDURE — G8417 CALC BMI ABV UP PARAM F/U: HCPCS | Performed by: NURSE PRACTITIONER

## 2020-06-18 PROCEDURE — 4040F PNEUMOC VAC/ADMIN/RCVD: CPT | Performed by: NURSE PRACTITIONER

## 2020-06-18 ASSESSMENT — ENCOUNTER SYMPTOMS
CHEST TIGHTNESS: 0
RHINORRHEA: 0
WHEEZING: 0
EYE PAIN: 0
SHORTNESS OF BREATH: 0
NAUSEA: 0
SORE THROAT: 0
COLOR CHANGE: 0
ABDOMINAL PAIN: 0
PHOTOPHOBIA: 0
COUGH: 0
BACK PAIN: 1
VOMITING: 0
DIARRHEA: 0
SINUS PRESSURE: 0
CONSTIPATION: 0

## 2020-06-18 NOTE — PROGRESS NOTES
Medical History  Mike Bhatia  has a past medical history of RAUL (acute kidney injury) ([facility] [address] ASHD (arteriosclerotic heart disease), Depression, Diabetic peripheral neuropathy ([facility] Fracture, HTN (hypertension), Hypercholesteremia, IDDM (insulin dependent diabetes mellitus) ([facility] Low back pain, Morbid obesity with BMI of 45.0-49.9, adult ([facility] and Osteoarthritis. Past Surgical History  The patient  has a past surgical history that includes Cholecystectomy; Rotator cuff repair; Cervical disc surgery (2000); Appendectomy; Spine surgery (2010); Colonoscopy (2007 and 2011); joint replacement; Tonsillectomy; amputation (Left, 9/10/14); EKG 12 Lead (8/14/2015); Coronary artery bypass graft (2015 august ); Cardiac surgery; vascular surgery; fracture surgery; Coronary angioplasty with stent (08/07/2017); POSTERIOR FUSION THORACIC SPINE (N/A, 12/28/2018); Pain management procedure (Bilateral, 1/28/2020); and Facet joint injection (Bilateral, 5/22/2020). Family History  This patient's family history includes Cancer in his mother. Social History  Mike Bhatia  reports that he has never smoked. He has never used smokeless tobacco. He reports current alcohol use. He reports that he does not use drugs.     Medications    Current Outpatient Medications:     carvedilol (COREG) 12.5 MG tablet, Take 1 tablet by mouth 2 times daily, Disp: 180 tablet, Rfl: 1    enalapril (VASOTEC) 10 MG tablet, TAKE 2 TABLETS BY MOUTH TWICE DAILY, Disp: 360 tablet, Rfl: 1    metFORMIN (GLUCOPHAGE) 500 MG tablet, TAKE TWO TABLETS BY MOUTH TWICE DAILY WITH MEALS, Disp: 360 tablet, Rfl: 1    insulin glargine (LANTUS SOLOSTAR) 100 UNIT/ML injection pen, INJECT 30 UNITS SUBCUTANEOUSLY ONCE DAILY, Disp: 15 pen, Rfl: 1    furosemide (LASIX) 40 MG tablet, Take 1 tablet by mouth daily, Disp: 60 tablet, Rfl: 0    atorvastatin (LIPITOR) 10 MG tablet, TAKE 1 TABLET BY MOUTH ONCE DAILY, Disp: 90 tablet, Rfl: 1    insulin aspart (NOVOLOG FLEXPEN) 100 UNIT/ML injection pen, INJECT 12 UNITS SUBCUTANEOUSLY 3 TIMES DAILY BEFORE MEALS, Disp: 15 pen, Rfl: 3    ticagrelor (BRILINTA) 90 MG TABS tablet, TAKE ONE TABLET BY MOUTH TWICE DAILY, Disp: 60 tablet, Rfl: 11    nitroGLYCERIN (NITROSTAT) 0.4 MG SL tablet, Place 1 tablet under the tongue every 5 minutes as needed for Chest pain, Disp: 25 tablet, Rfl: 3    ferrous sulfate 325 (65 Fe) MG tablet, Take 1 tablet by mouth 2 times daily, Disp: 60 tablet, Rfl: 2    Multiple Vitamins-Minerals (THERAPEUTIC MULTIVITAMIN-MINERALS) tablet, Take 1 tablet by mouth daily, Disp: 30 tablet, Rfl: 11    aspirin 81 MG tablet, Take 1 tablet by mouth daily, Disp: , Rfl:     acetaminophen (TYLENOL) 500 MG tablet, Take 500 mg by mouth as needed for Pain, Disp: , Rfl:     Subjective:      Review of Systems   Constitutional: Positive for activity change. Negative for appetite change, chills, diaphoresis, fatigue, fever and unexpected weight change. HENT: Negative for congestion, ear pain, hearing loss, mouth sores, nosebleeds, rhinorrhea, sinus pressure and sore throat. Eyes: Negative for photophobia, pain and visual disturbance. Respiratory: Negative for cough, chest tightness, shortness of breath and wheezing. Cardiovascular: Positive for leg swelling. Negative for chest pain and palpitations. CABG HTN MI  Has BLE lymphedema   Gastrointestinal: Negative for abdominal pain, constipation, diarrhea, nausea and vomiting. Endocrine: Negative for cold intolerance, heat intolerance, polydipsia, polyphagia and polyuria. DM   Genitourinary: Negative for decreased urine volume, difficulty urinating, frequency and hematuria. Musculoskeletal: Positive for arthralgias, back pain, gait problem, myalgias, neck pain and neck stiffness. Negative for joint swelling. Skin: Negative for color change and rash. Allergic/Immunologic: Negative for food allergies and immunocompromised state.    Neurological: Positive for General: Tenderness present. Right shoulder: He exhibits tenderness. Left shoulder: He exhibits tenderness. Right hip: He exhibits decreased range of motion, decreased strength, tenderness and bony tenderness. Left hip: He exhibits decreased range of motion, decreased strength, tenderness and bony tenderness. Right knee: He exhibits decreased range of motion. Tenderness found. Left knee: He exhibits decreased range of motion. Tenderness found. Right ankle: He exhibits swelling. Left ankle: He exhibits swelling. Cervical back: He exhibits decreased range of motion, tenderness and bony tenderness. Thoracic back: He exhibits decreased range of motion, tenderness and bony tenderness. Lumbar back: He exhibits decreased range of motion, tenderness, bony tenderness, pain and spasm. Back:       Right upper leg: He exhibits tenderness. Left upper leg: He exhibits tenderness. Right lower leg: Edema present. Left lower leg: Edema present. Right foot: Tenderness and swelling present. Left foot: Tenderness and swelling present. Comments: H/O Cervical surgery C5-6    T6-T9 PSF Lumbar Nj Decompression L2-S1  Right shoulder rotator cuff surgery  BTKR  Had 3 steel rods in LLE but hardware was removed. BLE lymphedema    Skin:     General: Skin is warm. Coloration: Skin is not pale. Findings: No erythema or rash. Neurological:      Mental Status: He is alert and oriented to person, place, and time. He is not disoriented. Cranial Nerves: Cranial nerves are intact. No cranial nerve deficit. Sensory: Sensation is intact. No sensory deficit. Motor: Weakness present. No atrophy or abnormal muscle tone. Coordination: Coordination is intact. Coordination normal.      Gait: Gait abnormal.      Deep Tendon Reflexes: Reflexes are normal and symmetric. Babinski sign absent on the right side.       Reflex Scores: Tricep reflexes are 2+ on the right side and 2+ on the left side. Bicep reflexes are 2+ on the right side and 2+ on the left side. Brachioradialis reflexes are 2+ on the right side and 2+ on the left side. Patellar reflexes are 2+ on the right side and 2+ on the left side. Achilles reflexes are 2+ on the right side and 2+ on the left side. Psychiatric:         Attention and Perception: Attention normal. He is attentive. Mood and Affect: Mood normal. Mood is not anxious or depressed. Affect is not labile, blunt, angry or inappropriate. Speech: Speech normal. He is communicative. Speech is not rapid and pressured, delayed, slurred or tangential.         Behavior: Behavior normal. Behavior is not agitated, slowed, aggressive, withdrawn, hyperactive or combative. Thought Content: Thought content normal. Thought content is not paranoid or delusional. Thought content does not include homicidal or suicidal ideation. Thought content does not include homicidal or suicidal plan. Cognition and Memory: Cognition normal. Memory is not impaired. He does not exhibit impaired recent memory or impaired remote memory. Judgment: Judgment normal. Judgment is not impulsive or inappropriate. ANNETTA test:+  Yeomans test: +  Gaenslen test:+     Assessment:     1. Lumbar spondylosis    2. Lumbar facet arthropathy    3. Neuropathy    4. Chronic pain syndrome            Plan:      · OARRS reviewed. Current MED:0  · Patient was not offered naloxone for home. · Discussed long term side effects of medications, tolerance, dependency and addiction. · Previous UDS reviewed  · UDS preformed today for compliance. · Patient told can not receive any pain medications from any other source. · No evidence of abuse, diversion or aberrant behavior.    Medications and/or procedures to improve function and quality of life- patient understanding with this and that may not be pain free   Discussed with patient about safe storage of medications at home   Discussed possible weaning of medication dosing dependent on treatment/procedure results.  Testing: none   Procedures: Lumbar RFA Bilateral L3-4,4-5 Bilateral right side first. Pt is on BRILINTA   Discussed with patient about risks with procedure including infection, reaction to medication, increased pain, or bleeding.  Medications:Tylenol      Meds. Prescribed:   No orders of the defined types were placed in this encounter. Return for Lumbar RFA Bilateral@ L3-4,4-5 right side first, Follow up after procedure. Time spent with patient was 15minutes, more than 50% was spent Counseling/coordinated the patient'scare.       Electronically signed by ABHISHEK Acosta CNP on6/18/2020 at 11:30 AM

## 2020-06-19 ENCOUNTER — TELEPHONE (OUTPATIENT)
Dept: CARDIOLOGY CLINIC | Age: 70
End: 2020-06-19

## 2020-06-19 NOTE — TELEPHONE ENCOUNTER
Pre op Risk Assessment    Procedure RFA L3-4, 4-5 Right side  Physician Dr. Norris Suarez  Date of surgery/procedure TBA    Last OV 2/6/2020  Last Stress 2/13/2020  Last Echo 2/13/2020  Last Cath 8/5/2017  Last Stent 8/5/2017  Is patient on blood thinners Brilinta, ASA  Hold Meds/how many days 5

## 2020-06-29 ENCOUNTER — NURSE ONLY (OUTPATIENT)
Dept: LAB | Age: 70
End: 2020-06-29

## 2020-06-29 ENCOUNTER — OFFICE VISIT (OUTPATIENT)
Dept: FAMILY MEDICINE CLINIC | Age: 70
End: 2020-06-29

## 2020-06-29 VITALS
HEART RATE: 80 BPM | BODY MASS INDEX: 40.43 KG/M2 | DIASTOLIC BLOOD PRESSURE: 74 MMHG | SYSTOLIC BLOOD PRESSURE: 128 MMHG | WEIGHT: 315 LBS | TEMPERATURE: 98.8 F | HEIGHT: 74 IN | RESPIRATION RATE: 16 BRPM

## 2020-06-29 LAB
AVERAGE GLUCOSE: 150 MG/DL (ref 70–126)
HBA1C MFR BLD: 7 % (ref 4.4–6.4)

## 2020-06-29 PROCEDURE — 1036F TOBACCO NON-USER: CPT | Performed by: FAMILY MEDICINE

## 2020-06-29 PROCEDURE — 99213 OFFICE O/P EST LOW 20 MIN: CPT | Performed by: FAMILY MEDICINE

## 2020-06-29 PROCEDURE — G8427 DOCREV CUR MEDS BY ELIG CLIN: HCPCS | Performed by: FAMILY MEDICINE

## 2020-06-29 PROCEDURE — 4040F PNEUMOC VAC/ADMIN/RCVD: CPT | Performed by: FAMILY MEDICINE

## 2020-06-29 PROCEDURE — 3017F COLORECTAL CA SCREEN DOC REV: CPT | Performed by: FAMILY MEDICINE

## 2020-06-29 PROCEDURE — 1123F ACP DISCUSS/DSCN MKR DOCD: CPT | Performed by: FAMILY MEDICINE

## 2020-06-29 PROCEDURE — G8417 CALC BMI ABV UP PARAM F/U: HCPCS | Performed by: FAMILY MEDICINE

## 2020-06-29 RX ORDER — INSULIN ASPART 100 [IU]/ML
INJECTION, SOLUTION INTRAVENOUS; SUBCUTANEOUS
Qty: 15 PEN | Refills: 1 | Status: SHIPPED | OUTPATIENT
Start: 2020-06-29 | End: 2020-09-11 | Stop reason: SDUPTHER

## 2020-06-29 RX ORDER — BLOOD SUGAR DIAGNOSTIC
STRIP MISCELLANEOUS
Qty: 100 EACH | Refills: 11 | Status: SHIPPED | OUTPATIENT
Start: 2020-06-29 | End: 2022-04-19 | Stop reason: SDUPTHER

## 2020-06-29 ASSESSMENT — ENCOUNTER SYMPTOMS
SHORTNESS OF BREATH: 0
COUGH: 0
ABDOMINAL PAIN: 0
EYE PAIN: 0
BLOOD IN STOOL: 0
SORE THROAT: 0
TROUBLE SWALLOWING: 0
CONSTIPATION: 0
BACK PAIN: 1
CHEST TIGHTNESS: 0
NAUSEA: 0

## 2020-06-29 NOTE — PROGRESS NOTES
(hypertension)     Hypercholesteremia     IDDM (insulin dependent diabetes mellitus) (HCC)     Low back pain     Morbid obesity with BMI of 45.0-49.9, adult (HCC)     Osteoarthritis        Review of Systems   Constitutional: Negative for fatigue and fever. HENT: Negative for congestion, ear pain, postnasal drip, sore throat and trouble swallowing. Eyes: Negative for pain. Respiratory: Negative for cough, chest tightness and shortness of breath. Cardiovascular: Positive for leg swelling. Negative for chest pain and palpitations. Gastrointestinal: Negative for abdominal pain, blood in stool, constipation and nausea. Genitourinary: Negative for difficulty urinating, frequency and urgency. Musculoskeletal: Positive for back pain. Negative for arthralgias, joint swelling and neck stiffness. Skin: Negative for rash. Neurological: Negative for dizziness, focal weakness, weakness and headaches. Hematological: Negative for adenopathy. Does not bruise/bleed easily. Psychiatric/Behavioral: Negative for behavioral problems, dysphoric mood and sleep disturbance. /74 (Site: Right Upper Arm, Position: Sitting, Cuff Size: Large Adult)   Pulse 80   Temp 98.8 °F (37.1 °C) (Oral)   Resp 16   Ht 6' 2\" (1.88 m)   Wt (!) 388 lb (176 kg)   BMI 49.82 kg/m²   Objective:   Physical Exam  Vitals signs and nursing note reviewed. Constitutional:       Appearance: He is well-developed. HENT:      Head: Normocephalic and atraumatic. Right Ear: External ear normal.      Left Ear: External ear normal.      Nose: Nose normal.   Eyes:      Conjunctiva/sclera: Conjunctivae normal.      Pupils: Pupils are equal, round, and reactive to light. Comments: Fundi nl   Neck:      Musculoskeletal: Normal range of motion and neck supple. Thyroid: No thyromegaly. Cardiovascular:      Rate and Rhythm: Normal rate and regular rhythm. Heart sounds: Normal heart sounds.    Pulmonary:      Effort:

## 2020-07-09 ENCOUNTER — HOSPITAL ENCOUNTER (OUTPATIENT)
Age: 70
Discharge: HOME | End: 2020-07-09
Payer: MEDICARE

## 2020-07-09 PROCEDURE — U0002 COVID-19 LAB TEST NON-CDC: HCPCS

## 2020-07-12 LAB
PERFORMING LAB: NORMAL
REPORT: NORMAL
SARS-COV-2: NOT DETECTED

## 2020-07-13 ENCOUNTER — TELEPHONE (OUTPATIENT)
Dept: PHYSICAL MEDICINE AND REHAB | Age: 70
End: 2020-07-13

## 2020-07-14 ENCOUNTER — APPOINTMENT (OUTPATIENT)
Dept: GENERAL RADIOLOGY | Age: 70
End: 2020-07-14
Attending: PAIN MEDICINE
Payer: MEDICARE

## 2020-07-14 ENCOUNTER — ANESTHESIA EVENT (OUTPATIENT)
Dept: OPERATING ROOM | Age: 70
End: 2020-07-14
Payer: MEDICARE

## 2020-07-14 ENCOUNTER — HOSPITAL ENCOUNTER (OUTPATIENT)
Age: 70
Setting detail: OUTPATIENT SURGERY
Discharge: HOME | End: 2020-07-14
Attending: PAIN MEDICINE | Admitting: PAIN MEDICINE
Payer: MEDICARE

## 2020-07-14 ENCOUNTER — ANESTHESIA (OUTPATIENT)
Dept: OPERATING ROOM | Age: 70
End: 2020-07-14
Payer: MEDICARE

## 2020-07-14 VITALS
RESPIRATION RATE: 16 BRPM | WEIGHT: 315 LBS | BODY MASS INDEX: 40.43 KG/M2 | OXYGEN SATURATION: 98 % | DIASTOLIC BLOOD PRESSURE: 67 MMHG | SYSTOLIC BLOOD PRESSURE: 143 MMHG | HEIGHT: 74 IN | TEMPERATURE: 98 F | HEART RATE: 83 BPM

## 2020-07-14 VITALS
DIASTOLIC BLOOD PRESSURE: 91 MMHG | OXYGEN SATURATION: 85 % | RESPIRATION RATE: 7 BRPM | SYSTOLIC BLOOD PRESSURE: 189 MMHG

## 2020-07-14 LAB — GLUCOSE BLD-MCNC: 245 MG/DL (ref 70–108)

## 2020-07-14 PROCEDURE — 7100000011 HC PHASE II RECOVERY - ADDTL 15 MIN: Performed by: PAIN MEDICINE

## 2020-07-14 PROCEDURE — 3600000056 HC PAIN LEVEL 4 BASE: Performed by: PAIN MEDICINE

## 2020-07-14 PROCEDURE — 2720000010 HC SURG SUPPLY STERILE: Performed by: PAIN MEDICINE

## 2020-07-14 PROCEDURE — 2709999900 HC NON-CHARGEABLE SUPPLY: Performed by: PAIN MEDICINE

## 2020-07-14 PROCEDURE — 6360000002 HC RX W HCPCS: Performed by: PAIN MEDICINE

## 2020-07-14 PROCEDURE — 3700000001 HC ADD 15 MINUTES (ANESTHESIA): Performed by: PAIN MEDICINE

## 2020-07-14 PROCEDURE — 64635 DESTROY LUMB/SAC FACET JNT: CPT | Performed by: PAIN MEDICINE

## 2020-07-14 PROCEDURE — 6360000002 HC RX W HCPCS: Performed by: NURSE ANESTHETIST, CERTIFIED REGISTERED

## 2020-07-14 PROCEDURE — 3700000000 HC ANESTHESIA ATTENDED CARE: Performed by: PAIN MEDICINE

## 2020-07-14 PROCEDURE — 2500000003 HC RX 250 WO HCPCS: Performed by: PAIN MEDICINE

## 2020-07-14 PROCEDURE — 3209999900 FLUORO FOR SURGICAL PROCEDURES

## 2020-07-14 PROCEDURE — 64636 DESTROY L/S FACET JNT ADDL: CPT | Performed by: PAIN MEDICINE

## 2020-07-14 PROCEDURE — 3600000057 HC PAIN LEVEL 4 ADDL 15 MIN: Performed by: PAIN MEDICINE

## 2020-07-14 PROCEDURE — 82948 REAGENT STRIP/BLOOD GLUCOSE: CPT

## 2020-07-14 PROCEDURE — 7100000010 HC PHASE II RECOVERY - FIRST 15 MIN: Performed by: PAIN MEDICINE

## 2020-07-14 RX ORDER — LIDOCAINE HYDROCHLORIDE 10 MG/ML
INJECTION, SOLUTION EPIDURAL; INFILTRATION; INTRACAUDAL; PERINEURAL PRN
Status: DISCONTINUED | OUTPATIENT
Start: 2020-07-14 | End: 2020-07-14 | Stop reason: ALTCHOICE

## 2020-07-14 RX ORDER — METHYLPREDNISOLONE ACETATE 80 MG/ML
INJECTION, SUSPENSION INTRA-ARTICULAR; INTRALESIONAL; INTRAMUSCULAR; SOFT TISSUE PRN
Status: DISCONTINUED | OUTPATIENT
Start: 2020-07-14 | End: 2020-07-14 | Stop reason: ALTCHOICE

## 2020-07-14 RX ORDER — FENTANYL CITRATE 50 UG/ML
INJECTION, SOLUTION INTRAMUSCULAR; INTRAVENOUS PRN
Status: DISCONTINUED | OUTPATIENT
Start: 2020-07-14 | End: 2020-07-14 | Stop reason: SDUPTHER

## 2020-07-14 RX ORDER — PROPOFOL 10 MG/ML
INJECTION, EMULSION INTRAVENOUS PRN
Status: DISCONTINUED | OUTPATIENT
Start: 2020-07-14 | End: 2020-07-14 | Stop reason: SDUPTHER

## 2020-07-14 RX ORDER — BUPIVACAINE HYDROCHLORIDE 2.5 MG/ML
INJECTION, SOLUTION EPIDURAL; INFILTRATION; INTRACAUDAL PRN
Status: DISCONTINUED | OUTPATIENT
Start: 2020-07-14 | End: 2020-07-14 | Stop reason: ALTCHOICE

## 2020-07-14 RX ADMIN — FENTANYL CITRATE 100 MCG: 50 INJECTION, SOLUTION INTRAMUSCULAR; INTRAVENOUS at 11:32

## 2020-07-14 RX ADMIN — PROPOFOL 50 MG: 10 INJECTION, EMULSION INTRAVENOUS at 11:43

## 2020-07-14 ASSESSMENT — PULMONARY FUNCTION TESTS
PIF_VALUE: 0

## 2020-07-14 ASSESSMENT — PAIN SCALES - GENERAL: PAINLEVEL_OUTOF10: 0

## 2020-07-14 ASSESSMENT — PAIN - FUNCTIONAL ASSESSMENT: PAIN_FUNCTIONAL_ASSESSMENT: 0-10

## 2020-07-14 NOTE — H&P
H & P      Lumbar Facet MBB #2  L3-4,4-5 Bilateral  5/22/2020 states he received about 75% pain relief or benefit for 2 weeks. He continues to have low back bilateral SI hip and BLE pain. He has h/o lumbar surgery L2-S1 Lumbar Laminectomy Decompression Fusion in 200  Dr Hayde Hazel. He uses a cane. His low back SI pain increases with walking and standing. When he sits down the pain relief. He takes Tylenol prn pain. Girma Pee He denies any ER visits since last visit.     Pain scale with out pain medications or at its worst is 7/10. Pain scale with pain medications or at its best is 2/10.        The patientis allergic to horse-derived products.     Past Medical History  Ranjit Donohue  has a past medical history of RAUL (acute kidney injury) ([facility] [address] ASHD (arteriosclerotic heart disease), Depression, Diabetic peripheral neuropathy ([facility] Fracture, HTN (hypertension), Hypercholesteremia, IDDM (insulin dependent diabetes mellitus) ([facility] [facility] Low back pain, Morbid obesity with BMI of 45.0-49.9, adult ([address] and Osteoarthritis.     Past Surgical History  The patient  has a past surgical history that includes Cholecystectomy; Rotator cuff repair; Cervical disc surgery (2000); Appendectomy; Spine surgery (2010); Colonoscopy (2007 and 2011); joint replacement; Tonsillectomy; amputation (Left, 9/10/14); EKG 12 Lead (8/14/2015); Coronary artery bypass graft (2015 august ); Cardiac surgery; vascular surgery; fracture surgery; Coronary angioplasty with stent (08/07/2017); POSTERIOR FUSION THORACIC SPINE (N/A, 12/28/2018); Pain management procedure (Bilateral, 1/28/2020); and Facet joint injection (Bilateral, 5/22/2020).    Family History  This patient's family history includes Cancer in his mother.  [address]  reports that he has never smoked. He has never used smokeless tobacco. He reports current alcohol use.  He reports that he does not use drugs.     Medications  Current Medication     Current Outpatient Medications:    carvedilol (COREG) 12.5 MG tablet, Take 1 tablet by mouth 2 times daily, Disp: 180 tablet, Rfl: 1    enalapril (VASOTEC) 10 MG tablet, TAKE 2 TABLETS BY MOUTH TWICE DAILY, Disp: 360 tablet, Rfl: 1    metFORMIN (GLUCOPHAGE) 500 MG tablet, TAKE TWO TABLETS BY MOUTH TWICE DAILY WITH MEALS, Disp: 360 tablet, Rfl: 1    insulin glargine (LANTUS SOLOSTAR) 100 UNIT/ML injection pen, INJECT 30 UNITS SUBCUTANEOUSLY ONCE DAILY, Disp: 15 pen, Rfl: 1    furosemide (LASIX) 40 MG tablet, Take 1 tablet by mouth daily, Disp: 60 tablet, Rfl: 0    atorvastatin (LIPITOR) 10 MG tablet, TAKE 1 TABLET BY MOUTH ONCE DAILY, Disp: 90 tablet, Rfl: 1    insulin aspart (NOVOLOG FLEXPEN) 100 UNIT/ML injection pen, INJECT 12 UNITS SUBCUTANEOUSLY 3 TIMES DAILY BEFORE MEALS, Disp: 15 pen, Rfl: 3    ticagrelor (BRILINTA) 90 MG TABS tablet, TAKE ONE TABLET BY MOUTH TWICE DAILY, Disp: 60 tablet, Rfl: 11    nitroGLYCERIN (NITROSTAT) 0.4 MG SL tablet, Place 1 tablet under the tongue every 5 minutes as needed for Chest pain, Disp: 25 tablet, Rfl: 3    ferrous sulfate 325 (65 Fe) MG tablet, Take 1 tablet by mouth 2 times daily, Disp: 60 tablet, Rfl: 2    Multiple Vitamins-Minerals (THERAPEUTIC MULTIVITAMIN-MINERALS) tablet, Take 1 tablet by mouth daily, Disp: 30 tablet, Rfl: 11    aspirin 81 MG tablet, Take 1 tablet by mouth daily, Disp: , Rfl:     acetaminophen (TYLENOL) 500 MG tablet, Take 500 mg by mouth as needed for Pain, Disp: , Rfl:         Subjective:      Review of Systems   Constitutional: Positive for activity change. Negative for appetite change, chills, diaphoresis, fatigue, fever and unexpected weight change. HENT: Negative for congestion, ear pain, hearing loss, mouth sores, nosebleeds, rhinorrhea, sinus pressure and sore throat. Eyes: Negative for photophobia, pain and visual disturbance. Respiratory: Negative for cough, chest tightness, shortness of breath and wheezing. Cardiovascular: Positive for leg swelling. Negative for chest pain and palpitations. CABG HTN MI  Has BLE lymphedema   Gastrointestinal: Negative for abdominal pain, constipation, diarrhea, nausea and vomiting. Endocrine: Negative for cold intolerance, heat intolerance, polydipsia, polyphagia and polyuria. DM   Genitourinary: Negative for decreased urine volume, difficulty urinating, frequency and hematuria. Musculoskeletal: Positive for arthralgias, back pain, gait problem, myalgias, neck pain and neck stiffness. Negative for joint swelling. Skin: Negative for color change and rash. Allergic/Immunologic: Negative for food allergies and immunocompromised state. Neurological: Positive for weakness and numbness. Negative for dizziness, tremors, syncope, facial asymmetry, speech difficulty and light-headedness. Hematological: Does not bruise/bleed easily. Psychiatric/Behavioral: Negative for agitation, behavioral problems, confusion, decreased concentration, dysphoric mood, hallucinations, self-injury, sleep disturbance and suicidal ideas. The patient is not nervous/anxious and is not hyperactive. Depression          Objective:      Vitals       Vitals:     06/18/20 1108   BP: 138/78   Pulse: 97   Weight: (!) 385 lb (174.6 kg)   Height: 6' 2\" (1.88 m)           Physical Exam  Vitals signs and nursing note reviewed. Constitutional:       General: He is not in acute distress. Appearance: He is well-developed. He is not diaphoretic. HENT:      Head: Normocephalic and atraumatic. Right Ear: External ear normal.      Left Ear: External ear normal.      Nose: Nose normal.      Mouth/Throat:      Pharynx: No oropharyngeal exudate. Eyes:      General: No scleral icterus. Right eye: No discharge. Left eye: No discharge. Conjunctiva/sclera: Conjunctivae normal.      Pupils: Pupils are equal, round, and reactive to light.    Neck:      Musculoskeletal: Full passive range of motion without pain, normal range of motion and neck supple. Normal range of motion. No edema, erythema, neck rigidity or muscular tenderness. Thyroid: No thyromegaly. Cardiovascular:      Rate and Rhythm: Normal rate and regular rhythm. Heart sounds: Normal heart sounds. No murmur. No friction rub. No gallop. Pulmonary:      Effort: Pulmonary effort is normal. No respiratory distress. Breath sounds: Normal breath sounds. No wheezing or rales. Chest:      Chest wall: No tenderness. Abdominal:      General: Bowel sounds are normal. There is no distension. Palpations: Abdomen is soft. Tenderness: There is no abdominal tenderness. There is no guarding or rebound. Musculoskeletal:         General: Tenderness present. Right shoulder: He exhibits tenderness. Left shoulder: He exhibits tenderness. Right hip: He exhibits decreased range of motion, decreased strength, tenderness and bony tenderness. Left hip: He exhibits decreased range of motion, decreased strength, tenderness and bony tenderness. Right knee: He exhibits decreased range of motion. Tenderness found. Left knee: He exhibits decreased range of motion. Tenderness found. Right ankle: He exhibits swelling. Left ankle: He exhibits swelling. Cervical back: He exhibits decreased range of motion, tenderness and bony tenderness. Thoracic back: He exhibits decreased range of motion, tenderness and bony tenderness. Lumbar back: He exhibits decreased range of motion, tenderness, bony tenderness, pain and spasm. Back:       Right upper leg: He exhibits tenderness. Left upper leg: He exhibits tenderness. Right lower leg: Edema present. Left lower leg: Edema present. Right foot: Tenderness and swelling present. Left foot: Tenderness and swelling present.       Comments: H/O Cervical surgery C5-6    T6-T9 PSF Lumbar Nj Decompression L2-S1  Right shoulder rotator cuff or inappropriate.         ANNETTA test:+  Yeomans test: +  Gaenslen test:+     Assessment:      1. Lumbar spondylosis    2. Lumbar facet arthropathy    3. Neuropathy    4. Chronic pain syndrome            Plan:      · OARRS reviewed. Current MED:0  · Patient was not offered naloxone for home. · Discussed long term side effects of medications, tolerance, dependency and addiction. · Previous UDS reviewed  · UDS preformed today for compliance. · Patient told can not receive any pain medications from any other source. · No evidence of abuse, diversion or aberrant behavior. · Medications and/or procedures to improve function and quality of life- patient understanding with this and that may not be pain free  · Discussed with patient about safe storage of medications at home  · Discussed possible weaning of medication dosing dependent on treatment/procedure results. · Testing: none  · Procedures: Lumbar RFA Bilateral L3-4,4-5 Bilateral right side first. Pt is on BRILINTA  · Discussed with patient about risks with procedure including infection, reaction to medication, increased pain, or bleeding.   · Medications:Tylenol           Return for Lumbar RFA Bilateral@ L3-4,4-5 right side first, Follow up after procedure.

## 2020-07-14 NOTE — ANESTHESIA PRE PROCEDURE
Department of Anesthesiology  Preprocedure Note       Name:  Suyapa Buchanan   Age:  65 y.o.  :  1950                                          MRN:  [id number]         Date:  2020      Surgeon: José Miguel Martin):  Regi Dover MD    Procedure: Procedure(s):  Lumbar RFA Bilateral L3-4,4-5  right side first    Medications prior to admission:   Prior to Admission medications    Medication Sig Start Date End Date Taking?  Authorizing Provider   insulin aspart (NOVOLOG FLEXPEN) 100 UNIT/ML injection pen INJECT 12 UNITS SUBCUTANEOUSLY 3 TIMES DAILY BEFORE MEALS 20  Yes Annika Centeno MD   carvedilol (COREG) 12.5 MG tablet Take 1 tablet by mouth 2 times daily 6/10/20 9/8/20 Yes Harrel Oppenheim, DO   enalapril (VASOTEC) 10 MG tablet TAKE 2 TABLETS BY MOUTH TWICE DAILY 20  Yes Annika Centeno MD   insulin glargine (LANTUS SOLOSTAR) 100 UNIT/ML injection pen INJECT 30 UNITS SUBCUTANEOUSLY ONCE DAILY 3/14/20  Yes Annika Centeno MD   furosemide (LASIX) 40 MG tablet Take 1 tablet by mouth daily 20  Yes Annika Centeno MD   atorvastatin (LIPITOR) 10 MG tablet TAKE 1 TABLET BY MOUTH ONCE DAILY 20  Yes Annika Centeno MD   ferrous sulfate 325 (65 Fe) MG tablet Take 1 tablet by mouth 2 times daily 19  Yes Annika Centeno MD   Multiple Vitamins-Minerals (THERAPEUTIC MULTIVITAMIN-MINERALS) tablet Take 1 tablet by mouth daily 19 Yes Annika Centeno MD   aspirin 81 MG tablet Take 1 tablet by mouth daily 17  Yes Zoila Genao MD   ACCU-CHEK SMARTVIEW strip Use to test Blood Sugar 3x daily, Dx: E11.9 20   Annika Centeno MD   metFORMIN (GLUCOPHAGE) 500 MG tablet TAKE TWO TABLETS BY MOUTH TWICE DAILY WITH MEALS 3/23/20   Annika Centeno MD   ticagrelor (BRILINTA) 90 MG TABS tablet TAKE ONE TABLET BY MOUTH TWICE DAILY 10/22/19   Annika Centeno MD   nitroGLYCERIN (NITROSTAT) 0.4 MG SL tablet Place 1 tablet under the tongue every 5 minutes as needed for Chest pain 8/14/19   Christopher Molina MD   acetaminophen (TYLENOL) 500 MG tablet Take 500 mg by mouth as needed for Pain    Historical Provider, MD       Current medications:    No current facility-administered medications for this encounter. Allergies: Allergies   Allergen Reactions    Horse-Derived Products        Problem List:    Patient Active Problem List   Diagnosis Code    HTN (hypertension) I10    IDDM (insulin dependent diabetes mellitus) ([facility] E11.9, Z79.4    Hypercholesteremia E78.00    Osteoarthritis M19.90    Diabetic peripheral neuropathy ([facility] E11.42    Coronary artery disease of bypass graft of native heart with stable angina pectoris ([facility] I25.708    S/P percutaneous transluminal coronary angioplasty Z98.61    Sprain/Injury of anterior ligaments of thoracic spine S23. 3XXA    Venous stasis dermatitis of both lower extremities I87.2    Lumbar spondylosis M47.816    Lumbar facet arthropathy M47.816    Orthostatic hypotension after exercise I95.1       Past Medical History:        Diagnosis Date    RAUL (acute kidney injury) ([facility] 2/13/2020    ASHD (arteriosclerotic heart disease) 2005 2006    stent  baki    Depression     Diabetic peripheral neuropathy [facility]) 2014    Fracture Oct 1984    left lower extremity     HTN (hypertension)     Hypercholesteremia     IDDM (insulin dependent diabetes mellitus) ([facility]     Low back pain     Morbid obesity with BMI of 45.0-49.9, adult ([facility]     Osteoarthritis        Past Surgical History:        Procedure Laterality Date    AMPUTATION Left 9/10/14    partial versus complete left hallux amputation, left great toe amputation    APPENDECTOMY     [id number]    fusion of C5-C6    CHOLECYSTECTOMY      COLONOSCOPY  2007 and 2011    colonn polyps  lorenzo    CORONARY ANGIOPLASTY WITH STENT PLACEMENT  08/07/2017    Dr Dennys Brown @ [address] GRAFT  2015 intravenous. Anesthetic plan and risks discussed with patient. Plan discussed with CRNA.                   Holly Benavides,    7/14/2020

## 2020-07-14 NOTE — PROGRESS NOTES
Blood sugar 245. Dr. Mukund Villatoro notified. Discharge instructions given to pt. Belongings packed and sent with pt. Pt verbalized understanding of instructions.

## 2020-07-14 NOTE — ANESTHESIA POSTPROCEDURE EVALUATION
Department of Anesthesiology  Postprocedure Note    Patient: Bruno Laura  MRN: [id number]  YOB: 1950  Date of evaluation: 7/14/2020  Time:  1:54 PM     Procedure Summary     Date:  07/14/20 Room / Location:  [address] [place] [address] 03 / 138 [facility]    Anesthesia Start:  1129 Anesthesia Stop:  5606    Procedure:  Lumbar RFA Bilateral L3-4,4-5  right side first (Right ) Diagnosis:  (Lumbar spondylosis)    Surgeon:  Stevie Mondragon MD Responsible Provider:  Edgardo Burdick DO    Anesthesia Type:  MAC ASA Status:  4          Anesthesia Type: MAC    Estelle Phase I:      Estelle Phase II: Estelle Score: 9    Last vitals: Reviewed and per EMR flowsheets.        Anesthesia Post Evaluation    Patient location during evaluation: bedside  Patient participation: complete - patient participated  Level of consciousness: awake and alert  Pain score: 1  Airway patency: patent  Nausea & Vomiting: no nausea and no vomiting  Complications: no  Cardiovascular status: hemodynamically stable and blood pressure returned to baseline  Respiratory status: spontaneous ventilation, room air and acceptable  Hydration status: stable

## 2020-07-14 NOTE — H&P
[facility]  History and Physical Update    Pt Name: Troy Dietz  MRN: [id number]  YOB: 1950  Date of evaluation: 7/14/2020      I have examined the patient and reviewed the H&P/Consult and there are no changes to the patient or plans.         Electronically signed by Melissa Mcfarland MD on 7/14/2020 at 10:45 AM

## 2020-07-14 NOTE — OP NOTE
Operative Note    Pre-Procedure Note    Patient Name: Suyapa Buchanan   YOB: 1950  Medical Record Number: [id number]  Date: 7/14/20    Indication:  Lower back painConsent: On file. Vital Signs:   Vitals:    07/14/20 1101   BP: (!) 184/83   Pulse: 68   Resp: 16   Temp: 97.6 °F (36.4 °C)   SpO2: 98%       Past Medical History:   has a past medical history of RAUL (acute kidney injury) ([facility] [address] ASHD (arteriosclerotic heart disease), Depression, Diabetic peripheral neuropathy ([facility] [address] Fracture, HTN (hypertension), Hypercholesteremia, IDDM (insulin dependent diabetes mellitus) ([facility] [address] Low back pain, Morbid obesity with BMI of 45.0-49.9, adult ([facility] [address] and Osteoarthritis. Past Surgical History:   has a past surgical history that includes Cholecystectomy; Rotator cuff repair; Cervical disc surgery (2000); Appendectomy; Spine surgery (2010); Colonoscopy (2007 and 2011); joint replacement; Tonsillectomy; amputation (Left, 9/10/14); EKG 12 Lead (8/14/2015); Coronary artery bypass graft (2015 august ); Cardiac surgery; vascular surgery; fracture surgery; Coronary angioplasty with stent (08/07/2017); POSTERIOR FUSION THORACIC SPINE (N/A, 12/28/2018); Pain management procedure (Bilateral, 1/28/2020); and Facet joint injection (Bilateral, 5/22/2020). Pre-Sedation Documentation and Exam:   Vital signs have been reviewed (see flow sheet for vitals). Sedation/ Anesthesia Plan:   MAC    Patient is an appropriate candidate for plan of sedation: yes    Preoperative Diagnosis:  L-spondylosis    Post-Op Dx: as above    Procedure Performed:  :Radiofrequency ablation of median branches at the levels of L3-4 and L4-5 right under fluoroscopic guidance     Indication for the Procedure: The patient has ahistory of chronic low back pain that is not responding well to the conservative treatment. Patient's pain is mostly axial in nature. Pain is interfering with the activities of daily living.   Physical examination revealed facet tenderness and facet loading is positive. Patient had undergone lumbar facet joint injections with pain relief that lasted for only a short period of time and had greater than 70% pain relief with confirmatory median branch blocks. Hence we decided to do radiofrequency abalation of median branches for long term pain releif. The procedure and risks  were discussed with the patient and an informed consent was obtained. Procedure:  Right   A meaningful communication was kept up with the patient throughout the procedure. The patient is placed in prone position and skin over the back was prepped and draped in sterile manner. Then using fluoroscopy the junction of the transverse process of the vertebra with the superior process of the facet joint was observed and the view was optimized. The skin and deep tissues posterior were infiltrated with 10 ml of  1% xylocaine. The RF canula with the 15 mm active tip was introduced through the skin wheal under fluoroscopy guidance such that the tip of the needle lies in the groove of the transverse process with the superior processes of the facet joint. Then a lateral view of the lumbar spine was obtained to make sure the tip of needle is not in the neural foramen. Then electric impedence was checked to make sure it is acceptable. Then a sensory stimulus was applied at 50 Hz up to 1 volt and concordant pain symptoms were reproduced. Then a motor stimulus was applied at 2 Hz up to 2 volts and no motor stimulation was seen in lower extremities. Some multifidus stimulus was seen. Then after negative aspiration a mixture of depomedrol 80 mg  and 0.25%  Marcaine 1 cc was injected through the needle in divided doses. Then a lesion was done at 80 degrees centigrade for 90 seconds.     EBL-0    For the L4 median branch the junction of the transverse process of L5 with the superolateral possible facet joint was taken as a reference point and for S1 median branch the most lateral and superior aspect of S1 foramina was taken as a reference point,. For L3 median branch the junction of L4 transverse process and superior articular process of facet joint was taken as reference point and so on. Patient's vital signs and neurological status remained stable throughout the procedure and post procedural period. The patient tolerated the procedure well and was discharged home in stable condition.     Electronically signed by Khushi Vivas MD on 7/14/20 at 11:46 AM EDT

## 2020-07-14 NOTE — PROGRESS NOTES
1151-Patient to Phase II via cart. Report received from [employer]. Patient drowsy but responsive. Vitals obtained and stable. Respirations even and unlabored on room air. Patient denies pain, nausea, numbness and tingling. Patient able to move all extremities. No drainage noted at injection sites. Patient instructed to stay in bed. Instructed on call light use. 1155-Patient provided with snack and drink. Call light in reach. Denies needs. 1210-Patient continues to deny pain and nausea. IV removed with no complications. Patient waiting for ride.

## 2020-07-23 ENCOUNTER — CARE COORDINATION (OUTPATIENT)
Dept: CARE COORDINATION | Age: 70
End: 2020-07-23

## 2020-07-31 ENCOUNTER — CARE COORDINATION (OUTPATIENT)
Dept: CARE COORDINATION | Age: 70
End: 2020-07-31

## 2020-07-31 NOTE — CARE COORDINATION
Lilly Hammans called and states he is feeling funny today. States he couldn't fall asleep last night and only slept about 2 hours. He states his blood sugar was 96 this morning. Reports that his blood sugars have not been running low. Discussed hypoglycemia and the importance of reporting low numbers. States he has been running 145 normally. States he is going to take a nap and well see if he feels better when he wakes up. I informed him to call if he continues to feel \"off\". He verbalized understanding.

## 2020-08-03 ENCOUNTER — APPOINTMENT (OUTPATIENT)
Dept: GENERAL RADIOLOGY | Age: 70
End: 2020-08-03
Payer: MEDICARE

## 2020-08-03 ENCOUNTER — HOSPITAL ENCOUNTER (EMERGENCY)
Age: 70
Discharge: HOME | End: 2020-08-03
Payer: MEDICARE

## 2020-08-03 VITALS
DIASTOLIC BLOOD PRESSURE: 57 MMHG | TEMPERATURE: 97 F | HEART RATE: 69 BPM | RESPIRATION RATE: 20 BRPM | OXYGEN SATURATION: 98 % | SYSTOLIC BLOOD PRESSURE: 138 MMHG | WEIGHT: 315 LBS | BODY MASS INDEX: 50.84 KG/M2

## 2020-08-03 PROCEDURE — 99214 OFFICE O/P EST MOD 30 MIN: CPT

## 2020-08-03 PROCEDURE — 73630 X-RAY EXAM OF FOOT: CPT

## 2020-08-03 PROCEDURE — 99202 OFFICE O/P NEW SF 15 MIN: CPT | Performed by: NURSE PRACTITIONER

## 2020-08-03 NOTE — ED TRIAGE NOTES
Pt still sitting in chair with left foot propped up on stool. There is no blood noted on the band age applied to the 4th toe, left foot.

## 2020-08-03 NOTE — ED NOTES
Discharge instructions reviewed with patient. Patient informed to go to ER for worsening symptoms. Appt is made to follow up with Dr Micky Gay for tomorrow at 1400 and pt will keep appt. Patient  Take to car per wheelchair in stable condition.       Daryle Lively, RN  08/03/20 8948

## 2020-08-03 NOTE — ED PROVIDER NOTES
[facility]  Urgent Care Encounter       CHIEF COMPLAINT       Chief Complaint   Patient presents with    Other     \"caught 4th toenail, left foot last night on the couch\"  bleeding since last night at 10:00 PM, on Brilinta       Nurses Notes reviewed and I agree except as noted in the HPI. HISTORY OF PRESENT ILLNESS   Nikita Abbott is a 65 y.o. male who presents to the urgent care center stating that last night he was getting up off the couch when his left foot got caught on the couch injuring the fourth toe and pulling the toenail off of the toe. The patient does take Brilinta and states that he has been bleeding since last night and cannot get the bleeding to stop. The patient does have a history of diabetes as well. The patient denies any pain to the foot or toe. The patient is awake alert and oriented skin is warm and dry the patient denies any other injuries at the present time    The history is provided by the patient. Foot Problem   Location:  Toe  Time since incident:  1 day  Injury: yes    Mechanism of injury comment:  Avulsed toenail getting up off the couch last night  Toe location:  L fourth toe  Pain details:     Quality:  Unable to specify    Radiates to:  Does not radiate    Severity:  No pain    Onset quality:  Sudden    Duration:  1 day    Timing:  Constant    Progression:  Unchanged  Chronicity:  New  Dislocation: no    Foreign body present:  No foreign bodies  Tetanus status:  Out of date  Prior injury to area:  No  Relieved by:  None tried  Worsened by:  Nothing  Ineffective treatments: Dressing to the toe. Associated symptoms: no decreased ROM and no stiffness    Risk factors: obesity    Risk factors: no known bone disorder        REVIEW OF SYSTEMS     Review of Systems   Constitutional: Positive for activity change. Musculoskeletal: Positive for gait problem. Negative for stiffness. Skin: Positive for wound.         Left fourth digit       PAST MEDICAL HISTORY         Diagnosis Date    RAUL (acute kidney injury) ([facility] [address] 2/13/2020    ASHD (arteriosclerotic heart disease) 2005 2006    stent  baki    Depression     Diabetic peripheral neuropathy [facility]) 2014    Fracture Oct 1984    left lower extremity     HTN (hypertension)     Hypercholesteremia     IDDM (insulin dependent diabetes mellitus) ([facility] [address]     Low back pain     Morbid obesity with BMI of 45.0-49.9, adult ([facility] [address]     Osteoarthritis        SURGICALHISTORY     Patient  has a past surgical history that includes Cholecystectomy; Rotator cuff repair; Cervical disc surgery (2000); Appendectomy; Spine surgery (2010); Colonoscopy (2007 and 2011); joint replacement; Tonsillectomy; amputation (Left, 9/10/14); EKG 12 Lead (8/14/2015); Coronary artery bypass graft (2015 august ); Cardiac surgery; vascular surgery; fracture surgery; Coronary angioplasty with stent (08/07/2017); POSTERIOR FUSION THORACIC SPINE (N/A, 12/28/2018); Pain management procedure (Bilateral, 1/28/2020); Facet joint injection (Bilateral, 5/22/2020); and Pain management procedure (Right, 7/14/2020). CURRENT MEDICATIONS       Current Discharge Medication List      CONTINUE these medications which have NOT CHANGED    Details   insulin aspart (NOVOLOG FLEXPEN) 100 UNIT/ML injection pen INJECT 12 UNITS SUBCUTANEOUSLY 3 TIMES DAILY BEFORE MEALS  Qty: 15 pen, Refills: 1    Associated Diagnoses: IDDM (insulin dependent diabetes mellitus) ([facility])      ACCU-CHEK SMARTVIEW strip Use to test Blood Sugar 3x daily, Dx: E11.9  Qty: 100 each, Refills: 11    Associated Diagnoses: IDDM (insulin dependent diabetes mellitus) ([facility])      carvedilol (COREG) 12.5 MG tablet Take 1 tablet by mouth 2 times daily  Qty: 180 tablet, Refills: 1    Associated Diagnoses: Essential hypertension;  Hyperkalemia; CKD (chronic kidney disease), stage II      enalapril (VASOTEC) 10 MG tablet TAKE 2 TABLETS BY MOUTH TWICE DAILY  Qty: 360 tablet, Refills: 1    Comments: Please consider 90 day supplies to promote better adherence  Associated Diagnoses: Renovascular hypertension      metFORMIN (GLUCOPHAGE) 500 MG tablet TAKE TWO TABLETS BY MOUTH TWICE DAILY WITH MEALS  Qty: 360 tablet, Refills: 1    Associated Diagnoses: IDDM (insulin dependent diabetes mellitus) ([facility])      insulin glargine (LANTUS SOLOSTAR) 100 UNIT/ML injection pen INJECT 30 UNITS SUBCUTANEOUSLY ONCE DAILY  Qty: 15 pen, Refills: 1    Comments: Please consider 90 day supplies to promote better adherence      furosemide (LASIX) 40 MG tablet Take 1 tablet by mouth daily  Qty: 60 tablet, Refills: 0      atorvastatin (LIPITOR) 10 MG tablet TAKE 1 TABLET BY MOUTH ONCE DAILY  Qty: 90 tablet, Refills: 1    Associated Diagnoses: Hypercholesteremia      ticagrelor (BRILINTA) 90 MG TABS tablet TAKE ONE TABLET BY MOUTH TWICE DAILY  Qty: 60 tablet, Refills: 11    Comments: Please consider 90 day supplies to promote better adherence      ferrous sulfate 325 (65 Fe) MG tablet Take 1 tablet by mouth 2 times daily  Qty: 60 tablet, Refills: 2    Associated Diagnoses: IDDM (insulin dependent diabetes mellitus) ([facility])      Multiple Vitamins-Minerals (THERAPEUTIC MULTIVITAMIN-MINERALS) tablet Take 1 tablet by mouth daily  Qty: 30 tablet, Refills: 11    Associated Diagnoses: IDDM (insulin dependent diabetes mellitus) ([facility])      aspirin 81 MG tablet Take 1 tablet by mouth daily      acetaminophen (TYLENOL) 500 MG tablet Take 500 mg by mouth as needed for Pain      nitroGLYCERIN (NITROSTAT) 0.4 MG SL tablet Place 1 tablet under the tongue every 5 minutes as needed for Chest pain  Qty: 25 tablet, Refills: 3    Associated Diagnoses: Coronary artery disease of bypass graft of native heart with stable angina pectoris ([facility] [address]             ALLERGIES     Patient is is allergic to horse-derived products.     Patients   Immunization History   Administered Date(s) Administered    Influenza Virus Vaccine 10/14/2015    Influenza, Quadv, IM, (6 mo and older Fluzone, Flulaval, Fluarix and 3 yrs and older Afluria) 10/04/2016    Influenza, Quadv, IM, PF (6 mo and older Fluzone, Flulaval, Fluarix, and 3 yrs and older Afluria) 02/14/2020    PPD Test 08/23/2015, 08/30/2015    Pneumococcal Conjugate 13-valent ([id number]) 06/30/2016    Pneumococcal Polysaccharide ([id number]) 01/06/2010    Td, unspecified formulation 05/01/2009    Tdap (Boostrix, Adacel) 09/09/2014, 01/08/2018       FAMILY HISTORY     Patient's family history includes Cancer in his mother. SOCIAL HISTORY     Patient  reports that he has never smoked. He has never used smokeless tobacco. He reports current alcohol use. He reports that he does not use drugs. PHYSICAL EXAM     ED TRIAGE VITALS   , Temp: 97 °F (36.1 °C), Pulse: 69, Resp: 20, SpO2: 98 %,Estimated body mass index is 50.84 kg/m² as calculated from the following:    Height as of 7/14/20: 6' 2\" (1.88 m). Weight as of this encounter: 396 lb (179.6 kg). ,No LMP for male patient. Physical Exam  Vitals signs and nursing note reviewed. Constitutional:       General: He is not in acute distress. Appearance: Normal appearance. He is well-developed. He is not ill-appearing, toxic-appearing or diaphoretic. HENT:      Head: Normocephalic and atraumatic. Right Ear: External ear normal.      Left Ear: External ear normal.      Nose: Nose normal.   Neck:      Musculoskeletal: Normal range of motion. Cardiovascular:      Rate and Rhythm: Normal rate. Pulmonary:      Effort: Pulmonary effort is normal.   Musculoskeletal:         General: Tenderness and signs of injury present. No swelling. Left foot: Normal range of motion and normal capillary refill. Swelling present. No tenderness, bony tenderness, crepitus or laceration. Comments: Fourth toe of left foot    Patient has a complete toenail avulsion from the left fourth digit. There is bleeding noted to the matrix of the nail bed.   The patient also has a partial amputation of the big toe of the left foot. Patient has generalized swelling noted to the foot which is chronic. Skin:     General: Skin is warm and dry. Capillary Refill: Capillary refill takes less than 2 seconds. Neurological:      Mental Status: He is alert and oriented to person, place, and time. Sensory: No sensory deficit. Psychiatric:         Mood and Affect: Mood normal.         Behavior: Behavior normal. Behavior is cooperative. DIAGNOSTIC RESULTS     Labs:No results found for this visit on 08/03/20. IMAGING:    XR FOOT LEFT (MIN 3 VIEWS)   Final Result      No fracture. **This report has been created using voice recognition software. It may contain minor errors which are inherent in voice recognition technology. **      Final report electronically signed by Dr. Bennie Seth on 8/3/2020 1:48 PM            EKG:      URGENT CARE COURSE:     Vitals:    08/03/20 1303   Pulse: 69   Resp: 20   Temp: 97 °F (36.1 °C)   TempSrc: Temporal   SpO2: 98%   Weight: (!) 396 lb (179.6 kg)       Medications - No data to display         PROCEDURES:  None    FINAL IMPRESSION      1. Avulsed toenail, initial encounter    2. Current use of long term anticoagulation    3. History of diabetes mellitus, type II          DISPOSITION/ PLAN     The patient had a helistat and a pressure dressing applied to the fourth digit. There was no bleeding through the dressing at this time. The patient was also given a postop shoe to wear. The patient was advised not to change the dressing until he is seen by podiatry tomorrow at 2:00. The patient is agreeable to the treatment plan and the patient left via wheelchair to his car.       PATIENT REFERRED TO:  Baudilio Broderick MD  [facility] / [address] [address]      DISCHARGE MEDICATIONS:  Current Discharge Medication List          Current Discharge Medication List          Current Discharge Medication List          ABHISHEK Benton - CNP    (Please note that portions of this note were completed with a voice recognition program. Efforts were made to edit the dictations but occasionally words are mis-transcribed.)           ABHISHEK Hodgson - NOMI  08/03/20 0205

## 2020-08-03 NOTE — ED TRIAGE NOTES
Left 4th toe and left foot cleansed with wet washcloth to clean dried blood and guaze held to 4th toe to slow the bleeding.

## 2020-08-13 ENCOUNTER — TELEPHONE (OUTPATIENT)
Dept: FAMILY MEDICINE CLINIC | Age: 70
End: 2020-08-13

## 2020-08-13 NOTE — TELEPHONE ENCOUNTER
Patient called stating that he gets nauseated and somewhat SOB when he stands up and moves around. Sugars have been running OK. Advised him to go to Urgent Care for evaluation but he said he doesn't have anyone that can take him and he would wait until tomorrow. Advised him if it got worse to call 911.

## 2020-08-17 NOTE — TELEPHONE ENCOUNTER
Spoke with patient and he stated the Nausea is a lot better    Mel can you let us know about the scale please

## 2020-08-17 NOTE — TELEPHONE ENCOUNTER
Patient cannot afford scale and insurance doesn't cover item. Pended order for scale to [facility]. Will send pharmacy a [facility] for scale.

## 2020-08-18 ENCOUNTER — TELEPHONE (OUTPATIENT)
Dept: FAMILY MEDICINE CLINIC | Age: 70
End: 2020-08-18

## 2020-08-18 RX ORDER — MECLIZINE HYDROCHLORIDE 25 MG/1
25 TABLET ORAL EVERY 6 HOURS PRN
Qty: 40 TABLET | Refills: 0 | Status: SHIPPED | OUTPATIENT
Start: 2020-08-18 | End: 2021-05-18 | Stop reason: ALTCHOICE

## 2020-08-18 NOTE — TELEPHONE ENCOUNTER
Date of last visit:  6/29/2020  Date of next visit:  9/29/2020    Requested Prescriptions     Signed Prescriptions Disp Refills    meclizine (ANTIVERT) 25 MG tablet 40 tablet 0     Sig: Take 1 tablet by mouth every 6 hours as needed for Dizziness     Authorizing Provider: Brian Powers     Ordering User: Alicia Estevez

## 2020-08-25 ENCOUNTER — CARE COORDINATION (OUTPATIENT)
Dept: CARE COORDINATION | Age: 70
End: 2020-08-25

## 2020-08-25 NOTE — CARE COORDINATION
Called and states he is having some dizziness. States he doesn't want to see PCP at this time but if he doesn't feel better by tomorrow he states he will call PCP. States blood sugars are running 136. Not able to report blood pressures. Denies falls. Denies headaches. Encouraged him to call with any questions or changes in condition.

## 2020-09-01 ENCOUNTER — CARE COORDINATION (OUTPATIENT)
Dept: CARE COORDINATION | Age: 70
End: 2020-09-01

## 2020-09-11 RX ORDER — INSULIN ASPART 100 [IU]/ML
INJECTION, SOLUTION INTRAVENOUS; SUBCUTANEOUS
Qty: 15 PEN | Refills: 0 | Status: SHIPPED | OUTPATIENT
Start: 2020-09-11 | End: 2020-10-16 | Stop reason: SDUPTHER

## 2020-09-11 NOTE — TELEPHONE ENCOUNTER
Date of last visit:  6/29/2020  Date of next visit:  9/29/2020    Requested Prescriptions     Signed Prescriptions Disp Refills    insulin aspart (NOVOLOG FLEXPEN) 100 UNIT/ML injection pen 15 pen 0     Sig: INJECT 12 UNITS SUBCUTANEOUSLY 3 TIMES DAILY BEFORE MEALS     Authorizing Provider: Cesar Petty     Ordering User: Asuncion Lopez

## 2020-09-14 ENCOUNTER — TELEPHONE (OUTPATIENT)
Dept: CARDIOLOGY CLINIC | Age: 70
End: 2020-09-14

## 2020-09-14 ENCOUNTER — OFFICE VISIT (OUTPATIENT)
Dept: PHYSICAL MEDICINE AND REHAB | Age: 70
End: 2020-09-14
Payer: MEDICARE

## 2020-09-14 VITALS
HEART RATE: 88 BPM | HEIGHT: 74 IN | DIASTOLIC BLOOD PRESSURE: 64 MMHG | TEMPERATURE: 96.9 F | BODY MASS INDEX: 40.43 KG/M2 | SYSTOLIC BLOOD PRESSURE: 130 MMHG | WEIGHT: 315 LBS

## 2020-09-14 PROCEDURE — 4040F PNEUMOC VAC/ADMIN/RCVD: CPT | Performed by: NURSE PRACTITIONER

## 2020-09-14 PROCEDURE — 1123F ACP DISCUSS/DSCN MKR DOCD: CPT | Performed by: NURSE PRACTITIONER

## 2020-09-14 PROCEDURE — 3017F COLORECTAL CA SCREEN DOC REV: CPT | Performed by: NURSE PRACTITIONER

## 2020-09-14 PROCEDURE — 1036F TOBACCO NON-USER: CPT | Performed by: NURSE PRACTITIONER

## 2020-09-14 PROCEDURE — G8417 CALC BMI ABV UP PARAM F/U: HCPCS | Performed by: NURSE PRACTITIONER

## 2020-09-14 PROCEDURE — G8427 DOCREV CUR MEDS BY ELIG CLIN: HCPCS | Performed by: NURSE PRACTITIONER

## 2020-09-14 PROCEDURE — 99213 OFFICE O/P EST LOW 20 MIN: CPT | Performed by: NURSE PRACTITIONER

## 2020-09-14 ASSESSMENT — ENCOUNTER SYMPTOMS
CHEST TIGHTNESS: 0
WHEEZING: 0
PHOTOPHOBIA: 0
CONSTIPATION: 0
RHINORRHEA: 0
SHORTNESS OF BREATH: 0
ABDOMINAL PAIN: 0
NAUSEA: 0
BACK PAIN: 1
VOMITING: 0
SINUS PRESSURE: 0
DIARRHEA: 0
COUGH: 0
EYE PAIN: 0
SORE THROAT: 0
COLOR CHANGE: 0

## 2020-09-14 NOTE — TELEPHONE ENCOUNTER
Pre op Risk Assessment    Procedure L- RFA left side L3-4, 4-5  Physician [facility]  Date of surgery/procedure TBD    Last OV 2-6-2020  Last Stress 2-  Last Echo 2-  Last Cath 8-5-2017  Last Stent 8-5-2017  Is patient on blood thinners ASA and Brilinta  Hold Meds/how many days Brilinta 5 days                                               ASA  ? ?     Fax to: [phone]

## 2020-09-14 NOTE — PROGRESS NOTES
[facility]  [address]  Dept: [phone]  Dept Fax: [phone]: [phone]    Visit Date: 9/14/2020    Functionality Assessment/Goals Worksheet     On a scale of 0 (Does not Interfere) to 10 (Completely Interferes)     1. Which number describes how during the past week pain has interfered with       the following:  A. General Activity:  7  B. Mood: 0  C. Walking Ability:  7  D. Normal Work (Includes both work outside the home and housework):  5  E. Relations with Other People:   0  F. Sleep:   0  G. Enjoyment of Life:   5    2. Patient Prefers to Take their Pain Medications:     [x]  On a regular basis   []  Only when necessary    []  Does not take pain medications    3. What are the Patient's Goals/Expectations for Visiting Pain Management? [x]  Learn about my pain    []  Receive Medication   []  Physical Therapy     []  Treat Depression   []  Receive Injections    []  Treat Sleep   []  Deal with Anxiety and Stress   []  Treat Opoid Dependence/Addiction   []  Other:      HPI:   Bruno Laura is a 65 y.o. male is here today for    Chief Complaint: Low back pain, Mid back pain, Right leg pain, Left leg pain and Hip pain  Si pain   HPI   F/U Lumbar RFA right side  L3-4,4-5  7/14/2020  States he is receiving about 50% pain relief. He continues to have mid back low back pain bilateral hip SI knee pain. He has had L2-S1 lumbar laminectomy Decompression fusion with Dr Abhinav Varela in 2010. He has increased pain with bending doing dishes getting up and down walking standing. He uses a cane. He takes Tylenol prn. He had Lumbar Facet MBB with 80% pain relief for  2 weeks. He has increased hip pain and cramps. Brian David He complains of any ER visits since last visit ripped toenail off was bleeding   Pain scale with out pain medications or at its worst is 7/10.   Pain scale with pain medications or at its best is 3/10. The patientis allergic to horse-derived products. Past Medical History  Suzanne Guadarrama  has a past medical history of RAUL (acute kidney injury) ([facility] [address] ASHD (arteriosclerotic heart disease), Depression, Di[facility] Fracture, HTN (hypertension), Hypercholesteremia, IDDM (insulin dependent diabetes mellitus) ([facility] Low back pain, Morbid obesity with BMI of 45.0-49.9, adult ([facility] and Osteoarthritis. Past Surgical History  The patient  has a past surgical history that includes Cholecystectomy; Rotator cuff repair; Cervical disc surgery (2000); Appendectomy; Spine surgery (2010); Colonoscopy (2007 and 2011); joint replacement; Tonsillectomy; amputation (Left, 9/10/14); EKG 12 Lead (8/14/2015); Coronary artery bypass graft (2015 august ); Cardiac surgery; vascular surgery; fracture surgery; Coronary angioplasty with stent (08/07/2017); POSTERIOR FUSION THORACIC SPINE (N/A, 12/28/2018); Pain management procedure (Bilateral, 1/28/2020); Facet joint injection (Bilateral, 5/22/2020); and Pain management procedure (Right, 7/14/2020). Family History  This patient's family history includes Cancer in his mother. Social History  Suzanne Guadarrama  reports that he has never smoked. He has never used smokeless tobacco. He reports current alcohol use. He reports that he does not use drugs. Medications    Current Outpatient Medications:     insulin aspart (NOVOLOG FLEXPEN) 100 UNIT/ML injection pen, INJECT 12 UNITS SUBCUTANEOUSLY 3 TIMES DAILY BEFORE MEALS, Disp: 15 pen, Rfl: 0    Misc.  Devices (PRECISION SCALE) MISC, 1 each by Does not apply route daily, Disp: 1 each, Rfl: 0    meclizine (ANTIVERT) 25 MG tablet, Take 1 tablet by mouth every 6 hours as needed for Dizziness, Disp: 40 tablet, Rfl: 0    ACCU-CHEK SMARTVIEW strip, Use to test Blood Sugar 3x daily, Dx: E11.9, Disp: 100 each, Rfl: 11    carvedilol (COREG) 12.5 MG tablet, Take 1 tablet by mouth 2 times daily, Disp: 180 tablet, Rfl: 1    enalapril (VASOTEC) 10 MG tablet, TAKE 2 TABLETS BY MOUTH TWICE DAILY, Disp: 360 tablet, Rfl: 1    metFORMIN (GLUCOPHAGE) 500 MG tablet, TAKE TWO TABLETS BY MOUTH TWICE DAILY WITH MEALS, Disp: 360 tablet, Rfl: 1    insulin glargine (LANTUS SOLOSTAR) 100 UNIT/ML injection pen, INJECT 30 UNITS SUBCUTANEOUSLY ONCE DAILY, Disp: 15 pen, Rfl: 1    furosemide (LASIX) 40 MG tablet, Take 1 tablet by mouth daily, Disp: 60 tablet, Rfl: 0    atorvastatin (LIPITOR) 10 MG tablet, TAKE 1 TABLET BY MOUTH ONCE DAILY, Disp: 90 tablet, Rfl: 1    ticagrelor (BRILINTA) 90 MG TABS tablet, TAKE ONE TABLET BY MOUTH TWICE DAILY, Disp: 60 tablet, Rfl: 11    nitroGLYCERIN (NITROSTAT) 0.4 MG SL tablet, Place 1 tablet under the tongue every 5 minutes as needed for Chest pain, Disp: 25 tablet, Rfl: 3    ferrous sulfate 325 (65 Fe) MG tablet, Take 1 tablet by mouth 2 times daily, Disp: 60 tablet, Rfl: 2    Multiple Vitamins-Minerals (THERAPEUTIC MULTIVITAMIN-MINERALS) tablet, Take 1 tablet by mouth daily, Disp: 30 tablet, Rfl: 11    aspirin 81 MG tablet, Take 1 tablet by mouth daily, Disp: , Rfl:     acetaminophen (TYLENOL) 500 MG tablet, Take 500 mg by mouth as needed for Pain, Disp: , Rfl:     Subjective:      Review of Systems   Constitutional: Positive for activity change. Negative for appetite change, chills, diaphoresis, fatigue, fever and unexpected weight change. HENT: Negative for congestion, ear pain, hearing loss, mouth sores, nosebleeds, rhinorrhea, sinus pressure and sore throat. Eyes: Negative for photophobia, pain and visual disturbance. Respiratory: Negative for cough, chest tightness, shortness of breath and wheezing. Cardiovascular: Positive for leg swelling. Negative for chest pain and palpitations. CABG HTN MI  Has BLE lymphedema   Gastrointestinal: Negative for abdominal pain, constipation, diarrhea, nausea and vomiting.    Endocrine: Negative for cold intolerance, heat intolerance, polydipsia, polyphagia and polyuria. DM   Genitourinary: Negative for decreased urine volume, difficulty urinating, frequency and hematuria. Musculoskeletal: Positive for arthralgias, back pain, gait problem, myalgias, neck pain and neck stiffness. Negative for joint swelling. Skin: Negative for color change and rash. Allergic/Immunologic: Negative for food allergies and immunocompromised state. Neurological: Positive for weakness and numbness. Negative for dizziness, tremors, syncope, facial asymmetry, speech difficulty and light-headedness. Hematological: Does not bruise/bleed easily. Psychiatric/Behavioral: Negative for agitation, behavioral problems, confusion, decreased concentration, dysphoric mood, hallucinations, self-injury, sleep disturbance and suicidal ideas. The patient is not nervous/anxious and is not hyperactive. Depression        Objective:     Vitals:    09/14/20 1116   BP: 130/64   Site: Left Upper Arm   Position: Sitting   Cuff Size: Large Adult   Pulse: 88   Temp: 96.9 °F (36.1 °C)   TempSrc: Temporal   Weight: (!) 396 lb (179.6 kg)   Height: 6' 2\" (1.88 m)       Physical Exam  Vitals signs and nursing note reviewed. Constitutional:       General: He is not in acute distress. Appearance: He is well-developed. He is not diaphoretic. HENT:      Head: Normocephalic and atraumatic. Right Ear: External ear normal.      Left Ear: External ear normal.      Nose: Nose normal.      Mouth/Throat:      Pharynx: No oropharyngeal exudate. Eyes:      General: No scleral icterus. Right eye: No discharge. Left eye: No discharge. Conjunctiva/sclera: Conjunctivae normal.      Pupils: Pupils are equal, round, and reactive to light. Neck:      Musculoskeletal: Full passive range of motion without pain, normal range of motion and neck supple. Normal range of motion. No edema, erythema, neck rigidity or muscular tenderness. Thyroid: No thyromegaly. Cardiovascular:      Rate and Rhythm: Normal rate and regular rhythm. Heart sounds: Normal heart sounds. No murmur. No friction rub. No gallop. Pulmonary:      Effort: Pulmonary effort is normal. No respiratory distress. Breath sounds: Normal breath sounds. No wheezing or rales. Chest:      Chest wall: No tenderness. Abdominal:      General: Bowel sounds are normal. There is no distension. Palpations: Abdomen is soft. Tenderness: There is no abdominal tenderness. There is no guarding or rebound. Musculoskeletal:         General: Tenderness present. Right shoulder: He exhibits tenderness. Left shoulder: He exhibits tenderness. Right hip: He exhibits decreased range of motion, decreased strength, tenderness and bony tenderness. Left hip: He exhibits decreased range of motion, decreased strength, tenderness and bony tenderness. Right knee: He exhibits decreased range of motion. Tenderness found. Left knee: He exhibits decreased range of motion. Tenderness found. Right ankle: He exhibits swelling. Left ankle: He exhibits swelling. Cervical back: He exhibits decreased range of motion, tenderness and bony tenderness. Thoracic back: He exhibits decreased range of motion, tenderness and bony tenderness. Lumbar back: He exhibits decreased range of motion, tenderness, bony tenderness, pain and spasm. Back:       Right upper leg: He exhibits tenderness. Left upper leg: He exhibits tenderness. Right lower leg: Edema present. Left lower leg: Edema present. Right foot: Tenderness and swelling present. Left foot: Tenderness and swelling present. Comments: H/O Cervical surgery C5-6    T6-T9 PSF Lumbar Nj Decompression L2-S1  Right shoulder rotator cuff surgery  BTKR  Had 3 steel rods in LLE but hardware was removed. BLE lymphedema    Skin:     General: Skin is warm.       Coloration: arthropathy    3. Neuropathy    4. Chronic pain syndrome    5. Pain of both hip joints            Plan:      · OARRS reviewed. Current MED:0  · Patient was not offered naloxone for home. · Discussed long term side effects of medications, tolerance, dependency and addiction. · Previous UDS reviewed  · UDS preformed today for compliance. · Patient told can not receive any pain medications from any other source. · No evidence of abuse, diversion or aberrant behavior.  Medications and/or procedures to improve function and quality of life- patient understanding with this and that may not be pain free   Discussed with patient about safe storage of medications at home   Discussed possible weaning of medication dosing dependent on treatment/procedure results.  Testing: Bilateral hip XR due to pain    Procedures: Lumbar RFA left side L3-4,4-5, pt on Brilinta   Discussed with patient about risks with procedure including infection, reaction to medication, increased pain, or bleeding.  Medications:Tylenol prn       Meds. Prescribed:   No orders of the defined types were placed in this encounter. Return for Lumbar RFA Left side L3-4, 4-5, Follow up after procedure. Time spent with patient was 15  minutes, more than 50% was spent Counseling/coordinated the patient'scare.       Electronically signed by ABHISHEK Porter CNP on9/14/2020 at 11:45 AM

## 2020-09-15 NOTE — TELEPHONE ENCOUNTER
Date of last visit:  6/29/2020  Date of next visit:  9/29/2020    Requested Prescriptions     Pending Prescriptions Disp Refills    atorvastatin (LIPITOR) 10 MG tablet 90 tablet 1     Sig: TAKE 1 TABLET BY MOUTH ONCE DAILY

## 2020-09-16 ENCOUNTER — HOSPITAL ENCOUNTER (OUTPATIENT)
Dept: GENERAL RADIOLOGY | Age: 70
Discharge: HOME | End: 2020-09-16
Payer: MEDICARE

## 2020-09-16 ENCOUNTER — HOSPITAL ENCOUNTER (OUTPATIENT)
Age: 70
Discharge: HOME | End: 2020-09-16
Payer: MEDICARE

## 2020-09-16 PROCEDURE — 73522 X-RAY EXAM HIPS BI 3-4 VIEWS: CPT

## 2020-09-16 RX ORDER — ATORVASTATIN CALCIUM 10 MG/1
TABLET, FILM COATED ORAL
Qty: 90 TABLET | Refills: 1 | Status: SHIPPED | OUTPATIENT
Start: 2020-09-16 | End: 2021-04-18 | Stop reason: SDUPTHER

## 2020-09-21 ENCOUNTER — CARE COORDINATION (OUTPATIENT)
Dept: CARE COORDINATION | Age: 70
End: 2020-09-21

## 2020-09-21 NOTE — CARE COORDINATION
Mechelle Lucas called and states he is having stomach bloating today and increased swelling in his upper legs (above the knees). States he has been doubling his Lasix from 40mg daily to 80mg daily over the past 2 weeks. He states he increased that dose himself because he thought he needed more. Was not able to report weight to compare if he is gaining weight. Would like PCP recommendation. Please advise. Thank you.

## 2020-09-23 ENCOUNTER — NURSE ONLY (OUTPATIENT)
Dept: LAB | Age: 70
End: 2020-09-23

## 2020-09-23 LAB
ALBUMIN SERPL-MCNC: 3.9 G/DL (ref 3.5–5.1)
ALP BLD-CCNC: 71 U/L (ref 38–126)
ALT SERPL-CCNC: 9 U/L (ref 11–66)
ANION GAP SERPL CALCULATED.3IONS-SCNC: 13 MEQ/L (ref 8–16)
AST SERPL-CCNC: 10 U/L (ref 5–40)
BASOPHILS # BLD: 0.4 %
BASOPHILS ABSOLUTE: 0 THOU/MM3 (ref 0–0.1)
BILIRUB SERPL-MCNC: 0.5 MG/DL (ref 0.3–1.2)
BILIRUBIN DIRECT: < 0.2 MG/DL (ref 0–0.3)
BUN BLDV-MCNC: 23 MG/DL (ref 7–22)
CALCIUM SERPL-MCNC: 9 MG/DL (ref 8.5–10.5)
CHLORIDE BLD-SCNC: 100 MEQ/L (ref 98–111)
CO2: 25 MEQ/L (ref 23–33)
CREAT SERPL-MCNC: 1.3 MG/DL (ref 0.4–1.2)
EOSINOPHIL # BLD: 2.6 %
EOSINOPHILS ABSOLUTE: 0.2 THOU/MM3 (ref 0–0.4)
ERYTHROCYTE [DISTWIDTH] IN BLOOD BY AUTOMATED COUNT: 12.7 % (ref 11.5–14.5)
ERYTHROCYTE [DISTWIDTH] IN BLOOD BY AUTOMATED COUNT: 43.7 FL (ref 35–45)
GFR SERPL CREATININE-BSD FRML MDRD: 54 ML/MIN/1.73M2
GLUCOSE BLD-MCNC: 204 MG/DL (ref 70–108)
HCT VFR BLD CALC: 32.5 % (ref 42–52)
HEMOGLOBIN: 10.9 GM/DL (ref 14–18)
IMMATURE GRANS (ABS): 0.02 THOU/MM3 (ref 0–0.07)
IMMATURE GRANULOCYTES: 0.2 %
LYMPHOCYTES # BLD: 29.1 %
LYMPHOCYTES ABSOLUTE: 2.3 THOU/MM3 (ref 1–4.8)
MCH RBC QN AUTO: 31.8 PG (ref 26–33)
MCHC RBC AUTO-ENTMCNC: 33.5 GM/DL (ref 32.2–35.5)
MCV RBC AUTO: 94.8 FL (ref 80–94)
MONOCYTES # BLD: 8.5 %
MONOCYTES ABSOLUTE: 0.7 THOU/MM3 (ref 0.4–1.3)
NUCLEATED RED BLOOD CELLS: 0 /100 WBC
PLATELET # BLD: 171 THOU/MM3 (ref 130–400)
PMV BLD AUTO: 9.8 FL (ref 9.4–12.4)
POTASSIUM SERPL-SCNC: 4.8 MEQ/L (ref 3.5–5.2)
RBC # BLD: 3.43 MILL/MM3 (ref 4.7–6.1)
SEG NEUTROPHILS: 59.2 %
SEGMENTED NEUTROPHILS ABSOLUTE COUNT: 4.7 THOU/MM3 (ref 1.8–7.7)
SODIUM BLD-SCNC: 138 MEQ/L (ref 135–145)
TOTAL PROTEIN: 6.6 G/DL (ref 6.1–8)
WBC # BLD: 8 THOU/MM3 (ref 4.8–10.8)

## 2020-09-23 NOTE — CARE COORDINATION
Milly Johnson! Just following up on this issue. Do you know if Dr. Francisco Murcia has made any recommendations? Thank you.

## 2020-09-23 NOTE — CARE COORDINATION
Called and spoke with patient- he stated the abdominal bloating got better once he started having diarrhea. Educated on the iCrumz and using Imodium OTC to help. He will go to [facility] and get some labs completed. Ordered: CBC, BMP, Hepatic per verbal order from Dr Maria Del Carmen Hayes.

## 2020-09-28 ENCOUNTER — TELEPHONE (OUTPATIENT)
Dept: FAMILY MEDICINE CLINIC | Age: 70
End: 2020-09-28

## 2020-09-28 NOTE — TELEPHONE ENCOUNTER
----- Message from Andrew Burdick MD sent at 9/27/2020  1:17 PM EDT -----  Call labs stable and keep appt

## 2020-09-29 ENCOUNTER — OFFICE VISIT (OUTPATIENT)
Dept: FAMILY MEDICINE CLINIC | Age: 70
End: 2020-09-29

## 2020-09-29 VITALS
HEART RATE: 84 BPM | TEMPERATURE: 96.8 F | HEIGHT: 74 IN | SYSTOLIC BLOOD PRESSURE: 128 MMHG | RESPIRATION RATE: 16 BRPM | BODY MASS INDEX: 40.43 KG/M2 | WEIGHT: 315 LBS | DIASTOLIC BLOOD PRESSURE: 78 MMHG

## 2020-09-29 PROCEDURE — 99213 OFFICE O/P EST LOW 20 MIN: CPT | Performed by: FAMILY MEDICINE

## 2020-09-29 PROCEDURE — 90688 IIV4 VACCINE SPLT 0.5 ML IM: CPT | Performed by: FAMILY MEDICINE

## 2020-09-29 PROCEDURE — 1036F TOBACCO NON-USER: CPT | Performed by: FAMILY MEDICINE

## 2020-09-29 PROCEDURE — G8427 DOCREV CUR MEDS BY ELIG CLIN: HCPCS | Performed by: FAMILY MEDICINE

## 2020-09-29 PROCEDURE — 4040F PNEUMOC VAC/ADMIN/RCVD: CPT | Performed by: FAMILY MEDICINE

## 2020-09-29 PROCEDURE — 3017F COLORECTAL CA SCREEN DOC REV: CPT | Performed by: FAMILY MEDICINE

## 2020-09-29 PROCEDURE — G0008 ADMIN INFLUENZA VIRUS VAC: HCPCS | Performed by: FAMILY MEDICINE

## 2020-09-29 PROCEDURE — 1123F ACP DISCUSS/DSCN MKR DOCD: CPT | Performed by: FAMILY MEDICINE

## 2020-09-29 PROCEDURE — G0438 PPPS, INITIAL VISIT: HCPCS | Performed by: FAMILY MEDICINE

## 2020-09-29 PROCEDURE — G8417 CALC BMI ABV UP PARAM F/U: HCPCS | Performed by: FAMILY MEDICINE

## 2020-09-29 ASSESSMENT — ENCOUNTER SYMPTOMS
BACK PAIN: 1
COUGH: 0
CONSTIPATION: 0
BLOOD IN STOOL: 0
EYE PAIN: 0
SORE THROAT: 0
CHEST TIGHTNESS: 0
NAUSEA: 0
SHORTNESS OF BREATH: 0
TROUBLE SWALLOWING: 0
ABDOMINAL PAIN: 0

## 2020-09-29 ASSESSMENT — PATIENT HEALTH QUESTIONNAIRE - PHQ9
SUM OF ALL RESPONSES TO PHQ9 QUESTIONS 1 & 2: 0
1. LITTLE INTEREST OR PLEASURE IN DOING THINGS: 0
SUM OF ALL RESPONSES TO PHQ QUESTIONS 1-9: 0
SUM OF ALL RESPONSES TO PHQ QUESTIONS 1-9: 0
2. FEELING DOWN, DEPRESSED OR HOPELESS: 0

## 2020-09-29 ASSESSMENT — LIFESTYLE VARIABLES: HOW OFTEN DO YOU HAVE A DRINK CONTAINING ALCOHOL: 0

## 2020-09-29 NOTE — PROGRESS NOTES
Immunizations Administered     Name Date Dose Route    Influenza, Quadv, IM, (6 mo and older Fluzone, Flulaval, Fluarix and 3 yrs and older Afluria) 9/29/2020 0.5 mL Intramuscular    Site: Deltoid- Right    Lot: [id number]    NDC: 71559-478-21

## 2020-09-29 NOTE — PATIENT INSTRUCTIONS
Personalized Preventive Plan for Suyapa Buchanan - 9/29/2020  Medicare offers a range of preventive health benefits. Some of the tests and screenings are paid in full while other may be subject to a deductible, co-insurance, and/or copay. Some of these benefits include a comprehensive review of your medical history including lifestyle, illnesses that may run in your family, and various assessments and screenings as appropriate. After reviewing your medical record and screening and assessments performed today your provider may have ordered immunizations, labs, imaging, and/or referrals for you. A list of these orders (if applicable) as well as your Preventive Care list are included within your After Visit Summary for your review. Other Preventive Recommendations:    · A preventive eye exam performed by an eye specialist is recommended every 1-2 years to screen for glaucoma; cataracts, macular degeneration, and other eye disorders. · A preventive dental visit is recommended every 6 months. · Try to get at least 150 minutes of exercise per week or 10,000 steps per day on a pedometer . · Order or download the FREE \"Exercise & Physical Activity: Your Everyday Guide\" from [employer]. Call 2-799.566.4343 or search [employer] online. · You need 1237-6385 mg of calcium and 3356-5460 IU of vitamin D per day. It is possible to meet your calcium requirement with diet alone, but a vitamin D supplement is usually necessary to meet this goal.  · When exposed to the sun, use a sunscreen that protects against both UVA and UVB radiation with an SPF of 30 or greater. Reapply every 2 to 3 hours or after sweating, drying off with a towel, or swimming. · Always wear a seat belt when traveling in a car. Always wear a helmet when riding a bicycle or motorcycle.

## 2020-09-29 NOTE — PROGRESS NOTES
Medicare Annual Wellness Visit  Name: Claudia Celeste Date: 2020   MRN: [id number] Sex: Male   Age: 65 y.o. Ethnicity: Non-/Non    : 1950 Race: Irvin Madsen is here for Medicare AWV    Screenings for behavioral, psychosocial and functional/safety risks, and cognitive dysfunction are all negative except as indicated below. These results, as well as other patient data from the [address] [facility] form, are documented in Flowsheets linked to this Encounter. Allergies   Allergen Reactions    Horse-Derived Products        Prior to Visit Medications    Medication Sig Taking?  Authorizing Provider   atorvastatin (LIPITOR) 10 MG tablet TAKE 1 TABLET BY MOUTH ONCE DAILY Yes Jackie Roberts MD   insulin aspart (NOVOLOG FLEXPEN) 100 UNIT/ML injection pen INJECT 12 UNITS SUBCUTANEOUSLY 3 TIMES DAILY BEFORE MEALS Yes Jackie Roberts MD   meclizine (ANTIVERT) 25 MG tablet Take 1 tablet by mouth every 6 hours as needed for Dizziness Yes Jackie Roberts MD   ACCU-CHEK SMARTVIEW strip Use to test Blood Sugar 3x daily, Dx: E11.9 Yes Jackie Roberts MD   carvedilol (COREG) 12.5 MG tablet Take 1 tablet by mouth 2 times daily Yes June Albert DO   enalapril (VASOTEC) 10 MG tablet TAKE 2 TABLETS BY MOUTH TWICE DAILY Yes Jackie Roberts MD   metFORMIN (GLUCOPHAGE) 500 MG tablet TAKE TWO TABLETS BY MOUTH TWICE DAILY WITH MEALS Yes Jackie Roberts MD   insulin glargine (LANTUS SOLOSTAR) 100 UNIT/ML injection pen INJECT 30 UNITS SUBCUTANEOUSLY ONCE DAILY Yes Jackie Roberts MD   furosemide (LASIX) 40 MG tablet Take 1 tablet by mouth daily Yes Jackie Roberts MD   ticagrelor (BRILINTA) 90 MG TABS tablet TAKE ONE TABLET BY MOUTH TWICE DAILY Yes Jackie Roberts MD   nitroGLYCERIN (NITROSTAT) 0.4 MG SL tablet Place 1 tablet under the tongue every 5 minutes as needed for Chest pain Yes Jackie Roberts MD   ferrous sulfate 325 (65 Fe) MG tablet Take 1 tablet by mouth 2 times daily Yes Jody Ruff MD   Multiple Vitamins-Minerals (THERAPEUTIC MULTIVITAMIN-MINERALS) tablet Take 1 tablet by mouth daily Yes Jody Ruff MD   aspirin 81 MG tablet Take 1 tablet by mouth daily Yes Caryn Condon MD   acetaminophen (TYLENOL) 500 MG tablet Take 500 mg by mouth as needed for Pain Yes Historical Provider, MD       Past Medical History:   Diagnosis Date    RAUL (acute kidney injury) ([facility] 2/13/2020    ASHD (arteriosclerotic heart disease) 2005 2006    stent  baki    Depression     Diabetic peripheral neuropathy ([facility] 2014    Fracture Oct 1984    left lower extremity     HTN (hypertension)     Hypercholesteremia     IDDM (insulin dependent diabetes mellitus) ([facility]     Low back pain     Morbid obesity with BMI of 45.0-49.9, adult ([facility]     Osteoarthritis        Past Surgical History:   Procedure Laterality Date    AMPUTATION Left 9/10/14    partial versus complete left hallux amputation, left great toe amputation    APPENDECTOMY     [address]    fusion of C5-C6    CHOLECYSTECTOMY      COLONOSCOPY  2007 and 2011    colonn polyps  lorenzo    CORONARY ANGIOPLASTY WITH STENT PLACEMENT  08/07/2017    Dr Pratibha Sandoval @ [address] GRAFT  2015 august     dox    EKG 12-LEAD  8/14/2015         FACET JOINT INJECTION Bilateral 5/22/2020    Lumbar Facet MBB @L3-4,4-5 bilateral #2 performed by Jeneen Holstein, MD at [address] [place] [address]      left leg    JOINT REPLACEMENT      bilateral knees gurvinder    PAIN MANAGEMENT PROCEDURE Bilateral 1/28/2020    Lumbar Facet MBB @ L3-4,4-5,5-S1 bilateral #1 LUMBAR FACET performed by Jeneen Holstein, MD at [place] [address] Right 7/14/2020    Lumbar RFA Bilateral L3-4,4-5  right side first performed by Jeneen Holstein, MD at [facility] N/A 12/28/2018 T7-T9 DECOMPRESSION, T7-T9 POSTERIOR FUSION performed by Jessica Urrutia MD at [address]  2010    fumich    TONSILLECTOMY      VASCULAR SURGERY      cabg harvests from left leg       Family History   Problem Relation Age of Onset    Cancer Mother         uterine       CareTeam (Including outside providers/suppliers regularly involved in providing care):   Patient Care Team:  Heron Lafleur MD as PCP - General (Family Medicine)  Heron Lafleur MD as PCP - [facility]    Wt Readings from Last 3 Encounters:   09/29/20 (!) 391 lb (177.4 kg)   09/14/20 (!) 396 lb (179.6 kg)   08/03/20 (!) 396 lb (179.6 kg)     Vitals:    09/29/20 1336   BP: 128/78   Site: Right Upper Arm   Position: Sitting   Cuff Size: Medium Adult   Pulse: 84   Resp: 16   Temp: 96.8 °F (36 °C)   TempSrc: Oral   Weight: (!) 391 lb (177.4 kg)   Height: 6' 2\" (1.88 m)     Body mass index is 50.2 kg/m². Based upon direct observation of the patient, evaluation of cognition reveals recent and remote memory intact. Patient's complete Health Risk Assessment and screening values have been reviewed and are found in Flowsheets. The following problems were reviewed today and where indicated follow up appointments were made and/or referrals ordered. Positive Risk Factor Screenings with Interventions:     Health Habits/Nutrition:  Health Habits/Nutrition  Do you exercise for at least 20 minutes 2-3 times per week?: (!) No  Have you lost any weight without trying in the past 3 months?: No  Do you eat fewer than 2 meals per day?: No  Have you seen a dentist within the past year?: (!) No  Body mass index is 50.2 kg/m².   Health Habits/Nutrition Interventions:  · has  dentutres and  not  able   to exercise     Hearing/Vision:  No exam data present  Hearing/Vision  Do you or your family notice any trouble with your hearing?: (!) Yes  Do you have difficulty driving, watching TV, or doing any of your daily activities because of your eyesight?: No  Have you had an eye exam within the past year?: (!) No  Hearing/Vision Interventions:  · Hearing concerns:    stable   · Vision concerns:  patient encouraged to make appointment with his/her eye specialist    Safety:  Safety  Do you have working smoke detectors?: Yes  Have all throw rugs been removed or fastened?: (!) No  Do you have non-slip mats or surfaces in all bathtubs/showers?: Yes  Do all of your stairways have a railing or banister?: Yes  Are your doorways, halls and stairs free of clutter?: Yes  Do you always fasten your seatbelt when you are in a car?: (!) No  Safety Interventions:  · Home safety tips provided  No  Throw  Rugs  And  On  Seatbelt     Personalized Preventive Plan   Current Health Maintenance Status  Immunization History   Administered Date(s) Administered    Influenza Virus Vaccine 10/14/2015    Influenza, Leartis Eliecer, IM, (6 mo and older Fluzone, Flulaval, Fluarix and 3 yrs and older Afluria) 10/04/2016    Influenza, Quadv, IM, PF (6 mo and older Fluzone, Flulaval, Fluarix, and 3 yrs and older Afluria) 02/14/2020    PPD Test 08/23/2015, 08/30/2015    Pneumococcal Conjugate 13-valent ([id number]) 06/30/2016    Pneumococcal Polysaccharide ([id number]) 01/06/2010    Td, unspecified formulation 05/01/2009    Tdap (Boostrix, Adacel) 09/09/2014, 01/08/2018        Health Maintenance   Topic Date Due    Shingles Vaccine (1 of 2) 07/01/1998    Diabetic foot exam  09/16/2016    Pneumococcal 65+ years Vaccine (2 of 2 - PPSV23) 06/30/2017    PSA counseling  05/30/2018    Diabetic microalbuminuria test  01/11/2019    Annual Wellness Visit (AWV)  05/29/2019    Diabetic retinal exam  06/20/2019    Lipid screen  10/17/2019    Flu vaccine (1) 09/01/2020    A1C test (Diabetic or Prediabetic)  06/29/2021    Statin Therapy  09/16/2021    Colon cancer screen colonoscopy  09/21/2021    Potassium monitoring  09/23/2021    Creatinine monitoring  2021    DTaP/Tdap/Td vaccine (3 - Td) 2028    Hepatitis C screen  Completed    Hepatitis A vaccine  Aged Out    Hib vaccine  Aged Out    Meningococcal (ACWY) vaccine  Aged Out     Recommendations for Camera [employer] & Integration Due: see orders and patient instructions/AVS.  . Recommended screening schedule for the next 5-10 years is provided to the patient in written form: see Patient Instructions/AVS.    There are no diagnoses linked to this encounter. 2020     Caryle Pair (:  1950) is a 65 y.o. male, here for evaluation of the following medical concerns:     Leg edema  Noted    Stable           Mid and  Lower  Back   pain      Low  Back pain  Noted and  For epidural  On    Right as  Had on  The left        Iddm  Noted    Needs  hgb  a1c       ashd  Stable       htn  Stable   Stable     Review of Systems   Constitutional: Negative for fatigue and fever. HENT: Negative for congestion, ear pain, postnasal drip, sore throat and trouble swallowing. Eyes: Negative for pain. Respiratory: Negative for cough, chest tightness and shortness of breath. Cardiovascular: Negative for chest pain, palpitations and leg swelling. Gastrointestinal: Negative for abdominal pain, blood in stool, constipation and nausea. Genitourinary: Negative for difficulty urinating, frequency and urgency. Musculoskeletal: Positive for back pain. Negative for arthralgias, joint swelling and neck stiffness. Low  Back pain   Skin: Negative for rash. Neurological: Negative for dizziness, weakness and headaches. Hematological: Negative for adenopathy. Does not bruise/bleed easily. Psychiatric/Behavioral: Negative for behavioral problems, dysphoric mood and sleep disturbance. Prior to Visit Medications    Medication Sig Taking?  Authorizing Provider   atorvastatin (LIPITOR) 10 MG tablet TAKE 1 TABLET BY MOUTH ONCE DAILY Yes Roswell Bence, MD   insulin aspart ([place]mer FLEXPEN) 100 UNIT/ML injection pen INJECT 12 UNITS SUBCUTANEOUSLY 3 TIMES DAILY BEFORE MEALS Yes Mavis Vieira MD   meclizine (ANTIVERT) 25 MG tablet Take 1 tablet by mouth every 6 hours as needed for Dizziness Yes Mavis Vieira MD   ACCU-CHEK SMARTVIEW strip Use to test Blood Sugar 3x daily, Dx: E11.9 Yes Mavis Vieira MD   carvedilol (COREG) 12.5 MG tablet Take 1 tablet by mouth 2 times daily Yes June Albert DO   enalapril (VASOTEC) 10 MG tablet TAKE 2 TABLETS BY MOUTH TWICE DAILY Yes Mavis Vieira MD   metFORMIN (GLUCOPHAGE) 500 MG tablet TAKE TWO TABLETS BY MOUTH TWICE DAILY WITH MEALS Yes Mavis Vieira MD   insulin glargine (LANTUS SOLOSTAR) 100 UNIT/ML injection pen INJECT 30 UNITS SUBCUTANEOUSLY ONCE DAILY Yes Mavis Vieira MD   furosemide (LASIX) 40 MG tablet Take 1 tablet by mouth daily Yes Mavis Vieira MD   ticagrelor (BRILINTA) 90 MG TABS tablet TAKE ONE TABLET BY MOUTH TWICE DAILY Yes Mavis Vieira MD   nitroGLYCERIN (NITROSTAT) 0.4 MG SL tablet Place 1 tablet under the tongue every 5 minutes as needed for Chest pain Yes Mavis Vieira MD   ferrous sulfate 325 (65 Fe) MG tablet Take 1 tablet by mouth 2 times daily Yes Mavis Vieira MD   Multiple Vitamins-Minerals (THERAPEUTIC MULTIVITAMIN-MINERALS) tablet Take 1 tablet by mouth daily Yes Mavis Vieira MD   aspirin 81 MG tablet Take 1 tablet by mouth daily Yes Roxana Mart MD   acetaminophen (TYLENOL) 500 MG tablet Take 500 mg by mouth as needed for Pain Yes Historical Provider, MD        Social History     Tobacco Use    Smoking status: Never Smoker    Smokeless tobacco: Never Used   Substance Use Topics    Alcohol use: Yes     Comment: rarely        Vitals:    09/29/20 1336   BP: 128/78   Site: Right Upper Arm   Position: Sitting   Cuff Size: Medium Adult   Pulse: 84   Resp: 16   Temp: 96.8 °F (36 °C)   TempSrc: Oral   Weight: (!) 391 lb (177.4 kg)   Height: 6' 2\" (1.88 m) stasis dermatitis of both lower extremities     9. Lumbar spondylosis           PLAn    Current Outpatient Medications   Medication Sig Dispense Refill    atorvastatin (LIPITOR) 10 MG tablet TAKE 1 TABLET BY MOUTH ONCE DAILY 90 tablet 1    insulin aspart (NOVOLOG FLEXPEN) 100 UNIT/ML injection pen INJECT 12 UNITS SUBCUTANEOUSLY 3 TIMES DAILY BEFORE MEALS 15 pen 0    meclizine (ANTIVERT) 25 MG tablet Take 1 tablet by mouth every 6 hours as needed for Dizziness 40 tablet 0    ACCU-CHEK SMARTVIEW strip Use to test Blood Sugar 3x daily, Dx: E11.9 100 each 11    carvedilol (COREG) 12.5 MG tablet Take 1 tablet by mouth 2 times daily 180 tablet 1    enalapril (VASOTEC) 10 MG tablet TAKE 2 TABLETS BY MOUTH TWICE DAILY 360 tablet 1    metFORMIN (GLUCOPHAGE) 500 MG tablet TAKE TWO TABLETS BY MOUTH TWICE DAILY WITH MEALS 360 tablet 1    insulin glargine (LANTUS SOLOSTAR) 100 UNIT/ML injection pen INJECT 30 UNITS SUBCUTANEOUSLY ONCE DAILY 15 pen 1    furosemide (LASIX) 40 MG tablet Take 1 tablet by mouth daily 60 tablet 0    ticagrelor (BRILINTA) 90 MG TABS tablet TAKE ONE TABLET BY MOUTH TWICE DAILY 60 tablet 11    nitroGLYCERIN (NITROSTAT) 0.4 MG SL tablet Place 1 tablet under the tongue every 5 minutes as needed for Chest pain 25 tablet 3    ferrous sulfate 325 (65 Fe) MG tablet Take 1 tablet by mouth 2 times daily 60 tablet 2    Multiple Vitamins-Minerals (THERAPEUTIC MULTIVITAMIN-MINERALS) tablet Take 1 tablet by mouth daily 30 tablet 11    aspirin 81 MG tablet Take 1 tablet by mouth daily      acetaminophen (TYLENOL) 500 MG tablet Take 500 mg by mouth as needed for Pain       No current facility-administered medications for this visit. Orders Placed This Encounter   Procedures    INFLUENZA, QUADV, 6 MO AND OLDER, IM, MDV, 0.5ML (FLULAVAL QUADV)    Hemoglobin A1C     Standing Status:   Future     Standing Expiration Date:   9/29/2021     No results found for this visit on 09/29/20.   There are no

## 2020-10-01 ENCOUNTER — APPOINTMENT (OUTPATIENT)
Dept: GENERAL RADIOLOGY | Age: 70
End: 2020-10-01
Attending: PAIN MEDICINE
Payer: MEDICARE

## 2020-10-01 ENCOUNTER — HOSPITAL ENCOUNTER (OUTPATIENT)
Age: 70
Setting detail: OUTPATIENT SURGERY
Discharge: HOME | End: 2020-10-01
Attending: PAIN MEDICINE | Admitting: PAIN MEDICINE
Payer: MEDICARE

## 2020-10-01 ENCOUNTER — ANESTHESIA EVENT (OUTPATIENT)
Dept: OPERATING ROOM | Age: 70
End: 2020-10-01
Payer: MEDICARE

## 2020-10-01 ENCOUNTER — ANESTHESIA (OUTPATIENT)
Dept: OPERATING ROOM | Age: 70
End: 2020-10-01
Payer: MEDICARE

## 2020-10-01 VITALS
OXYGEN SATURATION: 97 % | DIASTOLIC BLOOD PRESSURE: 85 MMHG | RESPIRATION RATE: 10 BRPM | SYSTOLIC BLOOD PRESSURE: 187 MMHG

## 2020-10-01 VITALS
SYSTOLIC BLOOD PRESSURE: 177 MMHG | OXYGEN SATURATION: 94 % | BODY MASS INDEX: 40.43 KG/M2 | HEIGHT: 74 IN | DIASTOLIC BLOOD PRESSURE: 76 MMHG | TEMPERATURE: 97.1 F | RESPIRATION RATE: 16 BRPM | WEIGHT: 315 LBS | HEART RATE: 80 BPM

## 2020-10-01 LAB — GLUCOSE BLD-MCNC: 132 MG/DL (ref 70–108)

## 2020-10-01 PROCEDURE — 3700000000 HC ANESTHESIA ATTENDED CARE: Performed by: PAIN MEDICINE

## 2020-10-01 PROCEDURE — 2500000003 HC RX 250 WO HCPCS: Performed by: PAIN MEDICINE

## 2020-10-01 PROCEDURE — 7100000011 HC PHASE II RECOVERY - ADDTL 15 MIN: Performed by: PAIN MEDICINE

## 2020-10-01 PROCEDURE — 82948 REAGENT STRIP/BLOOD GLUCOSE: CPT

## 2020-10-01 PROCEDURE — 7100000010 HC PHASE II RECOVERY - FIRST 15 MIN: Performed by: PAIN MEDICINE

## 2020-10-01 PROCEDURE — 3600000056 HC PAIN LEVEL 4 BASE: Performed by: PAIN MEDICINE

## 2020-10-01 PROCEDURE — 6360000002 HC RX W HCPCS: Performed by: NURSE ANESTHETIST, CERTIFIED REGISTERED

## 2020-10-01 PROCEDURE — 3209999900 FLUORO FOR SURGICAL PROCEDURES

## 2020-10-01 PROCEDURE — 2709999900 HC NON-CHARGEABLE SUPPLY: Performed by: PAIN MEDICINE

## 2020-10-01 PROCEDURE — 64636 DESTROY L/S FACET JNT ADDL: CPT | Performed by: PAIN MEDICINE

## 2020-10-01 PROCEDURE — 64635 DESTROY LUMB/SAC FACET JNT: CPT | Performed by: PAIN MEDICINE

## 2020-10-01 PROCEDURE — 6360000002 HC RX W HCPCS: Performed by: PAIN MEDICINE

## 2020-10-01 PROCEDURE — 2720000010 HC SURG SUPPLY STERILE: Performed by: PAIN MEDICINE

## 2020-10-01 RX ORDER — FENTANYL CITRATE 50 UG/ML
INJECTION, SOLUTION INTRAMUSCULAR; INTRAVENOUS PRN
Status: DISCONTINUED | OUTPATIENT
Start: 2020-10-01 | End: 2020-10-01 | Stop reason: SDUPTHER

## 2020-10-01 RX ORDER — BUPIVACAINE HYDROCHLORIDE 2.5 MG/ML
INJECTION, SOLUTION EPIDURAL; INFILTRATION; INTRACAUDAL PRN
Status: DISCONTINUED | OUTPATIENT
Start: 2020-10-01 | End: 2020-10-01 | Stop reason: ALTCHOICE

## 2020-10-01 RX ORDER — METHYLPREDNISOLONE ACETATE 80 MG/ML
INJECTION, SUSPENSION INTRA-ARTICULAR; INTRALESIONAL; INTRAMUSCULAR; SOFT TISSUE PRN
Status: DISCONTINUED | OUTPATIENT
Start: 2020-10-01 | End: 2020-10-01 | Stop reason: ALTCHOICE

## 2020-10-01 RX ORDER — PROPOFOL 10 MG/ML
INJECTION, EMULSION INTRAVENOUS PRN
Status: DISCONTINUED | OUTPATIENT
Start: 2020-10-01 | End: 2020-10-01 | Stop reason: SDUPTHER

## 2020-10-01 RX ORDER — LIDOCAINE HYDROCHLORIDE 10 MG/ML
INJECTION, SOLUTION EPIDURAL; INFILTRATION; INTRACAUDAL; PERINEURAL PRN
Status: DISCONTINUED | OUTPATIENT
Start: 2020-10-01 | End: 2020-10-01 | Stop reason: ALTCHOICE

## 2020-10-01 RX ADMIN — PROPOFOL 100 MG: 10 INJECTION, EMULSION INTRAVENOUS at 10:31

## 2020-10-01 RX ADMIN — FENTANYL CITRATE 100 MCG: 50 INJECTION, SOLUTION INTRAMUSCULAR; INTRAVENOUS at 10:23

## 2020-10-01 ASSESSMENT — PULMONARY FUNCTION TESTS
PIF_VALUE: 0

## 2020-10-01 ASSESSMENT — PAIN SCALES - GENERAL: PAINLEVEL_OUTOF10: 0

## 2020-10-01 ASSESSMENT — PAIN DESCRIPTION - DESCRIPTORS: DESCRIPTORS: CONSTANT;ACHING

## 2020-10-01 ASSESSMENT — PAIN - FUNCTIONAL ASSESSMENT: PAIN_FUNCTIONAL_ASSESSMENT: 0-10

## 2020-10-01 NOTE — H&P
[address] [place] [address]  History and Physical Update    Pt Name: Dayna Toledo  MRN: [id number]  YOB: 1950  Date of evaluation: 10/1/2020      I have examined the patient and reviewed the H&P/Consult and there are no changes to the patient or plans.         Electronically signed by Brian Nolan MD on 10/1/2020 at 10:41 AM

## 2020-10-01 NOTE — OP NOTE
Operative Note  Pre-Procedure Note    Patient Name: Jah Jenkins   YOB: 1950  Medical Record Number: [id number]  Date: 10/1/20    Indication:  Lower back pain  Consent: On file. Vital Signs:   Vitals:    10/01/20 0926   BP: (!) 187/77   Pulse: 82   Resp: 16   Temp: 96.6 °F (35.9 °C)   SpO2: 97%       Past Medical History:   has a past medical history of RAUL (acute kidney injury) ([facility] ASHD (arteriosclerotic heart disease), Depression, Diabetic peripheral neuropathy ([facility] Fracture, HTN (hypertension), Hypercholesteremia, IDDM (insulin dependent diabetes mellitus) ([facility] Low back pain, Morbid obesity with BMI of 45.0-49.9, adult ([facility] and Osteoarthritis. Past Surgical History:   has a past surgical history that includes Cholecystectomy; Rotator cuff repair; Cervical disc surgery (2000); Appendectomy; Spine surgery (2010); Colonoscopy (2007 and 2011); joint replacement; Tonsillectomy; amputation (Left, 9/10/14); EKG 12 Lead (8/14/2015); Coronary artery bypass graft (2015 august ); Cardiac surgery; vascular surgery; fracture surgery; Coronary angioplasty with stent (08/07/2017); POSTERIOR FUSION THORACIC SPINE (N/A, 12/28/2018); Pain management procedure (Bilateral, 1/28/2020); Facet joint injection (Bilateral, 5/22/2020); and Pain management procedure (Right, 7/14/2020). Pre-Sedation Documentation and Exam:   Vital signs have been reviewed (see flow sheet for vitals). Sedation/ Anesthesia Plan:   MAC    Patient is an appropriate candidate for plan of sedation: yes    Preoperative Diagnosis:  L-spondylosis     Post-Op Dx: as above     Procedure Performed:  :Radiofrequency ablation of median branches at the levels of L3-4 and L4-5 left under fluoroscopic guidance      Indication for the Procedure: The patient has ahistory of chronic low back pain that is not responding well to the conservative treatment. Patient's pain is mostly axial in nature.   Pain is interfering with the activities of daily living. Physical examination revealed facet tenderness and facet loading is positive. Patient had undergone lumbar facet joint injections with pain relief that lasted for only a short period of time and had greater than 70% pain relief with confirmatory median branch blocks. Hence we decided to do radiofrequency abalation of median branches for long term pain releif. The procedure and risks  were discussed with the patient and an informed consent was obtained.     Procedure:  Left   A meaningful communication was kept up with the patient throughout the procedure. The patient is placed in prone position and skin over the back was prepped and draped in sterile manner. Then using fluoroscopy the junction of the transverse process of the vertebra with the superior process of the facet joint was observed and the view was optimized. The skin and deep tissues posterior were infiltrated with 10 ml of  1% xylocaine. The RF canula with the 15 mm active tip was introduced through the skin wheal under fluoroscopy guidance such that the tip of the needle lies in the groove of the transverse process with the superior processes of the facet joint. Then a lateral view of the lumbar spine was obtained to make sure the tip of needle is not in the neural foramen. Then electric impedence was checked to make sure it is acceptable. Then a sensory stimulus was applied at 50 Hz up to 1 volt and concordant pain symptoms were reproduced. Then a motor stimulus was applied at 2 Hz up to 2 volts and no motor stimulation was seen in lower extremities. Some multifidus stimulus was seen. Then after negative aspiration a mixture of depomedrol 80 mg  and 0.25%  Marcaine 1 cc was injected through the needle in divided doses. Then a lesion was done at 80 degrees centigrade for 90 seconds.     EBL-0    For the L4 median branch the junction of the transverse process of L5 with the superolateral possible facet joint was taken as a

## 2020-10-01 NOTE — ANESTHESIA POSTPROCEDURE EVALUATION
Department of Anesthesiology  Postprocedure Note    Patient: Alvin Cruz  MRN: [id number]  YOB: 1950  Date of evaluation: 10/1/2020  Time:  3:01 PM     Procedure Summary     Date:  10/01/20 Room / Location:  [address] [place] [address] 03 / [facility]    Anesthesia Start:  5529 Anesthesia Stop:  2170    Procedure:  Lumbar RFA Left side L3-4, 4-5 (Left ) Diagnosis:  (Lumbar spondylosis)    Surgeon:  Lucio Rodriguez MD Responsible Provider:  Solomon Osullivan MD    Anesthesia Type:  MAC ASA Status:  3          Anesthesia Type: MAC    Estelle Phase I:      Estelle Phase II: Estelle Score: 10    Last vitals: Reviewed and per EMR flowsheets.        Anesthesia Post Evaluation    Patient location during evaluation: PACU  Patient participation: complete - patient participated  Level of consciousness: awake  Airway patency: patent  Nausea & Vomiting: no vomiting and no nausea  Complications: no  Cardiovascular status: hemodynamically stable  Respiratory status: acceptable  Hydration status: stable

## 2020-10-01 NOTE — H&P
H&P     Lumbar RFA right side  L3-4,4-5  7/14/2020  States he is receiving about 50% pain relief. He continues to have mid back low back pain bilateral hip SI knee pain. He has had L2-S1 lumbar laminectomy Decompression fusion with Dr Fabricio Payton in 2010. He has increased pain with bending doing dishes getting up and down walking standing. He uses a cane. He takes Tylenol prn. He had Lumbar Facet MBB with 80% pain relief for  2 weeks. He has increased hip pain and cramps.      . He complains of any ER visits since last visit ripped toenail off was bleeding   Pain scale with out pain medications or at its worst is 7/10. Pain scale with pain medications or at its best is 3/10.           The patientis allergic to horse-derived products.     Past Medical History  Tiny Soto  has a past medical history of RAUL (acute kidney injury) ([facility] [address] ASHD (arteriosclerotic heart disease), Depression, Diabetic peripheral neuropathy ([address] Fracture, HTN (hypertension), Hypercholesteremia, IDDM (insulin dependent diabetes mellitus) ([address] Low back pain, Morbid obesity with BMI of 45.0-49.9, adult ([address] and Osteoarthritis.     Past Surgical History  The patient  has a past surgical history that includes Cholecystectomy; Rotator cuff repair; Cervical disc surgery (2000); Appendectomy; Spine surgery (2010); Colonoscopy (2007 and 2011); joint replacement; Tonsillectomy; amputation (Left, 9/10/14); EKG 12 Lead (8/14/2015); Coronary artery bypass graft (2015 august ); Cardiac surgery; vascular surgery; fracture surgery; Coronary angioplasty with stent (08/07/2017); POSTERIOR FUSION THORACIC SPINE (N/A, 12/28/2018); Pain management procedure (Bilateral, 1/28/2020); Facet joint injection (Bilateral, 5/22/2020); and Pain management procedure (Right, 7/14/2020).    Family History  This patient's family history includes Cancer in his mother.  [address]  reports that he has never smoked.  He has never used smokeless tobacco. He reports current alcohol use. He reports that he does not use drugs.     Medications  Current Medication     Current Outpatient Medications:     insulin aspart (NOVOLOG FLEXPEN) 100 UNIT/ML injection pen, INJECT 12 UNITS SUBCUTANEOUSLY 3 TIMES DAILY BEFORE MEALS, Disp: 15 pen, Rfl: 0    Misc.  Devices (PRECISION SCALE) MISC, 1 each by Does not apply route daily, Disp: 1 each, Rfl: 0    meclizine (ANTIVERT) 25 MG tablet, Take 1 tablet by mouth every 6 hours as needed for Dizziness, Disp: 40 tablet, Rfl: 0    ACCU-CHEK SMARTVIEW strip, Use to test Blood Sugar 3x daily, Dx: E11.9, Disp: 100 each, Rfl: 11    carvedilol (COREG) 12.5 MG tablet, Take 1 tablet by mouth 2 times daily, Disp: 180 tablet, Rfl: 1    enalapril (VASOTEC) 10 MG tablet, TAKE 2 TABLETS BY MOUTH TWICE DAILY, Disp: 360 tablet, Rfl: 1    metFORMIN (GLUCOPHAGE) 500 MG tablet, TAKE TWO TABLETS BY MOUTH TWICE DAILY WITH MEALS, Disp: 360 tablet, Rfl: 1    insulin glargine (LANTUS SOLOSTAR) 100 UNIT/ML injection pen, INJECT 30 UNITS SUBCUTANEOUSLY ONCE DAILY, Disp: 15 pen, Rfl: 1    furosemide (LASIX) 40 MG tablet, Take 1 tablet by mouth daily, Disp: 60 tablet, Rfl: 0    atorvastatin (LIPITOR) 10 MG tablet, TAKE 1 TABLET BY MOUTH ONCE DAILY, Disp: 90 tablet, Rfl: 1    ticagrelor (BRILINTA) 90 MG TABS tablet, TAKE ONE TABLET BY MOUTH TWICE DAILY, Disp: 60 tablet, Rfl: 11    nitroGLYCERIN (NITROSTAT) 0.4 MG SL tablet, Place 1 tablet under the tongue every 5 minutes as needed for Chest pain, Disp: 25 tablet, Rfl: 3    ferrous sulfate 325 (65 Fe) MG tablet, Take 1 tablet by mouth 2 times daily, Disp: 60 tablet, Rfl: 2    Multiple Vitamins-Minerals (THERAPEUTIC MULTIVITAMIN-MINERALS) tablet, Take 1 tablet by mouth daily, Disp: 30 tablet, Rfl: 11    aspirin 81 MG tablet, Take 1 tablet by mouth daily, Disp: , Rfl:     acetaminophen (TYLENOL) 500 MG tablet, Take 500 mg by mouth as needed for Pain, Disp: , Rfl:         Subjective:      Review of Systems Constitutional: Positive for activity change. Negative for appetite change, chills, diaphoresis, fatigue, fever and unexpected weight change. HENT: Negative for congestion, ear pain, hearing loss, mouth sores, nosebleeds, rhinorrhea, sinus pressure and sore throat. Eyes: Negative for photophobia, pain and visual disturbance. Respiratory: Negative for cough, chest tightness, shortness of breath and wheezing. Cardiovascular: Positive for leg swelling. Negative for chest pain and palpitations. CABG HTN MI  Has BLE lymphedema   Gastrointestinal: Negative for abdominal pain, constipation, diarrhea, nausea and vomiting. Endocrine: Negative for cold intolerance, heat intolerance, polydipsia, polyphagia and polyuria. DM   Genitourinary: Negative for decreased urine volume, difficulty urinating, frequency and hematuria. Musculoskeletal: Positive for arthralgias, back pain, gait problem, myalgias, neck pain and neck stiffness. Negative for joint swelling. Skin: Negative for color change and rash. Allergic/Immunologic: Negative for food allergies and immunocompromised state. Neurological: Positive for weakness and numbness. Negative for dizziness, tremors, syncope, facial asymmetry, speech difficulty and light-headedness. Hematological: Does not bruise/bleed easily. Psychiatric/Behavioral: Negative for agitation, behavioral problems, confusion, decreased concentration, dysphoric mood, hallucinations, self-injury, sleep disturbance and suicidal ideas. The patient is not nervous/anxious and is not hyperactive. Depression          Objective:      Vitals       Vitals:     09/14/20 1116   BP: 130/64   Site: Left Upper Arm   Position: Sitting   Cuff Size: Large Adult   Pulse: 88   Temp: 96.9 °F (36.1 °C)   TempSrc: Temporal   Weight: (!) 396 lb (179.6 kg)   Height: 6' 2\" (1.88 m)           Physical Exam  Vitals signs and nursing note reviewed.    Constitutional:       General: He is not in acute distress. Appearance: He is well-developed. He is not diaphoretic. HENT:      Head: Normocephalic and atraumatic. Right Ear: External ear normal.      Left Ear: External ear normal.      Nose: Nose normal.      Mouth/Throat:      Pharynx: No oropharyngeal exudate. Eyes:      General: No scleral icterus. Right eye: No discharge. Left eye: No discharge. Conjunctiva/sclera: Conjunctivae normal.      Pupils: Pupils are equal, round, and reactive to light. Neck:      Musculoskeletal: Full passive range of motion without pain, normal range of motion and neck supple. Normal range of motion. No edema, erythema, neck rigidity or muscular tenderness. Thyroid: No thyromegaly. Cardiovascular:      Rate and Rhythm: Normal rate and regular rhythm. Heart sounds: Normal heart sounds. No murmur. No friction rub. No gallop. Pulmonary:      Effort: Pulmonary effort is normal. No respiratory distress. Breath sounds: Normal breath sounds. No wheezing or rales. Chest:      Chest wall: No tenderness. Abdominal:      General: Bowel sounds are normal. There is no distension. Palpations: Abdomen is soft. Tenderness: There is no abdominal tenderness. There is no guarding or rebound. Musculoskeletal:         General: Tenderness present. Right shoulder: He exhibits tenderness. Left shoulder: He exhibits tenderness. Right hip: He exhibits decreased range of motion, decreased strength, tenderness and bony tenderness. Left hip: He exhibits decreased range of motion, decreased strength, tenderness and bony tenderness. Right knee: He exhibits decreased range of motion. Tenderness found. Left knee: He exhibits decreased range of motion. Tenderness found. Right ankle: He exhibits swelling. Left ankle: He exhibits swelling. Cervical back: He exhibits decreased range of motion, tenderness and bony tenderness.       Thoracic back: He exhibits decreased range of motion, tenderness and bony tenderness. Lumbar back: He exhibits decreased range of motion, tenderness, bony tenderness, pain and spasm. Back:       Right upper leg: He exhibits tenderness. Left upper leg: He exhibits tenderness. Right lower leg: Edema present. Left lower leg: Edema present. Right foot: Tenderness and swelling present. Left foot: Tenderness and swelling present. Comments: H/O Cervical surgery C5-6    T6-T9 PSF Lumbar Nj Decompression L2-S1  Right shoulder rotator cuff surgery  BTKR  Had 3 steel rods in LLE but hardware was removed. BLE lymphedema    Skin:     General: Skin is warm. Coloration: Skin is not pale. Findings: No erythema or rash. Neurological:      Mental Status: He is alert and oriented to person, place, and time. He is not disoriented. Cranial Nerves: Cranial nerves are intact. No cranial nerve deficit. Sensory: Sensation is intact. No sensory deficit. Motor: Weakness present. No atrophy or abnormal muscle tone. Coordination: Coordination is intact. Coordination normal.      Gait: Gait abnormal.      Deep Tendon Reflexes: Reflexes are normal and symmetric. Babinski sign absent on the right side. Reflex Scores:       Tricep reflexes are 2+ on the right side and 2+ on the left side. Bicep reflexes are 2+ on the right side and 2+ on the left side. Brachioradialis reflexes are 2+ on the right side and 2+ on the left side. Patellar reflexes are 2+ on the right side and 2+ on the left side. Achilles reflexes are 2+ on the right side and 2+ on the left side. Psychiatric:         Attention and Perception: Attention normal. He is attentive. Mood and Affect: Mood normal. Mood is not anxious or depressed. Affect is not labile, blunt, angry or inappropriate. Speech: Speech normal. He is communicative.  Speech is not rapid and pressured, delayed,

## 2020-10-01 NOTE — ANESTHESIA PRE PROCEDURE
tablet under the tongue every 5 minutes as needed for Chest pain 8/14/19   Ritchie Shah MD   aspirin 81 MG tablet Take 1 tablet by mouth daily 8/8/17   Justine Dubose MD   acetaminophen (TYLENOL) 500 MG tablet Take 500 mg by mouth as needed for Pain    Historical Provider, MD       Current medications:    No current facility-administered medications for this encounter. Allergies: Allergies   Allergen Reactions    Horse-Derived Products        Problem List:    Patient Active Problem List   Diagnosis Code    HTN (hypertension) I10    IDDM (insulin dependent diabetes mellitus) [id number]    Hypercholesteremia E78.00    Osteoarthritis M19.90    Diabetic peripheral neuropathy ([facility] [address] E11.42    Coronary artery disease of bypass graft of native heart with stable angina pectoris ([facility] [address] I25.708    S/P percutaneous transluminal coronary angioplasty Z98.61    Sprain/Injury of anterior ligaments of thoracic spine S23. 3XXA    Venous stasis dermatitis of both lower extremities I87.2    Lumbar spondylosis M47.816    Lumbar facet arthropathy M47.816    Orthostatic hypotension after exercise I95.1       Past Medical History:        Diagnosis Date    RAUL (acute kidney injury) ([facility] [address] 2/13/2020    ASHD (arteriosclerotic heart disease) 2005 2006    stent  baki    Depression     Diabetic peripheral neuropathy [facility]) 2014    Fracture Oct 1984    left lower extremity     HTN (hypertension)     Hypercholesteremia     IDDM (insulin dependent diabetes mellitus) ([facility] [address]     Low back pain     Morbid obesity with BMI of 45.0-49.9, adult ([facility] [address]     Osteoarthritis        Past Surgical History:        Procedure Laterality Date    AMPUTATION Left 9/10/14    partial versus complete left hallux amputation, left great toe amputation    APPENDECTOMY     [place] [facility]  2000    fusion of C5-C6    CHOLECYSTECTOMY      COLONOSCOPY  2007 and 2011    colonn polyps  [place]    CORONARY ANGIOPLASTY WITH STENT PLACEMENT  08/07/2017    Dr Arpan Toribio @ [address] CORONARY ARTERY BYPASS GRAFT  2015 august dox    EKG 12-LEAD  8/14/2015         FACET JOINT INJECTION Bilateral 5/22/2020    Lumbar Facet MBB @L3-4,4-5 bilateral #2 performed by Luz Elena Roach MD at [address] [facility]      left leg    JOINT REPLACEMENT      bilateral knees gurvinder    PAIN MANAGEMENT PROCEDURE Bilateral 1/28/2020    Lumbar Facet MBB @ L3-4,4-5,5-S1 bilateral #1 LUMBAR FACET [facility] Right 7/14/2020    Lumbar RFA Bilateral L3-4,4-5  right side first performed by Luz Elena Roach MD at [address] [facility] N/A 12/28/2018    T7-T9 DECOMPRESSION, T7-T9 POSTERIOR FUSION performed by Emmanuelle Givens MD at [address] [facility]  2010    fumich    TONSILLECTOMY      VASCULAR SURGERY      cabg harvests from left leg       Social History:    Social History     Tobacco Use    Smoking status: Never Smoker    Smokeless tobacco: Never Used   Substance Use Topics    Alcohol use: Yes     Comment: rarely                                Counseling given: Not Answered      Vital Signs (Current):   Vitals:    10/01/20 0926   BP: (!) 187/77   Pulse: 82   Resp: 16   Temp: 96.6 °F (35.9 °C)   TempSrc: Temporal   SpO2: 97%   Weight: (!) 395 lb (179.2 kg)   Height: 6' 2\" (1.88 m)                                              BP Readings from Last 3 Encounters:   10/01/20 (!) 187/77   09/29/20 128/78   09/14/20 130/64       NPO Status: Time of last liquid consumption: 1800                        Time of last solid consumption: 1800                        Date of last liquid consumption: 09/30/20                        Date of last solid food consumption: 09/30/20    BMI:   Wt Readings from Last 3 Encounters:   10/01/20 (!) 395 lb (179.2 kg)   09/29/20 (!) 391 lb (177.4 Induction: intravenous. Anesthetic plan and risks discussed with patient. Plan discussed with CRNA.                   Aryan Chaudhari MD   10/1/2020

## 2020-10-01 NOTE — PROGRESS NOTES
Blood sugar 132. Discharge instructions given to pt. Belongings packed and sent with pt. Pt verbalized understanding of instructions.

## 2020-10-02 ENCOUNTER — NURSE ONLY (OUTPATIENT)
Dept: LAB | Age: 70
End: 2020-10-02

## 2020-10-02 LAB
AVERAGE GLUCOSE: 177 MG/DL (ref 70–126)
HBA1C MFR BLD: 7.9 % (ref 4.4–6.4)

## 2020-10-16 RX ORDER — INSULIN ASPART 100 [IU]/ML
INJECTION, SOLUTION INTRAVENOUS; SUBCUTANEOUS
Qty: 15 PEN | Refills: 0 | Status: SHIPPED | OUTPATIENT
Start: 2020-10-16 | End: 2021-01-20 | Stop reason: SDUPTHER

## 2020-10-21 ENCOUNTER — OFFICE VISIT (OUTPATIENT)
Dept: PHYSICAL MEDICINE AND REHAB | Age: 70
End: 2020-10-21
Payer: MEDICARE

## 2020-10-21 VITALS
WEIGHT: 315 LBS | SYSTOLIC BLOOD PRESSURE: 138 MMHG | BODY MASS INDEX: 40.43 KG/M2 | HEIGHT: 74 IN | DIASTOLIC BLOOD PRESSURE: 86 MMHG | TEMPERATURE: 97.6 F

## 2020-10-21 PROCEDURE — G8482 FLU IMMUNIZE ORDER/ADMIN: HCPCS | Performed by: NURSE PRACTITIONER

## 2020-10-21 PROCEDURE — 1123F ACP DISCUSS/DSCN MKR DOCD: CPT | Performed by: NURSE PRACTITIONER

## 2020-10-21 PROCEDURE — 4040F PNEUMOC VAC/ADMIN/RCVD: CPT | Performed by: NURSE PRACTITIONER

## 2020-10-21 PROCEDURE — G8428 CUR MEDS NOT DOCUMENT: HCPCS | Performed by: NURSE PRACTITIONER

## 2020-10-21 PROCEDURE — G8417 CALC BMI ABV UP PARAM F/U: HCPCS | Performed by: NURSE PRACTITIONER

## 2020-10-21 PROCEDURE — 1036F TOBACCO NON-USER: CPT | Performed by: NURSE PRACTITIONER

## 2020-10-21 PROCEDURE — 3017F COLORECTAL CA SCREEN DOC REV: CPT | Performed by: NURSE PRACTITIONER

## 2020-10-21 PROCEDURE — 99214 OFFICE O/P EST MOD 30 MIN: CPT | Performed by: NURSE PRACTITIONER

## 2020-10-21 ASSESSMENT — ENCOUNTER SYMPTOMS
COLOR CHANGE: 0
EYE PAIN: 0
RHINORRHEA: 0
ABDOMINAL PAIN: 0
VOMITING: 0
COUGH: 0
CHEST TIGHTNESS: 0
PHOTOPHOBIA: 0
BACK PAIN: 1
DIARRHEA: 0
SINUS PRESSURE: 0
WHEEZING: 0
CONSTIPATION: 0
SHORTNESS OF BREATH: 0
NAUSEA: 0
SORE THROAT: 0

## 2020-10-21 NOTE — PROGRESS NOTES
[facility] [place]  [address] [facility]  Dept: [phone]  Dept Fax: [phone]: [phone]    Visit Date: 10/21/2020    Functionality Assessment/Goals Worksheet     On a scale of 0 (Does not Interfere) to 10 (Completely Interferes)     1. Which number describes how during the past week pain has interfered with       the following:  A. General Activity:  5  B. Mood: 1  C. Walking Ability:  6  D. Normal Work (Includes both work outside the home and housework):  4  E. Relations with Other People:   2  F. Sleep:   2  G. Enjoyment of Life:   3    2. Patient Prefers to Take their Pain Medications:     []  On a regular basis   [x]  Only when necessary    []  Does not take pain medications    3. What are the Patient's Goals/Expectations for Visiting Pain Management? []  Learn about my pain    [x]  Receive Medication   []  Physical Therapy     []  Treat Depression   []  Receive Injections    []  Treat Sleep   []  Deal with Anxiety and Stress   []  Treat Opoid Dependence/Addiction   []  Other:      HPI:   Kerline Pino is a 65 y.o. male is here today for    Chief Complaint: Low back pain, Right leg pain, Left leg pain and Hip pain Si pain     HPI   F/U Lumbar RFA Left side L3-4,4-5  10/1/2020 states he is receiving about 50% pain relief or benefit. He had right Lumbar RFA 7/14/2020 with continued 50% pain relief. He continues to have low back Bilateral Si hip BLE pain. He states his BLE pain and weakness  numbness tingling neuropathy pain is his most pain complaint. He states his legs and feet don't talk to each other. He has BLE weakness. He fell 2 weeks ago. He uses a cane. He also has Bilateral SI pain. He has had Lumbar Laminectomy decompression with Dr Iva Maza in 2010. Medications reviewed. Patient denies side effects with medications.  Patient states he is taking medications as prescribed. Mahesh receiving pain medications from other sources. He denies any ER visits since last visit. Pain scale with out pain medications or at its worst is 9/10. Pain scale with pain medications or at its best is 1/10. Lumbar CT  2019    The patientis allergic to horse-derived products. Past Medical History  [address] [facility]  has a past medical history of RAUL (acute kidney injury) ([address] ASHD (arteriosclerotic heart disease), Depression, Diabetic peripheral neuropathy ([facility] [address] Fracture, HTN (hypertension), Hypercholesteremia, IDDM (insulin dependent diabetes mellitus), Low back pain, Morbid obesity with BMI of 45.0-49.9, adult ([facility] [address] and Osteoarthritis. Past Surgical History  The patient  has a past surgical history that includes Cholecystectomy; Rotator cuff repair; Cervical disc surgery (2000); Appendectomy; Spine surgery (2010); Colonoscopy (2007 and 2011); joint replacement; Tonsillectomy; amputation (Left, 9/10/14); EKG 12 Lead (8/14/2015); Coronary artery bypass graft (2015 august ); Cardiac surgery; vascular surgery; fracture surgery; Coronary angioplasty with stent (08/07/2017); POSTERIOR FUSION THORACIC SPINE (N/A, 12/28/2018); Pain management procedure (Bilateral, 1/28/2020); Facet joint injection (Bilateral, 5/22/2020); Pain management procedure (Right, 7/14/2020); and Pain management procedure (Left, 10/1/2020). Family History  This patient's family history includes Cancer in his mother. Social History  [address] [facility]  reports that he has never smoked. He has never used smokeless tobacco. He reports current alcohol use. He reports that he does not use drugs.     Medications    Current Outpatient Medications:     insulin aspart (NOVOLOG FLEXPEN) 100 UNIT/ML injection pen, INJECT 12 UNITS SUBCUTANEOUSLY 3 TIMES DAILY BEFORE MEALS, Disp: 15 pen, Rfl: 0    atorvastatin (LIPITOR) 10 MG tablet, TAKE 1 TABLET BY MOUTH ONCE DAILY, Disp: 90 tablet, Rfl: 1    meclizine (ANTIVERT) 25 MG tablet, Take 1 tablet by mouth every 6 hours as needed for Dizziness, Disp: 40 tablet, Rfl: 0    ACCU-CHEK SMARTVIEW strip, Use to test Blood Sugar 3x daily, Dx: E11.9, Disp: 100 each, Rfl: 11    carvedilol (COREG) 12.5 MG tablet, Take 1 tablet by mouth 2 times daily, Disp: 180 tablet, Rfl: 1    enalapril (VASOTEC) 10 MG tablet, TAKE 2 TABLETS BY MOUTH TWICE DAILY, Disp: 360 tablet, Rfl: 1    metFORMIN (GLUCOPHAGE) 500 MG tablet, TAKE TWO TABLETS BY MOUTH TWICE DAILY WITH MEALS, Disp: 360 tablet, Rfl: 1    insulin glargine (LANTUS SOLOSTAR) 100 UNIT/ML injection pen, INJECT 30 UNITS SUBCUTANEOUSLY ONCE DAILY, Disp: 15 pen, Rfl: 1    furosemide (LASIX) 40 MG tablet, Take 1 tablet by mouth daily, Disp: 60 tablet, Rfl: 0    ticagrelor (BRILINTA) 90 MG TABS tablet, TAKE ONE TABLET BY MOUTH TWICE DAILY, Disp: 60 tablet, Rfl: 11    nitroGLYCERIN (NITROSTAT) 0.4 MG SL tablet, Place 1 tablet under the tongue every 5 minutes as needed for Chest pain, Disp: 25 tablet, Rfl: 3    ferrous sulfate 325 (65 Fe) MG tablet, Take 1 tablet by mouth 2 times daily, Disp: 60 tablet, Rfl: 2    Multiple Vitamins-Minerals (THERAPEUTIC MULTIVITAMIN-MINERALS) tablet, Take 1 tablet by mouth daily, Disp: 30 tablet, Rfl: 11    aspirin 81 MG tablet, Take 1 tablet by mouth daily, Disp: , Rfl:     acetaminophen (TYLENOL) 500 MG tablet, Take 500 mg by mouth as needed for Pain, Disp: , Rfl:     Subjective:      Review of Systems   Constitutional: Positive for activity change. Negative for appetite change, chills, diaphoresis, fatigue, fever and unexpected weight change. HENT: Negative for congestion, ear pain, hearing loss, mouth sores, nosebleeds, rhinorrhea, sinus pressure and sore throat. Eyes: Negative for photophobia, pain and visual disturbance. Respiratory: Negative for cough, chest tightness, shortness of breath and wheezing. Cardiovascular: Positive for leg swelling. Negative for chest pain and palpitations.         CABG HTN MI  Has BLE lymphedema 3+ pitting edema   Gastrointestinal: Negative for abdominal pain, constipation, diarrhea, nausea and vomiting. GERD   Endocrine: Negative for cold intolerance, heat intolerance, polydipsia, polyphagia and polyuria. DM   Genitourinary: Negative for decreased urine volume, difficulty urinating, frequency and hematuria. Musculoskeletal: Positive for arthralgias, back pain, gait problem, myalgias, neck pain and neck stiffness. Negative for joint swelling. Skin: Negative for color change and rash. Allergic/Immunologic: Negative for food allergies and immunocompromised state. Neurological: Positive for weakness and numbness. Negative for dizziness, tremors, syncope, facial asymmetry, speech difficulty and light-headedness. Hematological: Does not bruise/bleed easily. Psychiatric/Behavioral: Negative for agitation, behavioral problems, confusion, decreased concentration, dysphoric mood, hallucinations, self-injury, sleep disturbance and suicidal ideas. The patient is not nervous/anxious and is not hyperactive. Depression        Objective:     Vitals:    10/21/20 1614   BP: 138/86   Temp: 97.6 °F (36.4 °C)   Weight: (!) 395 lb (179.2 kg)   Height: 6' 2\" (1.88 m)       Physical Exam  Vitals signs and nursing note reviewed. Constitutional:       General: He is not in acute distress. Appearance: He is well-developed. He is not diaphoretic. HENT:      Head: Normocephalic and atraumatic. Right Ear: External ear normal.      Left Ear: External ear normal.      Nose: Nose normal.      Mouth/Throat:      Pharynx: No oropharyngeal exudate. Eyes:      General: No scleral icterus. Right eye: No discharge. Left eye: No discharge. Conjunctiva/sclera: Conjunctivae normal.      Pupils: Pupils are equal, round, and reactive to light. Neck:      Musculoskeletal: Full passive range of motion without pain, normal range of motion and neck supple.  Normal range of motion. No edema, erythema, neck rigidity or muscular tenderness. Thyroid: No thyromegaly. Cardiovascular:      Rate and Rhythm: Normal rate and regular rhythm. Heart sounds: Normal heart sounds. No murmur. No friction rub. No gallop. Pulmonary:      Effort: Pulmonary effort is normal. No respiratory distress. Breath sounds: Normal breath sounds. No wheezing or rales. Chest:      Chest wall: No tenderness. Abdominal:      General: Bowel sounds are normal. There is no distension. Palpations: Abdomen is soft. Tenderness: There is no abdominal tenderness. There is no guarding or rebound. Musculoskeletal:         General: Tenderness present. Right shoulder: He exhibits tenderness. Left shoulder: He exhibits tenderness. Right hip: He exhibits decreased range of motion, decreased strength, tenderness and bony tenderness. Left hip: He exhibits decreased range of motion, decreased strength, tenderness and bony tenderness. Right knee: He exhibits decreased range of motion. Tenderness found. Left knee: He exhibits decreased range of motion. Tenderness found. Right ankle: He exhibits swelling. Left ankle: He exhibits swelling. Cervical back: He exhibits decreased range of motion, tenderness and bony tenderness. Thoracic back: He exhibits decreased range of motion, tenderness and bony tenderness. Lumbar back: He exhibits decreased range of motion, tenderness, bony tenderness, pain and spasm. Back:       Right upper leg: He exhibits tenderness. Left upper leg: He exhibits tenderness. Right lower leg: Edema present. Left lower leg: Edema present. Right foot: Tenderness and swelling present. Left foot: Tenderness and swelling present.       Comments: H/O Cervical surgery C5-6    T6-T9 PSF Lumbar Nj Decompression L2-S1  Right shoulder rotator cuff surgery  BTKR  Had 3 steel rods in LLE but hardware was removed. BLE lymphedema    Skin:     General: Skin is warm. Coloration: Skin is not pale. Findings: No erythema or rash. Neurological:      Mental Status: He is alert and oriented to person, place, and time. He is not disoriented. Cranial Nerves: Cranial nerves are intact. No cranial nerve deficit. Sensory: Sensation is intact. No sensory deficit. Motor: Weakness present. No atrophy or abnormal muscle tone. Coordination: Coordination is intact. Coordination normal.      Gait: Gait abnormal.      Deep Tendon Reflexes: Reflexes are normal and symmetric. Babinski sign absent on the right side. Reflex Scores:       Tricep reflexes are 2+ on the right side and 2+ on the left side. Bicep reflexes are 2+ on the right side and 2+ on the left side. Brachioradialis reflexes are 2+ on the right side and 2+ on the left side. Patellar reflexes are 2+ on the right side and 2+ on the left side. Achilles reflexes are 2+ on the right side and 2+ on the left side. Psychiatric:         Attention and Perception: Attention normal. He is attentive. Mood and Affect: Mood normal. Mood is not anxious or depressed. Affect is not labile, blunt, angry or inappropriate. Speech: Speech normal. He is communicative. Speech is not rapid and pressured, delayed, slurred or tangential.         Behavior: Behavior normal. Behavior is not agitated, slowed, aggressive, withdrawn, hyperactive or combative. Thought Content: Thought content normal. Thought content is not paranoid or delusional. Thought content does not include homicidal or suicidal ideation. Thought content does not include homicidal or suicidal plan. Cognition and Memory: Cognition normal. Memory is not impaired. He does not exhibit impaired recent memory or impaired remote memory. Judgment: Judgment normal. Judgment is not impulsive or inappropriate.        ANNETTA test: +  Yeomans test: +  Gaenslen test:+     Assessment:     1. Lumbar radiculitis    2. Lumbar foraminal stenosis    3. Lumbar spondylosis    4. Lumbar facet arthropathy    5. Neuropathy    6. Pain of both hip joints    7. Chronic pain syndrome    8. Fall, initial encounter            Plan:      · OARRS reviewed. Current MED0  · Patient was not offered naloxone for home. · Discussed long term side effects of medications, tolerance, dependency and addiction. · Previous UDS reviewed  · UDS preformed today for compliance. · Patient told can not receive any pain medications from any other source. · No evidence of abuse, diversion or aberrant behavior.  Medications and/or procedures to improve function and quality of life- patient understanding with this and that may not be pain free   Discussed with patient about safe storage of medications at home   Discussed possible weaning of medication dosing dependent on treatment/procedure results.  Testing: reviewed Lumbar CT , pt fell 2 weeks ago, Lumbar and hip XR  ordered due to pain    Procedures: TFLESI L5 Bilateral #1, PT IS ON BRILINTA ASPIRIN     Discussed with patient about risks with procedure including infection, reaction to medication, increased pain, or bleeding.  Medications:none      Meds. Prescribed:   No orders of the defined types were placed in this encounter. Return for TFLESI @L5 bilateral #1, Follow up after procedure. Time spent with patient was  25 minutes, more than 50% was spent Counseling/coordinated the patient'scare.       Electronically signed by ABHISHEK Saenz CNP on10/21/2020 at 4:52 PM

## 2020-10-22 ENCOUNTER — TELEPHONE (OUTPATIENT)
Dept: CARDIOLOGY CLINIC | Age: 70
End: 2020-10-22

## 2020-10-22 ENCOUNTER — TELEPHONE (OUTPATIENT)
Dept: PHYSICAL MEDICINE AND REHAB | Age: 70
End: 2020-10-22

## 2020-10-22 NOTE — TELEPHONE ENCOUNTER
[address] faxed for clearance. Can patient hold brilinta and ASA for 5 days for TFLESI @ L5 procedure nathalia TBD. Pt last stent 8/5/2017. Dr. Claudene Chasten is pt cleared?

## 2020-10-26 ENCOUNTER — HOSPITAL ENCOUNTER (OUTPATIENT)
Dept: GENERAL RADIOLOGY | Age: 70
Discharge: HOME | End: 2020-10-26
Payer: MEDICARE

## 2020-10-26 ENCOUNTER — HOSPITAL ENCOUNTER (OUTPATIENT)
Age: 70
Discharge: HOME | End: 2020-10-26
Payer: MEDICARE

## 2020-10-26 PROCEDURE — 73521 X-RAY EXAM HIPS BI 2 VIEWS: CPT

## 2020-10-26 PROCEDURE — 72100 X-RAY EXAM L-S SPINE 2/3 VWS: CPT

## 2020-11-03 PROBLEM — M47.816 LUMBAR FACET ARTHROPATHY: Status: RESOLVED | Noted: 2020-11-03 | Resolved: 2020-11-03

## 2020-11-03 NOTE — TELEPHONE ENCOUNTER
Mulugeta William called requesting a refill of the below medication which has been pended for you:     Requested Prescriptions     Pending Prescriptions Disp Refills    metFORMIN (GLUCOPHAGE) 500 MG tablet 360 tablet 1     Sig: TAKE TWO TABLETS BY MOUTH TWICE DAILY WITH MEALS       Last Appointment Date: 9/29/2020  Next Appointment Date: 1/5/2021    Allergies   Allergen Reactions    Horse-Derived Products        Please send to: [facility]

## 2020-11-17 ENCOUNTER — ANESTHESIA EVENT (OUTPATIENT)
Dept: OPERATING ROOM | Age: 70
End: 2020-11-17
Payer: MEDICARE

## 2020-11-17 ENCOUNTER — CARE COORDINATION (OUTPATIENT)
Dept: CARE COORDINATION | Age: 70
End: 2020-11-17

## 2020-11-17 ENCOUNTER — ANESTHESIA (OUTPATIENT)
Dept: OPERATING ROOM | Age: 70
End: 2020-11-17
Payer: MEDICARE

## 2020-11-17 ENCOUNTER — HOSPITAL ENCOUNTER (OUTPATIENT)
Age: 70
Setting detail: OUTPATIENT SURGERY
Discharge: HOME | End: 2020-11-17
Attending: PAIN MEDICINE | Admitting: PAIN MEDICINE
Payer: MEDICARE

## 2020-11-17 VITALS
HEART RATE: 83 BPM | HEIGHT: 74 IN | WEIGHT: 315 LBS | OXYGEN SATURATION: 99 % | RESPIRATION RATE: 16 BRPM | BODY MASS INDEX: 40.43 KG/M2 | TEMPERATURE: 96.9 F

## 2020-11-17 LAB — GLUCOSE BLD-MCNC: 228 MG/DL (ref 70–108)

## 2020-11-17 PROCEDURE — 2709999900 HC NON-CHARGEABLE SUPPLY: Performed by: PAIN MEDICINE

## 2020-11-17 PROCEDURE — 82948 REAGENT STRIP/BLOOD GLUCOSE: CPT

## 2020-11-17 ASSESSMENT — PAIN DESCRIPTION - DESCRIPTORS: DESCRIPTORS: ACHING;CONSTANT;DULL

## 2020-11-17 ASSESSMENT — PAIN - FUNCTIONAL ASSESSMENT: PAIN_FUNCTIONAL_ASSESSMENT: 0-10

## 2020-11-17 NOTE — CARE COORDINATION
Pt called to discuss concerns. Pt very \"lonely and tired of this. \" Pt feeling isolated. Discussed healthy coping skills. Pt stated he is having a hard time sleeping. Discussed bedtime rituals, and napping if needed. Pt shared that his daughter is coming this coming Sunday and bringing him a \"tablet so they can facetime. \" Pt stated he has a 11 mo old grandaughter he hasn't even seen. Reminded pt that everyone is in the same boat and we just need to be patient. Pt voiced understanding. Allowed pt to process thoughts and feelings and encouraged pt to call anytime. Plan of care:  Support pt in community  Assist pt with needed resources.

## 2020-11-23 NOTE — TELEPHONE ENCOUNTER
Date of last visit:  9/29/2020  Date of next visit:  1/5/2021    Requested Prescriptions     Pending Prescriptions Disp Refills    insulin glargine (LANTUS SOLOSTAR) 100 UNIT/ML injection pen 15 pen 1     Sig: INJECT 30 UNITS SUBCUTANEOUSLY ONCE DAILY

## 2020-11-24 RX ORDER — INSULIN GLARGINE 100 [IU]/ML
INJECTION, SOLUTION SUBCUTANEOUS
Qty: 15 PEN | Refills: 1 | Status: SHIPPED | OUTPATIENT
Start: 2020-11-24 | End: 2021-10-05 | Stop reason: SDUPTHER

## 2020-12-01 NOTE — TELEPHONE ENCOUNTER
Date of last visit:  9/29/2020  Date of next visit:  1/5/2021    Requested Prescriptions     Pending Prescriptions Disp Refills    ticagrelor (BRILINTA) 90 MG TABS tablet 60 tablet 11     Sig: TAKE ONE TABLET BY MOUTH TWICE DAILY

## 2020-12-01 NOTE — TELEPHONE ENCOUNTER
Pt called requesting 90 day Rx for Brilinta 90 mg tab take one tablet twice daily    [address]

## 2020-12-07 NOTE — TELEPHONE ENCOUNTER
Date of last visit:  9/29/2020  Date of next visit:  1/5/2021    Requested Prescriptions     Pending Prescriptions Disp Refills    enalapril (VASOTEC) 10 MG tablet 360 tablet 1     Sig: TAKE 2 TABLETS BY MOUTH TWICE DAILY    furosemide (LASIX) 40 MG tablet 60 tablet 1     Sig: Take 1 tablet by mouth daily

## 2020-12-07 NOTE — TELEPHONE ENCOUNTER
[facility] called requesting a refill of their:    enalapril (VASOTEC) 10 MG tablet TAKE 2 TABLETS BY MOUTH TWICE DAILY  furosemide (LASIX) 40 MG tablet QD    Send 90 day supply to [facility] [employer] [facility] [employer]

## 2020-12-08 ENCOUNTER — HOSPITAL ENCOUNTER (OUTPATIENT)
Age: 70
Discharge: HOME | End: 2020-12-08
Payer: MEDICARE

## 2020-12-08 PROCEDURE — U0003 INFECTIOUS AGENT DETECTION BY NUCLEIC ACID (DNA OR RNA); SEVERE ACUTE RESPIRATORY SYNDROME CORONAVIRUS 2 (SARS-COV-2) (CORONAVIRUS DISEASE [COVID-19]), AMPLIFIED PROBE TECHNIQUE, MAKING USE OF HIGH THROUGHPUT TECHNOLOGIES AS DESCRIBED BY CMS-2020-01-R: HCPCS

## 2020-12-08 RX ORDER — ENALAPRIL MALEATE 10 MG/1
TABLET ORAL
Qty: 360 TABLET | Refills: 1 | Status: SHIPPED | OUTPATIENT
Start: 2020-12-08 | End: 2021-03-27

## 2020-12-08 RX ORDER — FUROSEMIDE 40 MG/1
40 TABLET ORAL DAILY
Qty: 60 TABLET | Refills: 1 | Status: SHIPPED | OUTPATIENT
Start: 2020-12-08 | End: 2021-01-06 | Stop reason: ALTCHOICE

## 2020-12-09 LAB
PERFORMING LAB: NORMAL
REPORT: NORMAL
SARS-COV-2: NOT DETECTED

## 2020-12-09 NOTE — PROGRESS NOTES
Patient contacted regarding COVID-19 screen. Following questions asked: In the last month, have you been in contact with someone who was confirmed or suspected to have Coronavirus/COVID-19:  Patient stated NO    Do you or family members have any of the following symptoms:  Cough-no   Muscle pain-no   Shortness of breath-no   Fever-no   Weakness-no  Severe headache-no   Sore throat-no   Respiratory symptoms-no  Loss of taste and smell-no    Have you traveled in the last month?  No     Pt was tested yesterday results pending was tested yesterday per Dr Susan Mcconnell office, pt denies symtoms

## 2020-12-10 ENCOUNTER — APPOINTMENT (OUTPATIENT)
Dept: GENERAL RADIOLOGY | Age: 70
End: 2020-12-10
Attending: PAIN MEDICINE
Payer: MEDICARE

## 2020-12-10 ENCOUNTER — ANESTHESIA EVENT (OUTPATIENT)
Dept: OPERATING ROOM | Age: 70
End: 2020-12-10
Payer: MEDICARE

## 2020-12-10 ENCOUNTER — NURSE ONLY (OUTPATIENT)
Dept: LAB | Age: 70
End: 2020-12-10

## 2020-12-10 ENCOUNTER — ANESTHESIA (OUTPATIENT)
Dept: OPERATING ROOM | Age: 70
End: 2020-12-10
Payer: MEDICARE

## 2020-12-10 ENCOUNTER — HOSPITAL ENCOUNTER (OUTPATIENT)
Age: 70
Setting detail: OUTPATIENT SURGERY
Discharge: HOME | End: 2020-12-10
Attending: PAIN MEDICINE | Admitting: PAIN MEDICINE
Payer: MEDICARE

## 2020-12-10 VITALS
RESPIRATION RATE: 20 BRPM | SYSTOLIC BLOOD PRESSURE: 179 MMHG | DIASTOLIC BLOOD PRESSURE: 83 MMHG | HEART RATE: 84 BPM | HEIGHT: 74 IN | OXYGEN SATURATION: 99 % | TEMPERATURE: 97.1 F | WEIGHT: 315 LBS | BODY MASS INDEX: 40.43 KG/M2

## 2020-12-10 VITALS
RESPIRATION RATE: 19 BRPM | SYSTOLIC BLOOD PRESSURE: 205 MMHG | DIASTOLIC BLOOD PRESSURE: 95 MMHG | OXYGEN SATURATION: 100 %

## 2020-12-10 LAB
ANION GAP SERPL CALCULATED.3IONS-SCNC: 13 MEQ/L (ref 8–16)
BUN BLDV-MCNC: 36 MG/DL (ref 7–22)
CALCIUM SERPL-MCNC: 9.5 MG/DL (ref 8.5–10.5)
CHLORIDE BLD-SCNC: 102 MEQ/L (ref 98–111)
CO2: 24 MEQ/L (ref 23–33)
CREAT SERPL-MCNC: 1.3 MG/DL (ref 0.4–1.2)
GFR SERPL CREATININE-BSD FRML MDRD: 54 ML/MIN/1.73M2
GLUCOSE BLD-MCNC: 173 MG/DL (ref 70–108)
GLUCOSE BLD-MCNC: 222 MG/DL (ref 70–108)
POTASSIUM SERPL-SCNC: 4.8 MEQ/L (ref 3.5–5.2)
SODIUM BLD-SCNC: 139 MEQ/L (ref 135–145)

## 2020-12-10 PROCEDURE — 2709999900 HC NON-CHARGEABLE SUPPLY: Performed by: PAIN MEDICINE

## 2020-12-10 PROCEDURE — 3700000000 HC ANESTHESIA ATTENDED CARE: Performed by: PAIN MEDICINE

## 2020-12-10 PROCEDURE — 3209999900 FLUORO FOR SURGICAL PROCEDURES

## 2020-12-10 PROCEDURE — 7100000010 HC PHASE II RECOVERY - FIRST 15 MIN: Performed by: PAIN MEDICINE

## 2020-12-10 PROCEDURE — 6360000004 HC RX CONTRAST MEDICATION: Performed by: PAIN MEDICINE

## 2020-12-10 PROCEDURE — 82948 REAGENT STRIP/BLOOD GLUCOSE: CPT

## 2020-12-10 PROCEDURE — 2500000003 HC RX 250 WO HCPCS: Performed by: PAIN MEDICINE

## 2020-12-10 PROCEDURE — 7100000011 HC PHASE II RECOVERY - ADDTL 15 MIN: Performed by: PAIN MEDICINE

## 2020-12-10 PROCEDURE — 3600000054 HC PAIN LEVEL 3 BASE: Performed by: PAIN MEDICINE

## 2020-12-10 PROCEDURE — 3700000001 HC ADD 15 MINUTES (ANESTHESIA): Performed by: PAIN MEDICINE

## 2020-12-10 PROCEDURE — 6360000002 HC RX W HCPCS: Performed by: NURSE ANESTHETIST, CERTIFIED REGISTERED

## 2020-12-10 PROCEDURE — 3600000055 HC PAIN LEVEL 3 ADDL 15 MIN: Performed by: PAIN MEDICINE

## 2020-12-10 PROCEDURE — 64483 NJX AA&/STRD TFRM EPI L/S 1: CPT | Performed by: PAIN MEDICINE

## 2020-12-10 PROCEDURE — 6360000002 HC RX W HCPCS: Performed by: PAIN MEDICINE

## 2020-12-10 RX ORDER — DEXAMETHASONE SODIUM PHOSPHATE 4 MG/ML
INJECTION, SOLUTION INTRA-ARTICULAR; INTRALESIONAL; INTRAMUSCULAR; INTRAVENOUS; SOFT TISSUE PRN
Status: DISCONTINUED | OUTPATIENT
Start: 2020-12-10 | End: 2020-12-10 | Stop reason: ALTCHOICE

## 2020-12-10 RX ORDER — FENTANYL CITRATE 50 UG/ML
INJECTION, SOLUTION INTRAMUSCULAR; INTRAVENOUS PRN
Status: DISCONTINUED | OUTPATIENT
Start: 2020-12-10 | End: 2020-12-10 | Stop reason: SDUPTHER

## 2020-12-10 RX ORDER — LIDOCAINE HYDROCHLORIDE 10 MG/ML
INJECTION, SOLUTION INFILTRATION; PERINEURAL PRN
Status: DISCONTINUED | OUTPATIENT
Start: 2020-12-10 | End: 2020-12-10 | Stop reason: ALTCHOICE

## 2020-12-10 RX ORDER — BUPIVACAINE HYDROCHLORIDE 2.5 MG/ML
INJECTION, SOLUTION EPIDURAL; INFILTRATION; INTRACAUDAL PRN
Status: DISCONTINUED | OUTPATIENT
Start: 2020-12-10 | End: 2020-12-10 | Stop reason: ALTCHOICE

## 2020-12-10 RX ORDER — SODIUM CHLORIDE 9 MG/ML
INJECTION INTRAVENOUS PRN
Status: DISCONTINUED | OUTPATIENT
Start: 2020-12-10 | End: 2020-12-10 | Stop reason: ALTCHOICE

## 2020-12-10 RX ADMIN — FENTANYL CITRATE 100 MCG: 50 INJECTION, SOLUTION INTRAMUSCULAR; INTRAVENOUS at 10:39

## 2020-12-10 ASSESSMENT — PULMONARY FUNCTION TESTS
PIF_VALUE: 0

## 2020-12-10 ASSESSMENT — PAIN - FUNCTIONAL ASSESSMENT: PAIN_FUNCTIONAL_ASSESSMENT: 0-10

## 2020-12-10 ASSESSMENT — PAIN DESCRIPTION - DESCRIPTORS: DESCRIPTORS: CONSTANT

## 2020-12-10 NOTE — ANESTHESIA PRE PROCEDURE
Department of Anesthesiology  Preprocedure Note       Name:  Dayna Toledo   Age:  65 y.o.  :  1950                                          MRN:  [id number]         Date:  12/10/2020      Surgeon: Halie Bhatia):  Brian Nolan MD    Procedure: Procedure(s):  TFLESI @L5 bilateral #1    Medications prior to admission:   Prior to Admission medications    Medication Sig Start Date End Date Taking?  Authorizing Provider   enalapril (VASOTEC) 10 MG tablet TAKE 2 TABLETS BY MOUTH TWICE DAILY 20  Yes Zoar Morning, MD   furosemide (LASIX) 40 MG tablet Take 1 tablet by mouth daily 20  Yes Zoar Morning, MD   insulin glargine (LANTUS SOLOSTAR) 100 UNIT/ML injection pen INJECT 30 UNITS SUBCUTANEOUSLY ONCE DAILY 20  Yes Zoar Morning, MD   insulin aspart (NOVOLOG FLEXPEN) 100 UNIT/ML injection pen INJECT 12 UNITS SUBCUTANEOUSLY 3 TIMES DAILY BEFORE MEALS 10/16/20  Yes Abi Turner MD   atorvastatin (LIPITOR) 10 MG tablet TAKE 1 TABLET BY MOUTH ONCE DAILY 20  Yes Zoar Morning, MD   carvedilol (COREG) 12.5 MG tablet Take 1 tablet by mouth 2 times daily 6/10/20 9/29/22 Yes Jarvis Albert DO   ferrous sulfate 325 (65 Fe) MG tablet Take 1 tablet by mouth 2 times daily 19  Yes Zoar Morning, MD   acetaminophen (TYLENOL) 500 MG tablet Take 500 mg by mouth as needed for Pain   Yes Historical Provider, MD   ticagrelor (BRILINTA) 90 MG TABS tablet TAKE ONE TABLET BY MOUTH TWICE DAILY 20   Zoar Morning, MD   metFORMIN (GLUCOPHAGE) 500 MG tablet TAKE TWO TABLETS BY MOUTH TWICE DAILY WITH MEALS 20   Zoar Morning, MD   meclizine (ANTIVERT) 25 MG tablet Take 1 tablet by mouth every 6 hours as needed for Dizziness 20   Zoar Morning, MD   ACCU-CHEK SMARTVIEW strip Use to test Blood Sugar 3x daily, Dx: E11.9 20   Zoar Morning, MD   nitroGLYCERIN (NITROSTAT) 0.4 MG SL tablet Place 1 tablet under the tongue every 5 minutes as needed for Chest pain 8/14/19   Huma Irene MD   Multiple Vitamins-Minerals (THERAPEUTIC MULTIVITAMIN-MINERALS) tablet Take 1 tablet by mouth daily 1/8/19 2/21/23  Huma Irene MD   aspirin 81 MG tablet Take 1 tablet by mouth daily 8/8/17   Deondre Dickinson MD       Current medications:    No current facility-administered medications for this encounter. Allergies: Allergies   Allergen Reactions    Horse-Derived Products        Problem List:    Patient Active Problem List   Diagnosis Code    HTN (hypertension) I10    IDDM (insulin dependent diabetes mellitus) [id number]    Hypercholesteremia E78.00    Osteoarthritis M19.90    Diabetic peripheral neuropathy ([facility] E11.42    Coronary artery disease of bypass graft of native heart with stable angina pectoris ([facility] I25.708    S/P percutaneous transluminal coronary angioplasty Z98.61    Sprain/Injury of anterior ligaments of thoracic spine S23. 3XXA    Venous stasis dermatitis of both lower extremities I87.2    Lumbar spondylosis M47.816    Orthostatic hypotension after exercise I95.1       Past Medical History:        Diagnosis Date    RAUL (acute kidney injury) ([facility] 2/13/2020    ASHD (arteriosclerotic heart disease) 2005 2006    stent  baki    Depression     Diabetic peripheral neuropathy [facility]) 2014    Fracture Oct 1984    left lower extremity     HTN (hypertension)     Hypercholesteremia     IDDM (insulin dependent diabetes mellitus)     Low back pain     Morbid obesity with BMI of 45.0-49.9, adult ([facility]     Osteoarthritis        Past Surgical History:        Procedure Laterality Date    AMPUTATION Left 9/10/14    partial versus complete left hallux amputation, left great toe amputation    APPENDECTOMY      CARDIAC SURGERY      CERVICAL DISC SURGERY  2000    fusion of C5-C6    CHOLECYSTECTOMY      COLONOSCOPY  2007 and 2011    colonn polyps  lorenzo    CORONARY ANGIOPLASTY WITH STENT PLACEMENT  08/07/2017    Dr Kelsy Crowe @ [address] [facility]   lad    CORONARY ARTERY BYPASS GRAFT  2015 august dox    EKG 12-LEAD  8/14/2015         FACET JOINT INJECTION Bilateral 5/22/2020    Lumbar Facet MBB @L3-4,4-5 bilateral #2 performed by Rudy Olea MD at [address]      left leg    JOINT REPLACEMENT      bilateral knees gurvinder    PAIN MANAGEMENT PROCEDURE Bilateral 1/28/2020    Lumbar Facet MBB @ L3-4,4-5,5-S1 bilateral #1 LUMBAR FACET performed by Rudy Olea MD at [facility] [address] Right 7/14/2020    Lumbar RFA Bilateral L3-4,4-5  right side first performed by Rudy Olea MD at [facility] [address] Left 10/1/2020    Lumbar RFA Left side L3-4, 4-5 performed by Rudy Olea MD at [place] [id number] Bilateral 11/17/2020    TFLESI @L5 bilateral #1 performed by Rudy Olea MD at [address] N/A 12/28/2018    T7-T9 DECOMPRESSION, T7-T9 POSTERIOR FUSION performed by Verona Najera MD at [facility]  2010    fumich    TONSILLECTOMY      VASCULAR SURGERY      cabg harvests from left leg       Social History:    Social History     Tobacco Use    Smoking status: Never Smoker    Smokeless tobacco: Never Used   Substance Use Topics    Alcohol use: Yes     Comment: rarely                                Counseling given: Not Answered      Vital Signs (Current):   Vitals:    12/10/20 0948   BP: (!) 180/86   Pulse: 90   Resp: 16   Temp: 97.2 °F (36.2 °C)   TempSrc: Temporal   SpO2: 98%   Weight: (!) 381 lb (172.8 kg)   Height: 6' 2\" (1.88 m)                                              BP Readings from Last 3 Encounters:   12/10/20 (!) 180/86   10/21/20 138/86   10/01/20 (!) 177/76       NPO Status: Time of last liquid consumption: 1730                        Time of last solid consumption: 1730 Date of last liquid consumption: 12/09/20                        Date of last solid food consumption: 12/09/20    BMI:   Wt Readings from Last 3 Encounters:   12/10/20 (!) 381 lb (172.8 kg)   11/17/20 (!) 383 lb (173.7 kg)   10/21/20 (!) 395 lb (179.2 kg)     Body mass index is 48.92 kg/m². CBC:   Lab Results   Component Value Date    WBC 8.0 09/23/2020    RBC 3.43 09/23/2020    RBC 3.52 10/17/2018    HGB 10.9 09/23/2020    HCT 32.5 09/23/2020    MCV 94.8 09/23/2020    RDW 12.7 10/17/2018     09/23/2020       CMP:   Lab Results   Component Value Date     09/23/2020    K 4.8 09/23/2020    K 5.0 02/14/2020     09/23/2020    CO2 25 09/23/2020    BUN 23 09/23/2020    CREATININE 1.3 09/23/2020    LABGLOM 54 09/23/2020    GLUCOSE 204 09/23/2020    GLUCOSE 125 10/17/2018    PROT 6.6 09/23/2020    CALCIUM 9.0 09/23/2020    BILITOT 0.5 09/23/2020    ALKPHOS 71 09/23/2020    AST 10 09/23/2020    ALT 9 09/23/2020       POC Tests: No results for input(s): POCGLU, POCNA, POCK, POCCL, POCBUN, POCHEMO, POCHCT in the last 72 hours.     Coags:   Lab Results   Component Value Date    INR 1.00 02/08/2019    APTT 38.5 02/08/2019       HCG (If Applicable): No results found for: PREGTESTUR, PREGSERUM, HCG, HCGQUANT     ABGs: No results found for: PHART, PO2ART, TMV6BTR, WZA2AVW, BEART, K5DRCOKC     Type & Screen (If Applicable):  Lab Results   Component Value Date    LABRH POS 08/07/2017       Drug/Infectious Status (If Applicable):  No results found for: HIV, HEPCAB    COVID-19 Screening (If Applicable):   Lab Results   Component Value Date    COVID19 NOT DETECTED 12/08/2020         Anesthesia Evaluation    Airway: Mallampati: III  TM distance: >3 FB   Neck ROM: full  Mouth opening: > = 3 FB Dental:          Pulmonary:   (+) decreased breath sounds,                             Cardiovascular:    (+) hypertension:, CAD:,         Rhythm: regular                      Neuro/Psych:   (+) psychiatric history:

## 2020-12-10 NOTE — PROGRESS NOTES
1054: Patient arrived to Phase II recovery via cart. Awake and alert. Patient reports nausea. Blue bag provided and HOB elevated. Report received from surgical RN. 1056: BP elevated, SpO2>92% on RA. Denies pain. Pedal push and pull strong and equal bilaterally. 1059: BP rechecked and consistent with pre op reading. States nausea is resolving. Snack and drink provided. Call light placed within reach. 1125: Patient continues to rest on cart with no complaints. IVF continues to infuse and site remains clean, dry and intact. 1145: Patient resting with eyes closed on cart. IVF continues to infuse and site remains clean, dry and intact. 1200: IV removed without complications. Bandaid applied to site. Patient assisted in dressing at bedside. Denies numbness and tingling/dizziness or lightheadedness. 1209: Patient discharged home in stable condition with sister.

## 2020-12-10 NOTE — ANESTHESIA POSTPROCEDURE EVALUATION
Department of Anesthesiology  Postprocedure Note    Patient: Alvin Cruz  MRN: [id number]  YOB: 1950  Date of evaluation: 12/10/2020  Time:  1:43 PM     Procedure Summary     Date:  12/10/20 Room / Location:  [address] [place] [address] 03 / 138 [facility]    Anesthesia Start:  5459 Anesthesia Stop:  4633    Procedure:  TFLESI @L5 bilateral #1 (Bilateral ) Diagnosis:  (Lumbar radiculitis)    Surgeon:  Lucio Rodriguez MD Responsible Provider:  Solomon Osullivan MD    Anesthesia Type:  MAC ASA Status:  4          Anesthesia Type: MAC    Estelle Phase I:      Estelle Phase II: Estelle Score: 10    Last vitals: Reviewed and per EMR flowsheets.        Anesthesia Post Evaluation    Patient location during evaluation: PACU  Patient participation: complete - patient participated  Level of consciousness: awake  Airway patency: patent  Nausea & Vomiting: no vomiting and no nausea  Complications: no  Cardiovascular status: hemodynamically stable  Respiratory status: acceptable  Hydration status: stable

## 2020-12-10 NOTE — OP NOTE
radiating to lower extremities. As the patient is not responding to conservative management and pain is interfering with activities of daily living, we decided to proceed with transforaminal lumbar epidural steroid injection as symptoms are mostly following the nerve root distribution. The procedure and the risks were discussed with the patient and informed consent was obtained. Procedure: Bilateral    A meaningful communication was kept up with the patient throughout   the procedure. The patient is placed in prone position. The skin over the back was prepped and draped in sterile manner. Then the skin and deep tissues just above the tip of the transverse process of L-5 vertebra on Bilateral side were infiltrated with about 10 ml of 1% lidocaine. The #20-gauge, 3-1/2 inch Tuohy needle/#22-gauge, 5 inch spinal needle was inserted through the skin wheal  and under fluoroscopy guidance was directed such that the tip of the needle lies in the neuroforamina at about the 6 o'clock position under the pedicle of the L-5 vertebra. This was confirmed with AP and lateral views of the fluoroscopy. Then after negative aspiration a total of 1 ml of Omnipaque-180 was injected through the needle in divided dosesand spread of the contrast in the epidural space as well as along the nerve root was observed. Then after negative aspiration a total of 12 mg of Dexamethasone and 4 ml of 0.25%Marcaine MPF was injected through the needle in divided doses. The needle is removed and a Band-Aid was placed over the needle  insertion site. EBL-0  The patient's vital signs remained stable and the patient tolerated the procedure well. The patient was discharged home in stable condition and will be followed in the pain clinic in the next few weeks for further planning.       Electronically signed by Roro Pickett MD on 12/10/20 at 10:40 AM EST

## 2020-12-10 NOTE — H&P
[facility]  History and Physical Update    Pt Name: Deb Miner  MRN: [id number]  YOB: 1950  Date of evaluation: 12/10/2020      I have examined the patient and reviewed the H&P/Consult and there are no changes to the patient or plans.         Electronically signed by Joan Blakely MD on 12/10/2020 at 10:13 AM

## 2020-12-10 NOTE — H&P
procedure (Right, 7/14/2020); and Pain management procedure (Left, 10/1/2020).    Family History  This patient's family history includes Cancer in his mother.  [address]  reports that he has never smoked. He has never used smokeless tobacco. He reports current alcohol use.  He reports that he does not use drugs.     Medications  Current Medication     Current Outpatient Medications:     insulin aspart (NOVOLOG FLEXPEN) 100 UNIT/ML injection pen, INJECT 12 UNITS SUBCUTANEOUSLY 3 TIMES DAILY BEFORE MEALS, Disp: 15 pen, Rfl: 0    atorvastatin (LIPITOR) 10 MG tablet, TAKE 1 TABLET BY MOUTH ONCE DAILY, Disp: 90 tablet, Rfl: 1    meclizine (ANTIVERT) 25 MG tablet, Take 1 tablet by mouth every 6 hours as needed for Dizziness, Disp: 40 tablet, Rfl: 0    ACCU-CHEK SMARTVIEW strip, Use to test Blood Sugar 3x daily, Dx: E11.9, Disp: 100 each, Rfl: 11    carvedilol (COREG) 12.5 MG tablet, Take 1 tablet by mouth 2 times daily, Disp: 180 tablet, Rfl: 1    enalapril (VASOTEC) 10 MG tablet, TAKE 2 TABLETS BY MOUTH TWICE DAILY, Disp: 360 tablet, Rfl: 1    metFORMIN (GLUCOPHAGE) 500 MG tablet, TAKE TWO TABLETS BY MOUTH TWICE DAILY WITH MEALS, Disp: 360 tablet, Rfl: 1    insulin glargine (LANTUS SOLOSTAR) 100 UNIT/ML injection pen, INJECT 30 UNITS SUBCUTANEOUSLY ONCE DAILY, Disp: 15 pen, Rfl: 1    furosemide (LASIX) 40 MG tablet, Take 1 tablet by mouth daily, Disp: 60 tablet, Rfl: 0    ticagrelor (BRILINTA) 90 MG TABS tablet, TAKE ONE TABLET BY MOUTH TWICE DAILY, Disp: 60 tablet, Rfl: 11    nitroGLYCERIN (NITROSTAT) 0.4 MG SL tablet, Place 1 tablet under the tongue every 5 minutes as needed for Chest pain, Disp: 25 tablet, Rfl: 3    ferrous sulfate 325 (65 Fe) MG tablet, Take 1 tablet by mouth 2 times daily, Disp: 60 tablet, Rfl: 2    Multiple Vitamins-Minerals (THERAPEUTIC MULTIVITAMIN-MINERALS) tablet, Take 1 tablet by mouth daily, Disp: 30 tablet, Rfl: 11    aspirin 81 MG tablet, Take 1 tablet by mouth daily, Disp: , Rfl:     acetaminophen (TYLENOL) 500 MG tablet, Take 500 mg by mouth as needed for Pain, Disp: , Rfl:         Subjective:      Review of Systems   Constitutional: Positive for activity change. Negative for appetite change, chills, diaphoresis, fatigue, fever and unexpected weight change. HENT: Negative for congestion, ear pain, hearing loss, mouth sores, nosebleeds, rhinorrhea, sinus pressure and sore throat. Eyes: Negative for photophobia, pain and visual disturbance. Respiratory: Negative for cough, chest tightness, shortness of breath and wheezing. Cardiovascular: Positive for leg swelling. Negative for chest pain and palpitations. CABG HTN MI  Has BLE lymphedema 3+ pitting edema   Gastrointestinal: Negative for abdominal pain, constipation, diarrhea, nausea and vomiting. GERD   Endocrine: Negative for cold intolerance, heat intolerance, polydipsia, polyphagia and polyuria. DM   Genitourinary: Negative for decreased urine volume, difficulty urinating, frequency and hematuria. Musculoskeletal: Positive for arthralgias, back pain, gait problem, myalgias, neck pain and neck stiffness. Negative for joint swelling. Skin: Negative for color change and rash. Allergic/Immunologic: Negative for food allergies and immunocompromised state. Neurological: Positive for weakness and numbness. Negative for dizziness, tremors, syncope, facial asymmetry, speech difficulty and light-headedness. Hematological: Does not bruise/bleed easily. Psychiatric/Behavioral: Negative for agitation, behavioral problems, confusion, decreased concentration, dysphoric mood, hallucinations, self-injury, sleep disturbance and suicidal ideas. The patient is not nervous/anxious and is not hyperactive.          Depression          Objective:      Vitals       Vitals:     10/21/20 1614   BP: 138/86   Temp: 97.6 °F (36.4 °C)   Weight: (!) 395 lb (179.2 kg)   Height: 6' 2\" (1.88 m)    Physical Exam  Vitals signs and nursing note reviewed. Constitutional:       General: He is not in acute distress. Appearance: He is well-developed. He is not diaphoretic. HENT:      Head: Normocephalic and atraumatic. Right Ear: External ear normal.      Left Ear: External ear normal.      Nose: Nose normal.      Mouth/Throat:      Pharynx: No oropharyngeal exudate. Eyes:      General: No scleral icterus. Right eye: No discharge. Left eye: No discharge. Conjunctiva/sclera: Conjunctivae normal.      Pupils: Pupils are equal, round, and reactive to light. Neck:      Musculoskeletal: Full passive range of motion without pain, normal range of motion and neck supple. Normal range of motion. No edema, erythema, neck rigidity or muscular tenderness. Thyroid: No thyromegaly. Cardiovascular:      Rate and Rhythm: Normal rate and regular rhythm. Heart sounds: Normal heart sounds. No murmur. No friction rub. No gallop. Pulmonary:      Effort: Pulmonary effort is normal. No respiratory distress. Breath sounds: Normal breath sounds. No wheezing or rales. Chest:      Chest wall: No tenderness. Abdominal:      General: Bowel sounds are normal. There is no distension. Palpations: Abdomen is soft. Tenderness: There is no abdominal tenderness. There is no guarding or rebound. Musculoskeletal:         General: Tenderness present. Right shoulder: He exhibits tenderness. Left shoulder: He exhibits tenderness. Right hip: He exhibits decreased range of motion, decreased strength, tenderness and bony tenderness. Left hip: He exhibits decreased range of motion, decreased strength, tenderness and bony tenderness. Right knee: He exhibits decreased range of motion. Tenderness found. Left knee: He exhibits decreased range of motion. Tenderness found. Right ankle: He exhibits swelling. Left ankle: He exhibits swelling. Cervical back: He exhibits decreased range of motion, tenderness and bony tenderness. Thoracic back: He exhibits decreased range of motion, tenderness and bony tenderness. Lumbar back: He exhibits decreased range of motion, tenderness, bony tenderness, pain and spasm. Back:       Right upper leg: He exhibits tenderness. Left upper leg: He exhibits tenderness. Right lower leg: Edema present. Left lower leg: Edema present. Right foot: Tenderness and swelling present. Left foot: Tenderness and swelling present. Comments: H/O Cervical surgery C5-6    T6-T9 PSF Lumbar Nj Decompression L2-S1  Right shoulder rotator cuff surgery  BTKR  Had 3 steel rods in LLE but hardware was removed. BLE lymphedema    Skin:     General: Skin is warm. Coloration: Skin is not pale. Findings: No erythema or rash. Neurological:      Mental Status: He is alert and oriented to person, place, and time. He is not disoriented. Cranial Nerves: Cranial nerves are intact. No cranial nerve deficit. Sensory: Sensation is intact. No sensory deficit. Motor: Weakness present. No atrophy or abnormal muscle tone. Coordination: Coordination is intact. Coordination normal.      Gait: Gait abnormal.      Deep Tendon Reflexes: Reflexes are normal and symmetric. Babinski sign absent on the right side. Reflex Scores:       Tricep reflexes are 2+ on the right side and 2+ on the left side. Bicep reflexes are 2+ on the right side and 2+ on the left side. Brachioradialis reflexes are 2+ on the right side and 2+ on the left side. Patellar reflexes are 2+ on the right side and 2+ on the left side. Achilles reflexes are 2+ on the right side and 2+ on the left side. Psychiatric:         Attention and Perception: Attention normal. He is attentive. Mood and Affect: Mood normal. Mood is not anxious or depressed.  Affect is not labile, blunt, angry or inappropriate. Speech: Speech normal. He is communicative. Speech is not rapid and pressured, delayed, slurred or tangential.         Behavior: Behavior normal. Behavior is not agitated, slowed, aggressive, withdrawn, hyperactive or combative. Thought Content: Thought content normal. Thought content is not paranoid or delusional. Thought content does not include homicidal or suicidal ideation. Thought content does not include homicidal or suicidal plan. Cognition and Memory: Cognition normal. Memory is not impaired. He does not exhibit impaired recent memory or impaired remote memory. Judgment: Judgment normal. Judgment is not impulsive or inappropriate.         ANNETTA test: +  Yeomans test: +  Gaenslen test:+  Assessment:      1. Lumbar radiculitis    2. Lumbar foraminal stenosis    3. Lumbar spondylosis    4. Lumbar facet arthropathy    5. Neuropathy    6. Pain of both hip joints    7. Chronic pain syndrome    8. Fall, initial encounter       Plan:      · OARRS reviewed. Current MED0  · Patient was not offered naloxone for home. · Discussed long term side effects of medications, tolerance, dependency and addiction. · Previous UDS reviewed  · UDS preformed today for compliance. · Patient told can not receive any pain medications from any other source. · No evidence of abuse, diversion or aberrant behavior. · Medications and/or procedures to improve function and quality of life- patient understanding with this and that may not be pain free  · Discussed with patient about safe storage of medications at home  · Discussed possible weaning of medication dosing dependent on treatment/procedure results.    · Testing: reviewed Lumbar CT , pt fell 2 weeks ago, Lumbar and hip XR  ordered due to pain   · Procedures: TFLESI L5 Bilateral #1, PT IS ON BRILINTA ASPIRIN    · Discussed with patient about risks with procedure including infection, reaction to medication, increased pain, or bleeding. · Medications:none        Meds.  Prescribed:     Encounter Medications    No orders of the defined types were placed in this encounter.        Return for TFLESI @L5 bilateral #1, Follow up after procedure.

## 2020-12-11 ENCOUNTER — TELEPHONE (OUTPATIENT)
Dept: FAMILY MEDICINE CLINIC | Age: 70
End: 2020-12-11

## 2020-12-11 NOTE — TELEPHONE ENCOUNTER
Pt called said that he had a injection in his back yesterday. He had to stop his Brilinta on 12/5/20 and his Metformin on 12/9/20. His sugar this morning was 329. Pt is wanting advice.

## 2020-12-15 ENCOUNTER — OFFICE VISIT (OUTPATIENT)
Dept: NEPHROLOGY | Age: 70
End: 2020-12-15
Payer: MEDICARE

## 2020-12-15 VITALS
BODY MASS INDEX: 48.53 KG/M2 | DIASTOLIC BLOOD PRESSURE: 70 MMHG | SYSTOLIC BLOOD PRESSURE: 147 MMHG | HEART RATE: 80 BPM | WEIGHT: 315 LBS | TEMPERATURE: 97.6 F | OXYGEN SATURATION: 99 %

## 2020-12-15 PROCEDURE — 1036F TOBACCO NON-USER: CPT | Performed by: INTERNAL MEDICINE

## 2020-12-15 PROCEDURE — 4040F PNEUMOC VAC/ADMIN/RCVD: CPT | Performed by: INTERNAL MEDICINE

## 2020-12-15 PROCEDURE — 99213 OFFICE O/P EST LOW 20 MIN: CPT | Performed by: INTERNAL MEDICINE

## 2020-12-15 PROCEDURE — 1123F ACP DISCUSS/DSCN MKR DOCD: CPT | Performed by: INTERNAL MEDICINE

## 2020-12-15 PROCEDURE — G8427 DOCREV CUR MEDS BY ELIG CLIN: HCPCS | Performed by: INTERNAL MEDICINE

## 2020-12-15 PROCEDURE — 3017F COLORECTAL CA SCREEN DOC REV: CPT | Performed by: INTERNAL MEDICINE

## 2020-12-15 PROCEDURE — G8482 FLU IMMUNIZE ORDER/ADMIN: HCPCS | Performed by: INTERNAL MEDICINE

## 2020-12-15 PROCEDURE — G8417 CALC BMI ABV UP PARAM F/U: HCPCS | Performed by: INTERNAL MEDICINE

## 2020-12-15 RX ORDER — AMLODIPINE BESYLATE 5 MG/1
5 TABLET ORAL DAILY
Qty: 90 TABLET | Refills: 3 | Status: SHIPPED | OUTPATIENT
Start: 2020-12-15 | End: 2021-12-20 | Stop reason: SDUPTHER

## 2020-12-15 NOTE — PROGRESS NOTES
[address] [facility]  [address]  [facility] [address]  Dept: [phone]  Loc: [phone]  Progress Note  12/15/2020 9:49 AM      Pt Name:    Imelda Oates  YOB: 1950  Primary Care Physician:  Keyshawn Lopez MD     Chief Complaint:   Chief Complaint   Patient presents with    Follow-up     Esst HTN        History of Present Illness: This is a follow-up visit for CKD III . Had previous RAUL with hyperkalemia from diuretics. Overall states he is doing ok. BP has been running higher. Has chronic lymphedema. On lasix 40 mg daily, is stable. Blood sugars fluctuating. Last A1C higher at 7.9  He is getting injections in his back. Comorbidities include DM, HTN, CAD s/p CABG. Lymphedema. Pertinent items are noted in HPI.          Past History:  Past Medical History:   Diagnosis Date    RAUL (acute kidney injury) ([address] 2/13/2020    ASHD (arteriosclerotic heart disease) 2005 2006    stent  baki    Depression     Diabetic peripheral neuropathy [facility]) 2014    Fracture Oct 1984    left lower extremity     HTN (hypertension)     Hypercholesteremia     IDDM (insulin dependent diabetes mellitus)     Low back pain     Morbid obesity with BMI of 45.0-49.9, adult ([address]     Osteoarthritis      Past Surgical History:   Procedure Laterality Date    AMPUTATION Left 9/10/14    partial versus complete left hallux amputation, left great toe amputation    APPENDECTOMY      CARDIAC SURGERY      CERVICAL DISC SURGERY  2000    fusion of C5-C6    CHOLECYSTECTOMY      COLONOSCOPY  2007 and 2011    colonn polyps  lorenzo    CORONARY ANGIOPLASTY WITH STENT PLACEMENT  08/07/2017    Dr Carlos Nieto @ [facility]   lad    CORONARY ARTERY BYPASS GRAFT  2015 august     dox    EKG 12-LEAD  8/14/2015         FACET JOINT INJECTION Bilateral 5/22/2020    Lumbar Facet MBB @L3-4,4-5 bilateral #2 performed by Milton Sutherland MD at [facility]    FRACTURE SURGERY      left leg    JOINT REPLACEMENT      bilateral knees gurvinder    PAIN MANAGEMENT PROCEDURE Bilateral 1/28/2020    Lumbar Facet MBB @ L3-4,4-5,5-S1 bilateral #1 LUMBAR FACET performed by Robert Laughlin MD at [facility] Right 7/14/2020    Lumbar RFA Bilateral L3-4,4-5  right side first performed by Robert Laughlin MD at [facility] [address] Left 10/1/2020    Lumbar RFA Left side L3-4, 4-5 performed by Robert Laughlin MD at [place] [address] Bilateral 11/17/2020    TFLESI @L5 bilateral #1 performed by Robert Laughlin MD at [place] [address] Bilateral 12/10/2020    TFLESI @L5 bilateral #1 performed by Robert Laughlin MD at [facility] N/A 12/28/2018    T7-T9 DECOMPRESSION, T7-T9 POSTERIOR FUSION performed by Marcella Sinclair MD at [address] [facility]  2010    [facility]    TONSILLECTOMY      VASCULAR SURGERY      cabg harvests from left leg        VITALS:  BP (!) 147/70 (Site: Right Upper Arm, Position: Sitting, Cuff Size: Large Adult)   Pulse 80   Temp 97.6 °F (36.4 °C) (Temporal)   Wt (!) 378 lb (171.5 kg)   SpO2 99%   BMI 48.53 kg/m²   Wt Readings from Last 3 Encounters:   12/15/20 (!) 378 lb (171.5 kg)   12/10/20 (!) 381 lb (172.8 kg)   11/17/20 (!) 383 lb (173.7 kg)     Body mass index is 48.53 kg/m².      General Appearance: alert and cooperative with exam, appears comfortable, no distress, obese  HEENT: EOMI, moist oral mucus membranes  Neck: No jugular venous distention,   Lungs: Air entry B/L, no crackles or rales, no use of accessory muscles  Heart: S1, S2 heard, no rub  GI: soft, non-tender, no guarding,   Extremities: chronic lymphedema   Skin: warm, dry  Neurologic: no tremor, no asterixis, no focal neurologic deficits Medications:  Current Outpatient Medications   Medication Sig Dispense Refill    enalapril (VASOTEC) 10 MG tablet TAKE 2 TABLETS BY MOUTH TWICE DAILY 360 tablet 1    furosemide (LASIX) 40 MG tablet Take 1 tablet by mouth daily 60 tablet 1    ticagrelor (BRILINTA) 90 MG TABS tablet TAKE ONE TABLET BY MOUTH TWICE DAILY 60 tablet 3    insulin glargine (LANTUS SOLOSTAR) 100 UNIT/ML injection pen INJECT 30 UNITS SUBCUTANEOUSLY ONCE DAILY 15 pen 1    metFORMIN (GLUCOPHAGE) 500 MG tablet TAKE TWO TABLETS BY MOUTH TWICE DAILY WITH MEALS 360 tablet 1    insulin aspart (NOVOLOG FLEXPEN) 100 UNIT/ML injection pen INJECT 12 UNITS SUBCUTANEOUSLY 3 TIMES DAILY BEFORE MEALS (Patient taking differently: 18 Units INJECT 12 UNITS SUBCUTANEOUSLY 3 TIMES DAILY BEFORE MEALS) 15 pen 0    atorvastatin (LIPITOR) 10 MG tablet TAKE 1 TABLET BY MOUTH ONCE DAILY 90 tablet 1    meclizine (ANTIVERT) 25 MG tablet Take 1 tablet by mouth every 6 hours as needed for Dizziness 40 tablet 0    ACCU-CHEK SMARTVIEW strip Use to test Blood Sugar 3x daily, Dx: E11.9 100 each 11    carvedilol (COREG) 12.5 MG tablet Take 1 tablet by mouth 2 times daily 180 tablet 1    nitroGLYCERIN (NITROSTAT) 0.4 MG SL tablet Place 1 tablet under the tongue every 5 minutes as needed for Chest pain 25 tablet 3    ferrous sulfate 325 (65 Fe) MG tablet Take 1 tablet by mouth 2 times daily 60 tablet 2    Multiple Vitamins-Minerals (THERAPEUTIC MULTIVITAMIN-MINERALS) tablet Take 1 tablet by mouth daily 30 tablet 11    aspirin 81 MG tablet Take 1 tablet by mouth daily      acetaminophen (TYLENOL) 500 MG tablet Take 500 mg by mouth as needed for Pain       No current facility-administered medications for this visit.          Laboratory & Diagnostics:  CBC:   Lab Results   Component Value Date    WBC 8.0 09/23/2020    HGB 10.9 (L) 09/23/2020    HCT 32.5 (L) 09/23/2020    MCV 94.8 (H) 09/23/2020     09/23/2020     BMP:    Lab Results   Component Value Date  12/10/2020     09/23/2020     06/10/2020    K 4.8 12/10/2020    K 4.8 09/23/2020    K 4.6 06/10/2020     12/10/2020     09/23/2020     06/10/2020    CO2 24 12/10/2020    CO2 25 09/23/2020    CO2 30 06/10/2020    BUN 36 (H) 12/10/2020    BUN 23 (H) 09/23/2020    BUN 20 06/10/2020    CREATININE 1.3 (H) 12/10/2020    CREATININE 1.3 (H) 09/23/2020    CREATININE 1.1 06/10/2020    GLUCOSE 222 (H) 12/10/2020    GLUCOSE 204 (H) 09/23/2020    GLUCOSE 105 06/10/2020      Hepatic:   Lab Results   Component Value Date    AST 10 09/23/2020    AST 10 02/13/2020    AST 19 02/08/2019    ALT 9 (L) 09/23/2020    ALT 12 02/13/2020    ALT 24 02/08/2019    BILITOT 0.5 09/23/2020    BILITOT 0.5 02/13/2020    BILITOT 0.3 02/08/2019    ALKPHOS 71 09/23/2020    ALKPHOS 64 02/13/2020    ALKPHOS 82 02/08/2019     BNP: No results found for: BNP  Lipids:   Lab Results   Component Value Date    CHOL 119 10/17/2018    HDL 35.6 (L) 10/17/2018     INR:   Lab Results   Component Value Date    INR 1.00 02/08/2019    INR 0.97 08/11/2017    INR 1.03 08/07/2017     URINE:   Lab Results   Component Value Date    PROTUR 4.2 02/14/2020     Lab Results   Component Value Date    NITRU NEGATIVE 12/19/2018    COLORU YELLOW 12/19/2018    PHUR 5.5 12/19/2018    LABCAST 0-4 HYALINE 12/19/2018    WBCUA 0-2 12/19/2018    RBCUA 0-2 12/19/2018    YEAST NONE SEEN 12/19/2018    BACTERIA NONE 12/19/2018    SPECGRAV 1.015 12/19/2018    LEUKOCYTESUR NEGATIVE 12/19/2018    UROBILINOGEN 0.2 12/19/2018    BILIRUBINUR NEGATIVE 12/19/2018    BLOODU TRACE 12/19/2018    KETUA NEGATIVE 12/19/2018      Microalbumen/Creatinine ratio:  No components found for: RUCREAT        Impression/Plan:   1. 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 Overall stable. Fluctuates due to diuretics. Goals of care include slowing rate of progression by controlling blood pressure, blood glucoses and albuminuria and by avoiding nephrotoxins such as NSAIDs and IV contrast.  2. HTN: uncontrolled,add amlodipine 5 mg daily  3. Lymphedema:   4. DM, check urine MA next visit  5. CAD/CABG  6. Obesity        Bloodwork and medications were reviewed and plan of care discussed with the patient. Return to clinic in 1 year or sooner if the need arises.       Sara House, DO  [facility]

## 2020-12-28 ENCOUNTER — OFFICE VISIT (OUTPATIENT)
Dept: PHYSICAL MEDICINE AND REHAB | Age: 70
End: 2020-12-28
Payer: MEDICARE

## 2020-12-28 VITALS
HEIGHT: 74 IN | SYSTOLIC BLOOD PRESSURE: 122 MMHG | BODY MASS INDEX: 40.43 KG/M2 | DIASTOLIC BLOOD PRESSURE: 62 MMHG | WEIGHT: 315 LBS

## 2020-12-28 PROCEDURE — 1123F ACP DISCUSS/DSCN MKR DOCD: CPT | Performed by: NURSE PRACTITIONER

## 2020-12-28 PROCEDURE — G8417 CALC BMI ABV UP PARAM F/U: HCPCS | Performed by: NURSE PRACTITIONER

## 2020-12-28 PROCEDURE — 3017F COLORECTAL CA SCREEN DOC REV: CPT | Performed by: NURSE PRACTITIONER

## 2020-12-28 PROCEDURE — 99214 OFFICE O/P EST MOD 30 MIN: CPT | Performed by: NURSE PRACTITIONER

## 2020-12-28 PROCEDURE — 1036F TOBACCO NON-USER: CPT | Performed by: NURSE PRACTITIONER

## 2020-12-28 PROCEDURE — G8427 DOCREV CUR MEDS BY ELIG CLIN: HCPCS | Performed by: NURSE PRACTITIONER

## 2020-12-28 PROCEDURE — G8482 FLU IMMUNIZE ORDER/ADMIN: HCPCS | Performed by: NURSE PRACTITIONER

## 2020-12-28 PROCEDURE — 4040F PNEUMOC VAC/ADMIN/RCVD: CPT | Performed by: NURSE PRACTITIONER

## 2020-12-28 ASSESSMENT — ENCOUNTER SYMPTOMS
WHEEZING: 0
NAUSEA: 0
VOMITING: 0
CONSTIPATION: 0
SINUS PRESSURE: 0
COLOR CHANGE: 0
DIARRHEA: 0
SHORTNESS OF BREATH: 0
ABDOMINAL PAIN: 0
COUGH: 0
BACK PAIN: 1
CHEST TIGHTNESS: 0
RHINORRHEA: 0
PHOTOPHOBIA: 0
SORE THROAT: 0
EYE PAIN: 0

## 2020-12-28 NOTE — PROGRESS NOTES
[address] [facility] (formerly [facility])  [address]  Dept: [phone]  Dept Fax: [phone]: [phone]    Visit Date: 12/28/2020    Functionality Assessment/Goals Worksheet     On a scale of 0 (Does not Interfere) to 10 (Completely Interferes)     1. Which number describes how during the past week pain has interfered with       the following:  A. General Activity:  3  B. Mood: 0  C. Walking Ability:  0  D. Normal Work (Includes both work outside the home and housework):  0  E. Relations with Other People:   0  F. Sleep:   0  G. Enjoyment of Life:   0    2. Patient Prefers to Take their Pain Medications:     []  On a regular basis   []  Only when necessary    []  Does not take pain medications    3. What are the Patient's Goals/Expectations for Visiting Pain Management? []  Learn about my pain    []  Receive Medication   []  Physical Therapy     []  Treat Depression   []  Receive Injections    []  Treat Sleep   []  Deal with Anxiety and Stress   []  Treat Opoid Dependence/Addiction   []  Other:    HPI:   Ana Good is a 65 y.o. male is here today for    Chief Complaint: Low back pain, Mid back pain, Right leg pain, Left leg pain, Hip pain and Knee pain   SI PAIN   HPI   F/U TFLESI L5 Bilateral #1 12/10/2020  He received about 70% pain relief for 2-3 weeks but starting to wear off. He has increased low back SI hip BLE pain with walking standing bending twisting turning. He fell prior to last visit so I ordered hip and lumbar XR. He had Lumbar RFA L3-5  Left 10/2020 Right 7/2020 with 50% pain relief. He uses cane and walker at times. He takes Tylenol for pain. He denies any ER visits since last visit. Pain scale with out pain medications or at its worst is 7/10. Pain scale with pain medications or at its best is 4/10.     Bilateral hip XR   FINDINGS: No fracture or dislocation is seen. Normal abduction of both hips is demonstrated. There are moderate degenerative changes both hips and moderate degenerative changes both sacroiliac joints. Note that there is a 12 mm ossicle along the    superior/lateral aspect of the right hip joint. This could be an accessory ossicle or intra-articular body. Appearance unchanged from pelvic x-ray dated 9/16/2020.           Impression   Degenerative changes both hips and both sacroiliac joints.         Lumbar xr 10/26/2020  1. Slight thoracolumbar levoscoliosis. Cannot exclude muscle spasm. 2. Moderate vertebral body spondylosis throughout the lumbar spine several bulky bridging osteophytes. . Moderate disc space narrowing L3-4 and L4-5. Laminectomy defects lower lumbar spine. 3. Severe degenerative facet arthropathy throughout the lumbar spine. Mild degenerative changes left sacroiliac joint. 4. Overall appearance of lumbar spine has worsened since prior study.                 The patientis allergic to horse-derived products. Past Medical History  Heron Dayton  has a past medical history of RAUL (acute kidney injury) ([address] ASHD (arteriosclerotic heart disease), Depression, Diabetic peripheral neuropathy ([address] Fracture, HTN (hypertension), Hypercholesteremia, IDDM (insulin dependent diabetes mellitus), Low back pain, Morbid obesity with BMI of 45.0-49.9, adult ([address] and Osteoarthritis. Past Surgical History  The patient  has a past surgical history that includes Cholecystectomy; Rotator cuff repair; Cervical disc surgery (2000); Appendectomy; Spine surgery (2010); Colonoscopy (2007 and 2011); joint replacement; Tonsillectomy; amputation (Left, 9/10/14); EKG 12 Lead (8/14/2015); Coronary artery bypass graft (2015 august ); Cardiac surgery; vascular surgery; fracture surgery; Coronary angioplasty with stent (08/07/2017); POSTERIOR FUSION THORACIC SPINE (N/A, 12/28/2018); Pain management procedure (Bilateral, 1/28/2020);  Facet joint injection (Bilateral, 5/22/2020); Pain management procedure (Right, 7/14/2020); Pain management procedure (Left, 10/1/2020); Pain management procedure (Bilateral, 11/17/2020); and Pain management procedure (Bilateral, 12/10/2020). Family History  This patient's family history includes Cancer in his mother. Social History  Sheron Whalen  reports that he has never smoked. He has never used smokeless tobacco. He reports current alcohol use. He reports that he does not use drugs.     Medications    Current Outpatient Medications:     amLODIPine (NORVASC) 5 MG tablet, Take 1 tablet by mouth daily, Disp: 90 tablet, Rfl: 3    enalapril (VASOTEC) 10 MG tablet, TAKE 2 TABLETS BY MOUTH TWICE DAILY, Disp: 360 tablet, Rfl: 1    furosemide (LASIX) 40 MG tablet, Take 1 tablet by mouth daily, Disp: 60 tablet, Rfl: 1    ticagrelor (BRILINTA) 90 MG TABS tablet, TAKE ONE TABLET BY MOUTH TWICE DAILY, Disp: 60 tablet, Rfl: 3    insulin glargine (LANTUS SOLOSTAR) 100 UNIT/ML injection pen, INJECT 30 UNITS SUBCUTANEOUSLY ONCE DAILY, Disp: 15 pen, Rfl: 1    metFORMIN (GLUCOPHAGE) 500 MG tablet, TAKE TWO TABLETS BY MOUTH TWICE DAILY WITH MEALS, Disp: 360 tablet, Rfl: 1    insulin aspart (NOVOLOG FLEXPEN) 100 UNIT/ML injection pen, INJECT 12 UNITS SUBCUTANEOUSLY 3 TIMES DAILY BEFORE MEALS (Patient taking differently: 18 Units INJECT 12 UNITS SUBCUTANEOUSLY 3 TIMES DAILY BEFORE MEALS), Disp: 15 pen, Rfl: 0    atorvastatin (LIPITOR) 10 MG tablet, TAKE 1 TABLET BY MOUTH ONCE DAILY, Disp: 90 tablet, Rfl: 1    meclizine (ANTIVERT) 25 MG tablet, Take 1 tablet by mouth every 6 hours as needed for Dizziness, Disp: 40 tablet, Rfl: 0    ACCU-CHEK SMARTVIEW strip, Use to test Blood Sugar 3x daily, Dx: E11.9, Disp: 100 each, Rfl: 11    carvedilol (COREG) 12.5 MG tablet, Take 1 tablet by mouth 2 times daily, Disp: 180 tablet, Rfl: 1    nitroGLYCERIN (NITROSTAT) 0.4 MG SL tablet, Place 1 tablet under the tongue every 5 minutes as needed for Chest pain, Disp: 25 tablet, Rfl: 3    ferrous sulfate 325 (65 Fe) MG tablet, Take 1 tablet by mouth 2 times daily, Disp: 60 tablet, Rfl: 2    Multiple Vitamins-Minerals (THERAPEUTIC MULTIVITAMIN-MINERALS) tablet, Take 1 tablet by mouth daily, Disp: 30 tablet, Rfl: 11    aspirin 81 MG tablet, Take 1 tablet by mouth daily, Disp: , Rfl:     acetaminophen (TYLENOL) 500 MG tablet, Take 500 mg by mouth as needed for Pain, Disp: , Rfl:     Subjective:      Review of Systems   Constitutional: Positive for activity change. Negative for appetite change, chills, diaphoresis, fatigue, fever and unexpected weight change. HENT: Negative for congestion, ear pain, hearing loss, mouth sores, nosebleeds, rhinorrhea, sinus pressure and sore throat. Eyes: Negative for photophobia, pain and visual disturbance. Respiratory: Negative for cough, chest tightness, shortness of breath and wheezing. Cardiovascular: Positive for leg swelling. Negative for chest pain and palpitations. CABG HTN MI  Has BLE lymphedema 3+ pitting edema   Gastrointestinal: Negative for abdominal pain, constipation, diarrhea, nausea and vomiting. GERD   Endocrine: Negative for cold intolerance, heat intolerance, polydipsia, polyphagia and polyuria. DM   Genitourinary: Negative for decreased urine volume, difficulty urinating, frequency and hematuria. Musculoskeletal: Positive for arthralgias, back pain, gait problem, myalgias, neck pain and neck stiffness. Negative for joint swelling. Skin: Negative for color change and rash. Allergic/Immunologic: Negative for food allergies and immunocompromised state. Neurological: Positive for weakness and numbness. Negative for dizziness, tremors, syncope, facial asymmetry, speech difficulty and light-headedness. Hematological: Does not bruise/bleed easily.    Psychiatric/Behavioral: Negative for agitation, behavioral problems, confusion, decreased concentration, dysphoric mood, hallucinations, self-injury, sleep disturbance and suicidal ideas. The patient is not nervous/anxious and is not hyperactive. Depression        Objective:     Vitals:    12/28/20 1410   BP: 122/62   Weight: (!) 378 lb (171.5 kg)   Height: 6' 2\" (1.88 m)       Physical Exam  Vitals signs and nursing note reviewed. Constitutional:       General: He is not in acute distress. Appearance: He is well-developed. He is not diaphoretic. HENT:      Head: Normocephalic and atraumatic. Right Ear: External ear normal.      Left Ear: External ear normal.      Nose: Nose normal.      Mouth/Throat:      Pharynx: No oropharyngeal exudate. Eyes:      General: No scleral icterus. Right eye: No discharge. Left eye: No discharge. Conjunctiva/sclera: Conjunctivae normal.      Pupils: Pupils are equal, round, and reactive to light. Neck:      Musculoskeletal: Full passive range of motion without pain, normal range of motion and neck supple. Normal range of motion. No edema, erythema, neck rigidity or muscular tenderness. Thyroid: No thyromegaly. Cardiovascular:      Rate and Rhythm: Normal rate and regular rhythm. Heart sounds: Normal heart sounds. No murmur. No friction rub. No gallop. Pulmonary:      Effort: Pulmonary effort is normal. No respiratory distress. Breath sounds: Normal breath sounds. No wheezing or rales. Chest:      Chest wall: No tenderness. Abdominal:      General: Bowel sounds are normal. There is no distension. Palpations: Abdomen is soft. Tenderness: There is no abdominal tenderness. There is no guarding or rebound. Musculoskeletal:         General: Tenderness present. Right shoulder: He exhibits tenderness. Left shoulder: He exhibits tenderness. Right hip: He exhibits decreased range of motion, decreased strength, tenderness and bony tenderness.       Left hip: He exhibits decreased range of motion, decreased strength, tenderness and bony tenderness. Right knee: He exhibits decreased range of motion. Tenderness found. Left knee: He exhibits decreased range of motion. Tenderness found. Right ankle: He exhibits swelling. Left ankle: He exhibits swelling. Cervical back: He exhibits decreased range of motion, tenderness and bony tenderness. Thoracic back: He exhibits decreased range of motion, tenderness and bony tenderness. Lumbar back: He exhibits decreased range of motion, tenderness, bony tenderness, pain and spasm. Back:       Right upper leg: He exhibits tenderness. Left upper leg: He exhibits tenderness. Right lower leg: Edema present. Left lower leg: Edema present. Right foot: Tenderness and swelling present. Left foot: Tenderness and swelling present. Comments: H/O Cervical surgery C5-6    T6-T9 PSF Lumbar Nj Decompression L2-S1  Right shoulder rotator cuff surgery  BTKR  Had 3 steel rods in LLE but hardware was removed. BLE lymphedema    Skin:     General: Skin is warm. Coloration: Skin is not pale. Findings: No erythema or rash. Neurological:      Mental Status: He is alert and oriented to person, place, and time. He is not disoriented. Cranial Nerves: Cranial nerves are intact. No cranial nerve deficit. Sensory: Sensation is intact. No sensory deficit. Motor: Weakness present. No atrophy or abnormal muscle tone. Coordination: Coordination is intact. Coordination normal.      Gait: Gait abnormal.      Deep Tendon Reflexes: Reflexes are normal and symmetric. Babinski sign absent on the right side. Reflex Scores:       Tricep reflexes are 2+ on the right side and 2+ on the left side. Bicep reflexes are 2+ on the right side and 2+ on the left side. Brachioradialis reflexes are 2+ on the right side and 2+ on the left side. Patellar reflexes are 2+ on the right side and 2+ on the left side.        Achilles reflexes are 2+ on the right side and 2+ on the left side. Psychiatric:         Attention and Perception: Attention normal. He is attentive. Mood and Affect: Mood normal. Mood is not anxious or depressed. Affect is not labile, blunt, angry or inappropriate. Speech: Speech normal. He is communicative. Speech is not rapid and pressured, delayed, slurred or tangential.         Behavior: Behavior normal. Behavior is not agitated, slowed, aggressive, withdrawn, hyperactive or combative. Thought Content: Thought content normal. Thought content is not paranoid or delusional. Thought content does not include homicidal or suicidal ideation. Thought content does not include homicidal or suicidal plan. Cognition and Memory: Cognition normal. Memory is not impaired. He does not exhibit impaired recent memory or impaired remote memory. Judgment: Judgment normal. Judgment is not impulsive or inappropriate. ANNETTA test: +  Yeomans test:+  Gaenslen test: +     Assessment:     1. SI (sacroiliac) joint inflammation (HCC)    2. Lumbosacral radiculitis    3. Lumbosacral spinal stenosis    4. Primary osteoarthritis of both hips    5. Lumbar radiculitis    6. Lumbar foraminal stenosis    7. Lumbar spondylosis    8. Neuropathy    9. Coronary artery disease of bypass graft of native heart with stable angina pectoris ([facility]    10. Chronic pain syndrome            Plan:      · OARRS reviewed. Current MED:0  · Patient was not offered naloxone for home. · Discussed long term side effects of medications, tolerance, dependency and addiction. · Previous UDS reviewed  · UDS preformed today for compliance. · Patient told can not receive any pain medications from any other source. · No evidence of abuse, diversion or aberrant behavior.    Medications and/or procedures to improve function and quality of life- patient understanding with this and that may not be pain free   Discussed with patient about safe storage of medications at home   Discussed possible weaning of medication dosing dependent on treatment/procedure results.  Testing: reviewed hip and lumbar XR    Procedures: Bilateral Si MBB #1, pt is on Brilinta  Aspirin    Discussed with patient about risks with procedure including infection, reaction to medication, increased pain, or bleeding.  Medications: none      Meds. Prescribed:   No orders of the defined types were placed in this encounter. Return for Bilateral Si MBB #1, Follow up after procedure. Time spent with patient was 25 minutes, more than 50% was spent Counseling/coordinated the patient'scare.       Electronically signed by ABHISHEK Ba CNP on12/28/2020 at 2:36 PM

## 2021-01-05 ENCOUNTER — TELEPHONE (OUTPATIENT)
Dept: NEPHROLOGY | Age: 71
End: 2021-01-05

## 2021-01-05 DIAGNOSIS — E87.5 HYPERKALEMIA: ICD-10-CM

## 2021-01-05 DIAGNOSIS — I10 ESSENTIAL HYPERTENSION: ICD-10-CM

## 2021-01-05 DIAGNOSIS — N18.2 CKD (CHRONIC KIDNEY DISEASE), STAGE II: ICD-10-CM

## 2021-01-05 RX ORDER — CARVEDILOL 12.5 MG/1
12.5 TABLET ORAL 2 TIMES DAILY
Qty: 180 TABLET | Refills: 3 | Status: SHIPPED | OUTPATIENT
Start: 2021-01-05 | End: 2022-01-21 | Stop reason: SDUPTHER

## 2021-01-06 ENCOUNTER — OFFICE VISIT (OUTPATIENT)
Dept: FAMILY MEDICINE CLINIC | Age: 71
End: 2021-01-06

## 2021-01-06 VITALS
BODY MASS INDEX: 40.43 KG/M2 | HEART RATE: 74 BPM | SYSTOLIC BLOOD PRESSURE: 130 MMHG | TEMPERATURE: 96.8 F | RESPIRATION RATE: 16 BRPM | HEIGHT: 74 IN | DIASTOLIC BLOOD PRESSURE: 66 MMHG | WEIGHT: 315 LBS

## 2021-01-06 DIAGNOSIS — E78.00 HYPERCHOLESTEREMIA: ICD-10-CM

## 2021-01-06 DIAGNOSIS — M15.9 PRIMARY OSTEOARTHRITIS INVOLVING MULTIPLE JOINTS: ICD-10-CM

## 2021-01-06 DIAGNOSIS — Z98.61 S/P PERCUTANEOUS TRANSLUMINAL CORONARY ANGIOPLASTY: ICD-10-CM

## 2021-01-06 DIAGNOSIS — M47.816 LUMBAR SPONDYLOSIS: ICD-10-CM

## 2021-01-06 DIAGNOSIS — I87.2 VENOUS STASIS DERMATITIS OF BOTH LOWER EXTREMITIES: ICD-10-CM

## 2021-01-06 DIAGNOSIS — I10 ESSENTIAL HYPERTENSION: Primary | ICD-10-CM

## 2021-01-06 DIAGNOSIS — I95.1 ORTHOSTATIC HYPOTENSION AFTER EXERCISE: ICD-10-CM

## 2021-01-06 DIAGNOSIS — E11.42 DIABETIC PERIPHERAL NEUROPATHY (HCC): ICD-10-CM

## 2021-01-06 DIAGNOSIS — I25.708 CORONARY ARTERY DISEASE OF BYPASS GRAFT OF NATIVE HEART WITH STABLE ANGINA PECTORIS (HCC): ICD-10-CM

## 2021-01-06 PROCEDURE — 99213 OFFICE O/P EST LOW 20 MIN: CPT | Performed by: FAMILY MEDICINE

## 2021-01-06 PROCEDURE — 1123F ACP DISCUSS/DSCN MKR DOCD: CPT | Performed by: FAMILY MEDICINE

## 2021-01-06 PROCEDURE — G8417 CALC BMI ABV UP PARAM F/U: HCPCS | Performed by: FAMILY MEDICINE

## 2021-01-06 PROCEDURE — 4040F PNEUMOC VAC/ADMIN/RCVD: CPT | Performed by: FAMILY MEDICINE

## 2021-01-06 PROCEDURE — G8427 DOCREV CUR MEDS BY ELIG CLIN: HCPCS | Performed by: FAMILY MEDICINE

## 2021-01-06 PROCEDURE — 3017F COLORECTAL CA SCREEN DOC REV: CPT | Performed by: FAMILY MEDICINE

## 2021-01-06 PROCEDURE — 1036F TOBACCO NON-USER: CPT | Performed by: FAMILY MEDICINE

## 2021-01-06 RX ORDER — FUROSEMIDE 80 MG
80 TABLET ORAL DAILY
Qty: 60 TABLET | Refills: 3 | Status: SHIPPED | OUTPATIENT
Start: 2021-01-06 | End: 2021-04-09 | Stop reason: DRUGHIGH

## 2021-01-06 ASSESSMENT — ENCOUNTER SYMPTOMS
COUGH: 0
BACK PAIN: 0
SHORTNESS OF BREATH: 0
CHEST TIGHTNESS: 0
CONSTIPATION: 0
TROUBLE SWALLOWING: 0
ABDOMINAL PAIN: 0
NAUSEA: 0
BLOOD IN STOOL: 0
SORE THROAT: 0
EYE PAIN: 0

## 2021-01-06 ASSESSMENT — PATIENT HEALTH QUESTIONNAIRE - PHQ9
1. LITTLE INTEREST OR PLEASURE IN DOING THINGS: 0
SUM OF ALL RESPONSES TO PHQ QUESTIONS 1-9: 0

## 2021-01-06 NOTE — PROGRESS NOTES
Subjective:      Patient ID: Candelario Marin is a 65 y.o. male. Low  Back pain  And with    Epidurals  And  Some help     ashd  Stable  baki         Diabetes  He presents for his follow-up diabetic visit. He has type 2 diabetes mellitus. His disease course has been stable. Pertinent negatives for hypoglycemia include no dizziness, headaches or sweats. There are no diabetic associated symptoms. Pertinent negatives for diabetes include no chest pain, no fatigue and no weakness. There are no hypoglycemic complications. Symptoms are stable. There are no diabetic complications. Current diabetic treatment includes oral agent (monotherapy). He is compliant with treatment all of the time. His weight is stable. He is following a diabetic diet. Meal planning includes avoidance of concentrated sweets. There is no change in his home blood glucose trend. An ACE inhibitor/angiotensin II receptor blocker is being taken. Hypertension  This is a chronic problem. The current episode started more than 1 year ago. The problem has been resolved since onset. The problem is controlled. Pertinent negatives include no chest pain, headaches, palpitations, peripheral edema, shortness of breath or sweats. The current treatment provides significant improvement. There are no compliance problems. Hyperlipidemia  This is a chronic problem. The current episode started more than 1 year ago. Pertinent negatives include no chest pain, focal sensory loss, focal weakness, leg pain or shortness of breath. Current antihyperlipidemic treatment includes statins. The current treatment provides significant improvement of lipids. There are no compliance problems.           Past Medical History:   Diagnosis Date    RAUL (acute kidney injury) ([facility] [address] 2/13/2020    ASHD (arteriosclerotic heart disease) 2005 2006    stent  baki    Depression     Diabetic peripheral neuropathy [facility]) 2014    Fracture Oct 1984    left lower extremity  HTN (hypertension)     Hypercholesteremia     IDDM (insulin dependent diabetes mellitus)     Low back pain     Morbid obesity with BMI of 45.0-49.9, adult (HCC)     Osteoarthritis      Review of Systems   Constitutional: Negative for fatigue and fever. HENT: Negative for congestion, ear pain, postnasal drip, sore throat and trouble swallowing. Eyes: Negative for pain. Respiratory: Negative for cough, chest tightness and shortness of breath. Cardiovascular: Negative for chest pain, palpitations and leg swelling. Gastrointestinal: Negative for abdominal pain, blood in stool, constipation and nausea. Genitourinary: Negative for difficulty urinating, frequency and urgency. Musculoskeletal: Negative for arthralgias, back pain, joint swelling and neck stiffness. Skin: Negative for rash. Neurological: Negative for dizziness, focal weakness, weakness and headaches. Hematological: Negative for adenopathy. Does not bruise/bleed easily. Psychiatric/Behavioral: Negative for behavioral problems, dysphoric mood and sleep disturbance. /66 (Site: Right Upper Arm, Position: Sitting, Cuff Size: Medium Adult)   Pulse 74   Temp 96.8 °F (36 °C) (Oral)   Resp 16   Ht 6' 2\" (1.88 m)   Wt (!) 394 lb (178.7 kg)   BMI 50.59 kg/m²   Objective:   Physical Exam  Vitals signs and nursing note reviewed. Constitutional:       Appearance: He is well-developed. HENT:      Head: Normocephalic and atraumatic. Right Ear: External ear normal.      Left Ear: External ear normal.      Nose: Nose normal.   Eyes:      Conjunctiva/sclera: Conjunctivae normal.      Pupils: Pupils are equal, round, and reactive to light. Comments: Fundi nl   Neck:      Musculoskeletal: Normal range of motion and neck supple. Thyroid: No thyromegaly. Cardiovascular:      Rate and Rhythm: Normal rate and regular rhythm. Heart sounds: Normal heart sounds.    Pulmonary: Effort: Pulmonary effort is normal.      Breath sounds: Normal breath sounds. No wheezing or rales. Abdominal:      General: Bowel sounds are normal.      Palpations: Abdomen is soft. There is no mass. Tenderness: There is no abdominal tenderness. Musculoskeletal: Normal range of motion. Right lower leg: Edema present. Left lower leg: Edema present. Comments:   3  To  4 +  Lower  Legs    Lymphadenopathy:      Cervical: No cervical adenopathy. Skin:     General: Skin is warm and dry. Findings: No rash. Neurological:      Mental Status: He is alert and oriented to person, place, and time. Cranial Nerves: No cranial nerve deficit. Deep Tendon Reflexes: Reflexes are normal and symmetric. Assessment:       Diagnosis Orders   1. Essential hypertension     2. Venous stasis dermatitis of both lower extremities     3. S/P percutaneous transluminal coronary angioplasty     4. Lumbar spondylosis     5. Primary osteoarthritis involving multiple joints     6. Diabetic peripheral neuropathy ([facility]     7. Coronary artery disease of bypass graft of native heart with stable angina pectoris ([facility] [address]     8. Hypercholesteremia     9.  Orthostatic hypotension after exercise           Plan:      Current Outpatient Medications   Medication Sig Dispense Refill    furosemide (LASIX) 80 MG tablet Take 1 tablet by mouth daily 60 tablet 3    carvedilol (COREG) 12.5 MG tablet Take 1 tablet by mouth 2 times daily 180 tablet 3    amLODIPine (NORVASC) 5 MG tablet Take 1 tablet by mouth daily 90 tablet 3    enalapril (VASOTEC) 10 MG tablet TAKE 2 TABLETS BY MOUTH TWICE DAILY 360 tablet 1    ticagrelor (BRILINTA) 90 MG TABS tablet TAKE ONE TABLET BY MOUTH TWICE DAILY 60 tablet 3    insulin glargine (LANTUS SOLOSTAR) 100 UNIT/ML injection pen INJECT 30 UNITS SUBCUTANEOUSLY ONCE DAILY 15 pen 1    metFORMIN (GLUCOPHAGE) 500 MG tablet TAKE TWO TABLETS BY MOUTH TWICE DAILY WITH MEALS 360 tablet 1  insulin aspart (NOVOLOG FLEXPEN) 100 UNIT/ML injection pen INJECT 12 UNITS SUBCUTANEOUSLY 3 TIMES DAILY BEFORE MEALS (Patient taking differently: 18 Units INJECT 12 UNITS SUBCUTANEOUSLY 3 TIMES DAILY BEFORE MEALS) 15 pen 0    atorvastatin (LIPITOR) 10 MG tablet TAKE 1 TABLET BY MOUTH ONCE DAILY 90 tablet 1    meclizine (ANTIVERT) 25 MG tablet Take 1 tablet by mouth every 6 hours as needed for Dizziness 40 tablet 0    ACCU-CHEK SMARTVIEW strip Use to test Blood Sugar 3x daily, Dx: E11.9 100 each 11    nitroGLYCERIN (NITROSTAT) 0.4 MG SL tablet Place 1 tablet under the tongue every 5 minutes as needed for Chest pain 25 tablet 3    ferrous sulfate 325 (65 Fe) MG tablet Take 1 tablet by mouth 2 times daily 60 tablet 2    Multiple Vitamins-Minerals (THERAPEUTIC MULTIVITAMIN-MINERALS) tablet Take 1 tablet by mouth daily 30 tablet 11    aspirin 81 MG tablet Take 1 tablet by mouth daily      acetaminophen (TYLENOL) 500 MG tablet Take 500 mg by mouth as needed for Pain       No current facility-administered medications for this visit. Orders Placed This Encounter   Procedures    BUN & Creatinine     Standing Status:   Future     Standing Expiration Date:   1/6/2022    Electrolyte Panel     Standing Status:   Future     Standing Expiration Date:   1/6/2022    Hemoglobin A1C     Standing Status:   Future     Standing Expiration Date:   1/6/2022     No results found for this visit on 01/06/21. Antonia Pierson received counseling on the following healthy behaviors: nutrition and exercise    Patient given educational materials on Diabetes and Hyperlipidemia    I have instructed Antonia Pierson to complete a self tracking handout on Blood Sugars  and Blood Pressures  and instructed them to bring it with them to his next appointment. Discussed use, benefit, and side effects of prescribed medications. Barriers to medication compliance addressed. All patient questions answered. Pt voiced understanding.

## 2021-01-14 ENCOUNTER — TELEPHONE (OUTPATIENT)
Dept: PHYSICAL MEDICINE AND REHAB | Age: 71
End: 2021-01-14

## 2021-01-14 ENCOUNTER — TELEPHONE (OUTPATIENT)
Dept: FAMILY MEDICINE CLINIC | Age: 71
End: 2021-01-14

## 2021-01-14 NOTE — TELEPHONE ENCOUNTER
Pt called req to speak to Ashley Wilson. Pt stated he lost all of his contacts and has no other way to contact this care coordinator.      [phone]

## 2021-01-15 ENCOUNTER — CARE COORDINATION (OUTPATIENT)
Dept: CARE COORDINATION | Age: 71
End: 2021-01-15

## 2021-01-15 NOTE — CARE COORDINATION
Spoke with pt on phone. He stated he \"lost my  phone and contacts. \" Pt reports he is \"goiing stir crazy. \" Not wanting to \"take this virus to my moms. \" Pt has bought movies and watches them to pass the time. Pt can now google duo his family in [place] [id number]. Pt will be going to his grand daughter wedding next month in [place]. Pt looking forward to this. Active listening provided. Plan of care:  Support pt in community  Allow pt to process thoughts and feelings.

## 2021-01-15 NOTE — TELEPHONE ENCOUNTER
Eliseo Sandoval, can you please reach out to Kedar Chung, he is trying to get a hold of you and lost your phone number. Thank you.

## 2021-01-18 ENCOUNTER — ANESTHESIA (OUTPATIENT)
Dept: OPERATING ROOM | Age: 71
End: 2021-01-18
Payer: MEDICARE

## 2021-01-18 ENCOUNTER — ANESTHESIA EVENT (OUTPATIENT)
Dept: OPERATING ROOM | Age: 71
End: 2021-01-18
Payer: MEDICARE

## 2021-01-18 ENCOUNTER — APPOINTMENT (OUTPATIENT)
Dept: GENERAL RADIOLOGY | Age: 71
End: 2021-01-18
Attending: PAIN MEDICINE
Payer: MEDICARE

## 2021-01-18 ENCOUNTER — HOSPITAL ENCOUNTER (OUTPATIENT)
Age: 71
Setting detail: OUTPATIENT SURGERY
Discharge: HOME | End: 2021-01-18
Attending: PAIN MEDICINE | Admitting: PAIN MEDICINE
Payer: MEDICARE

## 2021-01-18 VITALS
HEIGHT: 74 IN | SYSTOLIC BLOOD PRESSURE: 120 MMHG | DIASTOLIC BLOOD PRESSURE: 58 MMHG | OXYGEN SATURATION: 99 % | RESPIRATION RATE: 16 BRPM | BODY MASS INDEX: 40.43 KG/M2 | WEIGHT: 315 LBS | HEART RATE: 68 BPM | TEMPERATURE: 97.1 F

## 2021-01-18 VITALS
RESPIRATION RATE: 20 BRPM | SYSTOLIC BLOOD PRESSURE: 138 MMHG | DIASTOLIC BLOOD PRESSURE: 64 MMHG | OXYGEN SATURATION: 100 %

## 2021-01-18 LAB — GLUCOSE BLD-MCNC: 149 MG/DL (ref 70–108)

## 2021-01-18 PROCEDURE — 3209999900 FLUORO FOR SURGICAL PROCEDURES

## 2021-01-18 PROCEDURE — 6360000004 HC RX CONTRAST MEDICATION: Performed by: PAIN MEDICINE

## 2021-01-18 PROCEDURE — 6360000002 HC RX W HCPCS: Performed by: PAIN MEDICINE

## 2021-01-18 PROCEDURE — 7100000011 HC PHASE II RECOVERY - ADDTL 15 MIN: Performed by: PAIN MEDICINE

## 2021-01-18 PROCEDURE — 3700000000 HC ANESTHESIA ATTENDED CARE: Performed by: PAIN MEDICINE

## 2021-01-18 PROCEDURE — 7100000010 HC PHASE II RECOVERY - FIRST 15 MIN: Performed by: PAIN MEDICINE

## 2021-01-18 PROCEDURE — 2500000003 HC RX 250 WO HCPCS: Performed by: PAIN MEDICINE

## 2021-01-18 PROCEDURE — 3600000054 HC PAIN LEVEL 3 BASE: Performed by: PAIN MEDICINE

## 2021-01-18 PROCEDURE — 27096 INJECT SACROILIAC JOINT: CPT | Performed by: PAIN MEDICINE

## 2021-01-18 PROCEDURE — 2580000003 HC RX 258: Performed by: NURSE ANESTHETIST, CERTIFIED REGISTERED

## 2021-01-18 PROCEDURE — 2709999900 HC NON-CHARGEABLE SUPPLY: Performed by: PAIN MEDICINE

## 2021-01-18 PROCEDURE — 82948 REAGENT STRIP/BLOOD GLUCOSE: CPT

## 2021-01-18 PROCEDURE — 6360000002 HC RX W HCPCS: Performed by: NURSE ANESTHETIST, CERTIFIED REGISTERED

## 2021-01-18 RX ORDER — SODIUM CHLORIDE 9 MG/ML
INJECTION INTRAVENOUS PRN
Status: DISCONTINUED | OUTPATIENT
Start: 2021-01-18 | End: 2021-01-18 | Stop reason: SDUPTHER

## 2021-01-18 RX ORDER — METHYLPREDNISOLONE ACETATE 80 MG/ML
INJECTION, SUSPENSION INTRA-ARTICULAR; INTRALESIONAL; INTRAMUSCULAR; SOFT TISSUE PRN
Status: DISCONTINUED | OUTPATIENT
Start: 2021-01-18 | End: 2021-01-18 | Stop reason: ALTCHOICE

## 2021-01-18 RX ORDER — LIDOCAINE HYDROCHLORIDE 10 MG/ML
INJECTION, SOLUTION INFILTRATION; PERINEURAL PRN
Status: DISCONTINUED | OUTPATIENT
Start: 2021-01-18 | End: 2021-01-18 | Stop reason: ALTCHOICE

## 2021-01-18 RX ORDER — PROPOFOL 10 MG/ML
INJECTION, EMULSION INTRAVENOUS PRN
Status: DISCONTINUED | OUTPATIENT
Start: 2021-01-18 | End: 2021-01-18 | Stop reason: SDUPTHER

## 2021-01-18 RX ORDER — BUPIVACAINE HYDROCHLORIDE 2.5 MG/ML
INJECTION, SOLUTION EPIDURAL; INFILTRATION; INTRACAUDAL PRN
Status: DISCONTINUED | OUTPATIENT
Start: 2021-01-18 | End: 2021-01-18 | Stop reason: ALTCHOICE

## 2021-01-18 RX ADMIN — SODIUM CHLORIDE 10 ML: 9 INJECTION, SOLUTION INTRAMUSCULAR; INTRAVENOUS; SUBCUTANEOUS at 11:38

## 2021-01-18 RX ADMIN — PROPOFOL 80 MG: 10 INJECTION, EMULSION INTRAVENOUS at 11:30

## 2021-01-18 RX ADMIN — SODIUM CHLORIDE 10 ML: 9 INJECTION, SOLUTION INTRAMUSCULAR; INTRAVENOUS; SUBCUTANEOUS at 11:43

## 2021-01-18 ASSESSMENT — PAIN SCALES - GENERAL: PAINLEVEL_OUTOF10: 0

## 2021-01-18 ASSESSMENT — PULMONARY FUNCTION TESTS
PIF_VALUE: 0

## 2021-01-18 NOTE — ANESTHESIA PRE PROCEDURE
ferrous sulfate 325 (65 Fe) MG tablet Take 1 tablet by mouth 2 times daily 1/8/19   Zahida Thomas MD   Multiple Vitamins-Minerals (THERAPEUTIC MULTIVITAMIN-MINERALS) tablet Take 1 tablet by mouth daily 1/8/19 2/21/23  Zahida Thomas MD   aspirin 81 MG tablet Take 1 tablet by mouth daily 8/8/17   Saw Flaherty MD   acetaminophen (TYLENOL) 500 MG tablet Take 500 mg by mouth as needed for Pain    Historical Provider, MD       Current medications:    No current outpatient medications on file. No current facility-administered medications for this visit. Allergies: Allergies   Allergen Reactions    Horse-Derived Products        Problem List:    Patient Active Problem List   Diagnosis Code    HTN (hypertension) I10    IDDM (insulin dependent diabetes mellitus) [id number]    Hypercholesteremia E78.00    Osteoarthritis M19.90    Diabetic peripheral neuropathy ([facility] E11.42    Coronary artery disease of bypass graft of native heart with stable angina pectoris ([facility] I25.708    S/P percutaneous transluminal coronary angioplasty Z98.61    Sprain/Injury of anterior ligaments of thoracic spine S23. 3XXA    Venous stasis dermatitis of both lower extremities I87.2    Lumbar spondylosis M47.816    Orthostatic hypotension after exercise I95.1    Lumbar foraminal stenosis M48.061    Lumbar radiculitis M54.16       Past Medical History:        Diagnosis Date    RAUL (acute kidney injury) ([facility] 2/13/2020    ASHD (arteriosclerotic heart disease) 2005 2006    stent  baki    Depression     Diabetic peripheral neuropathy [facility]) 2014    Fracture Oct 1984    left lower extremity     HTN (hypertension)     Hypercholesteremia     IDDM (insulin dependent diabetes mellitus)     Low back pain     Morbid obesity with BMI of 45.0-49.9, adult ([facility]     Osteoarthritis        Past Surgical History:        Procedure Laterality Date    AMPUTATION Left 9/10/14    partial versus complete left hallux amputation, left great toe amputation    APPENDECTOMY     [place] [facility]  2000    fusion of C5-C6    CHOLECYSTECTOMY      COLONOSCOPY  2007 and 2011    colonn polyps  lorenzo    CORONARY ANGIOPLASTY WITH STENT PLACEMENT  08/07/2017    Dr Lynda Lino @ [address] [facility] CORONARY ARTERY BYPASS GRAFT  2015 august dox    EKG 12-LEAD  8/14/2015         FACET JOINT INJECTION Bilateral 5/22/2020    Lumbar Facet MBB @L3-4,4-5 bilateral #2 performed by Agustin Castro MD at [address] [facility]      left leg    JOINT REPLACEMENT      bilateral knees gurvinder    PAIN MANAGEMENT PROCEDURE Bilateral 1/28/2020    Lumbar Facet MBB @ L3-4,4-5,5-S1 bilateral #1 LUMBAR FACET performed by Agustin Castro MD at [place] [address] Right 7/14/2020    Lumbar RFA Bilateral L3-4,4-5  right side first performed by Agustin Castro MD at [facility] [address] Left 10/1/2020    Lumbar RFA Left side L3-4, 4-5 performed by Agustin Castro MD at [place] [address] Bilateral 11/17/2020    TFLESI @L5 bilateral #1 performed by Agustin Castro MD at [place] [address] Bilateral 12/10/2020    TFLESI @L5 bilateral #1 performed by Agustin Castro MD at [address] [facility] N/A 12/28/2018    T7-T9 DECOMPRESSION, T7-T9 POSTERIOR FUSION performed by Yakelin Gary MD at [address] [facility] [address] [address]  2010    [facility]    TONSILLECTOMY      VASCULAR SURGERY      cabg harvests from left leg       Social History:    Social History     Tobacco Use    Smoking status: Never Smoker    Smokeless tobacco: Never Used   Substance Use Topics    Alcohol use: Yes     Comment: rarely                                Counseling given: Not Answered      Vital Signs (Current):    There were no vitals filed auscultation                             Cardiovascular:    (+) hypertension:, CAD:,         Rhythm: regular                      Neuro/Psych:   (+) psychiatric history:            GI/Hepatic/Renal:   (+) morbid obesity          Endo/Other:    (+) Diabetes, . Abdominal:   (+) obese,         Vascular:                                          Anesthesia Plan      MAC     ASA 3       Induction: intravenous. Anesthetic plan and risks discussed with patient.       Plan discussed with CRNA and surgical team.                  Bryant Ugalde MD   1/18/2021

## 2021-01-18 NOTE — PROGRESS NOTES
Discharge instructions given. Understanding verbalized. Patient states he stopped taking his Lasix. BP elevated. Dr. Heladio Ortega and Dr. Zoltan Browning notified. No new orders.

## 2021-01-18 NOTE — H&P
H&P    TFLESI L5 Bilateral #1 12/10/2020  He received about 70% pain relief for 2-3 weeks but starting to wear off. He has increased low back SI hip BLE pain with walking standing bending twisting turning. He fell prior to last visit so I ordered hip and lumbar XR. He had Lumbar RFA L3-5  Left 10/2020 Right 7/2020 with 50% pain relief. He uses cane and walker at times. He takes Tylenol for pain.           He denies any ER visits since last visit.     Pain scale with out pain medications or at its worst is 7/10. Pain scale with pain medications or at its best is 4/10.     Bilateral hip XR   FINDINGS: No fracture or dislocation is seen. Normal abduction of both hips is demonstrated. There are moderate degenerative changes both hips and moderate degenerative changes both sacroiliac joints. Note that there is a 12 mm ossicle along the    superior/lateral aspect of the right hip joint. This could be an accessory ossicle or intra-articular body. Appearance unchanged from pelvic x-ray dated 9/16/2020.           Impression   Degenerative changes both hips and both sacroiliac joints.          Lumbar xr 10/26/2020  1. Slight thoracolumbar levoscoliosis. Cannot exclude muscle spasm. 2. Moderate vertebral body spondylosis throughout the lumbar spine several bulky bridging osteophytes. . Moderate disc space narrowing L3-4 and L4-5. Laminectomy defects lower lumbar spine. 3. Severe degenerative facet arthropathy throughout the lumbar spine. Mild degenerative changes left sacroiliac joint.     4. Overall appearance of lumbar spine has worsened since prior study.                    The patientis allergic to horse-derived products.     Past Medical History  Renee Dong  has a past medical history of RAUL (acute kidney injury) ([address] ASHD (arteriosclerotic heart disease), Depression, Diabetic peripheral neuropathy ([address] Fracture, HTN (hypertension), Hypercholesteremia, IDDM (insulin dependent diabetes mellitus), Low back pain, Morbid obesity with BMI of 45.0-49.9, adult ([facility] and Osteoarthritis.     Past Surgical History  The patient  has a past surgical history that includes Cholecystectomy; Rotator cuff repair; Cervical disc surgery (2000); Appendectomy; Spine surgery (2010); Colonoscopy (2007 and 2011); joint replacement; Tonsillectomy; amputation (Left, 9/10/14); EKG 12 Lead (8/14/2015); Coronary artery bypass graft (2015 august ); Cardiac surgery; vascular surgery; fracture surgery; Coronary angioplasty with stent (08/07/2017); POSTERIOR FUSION THORACIC SPINE (N/A, 12/28/2018); Pain management procedure (Bilateral, 1/28/2020); Facet joint injection (Bilateral, 5/22/2020); Pain management procedure (Right, 7/14/2020); Pain management procedure (Left, 10/1/2020); Pain management procedure (Bilateral, 11/17/2020); and Pain management procedure (Bilateral, 12/10/2020).    Family History  This patient's family history includes Cancer in his mother.  [address]  reports that he has never smoked. He has never used smokeless tobacco. He reports current alcohol use.  He reports that he does not use drugs.     Medications    Current Medication      Current Outpatient Medications:     amLODIPine (NORVASC) 5 MG tablet, Take 1 tablet by mouth daily, Disp: 90 tablet, Rfl: 3    enalapril (VASOTEC) 10 MG tablet, TAKE 2 TABLETS BY MOUTH TWICE DAILY, Disp: 360 tablet, Rfl: 1    furosemide (LASIX) 40 MG tablet, Take 1 tablet by mouth daily, Disp: 60 tablet, Rfl: 1    ticagrelor (BRILINTA) 90 MG TABS tablet, TAKE ONE TABLET BY MOUTH TWICE DAILY, Disp: 60 tablet, Rfl: 3    insulin glargine (LANTUS SOLOSTAR) 100 UNIT/ML injection pen, INJECT 30 UNITS SUBCUTANEOUSLY ONCE DAILY, Disp: 15 pen, Rfl: 1    metFORMIN (GLUCOPHAGE) 500 MG tablet, TAKE TWO TABLETS BY MOUTH TWICE DAILY WITH MEALS, Disp: 360 tablet, Rfl: 1    insulin aspart (NOVOLOG FLEXPEN) 100 UNIT/ML injection pen, INJECT 12 UNITS SUBCUTANEOUSLY 3 TIMES DAILY BEFORE MEALS (Patient taking differently: 18 Units INJECT 12 UNITS SUBCUTANEOUSLY 3 TIMES DAILY BEFORE MEALS), Disp: 15 pen, Rfl: 0    atorvastatin (LIPITOR) 10 MG tablet, TAKE 1 TABLET BY MOUTH ONCE DAILY, Disp: 90 tablet, Rfl: 1    meclizine (ANTIVERT) 25 MG tablet, Take 1 tablet by mouth every 6 hours as needed for Dizziness, Disp: 40 tablet, Rfl: 0    ACCU-CHEK SMARTVIEW strip, Use to test Blood Sugar 3x daily, Dx: E11.9, Disp: 100 each, Rfl: 11    carvedilol (COREG) 12.5 MG tablet, Take 1 tablet by mouth 2 times daily, Disp: 180 tablet, Rfl: 1    nitroGLYCERIN (NITROSTAT) 0.4 MG SL tablet, Place 1 tablet under the tongue every 5 minutes as needed for Chest pain, Disp: 25 tablet, Rfl: 3    ferrous sulfate 325 (65 Fe) MG tablet, Take 1 tablet by mouth 2 times daily, Disp: 60 tablet, Rfl: 2    Multiple Vitamins-Minerals (THERAPEUTIC MULTIVITAMIN-MINERALS) tablet, Take 1 tablet by mouth daily, Disp: 30 tablet, Rfl: 11    aspirin 81 MG tablet, Take 1 tablet by mouth daily, Disp: , Rfl:     acetaminophen (TYLENOL) 500 MG tablet, Take 500 mg by mouth as needed for Pain, Disp: , Rfl:         Subjective:      Review of Systems   Constitutional: Positive for activity change. Negative for appetite change, chills, diaphoresis, fatigue, fever and unexpected weight change. HENT: Negative for congestion, ear pain, hearing loss, mouth sores, nosebleeds, rhinorrhea, sinus pressure and sore throat. Eyes: Negative for photophobia, pain and visual disturbance. Respiratory: Negative for cough, chest tightness, shortness of breath and wheezing. Cardiovascular: Positive for leg swelling. Negative for chest pain and palpitations. CABG HTN MI  Has BLE lymphedema 3+ pitting edema   Gastrointestinal: Negative for abdominal pain, constipation, diarrhea, nausea and vomiting. GERD   Endocrine: Negative for cold intolerance, heat intolerance, polydipsia, polyphagia and polyuria.         DM   Genitourinary: Negative for decreased urine volume, difficulty urinating, frequency and hematuria. Musculoskeletal: Positive for arthralgias, back pain, gait problem, myalgias, neck pain and neck stiffness. Negative for joint swelling. Skin: Negative for color change and rash. Allergic/Immunologic: Negative for food allergies and immunocompromised state. Neurological: Positive for weakness and numbness. Negative for dizziness, tremors, syncope, facial asymmetry, speech difficulty and light-headedness. Hematological: Does not bruise/bleed easily. Psychiatric/Behavioral: Negative for agitation, behavioral problems, confusion, decreased concentration, dysphoric mood, hallucinations, self-injury, sleep disturbance and suicidal ideas. The patient is not nervous/anxious and is not hyperactive. Depression          Objective:      Vitals       Vitals:     12/28/20 1410   BP: 122/62   Weight: (!) 378 lb (171.5 kg)   Height: 6' 2\" (1.88 m)            Physical Exam  Vitals signs and nursing note reviewed. Constitutional:       General: He is not in acute distress. Appearance: He is well-developed. He is not diaphoretic. HENT:      Head: Normocephalic and atraumatic. Right Ear: External ear normal.      Left Ear: External ear normal.      Nose: Nose normal.      Mouth/Throat:      Pharynx: No oropharyngeal exudate. Eyes:      General: No scleral icterus. Right eye: No discharge. Left eye: No discharge. Conjunctiva/sclera: Conjunctivae normal.      Pupils: Pupils are equal, round, and reactive to light. Neck:      Musculoskeletal: Full passive range of motion without pain, normal range of motion and neck supple. Normal range of motion. No edema, erythema, neck rigidity or muscular tenderness. Thyroid: No thyromegaly. Cardiovascular:      Rate and Rhythm: Normal rate and regular rhythm. Heart sounds: Normal heart sounds. No murmur. No friction rub. No gallop.     Pulmonary:      Effort: Pulmonary effort is normal. No respiratory distress. Breath sounds: Normal breath sounds. No wheezing or rales. Chest:      Chest wall: No tenderness. Abdominal:      General: Bowel sounds are normal. There is no distension. Palpations: Abdomen is soft. Tenderness: There is no abdominal tenderness. There is no guarding or rebound. Musculoskeletal:         General: Tenderness present. Right shoulder: He exhibits tenderness. Left shoulder: He exhibits tenderness. Right hip: He exhibits decreased range of motion, decreased strength, tenderness and bony tenderness. Left hip: He exhibits decreased range of motion, decreased strength, tenderness and bony tenderness. Right knee: He exhibits decreased range of motion. Tenderness found. Left knee: He exhibits decreased range of motion. Tenderness found. Right ankle: He exhibits swelling. Left ankle: He exhibits swelling. Cervical back: He exhibits decreased range of motion, tenderness and bony tenderness. Thoracic back: He exhibits decreased range of motion, tenderness and bony tenderness. Lumbar back: He exhibits decreased range of motion, tenderness, bony tenderness, pain and spasm. Back:       Right upper leg: He exhibits tenderness. Left upper leg: He exhibits tenderness. Right lower leg: Edema present. Left lower leg: Edema present. Right foot: Tenderness and swelling present. Left foot: Tenderness and swelling present. Comments: H/O Cervical surgery C5-6    T6-T9 PSF Lumbar Nj Decompression L2-S1  Right shoulder rotator cuff surgery  BTKR  Had 3 steel rods in LLE but hardware was removed. BLE lymphedema    Skin:     General: Skin is warm. Coloration: Skin is not pale. Findings: No erythema or rash. Neurological:      Mental Status: He is alert and oriented to person, place, and time. He is not disoriented.       Cranial Nerves: Cranial nerves Neuropathy    9. Coronary artery disease of bypass graft of native heart with stable angina pectoris ([facility]    10. Chronic pain syndrome       Plan:      · OARRS reviewed. Current MED:0  · Patient was not offered naloxone for home. · Discussed long term side effects of medications, tolerance, dependency and addiction. · Previous UDS reviewed  · UDS preformed today for compliance. · Patient told can not receive any pain medications from any other source. · No evidence of abuse, diversion or aberrant behavior. · Medications and/or procedures to improve function and quality of life- patient understanding with this and that may not be pain free  · Discussed with patient about safe storage of medications at home  · Discussed possible weaning of medication dosing dependent on treatment/procedure results. · Testing: reviewed hip and lumbar XR   · Procedures: Bilateral Si MBB #1, pt is on Brilinta  Aspirin   · Discussed with patient about risks with procedure including infection, reaction to medication, increased pain, or bleeding. · Medications: none        Meds.  Prescribed:     Encounter Medications    No orders of the defined types were placed in this encounter.         Return for Bilateral Si MBB #1, Follow up after procedure.

## 2021-01-18 NOTE — OP NOTE
Operative Note  Pre-Procedure Note    Patient Name: Bolivar Cowden   YOB: 1950  Medical Record Number: [id number]  Date: 1/18/21     Indication:  SI pain  Consent: On file. Vital Signs:   Vitals:    01/18/21 1050   BP: (!) 185/87   Pulse: 87   Resp: 20   Temp: 97.5 °F (36.4 °C)   SpO2: 98%       Past Medical History:   has a past medical history of RAUL (acute kidney injury) ([facility] ASHD (arteriosclerotic heart disease), Depression, Diabetic peripheral neuropathy ([facility] Fracture, HTN (hypertension), Hypercholesteremia, IDDM (insulin dependent diabetes mellitus), Low back pain, Morbid obesity with BMI of 45.0-49.9, adult ([facility] and Osteoarthritis. Past Surgical History:   has a past surgical history that includes Cholecystectomy; Rotator cuff repair; Cervical disc surgery (2000); Appendectomy; Spine surgery (2010); Colonoscopy (2007 and 2011); joint replacement; Tonsillectomy; amputation (Left, 9/10/14); EKG 12 Lead (8/14/2015); Coronary artery bypass graft (2015 august ); Cardiac surgery; vascular surgery; fracture surgery; Coronary angioplasty with stent (08/07/2017); POSTERIOR FUSION THORACIC SPINE (N/A, 12/28/2018); Pain management procedure (Bilateral, 1/28/2020); Facet joint injection (Bilateral, 5/22/2020); Pain management procedure (Right, 7/14/2020); Pain management procedure (Left, 10/1/2020); Pain management procedure (Bilateral, 11/17/2020); and Pain management procedure (Bilateral, 12/10/2020). Pre-Sedation Documentation and Exam:   Vital signs have been reviewed (see flow sheet for vitals). Sedation/ Anesthesia Plan: MAC    Patient is an appropriate candidate for plan of sedation: yes    Preoperative Diagnosis:  Bilateral sacroiliitis. Postoperative Diagnosis: Bilateral  Sacroiliitis. Procedure Performed:  Bilateral sacroiliac joint injection under fluoroscopy guidance # 1. Indication for the Procedure:   The patient failed conservative management  for pain in low back.  The patient is tender over the Bilateral SI joint. Noah's test is positive on the Bilateral side. As patient is not responding to conservative management and pain is interfering with activities of daily living we decided to proceed with SI joint injection. The procedure and risks were discussed with the patient and an informed consent was obtained. Procedure:     A meaningful communication was kept up with the patient throughout the procedure. The patient is placed in prone position. Skin over the back was prepped and draped in sterile manner. Then using fluoroscopy the Bilateral sacroiliac joint was identified. Then the angle of the fluoroscopy was adjusted such that the view of the caudal aspect of the joint space was optimized. Then skin and deep tissues over the caudal aspect of the joint were infiltrated with 6 ml of 1% lidocaine. The #22-gauge, 5-1/2 inch spinal needle was introduced through the skin wheal and directed such that the tip of the needle lies in the joint space. This was confirmed by injecting 0.5 ml of Omnipaque-180 through the needle in divided doses and observing the spread of the contrast along the joint space. Then after negative aspiration a total of 80 mg of depomedrol  with 2 ml of  0.25% Marcaine was injected through the needle in divided doses. The needle is removed and a Band-Aid was placed over the needle insertion site. EBL-0  The patient tolerated the procedure well and vital signs remained stable. The patient was discharged home in stable condition and will be followed in the pain clinic in the next few weeks or further planning.     Electronically signed by Abner Cheng MD on 1/18/21 at 11:31 AM EST

## 2021-01-18 NOTE — ANESTHESIA POSTPROCEDURE EVALUATION
Department of Anesthesiology  Postprocedure Note    Patient: Marnie Aldrich  MRN: [id number]  YOB: 1950  Date of evaluation: 1/18/2021  Time:  12:28 PM     Procedure Summary     Date: 01/18/21 Room / Location: [address] [place] [address] 03 / [address]    Anesthesia Start: 9535 Anesthesia Stop: 6552    Procedure: Bilateral Si MBB #1 (Bilateral ) Diagnosis: (Lumbar radiculitis)    Surgeons: Catherine Fontaine MD Responsible Provider: Deondre Woo MD    Anesthesia Type: MAC ASA Status: 3          Anesthesia Type: MAC    Estelle Phase I:      Estelle Phase II: Estelle Score: 10    Last vitals: Reviewed and per EMR flowsheets. Anesthesia Post Evaluation    Patient location during evaluation: PACU  Patient participation: complete - patient participated  Level of consciousness: awake and alert  Airway patency: patent  Nausea & Vomiting: no nausea and no vomiting  Complications: no  Cardiovascular status: hemodynamically stable  Respiratory status: acceptable  Hydration status: euvolemic      [facility]  POST-ANESTHESIA NOTE       Name:  Marnie Aldrich                                         Age:  65 y.o.   MRN:  [id number]      Last Vitals:  BP (!) 120/58   Pulse 68   Temp 97.1 °F (36.2 °C) (Infrared)   Resp 16   Ht 6' 2\" (1.88 m)   Wt (!) 383 lb (173.7 kg)   SpO2 99%   BMI 49.17 kg/m²   Patient Vitals for the past 4 hrs:   BP Temp Temp src Pulse Resp SpO2 Height Weight   01/18/21 1141 (!) 120/58 97.1 °F (36.2 °C) Infrared 68 16 99 % -- --   01/18/21 1050 (!) 185/87 97.5 °F (36.4 °C) Infrared 87 20 98 % 6' 2\" (1.88 m) (!) 383 lb (173.7 kg)       Level of Consciousness:  Awake    Respiratory:  Stable    Oxygen Saturation:  Stable    Cardiovascular:  Stable    Hydration:  Adequate    PONV:  Stable    Post-op Pain:  Adequate analgesia    Post-op Assessment:  No apparent anesthetic complications    Additional Follow-Up / Treatment / Comment:  None    ARIELLA BOND MD  January 18, 2021   12:29 PM

## 2021-01-18 NOTE — PROGRESS NOTES
1141: Patient to phase 2 recovery room via cart. Patient is awake and alert. Report received from surgical RN, Felicia Coleman. Patient's vitals obtained, see charting. 1143: Patient is denying pain and nausea at this time. IV is INT'd.   9358: Offered drink and snack provided to the patient. Bed is in the lowest position and call light is within reach. 1158: IV removed at this time- no complications and dressing applied. 1201: Patient calling his ride at this time. 1213: Patient ambulated to the car and discharged home in stable condition with his friend, Tommy Bach.

## 2021-01-20 DIAGNOSIS — E11.9 TYPE 2 DIABETES MELLITUS TREATED WITH INSULIN (HCC): ICD-10-CM

## 2021-01-20 DIAGNOSIS — Z79.4 TYPE 2 DIABETES MELLITUS TREATED WITH INSULIN (HCC): ICD-10-CM

## 2021-01-20 NOTE — TELEPHONE ENCOUNTER
Date of last visit:  1/6/2021  Date of next visit:  4/9/2021    Requested Prescriptions     Pending Prescriptions Disp Refills    insulin aspart (NOVOLOG FLEXPEN) 100 UNIT/ML injection pen       Sig: Inject 18 Units into the skin 3 times daily (before meals) INJECT 12 UNITS SUBCUTANEOUSLY 3 TIMES DAILY BEFORE MEALS

## 2021-01-20 NOTE — TELEPHONE ENCOUNTER
Judd Regan called requesting a refill of their:    insulin aspart (NOVOLOG FLEXPEN) 100 UNIT/ML injection pen 18 Units INJECT SUBCUTANEOUSLY 3 TIMES DAILY BEFORE MEALS    Send to [facility] on [employer]

## 2021-01-21 RX ORDER — INSULIN ASPART 100 [IU]/ML
18 INJECTION, SOLUTION INTRAVENOUS; SUBCUTANEOUS
Qty: 10 PEN | Refills: 3 | Status: SHIPPED | OUTPATIENT
Start: 2021-01-21 | End: 2021-12-28 | Stop reason: SDUPTHER

## 2021-01-25 ENCOUNTER — TELEPHONE (OUTPATIENT)
Dept: FAMILY MEDICINE CLINIC | Age: 71
End: 2021-01-25

## 2021-01-25 NOTE — TELEPHONE ENCOUNTER
Pt called asking what he can do to bring his sugars down because he is out of Humalog. Can't get any more until 2/2/21 per his insurance. Sugar just now 433, this morning was 179. Said that he had fruit, macaroni and cheese, tomatoes and green beans, coffee with sugar-free Afghan vanilla creamer. Said that he had gotten a little dizzy and lightheaded when he moved around. When he is sitting down everything is okay. Wants to know what he can do to bring the sugars down.

## 2021-01-28 ENCOUNTER — NURSE ONLY (OUTPATIENT)
Dept: LAB | Age: 71
End: 2021-01-28

## 2021-01-28 DIAGNOSIS — I87.2 VENOUS STASIS DERMATITIS OF BOTH LOWER EXTREMITIES: ICD-10-CM

## 2021-01-28 DIAGNOSIS — E11.42 DIABETIC PERIPHERAL NEUROPATHY (HCC): ICD-10-CM

## 2021-01-28 LAB
ANION GAP SERPL CALCULATED.3IONS-SCNC: 6 MEQ/L (ref 8–16)
AVERAGE GLUCOSE: 180 MG/DL (ref 70–126)
BUN BLDV-MCNC: 35 MG/DL (ref 7–22)
CHLORIDE BLD-SCNC: 100 MEQ/L (ref 98–111)
CO2: 30 MEQ/L (ref 23–33)
CREAT SERPL-MCNC: 1.5 MG/DL (ref 0.4–1.2)
GFR SERPL CREATININE-BSD FRML MDRD: 46 ML/MIN/1.73M2
HBA1C MFR BLD: 8 % (ref 4.4–6.4)
POTASSIUM SERPL-SCNC: 5.1 MEQ/L (ref 3.5–5.2)
SODIUM BLD-SCNC: 136 MEQ/L (ref 135–145)

## 2021-01-29 ENCOUNTER — TELEPHONE (OUTPATIENT)
Dept: FAMILY MEDICINE CLINIC | Age: 71
End: 2021-01-29

## 2021-02-03 ENCOUNTER — TELEPHONE (OUTPATIENT)
Dept: PHYSICAL MEDICINE AND REHAB | Age: 71
End: 2021-02-03

## 2021-02-03 ENCOUNTER — OFFICE VISIT (OUTPATIENT)
Dept: PHYSICAL MEDICINE AND REHAB | Age: 71
End: 2021-02-03
Payer: MEDICARE

## 2021-02-03 VITALS
SYSTOLIC BLOOD PRESSURE: 126 MMHG | HEART RATE: 88 BPM | BODY MASS INDEX: 40.43 KG/M2 | WEIGHT: 315 LBS | DIASTOLIC BLOOD PRESSURE: 70 MMHG | TEMPERATURE: 97.6 F | HEIGHT: 74 IN

## 2021-02-03 DIAGNOSIS — M16.0 PRIMARY OSTEOARTHRITIS OF BOTH HIPS: ICD-10-CM

## 2021-02-03 DIAGNOSIS — G62.9 NEUROPATHY: ICD-10-CM

## 2021-02-03 DIAGNOSIS — G89.4 CHRONIC PAIN SYNDROME: ICD-10-CM

## 2021-02-03 DIAGNOSIS — M48.07 LUMBOSACRAL SPINAL STENOSIS: ICD-10-CM

## 2021-02-03 DIAGNOSIS — M54.17 LUMBOSACRAL RADICULITIS: ICD-10-CM

## 2021-02-03 DIAGNOSIS — M46.1 SI (SACROILIAC) JOINT INFLAMMATION (HCC): Primary | ICD-10-CM

## 2021-02-03 DIAGNOSIS — M54.16 LUMBAR RADICULITIS: ICD-10-CM

## 2021-02-03 DIAGNOSIS — M47.816 LUMBAR SPONDYLOSIS: ICD-10-CM

## 2021-02-03 DIAGNOSIS — M48.061 LUMBAR FORAMINAL STENOSIS: ICD-10-CM

## 2021-02-03 PROCEDURE — 4040F PNEUMOC VAC/ADMIN/RCVD: CPT | Performed by: NURSE PRACTITIONER

## 2021-02-03 PROCEDURE — 99213 OFFICE O/P EST LOW 20 MIN: CPT | Performed by: NURSE PRACTITIONER

## 2021-02-03 PROCEDURE — G8427 DOCREV CUR MEDS BY ELIG CLIN: HCPCS | Performed by: NURSE PRACTITIONER

## 2021-02-03 PROCEDURE — 1123F ACP DISCUSS/DSCN MKR DOCD: CPT | Performed by: NURSE PRACTITIONER

## 2021-02-03 PROCEDURE — G8482 FLU IMMUNIZE ORDER/ADMIN: HCPCS | Performed by: NURSE PRACTITIONER

## 2021-02-03 PROCEDURE — 3017F COLORECTAL CA SCREEN DOC REV: CPT | Performed by: NURSE PRACTITIONER

## 2021-02-03 PROCEDURE — G8417 CALC BMI ABV UP PARAM F/U: HCPCS | Performed by: NURSE PRACTITIONER

## 2021-02-03 PROCEDURE — 1036F TOBACCO NON-USER: CPT | Performed by: NURSE PRACTITIONER

## 2021-02-03 ASSESSMENT — ENCOUNTER SYMPTOMS
COUGH: 0
NAUSEA: 0
RHINORRHEA: 0
SINUS PRESSURE: 0
PHOTOPHOBIA: 0
ABDOMINAL PAIN: 0
SORE THROAT: 0
CHEST TIGHTNESS: 0
DIARRHEA: 0
WHEEZING: 0
COLOR CHANGE: 0
SHORTNESS OF BREATH: 0
VOMITING: 0
BACK PAIN: 1
EYE PAIN: 0
CONSTIPATION: 0

## 2021-02-03 NOTE — PROGRESS NOTES
[facility] [address]  Dept: [phone]  Dept Fax: [phone]: [phone]    Visit Date: 2/3/2021    Functionality Assessment/Goals Worksheet     On a scale of 0 (Does not Interfere) to 10 (Completely Interferes)     1. Which number describes how during the past week pain has interfered with       the following:  A. General Activity:  8  B. Mood: 1  C. Walking Ability:  4  D. Normal Work (Includes both work outside the home and housework):  5  E. Relations with Other People:   0  F. Sleep:   3  G. Enjoyment of Life:   2    2. Patient Prefers to Take their Pain Medications:     []  On a regular basis   [x]  Only when necessary    []  Does not take pain medications    3. What are the Patient's Goals/Expectations for Visiting Pain Management? [x]  Learn about my pain    []  Receive Medication   []  Physical Therapy     []  Treat Depression   []  Receive Injections    []  Treat Sleep   []  Deal with Anxiety and Stress   []  Treat Opoid Dependence/Addiction   [x]  Other:Brilinta and ASA 81mg    HPI:   Roscoe Hansen is a 65 y.o. male is here today for    Chief Complaint: Low back pain, Mid back pain, Right leg pain, Left leg pain and Hip pain  SI pain  HPI   F/U  Bilateral SI MBB #1 1/18/2021 states he received about 80% pain relief for 3 days. He continues to have  Mid back low back bilateral Si  Hip pain. He has had TFLESI L5 bilateral lasted 3 weeks. He had Lumbar RFA  Left 10/2020 Right 7/2020 with 50% pain relief. E uses a cane for assistance. He takes Motrin Tylenol  PRN rarely. He denies any ER visits since last visit. Pain scale with out pain medications or at its worst is 9/10. Pain scale with pain medications or at its best is 0/10. .       The patientis allergic to horse-derived products.     Past Medical History  Tk Hayes  has a past medical history of RAUL (acute kidney injury) ([facility] [address] ASHD (arteriosclerotic heart disease), Depression, Diabetic peripheral neuropathy ([facility] [address] Fracture, HTN (hypertension), Hypercholesteremia, IDDM (insulin dependent diabetes mellitus), Low back pain, Morbid obesity with BMI of 45.0-49.9, adult ([facility] [address] and Osteoarthritis. Past Surgical History  The patient  has a past surgical history that includes Cholecystectomy; Rotator cuff repair; Cervical disc surgery (2000); Appendectomy; Spine surgery (2010); Colonoscopy (2007 and 2011); joint replacement; Tonsillectomy; amputation (Left, 9/10/14); EKG 12 Lead (8/14/2015); Coronary artery bypass graft (2015 august ); Cardiac surgery; vascular surgery; fracture surgery; Coronary angioplasty with stent (08/07/2017); POSTERIOR FUSION THORACIC SPINE (N/A, 12/28/2018); Pain management procedure (Bilateral, 1/28/2020); Facet joint injection (Bilateral, 5/22/2020); Pain management procedure (Right, 7/14/2020); Pain management procedure (Left, 10/1/2020); Pain management procedure (Bilateral, 11/17/2020); Pain management procedure (Bilateral, 12/10/2020); and Back Injection (Bilateral, 1/18/2021). Family History  This patient's family history includes Cancer in his mother. Social History  Lyatiss  reports that he has never smoked. He has never used smokeless tobacco. He reports current alcohol use. He reports that he does not use drugs.     Medications    Current Outpatient Medications:     insulin aspart (NOVOLOG FLEXPEN) 100 UNIT/ML injection pen, Inject 18 Units into the skin 3 times daily (before meals) INJECT 12 UNITS SUBCUTANEOUSLY 3 TIMES DAILY BEFORE MEALS, Disp: 10 pen, Rfl: 3    furosemide (LASIX) 80 MG tablet, Take 1 tablet by mouth daily, Disp: 60 tablet, Rfl: 3    carvedilol (COREG) 12.5 MG tablet, Take 1 tablet by mouth 2 times daily, Disp: 180 tablet, Rfl: 3    amLODIPine (NORVASC) 5 MG tablet, Take 1 tablet by mouth daily, Disp: 90 tablet, Rfl: 3    enalapril (VASOTEC) 10 MG tablet, TAKE 2 TABLETS BY MOUTH TWICE DAILY, Disp: 360 tablet, Rfl: 1    ticagrelor (BRILINTA) 90 MG TABS tablet, TAKE ONE TABLET BY MOUTH TWICE DAILY, Disp: 60 tablet, Rfl: 3    insulin glargine (LANTUS SOLOSTAR) 100 UNIT/ML injection pen, INJECT 30 UNITS SUBCUTANEOUSLY ONCE DAILY, Disp: 15 pen, Rfl: 1    metFORMIN (GLUCOPHAGE) 500 MG tablet, TAKE TWO TABLETS BY MOUTH TWICE DAILY WITH MEALS, Disp: 360 tablet, Rfl: 1    atorvastatin (LIPITOR) 10 MG tablet, TAKE 1 TABLET BY MOUTH ONCE DAILY, Disp: 90 tablet, Rfl: 1    meclizine (ANTIVERT) 25 MG tablet, Take 1 tablet by mouth every 6 hours as needed for Dizziness, Disp: 40 tablet, Rfl: 0    ACCU-CHEK SMARTVIEW strip, Use to test Blood Sugar 3x daily, Dx: E11.9, Disp: 100 each, Rfl: 11    nitroGLYCERIN (NITROSTAT) 0.4 MG SL tablet, Place 1 tablet under the tongue every 5 minutes as needed for Chest pain, Disp: 25 tablet, Rfl: 3    ferrous sulfate 325 (65 Fe) MG tablet, Take 1 tablet by mouth 2 times daily, Disp: 60 tablet, Rfl: 2    Multiple Vitamins-Minerals (THERAPEUTIC MULTIVITAMIN-MINERALS) tablet, Take 1 tablet by mouth daily, Disp: 30 tablet, Rfl: 11    aspirin 81 MG tablet, Take 1 tablet by mouth daily, Disp: , Rfl:     acetaminophen (TYLENOL) 500 MG tablet, Take 500 mg by mouth as needed for Pain, Disp: , Rfl:     Subjective:      Review of Systems   Constitutional: Positive for activity change. Negative for appetite change, chills, diaphoresis, fatigue, fever and unexpected weight change. HENT: Negative for congestion, ear pain, hearing loss, mouth sores, nosebleeds, rhinorrhea, sinus pressure and sore throat. Eyes: Negative for photophobia, pain and visual disturbance. Respiratory: Negative for cough, chest tightness, shortness of breath and wheezing. Cardiovascular: Positive for leg swelling. Negative for chest pain and palpitations.         CABG HTN MI  Has BLE lymphedema 3+ pitting edema   Gastrointestinal: Negative for abdominal pain, constipation, diarrhea, nausea and vomiting. GERD   Endocrine: Negative for cold intolerance, heat intolerance, polydipsia, polyphagia and polyuria. DM   Genitourinary: Negative for decreased urine volume, difficulty urinating, frequency and hematuria. Musculoskeletal: Positive for arthralgias, back pain, gait problem, myalgias, neck pain and neck stiffness. Negative for joint swelling. Skin: Negative for color change and rash. Allergic/Immunologic: Negative for food allergies and immunocompromised state. Neurological: Positive for weakness and numbness. Negative for dizziness, tremors, syncope, facial asymmetry, speech difficulty and light-headedness. Hematological: Does not bruise/bleed easily. Psychiatric/Behavioral: Negative for agitation, behavioral problems, confusion, decreased concentration, dysphoric mood, hallucinations, self-injury, sleep disturbance and suicidal ideas. The patient is not nervous/anxious and is not hyperactive. Depression        Objective:     Vitals:    02/03/21 1315   BP: 126/70   Site: Left Upper Arm   Position: Sitting   Cuff Size: Large Adult   Pulse: 88   Temp: 97.6 °F (36.4 °C)   TempSrc: Temporal   Weight: (!) 383 lb (173.7 kg)   Height: 6' 2\" (1.88 m)       Physical Exam  Vitals signs and nursing note reviewed. Constitutional:       General: He is not in acute distress. Appearance: He is well-developed. He is not diaphoretic. HENT:      Head: Normocephalic and atraumatic. Right Ear: External ear normal.      Left Ear: External ear normal.      Nose: Nose normal.      Mouth/Throat:      Pharynx: No oropharyngeal exudate. Eyes:      General: No scleral icterus. Right eye: No discharge. Left eye: No discharge. Conjunctiva/sclera: Conjunctivae normal.      Pupils: Pupils are equal, round, and reactive to light.    Neck:      Musculoskeletal: Full passive range of motion without pain, normal range of motion and neck supple. Normal range of motion. No edema, erythema, neck rigidity or muscular tenderness. Thyroid: No thyromegaly. Cardiovascular:      Rate and Rhythm: Normal rate and regular rhythm. Heart sounds: Normal heart sounds. No murmur. No friction rub. No gallop. Pulmonary:      Effort: Pulmonary effort is normal. No respiratory distress. Breath sounds: Normal breath sounds. No wheezing or rales. Chest:      Chest wall: No tenderness. Abdominal:      General: Bowel sounds are normal. There is no distension. Palpations: Abdomen is soft. Tenderness: There is no abdominal tenderness. There is no guarding or rebound. Musculoskeletal:         General: Tenderness present. Right shoulder: He exhibits tenderness. Left shoulder: He exhibits tenderness. Right hip: He exhibits decreased range of motion, decreased strength, tenderness and bony tenderness. Left hip: He exhibits decreased range of motion, decreased strength, tenderness and bony tenderness. Right knee: He exhibits decreased range of motion. Tenderness found. Left knee: He exhibits decreased range of motion. Tenderness found. Right ankle: He exhibits swelling. Left ankle: He exhibits swelling. Cervical back: He exhibits decreased range of motion, tenderness and bony tenderness. Thoracic back: He exhibits decreased range of motion, tenderness and bony tenderness. Lumbar back: He exhibits decreased range of motion, tenderness, bony tenderness, pain and spasm. Back:       Right upper leg: He exhibits tenderness. Left upper leg: He exhibits tenderness. Right lower leg: Edema present. Left lower leg: Edema present. Right foot: Tenderness and swelling present. Left foot: Tenderness and swelling present.       Comments: H/O Cervical surgery C5-6    T6-T9 PSF Lumbar Nj Decompression L2-S1  Right shoulder rotator cuff surgery  BTKR  Had 3 steel rods in LLE but hardware was removed. BLE lymphedema    Skin:     General: Skin is warm. Coloration: Skin is not pale. Findings: No erythema or rash. Neurological:      Mental Status: He is alert and oriented to person, place, and time. He is not disoriented. Cranial Nerves: Cranial nerves are intact. No cranial nerve deficit. Sensory: Sensation is intact. No sensory deficit. Motor: Weakness present. No atrophy or abnormal muscle tone. Coordination: Coordination is intact. Coordination normal.      Gait: Gait abnormal.      Deep Tendon Reflexes: Reflexes are normal and symmetric. Babinski sign absent on the right side. Reflex Scores:       Tricep reflexes are 2+ on the right side and 2+ on the left side. Bicep reflexes are 2+ on the right side and 2+ on the left side. Brachioradialis reflexes are 2+ on the right side and 2+ on the left side. Patellar reflexes are 2+ on the right side and 2+ on the left side. Achilles reflexes are 2+ on the right side and 2+ on the left side. Psychiatric:         Attention and Perception: Attention normal. He is attentive. Mood and Affect: Mood normal. Mood is not anxious or depressed. Affect is not labile, blunt, angry or inappropriate. Speech: Speech normal. He is communicative. Speech is not rapid and pressured, delayed, slurred or tangential.         Behavior: Behavior normal. Behavior is not agitated, slowed, aggressive, withdrawn, hyperactive or combative. Thought Content: Thought content normal. Thought content is not paranoid or delusional. Thought content does not include homicidal or suicidal ideation. Thought content does not include homicidal or suicidal plan. Cognition and Memory: Cognition normal. Memory is not impaired. He does not exhibit impaired recent memory or impaired remote memory. Judgment: Judgment normal. Judgment is not impulsive or inappropriate. ANNETTA test: +  Yeomans test: +  Gaenslen test:+     Assessment:     1. SI (sacroiliac) joint inflammation (HCC)    2. Lumbosacral radiculitis    3. Lumbosacral spinal stenosis    4. Primary osteoarthritis of both hips    5. Lumbar radiculitis    6. Lumbar foraminal stenosis    7. Lumbar spondylosis    8. Neuropathy    9. Chronic pain syndrome            Plan:      · OARRS reviewed. Current MED: 0  · Patient was not offered naloxone for home. · Discussed long term side effects of medications, tolerance, dependency and addiction. · Previous UDS reviewed  · UDS preformed today for compliance. · Patient told can not receive any pain medications from any other source. · No evidence of abuse, diversion or aberrant behavior.  Medications and/or procedures to improve function and quality of life- patient understanding with this and that may not be pain free   Discussed with patient about safe storage of medications at home   Discussed possible weaning of medication dosing dependent on treatment/procedure results.  Testing: none   Procedures: Bilateral SI MBB #2   Discussed with patient about risks with procedure including infection, reaction to medication, increased pain, or bleeding.  Medications:Motrin Tylenol    If patient is on blood thinners will need approval to hold: [place] Per DR [facility]. Prescribed:   No orders of the defined types were placed in this encounter. Return for Bilateral SI MBB #2, Follow up after procedure.                Electronically signed by ABHISHEK Rivas CNP on2/3/2021 at 1:34 PM

## 2021-02-04 ENCOUNTER — OFFICE VISIT (OUTPATIENT)
Dept: CARDIOLOGY CLINIC | Age: 71
End: 2021-02-04
Payer: MEDICARE

## 2021-02-04 VITALS
HEIGHT: 74 IN | DIASTOLIC BLOOD PRESSURE: 80 MMHG | SYSTOLIC BLOOD PRESSURE: 162 MMHG | HEART RATE: 90 BPM | WEIGHT: 315 LBS | BODY MASS INDEX: 40.43 KG/M2

## 2021-02-04 DIAGNOSIS — I10 ESSENTIAL HYPERTENSION: ICD-10-CM

## 2021-02-04 DIAGNOSIS — R06.02 SOB (SHORTNESS OF BREATH): ICD-10-CM

## 2021-02-04 DIAGNOSIS — I25.708 CORONARY ARTERY DISEASE OF BYPASS GRAFT OF NATIVE HEART WITH STABLE ANGINA PECTORIS (HCC): Primary | ICD-10-CM

## 2021-02-04 PROCEDURE — 99213 OFFICE O/P EST LOW 20 MIN: CPT | Performed by: NUCLEAR MEDICINE

## 2021-02-04 PROCEDURE — 1036F TOBACCO NON-USER: CPT | Performed by: NUCLEAR MEDICINE

## 2021-02-04 PROCEDURE — G8417 CALC BMI ABV UP PARAM F/U: HCPCS | Performed by: NUCLEAR MEDICINE

## 2021-02-04 PROCEDURE — 3017F COLORECTAL CA SCREEN DOC REV: CPT | Performed by: NUCLEAR MEDICINE

## 2021-02-04 PROCEDURE — G8428 CUR MEDS NOT DOCUMENT: HCPCS | Performed by: NUCLEAR MEDICINE

## 2021-02-04 PROCEDURE — 93000 ELECTROCARDIOGRAM COMPLETE: CPT | Performed by: NUCLEAR MEDICINE

## 2021-02-04 PROCEDURE — 1123F ACP DISCUSS/DSCN MKR DOCD: CPT | Performed by: NUCLEAR MEDICINE

## 2021-02-04 PROCEDURE — G8482 FLU IMMUNIZE ORDER/ADMIN: HCPCS | Performed by: NUCLEAR MEDICINE

## 2021-02-04 PROCEDURE — 4040F PNEUMOC VAC/ADMIN/RCVD: CPT | Performed by: NUCLEAR MEDICINE

## 2021-02-04 NOTE — PROGRESS NOTES
[address] [facility]  [address]  [address]  [facility] [address]  Dept: [phone]  Dept Fax: [phone]  Loc: [phone]    Visit Date: 2/4/2021    Sania Osuna is a 65 y.o. male who presents todayfor:  Chief Complaint   Patient presents with    Hypertension    Coronary Artery Disease    Hyperlipidemia   know LAD stent and CABG   No chest pain   No changes in breathing  Severe obesity   Going for back injections   Does have some neck pain   BP is stable   No dizziness  No syncope  Higher Bp today       HPI:  HPI  Past Medical History:   Diagnosis Date    RAUL (acute kidney injury) ([facility] [address] 2/13/2020    ASHD (arteriosclerotic heart disease) 2005 2006    stent  baki    Depression     Diabetic peripheral neuropathy ([facility] [address] 2014    Fracture Oct 1984    left lower extremity     HTN (hypertension)     Hypercholesteremia     IDDM (insulin dependent diabetes mellitus)     Low back pain     Morbid obesity with BMI of 45.0-49.9, adult ([facility] [address]     Osteoarthritis       Past Surgical History:   Procedure Laterality Date    AMPUTATION Left 9/10/14    partial versus complete left hallux amputation, left great toe amputation    APPENDECTOMY      BACK INJECTION Bilateral 1/18/2021    Bilateral Si MBB #1 performed by Sid Londono MD at [facility]    fusion of C5-C6    CHOLECYSTECTOMY      COLONOSCOPY  2007 and 2011    colonn polyps  lorenzo    CORONARY ANGIOPLASTY WITH STENT PLACEMENT  08/07/2017    Dr Nitish Frias @ [address] GRAFT  2015 august     dox    EKG 12-LEAD  8/14/2015         FACET JOINT INJECTION Bilateral 5/22/2020    Lumbar Facet MBB @L3-4,4-5 bilateral #2 performed by Sid Londono MD at [address] [place] [address]      left leg    JOINT REPLACEMENT      bilateral knees gurvinder    PAIN MANAGEMENT PROCEDURE Bilateral 1/28/2020    Lumbar Facet MBB @ L3-4,4-5,5-S1 bilateral #1 LUMBAR FACET performed by Georgina Jenkins MD at [facility] [address] Right 7/14/2020    Lumbar RFA Bilateral L3-4,4-5  right side first performed by Georgina Jenkins MD at [facility] [address] Left 10/1/2020    Lumbar RFA Left side L3-4, 4-5 performed by Georgina Jenkins MD at [place] [address] Bilateral 11/17/2020    TFLESI @L5 bilateral #1 performed by Georgina Jenkins MD at [place] [address] Bilateral 12/10/2020    TFLESI @L5 bilateral #1 performed by Georgina Jenkins MD at [address] [facility] N/A 12/28/2018    T7-T9 DECOMPRESSION, T7-T9 POSTERIOR FUSION performed by Lianne Cabrera MD at [address]  2010    fumich    TONSILLECTOMY      VASCULAR SURGERY      cabg harvests from left leg     Family History   Problem Relation Age of Onset    Cancer Mother         uterine     Social History     Tobacco Use    Smoking status: Never Smoker    Smokeless tobacco: Never Used   Substance Use Topics    Alcohol use: Yes     Comment: rarely      Current Outpatient Medications   Medication Sig Dispense Refill    insulin aspart (NOVOLOG FLEXPEN) 100 UNIT/ML injection pen Inject 18 Units into the skin 3 times daily (before meals) INJECT 12 UNITS SUBCUTANEOUSLY 3 TIMES DAILY BEFORE MEALS 10 pen 3    furosemide (LASIX) 80 MG tablet Take 1 tablet by mouth daily 60 tablet 3    carvedilol (COREG) 12.5 MG tablet Take 1 tablet by mouth 2 times daily 180 tablet 3    amLODIPine (NORVASC) 5 MG tablet Take 1 tablet by mouth daily 90 tablet 3    enalapril (VASOTEC) 10 MG tablet TAKE 2 TABLETS BY MOUTH TWICE DAILY 360 tablet 1    ticagrelor (BRILINTA) 90 MG TABS tablet TAKE ONE TABLET BY MOUTH TWICE DAILY 60 tablet 3    insulin glargine (LANTUS SOLOSTAR) 100 UNIT/ML injection pen INJECT 30 UNITS SUBCUTANEOUSLY ONCE DAILY 15 pen 1    metFORMIN (GLUCOPHAGE) 500 MG tablet TAKE TWO TABLETS BY MOUTH TWICE DAILY WITH MEALS 360 tablet 1    atorvastatin (LIPITOR) 10 MG tablet TAKE 1 TABLET BY MOUTH ONCE DAILY 90 tablet 1    meclizine (ANTIVERT) 25 MG tablet Take 1 tablet by mouth every 6 hours as needed for Dizziness 40 tablet 0    ACCU-CHEK SMARTVIEW strip Use to test Blood Sugar 3x daily, Dx: E11.9 100 each 11    nitroGLYCERIN (NITROSTAT) 0.4 MG SL tablet Place 1 tablet under the tongue every 5 minutes as needed for Chest pain 25 tablet 3    ferrous sulfate 325 (65 Fe) MG tablet Take 1 tablet by mouth 2 times daily 60 tablet 2    Multiple Vitamins-Minerals (THERAPEUTIC MULTIVITAMIN-MINERALS) tablet Take 1 tablet by mouth daily 30 tablet 11    aspirin 81 MG tablet Take 1 tablet by mouth daily      acetaminophen (TYLENOL) 500 MG tablet Take 500 mg by mouth as needed for Pain       No current facility-administered medications for this visit.       Allergies   Allergen Reactions    Horse-Derived Products      Health Maintenance   Topic Date Due    COVID-19 Vaccine (1 of 2) 07/01/1966    Shingles Vaccine (1 of 2) 07/01/1998    Diabetic foot exam  09/16/2016    Pneumococcal 65+ years Vaccine (2 of 2 - PPSV23) 06/30/2017    PSA counseling  05/30/2018    Diabetic microalbuminuria test  01/11/2019    Diabetic retinal exam  06/20/2019    Lipid screen  10/17/2019    Colon cancer screen colonoscopy  09/21/2021    Annual Wellness Visit (AWV)  09/30/2021    A1C test (Diabetic or Prediabetic)  01/28/2022    Potassium monitoring  01/28/2022    Creatinine monitoring  01/28/2022    DTaP/Tdap/Td vaccine (3 - Td) 01/08/2028    Flu vaccine  Completed    Hepatitis C screen  Completed    Hepatitis A vaccine  Aged Out    Hib vaccine  Aged Out    Meningococcal (ACWY) vaccine  Aged Out       Subjective:  Review of Systems  General:   No fever, no chills, some fatigue or weight loss  Pulmonary:    some baseline dyspnea, no wheezing  Cardiac:    Denies recent chest pain,   GI:     No nausea or vomiting, no abdominal pain  Neuro:    No dizziness or light headedness,   Musculoskeletal:  No recent active issues  Extremities:   No edema, no obvious claudication       Objective:  Physical Exam  BP (!) 162/80   Pulse 90   Ht 6' 2\" (1.88 m)   Wt (!) 376 lb (170.6 kg)   BMI 48.28 kg/m²   General:   Well developed, well nourished  Lungs:   Clear to auscultation  Heart:    Normal S1 S2, Slight murmur. no rubs, no gallops  Abdomen:   Soft, non tender, no organomegalies, positive bowel sounds  Extremities:   No edema, no cyanosis, good peripheral pulses  Neurological:   Awake, alert, oriented. No obvious focal deficits  Musculoskelatal:  No obvious deformities    Assessment:      Diagnosis Orders   1. 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 SOB (shortness of breath)  EKG 12 Lead   3. Essential hypertension     as above  Higher risk patient   Seems fair for now   ECG in office was done today. I reviewed the ECG. No acute findings      Plan:  No follow-ups on file. As above  Cardiac fair   Continue risk factor modification and medical management  Thank you for allowing me to participate in the care of your patient. Please don't hesitate to contact me regarding any further issues related to the patient care    Orders Placed:  Orders Placed This Encounter   Procedures    EKG 12 Lead     Order Specific Question:   Reason for Exam?     Answer: Other       Medications Prescribed:  No orders of the defined types were placed in this encounter. Discussed use, benefit, and side effects of prescribed medications. All patient questions answered. Pt voicedunderstanding. Instructed to continue current medications, diet and exercise. Continue risk factor modification and medical management.  Patient agreed with treatment plan. Follow up as directed.     Electronically signedby Ventura Montgomery MD on 2/4/2021 at 1:06 PM

## 2021-02-24 RX ORDER — BLOOD-GLUCOSE METER
EACH MISCELLANEOUS
Qty: 1 KIT | Refills: 0 | Status: SHIPPED | OUTPATIENT
Start: 2021-02-24 | End: 2022-04-05

## 2021-02-24 NOTE — TELEPHONE ENCOUNTER
Adriane Morgan called requesting a refill of the below medication which has been pended for you:     Requested Prescriptions     Pending Prescriptions Disp Refills    Blood Glucose Monitoring Suppl (ACCU-CHEK WAQAR SMARTVIEW) w/Device KIT 1 kit 0     Sig: Pt is to use to test blood sugar three times a day DX : E11.9       Last Appointment Date: 1/6/2021  Next Appointment Date: 4/9/2021    Allergies   Allergen Reactions    Horse-Derived Products        Please send to :    [facility]    Pt called and said that his meter is not working and he needs a new one.

## 2021-02-25 ENCOUNTER — TELEPHONE (OUTPATIENT)
Dept: PHYSICAL MEDICINE AND REHAB | Age: 71
End: 2021-02-25

## 2021-03-01 ENCOUNTER — HOSPITAL ENCOUNTER (OUTPATIENT)
Age: 71
Setting detail: OUTPATIENT SURGERY
Discharge: HOME | End: 2021-03-01
Attending: PAIN MEDICINE | Admitting: PAIN MEDICINE
Payer: MEDICARE

## 2021-03-01 ENCOUNTER — APPOINTMENT (OUTPATIENT)
Dept: GENERAL RADIOLOGY | Age: 71
End: 2021-03-01
Attending: PAIN MEDICINE
Payer: MEDICARE

## 2021-03-01 ENCOUNTER — ANESTHESIA EVENT (OUTPATIENT)
Dept: OPERATING ROOM | Age: 71
End: 2021-03-01
Payer: MEDICARE

## 2021-03-01 ENCOUNTER — ANESTHESIA (OUTPATIENT)
Dept: OPERATING ROOM | Age: 71
End: 2021-03-01
Payer: MEDICARE

## 2021-03-01 VITALS
DIASTOLIC BLOOD PRESSURE: 101 MMHG | OXYGEN SATURATION: 99 % | RESPIRATION RATE: 10 BRPM | SYSTOLIC BLOOD PRESSURE: 205 MMHG

## 2021-03-01 VITALS
BODY MASS INDEX: 40.43 KG/M2 | WEIGHT: 315 LBS | SYSTOLIC BLOOD PRESSURE: 164 MMHG | RESPIRATION RATE: 16 BRPM | HEART RATE: 82 BPM | TEMPERATURE: 97.5 F | OXYGEN SATURATION: 98 % | DIASTOLIC BLOOD PRESSURE: 74 MMHG | HEIGHT: 74 IN

## 2021-03-01 LAB — GLUCOSE BLD-MCNC: 268 MG/DL (ref 70–108)

## 2021-03-01 PROCEDURE — 2580000003 HC RX 258: Performed by: NURSE ANESTHETIST, CERTIFIED REGISTERED

## 2021-03-01 PROCEDURE — 27096 INJECT SACROILIAC JOINT: CPT | Performed by: PAIN MEDICINE

## 2021-03-01 PROCEDURE — 3209999900 FLUORO FOR SURGICAL PROCEDURES

## 2021-03-01 PROCEDURE — 6360000002 HC RX W HCPCS: Performed by: PAIN MEDICINE

## 2021-03-01 PROCEDURE — 7100000010 HC PHASE II RECOVERY - FIRST 15 MIN: Performed by: PAIN MEDICINE

## 2021-03-01 PROCEDURE — 82948 REAGENT STRIP/BLOOD GLUCOSE: CPT

## 2021-03-01 PROCEDURE — 3700000000 HC ANESTHESIA ATTENDED CARE: Performed by: PAIN MEDICINE

## 2021-03-01 PROCEDURE — 7100000011 HC PHASE II RECOVERY - ADDTL 15 MIN: Performed by: PAIN MEDICINE

## 2021-03-01 PROCEDURE — 3600000054 HC PAIN LEVEL 3 BASE: Performed by: PAIN MEDICINE

## 2021-03-01 PROCEDURE — 6360000004 HC RX CONTRAST MEDICATION: Performed by: PAIN MEDICINE

## 2021-03-01 PROCEDURE — 6360000002 HC RX W HCPCS: Performed by: NURSE ANESTHETIST, CERTIFIED REGISTERED

## 2021-03-01 PROCEDURE — 2709999900 HC NON-CHARGEABLE SUPPLY: Performed by: PAIN MEDICINE

## 2021-03-01 PROCEDURE — 2500000003 HC RX 250 WO HCPCS: Performed by: PAIN MEDICINE

## 2021-03-01 RX ORDER — BUPIVACAINE HYDROCHLORIDE 2.5 MG/ML
INJECTION, SOLUTION EPIDURAL; INFILTRATION; INTRACAUDAL PRN
Status: DISCONTINUED | OUTPATIENT
Start: 2021-03-01 | End: 2021-03-01 | Stop reason: ALTCHOICE

## 2021-03-01 RX ORDER — METHYLPREDNISOLONE ACETATE 80 MG/ML
INJECTION, SUSPENSION INTRA-ARTICULAR; INTRALESIONAL; INTRAMUSCULAR; SOFT TISSUE PRN
Status: DISCONTINUED | OUTPATIENT
Start: 2021-03-01 | End: 2021-03-01 | Stop reason: ALTCHOICE

## 2021-03-01 RX ORDER — PROPOFOL 10 MG/ML
INJECTION, EMULSION INTRAVENOUS PRN
Status: DISCONTINUED | OUTPATIENT
Start: 2021-03-01 | End: 2021-03-01 | Stop reason: SDUPTHER

## 2021-03-01 RX ORDER — SODIUM CHLORIDE 9 MG/ML
INJECTION INTRAVENOUS PRN
Status: DISCONTINUED | OUTPATIENT
Start: 2021-03-01 | End: 2021-03-01 | Stop reason: SDUPTHER

## 2021-03-01 RX ORDER — LIDOCAINE HYDROCHLORIDE 10 MG/ML
INJECTION, SOLUTION INFILTRATION; PERINEURAL PRN
Status: DISCONTINUED | OUTPATIENT
Start: 2021-03-01 | End: 2021-03-01 | Stop reason: ALTCHOICE

## 2021-03-01 RX ADMIN — SODIUM CHLORIDE 5 ML: 9 INJECTION, SOLUTION INTRAMUSCULAR; INTRAVENOUS; SUBCUTANEOUS at 10:51

## 2021-03-01 RX ADMIN — PROPOFOL 50 MG: 10 INJECTION, EMULSION INTRAVENOUS at 10:51

## 2021-03-01 ASSESSMENT — PULMONARY FUNCTION TESTS
PIF_VALUE: 0

## 2021-03-01 ASSESSMENT — PAIN - FUNCTIONAL ASSESSMENT: PAIN_FUNCTIONAL_ASSESSMENT: 0-10

## 2021-03-01 NOTE — ANESTHESIA POSTPROCEDURE EVALUATION
Department of Anesthesiology  Postprocedure Note    Patient: Regina Sheikh  MRN: [id number]  YOB: 1950  Date of evaluation: 3/1/2021  Time:  11:29 AM     Procedure Summary     Date: 03/01/21 [facility] / [facility] [address] [place] [address] [facility]    Anesthesia Start: 1900 Anesthesia Stop: 7778    Procedure: Bilateral SI MBB #2 (Bilateral ) Diagnosis: (Lumbar radiculitis)    Surgeons: Kellie Carmichael MD Responsible Provider: Anshul Palomino DO    Anesthesia Type: MAC ASA Status: 3          Anesthesia Type: MAC    Estelle Phase I:      Estelle Phase II: Estelle Score: 10    Last vitals: Reviewed and per EMR flowsheets.        Anesthesia Post Evaluation    Patient location during evaluation: bedside  Patient participation: complete - patient participated  Level of consciousness: awake  Airway patency: patent  Nausea & Vomiting: no nausea  Complications: no  Cardiovascular status: hemodynamically stable  Respiratory status: acceptable  Hydration status: stable

## 2021-03-01 NOTE — H&P
[facility]  History and Physical Update    Pt Name: Snaia Osuna  MRN: [id number]  YOB: 1950  Date of evaluation: 3/1/2021      I have examined the patient and reviewed the H&P/Consult and there are no changes to the patient or plans.         Electronically signed by Sid Londono MD on 3/1/2021 at 10:01 AM

## 2021-03-01 NOTE — H&P
H&P    F/U  Bilateral SI MBB #1 1/18/2021 states he received about 80% pain relief for 3 days. He continues to have  Mid back low back bilateral Si  Hip pain. He has had TFLESI L5 bilateral lasted 3 weeks. He had Lumbar RFA  Left 10/2020 Right 7/2020 with 50% pain relief. E uses a cane for assistance. He takes Motrin Tylenol  PRN rarely.           He denies any ER visits since last visit.     Pain scale with out pain medications or at its worst is 9/10. Pain scale with pain medications or at its best is 0/10. .         The patientis allergic to horse-derived products.     Past Medical History  Alonzo Wolf  has a past medical history of RAUL (acute kidney injury) ([address] ASHD (arteriosclerotic heart disease), Depression, Diabetic peripheral neuropathy ([address] Fracture, HTN (hypertension), Hypercholesteremia, IDDM (insulin dependent diabetes mellitus), Low back pain, Morbid obesity with BMI of 45.0-49.9, adult ([address] and Osteoarthritis.     Past Surgical History  The patient  has a past surgical history that includes Cholecystectomy; Rotator cuff repair; Cervical disc surgery (2000); Appendectomy; Spine surgery (2010); Colonoscopy (2007 and 2011); joint replacement; Tonsillectomy; amputation (Left, 9/10/14); EKG 12 Lead (8/14/2015); Coronary artery bypass graft (2015 august ); Cardiac surgery; vascular surgery; fracture surgery; Coronary angioplasty with stent (08/07/2017); POSTERIOR FUSION THORACIC SPINE (N/A, 12/28/2018); Pain management procedure (Bilateral, 1/28/2020); Facet joint injection (Bilateral, 5/22/2020); Pain management procedure (Right, 7/14/2020); Pain management procedure (Left, 10/1/2020); Pain management procedure (Bilateral, 11/17/2020); Pain management procedure (Bilateral, 12/10/2020); and Back Injection (Bilateral, 1/18/2021).    Family History  This patient's family history includes Cancer in his mother.  [address]  reports that he has never smoked.  He has never used smokeless tobacco. He reports current alcohol use.  He reports that he does not use drugs.     Medications    Current Medication      Current Outpatient Medications:     insulin aspart (NOVOLOG FLEXPEN) 100 UNIT/ML injection pen, Inject 18 Units into the skin 3 times daily (before meals) INJECT 12 UNITS SUBCUTANEOUSLY 3 TIMES DAILY BEFORE MEALS, Disp: 10 pen, Rfl: 3    furosemide (LASIX) 80 MG tablet, Take 1 tablet by mouth daily, Disp: 60 tablet, Rfl: 3    carvedilol (COREG) 12.5 MG tablet, Take 1 tablet by mouth 2 times daily, Disp: 180 tablet, Rfl: 3    amLODIPine (NORVASC) 5 MG tablet, Take 1 tablet by mouth daily, Disp: 90 tablet, Rfl: 3    enalapril (VASOTEC) 10 MG tablet, TAKE 2 TABLETS BY MOUTH TWICE DAILY, Disp: 360 tablet, Rfl: 1    ticagrelor (BRILINTA) 90 MG TABS tablet, TAKE ONE TABLET BY MOUTH TWICE DAILY, Disp: 60 tablet, Rfl: 3    insulin glargine (LANTUS SOLOSTAR) 100 UNIT/ML injection pen, INJECT 30 UNITS SUBCUTANEOUSLY ONCE DAILY, Disp: 15 pen, Rfl: 1    metFORMIN (GLUCOPHAGE) 500 MG tablet, TAKE TWO TABLETS BY MOUTH TWICE DAILY WITH MEALS, Disp: 360 tablet, Rfl: 1    atorvastatin (LIPITOR) 10 MG tablet, TAKE 1 TABLET BY MOUTH ONCE DAILY, Disp: 90 tablet, Rfl: 1    meclizine (ANTIVERT) 25 MG tablet, Take 1 tablet by mouth every 6 hours as needed for Dizziness, Disp: 40 tablet, Rfl: 0    ACCU-CHEK SMARTVIEW strip, Use to test Blood Sugar 3x daily, Dx: E11.9, Disp: 100 each, Rfl: 11    nitroGLYCERIN (NITROSTAT) 0.4 MG SL tablet, Place 1 tablet under the tongue every 5 minutes as needed for Chest pain, Disp: 25 tablet, Rfl: 3    ferrous sulfate 325 (65 Fe) MG tablet, Take 1 tablet by mouth 2 times daily, Disp: 60 tablet, Rfl: 2    Multiple Vitamins-Minerals (THERAPEUTIC MULTIVITAMIN-MINERALS) tablet, Take 1 tablet by mouth daily, Disp: 30 tablet, Rfl: 11    aspirin 81 MG tablet, Take 1 tablet by mouth daily, Disp: , Rfl:     acetaminophen (TYLENOL) 500 MG tablet, Take 500 mg by mouth as needed Cervical back: He exhibits decreased range of motion, tenderness and bony tenderness. Thoracic back: He exhibits decreased range of motion, tenderness and bony tenderness. Lumbar back: He exhibits decreased range of motion, tenderness, bony tenderness, pain and spasm. Back:       Right upper leg: He exhibits tenderness. Left upper leg: He exhibits tenderness. Right lower leg: Edema present. Left lower leg: Edema present. Right foot: Tenderness and swelling present. Left foot: Tenderness and swelling present. Comments: H/O Cervical surgery C5-6    T6-T9 PSF Lumbar Nj Decompression L2-S1  Right shoulder rotator cuff surgery  BTKR  Had 3 steel rods in LLE but hardware was removed. BLE lymphedema    Skin:     General: Skin is warm. Coloration: Skin is not pale. Findings: No erythema or rash. Neurological:      Mental Status: He is alert and oriented to person, place, and time. He is not disoriented. Cranial Nerves: Cranial nerves are intact. No cranial nerve deficit. Sensory: Sensation is intact. No sensory deficit. Motor: Weakness present. No atrophy or abnormal muscle tone. Coordination: Coordination is intact. Coordination normal.      Gait: Gait abnormal.      Deep Tendon Reflexes: Reflexes are normal and symmetric. Babinski sign absent on the right side. Reflex Scores:       Tricep reflexes are 2+ on the right side and 2+ on the left side. Bicep reflexes are 2+ on the right side and 2+ on the left side. Brachioradialis reflexes are 2+ on the right side and 2+ on the left side. Patellar reflexes are 2+ on the right side and 2+ on the left side. Achilles reflexes are 2+ on the right side and 2+ on the left side. Psychiatric:         Attention and Perception: Attention normal. He is attentive. Mood and Affect: Mood normal. Mood is not anxious or depressed.  Affect is not labile, blunt, angry or inappropriate. Speech: Speech normal. He is communicative. Speech is not rapid and pressured, delayed, slurred or tangential.         Behavior: Behavior normal. Behavior is not agitated, slowed, aggressive, withdrawn, hyperactive or combative. Thought Content: Thought content normal. Thought content is not paranoid or delusional. Thought content does not include homicidal or suicidal ideation. Thought content does not include homicidal or suicidal plan. Cognition and Memory: Cognition normal. Memory is not impaired. He does not exhibit impaired recent memory or impaired remote memory. Judgment: Judgment normal. Judgment is not impulsive or inappropriate.         ANNETTA test: +  Yeomans test: +  [place] test:+  Assessment:      1. SI (sacroiliac) joint inflammation (HCC)    2. Lumbosacral radiculitis    3. Lumbosacral spinal stenosis    4. Primary osteoarthritis of both hips    5. Lumbar radiculitis    6. Lumbar foraminal stenosis    7. Lumbar spondylosis    8. Neuropathy    9. Chronic pain syndrome       Plan:      · OARRS reviewed. Current MED: 0  · Patient was not offered naloxone for home. · Discussed long term side effects of medications, tolerance, dependency and addiction. · Previous UDS reviewed  · UDS preformed today for compliance. · Patient told can not receive any pain medications from any other source. · No evidence of abuse, diversion or aberrant behavior. · Medications and/or procedures to improve function and quality of life- patient understanding with this and that may not be pain free  · Discussed with patient about safe storage of medications at home  · Discussed possible weaning of medication dosing dependent on treatment/procedure results. · Testing: none  · Procedures: Bilateral SI MBB #2  · Discussed with patient about risks with procedure including infection, reaction to medication, increased pain, or bleeding.   · Medications:Motrin Tylenol   · If patient is on blood thinners will need approval to hold: Alok Marshall Me         Meds.  Prescribed:     Encounter Medications    No orders of the defined types were placed in this encounter.         Return for Bilateral SI MBB #2, Follow up after procedure.

## 2021-03-01 NOTE — PROGRESS NOTES
1056: Patient to phase 2 recovery room via cart. Patient is awake and alert. Report received from surgical RN, Skylar Kate. Patient's vitals obtained, see charting. 1058: Patient is denying pain and nausea at this time. IV is INT'd in his hand. 1102: IV removed at this time- no complications and dressing applied. Offered drink and snack provided. Bed is in the lowest position and call light is within reach. 1108: Patient is calling his ride at this time. 1114: Patient is getting dressed at this time. 1135: Patient ambulated to the car and discharged home in stable condition with his sister.

## 2021-03-01 NOTE — ANESTHESIA PRE PROCEDURE
(NITROSTAT) 0.4 MG SL tablet Place 1 tablet under the tongue every 5 minutes as needed for Chest pain 8/14/19   Caren Shipley MD   ferrous sulfate 325 (65 Fe) MG tablet Take 1 tablet by mouth 2 times daily 1/8/19   Caren Shipley MD   Multiple Vitamins-Minerals (THERAPEUTIC MULTIVITAMIN-MINERALS) tablet Take 1 tablet by mouth daily 1/8/19 2/21/23  Caren Shipley MD   aspirin 81 MG tablet Take 1 tablet by mouth daily 8/8/17   Kimberly Burleson MD   acetaminophen (TYLENOL) 500 MG tablet Take 500 mg by mouth as needed for Pain    Historical Provider, MD       Current medications:    No current facility-administered medications for this encounter. Allergies: Allergies   Allergen Reactions    Horse-Derived Products        Problem List:    Patient Active Problem List   Diagnosis Code    HTN (hypertension) I10    IDDM (insulin dependent diabetes mellitus) [id number]    Hypercholesteremia E78.00    Osteoarthritis M19.90    Diabetic peripheral neuropathy ([address] E11.42    Coronary artery disease of bypass graft of native heart with stable angina pectoris ([address] I25.708    S/P percutaneous transluminal coronary angioplasty Z98.61    Sprain/Injury of anterior ligaments of thoracic spine S23. 3XXA    Venous stasis dermatitis of both lower extremities I87.2    Lumbar spondylosis M47.816    Orthostatic hypotension after exercise I95.1    Lumbar foraminal stenosis M48.061    Lumbar radiculitis M54.16    Sacroiliac inflammation (HCC) M46.1       Past Medical History:        Diagnosis Date    RAUL (acute [address] injury) ([facility] 2/13/2020    ASHD (arteriosclerotic heart disease) 2005 2006    stent  baki    Depression     Diabetic peripheral neuropathy [facility]) 2014    Fracture Oct 1984    left lower extremity     HTN (hypertension)     Hypercholesteremia     IDDM (insulin dependent diabetes mellitus)     Low back pain     Morbid obesity with BMI of 45.0-49.9, adult (HCC)     Osteoarthritis Use    Smoking status: Never Smoker    Smokeless tobacco: Never Used   Substance Use Topics    Alcohol use: Yes     Comment: rarely                                Counseling given: Not Answered      Vital Signs (Current): There were no vitals filed for this visit. BP Readings from Last 3 Encounters:   02/04/21 (!) 162/80   02/03/21 126/70   01/18/21 (!) 120/58       NPO Status:                                                                                 BMI:   Wt Readings from Last 3 Encounters:   02/04/21 (!) 376 lb (170.6 kg)   02/03/21 (!) 383 lb (173.7 kg)   01/18/21 (!) 383 lb (173.7 kg)     There is no height or weight on file to calculate BMI.    CBC:   Lab Results   Component Value Date    WBC 8.0 09/23/2020    RBC 3.43 09/23/2020    RBC 3.52 10/17/2018    HGB 10.9 09/23/2020    HCT 32.5 09/23/2020    MCV 94.8 09/23/2020    RDW 12.7 10/17/2018     09/23/2020       CMP:   Lab Results   Component Value Date     01/28/2021    K 5.1 01/28/2021    K 5.0 02/14/2020     01/28/2021    CO2 30 01/28/2021    BUN 35 01/28/2021    CREATININE 1.5 01/28/2021    LABGLOM 46 01/28/2021    GLUCOSE 222 12/10/2020    GLUCOSE 125 10/17/2018    PROT 6.6 09/23/2020    CALCIUM 9.5 12/10/2020    BILITOT 0.5 09/23/2020    ALKPHOS 71 09/23/2020    AST 10 09/23/2020    ALT 9 09/23/2020       POC Tests: No results for input(s): POCGLU, POCNA, POCK, POCCL, POCBUN, POCHEMO, POCHCT in the last 72 hours.     Coags:   Lab Results   Component Value Date    INR 1.00 02/08/2019    APTT 38.5 02/08/2019       HCG (If Applicable): No results found for: PREGTESTUR, PREGSERUM, HCG, HCGQUANT     ABGs: No results found for: PHART, PO2ART, XGU3BCJ, IER1BOI, BEART, P1VWNLOL     Type & Screen (If Applicable):  Lab Results   Component Value Date    LABRH POS 08/07/2017       Drug/Infectious Status (If Applicable):  No results found for: HIV, HEPCAB    COVID-19 Screening (If Applicable): Lab Results   Component Value Date    COVID19 NOT DETECTED 12/08/2020         Anesthesia Evaluation  Patient summary reviewed and Nursing notes reviewed  Airway: Mallampati: II        Dental:          Pulmonary: breath sounds clear to auscultation                             Cardiovascular:  Exercise tolerance: good (>4 METS),   (+) hypertension:, angina:, CAD:,       ECG reviewed  Rhythm: regular  Rate: normal                    Neuro/Psych:   (+) neuromuscular disease:, psychiatric history:            GI/Hepatic/Renal:             Endo/Other:    (+) Diabetes, : arthritis:., .                 Abdominal:       Abdomen: soft. Vascular:                                        Anesthesia Plan      MAC     ASA 3       Induction: intravenous. Anesthetic plan and risks discussed with patient. Plan discussed with CRNA.                   Mike Chavis DO   3/1/2021

## 2021-03-01 NOTE — PROGRESS NOTES
Discharge instructions reviewed. Verbalized understanding. Blood glucose 268. Dr. Cheko Chen notified. No new orders. Patient states he stopped all medications 7 days ago. Provided with detailed list of when to stop required medications provided. Patient verbalized understanding.

## 2021-03-01 NOTE — OP NOTE
Operative Note  Pre-Procedure Note    Patient Name: Osvaldo Bourgeois   YOB: 1950  Medical Record Number: [id number]  Date: 3/1/21     Indication:  SI pain  Consent: On file. Vital Signs:   Vitals:    03/01/21 1026   BP: (!) 166/74   Pulse: 78   Resp: 16   Temp: 97.3 °F (36.3 °C)   SpO2: 97%       Past Medical History:   has a past medical history of RAUL (acute kidney injury) ([facility] ASHD (arteriosclerotic heart disease), Depression, Diabetic peripheral neuropathy ([facility] Fracture, HTN (hypertension), Hypercholesteremia, IDDM (insulin dependent diabetes mellitus), Low back pain, Morbid obesity with BMI of 45.0-49.9, adult ([facility] and Osteoarthritis. Past Surgical History:   has a past surgical history that includes Cholecystectomy; Rotator cuff repair; Cervical disc surgery (2000); Appendectomy; Spine surgery (2010); Colonoscopy (2007 and 2011); joint replacement; Tonsillectomy; amputation (Left, 9/10/14); EKG 12 Lead (8/14/2015); Coronary artery bypass graft (2015 august ); Cardiac surgery; vascular surgery; fracture surgery; Coronary angioplasty with stent (08/07/2017); POSTERIOR FUSION THORACIC SPINE (N/A, 12/28/2018); Pain management procedure (Bilateral, 1/28/2020); Facet joint injection (Bilateral, 5/22/2020); Pain management procedure (Right, 7/14/2020); Pain management procedure (Left, 10/1/2020); Pain management procedure (Bilateral, 11/17/2020); Pain management procedure (Bilateral, 12/10/2020); and Back Injection (Bilateral, 1/18/2021). Pre-Sedation Documentation and Exam:   Vital signs have been reviewed (see flow sheet for vitals). Sedation/ Anesthesia Plan: MAC    Patient is an appropriate candidate for plan of sedation: yes    Preoperative Diagnosis:  Bilateral sacroiliitis.     Postoperative Diagnosis: Bilateral  Sacroiliitis.     Procedure Performed:  Bilateral sacroiliac joint injection under fluoroscopy guidance # 2.        Indication for the Procedure:   The patient failed conservative management  for pain in low back. The patient is tender over the Bilateral SI joint. Noah's test is positive on the Bilateral side. As patient is not responding to conservative management and pain is interfering with activities of daily living we decided to proceed with SI joint injection. The procedure and risks were discussed with the patient and an informed consent was obtained.     Procedure:     A meaningful communication was kept up with the patient throughout the procedure. The patient is placed in prone position. Skin over the back was prepped and draped in sterile manner. Then using fluoroscopy the Bilateral sacroiliac joint was identified. Then the angle of the fluoroscopy was adjusted such that the view of the caudal aspect of the joint space was optimized. Then skin and deep tissues over the caudal aspect of the joint were infiltrated with 6 ml of 1% lidocaine. The #22-gauge, 5-1/2 inch spinal needle was introduced through the skin wheal and directed such that the tip of the needle lies in the joint space. This was confirmed by injecting 0.5 ml of Omnipaque-180 through the needle in divided doses and observing the spread of the contrast along the joint space. Then after negative aspiration a total of 80 mg of depomedrol  with 2 ml of  0.25% Marcaine was injected through the needle in divided doses. The needle is removed and a Band-Aid was placed over the needle insertion site. EBL-0  The patient tolerated the procedure well and vital signs remained stable.   The patient was discharged home in stable condition and will be followed in the pain clinic in the next few weeks or further planning.       Electronically signed by Federico Ram MD on 3/1/21 at 10:47 AM EST

## 2021-03-02 ENCOUNTER — TELEPHONE (OUTPATIENT)
Dept: PHYSICAL MEDICINE AND REHAB | Age: 71
End: 2021-03-02

## 2021-03-02 ENCOUNTER — TELEPHONE (OUTPATIENT)
Dept: FAMILY MEDICINE CLINIC | Age: 71
End: 2021-03-02

## 2021-03-02 NOTE — TELEPHONE ENCOUNTER
Patient called stating that his BP is 118/58 with pulse 58. Patient feeling slightly \"woozy\". Stated Dr Alejandro Ash gave him an injection in his back yesterday. He did call them and is awaiting them to get back with him. Advised him if he doesn't get any better he is to go to ER for evaluation unless Dr Alejandro Ash suggests otherwise.

## 2021-03-02 NOTE — TELEPHONE ENCOUNTER
Patient had SI injection, should not have these issues related to injection. Possible from sedation. If persist needs to be taken to ER or call 911  NO DRIVING.

## 2021-03-03 NOTE — TELEPHONE ENCOUNTER
Spoke with patient and his BP: 117/53 was today and he did not write his pulse down. Dr Ginger Plunkett office told him to monitor his Dizziness throughout today and if its not any then he needed to be evaluated in the ER. He stated the Dizziness is better today then it was yesterday.

## 2021-03-03 NOTE — TELEPHONE ENCOUNTER
Pt. Contacted. Informed of Dr. Adela Nobles recommendations. States that the symptoms are better this morning. Again advised him to go the ER if they persist and NO DRIVING. He also contacted Dr. Emily Morin yesterday regarding symptoms and vital signs. Advised to call him back today with an update. Verbalized understanding.

## 2021-03-15 ENCOUNTER — TELEPHONE (OUTPATIENT)
Dept: FAMILY MEDICINE CLINIC | Age: 71
End: 2021-03-15

## 2021-03-25 ENCOUNTER — CARE COORDINATION (OUTPATIENT)
Dept: CARE COORDINATION | Age: 71
End: 2021-03-25

## 2021-03-25 NOTE — CARE COORDINATION
Fernando Boyd called and had a few questions regarding health. Discussed options for snacking at night. States legs are about the same and maybe some improvement. States he is elevating his legs and taking meds as ordered. Denies edema. No falls. Denies the need for [facility] support. Questions answered and no new barriers present.

## 2021-03-26 DIAGNOSIS — I15.0 RENOVASCULAR HYPERTENSION: ICD-10-CM

## 2021-03-26 NOTE — TELEPHONE ENCOUNTER
Date of last visit:  1/6/2021  Date of next visit:  4/9/2021    Requested Prescriptions     Pending Prescriptions Disp Refills    enalapril (VASOTEC) 10 MG tablet [Pharmacy Med Name: Enalapril Maleate 10 MG Oral Tablet] 360 tablet 0     Sig: Take 2 tablets by mouth twice daily

## 2021-03-27 RX ORDER — ENALAPRIL MALEATE 10 MG/1
TABLET ORAL
Qty: 360 TABLET | Refills: 0 | Status: SHIPPED | OUTPATIENT
Start: 2021-03-27 | End: 2021-08-10

## 2021-04-09 ENCOUNTER — OFFICE VISIT (OUTPATIENT)
Dept: FAMILY MEDICINE CLINIC | Age: 71
End: 2021-04-09

## 2021-04-09 VITALS
WEIGHT: 315 LBS | BODY MASS INDEX: 40.43 KG/M2 | RESPIRATION RATE: 16 BRPM | DIASTOLIC BLOOD PRESSURE: 66 MMHG | SYSTOLIC BLOOD PRESSURE: 124 MMHG | TEMPERATURE: 97.6 F | HEIGHT: 74 IN | HEART RATE: 82 BPM

## 2021-04-09 DIAGNOSIS — I10 ESSENTIAL HYPERTENSION: ICD-10-CM

## 2021-04-09 DIAGNOSIS — E66.01 CLASS 3 SEVERE OBESITY WITH SERIOUS COMORBIDITY AND BODY MASS INDEX (BMI) OF 45.0 TO 49.9 IN ADULT, UNSPECIFIED OBESITY TYPE (HCC): ICD-10-CM

## 2021-04-09 DIAGNOSIS — I87.2 VENOUS STASIS DERMATITIS OF BOTH LOWER EXTREMITIES: Primary | ICD-10-CM

## 2021-04-09 DIAGNOSIS — I25.708 CORONARY ARTERY DISEASE OF BYPASS GRAFT OF NATIVE HEART WITH STABLE ANGINA PECTORIS (HCC): ICD-10-CM

## 2021-04-09 DIAGNOSIS — F33.41 RECURRENT MAJOR DEPRESSIVE DISORDER, IN PARTIAL REMISSION (HCC): ICD-10-CM

## 2021-04-09 DIAGNOSIS — M48.061 LUMBAR FORAMINAL STENOSIS: ICD-10-CM

## 2021-04-09 DIAGNOSIS — E78.00 HYPERCHOLESTEREMIA: ICD-10-CM

## 2021-04-09 DIAGNOSIS — E11.42 DIABETIC PERIPHERAL NEUROPATHY (HCC): ICD-10-CM

## 2021-04-09 DIAGNOSIS — I50.32 CHRONIC DIASTOLIC (CONGESTIVE) HEART FAILURE (HCC): ICD-10-CM

## 2021-04-09 PROBLEM — E66.813 CLASS 3 SEVERE OBESITY WITH SERIOUS COMORBIDITY AND BODY MASS INDEX (BMI) OF 45.0 TO 49.9 IN ADULT: Status: ACTIVE | Noted: 2021-04-09

## 2021-04-09 PROCEDURE — 99213 OFFICE O/P EST LOW 20 MIN: CPT | Performed by: FAMILY MEDICINE

## 2021-04-09 PROCEDURE — 1036F TOBACCO NON-USER: CPT | Performed by: FAMILY MEDICINE

## 2021-04-09 PROCEDURE — G8427 DOCREV CUR MEDS BY ELIG CLIN: HCPCS | Performed by: FAMILY MEDICINE

## 2021-04-09 PROCEDURE — 1123F ACP DISCUSS/DSCN MKR DOCD: CPT | Performed by: FAMILY MEDICINE

## 2021-04-09 PROCEDURE — 3017F COLORECTAL CA SCREEN DOC REV: CPT | Performed by: FAMILY MEDICINE

## 2021-04-09 PROCEDURE — 4040F PNEUMOC VAC/ADMIN/RCVD: CPT | Performed by: FAMILY MEDICINE

## 2021-04-09 PROCEDURE — G8417 CALC BMI ABV UP PARAM F/U: HCPCS | Performed by: FAMILY MEDICINE

## 2021-04-09 RX ORDER — FUROSEMIDE 80 MG
80 TABLET ORAL DAILY
Qty: 60 TABLET | Refills: 3 | Status: SHIPPED
Start: 2021-04-09 | End: 2021-09-21 | Stop reason: SDUPTHER

## 2021-04-09 ASSESSMENT — ENCOUNTER SYMPTOMS
COUGH: 0
ORTHOPNEA: 0
BACK PAIN: 0
EYE PAIN: 0
CONSTIPATION: 0
SORE THROAT: 0
ABDOMINAL PAIN: 0
TROUBLE SWALLOWING: 0
SHORTNESS OF BREATH: 0
CHEST TIGHTNESS: 0
NAUSEA: 0
BLOOD IN STOOL: 0

## 2021-04-09 NOTE — PROGRESS NOTES
shortness of breath. Cardiovascular: Negative for chest pain, palpitations, orthopnea and leg swelling. Gastrointestinal: Negative for abdominal pain, blood in stool, constipation and nausea. Genitourinary: Negative for difficulty urinating, frequency and urgency. Musculoskeletal: Negative for arthralgias, back pain, joint swelling and neck stiffness. Skin: Negative for rash. Neurological: Negative for dizziness, weakness and headaches. Hematological: Negative for adenopathy. Does not bruise/bleed easily. Psychiatric/Behavioral: Negative for behavioral problems, dysphoric mood and sleep disturbance. /66 (Site: Left Upper Arm, Position: Sitting, Cuff Size: Large Adult)   Pulse 82   Temp 97.6 °F (36.4 °C) (Skin)   Resp 16   Ht 6' 2\" (1.88 m)   Wt (!) 373 lb 6 oz (169.4 kg)   BMI 47.94 kg/m²   Objective:   Physical Exam  Vitals signs and nursing note reviewed. Constitutional:       Appearance: He is well-developed. HENT:      Head: Normocephalic and atraumatic. Right Ear: External ear normal.      Left Ear: External ear normal.      Nose: Nose normal.   Eyes:      Conjunctiva/sclera: Conjunctivae normal.      Pupils: Pupils are equal, round, and reactive to light. Comments: Fundi nl   Neck:      Musculoskeletal: Normal range of motion and neck supple. Thyroid: No thyromegaly. Cardiovascular:      Rate and Rhythm: Normal rate and regular rhythm. Heart sounds: Normal heart sounds. Pulmonary:      Effort: Pulmonary effort is normal.      Breath sounds: Normal breath sounds. No wheezing or rales. Abdominal:      General: Bowel sounds are normal.      Palpations: Abdomen is soft. There is no mass. Tenderness: There is no abdominal tenderness. Musculoskeletal: Normal range of motion. Right lower leg: Edema present. Left lower leg: Edema present.       Comments:   Right   Greater  Than  The left  3  To  4 +   Lymphadenopathy:      Cervical: No cervical adenopathy. Skin:     General: Skin is warm and dry. Findings: No rash. Neurological:      Mental Status: He is alert and oriented to person, place, and time. Cranial Nerves: No cranial nerve deficit. Deep Tendon Reflexes: Reflexes are normal and symmetric. Assessment:       Diagnosis Orders   1. Venous stasis dermatitis of both lower extremities     2. Chronic diastolic (congestive) heart failure ([address]     3. Recurrent major depressive disorder, in partial remission (HCC)     4. Class 3 severe obesity with serious comorbidity and body mass index (BMI) of 45.0 to 49.9 in adult, unspecified obesity type ([facility]     5. Lumbar foraminal stenosis     6. Coronary artery disease of bypass graft of native heart with stable angina pectoris ([facility]     7. Hypercholesteremia     8. Essential hypertension     9.  Diabetic peripheral neuropathy (HCC)           Plan:      .  Current Outpatient Medications   Medication Sig Dispense Refill    furosemide (LASIX) 80 MG tablet Take 1 tablet by mouth daily 80 mg at 10  Am  And  40  Mg  4 pm 60 tablet 3    enalapril (VASOTEC) 10 MG tablet Take 2 tablets by mouth twice daily 360 tablet 0    Blood Glucose Monitoring Suppl (ACCU-CHEK WAQAR SMARTVIEW) w/Device KIT Pt is to use to test blood sugar three times a day DX : E11.9 1 kit 0    insulin aspart (NOVOLOG FLEXPEN) 100 UNIT/ML injection pen Inject 18 Units into the skin 3 times daily (before meals) INJECT 12 UNITS SUBCUTANEOUSLY 3 TIMES DAILY BEFORE MEALS 10 pen 3    amLODIPine (NORVASC) 5 MG tablet Take 1 tablet by mouth daily 90 tablet 3    ticagrelor (BRILINTA) 90 MG TABS tablet TAKE ONE TABLET BY MOUTH TWICE DAILY 60 tablet 3    insulin glargine (LANTUS SOLOSTAR) 100 UNIT/ML injection pen INJECT 30 UNITS SUBCUTANEOUSLY ONCE DAILY 15 pen 1    metFORMIN (GLUCOPHAGE) 500 MG tablet TAKE TWO TABLETS BY MOUTH TWICE DAILY WITH MEALS 360 tablet 1    atorvastatin (LIPITOR) 10 MG tablet TAKE 1 TABLET BY MOUTH ONCE DAILY 90 tablet 1    meclizine (ANTIVERT) 25 MG tablet Take 1 tablet by mouth every 6 hours as needed for Dizziness 40 tablet 0    ACCU-CHEK SMARTVIEW strip Use to test Blood Sugar 3x daily, Dx: E11.9 100 each 11    nitroGLYCERIN (NITROSTAT) 0.4 MG SL tablet Place 1 tablet under the tongue every 5 minutes as needed for Chest pain 25 tablet 3    ferrous sulfate 325 (65 Fe) MG tablet Take 1 tablet by mouth 2 times daily 60 tablet 2    Multiple Vitamins-Minerals (THERAPEUTIC MULTIVITAMIN-MINERALS) tablet Take 1 tablet by mouth daily 30 tablet 11    aspirin 81 MG tablet Take 1 tablet by mouth daily      acetaminophen (TYLENOL) 500 MG tablet Take 500 mg by mouth as needed for Pain      carvedilol (COREG) 12.5 MG tablet Take 1 tablet by mouth 2 times daily 180 tablet 3     No current facility-administered medications for this visit. No results found for this visit on 04/09/21. No images are attached to the encounter.   Sonal Aguilar MD

## 2021-04-16 DIAGNOSIS — E78.00 HYPERCHOLESTEREMIA: ICD-10-CM

## 2021-04-16 NOTE — TELEPHONE ENCOUNTER
Requesting refill of Atorvastatin 10MG           [facility], [address] [facility].

## 2021-04-18 RX ORDER — ATORVASTATIN CALCIUM 10 MG/1
TABLET, FILM COATED ORAL
Qty: 90 TABLET | Refills: 1 | Status: SHIPPED | OUTPATIENT
Start: 2021-04-18 | End: 2021-10-21 | Stop reason: SDUPTHER

## 2021-04-23 ENCOUNTER — APPOINTMENT (OUTPATIENT)
Dept: GENERAL RADIOLOGY | Age: 71
End: 2021-04-23
Payer: MEDICARE

## 2021-04-23 ENCOUNTER — HOSPITAL ENCOUNTER (EMERGENCY)
Age: 71
Discharge: HOME | End: 2021-04-23
Attending: FAMILY MEDICINE
Payer: MEDICARE

## 2021-04-23 ENCOUNTER — TELEPHONE (OUTPATIENT)
Dept: FAMILY MEDICINE CLINIC | Age: 71
End: 2021-04-23

## 2021-04-23 VITALS
DIASTOLIC BLOOD PRESSURE: 71 MMHG | RESPIRATION RATE: 14 BRPM | HEART RATE: 77 BPM | TEMPERATURE: 98.1 F | OXYGEN SATURATION: 99 % | BODY MASS INDEX: 40.43 KG/M2 | HEIGHT: 74 IN | WEIGHT: 315 LBS | SYSTOLIC BLOOD PRESSURE: 151 MMHG

## 2021-04-23 DIAGNOSIS — T14.8XXA MULTIPLE SKIN TEARS: Primary | ICD-10-CM

## 2021-04-23 PROCEDURE — 99283 EMERGENCY DEPT VISIT LOW MDM: CPT

## 2021-04-23 PROCEDURE — 73620 X-RAY EXAM OF FOOT: CPT

## 2021-04-23 RX ORDER — GINSENG 100 MG
CAPSULE ORAL ONCE
Status: DISCONTINUED | OUTPATIENT
Start: 2021-04-23 | End: 2021-04-23 | Stop reason: HOSPADM

## 2021-04-23 ASSESSMENT — ENCOUNTER SYMPTOMS
CHEST TIGHTNESS: 0
ABDOMINAL DISTENTION: 0
SHORTNESS OF BREATH: 0
SORE THROAT: 0
EYE DISCHARGE: 0
EYE PAIN: 0
ABDOMINAL PAIN: 0

## 2021-04-23 NOTE — ED TRIAGE NOTES
Patient presents via EMS to ER with reports of bilateral foot abrasions that occurred today after sliding down out of power chair. Patient reports he was trying to get up causing feet abrasions. Patient reports his skin is just so dry that it tears easily.

## 2021-04-23 NOTE — ED PROVIDER NOTES
[address] [address]        Pt Name: Robyn Fox  MRN: [id number]  [place] 1950  Date of evaluation: 4/23/2021  Treating Resident Physician: Ofelia Javier DO  Supervising Physician: Corey Feliz       Chief Complaint   Patient presents with    Foot Injury     bilateral toes    Abrasion     right arm     History obtained from the patient. HISTORY OF PRESENT ILLNESS    HPI  Robyn Fox is a 65 y.o. male who presents to the emergency department for evaluation of bilateral foot abrasions that occurred today. Patient said he was sliding down out of his power chair when he hit his feet on the ground and pushed himself back up causing his skin to tear. Patient states he has difficulty feeling that area so injuries to his lower extremities are frequent secondary to his diabetes. No current pain, no fever, nausea, vomiting, missed medications, rashes, purulence or drainage. Patient has no active bleeding. States he is anticoagulated. The patient has no other acute complaints at this time. REVIEW OF SYSTEMS   Review of Systems   Constitutional: Negative for activity change and fever. HENT: Negative for congestion and sore throat. Eyes: Negative for pain and discharge. Respiratory: Negative for chest tightness and shortness of breath. Cardiovascular: Negative for chest pain and palpitations. Gastrointestinal: Negative for abdominal distention and abdominal pain. Genitourinary: Negative for difficulty urinating and dysuria. Musculoskeletal: Positive for gait problem and joint swelling. Negative for arthralgias. Skin: Positive for wound. Negative for pallor. Neurological: Negative for dizziness, seizures, syncope, weakness, light-headedness and headaches.          PAST MEDICAL AND SURGICAL HISTORY     Past Medical History:   Diagnosis Date    RAUL (acute kidney injury) ([facility] 2/13/2020    [facility] (arteriosclerotic heart disease) 2005 2006    stent  baki    Depression     Diabetic peripheral neuropathy [facility]) 2014    Fracture Oct 1984    left lower extremity     HTN (hypertension)     Hypercholesteremia     IDDM (insulin dependent diabetes mellitus)     Low back pain     Morbid obesity with BMI of 45.0-49.9, adult ([address]     Osteoarthritis      Past Surgical History:   Procedure Laterality Date    AMPUTATION Left 9/10/14    partial versus complete left hallux amputation, left great toe amputation    APPENDECTOMY      BACK INJECTION Bilateral 1/18/2021    Bilateral Si MBB #1 performed by Elaine George MD at [address] Bilateral 3/1/2021    Bilateral SI MBB #2 performed by [facility] MD at [facility]    fusion of C5-C6    CHOLECYSTECTOMY      COLONOSCOPY  2007 and 2011    colonn polyps  lorenzo    CORONARY ANGIOPLASTY WITH STENT PLACEMENT  08/07/2017    Dr Brown Apt @ [address] [place] [address] CORONARY ARTERY BYPASS GRAFT  2015 august dox    EKG 12-LEAD  8/14/2015         FACET JOINT INJECTION Bilateral 5/22/2020    Lumbar Facet MBB @L3-4,4-5 bilateral #2 performed by Elaine George MD at [address] [place] [address]      left leg    JOINT REPLACEMENT      bilateral knees gurvinder    PAIN MANAGEMENT PROCEDURE Bilateral 1/28/2020    Lumbar Facet MBB @ L3-4,4-5,5-S1 bilateral #1 LUMBAR FACET performed by Elaine George MD at [facility] Right 7/14/2020    Lumbar RFA Bilateral L3-4,4-5  right side first performed by Elaine George MD at [facility] Left 10/1/2020    Lumbar RFA Left side L3-4, 4-5 performed by Elaine George MD at [address] [address] Bilateral 11/17/2020    TFLESI @L5 bilateral #1 performed by [facility] MD at [facility]    PAIN MANAGEMENT PROCEDURE Bilateral 12/10/2020    TFLESI @L5 bilateral #1 performed by Ruby Corrales MD at [address] [facility] N/A 12/28/2018    T7-T9 DECOMPRESSION, T7-T9 POSTERIOR FUSION performed by Adrianna Gu MD at [address]  2010    fumich    TONSILLECTOMY      VASCULAR SURGERY      cabg harvests from left leg         MEDICATIONS     Current Facility-Administered Medications:     bacitracin ointment, , Topical, Once, Dorrene Orts, DO    Current Outpatient Medications:     atorvastatin (LIPITOR) 10 MG tablet, TAKE 1 TABLET BY MOUTH ONCE DAILY, Disp: 90 tablet, Rfl: 1    furosemide (LASIX) 80 MG tablet, Take 1 tablet by mouth daily 80 mg at 10  Am  And  40  Mg  4 pm, Disp: 60 tablet, Rfl: 3    enalapril (VASOTEC) 10 MG tablet, Take 2 tablets by mouth twice daily, Disp: 360 tablet, Rfl: 0    amLODIPine (NORVASC) 5 MG tablet, Take 1 tablet by mouth daily, Disp: 90 tablet, Rfl: 3    ticagrelor (BRILINTA) 90 MG TABS tablet, TAKE ONE TABLET BY MOUTH TWICE DAILY, Disp: 60 tablet, Rfl: 3    metFORMIN (GLUCOPHAGE) 500 MG tablet, TAKE TWO TABLETS BY MOUTH TWICE DAILY WITH MEALS, Disp: 360 tablet, Rfl: 1    Multiple Vitamins-Minerals (THERAPEUTIC MULTIVITAMIN-MINERALS) tablet, Take 1 tablet by mouth daily, Disp: 30 tablet, Rfl: 11    aspirin 81 MG tablet, Take 1 tablet by mouth daily, Disp: , Rfl:     Blood Glucose Monitoring Suppl (ACCU-CHEK WAQAR SMARTVIEW) w/Device KIT, Pt is to use to test blood sugar three times a day DX : E11.9, Disp: 1 kit, Rfl: 0    insulin aspart (NOVOLOG FLEXPEN) 100 UNIT/ML injection pen, Inject 18 Units into the skin 3 times daily (before meals) INJECT 12 UNITS SUBCUTANEOUSLY 3 TIMES DAILY BEFORE MEALS, Disp: 10 pen, Rfl: 3    carvedilol (COREG) 12.5 MG tablet, Take 1 tablet by mouth 2 times daily, Disp: 180 tablet, Rfl: 3    insulin glargine (LANTUS SOLOSTAR) 100 UNIT/ML injection pen, INJECT 30 UNITS SUBCUTANEOUSLY ONCE DAILY, Disp: 15 pen, Rfl: 1    meclizine (ANTIVERT) 25 MG tablet, Take 1 tablet by mouth every 6 hours as needed for Dizziness, Disp: 40 tablet, Rfl: 0    ACCU-CHEK SMARTVIEW strip, Use to test Blood Sugar 3x daily, Dx: E11.9, Disp: 100 each, Rfl: 11    nitroGLYCERIN (NITROSTAT) 0.4 MG SL tablet, Place 1 tablet under the tongue every 5 minutes as needed for Chest pain, Disp: 25 tablet, Rfl: 3    acetaminophen (TYLENOL) 500 MG tablet, Take 500 mg by mouth as needed for Pain, Disp: , Rfl:       SOCIAL HISTORY     Social History     Social History Narrative    Not on file     Social History     Tobacco Use    Smoking status: Never Smoker    Smokeless tobacco: Never Used   Substance Use Topics    Alcohol use: Not Currently     Comment: rarely    Drug use: No         ALLERGIES     Allergies   Allergen Reactions    Horse-Derived Products          FAMILY HISTORY     Family History   Problem Relation Age of Onset    Cancer Mother         uterine         PREVIOUS RECORDS   Previous records reviewed: Patient last seen this department 2 years ago for peripheral edema. .        PHYSICAL EXAM     ED Triage Vitals [04/23/21 1025]   BP Temp Temp Source Pulse Resp SpO2 Height Weight   (!) 151/71 98.1 °F (36.7 °C) Oral 77 14 99 % 6' 2\" (1.88 m) (!) 376 lb (170.6 kg)     Initial vital signs and nursing assessment reviewed and normal. Body mass index is 48.28 kg/m². Pulsoximetry is normal per my interpretation. Additional Vital Signs:  Vitals:    04/23/21 1025   BP: (!) 151/71   Pulse: 77   Resp: 14   Temp: 98.1 °F (36.7 °C)   SpO2: 99%       Physical Exam  Constitutional:       General: He is not in acute distress. Appearance: Normal appearance. He is not ill-appearing or diaphoretic. HENT:      Head: Normocephalic and atraumatic. Mouth/Throat:      Mouth: Mucous membranes are moist.      Pharynx: Oropharynx is clear.  No oropharyngeal exudate or posterior oropharyngeal erythema. Eyes:      Extraocular Movements: Extraocular movements intact. Conjunctiva/sclera: Conjunctivae normal.   Neck:      Musculoskeletal: Normal range of motion. No neck rigidity or muscular tenderness. Cardiovascular:      Rate and Rhythm: Normal rate and regular rhythm. Pulses: Normal pulses. Heart sounds: Normal heart sounds. No murmur. No friction rub. No gallop. Pulmonary:      Effort: Pulmonary effort is normal.      Breath sounds: Normal breath sounds. No wheezing, rhonchi or rales. Abdominal:      Palpations: Abdomen is soft. Tenderness: There is no abdominal tenderness. There is no guarding or rebound. Musculoskeletal:         General: Swelling (Chronic venous stasis dermatitis bilateral lower extremity. Patient with largely decree sensation to the lower extremity up to the knee.) present. Right lower leg: Edema present. Left lower leg: Edema present. Skin:     General: Skin is warm and dry. Capillary Refill: Capillary refill takes less than 2 seconds. Findings: Lesion (Skin tear of the right ulnar forearm, skin tear of the left first and second distal phalanges. Skin tear of the right first phalanges. All are hemostatic, without need for repair.) present. Neurological:      General: No focal deficit present. Mental Status: He is alert and oriented to person, place, and time. Sensory: Sensory deficit present. Motor: No weakness. MEDICAL DECISION MAKING   Initial Assessment:   1. Pleasant 68-year-old chronically ill male. Skin tears to the right arm and bilateral feet. Occurred today, no concern for acute trauma, fracture, infection. Patient is afebrile. His wounds are hemostatic. Plan:    Plan debride wounds for evaluation and need for sutures. Plain film to exclude fracture. Bacitracin ointment, sterile nonstick dressing. Follow-up with podiatry.   Patient given to this plan and discharge home.      ED RESULTS   Laboratory results:  Labs Reviewed - No data to display    Radiologic studies results:  XR FOOT RIGHT (2 VIEWS)   Final Result   Limited 2 view foot demonstrates no acute abnormality. **This report has been created using voice recognition software. It may contain minor errors which are inherent in voice recognition technology. **      Final report electronically signed by Dr. Deedee Whitney on 4/23/2021 11:30 AM      XR FOOT LEFT (2 VIEWS)   Final Result   Limited 2 projection foot demonstrates soft tissue swelling. No acute fracture seen            **This report has been created using voice recognition software. It may contain minor errors which are inherent in voice recognition technology. **      Final report electronically signed by Dr. Deedee Whitney on 4/23/2021 11:31 AM          ED Medications administered this visit:   Medications   bacitracin ointment (has no administration in time range)         ED COURSE          Strict return precautions and follow up instructions were discussed with the patient prior to discharge, with which the patient agrees. FINAL DISPOSITION     Final diagnoses:   Multiple skin tears     Condition: condition: stable  Dispo: Discharge to home      This transcription was electronically signed. Parts of this transcriptions may have been dictated by use of voice recognition software and electronically transcribed, and parts may have been transcribed with the assistance of an ED scribe. The transcription may contain errors not detected in proofreading. Please refer to my supervising physician's documentation if my documentation differs.     Electronically Signed: Natty Lozano, 04/23/21, 1:20 PM       Natty Lozano DO  Resident  04/23/21 0538

## 2021-05-03 NOTE — TELEPHONE ENCOUNTER
Date of last visit:  4/9/2021  Date of next visit:  5/12/2021    Requested Prescriptions     Pending Prescriptions Disp Refills    ticagrelor (BRILINTA) 90 MG TABS tablet 60 tablet 3     Sig: TAKE ONE TABLET BY MOUTH TWICE DAILY

## 2021-05-06 ENCOUNTER — TELEPHONE (OUTPATIENT)
Dept: FAMILY MEDICINE CLINIC | Age: 71
End: 2021-05-06

## 2021-05-06 DIAGNOSIS — K21.9 GASTROESOPHAGEAL REFLUX DISEASE WITHOUT ESOPHAGITIS: Primary | ICD-10-CM

## 2021-05-06 NOTE — TELEPHONE ENCOUNTER
Pt called stating the last 3 days he has had acid reflex when waking up. He stated that it does not matter what he eats he still gets it. He is asking if there is anything he would be able to take to help with it.        Leo Trotter Sidney may be reached at [phone]

## 2021-05-07 RX ORDER — PANTOPRAZOLE SODIUM 40 MG/1
40 TABLET, DELAYED RELEASE ORAL
Qty: 30 TABLET | Refills: 0 | Status: SHIPPED | OUTPATIENT
Start: 2021-05-07 | End: 2022-09-02

## 2021-05-11 ENCOUNTER — TELEPHONE (OUTPATIENT)
Dept: FAMILY MEDICINE CLINIC | Age: 71
End: 2021-05-11

## 2021-05-11 NOTE — TELEPHONE ENCOUNTER
Pt called stating his BP is around 99/65 with occasional lightheadedness when he stands up.     Pt informed per nurse Tucker to push fluids and if BP drops lower to go to ER to be evaluated before his appt tomorrow

## 2021-05-18 ENCOUNTER — OFFICE VISIT (OUTPATIENT)
Dept: FAMILY MEDICINE CLINIC | Age: 71
End: 2021-05-18

## 2021-05-18 ENCOUNTER — NURSE ONLY (OUTPATIENT)
Dept: LAB | Age: 71
End: 2021-05-18

## 2021-05-18 VITALS
TEMPERATURE: 97.3 F | SYSTOLIC BLOOD PRESSURE: 138 MMHG | HEIGHT: 74 IN | BODY MASS INDEX: 40.43 KG/M2 | WEIGHT: 315 LBS | RESPIRATION RATE: 16 BRPM | DIASTOLIC BLOOD PRESSURE: 82 MMHG | HEART RATE: 80 BPM

## 2021-05-18 DIAGNOSIS — E11.42 DIABETIC PERIPHERAL NEUROPATHY (HCC): ICD-10-CM

## 2021-05-18 DIAGNOSIS — I25.708 CORONARY ARTERY DISEASE OF BYPASS GRAFT OF NATIVE HEART WITH STABLE ANGINA PECTORIS (HCC): ICD-10-CM

## 2021-05-18 DIAGNOSIS — E78.00 HYPERCHOLESTEREMIA: ICD-10-CM

## 2021-05-18 DIAGNOSIS — L57.0 ACTINIC KERATOSIS OF SCALP: ICD-10-CM

## 2021-05-18 DIAGNOSIS — I10 ESSENTIAL HYPERTENSION: ICD-10-CM

## 2021-05-18 DIAGNOSIS — E11.69 TYPE 2 DIABETES MELLITUS WITH OTHER SPECIFIED COMPLICATION, WITH LONG-TERM CURRENT USE OF INSULIN (HCC): Primary | ICD-10-CM

## 2021-05-18 DIAGNOSIS — Z79.4 TYPE 2 DIABETES MELLITUS WITH OTHER SPECIFIED COMPLICATION, WITH LONG-TERM CURRENT USE OF INSULIN (HCC): ICD-10-CM

## 2021-05-18 DIAGNOSIS — N40.0 BENIGN PROSTATIC HYPERPLASIA WITHOUT LOWER URINARY TRACT SYMPTOMS: ICD-10-CM

## 2021-05-18 DIAGNOSIS — Z79.4 TYPE 2 DIABETES MELLITUS WITH OTHER SPECIFIED COMPLICATION, WITH LONG-TERM CURRENT USE OF INSULIN (HCC): Primary | ICD-10-CM

## 2021-05-18 DIAGNOSIS — E11.69 TYPE 2 DIABETES MELLITUS WITH OTHER SPECIFIED COMPLICATION, WITH LONG-TERM CURRENT USE OF INSULIN (HCC): ICD-10-CM

## 2021-05-18 DIAGNOSIS — I50.32 CHRONIC DIASTOLIC (CONGESTIVE) HEART FAILURE (HCC): ICD-10-CM

## 2021-05-18 LAB
ALBUMIN SERPL-MCNC: 4.3 G/DL (ref 3.5–5.1)
ALP BLD-CCNC: 70 U/L (ref 38–126)
ALT SERPL-CCNC: 8 U/L (ref 11–66)
ANION GAP SERPL CALCULATED.3IONS-SCNC: 12 MEQ/L (ref 8–16)
AST SERPL-CCNC: 12 U/L (ref 5–40)
BASOPHILS # BLD: 0.6 %
BASOPHILS ABSOLUTE: 0.1 THOU/MM3 (ref 0–0.1)
BILIRUB SERPL-MCNC: 0.3 MG/DL (ref 0.3–1.2)
BILIRUBIN DIRECT: < 0.2 MG/DL (ref 0–0.3)
BUN BLDV-MCNC: 59 MG/DL (ref 7–22)
CALCIUM SERPL-MCNC: 9.3 MG/DL (ref 8.5–10.5)
CHLORIDE BLD-SCNC: 102 MEQ/L (ref 98–111)
CHOLESTEROL, TOTAL: 110 MG/DL (ref 100–199)
CO2: 25 MEQ/L (ref 23–33)
CREAT SERPL-MCNC: 3.5 MG/DL (ref 0.4–1.2)
EOSINOPHIL # BLD: 2.6 %
EOSINOPHILS ABSOLUTE: 0.2 THOU/MM3 (ref 0–0.4)
ERYTHROCYTE [DISTWIDTH] IN BLOOD BY AUTOMATED COUNT: 12.6 % (ref 11.5–14.5)
ERYTHROCYTE [DISTWIDTH] IN BLOOD BY AUTOMATED COUNT: 45.1 FL (ref 35–45)
GFR SERPL CREATININE-BSD FRML MDRD: 17 ML/MIN/1.73M2
GLUCOSE BLD-MCNC: 149 MG/DL (ref 70–108)
HCT VFR BLD CALC: 34.3 % (ref 42–52)
HEMOGLOBIN: 10.9 GM/DL (ref 14–18)
IMMATURE GRANS (ABS): 0.02 THOU/MM3 (ref 0–0.07)
IMMATURE GRANULOCYTES: 0.2 %
LYMPHOCYTES # BLD: 25.1 %
LYMPHOCYTES ABSOLUTE: 2.1 THOU/MM3 (ref 1–4.8)
MCH RBC QN AUTO: 31.3 PG (ref 26–33)
MCHC RBC AUTO-ENTMCNC: 31.8 GM/DL (ref 32.2–35.5)
MCV RBC AUTO: 98.6 FL (ref 80–94)
MONOCYTES # BLD: 8.1 %
MONOCYTES ABSOLUTE: 0.7 THOU/MM3 (ref 0.4–1.3)
NUCLEATED RED BLOOD CELLS: 0 /100 WBC
PLATELET # BLD: 207 THOU/MM3 (ref 130–400)
PMV BLD AUTO: 9.6 FL (ref 9.4–12.4)
POTASSIUM SERPL-SCNC: 5.9 MEQ/L (ref 3.5–5.2)
PROSTATE SPECIFIC ANTIGEN: 0.43 NG/ML (ref 0–1)
RBC # BLD: 3.48 MILL/MM3 (ref 4.7–6.1)
SEG NEUTROPHILS: 63.4 %
SEGMENTED NEUTROPHILS ABSOLUTE COUNT: 5.3 THOU/MM3 (ref 1.8–7.7)
SODIUM BLD-SCNC: 139 MEQ/L (ref 135–145)
TOTAL PROTEIN: 7.1 G/DL (ref 6.1–8)
WBC # BLD: 8.4 THOU/MM3 (ref 4.8–10.8)

## 2021-05-18 PROCEDURE — 4040F PNEUMOC VAC/ADMIN/RCVD: CPT | Performed by: FAMILY MEDICINE

## 2021-05-18 PROCEDURE — 1036F TOBACCO NON-USER: CPT | Performed by: FAMILY MEDICINE

## 2021-05-18 PROCEDURE — G8427 DOCREV CUR MEDS BY ELIG CLIN: HCPCS | Performed by: FAMILY MEDICINE

## 2021-05-18 PROCEDURE — 3017F COLORECTAL CA SCREEN DOC REV: CPT | Performed by: FAMILY MEDICINE

## 2021-05-18 PROCEDURE — 1123F ACP DISCUSS/DSCN MKR DOCD: CPT | Performed by: FAMILY MEDICINE

## 2021-05-18 PROCEDURE — G8417 CALC BMI ABV UP PARAM F/U: HCPCS | Performed by: FAMILY MEDICINE

## 2021-05-18 PROCEDURE — 99213 OFFICE O/P EST LOW 20 MIN: CPT | Performed by: FAMILY MEDICINE

## 2021-05-18 PROCEDURE — 17000 DESTRUCT PREMALG LESION: CPT | Performed by: FAMILY MEDICINE

## 2021-05-18 RX ORDER — SULFAMETHOXAZOLE AND TRIMETHOPRIM 800; 160 MG/1; MG/1
TABLET ORAL
COMMUNITY
Start: 2021-05-08 | End: 2021-08-10

## 2021-05-18 SDOH — ECONOMIC STABILITY: FOOD INSECURITY: WITHIN THE PAST 12 MONTHS, YOU WORRIED THAT YOUR FOOD WOULD RUN OUT BEFORE YOU GOT MONEY TO BUY MORE.: NEVER TRUE

## 2021-05-18 SDOH — ECONOMIC STABILITY: FOOD INSECURITY: WITHIN THE PAST 12 MONTHS, THE FOOD YOU BOUGHT JUST DIDN'T LAST AND YOU DIDN'T HAVE MONEY TO GET MORE.: NEVER TRUE

## 2021-05-18 ASSESSMENT — ENCOUNTER SYMPTOMS
CONSTIPATION: 0
BLOOD IN STOOL: 0
SORE THROAT: 0
ABDOMINAL PAIN: 0
NAUSEA: 0
TROUBLE SWALLOWING: 0
ORTHOPNEA: 0
BACK PAIN: 0
EYE PAIN: 0
CHEST TIGHTNESS: 0
SHORTNESS OF BREATH: 0
COUGH: 0

## 2021-05-18 ASSESSMENT — SOCIAL DETERMINANTS OF HEALTH (SDOH): HOW HARD IS IT FOR YOU TO PAY FOR THE VERY BASICS LIKE FOOD, HOUSING, MEDICAL CARE, AND HEATING?: NOT HARD AT ALL

## 2021-05-18 NOTE — PROGRESS NOTES
Subjective:      Patient ID: Peter Frost is a 65 y.o. male. Toes  Both  Feet  Torn  sklin        Venous  insuffiency  Noted        ashd  Stable       nidddm  With  insulin         Obesity  Noted         actinic  Keratosis  lesion   Of  scalp    Hypertension  This is a chronic problem. The current episode started more than 1 year ago. The problem is controlled. Associated symptoms include peripheral edema. Pertinent negatives include no chest pain, headaches, orthopnea, palpitations or shortness of breath. Past Medical History:   Diagnosis Date    RAUL (acute kidney injury) ([facility] [address] 2/13/2020    ASHD (arteriosclerotic heart disease) 2005 2006    stent  baki    Depression     Diabetic peripheral neuropathy [facility]) 2014    Fracture Oct 1984    left lower extremity     HTN (hypertension)     Hypercholesteremia     IDDM (insulin dependent diabetes mellitus)     Low back pain     Morbid obesity with BMI of 45.0-49.9, adult (HCC)     Osteoarthritis        Review of Systems   Constitutional: Negative for fatigue and fever. HENT: Negative for congestion, ear pain, postnasal drip, sore throat and trouble swallowing. Eyes: Negative for pain. Respiratory: Negative for cough, chest tightness and shortness of breath. Cardiovascular: Positive for leg swelling. Negative for chest pain, palpitations and orthopnea. Gastrointestinal: Negative for abdominal pain, blood in stool, constipation and nausea. Genitourinary: Negative for difficulty urinating, frequency and urgency. Musculoskeletal: Negative for arthralgias, back pain, joint swelling and neck stiffness. Skin: Negative for rash. Lesion  Of  The  scalp   Neurological: Negative for dizziness, weakness and headaches. Hematological: Negative for adenopathy. Does not bruise/bleed easily. Psychiatric/Behavioral: Negative for behavioral problems, dysphoric mood and sleep disturbance.      /82 (Site: Right Upper Arm, Position: Sitting, Cuff Size: Large Adult)   Pulse 80   Temp 97.3 °F (36.3 °C) (Skin)   Resp 16   Ht 6' 2\" (1.88 m)   Wt (!) 368 lb 4 oz (167 kg)   BMI 47.28 kg/m²   Objective:   Physical Exam  Vitals and nursing note reviewed. Constitutional:       Appearance: He is well-developed. HENT:      Head: Normocephalic and atraumatic. Right Ear: External ear normal.      Left Ear: External ear normal.      Nose: Nose normal.   Eyes:      Conjunctiva/sclera: Conjunctivae normal.      Pupils: Pupils are equal, round, and reactive to light. Comments: Fundi nl   Neck:      Thyroid: No thyromegaly. Cardiovascular:      Rate and Rhythm: Normal rate and regular rhythm. Heart sounds: Normal heart sounds. Pulmonary:      Effort: Pulmonary effort is normal.      Breath sounds: Normal breath sounds. No wheezing or rales. Abdominal:      General: Bowel sounds are normal.      Palpations: Abdomen is soft. There is no mass. Tenderness: There is no abdominal tenderness. Musculoskeletal:         General: Normal range of motion. Cervical back: Normal range of motion and neck supple. Right lower leg: Edema present. Left lower leg: Edema present. Comments:     2  To  3 +  To  The  Knees     Lymphadenopathy:      Cervical: No cervical adenopathy. Skin:     General: Skin is warm and dry. Findings: No rash. Comments: Left  Parietal  Area  With  Skin lesion  Actinic  keratosis   Neurological:      Mental Status: He is alert and oriented to person, place, and time. Cranial Nerves: No cranial nerve deficit. Deep Tendon Reflexes: Reflexes are normal and symmetric. Cryo  To  Scalp  lesion    Assessment:           Diagnosis Orders   1. Type 2 diabetes mellitus with other specified complication, with long-term current use of insulin (HCC)  metFORMIN (GLUCOPHAGE) 500 MG tablet    Hemoglobin A1C   2. Hypercholesteremia     3.  Chronic diastolic (congestive) heart failure (HCC) 4. Diabetic peripheral neuropathy ([facility] [address]     5. Essential hypertension  TSH with Reflex    Basic Metabolic Panel    Hepatic Function Panel    CBC Auto Differential   6. Coronary artery disease of bypass graft of native heart with stable angina pectoris (HCC)  Cholesterol, Total   7. Benign prostatic hyperplasia without lower urinary tract symptoms  PSA, Prostatic Specific Antigen   8.  Actinic keratosis of scalp  MO DESTRUC PREMALIGNANT, FIRST LESION     Plan:      Current Outpatient Medications   Medication Sig Dispense Refill    sulfamethoxazole-trimethoprim (BACTRIM DS;SEPTRA DS) 800-160 MG per tablet TAKE 1 TABLET BY MOUTH TWICE DAILY      metFORMIN (GLUCOPHAGE) 500 MG tablet TAKE TWO TABLETS BY MOUTH TWICE DAILY WITH MEALS 360 tablet 1    pantoprazole (PROTONIX) 40 MG tablet Take 1 tablet by mouth every morning (before breakfast) 30 tablet 0    ticagrelor (BRILINTA) 90 MG TABS tablet TAKE ONE TABLET BY MOUTH TWICE DAILY 60 tablet 3    atorvastatin (LIPITOR) 10 MG tablet TAKE 1 TABLET BY MOUTH ONCE DAILY 90 tablet 1    furosemide (LASIX) 80 MG tablet Take 1 tablet by mouth daily 80 mg at 10  Am  And  40  Mg  4 pm 60 tablet 3    enalapril (VASOTEC) 10 MG tablet Take 2 tablets by mouth twice daily 360 tablet 0    Blood Glucose Monitoring Suppl (ACCU-CHEK WAQAR SMARTVIEW) w/Device KIT Pt is to use to test blood sugar three times a day DX : E11.9 1 kit 0    insulin aspart (NOVOLOG FLEXPEN) 100 UNIT/ML injection pen Inject 18 Units into the skin 3 times daily (before meals) INJECT 12 UNITS SUBCUTANEOUSLY 3 TIMES DAILY BEFORE MEALS 10 pen 3    carvedilol (COREG) 12.5 MG tablet Take 1 tablet by mouth 2 times daily 180 tablet 3    amLODIPine (NORVASC) 5 MG tablet Take 1 tablet by mouth daily 90 tablet 3    insulin glargine (LANTUS SOLOSTAR) 100 UNIT/ML injection pen INJECT 30 UNITS SUBCUTANEOUSLY ONCE DAILY 15 pen 1    ACCU-CHEK SMARTVIEW strip Use to test Blood Sugar 3x daily, Dx: E11.9 100 each 11    nitroGLYCERIN (NITROSTAT) 0.4 MG SL tablet Place 1 tablet under the tongue every 5 minutes as needed for Chest pain 25 tablet 3    Multiple Vitamins-Minerals (THERAPEUTIC MULTIVITAMIN-MINERALS) tablet Take 1 tablet by mouth daily 30 tablet 11    aspirin 81 MG tablet Take 1 tablet by mouth daily      acetaminophen (TYLENOL) 500 MG tablet Take 500 mg by mouth as needed for Pain       No current facility-administered medications for this visit. Orders Placed This Encounter   Procedures    TSH with Reflex     Standing Status:   Future     Standing Expiration Date:   5/18/2022    Basic Metabolic Panel     Standing Status:   Future     Standing Expiration Date:   5/18/2022    Hepatic Function Panel     Standing Status:   Future     Standing Expiration Date:   5/18/2022    Cholesterol, Total     Standing Status:   Future     Standing Expiration Date:   5/19/2022     Order Specific Question:   Is Patient Fasting? Answer:   yes     Order Specific Question:   No of Hours? Answer:   12 hours    CBC Auto Differential     Standing Status:   Future     Standing Expiration Date:   5/18/2022    PSA, Prostatic Specific Antigen     Standing Status:   Future     Standing Expiration Date:   5/18/2022    Hemoglobin A1C     Standing Status:   Future     Standing Expiration Date:   5/18/2022    TN DESTRUC PREMALIGNANT, FIRST LESION     After a verbal consent , the lesion was applied with liquid nitrogen thaw and freeze method and tolerated well the procedure     one  Lesion of  Scalp  Left  Posterior  Parietal  Area  With  cryo     No results found for this visit on 05/18/21. Turner Logan received counseling on the following healthy behaviors: nutrition and exercise    Patient given educational materials on Diabetes and Hyperlipidemia    I have instructed Turner Logan to complete a self tracking handout on Blood Sugars  and Blood Pressures  and instructed them to bring it with them to his next appointment.      Discussed use, benefit, and side effects of prescribed medications. Barriers to medication compliance addressed. All patient questions answered. Pt voiced understanding.          Zbigniew Brito MD

## 2021-05-19 ENCOUNTER — TELEPHONE (OUTPATIENT)
Dept: FAMILY MEDICINE CLINIC | Age: 71
End: 2021-05-19

## 2021-05-19 DIAGNOSIS — I15.0 RENOVASCULAR HYPERTENSION: Primary | ICD-10-CM

## 2021-05-19 LAB
AVERAGE GLUCOSE: 174 MG/DL (ref 70–126)
HBA1C MFR BLD: 7.8 % (ref 4.4–6.4)

## 2021-05-19 NOTE — TELEPHONE ENCOUNTER
Acute renal failure and ? Too much lasix .  Hold lasix for 2  Days and make sure drink water     has he seen renal in past      no lasix or metformin and no enalapril  And stop bactrim     lab on 5-21-21

## 2021-05-19 NOTE — TELEPHONE ENCOUNTER
Juanita Umanzor informed by Phone. He got out all 4 bottles of meds to hold while I was on the phone. Lab order faxed to [facility] per request to repeat on Friday 5-.  He stated he has never seen Dav Luong or a Nephrologist in the past.

## 2021-05-19 NOTE — TELEPHONE ENCOUNTER
----- Message from Bakari Rosado MD sent at 5/19/2021  5:54 AM EDT -----  Has  seen tracy jones and nephrology in past

## 2021-05-19 NOTE — TELEPHONE ENCOUNTER
Pt called asking what he can do to help improve his kidneys.      Xiomy Riojas may be reached at [phone]

## 2021-05-20 ENCOUNTER — CARE COORDINATION (OUTPATIENT)
Dept: CARE COORDINATION | Age: 71
End: 2021-05-20

## 2021-05-20 NOTE — CARE COORDINATION
Franco Petit called and states he had questions about what to eat with his current kidney status. I informed him I could involve a RD to call him and discuss this with him. He states he will wait to see what PCP thinks and if he will be set up with nephrologist or not. I encouraged him to call me back with any questions or need for additional support.

## 2021-05-21 ENCOUNTER — NURSE ONLY (OUTPATIENT)
Dept: LAB | Age: 71
End: 2021-05-21

## 2021-05-21 DIAGNOSIS — I10 ESSENTIAL HYPERTENSION: ICD-10-CM

## 2021-05-21 DIAGNOSIS — I15.0 RENOVASCULAR HYPERTENSION: ICD-10-CM

## 2021-05-21 LAB
ANION GAP SERPL CALCULATED.3IONS-SCNC: 11 MEQ/L (ref 8–16)
BUN BLDV-MCNC: 33 MG/DL (ref 7–22)
CALCIUM SERPL-MCNC: 9.6 MG/DL (ref 8.5–10.5)
CHLORIDE BLD-SCNC: 102 MEQ/L (ref 98–111)
CO2: 23 MEQ/L (ref 23–33)
CREAT SERPL-MCNC: 2.1 MG/DL (ref 0.4–1.2)
GFR SERPL CREATININE-BSD FRML MDRD: 31 ML/MIN/1.73M2
GLUCOSE BLD-MCNC: 293 MG/DL (ref 70–108)
POTASSIUM SERPL-SCNC: 5.7 MEQ/L (ref 3.5–5.2)
SODIUM BLD-SCNC: 136 MEQ/L (ref 135–145)
TSH SERPL DL<=0.05 MIU/L-ACNC: 2.87 UIU/ML (ref 0.4–4.2)

## 2021-05-22 ENCOUNTER — TELEPHONE (OUTPATIENT)
Dept: FAMILY MEDICINE CLINIC | Age: 71
End: 2021-05-22

## 2021-05-22 DIAGNOSIS — I15.0 RENOVASCULAR HYPERTENSION: Primary | ICD-10-CM

## 2021-05-22 NOTE — TELEPHONE ENCOUNTER
Take 40 mg lasix next 2 days and get lab on Monday and see next Wednesday  I called and informed patient and need appointment next week on 5-26-21

## 2021-05-24 ENCOUNTER — NURSE ONLY (OUTPATIENT)
Dept: LAB | Age: 71
End: 2021-05-24

## 2021-05-24 DIAGNOSIS — I15.0 RENOVASCULAR HYPERTENSION: ICD-10-CM

## 2021-05-24 LAB
ANION GAP SERPL CALCULATED.3IONS-SCNC: 13 MEQ/L (ref 8–16)
BUN BLDV-MCNC: 24 MG/DL (ref 7–22)
CHLORIDE BLD-SCNC: 99 MEQ/L (ref 98–111)
CO2: 24 MEQ/L (ref 23–33)
CREAT SERPL-MCNC: 1.7 MG/DL (ref 0.4–1.2)
GFR SERPL CREATININE-BSD FRML MDRD: 40 ML/MIN/1.73M2
POTASSIUM SERPL-SCNC: 4.9 MEQ/L (ref 3.5–5.2)
SODIUM BLD-SCNC: 136 MEQ/L (ref 135–145)

## 2021-05-25 ENCOUNTER — TELEPHONE (OUTPATIENT)
Dept: FAMILY MEDICINE CLINIC | Age: 71
End: 2021-05-25

## 2021-05-25 NOTE — TELEPHONE ENCOUNTER
Tuyet Mustafa informed by Phone to start Vasotec and continue taking lasix. Also confirmed he is keeping 5/26 apt.

## 2021-05-25 NOTE — TELEPHONE ENCOUNTER
----- Message from Pradeep Miramontes MD sent at 5/25/2021  5:45 AM EDT -----  Start vasotec 10 mg one a day and keep lasix at 40 mg or 1/2 tab a day .  Weigh self daily and keep appointment tomorrow

## 2021-05-27 ENCOUNTER — OFFICE VISIT (OUTPATIENT)
Dept: FAMILY MEDICINE CLINIC | Age: 71
End: 2021-05-27

## 2021-05-27 VITALS
WEIGHT: 315 LBS | HEART RATE: 78 BPM | TEMPERATURE: 97.7 F | HEIGHT: 74 IN | BODY MASS INDEX: 40.43 KG/M2 | RESPIRATION RATE: 16 BRPM | SYSTOLIC BLOOD PRESSURE: 138 MMHG | DIASTOLIC BLOOD PRESSURE: 70 MMHG

## 2021-05-27 DIAGNOSIS — I15.0 RENOVASCULAR HYPERTENSION: ICD-10-CM

## 2021-05-27 DIAGNOSIS — E11.42 TYPE 2 DIABETES MELLITUS WITH DIABETIC POLYNEUROPATHY, WITH LONG-TERM CURRENT USE OF INSULIN (HCC): ICD-10-CM

## 2021-05-27 DIAGNOSIS — N17.9 ACUTE RENAL FAILURE SUPERIMPOSED ON STAGE 3A CHRONIC KIDNEY DISEASE, UNSPECIFIED ACUTE RENAL FAILURE TYPE (HCC): Primary | ICD-10-CM

## 2021-05-27 DIAGNOSIS — I10 ESSENTIAL HYPERTENSION: ICD-10-CM

## 2021-05-27 DIAGNOSIS — E11.42 DIABETIC PERIPHERAL NEUROPATHY (HCC): ICD-10-CM

## 2021-05-27 DIAGNOSIS — Z79.4 TYPE 2 DIABETES MELLITUS WITH DIABETIC POLYNEUROPATHY, WITH LONG-TERM CURRENT USE OF INSULIN (HCC): ICD-10-CM

## 2021-05-27 DIAGNOSIS — I25.708 CORONARY ARTERY DISEASE OF BYPASS GRAFT OF NATIVE HEART WITH STABLE ANGINA PECTORIS (HCC): ICD-10-CM

## 2021-05-27 DIAGNOSIS — N18.31 ACUTE RENAL FAILURE SUPERIMPOSED ON STAGE 3A CHRONIC KIDNEY DISEASE, UNSPECIFIED ACUTE RENAL FAILURE TYPE (HCC): Primary | ICD-10-CM

## 2021-05-27 PROCEDURE — 99213 OFFICE O/P EST LOW 20 MIN: CPT | Performed by: FAMILY MEDICINE

## 2021-05-27 PROCEDURE — 3017F COLORECTAL CA SCREEN DOC REV: CPT | Performed by: FAMILY MEDICINE

## 2021-05-27 PROCEDURE — 4040F PNEUMOC VAC/ADMIN/RCVD: CPT | Performed by: FAMILY MEDICINE

## 2021-05-27 PROCEDURE — G8417 CALC BMI ABV UP PARAM F/U: HCPCS | Performed by: FAMILY MEDICINE

## 2021-05-27 PROCEDURE — G8427 DOCREV CUR MEDS BY ELIG CLIN: HCPCS | Performed by: FAMILY MEDICINE

## 2021-05-27 PROCEDURE — 1036F TOBACCO NON-USER: CPT | Performed by: FAMILY MEDICINE

## 2021-05-27 PROCEDURE — 1123F ACP DISCUSS/DSCN MKR DOCD: CPT | Performed by: FAMILY MEDICINE

## 2021-05-27 RX ORDER — ENALAPRIL MALEATE 10 MG/1
TABLET ORAL
Qty: 360 TABLET | Refills: 1
Start: 2021-05-27 | End: 2021-11-01 | Stop reason: SDUPTHER

## 2021-05-27 ASSESSMENT — ENCOUNTER SYMPTOMS
CONSTIPATION: 0
BLOOD IN STOOL: 0
NAUSEA: 0
SORE THROAT: 0
TROUBLE SWALLOWING: 0
BACK PAIN: 0
ORTHOPNEA: 0
COUGH: 0
CHEST TIGHTNESS: 0
SHORTNESS OF BREATH: 0
EYE PAIN: 0
ABDOMINAL PAIN: 0

## 2021-05-27 NOTE — PROGRESS NOTES
Subjective:      Patient ID: Fabio Garay is a 65 y.o. male. ACUTE KIDNEY  INJURY  On  Chronic     Hypertension  This is a chronic problem. The current episode started more than 1 year ago. The problem has been resolved since onset. The problem is controlled. Associated symptoms include peripheral edema. Pertinent negatives include no chest pain, headaches, orthopnea, palpitations or shortness of breath. The current treatment provides significant improvement. Past Medical History:   Diagnosis Date    RAUL (acute kidney injury) ([facility] [address] 2/13/2020    ASHD (arteriosclerotic heart disease) 2005 2006    stent  baki    Depression     Diabetic peripheral neuropathy [facility]) 2014    Fracture Oct 1984    left lower extremity     HTN (hypertension)     Hypercholesteremia     IDDM (insulin dependent diabetes mellitus)     Low back pain     Morbid obesity with BMI of 45.0-49.9, adult (HCC)     Osteoarthritis      Review of Systems   Constitutional: Negative for fatigue and fever. HENT: Negative for congestion, ear pain, postnasal drip, sore throat and trouble swallowing. Eyes: Negative for pain. Respiratory: Negative for cough, chest tightness and shortness of breath. Cardiovascular: Negative for chest pain, palpitations, orthopnea and leg swelling. Gastrointestinal: Negative for abdominal pain, blood in stool, constipation and nausea. Genitourinary: Negative for difficulty urinating, frequency and urgency. Musculoskeletal: Negative for arthralgias, back pain, joint swelling and neck stiffness. Skin: Negative for rash. Neurological: Negative for dizziness, weakness and headaches. Hematological: Negative for adenopathy. Does not bruise/bleed easily. Psychiatric/Behavioral: Negative for behavioral problems, dysphoric mood and sleep disturbance.      /70 (Site: Right Upper Arm, Position: Sitting, Cuff Size: Large Adult)   Pulse 78   Temp 97.7 °F (36.5 °C) (Skin)   Resp 16   Ht 6' 2\" (1.88 m)   Wt (!) 368 lb 4 oz (167 kg)   BMI 47.28 kg/m²   Objective:   Physical Exam  Vitals and nursing note reviewed. Constitutional:       Appearance: He is well-developed. HENT:      Head: Normocephalic and atraumatic. Right Ear: External ear normal.      Left Ear: External ear normal.      Nose: Nose normal.   Eyes:      Conjunctiva/sclera: Conjunctivae normal.      Pupils: Pupils are equal, round, and reactive to light. Comments: Fundi nl   Neck:      Thyroid: No thyromegaly. Cardiovascular:      Rate and Rhythm: Normal rate and regular rhythm. Heart sounds: Normal heart sounds. Pulmonary:      Effort: Pulmonary effort is normal.      Breath sounds: Normal breath sounds. No wheezing or rales. Abdominal:      General: Bowel sounds are normal.      Palpations: Abdomen is soft. There is no mass. Tenderness: There is no abdominal tenderness. Musculoskeletal:         General: Normal range of motion. Cervical back: Normal range of motion and neck supple. Right lower leg: Edema present. Left lower leg: Edema present. Comments:  3 +  Both  Legs    Lymphadenopathy:      Cervical: No cervical adenopathy. Skin:     General: Skin is warm and dry. Findings: No rash. Neurological:      Mental Status: He is alert and oriented to person, place, and time. Cranial Nerves: No cranial nerve deficit. Deep Tendon Reflexes: Reflexes are normal and symmetric. Assessment:       Diagnosis Orders   1. Acute renal failure superimposed on stage 3a chronic kidney disease, unspecified acute renal failure type ([address]     2. Essential hypertension     3. Coronary artery disease of bypass graft of native heart with stable angina pectoris ([address]     4. Diabetic peripheral neuropathy ([address]     5.  Type 2 diabetes mellitus with diabetic polyneuropathy, with long-term current use of insulin ([address]           Plan:      Current Outpatient Medications   Medication Sig Dispense Refill    enalapril (VASOTEC) 10 MG tablet One a day 360 tablet 1    sulfamethoxazole-trimethoprim (BACTRIM DS;SEPTRA DS) 800-160 MG per tablet TAKE 1 TABLET BY MOUTH TWICE DAILY      metFORMIN (GLUCOPHAGE) 500 MG tablet TAKE TWO TABLETS BY MOUTH TWICE DAILY WITH MEALS 360 tablet 1    pantoprazole (PROTONIX) 40 MG tablet Take 1 tablet by mouth every morning (before breakfast) 30 tablet 0    ticagrelor (BRILINTA) 90 MG TABS tablet TAKE ONE TABLET BY MOUTH TWICE DAILY 60 tablet 3    atorvastatin (LIPITOR) 10 MG tablet TAKE 1 TABLET BY MOUTH ONCE DAILY 90 tablet 1    furosemide (LASIX) 80 MG tablet Take 1 tablet by mouth daily 80 mg at 10  Am  And  40  Mg  4 pm 60 tablet 3    enalapril (VASOTEC) 10 MG tablet Take 2 tablets by mouth twice daily 360 tablet 0    Blood Glucose Monitoring Suppl (ACCU-CHEK WAQAR SMARTVIEW) w/Device KIT Pt is to use to test blood sugar three times a day DX : E11.9 1 kit 0    insulin aspart (NOVOLOG FLEXPEN) 100 UNIT/ML injection pen Inject 18 Units into the skin 3 times daily (before meals) INJECT 12 UNITS SUBCUTANEOUSLY 3 TIMES DAILY BEFORE MEALS 10 pen 3    amLODIPine (NORVASC) 5 MG tablet Take 1 tablet by mouth daily 90 tablet 3    insulin glargine (LANTUS SOLOSTAR) 100 UNIT/ML injection pen INJECT 30 UNITS SUBCUTANEOUSLY ONCE DAILY 15 pen 1    ACCU-CHEK SMARTVIEW strip Use to test Blood Sugar 3x daily, Dx: E11.9 100 each 11    nitroGLYCERIN (NITROSTAT) 0.4 MG SL tablet Place 1 tablet under the tongue every 5 minutes as needed for Chest pain 25 tablet 3    Multiple Vitamins-Minerals (THERAPEUTIC MULTIVITAMIN-MINERALS) tablet Take 1 tablet by mouth daily 30 tablet 11    aspirin 81 MG tablet Take 1 tablet by mouth daily      acetaminophen (TYLENOL) 500 MG tablet Take 500 mg by mouth as needed for Pain      carvedilol (COREG) 12.5 MG tablet Take 1 tablet by mouth 2 times daily 180 tablet 3     No current facility-administered medications for this visit. Orders Placed This Encounter   Procedures    Electrolyte Panel     Standing Status:   Future     Standing Expiration Date:   5/27/2022    BUN & Creatinine     Standing Status:   Future     Standing Expiration Date:   5/27/2022          lasix  To one a  Day and  vasotec  Just  10  Mg  A day  . Maurice Barrera   Resume  Metformin      Labs   Next  Week   Gerald Lockhart MD

## 2021-06-03 ENCOUNTER — NURSE ONLY (OUTPATIENT)
Dept: LAB | Age: 71
End: 2021-06-03

## 2021-06-03 DIAGNOSIS — N18.31 ACUTE RENAL FAILURE SUPERIMPOSED ON STAGE 3A CHRONIC KIDNEY DISEASE, UNSPECIFIED ACUTE RENAL FAILURE TYPE (HCC): ICD-10-CM

## 2021-06-03 DIAGNOSIS — N17.9 ACUTE RENAL FAILURE SUPERIMPOSED ON STAGE 3A CHRONIC KIDNEY DISEASE, UNSPECIFIED ACUTE RENAL FAILURE TYPE (HCC): ICD-10-CM

## 2021-06-03 LAB
ANION GAP SERPL CALCULATED.3IONS-SCNC: 13 MEQ/L (ref 8–16)
BUN BLDV-MCNC: 34 MG/DL (ref 7–22)
CHLORIDE BLD-SCNC: 102 MEQ/L (ref 98–111)
CO2: 26 MEQ/L (ref 23–33)
CREAT SERPL-MCNC: 1.5 MG/DL (ref 0.4–1.2)
GFR SERPL CREATININE-BSD FRML MDRD: 46 ML/MIN/1.73M2
POTASSIUM SERPL-SCNC: 4.4 MEQ/L (ref 3.5–5.2)
SODIUM BLD-SCNC: 141 MEQ/L (ref 135–145)

## 2021-06-04 ENCOUNTER — TELEPHONE (OUTPATIENT)
Dept: FAMILY MEDICINE CLINIC | Age: 71
End: 2021-06-04

## 2021-06-04 DIAGNOSIS — I10 ESSENTIAL HYPERTENSION: Primary | ICD-10-CM

## 2021-06-04 NOTE — TELEPHONE ENCOUNTER
----- Message from Danielle Serrano MD sent at 6/4/2021  6:48 AM EDT -----  Labs much better and resume metformin .  How is leg swelling doing      Repeat lab in one week

## 2021-07-20 ENCOUNTER — TELEPHONE (OUTPATIENT)
Dept: FAMILY MEDICINE CLINIC | Age: 71
End: 2021-07-20

## 2021-07-20 NOTE — TELEPHONE ENCOUNTER
Pt called wanting to know of something to help with arthritis in knee, walking on it hurts.  Propping it up with ice does help a bit

## 2021-07-20 NOTE — TELEPHONE ENCOUNTER
I called and left him a message to try Voltaren Gel as directed. I told him he could also take his prn tylenol but to not take over 4000mg in a 24 hour period.  I suggested that if this does not help sufficiently he can call back for an appointment to see the

## 2021-08-02 ENCOUNTER — TELEPHONE (OUTPATIENT)
Dept: FAMILY MEDICINE CLINIC | Age: 71
End: 2021-08-02

## 2021-08-02 DIAGNOSIS — M62.830 BACK MUSCLE SPASM: Primary | ICD-10-CM

## 2021-08-03 RX ORDER — TIZANIDINE 4 MG/1
4 TABLET ORAL 3 TIMES DAILY
Qty: 21 TABLET | Refills: 0 | Status: SHIPPED | OUTPATIENT
Start: 2021-08-03 | End: 2022-01-20 | Stop reason: SDUPTHER

## 2021-08-03 NOTE — TELEPHONE ENCOUNTER
Per Verbal order of  Sent prescription zanaflex 4 mg  to pharmacy for pt. Pt informed of this and taking tyelonol 500 mg every 6 hours.

## 2021-08-09 ENCOUNTER — HOSPITAL ENCOUNTER (EMERGENCY)
Age: 71
Discharge: HOME | End: 2021-08-09
Attending: EMERGENCY MEDICINE
Payer: MEDICARE

## 2021-08-09 ENCOUNTER — TELEPHONE (OUTPATIENT)
Dept: FAMILY MEDICINE CLINIC | Age: 71
End: 2021-08-09

## 2021-08-09 ENCOUNTER — APPOINTMENT (OUTPATIENT)
Dept: ULTRASOUND IMAGING | Age: 71
End: 2021-08-09
Payer: MEDICARE

## 2021-08-09 VITALS
HEIGHT: 74 IN | WEIGHT: 315 LBS | BODY MASS INDEX: 40.43 KG/M2 | RESPIRATION RATE: 17 BRPM | DIASTOLIC BLOOD PRESSURE: 66 MMHG | SYSTOLIC BLOOD PRESSURE: 133 MMHG | TEMPERATURE: 98.3 F | HEART RATE: 68 BPM | OXYGEN SATURATION: 97 %

## 2021-08-09 DIAGNOSIS — N43.3 HYDROCELE, UNSPECIFIED HYDROCELE TYPE: Primary | ICD-10-CM

## 2021-08-09 DIAGNOSIS — I86.1 VARICOCELE: ICD-10-CM

## 2021-08-09 LAB
BILIRUBIN URINE: NEGATIVE
BLOOD, URINE: NEGATIVE
CHARACTER, URINE: CLEAR
COLOR: YELLOW
GLUCOSE URINE: NEGATIVE MG/DL
KETONES, URINE: NEGATIVE
LEUKOCYTE ESTERASE, URINE: NEGATIVE
NITRITE, URINE: NEGATIVE
PH UA: 5 (ref 5–9)
PROTEIN UA: NEGATIVE
SPECIFIC GRAVITY, URINE: 1.01 (ref 1–1.03)
UROBILINOGEN, URINE: 0.2 EU/DL (ref 0–1)

## 2021-08-09 PROCEDURE — 81003 URINALYSIS AUTO W/O SCOPE: CPT

## 2021-08-09 PROCEDURE — 76870 US EXAM SCROTUM: CPT

## 2021-08-09 PROCEDURE — 99282 EMERGENCY DEPT VISIT SF MDM: CPT

## 2021-08-09 RX ORDER — DIPHENHYDRAMINE HYDROCHLORIDE 50 MG/ML
25 INJECTION INTRAMUSCULAR; INTRAVENOUS ONCE
Status: DISCONTINUED | OUTPATIENT
Start: 2021-08-09 | End: 2021-08-09

## 2021-08-09 RX ORDER — PROCHLORPERAZINE EDISYLATE 5 MG/ML
10 INJECTION INTRAMUSCULAR; INTRAVENOUS ONCE
Status: DISCONTINUED | OUTPATIENT
Start: 2021-08-09 | End: 2021-08-09

## 2021-08-09 RX ORDER — 0.9 % SODIUM CHLORIDE 0.9 %
1000 INTRAVENOUS SOLUTION INTRAVENOUS ONCE
Status: DISCONTINUED | OUTPATIENT
Start: 2021-08-09 | End: 2021-08-09

## 2021-08-09 ASSESSMENT — PAIN SCALES - GENERAL: PAINLEVEL_OUTOF10: 1

## 2021-08-09 ASSESSMENT — PAIN DESCRIPTION - LOCATION: LOCATION: GROIN

## 2021-08-09 ASSESSMENT — PAIN DESCRIPTION - PAIN TYPE: TYPE: ACUTE PAIN

## 2021-08-09 NOTE — ED PROVIDER NOTES
[facility] EMERGENCY DEPT      CHIEF COMPLAINT       Chief Complaint   Patient presents with    Testicle Pain       Nurses Notes reviewed and I agree except as noted in the HPI. HISTORY OF PRESENT ILLNESS    Sal House is a 66 y.o. male who presents with complaint of scrotal pain/mass sensation since last week. Patient has no dysuria, no hematuria, no fevers or chills. He has had no trauma to his testicle. No history of testicular cancer. Onset: Acute  Duration: About a week  Timing: Persistent  Location of Pain: Testicular  Intesity/severity: Mild to moderate  Modifying Factors: Palpation/pressure of the testicle makes his symptoms worse  Relieved by;  Previous Episodes; Tx Before arrival: Muscle relaxants per primary care physician without relief. REVIEW OF SYSTEMS      Review of Systems   Constitutional: Negative for fever, chills, diaphoresis and fatigue. HENT: Negative for congestion, drooling, facial swelling and sore throat. Eyes: Negative for photophobia, pain and discharge. Respiratory: Negative for cough, shortness of breath, wheezing and stridor. Cardiovascular: Negative for chest pain, palpitations and leg swelling. Gastrointestinal: Negative for abdominal pain, blood in stool and abdominal distention. Genitourinary: Negative for dysuria, urgency, hematuria and difficulty urinating. Positive for pressure/pain/mass-effect sensation to testicles bilaterally. Musculoskeletal: Negative for gait problem, neck pain and neck stiffness. Skin; No rash, No itching  Neurological: Negative for seizures, weakness and numbness. Psychiatric/Behavioral: Negative for hallucinations, confusion and agitation.      PAST MEDICAL HISTORY    has a past medical history of RAUL (acute kidney injury) ([facility] [address] ASHD (arteriosclerotic heart disease), Depression, Diabetic peripheral neuropathy ([facility] [address] Fracture, HTN (hypertension), Hypercholesteremia, IDDM (insulin dependent diabetes mellitus), Low back pain, Morbid obesity with BMI of 45.0-49.9, adult ([facility] [address] and Osteoarthritis. 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     CURRENT MEDICATIONS       Discharge Medication List as of 8/9/2021  7:18 PM      CONTINUE these medications which have NOT CHANGED    Details   tiZANidine (ZANAFLEX) 4 MG tablet Take 1 tablet by mouth 3 times daily, Disp-21 tablet, R-0Normal      !! enalapril (VASOTEC) 10 MG tablet One a day, Disp-360 tablet, R-1Please consider 90 day supplies to promote better adherenceNO PRINT      sulfamethoxazole-trimethoprim (BACTRIM DS;SEPTRA DS) 800-160 MG per tablet TAKE 1 TABLET BY MOUTH TWICE DAILYHistorical Med      metFORMIN (GLUCOPHAGE) 500 MG tablet TAKE TWO TABLETS BY MOUTH TWICE DAILY WITH MEALS, Disp-360 tablet, R-1Normal      pantoprazole (PROTONIX) 40 MG tablet Take 1 tablet by mouth every morning (before breakfast), Disp-30 tablet, R-0Normal      ticagrelor (BRILINTA) 90 MG TABS tablet TAKE ONE TABLET BY MOUTH TWICE DAILY, Disp-60 tablet, R-3Please consider 90 day supplies to promote better adherenceNormal      atorvastatin (LIPITOR) 10 MG tablet TAKE 1 TABLET BY MOUTH ONCE DAILY, Disp-90 tablet, R-1Normal      furosemide (LASIX) 80 MG tablet Take 1 tablet by mouth daily 80 mg at 10  Am  And  40  Mg  4 pm, Disp-60 tablet, R-3NO PRINT      !! enalapril (VASOTEC) 10 MG tablet Take 2 tablets by mouth twice daily, Disp-360 tablet, R-0Normal      Blood Glucose Monitoring Suppl (ACCU-CHEK WAQAR SMARTVIEW) w/Device KIT Disp-1 kit, R-0, NormalPt is to use to test blood sugar three times a day DX : E11.9      insulin aspart (NOVOLOG FLEXPEN) 100 UNIT/ML injection pen Inject 18 Units into the skin 3 times daily (before meals) INJECT 12 UNITS SUBCUTANEOUSLY 3 TIMES DAILY BEFORE MEALS, Disp-10 pen, R-3Normal      carvedilol (COREG) 12.5 MG tablet Take 1 tablet by mouth 2 times daily, Disp-180 tablet, R-3Normal      amLODIPine (NORVASC) 5 MG tablet Take 1 tablet by mouth daily, Disp-90 tablet, R-3Normal      insulin glargine (LANTUS SOLOSTAR) 100 UNIT/ML injection pen INJECT 30 UNITS SUBCUTANEOUSLY ONCE DAILY, Disp-15 pen,R-1Please consider 90 day supplies to promote better adherenceNormal      ACCU-CHEK SMARTVIEW strip Use to test Blood Sugar 3x daily, Dx: E11.9, Disp-100 each, R-11, DAWNormal      nitroGLYCERIN (NITROSTAT) 0.4 MG SL tablet Place 1 tablet under the tongue every 5 minutes as needed for Chest pain, Disp-25 tablet, R-3Normal      Multiple Vitamins-Minerals (THERAPEUTIC MULTIVITAMIN-MINERALS) tablet Take 1 tablet by mouth daily, Disp-30 tablet, R-11Normal      aspirin 81 MG tablet Take 1 tablet by mouth dailyOTC      acetaminophen (TYLENOL) 500 MG tablet Take 500 mg by mouth as needed for PainHistorical Med       !! - Potential duplicate medications found. Please discuss with provider. ALLERGIES     is allergic to horse-derived products. FAMILY HISTORY     He indicated that his mother is alive. He indicated that his father is . family history includes Cancer in his mother. SOCIAL HISTORY      reports that he has never smoked.  He has never used smokeless tobacco. He reports previous alcohol use. He reports that he does not use drugs. PHYSICAL EXAM     INITIAL VITALS:  height is 6' 2\" (1.88 m) and weight is 382 lb (173.3 kg) (abnormal). His oral temperature is 98.3 °F (36.8 °C). His blood pressure is 133/66 and his pulse is 68. His respiration is 17 and oxygen saturation is 97%. Physical Exam   Constitutional:  well-developed and well-nourished. HENT: Head: Normocephalic, atraumatic, Bilateral external ears normal, Oropharynx mosit, No oral exudates, Nose normal.   Eyes: PERRL, EOMI, Conjunctiva normal, No discharge. No scleral icterus  Neck: Normal range of motion, No tenderness, Supple  Cardiovascular: Normal rate, regular rhythm, S1 normal and S2 normal.  Exam reveals no gallop. Pulmonary/Chest: Effort normal and breath sounds normal. No accessory muscle usage or stridor. No respiratory distress. no wheezes. has no rales. exhibits no tenderness. Abdominal: Soft. Bowel sounds are normal.  exhibits no distension. There is no tenderness. There is no rebound and no guarding. Genitourinary: Patient declined, states that he will follow-up with urology. Extremities: No edema, no tenderness, no cyanosis, no clubbing. Musculoskeletal: Good range of motion in major joints is observed. No major deformities noted. Neurological: Alert and oriented ×3, normal motor function, normal sensory function, no focal deficits. GCS 15  Skin: Skin is warm, dry and intact. No rash noted. No erythema. Psychiatric: Affect normal, judgment normal, mood normal.  DIFFERENTIAL DIAGNOSIS:       DIAGNOSTIC RESULTS     EKG: All EKG's are interpreted by the Emergency Department Physician who either signs or Co-signs this chart in the absence of a cardiologist.      RADIOLOGY: non-plain film images(s) such as CT, Ultrasound and MRI are read by the radiologist.  Plain radiographic images are visualized and preliminarily interpreted by the emergency physician unless otherwise stated below.       LABS: Labs Reviewed   URINE RT REFLEX TO CULTURE       EMERGENCY DEPARTMENT COURSE:   Vitals:    Vitals:    08/09/21 1615   BP: 133/66   Pulse: 68   Resp: 17   Temp: 98.3 °F (36.8 °C)   TempSrc: Oral   SpO2: 97%   Weight: (!) 382 lb (173.3 kg)   Height: 6' 2\" (1.88 m)   Patient has a varicocele and hydrocele on ultrasound, no testicular torsion, no cancer-like masses. Patient discharged home with urology follow-up. Patient had NO UTI-like symptoms. CRITICAL CARE:       CONSULTS:  None    PROCEDURES:  None    FINAL IMPRESSION      1. Hydrocele, unspecified hydrocele type    2. Varicocele          DISPOSITION/PLAN   Decision To Discharge    PATIENT REFERRED TO:  [address] [facility]  [address] [facility].  [address] [facility]  Schedule an appointment as soon as possible for a visit in 1 day  RE-CHECK AND FURTHER TESTING AS NEEDED      DISCHARGE MEDICATIONS:  Discharge Medication List as of 8/9/2021  7:18 PM          (Please note that portions of this note were completed with a voice recognition program.  Efforts were made to edit the dictations but occasionally words are mis-transcribed.)    Martine Powers, [facility], DO  08/09/21 2016

## 2021-08-09 NOTE — ED TRIAGE NOTES
Presents to ER with complaints of testicle pain that has been ongoing for 2 weeks. Pt states he talked to his PCP and his PCP instructed him to be evaluated at the ER. Pt rates pain 1 out of 10 at this time.

## 2021-08-09 NOTE — TELEPHONE ENCOUNTER
Pt called said that his left testicle is swollen and very painful. Also his back pain is not much better. Said that the muscle relaxer's did help but not much. I did let him know for the testicle pain he would need to go to urgent care to get it checked. We do not have any appointment openings today.

## 2021-08-10 ENCOUNTER — OFFICE VISIT (OUTPATIENT)
Dept: UROLOGY | Age: 71
End: 2021-08-10
Payer: MEDICARE

## 2021-08-10 VITALS — WEIGHT: 315 LBS | RESPIRATION RATE: 17 BRPM | BODY MASS INDEX: 40.43 KG/M2 | HEIGHT: 74 IN

## 2021-08-10 DIAGNOSIS — I86.1 BILATERAL VARICOCELES: Primary | ICD-10-CM

## 2021-08-10 DIAGNOSIS — N43.3 HYDROCELE, BILATERAL: ICD-10-CM

## 2021-08-10 PROCEDURE — 1123F ACP DISCUSS/DSCN MKR DOCD: CPT | Performed by: UROLOGY

## 2021-08-10 PROCEDURE — 4040F PNEUMOC VAC/ADMIN/RCVD: CPT | Performed by: UROLOGY

## 2021-08-10 PROCEDURE — G8417 CALC BMI ABV UP PARAM F/U: HCPCS | Performed by: UROLOGY

## 2021-08-10 PROCEDURE — 99204 OFFICE O/P NEW MOD 45 MIN: CPT | Performed by: UROLOGY

## 2021-08-10 PROCEDURE — 1036F TOBACCO NON-USER: CPT | Performed by: UROLOGY

## 2021-08-10 PROCEDURE — G8427 DOCREV CUR MEDS BY ELIG CLIN: HCPCS | Performed by: UROLOGY

## 2021-08-10 PROCEDURE — 3017F COLORECTAL CA SCREEN DOC REV: CPT | Performed by: UROLOGY

## 2021-08-10 RX ORDER — CIPROFLOXACIN 500 MG/1
500 TABLET, FILM COATED ORAL 2 TIMES DAILY
Qty: 28 TABLET | Refills: 0 | Status: SHIPPED | OUTPATIENT
Start: 2021-08-10 | End: 2021-08-24

## 2021-08-10 NOTE — PROGRESS NOTES
KARINA Ayala MD        [facility] [address] [facility] [address]  Dept: [phone]  Dept Fax: [phone]: [address] [address] Urology Office Note -     Patient:  Gayatri Chew  YOB: 1950  Date: 8/10/2021    The patient is a 66 y.o. male who presents today for evaluation of the following problems:   Chief Complaint   Patient presents with    Advice Only     New patient, hydrocele. referred/consultation requested by Loc Ray MD.    HISTORY OF PRESENT ILLNESS:     He was seen in the ED for scrotal pain/mass sensation on 8/9/2021. States pain began approximately 1 week ago. He denies any trauma to the testicle, no dysuria, no gross hematuria. Denies history of testicular cancer. US scrotum man testicles 8/9/2021: No acute findings. Moderate-sized bilateral varicoceles. Moderate-sized bilateral hydroceles. He does endorse significant lower extremity edema which is progressively worsened the last 2-3 months. He does see cardiology, but only on a yearly basis. He has noticed worsening of his chronic wounds on his legs, including seepage from his legs for the last 3 days. He states he can feel his edema up to his groin. He is not noticing any testicular swelling, but states that it could very well be possible. Requested/reviewed records from Loc Ray MD office and/or outside physician/EMR    (Patient's old records have been requested, reviewed and pertinent findings summarized in today's note.)    Procedures Today: N/A      Last several PSA's:  Lab Results   Component Value Date    PSA 0.43 05/18/2021    PSA 0.25 05/30/2017       Last total testosterone:  No results found for: TESTOSTERONE    Urinalysis today:  No results found for this visit on 08/10/21.     Last BUN and creatinine:  Lab Results   Component Value Date    BUN 34 (H) 06/03/2021     Lab Results Component Value Date    CREATININE 1.5 (H) 06/03/2021         Imaging Reviewed during this Office Visit:   America Rosado MD independently reviewed the images and verified the radiology reports from:    [address]    Result Date: 8/9/2021  PROCEDURE: US SCROTUM AND TESTICLES CLINICAL INFORMATION: scrotal pain COMPARISON: No prior study. TECHNIQUE: Multiple grayscale and color flow sonographic images of both testicles and other scrotal contents were obtained. Spectral Doppler waveforms were also generated. FINDINGS: Right Testicle- 4.4 x 2.4 x 2.9 cm Right Epididymis- 1.3 x 0.9 x 1.3 cm Left Testicle - 4.7 x 2.7 x 3.0 cm Left Epididymis - 0.9 x 1.4 x 0.7 cm Testicles: Both testicles are normal in size, contour, and echogenicity. Normal vascular color flow demonstrated to both testicles. Spectral Doppler waveforms of both testicles are normal. Epididymi:  Small 3 mm epididymal head cyst right side. Hydrocele: Moderate size hydroceles bilaterally. Varicocele: Moderate-sized varicocele bilaterally. 1. No acute findings. 2. Moderate-sized bilateral varicoceles. 3. Moderate-sized bilateral hydroceles. **This report has been created using voice recognition software. It may contain minor errors which are inherent in voice recognition technology. ** Final report electronically signed by Dr. Julia Plunkett on 8/9/2021 6:43 PM      PAST MEDICAL, FAMILY AND SOCIAL HISTORY:  Past Medical History:   Diagnosis Date    RAUL (acute kidney injury) ([facility] [address] 2/13/2020    ASHD (arteriosclerotic heart disease) 2005 2006    stent  baki    Depression     Diabetic peripheral neuropathy [facility]) 2014    Fracture Oct 1984    left lower extremity     HTN (hypertension)     Hypercholesteremia     IDDM (insulin dependent diabetes mellitus)     Low back pain     Morbid obesity with BMI of 45.0-49.9, adult ([facility] [address]     Osteoarthritis      Past Surgical History:   Procedure Laterality Date    AMPUTATION Left 9/10/14    partial versus complete left hallux amputation, left great toe amputation    APPENDECTOMY      BACK INJECTION Bilateral 1/18/2021    Bilateral Si MBB #1 performed by Heather Mcduffie MD at [address] Bilateral 3/1/2021    Bilateral SI MBB #2 performed by Heather Mcduffie MD at [facility]    fusion of C5-C6    CHOLECYSTECTOMY      COLONOSCOPY  2007 and 2011    colonn polyps  lorenzo    CORONARY ANGIOPLASTY WITH STENT PLACEMENT  08/07/2017    Dr Irvin Lozano @ [id number] [place] [address] Drive CORONARY ARTERY BYPASS GRAFT  2015 august dox    EKG 12-LEAD  8/14/2015         FACET JOINT INJECTION Bilateral 5/22/2020    Lumbar Facet MBB @L3-4,4-5 bilateral #2 performed by Heather Mcduffie MD at [address] [facility]      left leg    JOINT REPLACEMENT      bilateral knees gurvinder    PAIN MANAGEMENT PROCEDURE Bilateral 1/28/2020    Lumbar Facet MBB @ L3-4,4-5,5-S1 bilateral #1 LUMBAR FACET performed by Heather Mcduffie MD at [facility] [address] Right 7/14/2020    Lumbar RFA Bilateral L3-4,4-5  right side first performed by Heather Mcduffie MD at [facility] [address] Left 10/1/2020    Lumbar RFA Left side L3-4, 4-5 performed by Heather Mcduffie MD at [facility] [address] Bilateral 11/17/2020    TFLESI @L5 bilateral #1 performed by Heather Mcduffie MD at [facility] [address] Bilateral 12/10/2020    TFLESI @L5 bilateral #1 performed by Heather Mcduffie MD at [address] [facility] N/A 12/28/2018    T7-T9 DECOMPRESSION, T7-T9 POSTERIOR FUSION performed by Harrison Vega MD at [address] [facility] [address]  2010    fumich    TONSILLECTOMY      VASCULAR SURGERY      cabg harvests from left leg     Family History Problem Relation Age of Onset    Cancer Mother         uterine     Outpatient Medications Marked as Taking for the 8/10/21 encounter (Office Visit) with Brady Greer MD   Medication Sig Dispense Refill    tiZANidine (ZANAFLEX) 4 MG tablet Take 1 tablet by mouth 3 times daily 21 tablet 0    enalapril (VASOTEC) 10 MG tablet One a day 360 tablet 1    metFORMIN (GLUCOPHAGE) 500 MG tablet TAKE TWO TABLETS BY MOUTH TWICE DAILY WITH MEALS 360 tablet 1    pantoprazole (PROTONIX) 40 MG tablet Take 1 tablet by mouth every morning (before breakfast) 30 tablet 0    ticagrelor (BRILINTA) 90 MG TABS tablet TAKE ONE TABLET BY MOUTH TWICE DAILY 60 tablet 3    atorvastatin (LIPITOR) 10 MG tablet TAKE 1 TABLET BY MOUTH ONCE DAILY 90 tablet 1    furosemide (LASIX) 80 MG tablet Take 1 tablet by mouth daily 80 mg at 10  Am  And  40  Mg  4 pm 60 tablet 3    [DISCONTINUED] enalapril (VASOTEC) 10 MG tablet Take 2 tablets by mouth twice daily 360 tablet 0    Blood Glucose Monitoring Suppl (ACCU-CHEK WAQAR SMARTVIEW) w/Device KIT Pt is to use to test blood sugar three times a day DX : E11.9 1 kit 0    insulin aspart (NOVOLOG FLEXPEN) 100 UNIT/ML injection pen Inject 18 Units into the skin 3 times daily (before meals) INJECT 12 UNITS SUBCUTANEOUSLY 3 TIMES DAILY BEFORE MEALS 10 pen 3    carvedilol (COREG) 12.5 MG tablet Take 1 tablet by mouth 2 times daily 180 tablet 3    amLODIPine (NORVASC) 5 MG tablet Take 1 tablet by mouth daily 90 tablet 3    insulin glargine (LANTUS SOLOSTAR) 100 UNIT/ML injection pen INJECT 30 UNITS SUBCUTANEOUSLY ONCE DAILY 15 pen 1    ACCU-CHEK SMARTVIEW strip Use to test Blood Sugar 3x daily, Dx: E11.9 100 each 11    nitroGLYCERIN (NITROSTAT) 0.4 MG SL tablet Place 1 tablet under the tongue every 5 minutes as needed for Chest pain 25 tablet 3    Multiple Vitamins-Minerals (THERAPEUTIC MULTIVITAMIN-MINERALS) tablet Take 1 tablet by mouth daily 30 tablet 11    aspirin 81 MG tablet Take 1 tablet by mouth daily      acetaminophen (TYLENOL) 500 MG tablet Take 500 mg by mouth as needed for Pain         Horse-derived products  Social History     Tobacco Use   Smoking Status Never Smoker   Smokeless Tobacco Never Used      (If patient a smoker, smoking cessation counseling offered)   Social History     Substance and Sexual Activity   Alcohol Use Not Currently    Comment: rarely       REVIEW OF SYSTEMS:  Constitutional: negative  Eyes: negative  Respiratory: negative  Cardiovascular: negative  Gastrointestinal: negative  Genitourinary: see HPI  Musculoskeletal: negative  Skin: negative   Neurological: negative  Hematological/Lymphatic: negative  Psychological: negative    Physical Exam:    This a 66 y.o. male  Vitals:    08/10/21 1544   Resp: 17     Body mass index is 48.94 kg/m². Constitutional: Patient in no acute distress;   Scrotal edema and left hydrocele> right. More so reactive. Tender to the touch. Assessment and Plan        1. Bilateral varicoceles    2. Hydrocele, bilateral               Plan: Will treat for presumptive epididymitis-- left side very tender  Need aggressive diuresis prior to any intervention for hydroceles. Left hydrocele >> right but moreso reactive  Needs to see PCP/cardiology regarding fluid status/edema    Reassess in 6-8 weeks      Prescriptions Ordered:  No orders of the defined types were placed in this encounter. Orders Placed:  No orders of the defined types were placed in this encounter.            Nhan Eduardo MD

## 2021-08-11 ENCOUNTER — TELEPHONE (OUTPATIENT)
Dept: FAMILY MEDICINE CLINIC | Age: 71
End: 2021-08-11

## 2021-08-11 DIAGNOSIS — I87.2 VENOUS STASIS DERMATITIS OF BOTH LOWER EXTREMITIES: Primary | ICD-10-CM

## 2021-08-11 RX ORDER — CEPHALEXIN 500 MG/1
500 CAPSULE ORAL 3 TIMES DAILY
Qty: 21 CAPSULE | Refills: 0 | Status: SHIPPED | OUTPATIENT
Start: 2021-08-11 | End: 2021-08-18

## 2021-08-11 NOTE — TELEPHONE ENCOUNTER
Pt called back and stated that he just took his blood pressure and it was 74/48. I asked pt if he has ate or drank anything today and he stated no just some diet dr. Claudia Schofield. I encouraged the pt to eat lunch and drink water and recheck the BP. If it still remained that low with the leg selling he needed to be seen and go to Urgent Care or the ED.

## 2021-08-11 NOTE — TELEPHONE ENCOUNTER
Says cellulitis on both legs is seeping, the skin on it is peeling. Has put band aids on it to help slow it down. What would you suggest him to do?

## 2021-08-11 NOTE — TELEPHONE ENCOUNTER
Per Verbal order of  Sent prescription Cephalexin to pharmacy for pt. Pt informed to keep appt with his cardiologist for the leg swelling on 8/13/2021.

## 2021-08-13 ENCOUNTER — OFFICE VISIT (OUTPATIENT)
Dept: CARDIOLOGY CLINIC | Age: 71
End: 2021-08-13
Payer: MEDICARE

## 2021-08-13 VITALS
WEIGHT: 315 LBS | HEART RATE: 65 BPM | HEIGHT: 74 IN | BODY MASS INDEX: 40.43 KG/M2 | DIASTOLIC BLOOD PRESSURE: 77 MMHG | SYSTOLIC BLOOD PRESSURE: 150 MMHG

## 2021-08-13 DIAGNOSIS — I25.10 CORONARY ARTERY DISEASE INVOLVING NATIVE CORONARY ARTERY OF NATIVE HEART WITHOUT ANGINA PECTORIS: ICD-10-CM

## 2021-08-13 DIAGNOSIS — E11.9 INSULIN DEPENDENT TYPE 2 DIABETES MELLITUS (HCC): ICD-10-CM

## 2021-08-13 DIAGNOSIS — R60.0 BILATERAL LOWER EXTREMITY EDEMA: Primary | ICD-10-CM

## 2021-08-13 DIAGNOSIS — Z95.5 HISTORY OF CORONARY ARTERY STENT PLACEMENT: ICD-10-CM

## 2021-08-13 DIAGNOSIS — I10 ESSENTIAL HYPERTENSION: ICD-10-CM

## 2021-08-13 DIAGNOSIS — E78.5 DYSLIPIDEMIA: ICD-10-CM

## 2021-08-13 DIAGNOSIS — N18.30 STAGE 3 CHRONIC KIDNEY DISEASE, UNSPECIFIED WHETHER STAGE 3A OR 3B CKD (HCC): ICD-10-CM

## 2021-08-13 DIAGNOSIS — Z79.4 INSULIN DEPENDENT TYPE 2 DIABETES MELLITUS (HCC): ICD-10-CM

## 2021-08-13 PROCEDURE — 99214 OFFICE O/P EST MOD 30 MIN: CPT | Performed by: NURSE PRACTITIONER

## 2021-08-13 PROCEDURE — 4040F PNEUMOC VAC/ADMIN/RCVD: CPT | Performed by: NURSE PRACTITIONER

## 2021-08-13 PROCEDURE — G8417 CALC BMI ABV UP PARAM F/U: HCPCS | Performed by: NURSE PRACTITIONER

## 2021-08-13 PROCEDURE — 1123F ACP DISCUSS/DSCN MKR DOCD: CPT | Performed by: NURSE PRACTITIONER

## 2021-08-13 PROCEDURE — 3051F HG A1C>EQUAL 7.0%<8.0%: CPT | Performed by: NURSE PRACTITIONER

## 2021-08-13 PROCEDURE — 3017F COLORECTAL CA SCREEN DOC REV: CPT | Performed by: NURSE PRACTITIONER

## 2021-08-13 PROCEDURE — G8427 DOCREV CUR MEDS BY ELIG CLIN: HCPCS | Performed by: NURSE PRACTITIONER

## 2021-08-13 PROCEDURE — 1036F TOBACCO NON-USER: CPT | Performed by: NURSE PRACTITIONER

## 2021-08-13 PROCEDURE — 2022F DILAT RTA XM EVC RTNOPTHY: CPT | Performed by: NURSE PRACTITIONER

## 2021-08-13 RX ORDER — METOLAZONE 2.5 MG/1
2.5 TABLET ORAL DAILY
Qty: 3 TABLET | Refills: 0 | Status: SHIPPED | OUTPATIENT
Start: 2021-08-13 | End: 2021-08-24

## 2021-08-13 NOTE — PROGRESS NOTES
[facility] PROFESSIONAL SERVICES  [facility]   [address] [address]   Dept: [phone]   Dept Fax: [phone]   Loc: [phone]      Chief Complaint   Patient presents with    Edema     F/U ov for BLE edema in 65 y/o man with history of recent cellulitis, venous stasis BLE, CAD with LAD stent, HTN, HLD, CKD, IDDM, obesity. Denies chest pain, palpitations, sob, lightheadedness, dizziness or syncope. Has BLE swelling. Has open area on left leg from hitting on car door.      Cardiologist:  Dr. Muhammad Hernandez:   No fever, no chills, No fatigue or weight loss  Pulmonary:    No dyspnea, no wheezing  Cardiac:    Denies recent chest pain   GI:     No nausea or vomiting, no abdominal pain  Neuro:    No dizziness or light headedness  Musculoskeletal:  No recent active issues  Extremities:   BLE edema, good peripheral pulses      Past Medical History:   Diagnosis Date    RAUL (acute kidney injury) ([facility] [address] 2/13/2020    ASHD (arteriosclerotic heart disease) 2005 2006    stent  baki    Depression     Diabetic peripheral neuropathy ([facility] [address] [facility] 2014    Fracture Oct 1984    left lower extremity     HTN (hypertension)     Hypercholesteremia     IDDM (insulin dependent diabetes mellitus)     Low back pain     Morbid obesity with BMI of 45.0-49.9, adult (HCC)     Osteoarthritis        Allergies   Allergen Reactions    Horse-Derived Products        Current Outpatient Medications   Medication Sig Dispense Refill    metOLazone (ZAROXOLYN) 2.5 MG tablet Take 1 tablet by mouth daily for 3 doses Take 1 tablet every other day for 3 doses 3 tablet 0    cephALEXin (KEFLEX) 500 MG capsule Take 1 capsule by mouth 3 times daily for 7 days 21 capsule 0    ciprofloxacin (CIPRO) 500 MG tablet Take 1 tablet by mouth 2 times daily for 14 days 28 tablet 0    tiZANidine (ZANAFLEX) 4 MG tablet Take 1 tablet by mouth 3 times daily 21 tablet 0    enalapril (VASOTEC) 10 MG tablet One a day 360 tablet 1    metFORMIN (GLUCOPHAGE) 500 MG tablet TAKE TWO TABLETS BY MOUTH TWICE DAILY WITH MEALS 360 tablet 1    pantoprazole (PROTONIX) 40 MG tablet Take 1 tablet by mouth every morning (before breakfast) 30 tablet 0    ticagrelor (BRILINTA) 90 MG TABS tablet TAKE ONE TABLET BY MOUTH TWICE DAILY 60 tablet 3    atorvastatin (LIPITOR) 10 MG tablet TAKE 1 TABLET BY MOUTH ONCE DAILY 90 tablet 1    furosemide (LASIX) 80 MG tablet Take 1 tablet by mouth daily 80 mg at 10  Am  And  40  Mg  4 pm 60 tablet 3    Blood Glucose Monitoring Suppl (ACCU-CHEK WAQAR SMARTVIEW) w/Device KIT Pt is to use to test blood sugar three times a day DX : E11.9 1 kit 0    insulin aspart (NOVOLOG FLEXPEN) 100 UNIT/ML injection pen Inject 18 Units into the skin 3 times daily (before meals) INJECT 12 UNITS SUBCUTANEOUSLY 3 TIMES DAILY BEFORE MEALS 10 pen 3    amLODIPine (NORVASC) 5 MG tablet Take 1 tablet by mouth daily 90 tablet 3    insulin glargine (LANTUS SOLOSTAR) 100 UNIT/ML injection pen INJECT 30 UNITS SUBCUTANEOUSLY ONCE DAILY 15 pen 1    ACCU-CHEK SMARTVIEW strip Use to test Blood Sugar 3x daily, Dx: E11.9 100 each 11    nitroGLYCERIN (NITROSTAT) 0.4 MG SL tablet Place 1 tablet under the tongue every 5 minutes as needed for Chest pain 25 tablet 3    Multiple Vitamins-Minerals (THERAPEUTIC MULTIVITAMIN-MINERALS) tablet Take 1 tablet by mouth daily 30 tablet 11    aspirin 81 MG tablet Take 1 tablet by mouth daily      acetaminophen (TYLENOL) 500 MG tablet Take 500 mg by mouth as needed for Pain      carvedilol (COREG) 12.5 MG tablet Take 1 tablet by mouth 2 times daily 180 tablet 3     No current facility-administered medications for this visit. Social History     Socioeconomic History    Marital status:       Spouse name: None    Number of children: 2    Years of education: None    Highest education level: None   Occupational History    None   Tobacco Use    Smoking status: Never Smoker    3. History of coronary artery stent placement     4. Insulin dependent type 2 diabetes mellitus ([facility]     5. Dyslipidemia     6. Essential hypertension     7. Stage 3 chronic kidney disease, unspecified whether stage 3a or 3b CKD ([facility]     8. BMI 45.0-49.9, adult ([facility]         No orders of the defined types were placed in this encounter. F/u ov for BLE edema, following with PCP for cellulitis  BLE venous stasis  CAD with LAD stent  HTN  HLD  CKD  IDDM  Obesity. Denies chest pain, palpitations, sob, lightheadedness, dizziness or syncope. Has BLE swelling. Has open area on left leg from hitting on car door. Currently on lasix 80 mg in am and 40 mg in pm  Will give zaroxolyn 2.5 mg every other day for 3 doses  F/u as scheduled next week with PCP     Discussed use, benefit, and side effects of prescribed medications. Reports compliance and denies side effects with. All patient questions answered. Pt voiced understanding. Instructed to continue current medications, diet and exercise. Continue risk factor modification and medical management. Patient agreed with treatment plan. Follow up as directed.     Continue Dr Chano Yoder current treatment plan  Follow up with Dr Christopher Luis as scheduled or sooner if needed

## 2021-08-24 ENCOUNTER — OFFICE VISIT (OUTPATIENT)
Dept: FAMILY MEDICINE CLINIC | Age: 71
End: 2021-08-24

## 2021-08-24 VITALS
SYSTOLIC BLOOD PRESSURE: 122 MMHG | DIASTOLIC BLOOD PRESSURE: 64 MMHG | HEIGHT: 74 IN | TEMPERATURE: 96.9 F | BODY MASS INDEX: 48.89 KG/M2 | RESPIRATION RATE: 16 BRPM | HEART RATE: 76 BPM

## 2021-08-24 DIAGNOSIS — I87.2 CHRONIC VENOUS STASIS DERMATITIS OF LOWER EXTREMITY: ICD-10-CM

## 2021-08-24 DIAGNOSIS — I10 ESSENTIAL HYPERTENSION: ICD-10-CM

## 2021-08-24 DIAGNOSIS — I25.708 CORONARY ARTERY DISEASE OF BYPASS GRAFT OF NATIVE HEART WITH STABLE ANGINA PECTORIS (HCC): ICD-10-CM

## 2021-08-24 DIAGNOSIS — E11.42 TYPE 2 DIABETES MELLITUS WITH DIABETIC POLYNEUROPATHY, WITH LONG-TERM CURRENT USE OF INSULIN (HCC): Primary | ICD-10-CM

## 2021-08-24 DIAGNOSIS — I87.2 STASIS DERMATITIS OF BOTH LEGS: ICD-10-CM

## 2021-08-24 DIAGNOSIS — L03.116 CELLULITIS OF LEG, LEFT: ICD-10-CM

## 2021-08-24 DIAGNOSIS — Z79.4 TYPE 2 DIABETES MELLITUS WITH DIABETIC POLYNEUROPATHY, WITH LONG-TERM CURRENT USE OF INSULIN (HCC): Primary | ICD-10-CM

## 2021-08-24 LAB
CHP ED QC CHECK: ABNORMAL
GLUCOSE BLD-MCNC: 148 MG/DL
HBA1C MFR BLD: 7.6 %

## 2021-08-24 PROCEDURE — 4040F PNEUMOC VAC/ADMIN/RCVD: CPT | Performed by: FAMILY MEDICINE

## 2021-08-24 PROCEDURE — G8417 CALC BMI ABV UP PARAM F/U: HCPCS | Performed by: FAMILY MEDICINE

## 2021-08-24 PROCEDURE — 1123F ACP DISCUSS/DSCN MKR DOCD: CPT | Performed by: FAMILY MEDICINE

## 2021-08-24 PROCEDURE — 1036F TOBACCO NON-USER: CPT | Performed by: FAMILY MEDICINE

## 2021-08-24 PROCEDURE — 3017F COLORECTAL CA SCREEN DOC REV: CPT | Performed by: FAMILY MEDICINE

## 2021-08-24 PROCEDURE — 99213 OFFICE O/P EST LOW 20 MIN: CPT | Performed by: FAMILY MEDICINE

## 2021-08-24 PROCEDURE — G8427 DOCREV CUR MEDS BY ELIG CLIN: HCPCS | Performed by: FAMILY MEDICINE

## 2021-08-24 PROCEDURE — 82962 GLUCOSE BLOOD TEST: CPT | Performed by: FAMILY MEDICINE

## 2021-08-24 PROCEDURE — 83036 HEMOGLOBIN GLYCOSYLATED A1C: CPT | Performed by: FAMILY MEDICINE

## 2021-08-24 RX ORDER — CEPHALEXIN 500 MG/1
500 CAPSULE ORAL 3 TIMES DAILY
Qty: 30 CAPSULE | Refills: 0 | Status: ON HOLD | OUTPATIENT
Start: 2021-08-24 | End: 2022-02-08

## 2021-08-24 RX ORDER — SULFAMETHOXAZOLE AND TRIMETHOPRIM 800; 160 MG/1; MG/1
TABLET ORAL
COMMUNITY
Start: 2021-08-16 | End: 2021-08-26

## 2021-08-24 ASSESSMENT — ENCOUNTER SYMPTOMS
COUGH: 0
NAUSEA: 0
BLOOD IN STOOL: 0
CONSTIPATION: 0
EYE PAIN: 0
CHEST TIGHTNESS: 0
TROUBLE SWALLOWING: 0
ABDOMINAL PAIN: 0
SORE THROAT: 0
SHORTNESS OF BREATH: 0
BACK PAIN: 0

## 2021-08-24 NOTE — PROGRESS NOTES
Subjective:      Patient ID: Lawrence Roblero is a 66 y.o. male. Lightheaded  At  Times      lower  Leg  With  lesion and  When  Hits  And  Active    Sores       Testicle  Cellulitis and  Better     ashd  noted    Diabetes  He presents for his follow-up diabetic visit. He has type 2 diabetes mellitus. His disease course has been stable. There are no hypoglycemic associated symptoms. Pertinent negatives for hypoglycemia include no dizziness, headaches or sweats. There are no diabetic associated symptoms. Pertinent negatives for diabetes include no chest pain, no fatigue and no weakness. There are no hypoglycemic complications. Symptoms are stable. There are no diabetic complications. Current diabetic treatment includes insulin injections and oral agent (monotherapy). He is compliant with treatment all of the time. He is following a diabetic diet. His breakfast blood glucose range is generally 130-140 mg/dl. An ACE inhibitor/angiotensin II receptor blocker is being taken. Hypertension  The current episode started more than 1 year ago. The problem has been resolved since onset. The problem is controlled. Pertinent negatives include no anxiety, chest pain, headaches, malaise/fatigue, neck pain, palpitations, peripheral edema, shortness of breath or sweats. The current treatment provides significant improvement. There are no compliance problems. Past Medical History:   Diagnosis Date    RAUL (acute kidney injury) ([facility] [address] 2/13/2020    ASHD (arteriosclerotic heart disease) 2005 2006    stent  baki    Depression     Diabetic peripheral neuropathy [facility]) 2014    Fracture Oct 1984    left lower extremity     HTN (hypertension)     Hypercholesteremia     IDDM (insulin dependent diabetes mellitus)     Low back pain     Morbid obesity with BMI of 45.0-49.9, adult (HCC)     Osteoarthritis        Review of Systems   Constitutional: Negative for fatigue, fever and malaise/fatigue.    HENT: Negative for congestion, ear pain, postnasal drip, sore throat and trouble swallowing. Eyes: Negative for pain. Respiratory: Negative for cough, chest tightness and shortness of breath. Cardiovascular: Positive for leg swelling. Negative for chest pain and palpitations. Gastrointestinal: Negative for abdominal pain, blood in stool, constipation and nausea. Genitourinary: Negative for difficulty urinating, frequency and urgency. Musculoskeletal: Negative for arthralgias, back pain, joint swelling, neck pain and neck stiffness. lower  Leg  rash   Skin: Negative for rash. Neurological: Positive for light-headedness. Negative for dizziness, weakness and headaches. Occurs  Easily    Hematological: Negative for adenopathy. Does not bruise/bleed easily. Psychiatric/Behavioral: Negative for behavioral problems, dysphoric mood and sleep disturbance. /64 (Site: Right Upper Arm, Position: Sitting, Cuff Size: Medium Adult)   Pulse 76   Temp 96.9 °F (36.1 °C) (Infrared)   Resp 16   Ht 6' 2\" (1.88 m)   BMI 48.89 kg/m²   Objective:   Physical Exam  Vitals and nursing note reviewed. Constitutional:       Appearance: He is well-developed. HENT:      Head: Normocephalic and atraumatic. Right Ear: External ear normal.      Left Ear: External ear normal.      Nose: Nose normal.   Eyes:      Conjunctiva/sclera: Conjunctivae normal.      Pupils: Pupils are equal, round, and reactive to light. Comments: Fundi nl   Neck:      Thyroid: No thyromegaly. Cardiovascular:      Rate and Rhythm: Normal rate and regular rhythm. Heart sounds: Normal heart sounds. Pulmonary:      Effort: Pulmonary effort is normal.      Breath sounds: Normal breath sounds. No wheezing or rales. Abdominal:      General: Bowel sounds are normal.      Palpations: Abdomen is soft. There is no mass. Tenderness: There is no abdominal tenderness. Musculoskeletal:         General: Normal range of motion. Cervical back: Normal range of motion and neck supple. Right lower leg: Edema present. Left lower leg: Edema present. Comments:   3  To  43 +  Edema  With  chronic skin  Change and  Redness and  Warmth  Left leg    Lymphadenopathy:      Cervical: No cervical adenopathy. Skin:     General: Skin is warm and dry. Findings: No rash. Neurological:      Mental Status: He is alert and oriented to person, place, and time. Cranial Nerves: No cranial nerve deficit. Deep Tendon Reflexes: Reflexes are normal and symmetric. Assessment:       Diagnosis Orders   1. Type 2 diabetes mellitus with diabetic polyneuropathy, with long-term current use of insulin ([facility])  POCT Glucose    POCT glycosylated hemoglobin (Hb A1C)   2. Chronic venous stasis dermatitis of lower extremity     3. Coronary artery disease of bypass graft of native heart with stable angina pectoris ([facility] [address]     4. Essential hypertension     5. Stasis dermatitis of both legs     6.  Cellulitis of leg, left  cephALEXin (KEFLEX) 500 MG capsule    JAZLYN - Ni Sal MD, Dermatology, [facility]         Plan:       Current Outpatient Medications   Medication Sig Dispense Refill    sulfamethoxazole-trimethoprim (BACTRIM DS;SEPTRA DS) 800-160 MG per tablet TAKE 1 TABLET BY MOUTH TWICE DAILY      cephALEXin (KEFLEX) 500 MG capsule Take 1 capsule by mouth 3 times daily 30 capsule 0    tiZANidine (ZANAFLEX) 4 MG tablet Take 1 tablet by mouth 3 times daily 21 tablet 0    enalapril (VASOTEC) 10 MG tablet One a day 360 tablet 1    metFORMIN (GLUCOPHAGE) 500 MG tablet TAKE TWO TABLETS BY MOUTH TWICE DAILY WITH MEALS 360 tablet 1    pantoprazole (PROTONIX) 40 MG tablet Take 1 tablet by mouth every morning (before breakfast) 30 tablet 0    ticagrelor (BRILINTA) 90 MG TABS tablet TAKE ONE TABLET BY MOUTH TWICE DAILY 60 tablet 3    atorvastatin (LIPITOR) 10 MG tablet TAKE 1 TABLET BY MOUTH ONCE DAILY 90 tablet 1    furosemide (LASIX) 80 MG tablet Take 1 tablet by mouth daily 80 mg at 10  Am  And  40  Mg  4 pm 60 tablet 3    Blood Glucose Monitoring Suppl (ACCU-CHEK WAQAR SMARTVIEW) w/Device KIT Pt is to use to test blood sugar three times a day DX : E11.9 1 kit 0    insulin aspart (NOVOLOG FLEXPEN) 100 UNIT/ML injection pen Inject 18 Units into the skin 3 times daily (before meals) INJECT 12 UNITS SUBCUTANEOUSLY 3 TIMES DAILY BEFORE MEALS 10 pen 3    carvedilol (COREG) 12.5 MG tablet Take 1 tablet by mouth 2 times daily 180 tablet 3    amLODIPine (NORVASC) 5 MG tablet Take 1 tablet by mouth daily 90 tablet 3    insulin glargine (LANTUS SOLOSTAR) 100 UNIT/ML injection pen INJECT 30 UNITS SUBCUTANEOUSLY ONCE DAILY 15 pen 1    ACCU-CHEK SMARTVIEW strip Use to test Blood Sugar 3x daily, Dx: E11.9 100 each 11    nitroGLYCERIN (NITROSTAT) 0.4 MG SL tablet Place 1 tablet under the tongue every 5 minutes as needed for Chest pain 25 tablet 3    Multiple Vitamins-Minerals (THERAPEUTIC MULTIVITAMIN-MINERALS) tablet Take 1 tablet by mouth daily 30 tablet 11    aspirin 81 MG tablet Take 1 tablet by mouth daily      acetaminophen (TYLENOL) 500 MG tablet Take 500 mg by mouth as needed for Pain       No current facility-administered medications for this visit. Orders Placed This Encounter   Procedures   Shahbaz Sumner MD, Dermatology, [address] [facility]     Referral Priority:   Routine     Referral Type:   Eval and Treat     Referral Reason:   Specialty Services Required     Referred to Provider:   Missael Rodriguez MD     Requested Specialty:   Dermatology     Number of Visits Requested:   1    POCT Glucose    POCT glycosylated hemoglobin (Hb A1C)     Results for orders placed or performed in visit on 08/24/21   POCT Glucose   Result Value Ref Range    Glucose 148 (A) mg/dL    QC OK?      POCT glycosylated hemoglobin (Hb A1C)   Result Value Ref Range    Hemoglobin A1C 7.6 (A) %     Tuan received counseling on the following healthy behaviors: nutrition and exercise    Patient given educational materials on Diabetes and Hyperlipidemia    I have instructed Lex Tobias to complete a self tracking handout on Blood Sugars  and Blood Pressures  and instructed them to bring it with them to his next appointment. Discussed use, benefit, and side effects of prescribed medications. Barriers to medication compliance addressed. All patient questions answered. Pt voiced understanding.        See  Dr Jocelyne Reaves  With leg change   see in 2  mths   Keyshawn Lopez MD

## 2021-08-30 ENCOUNTER — TELEPHONE (OUTPATIENT)
Dept: FAMILY MEDICINE CLINIC | Age: 71
End: 2021-08-30

## 2021-08-30 RX ORDER — ONDANSETRON 4 MG/1
4 TABLET, FILM COATED ORAL EVERY 8 HOURS PRN
Qty: 30 TABLET | Refills: 0 | Status: SHIPPED | OUTPATIENT
Start: 2021-08-30

## 2021-08-30 NOTE — TELEPHONE ENCOUNTER
Date of last visit:  8/24/2021  Date of next visit:  10/27/2021    Requested Prescriptions     Pending Prescriptions Disp Refills    ticagrelor (BRILINTA) 90 MG TABS tablet [Pharmacy Med Name: Brilinta 90 MG Oral Tablet] 180 tablet 0     Sig: Take 1 tablet by mouth twice daily

## 2021-08-30 NOTE — TELEPHONE ENCOUNTER
Date of last visit:  8/24/2021  Date of next visit:  10/27/2021    Requested Prescriptions     Signed Prescriptions Disp Refills    ondansetron (ZOFRAN) 4 MG tablet 30 tablet 0     Sig: Take 1 tablet by mouth every 8 hours as needed for Nausea or Vomiting     Authorizing Provider: Curtis Osorio     Ordering User: Deborah Ansari

## 2021-08-30 NOTE — TELEPHONE ENCOUNTER
Pt called said that he vomited twice this past Friday. Since then he has just been very nauseous. Has been able to eat some but not much and still very nauseous. He is drinking water and sprite. Also said that sunlight bothers his eyes a lot since Friday.

## 2021-09-20 DIAGNOSIS — I87.2 VENOUS STASIS DERMATITIS OF BOTH LOWER EXTREMITIES: ICD-10-CM

## 2021-09-20 DIAGNOSIS — I10 ESSENTIAL HYPERTENSION: ICD-10-CM

## 2021-09-20 NOTE — TELEPHONE ENCOUNTER
Date of last visit:  8/24/2021  Date of next visit:  10/27/2021    Requested Prescriptions     Pending Prescriptions Disp Refills    furosemide (LASIX) 80 MG tablet 60 tablet 3     Sig: Take 1 tablet by mouth daily 80 mg at 10  Am  And  40  Mg  4 pm    furosemide (LASIX) 40 MG tablet 60 tablet 1     Sig: Take 1 tablet by mouth daily

## 2021-09-21 RX ORDER — FUROSEMIDE 80 MG
80 TABLET ORAL DAILY
Qty: 60 TABLET | Refills: 3 | Status: SHIPPED | OUTPATIENT
Start: 2021-09-21 | End: 2021-09-21 | Stop reason: DRUGHIGH

## 2021-09-21 RX ORDER — FUROSEMIDE 80 MG
80 TABLET ORAL DAILY
Qty: 60 TABLET | Refills: 3 | Status: ON HOLD | OUTPATIENT
Start: 2021-09-21 | End: 2022-05-07 | Stop reason: SDUPTHER

## 2021-09-21 RX ORDER — FUROSEMIDE 40 MG/1
40 TABLET ORAL DAILY
Qty: 60 TABLET | Refills: 1 | Status: SHIPPED | OUTPATIENT
Start: 2021-09-21 | End: 2022-02-24 | Stop reason: ALTCHOICE

## 2021-09-23 ENCOUNTER — CARE COORDINATION (OUTPATIENT)
Dept: CARE COORDINATION | Age: 71
End: 2021-09-23

## 2021-09-23 NOTE — CARE COORDINATION
Pt called to say he was in a he said he has a crush on a lady on local [employer]. He said \"if she is not on I about go berserk. \"  Allowed pt to process thoughts and feelings. He talked about his family in [place]. They set up a [employer] so he can \"do more in my apt. \" Pt worried about his 80 yr old mother who is \"not doing well. \"  Encouraged pt to go to [place] on [address]. Provided him with the address and phone # to check it out. Pt voiced understanding. Active listening provided.

## 2021-09-29 ENCOUNTER — TELEPHONE (OUTPATIENT)
Dept: FAMILY MEDICINE CLINIC | Age: 71
End: 2021-09-29

## 2021-09-29 NOTE — TELEPHONE ENCOUNTER
Pt is having severe pain in lower back just above his belt. It happens more when standing or doing any activities. But he cant stand or do anything long because he has to sit down from having shortness of breathe. After 3 to 4 minutes of sitting he catches his breath again. Should he be concerned ?

## 2021-09-30 NOTE — TELEPHONE ENCOUNTER
Vera Caldera informed by Phone. He is not having any Urinary Problems. He stated he is taking the Zanaflex and Tylenol but it is not helping. He does have an appt with O on 10-8-2021.

## 2021-10-04 NOTE — TELEPHONE ENCOUNTER
Date of last visit:  8/24/2021  Date of next visit:  10/27/2021    Requested Prescriptions     Pending Prescriptions Disp Refills    insulin glargine (LANTUS SOLOSTAR) 100 UNIT/ML injection pen 15 pen 1     Sig: INJECT 30 UNITS SUBCUTANEOUSLY ONCE DAILY

## 2021-10-04 NOTE — TELEPHONE ENCOUNTER
Wiliam Exon called requesting a refill of their:    insulin glargine (LANTUS SOLOSTAR) 100 UNIT/ML injection pen INJECT 30 UNITS SUBCUTANEOUSLY ONCE DAILY    Send to [facility] on [address] [employer]

## 2021-10-05 RX ORDER — INSULIN GLARGINE 100 [IU]/ML
INJECTION, SOLUTION SUBCUTANEOUS
Qty: 15 PEN | Refills: 1 | Status: SHIPPED | OUTPATIENT
Start: 2021-10-05 | End: 2021-10-08 | Stop reason: DRUGHIGH

## 2021-10-08 ENCOUNTER — TELEPHONE (OUTPATIENT)
Dept: FAMILY MEDICINE CLINIC | Age: 71
End: 2021-10-08

## 2021-10-08 RX ORDER — INSULIN GLARGINE 100 [IU]/ML
INJECTION, SOLUTION SUBCUTANEOUS
Qty: 15 PEN | Refills: 1 | Status: SHIPPED
Start: 2021-10-08 | End: 2022-08-10

## 2021-10-08 NOTE — TELEPHONE ENCOUNTER
Legs ok and feels lightheaded getting up so hold this pm lasix tonight only     sugars am and pm running 90 to 115 as will decrease lantus to 25 units     patient called 10-8-21 at 1700 hours

## 2021-10-18 ENCOUNTER — CARE COORDINATION (OUTPATIENT)
Dept: CARE COORDINATION | Age: 71
End: 2021-10-18

## 2021-10-18 NOTE — CARE COORDINATION
Ru Monge called and states he has multiple issues going on. Was agreeable to see PCP sooner. Made appt for tomorrow to have PCP address issues. Confirmed date and time of PCP appt during call.

## 2021-10-19 ENCOUNTER — OFFICE VISIT (OUTPATIENT)
Dept: FAMILY MEDICINE CLINIC | Age: 71
End: 2021-10-19

## 2021-10-19 VITALS
HEART RATE: 68 BPM | DIASTOLIC BLOOD PRESSURE: 62 MMHG | SYSTOLIC BLOOD PRESSURE: 136 MMHG | TEMPERATURE: 97.6 F | WEIGHT: 315 LBS | HEIGHT: 74 IN | BODY MASS INDEX: 40.43 KG/M2 | RESPIRATION RATE: 16 BRPM

## 2021-10-19 DIAGNOSIS — Z23 INFLUENZA VACCINE NEEDED: ICD-10-CM

## 2021-10-19 DIAGNOSIS — Z79.4 TYPE 2 DIABETES MELLITUS WITH DIABETIC POLYNEUROPATHY, WITH LONG-TERM CURRENT USE OF INSULIN (HCC): ICD-10-CM

## 2021-10-19 DIAGNOSIS — I25.708 CORONARY ARTERY DISEASE OF BYPASS GRAFT OF NATIVE HEART WITH STABLE ANGINA PECTORIS (HCC): ICD-10-CM

## 2021-10-19 DIAGNOSIS — R06.09 DYSPNEA ON EXERTION: Primary | ICD-10-CM

## 2021-10-19 DIAGNOSIS — I10 PRIMARY HYPERTENSION: ICD-10-CM

## 2021-10-19 DIAGNOSIS — I87.2 VENOUS STASIS DERMATITIS OF BOTH LOWER EXTREMITIES: ICD-10-CM

## 2021-10-19 DIAGNOSIS — E11.42 DIABETIC PERIPHERAL NEUROPATHY (HCC): ICD-10-CM

## 2021-10-19 DIAGNOSIS — E11.42 TYPE 2 DIABETES MELLITUS WITH DIABETIC POLYNEUROPATHY, WITH LONG-TERM CURRENT USE OF INSULIN (HCC): ICD-10-CM

## 2021-10-19 DIAGNOSIS — I50.32 CHRONIC DIASTOLIC (CONGESTIVE) HEART FAILURE (HCC): ICD-10-CM

## 2021-10-19 ASSESSMENT — ENCOUNTER SYMPTOMS
BACK PAIN: 0
CONSTIPATION: 0
ABDOMINAL PAIN: 0
CHEST TIGHTNESS: 0
BLOOD IN STOOL: 0
SPUTUM PRODUCTION: 1
TROUBLE SWALLOWING: 0
WHEEZING: 0
SORE THROAT: 0
SHORTNESS OF BREATH: 1
COUGH: 0
NAUSEA: 0
EYE PAIN: 0

## 2021-10-19 NOTE — PROGRESS NOTES
Subjective:      Patient ID: Marcus Valentine is a 66 y.o. male. Weight  Loss  Better as  Down  20   Pounds        Was on lasix  80 mg  Am and now  Extra    40  Mg  In  Pm       ashd   Stable and no pain      Dyspnea  Noted       leg  Swelling less       Leg  Stasis   Better       Cough in am and  Clears              Shortness of Breath  This is a new problem. The current episode started in the past 7 days. The problem has been gradually worsening (  with  running a sweeper and  with  walking  ok). Associated symptoms include sputum production. Pertinent negatives include no abdominal pain, chest pain, ear pain, fever, headaches, leg swelling, rash, sore throat or wheezing. He has tried nothing for the symptoms. Past Medical History:   Diagnosis Date    RAUL (acute kidney injury) ([facility] [address] 2/13/2020    ASHD (arteriosclerotic heart disease) 2005 2006    stent  baki    Depression     Diabetic peripheral neuropathy [facility]) 2014    Fracture Oct 1984    left lower extremity     HTN (hypertension)     Hypercholesteremia     IDDM (insulin dependent diabetes mellitus)     Low back pain     Morbid obesity with BMI of 45.0-49.9, adult (HCC)     Osteoarthritis      Review of Systems   Constitutional: Negative for fatigue and fever. HENT: Negative for congestion, ear pain, postnasal drip, sore throat and trouble swallowing. Eyes: Negative for pain. Respiratory: Positive for sputum production and shortness of breath. Negative for cough, chest tightness and wheezing. Cardiovascular: Negative for chest pain, palpitations and leg swelling. Gastrointestinal: Negative for abdominal pain, blood in stool, constipation and nausea. Genitourinary: Negative for difficulty urinating, frequency and urgency. Musculoskeletal: Negative for arthralgias, back pain, joint swelling and neck stiffness. Skin: Negative for rash. Neurological: Negative for dizziness, weakness and headaches.    Hematological: Negative for adenopathy. Does not bruise/bleed easily. Psychiatric/Behavioral: Negative for behavioral problems, dysphoric mood and sleep disturbance. /62 (Site: Right Upper Arm, Position: Sitting, Cuff Size: Large Adult)   Pulse 68   Temp 97.6 °F (36.4 °C) (Infrared)   Resp 16   Ht 6' 2\" (1.88 m)   Wt (!) 361 lb 3.2 oz (163.8 kg)   BMI 46.38 kg/m²   Objective:   Physical Exam  Vitals and nursing note reviewed. Constitutional:       Appearance: He is well-developed. HENT:      Head: Normocephalic and atraumatic. Right Ear: External ear normal.      Left Ear: External ear normal.      Nose: Nose normal.   Eyes:      Conjunctiva/sclera: Conjunctivae normal.      Pupils: Pupils are equal, round, and reactive to light. Comments: Fundi nl   Neck:      Thyroid: No thyromegaly. Cardiovascular:      Rate and Rhythm: Normal rate and regular rhythm. Heart sounds: Normal heart sounds. Pulmonary:      Effort: Pulmonary effort is normal.      Breath sounds: Normal breath sounds. No wheezing or rales. Abdominal:      General: Bowel sounds are normal.      Palpations: Abdomen is soft. There is no mass. Tenderness: There is no abdominal tenderness. Musculoskeletal:         General: Normal range of motion. Cervical back: Normal range of motion and neck supple. Lymphadenopathy:      Cervical: No cervical adenopathy. Skin:     General: Skin is warm and dry. Findings: No rash. Neurological:      Mental Status: He is alert and oriented to person, place, and time. Cranial Nerves: No cranial nerve deficit. Deep Tendon Reflexes: Reflexes are normal and symmetric. Assessment:       Diagnosis Orders   1. Dyspnea on exertion  XR CHEST STANDARD (2 VW)    AST(SGOT) & ALT(SGPT)    BUN & Creatinine    Electrolyte Panel    CBC Auto Differential   2. Coronary artery disease of bypass graft of native heart with stable angina pectoris ([address]     3. Primary hypertension     4. Chronic diastolic (congestive) heart failure ([facility])  Brain Natriuretic Peptide   5. Diabetic peripheral neuropathy ([facility] [address]     6.  Type 2 diabetes mellitus with diabetic polyneuropathy, with long-term current use of insulin ([facility])     7. Venous stasis dermatitis of both lower extremities           Plan:      Current Outpatient Medications   Medication Sig Dispense Refill    insulin glargine (LANTUS SOLOSTAR) 100 UNIT/ML injection pen INJECT 25 UNITS SUBCUTANEOUSLY ONCE DAILY 15 pen 1    furosemide (LASIX) 40 MG tablet Take 1 tablet by mouth daily 60 tablet 1    furosemide (LASIX) 80 MG tablet Take 1 tablet by mouth daily Patient takes both 80mg and 40 mg 60 tablet 3    ticagrelor (BRILINTA) 90 MG TABS tablet Take 1 tablet by mouth twice daily 180 tablet 0    ondansetron (ZOFRAN) 4 MG tablet Take 1 tablet by mouth every 8 hours as needed for Nausea or Vomiting 30 tablet 0    enalapril (VASOTEC) 10 MG tablet One a day 360 tablet 1    metFORMIN (GLUCOPHAGE) 500 MG tablet TAKE TWO TABLETS BY MOUTH TWICE DAILY WITH MEALS 360 tablet 1    atorvastatin (LIPITOR) 10 MG tablet TAKE 1 TABLET BY MOUTH ONCE DAILY 90 tablet 1    Blood Glucose Monitoring Suppl (ACCU-CHEK WAQAR SMARTVIEW) w/Device KIT Pt is to use to test blood sugar three times a day DX : E11.9 1 kit 0    insulin aspart (NOVOLOG FLEXPEN) 100 UNIT/ML injection pen Inject 18 Units into the skin 3 times daily (before meals) INJECT 12 UNITS SUBCUTANEOUSLY 3 TIMES DAILY BEFORE MEALS 10 pen 3    carvedilol (COREG) 12.5 MG tablet Take 1 tablet by mouth 2 times daily 180 tablet 3    amLODIPine (NORVASC) 5 MG tablet Take 1 tablet by mouth daily 90 tablet 3    ACCU-CHEK SMARTVIEW strip Use to test Blood Sugar 3x daily, Dx: E11.9 100 each 11    nitroGLYCERIN (NITROSTAT) 0.4 MG SL tablet Place 1 tablet under the tongue every 5 minutes as needed for Chest pain 25 tablet 3    Multiple Vitamins-Minerals (THERAPEUTIC MULTIVITAMIN-MINERALS) tablet Take 1 tablet by mouth daily 30 tablet 11    aspirin 81 MG tablet Take 1 tablet by mouth daily      acetaminophen (TYLENOL) 500 MG tablet Take 500 mg by mouth as needed for Pain      cephALEXin (KEFLEX) 500 MG capsule Take 1 capsule by mouth 3 times daily (Patient not taking: Reported on 10/19/2021) 30 capsule 0    tiZANidine (ZANAFLEX) 4 MG tablet Take 1 tablet by mouth 3 times daily (Patient not taking: Reported on 10/19/2021) 21 tablet 0    pantoprazole (PROTONIX) 40 MG tablet Take 1 tablet by mouth every morning (before breakfast) (Patient not taking: Reported on 10/19/2021) 30 tablet 0     No current facility-administered medications for this visit. Orders Placed This Encounter   Procedures    XR CHEST STANDARD (2 VW)     Standing Status:   Future     Standing Expiration Date:   10/19/2022     Order Specific Question:   Reason for exam:     Answer:   shortness of  breathe    AST(SGOT) & ALT(SGPT)     Standing Status:   Future     Standing Expiration Date:   10/19/2022    BUN & Creatinine     Standing Status:   Future     Standing Expiration Date:   10/19/2022    Electrolyte Panel     Standing Status:   Future     Standing Expiration Date:   10/19/2022    CBC Auto Differential     Standing Status:   Future     Standing Expiration Date:   10/19/2022    Brain Natriuretic Peptide     Standing Status:   Future     Standing Expiration Date:   10/19/2022     No results found for this visit on 10/19/21.        See in  2  mths   Courtney Roy MD

## 2021-10-20 DIAGNOSIS — E78.00 HYPERCHOLESTEREMIA: ICD-10-CM

## 2021-10-20 NOTE — TELEPHONE ENCOUNTER
Date of last visit:  10/19/2021  Date of next visit:  12/20/2021    Requested Prescriptions     Pending Prescriptions Disp Refills    atorvastatin (LIPITOR) 10 MG tablet 90 tablet 1     Sig: TAKE 1 TABLET BY MOUTH ONCE DAILY

## 2021-10-21 ENCOUNTER — HOSPITAL ENCOUNTER (OUTPATIENT)
Age: 71
Discharge: HOME | End: 2021-10-21
Payer: MEDICARE

## 2021-10-21 ENCOUNTER — HOSPITAL ENCOUNTER (OUTPATIENT)
Dept: GENERAL RADIOLOGY | Age: 71
Discharge: HOME | End: 2021-10-21
Payer: MEDICARE

## 2021-10-21 DIAGNOSIS — I50.32 CHRONIC DIASTOLIC (CONGESTIVE) HEART FAILURE (HCC): ICD-10-CM

## 2021-10-21 DIAGNOSIS — R06.09 DYSPNEA ON EXERTION: ICD-10-CM

## 2021-10-21 LAB — PRO-BNP: 369.7 PG/ML (ref 0–900)

## 2021-10-21 PROCEDURE — 36415 COLL VENOUS BLD VENIPUNCTURE: CPT

## 2021-10-21 PROCEDURE — 83880 ASSAY OF NATRIURETIC PEPTIDE: CPT

## 2021-10-21 PROCEDURE — 71046 X-RAY EXAM CHEST 2 VIEWS: CPT

## 2021-10-21 RX ORDER — ATORVASTATIN CALCIUM 10 MG/1
TABLET, FILM COATED ORAL
Qty: 90 TABLET | Refills: 1 | Status: SHIPPED | OUTPATIENT
Start: 2021-10-21 | End: 2022-04-26 | Stop reason: SDUPTHER

## 2021-10-22 ENCOUNTER — TELEPHONE (OUTPATIENT)
Dept: FAMILY MEDICINE CLINIC | Age: 71
End: 2021-10-22

## 2021-10-22 NOTE — TELEPHONE ENCOUNTER
----- Message from Harika Mcgee MD sent at 10/22/2021  6:03 AM EDT -----  Call as  cxr and bnp are ok  but keep lasix at 80 in am and 40 mg in pm     Please call

## 2021-11-01 DIAGNOSIS — I15.0 RENOVASCULAR HYPERTENSION: ICD-10-CM

## 2021-11-01 NOTE — TELEPHONE ENCOUNTER
Pt called requesting 90 day supply of Enalapril 10 mg two tablets twice daily (per pt).     Rubens Cadet

## 2021-11-01 NOTE — TELEPHONE ENCOUNTER
Date of last visit:  10/19/2021  Date of next visit:  12/20/2021    Requested Prescriptions     Pending Prescriptions Disp Refills    enalapril (VASOTEC) 10 MG tablet 90 tablet 1     Sig: One a day

## 2021-11-02 RX ORDER — ENALAPRIL MALEATE 10 MG/1
TABLET ORAL
Qty: 90 TABLET | Refills: 1 | Status: SHIPPED | OUTPATIENT
Start: 2021-11-02 | End: 2022-05-06 | Stop reason: SDUPTHER

## 2021-11-03 NOTE — TELEPHONE ENCOUNTER
Please clarify the Enalapril dose- he stated he takes Enalapril 10mg 2 tablets PO twice a day and we sent over the Enalapril 10mg 1 tablet daily?

## 2021-11-04 ENCOUNTER — NURSE ONLY (OUTPATIENT)
Dept: LAB | Age: 71
End: 2021-11-04

## 2021-11-04 DIAGNOSIS — R06.09 DYSPNEA ON EXERTION: ICD-10-CM

## 2021-11-04 LAB
ALT SERPL-CCNC: 7 U/L (ref 11–66)
ANION GAP SERPL CALCULATED.3IONS-SCNC: 16 MEQ/L (ref 8–16)
AST SERPL-CCNC: 10 U/L (ref 5–40)
BASOPHILS # BLD: 0.7 %
BASOPHILS ABSOLUTE: 0 THOU/MM3 (ref 0–0.1)
BUN BLDV-MCNC: 32 MG/DL (ref 7–22)
CHLORIDE BLD-SCNC: 102 MEQ/L (ref 98–111)
CO2: 23 MEQ/L (ref 23–33)
CREAT SERPL-MCNC: 1.5 MG/DL (ref 0.4–1.2)
EOSINOPHIL # BLD: 3.5 %
EOSINOPHILS ABSOLUTE: 0.2 THOU/MM3 (ref 0–0.4)
ERYTHROCYTE [DISTWIDTH] IN BLOOD BY AUTOMATED COUNT: 12.8 % (ref 11.5–14.5)
ERYTHROCYTE [DISTWIDTH] IN BLOOD BY AUTOMATED COUNT: 45.3 FL (ref 35–45)
GFR SERPL CREATININE-BSD FRML MDRD: 46 ML/MIN/1.73M2
HCT VFR BLD CALC: 28.9 % (ref 42–52)
HEMOGLOBIN: 9.5 GM/DL (ref 14–18)
IMMATURE GRANS (ABS): 0.01 THOU/MM3 (ref 0–0.07)
IMMATURE GRANULOCYTES: 0.1 %
LYMPHOCYTES # BLD: 33.1 %
LYMPHOCYTES ABSOLUTE: 2.3 THOU/MM3 (ref 1–4.8)
MCH RBC QN AUTO: 32 PG (ref 26–33)
MCHC RBC AUTO-ENTMCNC: 32.9 GM/DL (ref 32.2–35.5)
MCV RBC AUTO: 97.3 FL (ref 80–94)
MONOCYTES # BLD: 11.3 %
MONOCYTES ABSOLUTE: 0.8 THOU/MM3 (ref 0.4–1.3)
NUCLEATED RED BLOOD CELLS: 0 /100 WBC
PLATELET # BLD: 158 THOU/MM3 (ref 130–400)
PMV BLD AUTO: 9.7 FL (ref 9.4–12.4)
POTASSIUM SERPL-SCNC: 4.8 MEQ/L (ref 3.5–5.2)
RBC # BLD: 2.97 MILL/MM3 (ref 4.7–6.1)
SEG NEUTROPHILS: 51.3 %
SEGMENTED NEUTROPHILS ABSOLUTE COUNT: 3.5 THOU/MM3 (ref 1.8–7.7)
SODIUM BLD-SCNC: 141 MEQ/L (ref 135–145)
WBC # BLD: 6.8 THOU/MM3 (ref 4.8–10.8)

## 2021-11-04 NOTE — TELEPHONE ENCOUNTER
Priscila Crews informed by Phone. He will get his labs completed at [facility]. He will start taking Enalapril once a day.

## 2021-11-05 ENCOUNTER — TELEPHONE (OUTPATIENT)
Dept: FAMILY MEDICINE CLINIC | Age: 71
End: 2021-11-05

## 2021-11-05 ENCOUNTER — NURSE ONLY (OUTPATIENT)
Dept: LAB | Age: 71
End: 2021-11-05

## 2021-11-05 DIAGNOSIS — D64.9 ANEMIA, UNSPECIFIED TYPE: Primary | ICD-10-CM

## 2021-11-05 DIAGNOSIS — D64.9 LOW HEMOGLOBIN: Primary | ICD-10-CM

## 2021-11-05 DIAGNOSIS — D64.9 ANEMIA, UNSPECIFIED TYPE: ICD-10-CM

## 2021-11-05 LAB
FERRITIN: 119 NG/ML (ref 22–322)
FOLATE: 7.2 NG/ML (ref 4.8–24.2)
IRON: 94 UG/DL (ref 65–195)
TOTAL IRON BINDING CAPACITY: 259 UG/DL (ref 171–450)
VITAMIN B-12: 353 PG/ML (ref 211–911)

## 2021-11-08 ENCOUNTER — TELEPHONE (OUTPATIENT)
Dept: FAMILY MEDICINE CLINIC | Age: 71
End: 2021-11-08

## 2021-11-08 DIAGNOSIS — D64.9 ANEMIA, UNSPECIFIED TYPE: Primary | ICD-10-CM

## 2021-11-08 RX ORDER — M-VIT,TX,IRON,MINS/CALC/FOLIC 27MG-0.4MG
1 TABLET ORAL DAILY
Qty: 30 TABLET | Refills: 11 | Status: SHIPPED | OUTPATIENT
Start: 2021-11-08 | End: 2022-11-08

## 2021-11-08 RX ORDER — FERROUS SULFATE 325(65) MG
325 TABLET ORAL 2 TIMES DAILY
Qty: 60 TABLET | Refills: 5 | Status: SHIPPED | OUTPATIENT
Start: 2021-11-08 | End: 2022-06-14 | Stop reason: SDUPTHER

## 2021-11-08 NOTE — TELEPHONE ENCOUNTER
Rajesh Mike informed by Phone. Faxed eklsie dumont, labs to dr[facility] Osvaldo Thomas. [facility]

## 2021-11-08 NOTE — TELEPHONE ENCOUNTER
----- Message from Bladimir Mendoza MD sent at 11/8/2021  5:40 AM EST -----  Iron studies ok and [facility] and start ferrous sulfate 325 mg one bid and mvi daily sent to pharmacy  please call

## 2021-11-09 LAB — SOLUBLE TRANSFERRIN RECEPT: 2.2 MG/L (ref 2.2–5)

## 2021-11-10 ENCOUNTER — HOSPITAL ENCOUNTER (OUTPATIENT)
Dept: MRI IMAGING | Age: 71
Discharge: HOME | End: 2021-11-10
Payer: MEDICARE

## 2021-11-10 DIAGNOSIS — M54.17 LUMBOSACRAL RADICULOPATHY: ICD-10-CM

## 2021-11-10 PROCEDURE — 72148 MRI LUMBAR SPINE W/O DYE: CPT

## 2021-11-15 DIAGNOSIS — E11.69 TYPE 2 DIABETES MELLITUS WITH OTHER SPECIFIED COMPLICATION, WITH LONG-TERM CURRENT USE OF INSULIN (HCC): ICD-10-CM

## 2021-11-15 DIAGNOSIS — Z79.4 TYPE 2 DIABETES MELLITUS WITH OTHER SPECIFIED COMPLICATION, WITH LONG-TERM CURRENT USE OF INSULIN (HCC): ICD-10-CM

## 2021-11-15 NOTE — TELEPHONE ENCOUNTER
Date of last visit:  10/19/2021  Date of next visit:  12/20/2021    Requested Prescriptions     Pending Prescriptions Disp Refills    metFORMIN (GLUCOPHAGE) 500 MG tablet 360 tablet 1     Sig: TAKE TWO TABLETS BY MOUTH TWICE DAILY WITH MEALS

## 2021-11-29 NOTE — TELEPHONE ENCOUNTER
Date of last visit:  10/19/2021  Date of next visit:  12/20/2021    Requested Prescriptions     Pending Prescriptions Disp Refills    ticagrelor (BRILINTA) 90 MG TABS tablet 180 tablet 1     Sig: Take 1 tablet by mouth twice daily

## 2021-12-06 ENCOUNTER — TELEPHONE (OUTPATIENT)
Dept: FAMILY MEDICINE CLINIC | Age: 71
End: 2021-12-06

## 2021-12-06 NOTE — TELEPHONE ENCOUNTER
Pt called said that he feels horrible. Pt is having a really bad productive cough. Head is plugged up  Headache. Also having shortness of breath. BP was 134/47. I suggested going to ER or urgent care but pt says that he doesn't trust himself to drive.       Shereen Cadet

## 2021-12-20 ENCOUNTER — OFFICE VISIT (OUTPATIENT)
Dept: FAMILY MEDICINE CLINIC | Age: 71
End: 2021-12-20

## 2021-12-20 ENCOUNTER — NURSE ONLY (OUTPATIENT)
Dept: LAB | Age: 71
End: 2021-12-20

## 2021-12-20 VITALS
TEMPERATURE: 96 F | WEIGHT: 315 LBS | SYSTOLIC BLOOD PRESSURE: 120 MMHG | HEART RATE: 80 BPM | DIASTOLIC BLOOD PRESSURE: 64 MMHG | BODY MASS INDEX: 40.43 KG/M2 | RESPIRATION RATE: 16 BRPM | HEIGHT: 74 IN

## 2021-12-20 DIAGNOSIS — I25.708 CORONARY ARTERY DISEASE OF BYPASS GRAFT OF NATIVE HEART WITH STABLE ANGINA PECTORIS (HCC): ICD-10-CM

## 2021-12-20 DIAGNOSIS — M54.16 LUMBAR RADICULITIS: ICD-10-CM

## 2021-12-20 DIAGNOSIS — E11.42 TYPE 2 DIABETES MELLITUS WITH DIABETIC POLYNEUROPATHY, WITH LONG-TERM CURRENT USE OF INSULIN (HCC): ICD-10-CM

## 2021-12-20 DIAGNOSIS — I50.32 CHRONIC DIASTOLIC (CONGESTIVE) HEART FAILURE (HCC): ICD-10-CM

## 2021-12-20 DIAGNOSIS — E11.42 DIABETIC PERIPHERAL NEUROPATHY (HCC): ICD-10-CM

## 2021-12-20 DIAGNOSIS — I87.2 VENOUS STASIS DERMATITIS OF BOTH LOWER EXTREMITIES: ICD-10-CM

## 2021-12-20 DIAGNOSIS — Z79.4 TYPE 2 DIABETES MELLITUS WITH DIABETIC POLYNEUROPATHY, WITH LONG-TERM CURRENT USE OF INSULIN (HCC): ICD-10-CM

## 2021-12-20 DIAGNOSIS — E66.01 CLASS 3 SEVERE OBESITY WITH SERIOUS COMORBIDITY AND BODY MASS INDEX (BMI) OF 45.0 TO 49.9 IN ADULT, UNSPECIFIED OBESITY TYPE (HCC): ICD-10-CM

## 2021-12-20 DIAGNOSIS — E78.00 HYPERCHOLESTEREMIA: ICD-10-CM

## 2021-12-20 DIAGNOSIS — E11.42 TYPE 2 DIABETES MELLITUS WITH DIABETIC POLYNEUROPATHY, WITH LONG-TERM CURRENT USE OF INSULIN (HCC): Primary | ICD-10-CM

## 2021-12-20 DIAGNOSIS — F33.41 RECURRENT MAJOR DEPRESSIVE DISORDER, IN PARTIAL REMISSION (HCC): ICD-10-CM

## 2021-12-20 DIAGNOSIS — Z79.4 TYPE 2 DIABETES MELLITUS WITH DIABETIC POLYNEUROPATHY, WITH LONG-TERM CURRENT USE OF INSULIN (HCC): Primary | ICD-10-CM

## 2021-12-20 DIAGNOSIS — M15.9 PRIMARY OSTEOARTHRITIS INVOLVING MULTIPLE JOINTS: ICD-10-CM

## 2021-12-20 LAB
AVERAGE GLUCOSE: 159 MG/DL (ref 70–126)
HBA1C MFR BLD: 7.3 % (ref 4.4–6.4)

## 2021-12-20 PROCEDURE — 1036F TOBACCO NON-USER: CPT | Performed by: FAMILY MEDICINE

## 2021-12-20 PROCEDURE — G8427 DOCREV CUR MEDS BY ELIG CLIN: HCPCS | Performed by: FAMILY MEDICINE

## 2021-12-20 PROCEDURE — 4040F PNEUMOC VAC/ADMIN/RCVD: CPT | Performed by: FAMILY MEDICINE

## 2021-12-20 PROCEDURE — 1123F ACP DISCUSS/DSCN MKR DOCD: CPT | Performed by: FAMILY MEDICINE

## 2021-12-20 PROCEDURE — G8417 CALC BMI ABV UP PARAM F/U: HCPCS | Performed by: FAMILY MEDICINE

## 2021-12-20 PROCEDURE — 99213 OFFICE O/P EST LOW 20 MIN: CPT | Performed by: FAMILY MEDICINE

## 2021-12-20 PROCEDURE — 3017F COLORECTAL CA SCREEN DOC REV: CPT | Performed by: FAMILY MEDICINE

## 2021-12-20 RX ORDER — AMLODIPINE BESYLATE 5 MG/1
5 TABLET ORAL DAILY
Qty: 90 TABLET | Refills: 3 | Status: SHIPPED | OUTPATIENT
Start: 2021-12-20 | End: 2022-02-24 | Stop reason: ALTCHOICE

## 2021-12-20 ASSESSMENT — ENCOUNTER SYMPTOMS
ABDOMINAL PAIN: 0
BACK PAIN: 0
COUGH: 0
TROUBLE SWALLOWING: 0
CHEST TIGHTNESS: 0
NAUSEA: 0
SORE THROAT: 0
BLOOD IN STOOL: 0
EYE PAIN: 0
CONSTIPATION: 0
SHORTNESS OF BREATH: 0

## 2021-12-20 NOTE — PROGRESS NOTES
Subjective:      Patient ID: Eliza Frazier is a 66 y.o. male. Low  Back  With pain  Of  L5  Area and pain  Of  The T6  Area  In  Past       ashd  Stable  No  Chest pain     lipids  stable    '  Neuropathy  Noted   Peripheral and  No radiculopathy      Chronic  Diastolic  chf   Some  Dyspnea  Noted                 Diabetes  He presents for his follow-up diabetic visit. His disease course has been stable. There are no hypoglycemic associated symptoms. Pertinent negatives for hypoglycemia include no dizziness or headaches. There are no diabetic associated symptoms. Pertinent negatives for diabetes include no chest pain, no fatigue and no weakness. There are no hypoglycemic complications. Symptoms are stable. Current diabetic treatment includes insulin injections. He is compliant with treatment all of the time. His breakfast blood glucose is taken between 8-9 am. His breakfast blood glucose range is generally 110-130 mg/dl. (  Checks    Sugars    3  Times a day) An ACE inhibitor/angiotensin II receptor blocker is being taken. Past Medical History:   Diagnosis Date    RAUL (acute kidney injury) ([facility] [address] 2/13/2020    ASHD (arteriosclerotic heart disease) 2005 2006    stent  baki    Depression     Diabetic peripheral neuropathy [facility]) 2014    Fracture Oct 1984    left lower extremity     HTN (hypertension)     Hypercholesteremia     IDDM (insulin dependent diabetes mellitus)     Low back pain     Morbid obesity with BMI of 45.0-49.9, adult (HCC)     Osteoarthritis        Review of Systems   Constitutional: Negative for fatigue and fever. HENT: Negative for congestion, ear pain, postnasal drip, sore throat and trouble swallowing. Eyes: Negative for pain. Respiratory: Negative for cough, chest tightness and shortness of breath. Cardiovascular: Negative for chest pain, palpitations and leg swelling. Gastrointestinal: Negative for abdominal pain, blood in stool, constipation and nausea. Genitourinary: Negative for difficulty urinating, frequency and urgency. Musculoskeletal: Negative for arthralgias, back pain, joint swelling and neck stiffness. Skin: Negative for rash. Neurological: Negative for dizziness, weakness and headaches. Hematological: Negative for adenopathy. Does not bruise/bleed easily. Psychiatric/Behavioral: Negative for behavioral problems, dysphoric mood and sleep disturbance. /64 (Site: Left Upper Arm, Position: Sitting, Cuff Size: Large Adult)   Pulse 80   Temp 96 °F (35.6 °C) (Skin)   Resp 16   Ht 6' 2\" (1.88 m)   Wt (!) 364 lb 2 oz (165.2 kg)   BMI 46.75 kg/m²   Objective:   Physical Exam  Vitals and nursing note reviewed. Constitutional:       Appearance: He is well-developed. HENT:      Head: Normocephalic and atraumatic. Right Ear: External ear normal.      Left Ear: External ear normal.      Nose: Nose normal.   Eyes:      Conjunctiva/sclera: Conjunctivae normal.      Pupils: Pupils are equal, round, and reactive to light. Comments: Fundi nl   Neck:      Thyroid: No thyromegaly. Cardiovascular:      Rate and Rhythm: Normal rate and regular rhythm. Heart sounds: Normal heart sounds. Pulmonary:      Effort: Pulmonary effort is normal.      Breath sounds: Normal breath sounds. No wheezing or rales. Abdominal:      General: Bowel sounds are normal.      Palpations: Abdomen is soft. There is no mass. Tenderness: There is no abdominal tenderness. Musculoskeletal:         General: Normal range of motion. Cervical back: Normal range of motion and neck supple. Comments:  Low  Back pain  Noted   Now and across  Low  Back    Lymphadenopathy:      Cervical: No cervical adenopathy. Skin:     General: Skin is warm and dry. Findings: No rash. Neurological:      Mental Status: He is alert and oriented to person, place, and time. Cranial Nerves: No cranial nerve deficit.       Deep Tendon Reflexes: Reflexes are normal and symmetric. Assessment:       Diagnosis Orders   1. Type 2 diabetes mellitus with diabetic polyneuropathy, with long-term current use of insulin ([facility])     2. Class 3 severe obesity with serious comorbidity and body mass index (BMI) of 45.0 to 49.9 in adult, unspecified obesity type ([facility] [address]     3. Recurrent major depressive disorder, in partial remission ([facility] [address]     4. Chronic diastolic (congestive) heart failure ([facility] [address]     5. Lumbar radiculitis     6. Hypercholesteremia     7. Venous stasis dermatitis of both lower extremities     8. Primary osteoarthritis involving multiple joints     9. Diabetic peripheral neuropathy ([facility] [address]     10.  Coronary artery disease of bypass graft of native heart with stable [facility] p[facility]           Plan:      Current Outpatient Medications   Medication Sig Dispense Refill    ticagrelor (BRILINTA) 90 MG TABS tablet Take 1 tablet by mouth twice daily 180 tablet 1    metFORMIN (GLUCOPHAGE) 500 MG tablet TAKE TWO TABLETS BY MOUTH TWICE DAILY WITH MEALS 360 tablet 1    ferrous sulfate (IRON 325) 325 (65 Fe) MG tablet Take 1 tablet by mouth 2 times daily 60 tablet 5    Multiple Vitamins-Minerals (THERAPEUTIC MULTIVITAMIN-MINERALS) tablet Take 1 tablet by mouth daily 30 tablet 11    enalapril (VASOTEC) 10 MG tablet One a day 90 tablet 1    atorvastatin (LIPITOR) 10 MG tablet TAKE 1 TABLET BY MOUTH ONCE DAILY 90 tablet 1    insulin glargine (LANTUS SOLOSTAR) 100 UNIT/ML injection pen INJECT 25 UNITS SUBCUTANEOUSLY ONCE DAILY 15 pen 1    furosemide (LASIX) 40 MG tablet Take 1 tablet by mouth daily 60 tablet 1    furosemide (LASIX) 80 MG tablet Take 1 tablet by mouth daily Patient takes both 80mg and 40 mg 60 tablet 3    ondansetron (ZOFRAN) 4 MG tablet Take 1 tablet by mouth every 8 hours as needed for Nausea or Vomiting 30 tablet 0    cephALEXin (KEFLEX) 500 MG capsule Take 1 capsule by mouth 3 times daily 30 capsule 0    tiZANidine (ZANAFLEX) 4 MG tablet Take 1 tablet by mouth 3 times daily 21 tablet 0    pantoprazole (PROTONIX) 40 MG tablet Take 1 tablet by mouth every morning (before breakfast) 30 tablet 0    Blood Glucose Monitoring Suppl (ACCU-CHEK WAQAR SMARTVIEW) w/Device KIT Pt is to use to test blood sugar three times a day DX : E11.9 1 kit 0    insulin aspart (NOVOLOG FLEXPEN) 100 UNIT/ML injection pen Inject 18 Units into the skin 3 times daily (before meals) INJECT 12 UNITS SUBCUTANEOUSLY 3 TIMES DAILY BEFORE MEALS 10 pen 3    carvedilol (COREG) 12.5 MG tablet Take 1 tablet by mouth 2 times daily 180 tablet 3    amLODIPine (NORVASC) 5 MG tablet Take 1 tablet by mouth daily 90 tablet 3    ACCU-CHEK SMARTVIEW strip Use to test Blood Sugar 3x daily, Dx: E11.9 100 each 11    nitroGLYCERIN (NITROSTAT) 0.4 MG SL tablet Place 1 tablet under the tongue every 5 minutes as needed for Chest pain 25 tablet 3    Multiple Vitamins-Minerals (THERAPEUTIC MULTIVITAMIN-MINERALS) tablet Take 1 tablet by mouth daily 30 tablet 11    aspirin 81 MG tablet Take 1 tablet by mouth daily      acetaminophen (TYLENOL) 500 MG tablet Take 500 mg by mouth as needed for Pain       No current facility-administered medications for this visit. No orders of the defined types were placed in this encounter. No results found for this visit on 12/20/21. Aleta Rodas received counseling on the following healthy behaviors: nutrition and exercise    Patient given educational materials on Diabetes and Hyperlipidemia    I have instructed Aleta Rodas to complete a self tracking handout on Blood Sugars  and Blood Pressures  and instructed them to bring it with them to his next appointment. Discussed use, benefit, and side effects of prescribed medications. Barriers to medication compliance addressed. All patient questions answered. Pt voiced understanding.              See in 3  [facility]   And  To  See oio  On back   Donald Haque MD

## 2021-12-27 DIAGNOSIS — Z79.4 TYPE 2 DIABETES MELLITUS TREATED WITH INSULIN (HCC): ICD-10-CM

## 2021-12-27 DIAGNOSIS — E11.9 TYPE 2 DIABETES MELLITUS TREATED WITH INSULIN (HCC): ICD-10-CM

## 2021-12-27 NOTE — TELEPHONE ENCOUNTER
Pt called wanting Rx for Novolog Flex Pen - injects 12 units three times daily before meals    Ke Cadet

## 2021-12-27 NOTE — TELEPHONE ENCOUNTER
Date of last visit:  12/20/2021  Date of next visit:  3/23/2022    Requested Prescriptions     Pending Prescriptions Disp Refills    insulin aspart (NOVOLOG FLEXPEN) 100 UNIT/ML injection pen 10 pen 3     Sig: Inject 18 Units into the skin 3 times daily (before meals) INJECT 12 UNITS SUBCUTANEOUSLY 3 TIMES DAILY BEFORE MEALS

## 2021-12-28 RX ORDER — INSULIN ASPART 100 [IU]/ML
18 INJECTION, SOLUTION INTRAVENOUS; SUBCUTANEOUS
Qty: 10 PEN | Refills: 3 | Status: SHIPPED | OUTPATIENT
Start: 2021-12-28

## 2022-01-04 ENCOUNTER — VIRTUAL VISIT (OUTPATIENT)
Dept: UROLOGY | Age: 72
End: 2022-01-04
Payer: MEDICARE

## 2022-01-04 DIAGNOSIS — N43.3 HYDROCELE, BILATERAL: Primary | ICD-10-CM

## 2022-01-04 PROCEDURE — 99441 PR PHYS/QHP TELEPHONE EVALUATION 5-10 MIN: CPT | Performed by: UROLOGY

## 2022-01-05 NOTE — PROGRESS NOTES
Katie Cantu is a 66 y.o. male evaluated via telephone on 1/4/2022. Consent:  He and/or health care decision maker is aware that that he may receive a bill for this telephone service, depending on his insurance coverage, and has provided verbal consent to proceed: Yes      Documentation:  I communicated with the patient and/or health care decision maker about scrotal edema. Details of this discussion including any medical advice provided:     Urologic issues resolved  Follow up PRN      I affirm this is a Patient Initiated Episode with a Patient who has not had a related appointment within my department in the past 7 days or scheduled within the next 24 hours. Patient identification was verified at the start of the visit: Yes    Total Time: minutes: 5-10 minutes    The visit was conducted pursuant to the emergency declaration under the [address] [place] [address], [address] [place] [address] authority and the [employer] General Act. Patient identification was verified, and a caregiver was present when appropriate. The patient was located in a state where the provider was credentialed to provide care.     Note: not billable if this call serves to triage the patient into an appointment for the relevant concern      Stanford Shah MD

## 2022-01-20 ENCOUNTER — TELEPHONE (OUTPATIENT)
Dept: FAMILY MEDICINE CLINIC | Age: 72
End: 2022-01-20

## 2022-01-20 DIAGNOSIS — N18.2 CKD (CHRONIC KIDNEY DISEASE), STAGE II: ICD-10-CM

## 2022-01-20 DIAGNOSIS — E87.5 HYPERKALEMIA: ICD-10-CM

## 2022-01-20 DIAGNOSIS — I10 ESSENTIAL HYPERTENSION: ICD-10-CM

## 2022-01-20 DIAGNOSIS — M62.830 BACK MUSCLE SPASM: ICD-10-CM

## 2022-01-20 RX ORDER — TIZANIDINE 4 MG/1
4 TABLET ORAL EVERY 6 HOURS PRN
Qty: 30 TABLET | Refills: 1 | Status: SHIPPED | OUTPATIENT
Start: 2022-01-20 | End: 2022-09-02

## 2022-01-20 NOTE — TELEPHONE ENCOUNTER
Date of last visit:  12/20/2021  Date of next visit:  3/23/2022    Requested Prescriptions     Pending Prescriptions Disp Refills    carvedilol (COREG) 12.5 MG tablet 180 tablet 3     Sig: Take 1 tablet by mouth 2 times daily

## 2022-01-20 NOTE — TELEPHONE ENCOUNTER
Date of last visit:  12/20/2021  Date of next visit:  3/23/2022    Requested Prescriptions     Signed Prescriptions Disp Refills    tiZANidine (ZANAFLEX) 4 MG tablet 30 tablet 1     Sig: Take 1 tablet by mouth every 6 hours as needed (Muscle Spasms)     Authorizing Provider: Ijeoma Hayes     Ordering User: ANTON FELDMAN       Patient called and stated he is having Musculoskeletal Pain and Spasms and requested a Muscle Relaxer to help. Cesilia Mark informed by Phone and will increase his fluids and go some at home PT Exercises to help.

## 2022-01-21 RX ORDER — CARVEDILOL 12.5 MG/1
12.5 TABLET ORAL 2 TIMES DAILY
Qty: 180 TABLET | Refills: 2 | Status: SHIPPED | OUTPATIENT
Start: 2022-01-21 | End: 2022-10-13

## 2022-01-25 ENCOUNTER — TELEPHONE (OUTPATIENT)
Dept: FAMILY MEDICINE CLINIC | Age: 72
End: 2022-01-25

## 2022-01-25 NOTE — TELEPHONE ENCOUNTER
Received fax from [facility]- it stated they have not received any response from patient to schedule his consultation. Spoke with patient on the phone- he seemed unaware that they were trying to contact him. I gave him the # to call and make an appt himself. He stated he would call today.

## 2022-01-26 ENCOUNTER — NURSE ONLY (OUTPATIENT)
Dept: LAB | Age: 72
End: 2022-01-26

## 2022-01-31 ENCOUNTER — CARE COORDINATION (OUTPATIENT)
Dept: CARE COORDINATION | Age: 72
End: 2022-01-31

## 2022-01-31 NOTE — CARE COORDINATION
Oddis Room called and states he is not feeling well. Sx's started yesterday. Sx's include sore throat, cough, thick white phlegm. States he called PCP office today to make appt and was informed to go to ED for evaluation. I informed him that he should be tested for COVID and see if he might qualify for anti viral medication. He verbalized understanding and PCP recommendation.

## 2022-02-07 ENCOUNTER — HOSPITAL ENCOUNTER (OUTPATIENT)
Age: 72
Setting detail: OBSERVATION
Discharge: HOME | End: 2022-02-10
Attending: HOSPITALIST | Admitting: FAMILY MEDICINE
Payer: MEDICARE

## 2022-02-07 ENCOUNTER — APPOINTMENT (OUTPATIENT)
Dept: GENERAL RADIOLOGY | Age: 72
End: 2022-02-07
Payer: MEDICARE

## 2022-02-07 ENCOUNTER — APPOINTMENT (OUTPATIENT)
Dept: CT IMAGING | Age: 72
End: 2022-02-07
Payer: MEDICARE

## 2022-02-07 DIAGNOSIS — I95.9 HYPOTENSION, UNSPECIFIED HYPOTENSION TYPE: ICD-10-CM

## 2022-02-07 DIAGNOSIS — J44.1 COPD EXACERBATION (HCC): ICD-10-CM

## 2022-02-07 DIAGNOSIS — E86.0 DEHYDRATION: Primary | ICD-10-CM

## 2022-02-07 PROBLEM — N17.9 AKI (ACUTE KIDNEY INJURY) (HCC): Status: ACTIVE | Noted: 2022-02-07

## 2022-02-07 LAB
ALBUMIN SERPL-MCNC: 3.4 G/DL (ref 3.5–5.1)
ALP BLD-CCNC: 82 U/L (ref 38–126)
ALT SERPL-CCNC: 17 U/L (ref 11–66)
ANION GAP SERPL CALCULATED.3IONS-SCNC: 16 MEQ/L (ref 8–16)
AST SERPL-CCNC: 27 U/L (ref 5–40)
BASOPHILS # BLD: 0.2 %
BASOPHILS ABSOLUTE: 0 THOU/MM3 (ref 0–0.1)
BILIRUB SERPL-MCNC: 0.4 MG/DL (ref 0.3–1.2)
BUN BLDV-MCNC: 55 MG/DL (ref 7–22)
CALCIUM SERPL-MCNC: 9.2 MG/DL (ref 8.5–10.5)
CHLORIDE BLD-SCNC: 100 MEQ/L (ref 98–111)
CO2: 22 MEQ/L (ref 23–33)
CREAT SERPL-MCNC: 1.9 MG/DL (ref 0.4–1.2)
D-DIMER QUANTITATIVE: ABNORMAL NG/ML FEU (ref 0–500)
EKG ATRIAL RATE: 79 BPM
EKG P AXIS: 76 DEGREES
EKG P-R INTERVAL: 202 MS
EKG Q-T INTERVAL: 412 MS
EKG QRS DURATION: 132 MS
EKG QTC CALCULATION (BAZETT): 472 MS
EKG R AXIS: -26 DEGREES
EKG T AXIS: 73 DEGREES
EKG VENTRICULAR RATE: 79 BPM
EOSINOPHIL # BLD: 1.4 %
EOSINOPHILS ABSOLUTE: 0.1 THOU/MM3 (ref 0–0.4)
ERYTHROCYTE [DISTWIDTH] IN BLOOD BY AUTOMATED COUNT: 12.5 % (ref 11.5–14.5)
ERYTHROCYTE [DISTWIDTH] IN BLOOD BY AUTOMATED COUNT: 43.9 FL (ref 35–45)
FLU A ANTIGEN: NEGATIVE
FLU B ANTIGEN: NEGATIVE
GFR SERPL CREATININE-BSD FRML MDRD: 35 ML/MIN/1.73M2
GLUCOSE BLD-MCNC: 120 MG/DL (ref 70–108)
GLUCOSE BLD-MCNC: 121 MG/DL (ref 70–108)
GLUCOSE BLD-MCNC: 51 MG/DL (ref 70–108)
GLUCOSE BLD-MCNC: 70 MG/DL (ref 70–108)
GLUCOSE BLD-MCNC: 82 MG/DL (ref 70–108)
GROUP A STREP CULTURE, REFLEX: NEGATIVE
HCT VFR BLD CALC: 29.8 % (ref 42–52)
HEMOGLOBIN: 9.9 GM/DL (ref 14–18)
IMMATURE GRANS (ABS): 0.04 THOU/MM3 (ref 0–0.07)
IMMATURE GRANULOCYTES: 0.4 %
INFLUENZA A: NOT DETECTED
INFLUENZA B: NOT DETECTED
LIPASE: 20.5 U/L (ref 5.6–51.3)
LYMPHOCYTES # BLD: 15.5 %
LYMPHOCYTES ABSOLUTE: 1.5 THOU/MM3 (ref 1–4.8)
MCH RBC QN AUTO: 31.7 PG (ref 26–33)
MCHC RBC AUTO-ENTMCNC: 33.2 GM/DL (ref 32.2–35.5)
MCV RBC AUTO: 95.5 FL (ref 80–94)
MONOCYTES # BLD: 5.5 %
MONOCYTES ABSOLUTE: 0.5 THOU/MM3 (ref 0.4–1.3)
NUCLEATED RED BLOOD CELLS: 0 /100 WBC
OSMOLALITY CALCULATION: 289.9 MOSMOL/KG (ref 275–300)
PLATELET # BLD: 223 THOU/MM3 (ref 130–400)
PMV BLD AUTO: 9.1 FL (ref 9.4–12.4)
POTASSIUM REFLEX MAGNESIUM: 4.3 MEQ/L (ref 3.5–5.2)
PRO-BNP: 400.8 PG/ML (ref 0–900)
RBC # BLD: 3.12 MILL/MM3 (ref 4.7–6.1)
REFLEX THROAT C + S: NORMAL
SARS-COV-2 RNA, RT PCR: NOT DETECTED
SARS-COV-2, NAAT: NOT  DETECTED
SEG NEUTROPHILS: 77 %
SEGMENTED NEUTROPHILS ABSOLUTE COUNT: 7.6 THOU/MM3 (ref 1.8–7.7)
SODIUM BLD-SCNC: 138 MEQ/L (ref 135–145)
TOTAL PROTEIN: 6.7 G/DL (ref 6.1–8)
TROPONIN T: 0.02 NG/ML
TROPONIN T: 0.02 NG/ML
WBC # BLD: 9.9 THOU/MM3 (ref 4.8–10.8)

## 2022-02-07 PROCEDURE — 96361 HYDRATE IV INFUSION ADD-ON: CPT

## 2022-02-07 PROCEDURE — 87636 SARSCOV2 & INF A&B AMP PRB: CPT

## 2022-02-07 PROCEDURE — 99223 1ST HOSP IP/OBS HIGH 75: CPT | Performed by: HOSPITALIST

## 2022-02-07 PROCEDURE — 93010 ELECTROCARDIOGRAM REPORT: CPT | Performed by: NUCLEAR MEDICINE

## 2022-02-07 PROCEDURE — 2580000003 HC RX 258: Performed by: NURSE PRACTITIONER

## 2022-02-07 PROCEDURE — 6370000000 HC RX 637 (ALT 250 FOR IP): Performed by: NURSE PRACTITIONER

## 2022-02-07 PROCEDURE — 82728 ASSAY OF FERRITIN: CPT

## 2022-02-07 PROCEDURE — 85025 COMPLETE CBC W/AUTO DIFF WBC: CPT

## 2022-02-07 PROCEDURE — 87804 INFLUENZA ASSAY W/OPTIC: CPT

## 2022-02-07 PROCEDURE — 83880 ASSAY OF NATRIURETIC PEPTIDE: CPT

## 2022-02-07 PROCEDURE — 82948 REAGENT STRIP/BLOOD GLUCOSE: CPT

## 2022-02-07 PROCEDURE — 80053 COMPREHEN METABOLIC PANEL: CPT

## 2022-02-07 PROCEDURE — 96360 HYDRATION IV INFUSION INIT: CPT

## 2022-02-07 PROCEDURE — 71045 X-RAY EXAM CHEST 1 VIEW: CPT

## 2022-02-07 PROCEDURE — 87880 STREP A ASSAY W/OPTIC: CPT

## 2022-02-07 PROCEDURE — 1200000000 HC SEMI PRIVATE

## 2022-02-07 PROCEDURE — 85379 FIBRIN DEGRADATION QUANT: CPT

## 2022-02-07 PROCEDURE — 83690 ASSAY OF LIPASE: CPT

## 2022-02-07 PROCEDURE — 87070 CULTURE OTHR SPECIMN AEROBIC: CPT

## 2022-02-07 PROCEDURE — 6360000004 HC RX CONTRAST MEDICATION: Performed by: NURSE PRACTITIONER

## 2022-02-07 PROCEDURE — 83540 ASSAY OF IRON: CPT

## 2022-02-07 PROCEDURE — 83550 IRON BINDING TEST: CPT

## 2022-02-07 PROCEDURE — 93005 ELECTROCARDIOGRAM TRACING: CPT | Performed by: NURSE PRACTITIONER

## 2022-02-07 PROCEDURE — 87635 SARS-COV-2 COVID-19 AMP PRB: CPT

## 2022-02-07 PROCEDURE — 71275 CT ANGIOGRAPHY CHEST: CPT

## 2022-02-07 PROCEDURE — 99285 EMERGENCY DEPT VISIT HI MDM: CPT

## 2022-02-07 PROCEDURE — G0378 HOSPITAL OBSERVATION PER HR: HCPCS

## 2022-02-07 PROCEDURE — 84484 ASSAY OF TROPONIN QUANT: CPT

## 2022-02-07 PROCEDURE — 36415 COLL VENOUS BLD VENIPUNCTURE: CPT

## 2022-02-07 RX ORDER — 0.9 % SODIUM CHLORIDE 0.9 %
1000 INTRAVENOUS SOLUTION INTRAVENOUS ONCE
Status: COMPLETED | OUTPATIENT
Start: 2022-02-07 | End: 2022-02-07

## 2022-02-07 RX ORDER — GUAIFENESIN 600 MG/1
600 TABLET, EXTENDED RELEASE ORAL ONCE
Status: COMPLETED | OUTPATIENT
Start: 2022-02-07 | End: 2022-02-07

## 2022-02-07 RX ORDER — SODIUM CHLORIDE 0.9 % (FLUSH) 0.9 %
5-40 SYRINGE (ML) INJECTION PRN
Status: DISCONTINUED | OUTPATIENT
Start: 2022-02-07 | End: 2022-02-10 | Stop reason: HOSPADM

## 2022-02-07 RX ORDER — ATORVASTATIN CALCIUM 10 MG/1
10 TABLET, FILM COATED ORAL DAILY
Status: DISCONTINUED | OUTPATIENT
Start: 2022-02-08 | End: 2022-02-10 | Stop reason: HOSPADM

## 2022-02-07 RX ORDER — ACETAMINOPHEN 325 MG/1
650 TABLET ORAL EVERY 6 HOURS PRN
Status: DISCONTINUED | OUTPATIENT
Start: 2022-02-07 | End: 2022-02-10 | Stop reason: HOSPADM

## 2022-02-07 RX ORDER — ACETAMINOPHEN 650 MG/1
650 SUPPOSITORY RECTAL EVERY 6 HOURS PRN
Status: DISCONTINUED | OUTPATIENT
Start: 2022-02-07 | End: 2022-02-10 | Stop reason: HOSPADM

## 2022-02-07 RX ORDER — NICOTINE POLACRILEX 4 MG
15 LOZENGE BUCCAL PRN
Status: DISCONTINUED | OUTPATIENT
Start: 2022-02-07 | End: 2022-02-10 | Stop reason: HOSPADM

## 2022-02-07 RX ORDER — FUROSEMIDE 40 MG/1
80 TABLET ORAL DAILY
Status: DISCONTINUED | OUTPATIENT
Start: 2022-02-08 | End: 2022-02-10 | Stop reason: HOSPADM

## 2022-02-07 RX ORDER — SODIUM CHLORIDE 0.9 % (FLUSH) 0.9 %
5-40 SYRINGE (ML) INJECTION EVERY 12 HOURS SCHEDULED
Status: DISCONTINUED | OUTPATIENT
Start: 2022-02-07 | End: 2022-02-10 | Stop reason: HOSPADM

## 2022-02-07 RX ORDER — ACETAMINOPHEN 325 MG/1
650 TABLET ORAL ONCE
Status: COMPLETED | OUTPATIENT
Start: 2022-02-07 | End: 2022-02-07

## 2022-02-07 RX ORDER — TIZANIDINE 4 MG/1
4 TABLET ORAL EVERY 6 HOURS PRN
Status: DISCONTINUED | OUTPATIENT
Start: 2022-02-07 | End: 2022-02-10 | Stop reason: HOSPADM

## 2022-02-07 RX ORDER — SODIUM CHLORIDE 9 MG/ML
25 INJECTION, SOLUTION INTRAVENOUS PRN
Status: DISCONTINUED | OUTPATIENT
Start: 2022-02-07 | End: 2022-02-10 | Stop reason: HOSPADM

## 2022-02-07 RX ORDER — FERROUS SULFATE 325(65) MG
325 TABLET ORAL 2 TIMES DAILY
Status: DISCONTINUED | OUTPATIENT
Start: 2022-02-08 | End: 2022-02-10 | Stop reason: HOSPADM

## 2022-02-07 RX ORDER — MULTIVITAMIN WITH IRON
1 TABLET ORAL DAILY
Status: DISCONTINUED | OUTPATIENT
Start: 2022-02-08 | End: 2022-02-10 | Stop reason: HOSPADM

## 2022-02-07 RX ORDER — FUROSEMIDE 40 MG/1
40 TABLET ORAL DAILY
Status: DISCONTINUED | OUTPATIENT
Start: 2022-02-07 | End: 2022-02-10 | Stop reason: HOSPADM

## 2022-02-07 RX ORDER — ASPIRIN 81 MG/1
81 TABLET ORAL DAILY
Status: DISCONTINUED | OUTPATIENT
Start: 2022-02-08 | End: 2022-02-10 | Stop reason: HOSPADM

## 2022-02-07 RX ORDER — AMLODIPINE BESYLATE 5 MG/1
5 TABLET ORAL DAILY
Status: DISCONTINUED | OUTPATIENT
Start: 2022-02-08 | End: 2022-02-10 | Stop reason: HOSPADM

## 2022-02-07 RX ORDER — ONDANSETRON 2 MG/ML
4 INJECTION INTRAMUSCULAR; INTRAVENOUS EVERY 6 HOURS PRN
Status: DISCONTINUED | OUTPATIENT
Start: 2022-02-07 | End: 2022-02-10 | Stop reason: HOSPADM

## 2022-02-07 RX ORDER — CARVEDILOL 6.25 MG/1
12.5 TABLET ORAL 2 TIMES DAILY
Status: DISCONTINUED | OUTPATIENT
Start: 2022-02-08 | End: 2022-02-10 | Stop reason: HOSPADM

## 2022-02-07 RX ORDER — HYDROCODONE BITARTRATE AND ACETAMINOPHEN 5; 325 MG/1; MG/1
1 TABLET ORAL ONCE
Status: COMPLETED | OUTPATIENT
Start: 2022-02-07 | End: 2022-02-07

## 2022-02-07 RX ORDER — ONDANSETRON 4 MG/1
4 TABLET, ORALLY DISINTEGRATING ORAL EVERY 8 HOURS PRN
Status: DISCONTINUED | OUTPATIENT
Start: 2022-02-07 | End: 2022-02-10 | Stop reason: HOSPADM

## 2022-02-07 RX ORDER — DEXTROSE MONOHYDRATE 50 MG/ML
100 INJECTION, SOLUTION INTRAVENOUS PRN
Status: DISCONTINUED | OUTPATIENT
Start: 2022-02-07 | End: 2022-02-10 | Stop reason: HOSPADM

## 2022-02-07 RX ORDER — ONDANSETRON 4 MG/1
4 TABLET, FILM COATED ORAL EVERY 8 HOURS PRN
Status: DISCONTINUED | OUTPATIENT
Start: 2022-02-07 | End: 2022-02-07 | Stop reason: SDUPTHER

## 2022-02-07 RX ORDER — M-VIT,TX,IRON,MINS/CALC/FOLIC 27MG-0.4MG
1 TABLET ORAL DAILY
Status: DISCONTINUED | OUTPATIENT
Start: 2022-02-08 | End: 2022-02-07 | Stop reason: SDUPTHER

## 2022-02-07 RX ORDER — SODIUM CHLORIDE 9 MG/ML
INJECTION, SOLUTION INTRAVENOUS CONTINUOUS
Status: DISCONTINUED | OUTPATIENT
Start: 2022-02-07 | End: 2022-02-10 | Stop reason: HOSPADM

## 2022-02-07 RX ORDER — PANTOPRAZOLE SODIUM 40 MG/1
40 TABLET, DELAYED RELEASE ORAL
Status: DISCONTINUED | OUTPATIENT
Start: 2022-02-08 | End: 2022-02-10 | Stop reason: HOSPADM

## 2022-02-07 RX ORDER — NITROGLYCERIN 0.4 MG/1
0.4 TABLET SUBLINGUAL EVERY 5 MIN PRN
Status: DISCONTINUED | OUTPATIENT
Start: 2022-02-07 | End: 2022-02-10 | Stop reason: HOSPADM

## 2022-02-07 RX ORDER — GUAIFENESIN 600 MG/1
600 TABLET, EXTENDED RELEASE ORAL 2 TIMES DAILY
Status: DISCONTINUED | OUTPATIENT
Start: 2022-02-07 | End: 2022-02-07

## 2022-02-07 RX ADMIN — ACETAMINOPHEN 650 MG: 325 TABLET ORAL at 13:31

## 2022-02-07 RX ADMIN — SODIUM CHLORIDE 1000 ML: 9 INJECTION, SOLUTION INTRAVENOUS at 13:32

## 2022-02-07 RX ADMIN — HYDROCODONE BITARTRATE AND ACETAMINOPHEN 1 TABLET: 5; 325 TABLET ORAL at 16:35

## 2022-02-07 RX ADMIN — IOPAMIDOL 80 ML: 755 INJECTION, SOLUTION INTRAVENOUS at 14:15

## 2022-02-07 RX ADMIN — GUAIFENESIN 600 MG: 600 TABLET, EXTENDED RELEASE ORAL at 16:35

## 2022-02-07 ASSESSMENT — ENCOUNTER SYMPTOMS
EYE REDNESS: 1
EYE ITCHING: 1
COUGH: 1
BACK PAIN: 1
EYE DISCHARGE: 1
EYE PAIN: 1
SORE THROAT: 1
COLOR CHANGE: 0
RHINORRHEA: 1
SHORTNESS OF BREATH: 1

## 2022-02-07 ASSESSMENT — PAIN SCALES - GENERAL
PAINLEVEL_OUTOF10: 3
PAINLEVEL_OUTOF10: 3
PAINLEVEL_OUTOF10: 2
PAINLEVEL_OUTOF10: 5
PAINLEVEL_OUTOF10: 3
PAINLEVEL_OUTOF10: 0
PAINLEVEL_OUTOF10: 5
PAINLEVEL_OUTOF10: 7

## 2022-02-07 ASSESSMENT — PAIN DESCRIPTION - LOCATION
LOCATION: THROAT
LOCATION: THROAT

## 2022-02-07 ASSESSMENT — HEART SCORE: ECG: 0

## 2022-02-07 NOTE — ED NOTES
During orthostatic BP, pt became very dizzy and started breathing heavier. Pt sat down safely once blood pressure was done taking. Pt states once sitting down that he started to feel better.       Mireille Ward RN  02/07/22 [id number]

## 2022-02-07 NOTE — ED NOTES
Pt appears to be resting on cot. No distress noted. Pt denies any needs at this time. Call light in reach.       Kaya Chaudhary RN  02/07/22 0668

## 2022-02-07 NOTE — ED NOTES
[facility]  02/07/22 3876 This is a 88 YO W M came to ER because he was feeling palpitatio and gap in between. No chest pain or SOB.

## 2022-02-07 NOTE — ED NOTES
ED nurse-to-nurse bedside report    Chief Complaint   Patient presents with    Shortness of Breath    Fatigue    Diarrhea      LOC: alert and orientated to name, place, date  Vital signs   Vitals:    02/07/22 1740 02/07/22 1824 02/07/22 1831 02/07/22 1837   BP: (!) 111/55  (!) 88/48 91/68   Pulse: 54      Resp: 22 18     Temp:       SpO2: 96% 97%     Weight:       Height:          Pain:    Pain Interventions: see MAR  Pain Goal: 2/10  Oxygen: No    Current needs required RA   Telemetry: Yes  LDAs:   Peripheral IV 02/07/22 Right Forearm (Active)   Site Assessment Clean;Dry; Intact 02/07/22 1833   Line Status Flushed 02/07/22 1833   Dressing Status Clean;Dry; Intact 02/07/22 1833     Continuous Infusions:   Mobility: Requires assistance * 1  Sandoval Fall Risk Score:    Fall Risk 12/20/2021 9/29/2020 12/3/2019 10/15/2018 6/29/2017 6/6/2017 4/10/2017   2 or more falls in past year? no no no no yes no no   Fall with injury in past year? no no no no yes no no     Fall Interventions: call light in reach, bed rails up x2, wheels locked and bed in low position  Report given to: Ulysses Contreras 34, RN  02/07/22 4774

## 2022-02-07 NOTE — ED NOTES
Ambulatory pulse ox to 90%. Pt became increasingly winded and breathing became labored. Pt was safe during ambulation.      Rhea Castillo RN  02/07/22 5783

## 2022-02-07 NOTE — ED PROVIDER NOTES
[address] [facility] [address] [address] EMERGENCY DEPT  EMERGENCY MEDICINE     Pt Name: Brion Kehr  MRN: [id number]  [place] 1950  Date of evaluation: 2/7/2022  PCP:    Luis Rosales MD  Provider: ABHISHEK Maria - CNP    CHIEF COMPLAINT       Chief Complaint   Patient presents with    Shortness of Breath    Fatigue    Diarrhea       HISTORY OF PRESENT ILLNESS      Patient is a 77-year-old man with chronic diastolic heart failure and insulin-dependent type 2 diabetes presenting with shortness of breath, cough, rhinorrhea, and eye discharge. Patient states he has been experiencing these respiratory symptoms for 3-4 days; reports he has been awakening with eye crustiness, burning, and discharge for 3 days. He does endorse lightheadedness with ambulation secondary to the shortness of breath. He also states he has been more sedentary than usual due to low back pain. He denies fever or sick contacts. Patient states he has been fully vaccinated and boosted for Covid, he has received his flu vaccination this year. Triage notes and Nursing notes were reviewed by myself. Any discrepancies are addressed above.     PAST MEDICAL HISTORY     Past Medical History:   Diagnosis Date    RAUL (acute kidney injury) ([facility] [address] 2/13/2020    ASHD (arteriosclerotic heart disease) 2005 2006    stent  baki    Depression     Diabetic peripheral neuropathy [facility]) 2014    Fracture Oct 1984    left lower extremity     HTN (hypertension)     Hypercholesteremia     IDDM (insulin dependent diabetes mellitus)     Low back pain     Morbid obesity with BMI of 45.0-49.9, adult ([facility] [address]     Osteoarthritis        SURGICAL HISTORY       Past Surgical History:   Procedure Laterality Date    AMPUTATION Left 9/10/14    partial versus complete left hallux amputation, left great toe amputation    APPENDECTOMY      BACK INJECTION Bilateral 1/18/2021    Bilateral Si MBB #1 performed by Marcella Barney MD at [address] [facility] INJECTION Bilateral 3/1/2021    Bilateral SI MBB #2 performed by Achilles Bosworth, MD at [facility]    fusion of C5-C6    CHOLECYSTECTOMY      COLONOSCOPY  2007 and 2011    colonn polyps  lorenzo    CORONARY ANGIOPLASTY WITH STENT PLACEMENT  08/07/2017    Dr Saeed Elam @ [address] CORONARY ARTERY BYPASS GRAFT  2015 august dox    EKG 12-LEAD  8/14/2015         FACET JOINT INJECTION Bilateral 5/22/2020    Lumbar Facet MBB @L3-4,4-5 bilateral #2 performed by Achilles Bosworth, MD at [place] [address]      left leg    JOINT REPLACEMENT      bilateral knees gurvinder    PAIN MANAGEMENT PROCEDURE Bilateral 1/28/2020    Lumbar Facet MBB @ L3-4,4-5,5-S1 bilateral #1 LUMBAR FACET performed by Achilles Bosworth, MD at [place] [address] Right 7/14/2020    Lumbar RFA Bilateral L3-4,4-5  right side first performed by Achilles Bosworth, MD at [facility] [address] Left 10/1/2020    Lumbar RFA Left side L3-4, 4-5 performed by Achilles Bosworth, MD at [facility] [address] Bilateral 11/17/2020    TFLESI @L5 bilateral #1 performed by Achilles Bosworth, MD at [facility] [address] Bilateral 12/10/2020    TFLESI @L5 bilateral #1 performed by Achilles Bosworth, MD at [address] [facility] N/A 12/28/2018    T7-T9 DECOMPRESSION, T7-T9 POSTERIOR FUSION performed by Brie Rodríguez MD at [address] [facility]  2010    [facility]    TONSILLECTOMY      VASCULAR SURGERY      cabg harvests from left leg       CURRENT MEDICATIONS       Previous Medications    ACCU-CHEK SMARTVIEW STRIP    Use to test Blood Sugar 3x daily, Dx: E11.9    ACETAMINOPHEN (TYLENOL) 500 MG TABLET    Take 500 mg by mouth as needed for Pain    AMLODIPINE (NORVASC) 5 MG TABLET    Take 1 tablet by mouth daily    ASPIRIN 81 MG TABLET    Take 1 tablet by mouth daily    ATORVASTATIN (LIPITOR) 10 MG TABLET    TAKE 1 TABLET BY MOUTH ONCE DAILY    BLOOD GLUCOSE MONITORING SUPPL (ACCU-CHEK WA[employer]AR [employer]) W/DEVICE KIT    Pt is to use to test blood sugar three times a day DX : E11.9    CARVEDILOL (COREG) 12.5 MG TABLET    Take 1 tablet by mouth 2 times daily    CEPHALEXIN (KEFLEX) 500 MG CAPSULE    Take 1 capsule by mouth 3 times daily    ENALAPRIL (VASOTEC) 10 MG TABLET    One a day    FERROUS SULFATE (IRON 325) 325 (65 FE) MG TABLET    Take 1 tablet by mouth 2 times daily    FUROSEMIDE (LASIX) 40 MG TABLET    Take 1 tablet by mouth daily    FUROSEMIDE (LASIX) 80 MG TABLET    Take 1 tablet by mouth daily Patient takes both 80mg and 40 mg    INSULIN ASPART (NOVOLOG FLEXPEN) 100 UNIT/ML INJECTION PEN    Inject 18 Units into the skin 3 times daily (before meals) INJECT 12 UNITS SUBCUTANEOUSLY 3 TIMES DAILY BEFORE MEALS    INSULIN GLARGINE (LANTUS SOLOSTAR) 100 UNIT/ML INJECTION PEN    INJECT 25 UNITS SUBCUTANEOUSLY ONCE DAILY    METFORMIN (GLUCOPHAGE) 500 MG TABLET    TAKE TWO TABLETS BY MOUTH TWICE DAILY WITH MEALS    MULTIPLE VITAMINS-MINERALS (THERAPEUTIC MULTIVITAMIN-MINERALS) TABLET    Take 1 tablet by mouth daily    MULTIPLE VITAMINS-MINERALS (THERAPEUTIC MULTIVITAMIN-MINERALS) TABLET    Take 1 tablet by mouth daily    NITROGLYCERIN (NITROSTAT) 0.4 MG SL TABLET    Place 1 tablet under the tongue every 5 minutes as needed for Chest pain    ONDANSETRON (ZOFRAN) 4 MG TABLET    Take 1 tablet by mouth every 8 hours as needed for Nausea or Vomiting    PANTOPRAZOLE (PROTONIX) 40 MG TABLET    Take 1 tablet by mouth every morning (before breakfast)    TICAGRELOR (BRILINTA) 90 MG TABS TABLET    Take 1 tablet by mouth twice daily    TIZANIDINE (ZANAFLEX) 4 MG TABLET    Take 1 tablet by mouth every 6 hours as needed (Muscle Spasms)       ALLERGIES       Allergies   Allergen Reactions    Horse-Derived Products        FAMILY HISTORY       Family History   Problem Relation Age of Onset    Cancer Mother         uterine        SOCIAL HISTORY       Social History     Socioeconomic History    Marital status:      Spouse name: None    Number of children: 2    Years of education: None    Highest education level: None   Occupational History    None   Tobacco Use    Smoking status: Never Smoker    Smokeless tobacco: Never Used   Vaping Use    Vaping Use: Never used   Substance and Sexual Activity    Alcohol use: Not Currently     Comment: rarely    Drug use: No    Sexual activity: Never   Other Topics Concern    None   Social History Narrative    None     Social Determinants of Health     Financial Resource Strain: Low Risk     Difficulty of Paying Living Expenses: Not hard at all   Food Insecurity: No Food Insecurity    Worried About Running Out of Food in the Last Year: Never true    Nereida of Food in the Last Year: Never true   Transportation Needs:     Lack of Transportation (Medical): Not on file    Lack of Transportation (Non-Medical):  Not on file   Physical Activity:     Days of Exercise per Week: Not on file    Minutes of Exercise per Session: Not on file   Stress:     Feeling of Stress : Not on file   Social Connections:     Frequency of Communication with Friends and Family: Not on file    Frequency of Social Gatherings with Friends and Family: Not on file    Attends Catholic Services: Not on file    Active Member of Clubs or Organizations: Not on file    Attends Club or Organization Meetings: Not on file    Marital Status: Not on file   Intimate Partner Violence:     Fear of Current or Ex-Partner: Not on file    Emotionally Abused: Not on file    Physically Abused: Not on file    Sexually Abused: Not on file   Housing Stability:     Unable to Pay for Housing in the Last Year: Not on file    Number of Places Lived in the Last Year: Not on file    Unstable Housing in the Last Year: Not on file       REVIEW OF SYSTEMS     Review of Systems   Constitutional: Positive for activity change. Negative for chills, diaphoresis and fever. HENT: Positive for congestion, rhinorrhea and sore throat. Eyes: Positive for pain, discharge, redness and itching. Respiratory: Positive for cough and shortness of breath. Cardiovascular: Positive for leg swelling. Negative for chest pain. Musculoskeletal: Positive for back pain. Skin: Negative for color change. Neurological: Positive for dizziness and light-headedness. Negative for syncope. Except as noted above the remainder of the review of systems was reviewed and is negative. SCREENINGS          Heart Score for chest pain patients  History: Moderately Suspicious  ECG: Normal  Patient Age: > 65 years  *Risk factors for Atherosclerotic disease: Hypercholesterolemia,Hypertension,Coronary Artery Disease  Risk Factors: > 3 Risk factors or history of atherosclerotic disease*  Troponin: < 1X normal limit  Heart Score Total: 5             PHYSICAL EXAM    (up to 7 for level 4, 8 or more for level 5)     ED Triage Vitals [02/07/22 1113]   BP Temp Temp src Pulse Resp SpO2 Height Weight   (!) 112/51 97.8 °F (36.6 °C) -- 78 24 97 % 6' 2\" (1.88 m) (!) 373 lb (169.2 kg)       Physical Exam  Constitutional:       General: He is not in acute distress (Coughing throughout exam). Appearance: He is well-developed. He is not ill-appearing. HENT:      Head: Normocephalic. Right Ear: There is impacted cerumen. Left Ear: There is impacted cerumen. Nose: Rhinorrhea present. Mouth/Throat:      Mouth: Mucous membranes are moist.      Pharynx: No oropharyngeal exudate or posterior oropharyngeal erythema. Eyes:      General:         Right eye: Discharge (Yellow crusting discharge observed on eyelashes) present. Left eye: Discharge (Yellow crusting discharge observed on eyelashes) present. Conjunctiva/sclera:      Right eye: Right conjunctiva is injected. Left eye: Left conjunctiva is injected. Cardiovascular:      Rate and Rhythm: Normal rate and regular rhythm. Pulmonary:      Effort: Pulmonary effort is normal. No tachypnea, accessory muscle usage or respiratory distress. Breath sounds: No wheezing. Chest:      Chest wall: No mass. Abdominal:      Palpations: Abdomen is soft. Musculoskeletal:      Right lower leg: No tenderness. Edema (2+) present. Left lower leg: No tenderness. Edema (2+) present. Skin:     General: Skin is warm and dry. Capillary Refill: Capillary refill takes less than 2 seconds. Neurological:      Mental Status: He is alert and oriented to person, place, and time. DIAGNOSTIC RESULTS     EKG:(none if blank)  All EKGs are interpreted by the Emergency Department Physician who either signs or Co-signs this chart in the absence of a cardiologist.    Normal sinus rhythm with no significant changes from previous EKGs. RADIOLOGY: (none if blank)   I directly visualized the following images and reviewed the radiologist interpretations. Interpretation per the Radiologist below, if available at the time of this note:  CTA CHEST [address] - r/o Pulmonary Embolism   Final Result    No pulmonary emboli or pulmonary infiltrates. **This report has been created using voice recognition software. It may contain minor errors which are inherent in voice recognition technology. **      Final report electronically signed by Dr. Daniel Gold on 2/7/2022 2:42 PM      XR CHEST PORTABLE   Final Result   Stable chest no acute cardiopulmonary process            **This report has been created using voice recognition software. It may contain minor errors which are inherent in voice recognition technology. **      Final report electronically signed by Dr. Daniel Gold on 2/7/2022 12:22 PM          LABS:  Labs Reviewed   CBC WITH AUTO DIFFERENTIAL - Abnormal; Notable for the following components:       Result Value    RBC 3.12 (*)     Hemoglobin 9.9 (*)     Hematocrit 29.8 (*)     MCV 95.5 (*)     MPV 9.1 (*)     All other components within normal limits   COMPREHENSIVE METABOLIC PANEL W/ REFLEX TO MG FOR LOW K - Abnormal; Notable for the following components:    CREATININE 1.9 (*)     BUN 55 (*)     CO2 22 (*)     Albumin 3.4 (*)     All other components within normal limits   TROPONIN - Abnormal; Notable for the following components:    Troponin T 0.016 (*)     All other components within normal limits   D-DIMER, QUANTITATIVE - Abnormal; Notable for the following components:    D-Dimer, Quant 04940.00 (*)     All other components within normal limits   GLOMERULAR FILTRATION RATE, ESTIMATED - Abnormal; Notable for the following components:    Est, Glom Filt Rate 35 (*)     All other components within normal limits   TROPONIN - Abnormal; Notable for the following components:    Troponin T 0.016 (*)     All other components within normal limits   POCT GLUCOSE - Abnormal; Notable for the following components:    POC Glucose 51 (*)     All other components within normal limits   POCT GLUCOSE - Abnormal; Notable for the following components:    POC Glucose 121 (*)     All other components within normal limits   RAPID INFLUENZA A/B ANTIGENS   COVID-19, RAPID   COVID-19 & INFLUENZA COMBO   CULTURE, THROAT    Narrative:     Source: Specimen not received       Site:           Current Antibiotics:   LIPASE   BRAIN NATRIURETIC PEPTIDE   GROUP A STREP, REFLEX   ANION GAP   OSMOLALITY   POCT GLUCOSE       All other labs were within normal range or not returned as of this dictation. Please note, any cultures that may have been sent were not resulted at the time of this patient visit.     EMERGENCY DEPARTMENT COURSE and Medical Decision Making:     Vitals:    Vitals:    02/07/22 1837 02/07/22 1911 02/07/22 1925 02/07/22 1926   BP: 91/68 (!) 103/52  (!) 112/38   Pulse:  58 54    Resp:  14 21    Temp:       SpO2:  99% 96%    Weight:       Height:           PROCEDURES: (None if blank)  Procedures    ED Course as of 02/07/22 2012 Mon Feb 07, 2022   1858 Discussed results with patient and plan for admission. Questions answered. Patient agreeable with plan. [NW]   1936 Perfect serve message to hospitalist for admission. They have accepted patient. [NW]      ED Course User Index  [NW] Ct Lemus, APRN - CNP     MDM  Number of Diagnoses or Management Options  COPD exacerbation ([facility] [address]): established, worsening  Dehydration: new, needed workup  Hypotension, unspecified hypotension type: new, needed workup     Amount and/or Complexity of Data Reviewed  Clinical lab tests: ordered and reviewed  Tests in the radiology section of CPT®: reviewed  Tests in the medicine section of CPT®: ordered and reviewed    Risk of Complications, Morbidity, and/or Mortality  Presenting problems: moderate  Diagnostic procedures: moderate  Management options: low         Patient is a 77-year-old male with insulin-dependent type 2 diabetes, chronic diastolic heart failure presented to the emergency department complaining of shortness of breath, rhinorrhea, ocular discharge for 3-4 days. CTA of the chest was reassuring. Labs were reassuring except for D-dimer. Orthostatic vital signs show hypotension once sitting and standing. Patient becomes dizzy and short of breath when standing. Patient to be admitted for definitive care.     Strict return precautions and follow up instructions were discussed with the patient with which the patient agrees    ED Medications administered this visit:    Medications   0.9 % sodium chloride bolus (0 mLs IntraVENous Stopped 2/7/22 1432)   acetaminophen (TYLENOL) tablet 650 mg (650 mg Oral Given 2/7/22 1331)   iopamidol (ISOVUE-370) 76 % injection 80 mL (80 mLs IntraVENous Given 2/7/22 1415)   HYDROcodone-acetaminophen (NORCO) 5-325 MG per tablet 1 tablet (1 tablet Oral Given

## 2022-02-07 NOTE — ED TRIAGE NOTES
Pt to ED due to not feeling well and shortness of breath. Pt states that starting about five days ago he noticed body aches and a sore throat. Pt states that it really hurts to swallow. Pt states that he started having diarrhea yesterday. Pt states that he has little to no appetite. No distress noted. Bilateral rhonchi heard in the upper lung lobes. Upon transferring from the ems cot to the ED bed the pt became dizzy. Pt states that last week he got so dizzy that he fell over. There appears to be a yellow discharge in both eyes.

## 2022-02-07 NOTE — ED NOTES
Pt states that he feels his sugar is low and that his vision is \"off\", this RN checked his sugar and found it to be 51. This RN provided the patient with 2, 4oz cups of orange juice and some jello. Will recheck the patients blood sugar.       Mellissa Platt RN  02/07/22 4376

## 2022-02-07 NOTE — ED NOTES
ED to inpatient nurses report    Chief Complaint   Patient presents with    Shortness of Breath    Fatigue    Diarrhea      Present to ED from home  LOC: alert and orientated to name, place, date  Vital signs   Vitals:    02/07/22 1213 02/07/22 1313 02/07/22 1359 02/07/22 1525   BP: (!) 112/45 (!) 114/57 (!) 104/48 (!) 103/44   Pulse: 73 73 52 55   Resp: 17 20 22 18   Temp:       SpO2: 97% 97% 96% 97%   Weight:       Height:          Oxygen Baseline 96%    Current needs required RA Bipap/Cpap No  LDAs:   Peripheral IV 02/07/22 Right Forearm (Active)   Site Assessment Clean;Dry; Intact 02/07/22 1525   Line Status Flushed 02/07/22 1525   Dressing Status Clean;Dry; Intact 02/07/22 1525     Mobility: Requires assistance * 1  Pending ED orders: NA  Present condition: stable    Electronically signed by Ayse Borjas RN on 2/7/2022 at 4:00 PM       Ayse Borjas RN  02/07/22 1600 [Dear  ___] : Dear  [unfilled], [Consult Letter:] : I had the pleasure of evaluating your patient, [unfilled]. [Please see my note below.] : Please see my note below. [Consult Closing:] : Thank you very much for allowing me to participate in the care of this patient.  If you have any questions, please do not hesitate to contact me. [Sincerely,] : Sincerely, [DrLee  ___] : Dr. HUNTER

## 2022-02-08 LAB
ALBUMIN SERPL-MCNC: 2.5 G/DL (ref 3.5–5.1)
ALP BLD-CCNC: 70 U/L (ref 38–126)
ALT SERPL-CCNC: 14 U/L (ref 11–66)
ANION GAP SERPL CALCULATED.3IONS-SCNC: 11 MEQ/L (ref 8–16)
AST SERPL-CCNC: 18 U/L (ref 5–40)
BASOPHILS # BLD: 0.3 %
BASOPHILS ABSOLUTE: 0 THOU/MM3 (ref 0–0.1)
BILIRUB SERPL-MCNC: 0.3 MG/DL (ref 0.3–1.2)
BUN BLDV-MCNC: 52 MG/DL (ref 7–22)
CALCIUM SERPL-MCNC: 8.1 MG/DL (ref 8.5–10.5)
CHLORIDE BLD-SCNC: 102 MEQ/L (ref 98–111)
CO2: 22 MEQ/L (ref 23–33)
CREAT SERPL-MCNC: 1.6 MG/DL (ref 0.4–1.2)
EOSINOPHIL # BLD: 2.5 %
EOSINOPHILS ABSOLUTE: 0.2 THOU/MM3 (ref 0–0.4)
ERYTHROCYTE [DISTWIDTH] IN BLOOD BY AUTOMATED COUNT: 12.5 % (ref 11.5–14.5)
ERYTHROCYTE [DISTWIDTH] IN BLOOD BY AUTOMATED COUNT: 43.4 FL (ref 35–45)
FERRITIN: 417 NG/ML (ref 22–322)
GFR SERPL CREATININE-BSD FRML MDRD: 43 ML/MIN/1.73M2
GLUCOSE BLD-MCNC: 160 MG/DL (ref 70–108)
GLUCOSE BLD-MCNC: 167 MG/DL (ref 70–108)
GLUCOSE BLD-MCNC: 236 MG/DL (ref 70–108)
GLUCOSE BLD-MCNC: 270 MG/DL (ref 70–108)
GLUCOSE BLD-MCNC: 281 MG/DL (ref 70–108)
HCT VFR BLD CALC: 25.7 % (ref 42–52)
HEMOGLOBIN: 8.5 GM/DL (ref 14–18)
IMMATURE GRANS (ABS): 0.03 THOU/MM3 (ref 0–0.07)
IMMATURE GRANULOCYTES: 0.3 %
IRON SATURATION: 20 % (ref 20–50)
IRON: 36 UG/DL (ref 65–195)
LACTIC ACID: 0.9 MMOL/L (ref 0.5–2)
LYMPHOCYTES # BLD: 19.1 %
LYMPHOCYTES ABSOLUTE: 1.7 THOU/MM3 (ref 1–4.8)
MCH RBC QN AUTO: 31.5 PG (ref 26–33)
MCHC RBC AUTO-ENTMCNC: 33.1 GM/DL (ref 32.2–35.5)
MCV RBC AUTO: 95.2 FL (ref 80–94)
MONOCYTES # BLD: 7.5 %
MONOCYTES ABSOLUTE: 0.7 THOU/MM3 (ref 0.4–1.3)
NUCLEATED RED BLOOD CELLS: 0 /100 WBC
PLATELET # BLD: 195 THOU/MM3 (ref 130–400)
PMV BLD AUTO: 8.9 FL (ref 9.4–12.4)
POTASSIUM REFLEX MAGNESIUM: 4.6 MEQ/L (ref 3.5–5.2)
RBC # BLD: 2.7 MILL/MM3 (ref 4.7–6.1)
SEG NEUTROPHILS: 70.3 %
SEGMENTED NEUTROPHILS ABSOLUTE COUNT: 6.3 THOU/MM3 (ref 1.8–7.7)
SODIUM BLD-SCNC: 135 MEQ/L (ref 135–145)
TOTAL CK: 287 U/L (ref 55–170)
TOTAL IRON BINDING CAPACITY: 176 UG/DL (ref 171–450)
TOTAL PROTEIN: 5.4 G/DL (ref 6.1–8)
WBC # BLD: 8.9 THOU/MM3 (ref 4.8–10.8)

## 2022-02-08 PROCEDURE — G0378 HOSPITAL OBSERVATION PER HR: HCPCS

## 2022-02-08 PROCEDURE — 83605 ASSAY OF LACTIC ACID: CPT

## 2022-02-08 PROCEDURE — 80053 COMPREHEN METABOLIC PANEL: CPT

## 2022-02-08 PROCEDURE — 94640 AIRWAY INHALATION TREATMENT: CPT

## 2022-02-08 PROCEDURE — 6370000000 HC RX 637 (ALT 250 FOR IP): Performed by: FAMILY MEDICINE

## 2022-02-08 PROCEDURE — 6370000000 HC RX 637 (ALT 250 FOR IP): Performed by: HOSPITALIST

## 2022-02-08 PROCEDURE — 96372 THER/PROPH/DIAG INJ SC/IM: CPT

## 2022-02-08 PROCEDURE — 85025 COMPLETE CBC W/AUTO DIFF WBC: CPT

## 2022-02-08 PROCEDURE — 94760 N-INVAS EAR/PLS OXIMETRY 1: CPT

## 2022-02-08 PROCEDURE — 6370000000 HC RX 637 (ALT 250 FOR IP): Performed by: PHYSICIAN ASSISTANT

## 2022-02-08 PROCEDURE — 82948 REAGENT STRIP/BLOOD GLUCOSE: CPT

## 2022-02-08 PROCEDURE — 6370000000 HC RX 637 (ALT 250 FOR IP): Performed by: INTERNAL MEDICINE

## 2022-02-08 PROCEDURE — 2580000003 HC RX 258: Performed by: HOSPITALIST

## 2022-02-08 PROCEDURE — 6360000002 HC RX W HCPCS: Performed by: HOSPITALIST

## 2022-02-08 PROCEDURE — 6360000002 HC RX W HCPCS: Performed by: FAMILY MEDICINE

## 2022-02-08 PROCEDURE — 82550 ASSAY OF CK (CPK): CPT

## 2022-02-08 PROCEDURE — 36415 COLL VENOUS BLD VENIPUNCTURE: CPT

## 2022-02-08 PROCEDURE — 1200000000 HC SEMI PRIVATE

## 2022-02-08 PROCEDURE — 96365 THER/PROPH/DIAG IV INF INIT: CPT

## 2022-02-08 RX ORDER — UREA 40 %
CREAM (GRAM) TOPICAL PRN
Status: DISCONTINUED | OUTPATIENT
Start: 2022-02-08 | End: 2022-02-10 | Stop reason: HOSPADM

## 2022-02-08 RX ORDER — BENZONATATE 100 MG/1
100 CAPSULE ORAL 3 TIMES DAILY PRN
Status: DISCONTINUED | OUTPATIENT
Start: 2022-02-08 | End: 2022-02-10 | Stop reason: HOSPADM

## 2022-02-08 RX ORDER — LEVOFLOXACIN 5 MG/ML
500 INJECTION, SOLUTION INTRAVENOUS EVERY 24 HOURS
Status: DISCONTINUED | OUTPATIENT
Start: 2022-02-08 | End: 2022-02-10 | Stop reason: HOSPADM

## 2022-02-08 RX ORDER — CIPROFLOXACIN HYDROCHLORIDE 3.5 MG/ML
1 SOLUTION/ DROPS TOPICAL
Status: DISCONTINUED | OUTPATIENT
Start: 2022-02-08 | End: 2022-02-10 | Stop reason: HOSPADM

## 2022-02-08 RX ORDER — IPRATROPIUM BROMIDE AND ALBUTEROL SULFATE 2.5; .5 MG/3ML; MG/3ML
1 SOLUTION RESPIRATORY (INHALATION) EVERY 6 HOURS
Status: DISCONTINUED | OUTPATIENT
Start: 2022-02-08 | End: 2022-02-08

## 2022-02-08 RX ORDER — ALBUTEROL SULFATE 90 UG/1
2 AEROSOL, METERED RESPIRATORY (INHALATION) 2 TIMES DAILY
Status: DISCONTINUED | OUTPATIENT
Start: 2022-02-08 | End: 2022-02-10 | Stop reason: HOSPADM

## 2022-02-08 RX ADMIN — SODIUM CHLORIDE: 9 INJECTION, SOLUTION INTRAVENOUS at 00:19

## 2022-02-08 RX ADMIN — PANTOPRAZOLE SODIUM 40 MG: 40 TABLET, DELAYED RELEASE ORAL at 06:12

## 2022-02-08 RX ADMIN — ENOXAPARIN SODIUM 40 MG: 100 INJECTION SUBCUTANEOUS at 20:45

## 2022-02-08 RX ADMIN — CIPROFLOXACIN 1 DROP: 3 SOLUTION OPHTHALMIC at 15:01

## 2022-02-08 RX ADMIN — ALBUTEROL SULFATE 2 PUFF: 90 AEROSOL, METERED RESPIRATORY (INHALATION) at 16:39

## 2022-02-08 RX ADMIN — TICAGRELOR 90 MG: 90 TABLET ORAL at 00:01

## 2022-02-08 RX ADMIN — SODIUM CHLORIDE, PRESERVATIVE FREE 10 ML: 5 INJECTION INTRAVENOUS at 00:17

## 2022-02-08 RX ADMIN — CARVEDILOL 12.5 MG: 6.25 TABLET, FILM COATED ORAL at 21:46

## 2022-02-08 RX ADMIN — SODIUM CHLORIDE: 9 INJECTION, SOLUTION INTRAVENOUS at 21:42

## 2022-02-08 RX ADMIN — TICAGRELOR 90 MG: 90 TABLET ORAL at 09:27

## 2022-02-08 RX ADMIN — ALBUTEROL SULFATE 2 PUFF: 90 AEROSOL, METERED RESPIRATORY (INHALATION) at 09:19

## 2022-02-08 RX ADMIN — BENZONATATE 100 MG: 100 CAPSULE ORAL at 21:46

## 2022-02-08 RX ADMIN — INSULIN LISPRO 3 UNITS: 100 INJECTION, SOLUTION INTRAVENOUS; SUBCUTANEOUS at 21:50

## 2022-02-08 RX ADMIN — CIPROFLOXACIN 1 DROP: 3 SOLUTION OPHTHALMIC at 18:26

## 2022-02-08 RX ADMIN — ASPIRIN 81 MG: 81 TABLET, COATED ORAL at 09:27

## 2022-02-08 RX ADMIN — LEVOFLOXACIN 500 MG: 5 INJECTION, SOLUTION INTRAVENOUS at 10:29

## 2022-02-08 RX ADMIN — CIPROFLOXACIN 1 DROP: 3 SOLUTION OPHTHALMIC at 09:29

## 2022-02-08 RX ADMIN — ENOXAPARIN SODIUM 40 MG: 100 INJECTION SUBCUTANEOUS at 09:29

## 2022-02-08 RX ADMIN — FUROSEMIDE 40 MG: 40 TABLET ORAL at 00:01

## 2022-02-08 RX ADMIN — Medication 1 TABLET: at 09:27

## 2022-02-08 RX ADMIN — BENZONATATE 100 MG: 100 CAPSULE ORAL at 09:27

## 2022-02-08 RX ADMIN — FERROUS SULFATE TAB 325 MG (65 MG ELEMENTAL FE) 325 MG: 325 (65 FE) TAB at 00:01

## 2022-02-08 RX ADMIN — CIPROFLOXACIN 1 DROP: 3 SOLUTION OPHTHALMIC at 21:46

## 2022-02-08 RX ADMIN — ATORVASTATIN CALCIUM 10 MG: 10 TABLET, FILM COATED ORAL at 09:27

## 2022-02-08 RX ADMIN — FERROUS SULFATE TAB 325 MG (65 MG ELEMENTAL FE) 325 MG: 325 (65 FE) TAB at 09:27

## 2022-02-08 RX ADMIN — ACETAMINOPHEN 650 MG: 325 TABLET ORAL at 20:45

## 2022-02-08 RX ADMIN — TICAGRELOR 90 MG: 90 TABLET ORAL at 21:46

## 2022-02-08 RX ADMIN — FERROUS SULFATE TAB 325 MG (65 MG ELEMENTAL FE) 325 MG: 325 (65 FE) TAB at 21:46

## 2022-02-08 ASSESSMENT — PAIN DESCRIPTION - DESCRIPTORS
DESCRIPTORS: ACHING
DESCRIPTORS: ACHING

## 2022-02-08 ASSESSMENT — PAIN SCALES - GENERAL
PAINLEVEL_OUTOF10: 5
PAINLEVEL_OUTOF10: 5
PAINLEVEL_OUTOF10: 0
PAINLEVEL_OUTOF10: 0
PAINLEVEL_OUTOF10: 5

## 2022-02-08 ASSESSMENT — PAIN DESCRIPTION - PAIN TYPE: TYPE: ACUTE PAIN

## 2022-02-08 ASSESSMENT — PAIN DESCRIPTION - LOCATION
LOCATION: HEAD
LOCATION: HEAD

## 2022-02-08 NOTE — CONSULTS
Podiatric Surgery Consult    Reason for Consult:  Bleeding right hallux nail  Requesting Physician:  Dr. John Hensley:  Bleeding right hallux nail    HISTORY OF PRESENT ILLNESS:                The patient is a 66 y.o. male with significant past medical history of RAUL, ASHD, DM, obesity who is being seen at bedside on behalf of Dr. Megha Pavon with complaints of a bleeding right hallux nail. Patient states that earlier today his nurse noticed bleeding from his right hallux nail that was copious. He has peripheral neuropathy and he did not feel anything happen. Patient states that he wants to reach down and work the rest of the toenail off, however he will let the podiatry resident out and perform this. Patient denies any currently active wounds. Patient denies any nausea, vomiting, fever, chills, chest pain, shortness of breath. No other acute complaints.     Past Medical History:        Diagnosis Date    RAUL (acute kidney injury) ([facility] [address] 2/13/2020    ASHD (arteriosclerotic heart disease) 2005 2006    stent  baki    Depression     Diabetic peripheral neuropathy [facility]) 2014    Fracture Oct 1984    left lower extremity     HTN (hypertension)     Hypercholesteremia     IDDM (insulin dependent diabetes mellitus)     Low back pain     Morbid obesity with BMI of 45.0-49.9, adult ([facility] [address]     Osteoarthritis      Past Surgical History:        Procedure Laterality Date    AMPUTATION Left 9/10/14    partial versus complete left hallux amputation, left great toe amputation    APPENDECTOMY      BACK INJECTION Bilateral 1/18/2021    Bilateral Si MBB #1 performed by Roopa Nesbitt MD at [address] Bilateral 3/1/2021    Bilateral SI MBB #2 performed by [facility]itt [facility] at [facility]    fusion of C5-C6    CHOLECYSTECTOMY      COLONOSCOPY  2007 and 2011    colonn polyps  lorenzo    CORONARY ANGIOPLASTY WITH STENT PLACEMENT  08/07/2017    Dr Temi Orozco @ [id number] CORONARY ARTERY BYPASS GRAFT  2015 august dox    EKG 12-LEAD  8/14/2015         FACET JOINT INJECTION Bilateral 5/22/2020    Lumbar Facet MBB @L3-4,4-5 bilateral #2 performed by Melida Paez MD at [address] [facility]      left leg    JOINT REPLACEMENT      bilateral knees gurvinder    PAIN MANAGEMENT PROCEDURE Bilateral 1/28/2020    Lumbar Facet MBB @ L3-4,4-5,5-S1 bilateral #1 LUMBAR FACET performed by Melida Paez MD at [facility] [address] Right 7/14/2020    Lumbar RFA Bilateral L3-4,4-5  right side first performed by Melida Paez MD at [facility] [address] Left 10/1/2020    Lumbar RFA Left side L3-4, 4-5 performed by Melida Paez MD at [facility] [address] Bilateral 11/17/2020    TFLESI @L5 bilateral #1 performed by Melida Paez MD at [facility] [address] Bilateral 12/10/2020    TFLESI @L5 bilateral #1 performed by Melida Paez MD at [address] [facility] N/A 12/28/2018    T7-T9 DECOMPRESSION, T7-T9 POSTERIOR FUSION performed by Kevin Glass MD at [address] [facility]  2010    [facility]    TONSILLECTOMY      VASCULAR SURGERY      cabg harvests from left leg     Current Medications:    Current Facility-Administered Medications: benzonatate (TESSALON) capsule 100 mg, 100 mg, Oral, TID PRN  ciprofloxacin (CILOXAN) 0.3 % ophthalmic solution 1 drop, 1 drop, Both Eyes, Q4H While awake  levoFLOXacin (LEVAQUIN) 500 MG/100ML infusion 500 mg, 500 mg, IntraVENous, Q24H  Urea (CARMOL) 40 % cream, , Topical, PRN  albuterol sulfate  (90 Base) MCG/ACT inhaler 2 puff, 2 puff, Inhalation, BID  aspirin EC tablet 81 mg, 81 mg, Oral, Daily  nitroGLYCERIN (NITROSTAT) SL tablet 0.4 mg, 0.4 mg, SubLINGual, Q5 Min PRN  pantoprazole (PROTONIX) tablet 40 mg, 40 mg, Oral, QAM AC  [Held by provider] furosemide (LASIX) tablet 40 mg, 40 mg, Oral, Daily  [Held by provider] furosemide (LASIX) tablet 80 mg, 80 mg, Oral, Daily  atorvastatin (LIPITOR) tablet 10 mg, 10 mg, Oral, Daily  ferrous sulfate (IRON 325) tablet 325 mg, 325 mg, Oral, BID  multivitamin 1 tablet, 1 tablet, Oral, Daily  amLODIPine (NORVASC) tablet 5 mg, 5 mg, Oral, Daily  carvedilol (COREG) tablet 12.5 mg, 12.5 mg, Oral, BID  tiZANidine (ZANAFLEX) tablet 4 mg, 4 mg, Oral, Q6H PRN  ticagrelor (BRILINTA) tablet 90 mg, 90 mg, Oral, BID  insulin lispro (HUMALOG) injection vial 0-12 Units, 0-12 Units, SubCUTAneous, TID WC  insulin lispro (HUMALOG) injection vial 0-6 Units, 0-6 Units, SubCUTAneous, Nightly  sodium chloride flush 0.9 % injection 5-40 mL, 5-40 mL, IntraVENous, 2 times per day  sodium chloride flush 0.9 % injection 5-40 mL, 5-40 mL, IntraVENous, PRN  0.9 % sodium chloride infusion, 25 mL, IntraVENous, PRN  enoxaparin (LOVENOX) injection 40 mg, 40 mg, SubCUTAneous, BID  ondansetron (ZOFRAN-ODT) disintegrating tablet 4 mg, 4 mg, Oral, Q8H PRN **OR** ondansetron (ZOFRAN) injection 4 mg, 4 mg, IntraVENous, Q6H PRN  acetaminophen (TYLENOL) tablet 650 mg, 650 mg, Oral, Q6H PRN **OR** acetaminophen (TYLENOL) suppository 650 mg, 650 mg, Rectal, Q6H PRN  0.9 % sodium chloride infusion, , IntraVENous, Continuous  glucose (GLUTOSE) 40 % oral gel 15 g, 15 g, Oral, PRN  glucagon (rDNA) injection 1 mg, 1 mg, IntraMUSCular, PRN  dextrose 5 % solution, 100 mL/hr, IntraVENous, PRN  dextrose bolus (hypoglycemia) 10% 125 mL, 125 mL, IntraVENous, PRN **OR** dextrose bolus (hypoglycemia) 10% 250 mL, 250 mL, IntraVENous, PRN  Allergies:  Horse-derived products    Social History:    TOBACCO:   reports that he has never smoked. He has never used smokeless tobacco.  ETOH:   reports previous alcohol use. DRUGS:   reports no history of drug use.   Family History: Problem Relation Age of Onset    Cancer Mother         uterine     REVIEW OF SYSTEMS:    Pertinent review of systems in HPI  PHYSICAL EXAM:      Vitals:    /63   Pulse 68   Temp 98.1 °F (36.7 °C) (Oral)   Resp 18   Ht 6' 2\" (1.88 m)   Wt (!) 373 lb (169.2 kg)   SpO2 96%   BMI 47.89 kg/m²     Exam:     Vascular: Dorsalis pedis and posterior tibial pulses are palpable bilaterally. Skin temperature is warm to cool from proximal tibial tuberosity to distal digits. CFT < 3 seconds to exposed digits. Edema grossly present bilaterally. Hair growth negative. Quality of skin diminished. Dermatologic: Patient has what appears to be a traumatic nail avulsion of the right hallux nail of acute chronicity. There are spicules of the medial and lateral borders of the nail still present in the wound. These were removed without incident. There is a small nailbed laceration that is present, this does not appear to probe to the level of bone. There is moderate amount of sanguinous discharge for the size of the lesion present. There is no periwound wound erythema. Edema consistent with patient's standard amount of edema in his legs. No purulence. Neurovascular: Light touch sensation is grossly intact at the midfoot bilaterally. Musculoskeletal: Muscle strength 5/5 for all plantarflexors, dorsiflexors, inverters and everters examined. No pain on palpation of right hallux. Status post partial hallux amputation left foot.           LABS:  Recent Labs     02/07/22  1130 02/08/22 0415   WBC 9.9 8.9   HGB 9.9* 8.5*   HCT 29.8* 25.7*    195        Recent Labs     02/08/22  0415      K 4.6      CO2 22*   BUN 52*   CREATININE 1.6*        Recent Labs     02/07/22  1130 02/08/22 0415   PROT 6.7 5.4*        Recent Labs     02/08/22  0415   CKTOTAL 287*     Assessment  Principle  1) Traumatic nail avulsion, right hallux nail    Plan  -Patient initially examined and evaluated  -Labs reviewed, white blood

## 2022-02-08 NOTE — H&P
Department of Internal Medicine  General Internal Medicine  Attending History and Physical      CHIEF COMPLAINT: Diarrhea    Reason for Admission: Hypotension    History Obtained From:  patient    HISTORY OF PRESENT ILLNESS:      The patient is a 66 y.o. male with significant past medical history of chronic diastolic heart failure and insulin-dependent type 2 diabetes is admitted for hypotension due to diarrhea. He has diarrhea for th past 2-3 days. Nothing improved or worsened his diarrhea. He has 2-3 loose bowel movement per day. No bloody diarrhea. He also has mild nonproductive cough with rhinorrhea. He has mild SOB. No chest pain. He also has been lightheaded whenever he stands or when he moves.     Past Medical History:        Diagnosis Date    RAUL (acute kidney injury) ([facility] [address] 2/13/2020    ASHD (arteriosclerotic heart disease) 2005 2006    stent  baki    Depression     Diabetic peripheral neuropathy [facility]) 2014    Fracture Oct 1984    left lower extremity     HTN (hypertension)     Hypercholesteremia     IDDM (insulin dependent diabetes mellitus)     Low back pain     Morbid obesity with BMI of 45.0-49.9, adult ([facility] [address]     Osteoarthritis      Past Surgical History:        Procedure Laterality Date    AMPUTATION Left 9/10/14    partial versus complete left hallux amputation, left great toe amputation    APPENDECTOMY      BACK INJECTION Bilateral 1/18/2021    Bilateral Si MBB #1 performed by Karrie Hutchins MD at [address] Bilateral 3/1/2021    Bilateral SI MBB #2 performed by Karrie Hutchins MD at [facility]    fusion of C5-C6    CHOLECYSTECTOMY      COLONOSCOPY  2007 and 2011    colonn polyps  lorenzo    CORONARY ANGIOPLASTY WITH STENT PLACEMENT  08/07/2017    Dr Shelby Morillo @ [facility]   lad    CORONARY ARTERY BYPASS GRAFT  2015 august dox    EKG 12-LEAD  8/14/2015         FACET JOINT INJECTION Bilateral 5/22/2020    Lumbar Facet MBB @L3-4,4-5 bilateral #2 performed by Booker Pa MD at [address] [facility]      left leg    JOINT REPLACEMENT      bilateral knees gurvinder    PAIN MANAGEMENT PROCEDURE Bilateral 1/28/2020    Lumbar Facet MBB @ L3-4,4-5,5-S1 bilateral #1 LUMBAR FACET performed by Booker Pa MD at [facility] [address] Right 7/14/2020    Lumbar RFA Bilateral L3-4,4-5  right side first performed by Booker Pa MD at [facility] [address] Left 10/1/2020    Lumbar RFA Left side L3-4, 4-5 performed by Booker Pa MD at [facility] [address] Bilateral 11/17/2020    TFLESI @L5 bilateral #1 performed by Booker Pa MD at [facility] [address] Bilateral 12/10/2020    TFLESI @L5 bilateral #1 performed by Booker Pa MD at [address] [facility] N/A 12/28/2018    T7-T9 DECOMPRESSION, T7-T9 POSTERIOR FUSION performed by Wilton Jones MD at [address] [facility] [address]  2010    [facility]    TONSILLECTOMY      VASCULAR SURGERY      cabg harvests from left leg       Medications Prior to Admission:    Not in a hospital admission. Allergies:  Horse-derived products    Social History:   Socioeconomic History    Marital status:         Spouse name: None    Number of children: 2    Years of education: None    Highest education level: None   Occupational History    None   Tobacco Use    Smoking status: Never Smoker    Smokeless tobacco: Never Used   Vaping Use    Vaping Use: Never used   Substance and Sexual Activity    Alcohol use: Not Currently       Comment: rarely    Drug use: No    Sexual activity: Never       Family History:       Problem Relation Age of Onset    Cancer Mother         uterine     REVIEW OF SYSTEMS:  10/10 systems reviewed and are negative.     PHYSICAL EXAM:  Vitals:  BP (!) 112/38   Pulse 54   Temp 97.8 °F (36.6 °C)   Resp 21   Ht 6' 2\" (1.88 m)   Wt (!) 373 lb (169.2 kg)   SpO2 96%   BMI 47.89 kg/m²   BP (!) 112/38   Pulse 54   Temp 97.8 °F (36.6 °C)   Resp 21   Ht 6' 2\" (1.88 m)   Wt (!) 373 lb (169.2 kg)   SpO2 96%   BMI 47.89 kg/m²     General Appearance:  Alert, cooperative, no distress, appears stated age   Head:  Normocephalic, without obvious abnormality, atraumatic   Eyes:  PERRL, conjunctiva/corneas clear, EOM's intact, fundi benign, both eyes   Ears:  Normal TM's and external ear canals, both ears   Nose: Nares normal, septum midline, mucosa normal, no drainage or sinus tenderness   Throat: Lips, mucosa, and tongue normal; teeth and gums normal   Neck: Supple, symmetrical, trachea midline, no adenopathy, thyroid: not enlarged, symmetric, no tenderness/mass/nodules, no carotid bruit or JVD   Back:   Symmetric, no curvature, ROM normal, no CVA tenderness   Lungs:   Clear to auscultation bilaterally, respirations unlabored   Chest Wall:  No tenderness or deformity   Heart:  Regular rate and rhythm, S1, S2 normal, no murmur, rub or gallop   Abdomen:   Soft, non-tender, bowel sounds active all four quadrants,  no masses, no organomegaly   Genitalia:  Normal male   Rectal:  Normal tone, normal prostate, no masses or tenderness;  guaiac negative stool   Extremities: Extremities normal, atraumatic, no cyanosis or edema   Pulses: 2+ and symmetric   Skin: Skin color, texture, turgor normal, no rashes or lesions   Lymph nodes: Cervical, supraclavicular, and axillary nodes normal   Neurologic: Normal         DATA:  CBC with Differential:    Lab Results   Component Value Date    WBC 9.9 02/07/2022    RBC 3.12 02/07/2022    RBC 3.52 10/17/2018    HGB 9.9 02/07/2022    HCT 29.8 02/07/2022     02/07/2022    MCV 95.5 02/07/2022    MCH 31.7 02/07/2022    MCHC 33.2 02/07/2022    RDW 12.7 10/17/2018 NRBC 0 02/07/2022    NRBC 0 04/22/2012    SEGSPCT 77.0 02/07/2022    LYMPHOPCT 31.0 10/17/2018    MONOPCT 5.5 02/07/2022    EOSPCT 2.9 10/17/2018    BASOPCT 0.7 10/17/2018    MONOSABS 0.5 02/07/2022    LYMPHSABS 1.5 02/07/2022    EOSABS 0.1 02/07/2022    BASOSABS 0.0 02/07/2022     BMP:    Lab Results   Component Value Date     02/07/2022    K 4.3 02/07/2022     02/07/2022    CO2 22 02/07/2022    BUN 55 02/07/2022    LABALBU 3.4 02/07/2022    LABALBU 4.4 09/18/2011    CREATININE 1.9 02/07/2022    CALCIUM 9.2 02/07/2022    LABGLOM 35 02/07/2022    GLUCOSE 82 02/07/2022    GLUCOSE 125 10/17/2018     ASSESSMENT AND PLAN:      66 y.o. male with significant past medical history of chronic diastolic heart failure and insulin-dependent type 2 diabetes is admitted for hypotension due to diarrhea. 1) Diarrhea  Will send for C. Diff  Will consider aggressive if persistent. 2) RAUL  Likely pre-renal azotemia. CTA done today noted. Baseline Cr 1.0  Cr 1.9  Will get UA. IVF hydration. Will consider renal US if no improvement. 3) Orthostatic hypotension  Likely secondary to hypovolemia due to diarrhea  IVF hydration. 4) Chronic normocytic anemia  Will get occult blood. Will get iron studies. Serial labs.

## 2022-02-08 NOTE — FLOWSHEET NOTE
02/08/22 1150   Encounter Summary   Services provided to: Patient and family together   Referral/Consult From: Rounding   Continue Visiting Yes  (2/8)   Complexity of Encounter Moderate   Length of Encounter 15 minutes   Routine   Type Follow up   Assessment Approachable   Intervention Nurtured hope   Outcome Comfort   Assessment: In my encounter with the 66 yr old patient the pts family was supportively present. While rounding the unit I provided spiritual care to patient and their family through conversation, I also came to assess their spiritual needs present. The pt was admitted due to acute kidney injury. Interventions:  I provided, prayer, emotional support and words of comfort.  provided a listening presence and encouraged pt to share their beliefs and how they support him during their hospitalization. Outcomes: The patient was encouraged and didnt share any further spiritual needs at this time. The pt remains optimistic and hopeful. The pt shared that they were appreciative for the support. Plan:  Chaplains will follow-up at a later time for assessment of any spiritual care needs present.

## 2022-02-08 NOTE — PLAN OF CARE
Problem: Falls - Risk of:  Goal: Will remain free from falls  Description: Will remain free from falls  Outcome: Met This Shift  Goal: Absence of physical injury  Description: Absence of physical injury  Outcome: Met This Shift     Problem: Infection:  Goal: Will remain free from infection  Description: Will remain free from infection  Outcome: Met This Shift     Problem: Safety:  Goal: Free from accidental physical injury  Description: Free from accidental physical injury  Outcome: Met This Shift  Goal: Free from intentional harm  Description: Free from intentional harm  Outcome: Met This Shift     Problem: Daily Care:  Goal: Daily care needs are met  Description: Daily care needs are met  Outcome: Met This Shift     Problem: Pain:  Goal: Patient's pain/discomfort is manageable  Description: Patient's pain/discomfort is manageable  Outcome: Met This Shift     Problem: Skin Integrity:  Goal: Skin integrity will stabilize  Description: Skin integrity will stabilize  Outcome: Met This Shift     Problem: Discharge Planning:  Goal: Patients continuum of care needs are met  Description: Patients continuum of care needs are met  Outcome: Met This Shift     Problem:  Activity:  Goal: Ability to tolerate increased activity will improve  Description: Ability to tolerate increased activity will improve  Outcome: Met This Shift  Goal: Risk for activity intolerance will decrease  Description: Risk for activity intolerance will decrease  Outcome: Met This Shift     Problem: Coping:  Goal: Ability to adjust to condition or change in health will improve  Description: Ability to adjust to condition or change in health will improve  Outcome: Met This Shift     Problem: Fluid Volume:  Goal: Ability to maintain a balanced intake and output will improve  Description: Ability to maintain a balanced intake and output will improve  Outcome: Met This Shift     Problem: Health Behavior:  Goal: Ability to identify and utilize available resources and services will improve  Description: Ability to identify and utilize available resources and services will improve  Outcome: Met This Shift  Goal: Ability to manage health-related needs will improve  Description: Ability to manage health-related needs will improve  Outcome: Met This Shift     Problem: Metabolic:  Goal: Ability to maintain appropriate glucose levels will improve  Description: Ability to maintain appropriate glucose levels will improve  Outcome: Met This Shift     Problem: Nutritional:  Goal: Maintenance of adequate nutrition will improve  Description: Maintenance of adequate nutrition will improve  Outcome: Met This Shift  Goal: Progress toward achieving an optimal weight will improve  Description: Progress toward achieving an optimal weight will improve  Outcome: Met This Shift     Problem: Physical Regulation:  Goal: Complications related to the disease process, condition or treatment will be avoided or minimized  Description: Complications related to the disease process, condition or treatment will be avoided or minimized  Outcome: Met This Shift  Goal: Diagnostic test results will improve  Description: Diagnostic test results will improve  Outcome: Met This Shift     Problem: Skin Integrity:  Goal: Risk for impaired skin integrity will decrease  Description: Risk for impaired skin integrity will decrease  Outcome: Met This Shift     Problem: Tissue Perfusion:  Goal: Adequacy of tissue perfusion will improve  Description: Adequacy of tissue perfusion will improve  Outcome: Met This Shift     Problem: Diarrhea:  Goal: Bowel elimination is within specified parameters  Description: Bowel elimination is within specified parameters  Outcome: Met This Shift  Goal: Passage of soft, formed stool  Description: Passage of soft, formed stool  Outcome: Met This Shift  Goal: Establishment of normal bowel function will improve to within specified parameters  Description: Establishment of normal bowel function will improve to within specified parameters  Outcome: Met This Shift

## 2022-02-08 NOTE — ED NOTES
ED to inpatient nurses report    Chief Complaint   Patient presents with    Shortness of Breath    Fatigue    Diarrhea      Present to ED from home  LOC: alert and orientated to name, place, date  Vital signs   Vitals:    02/07/22 1925 02/07/22 1926 02/07/22 2025 02/07/22 2026   BP:  (!) 112/38  (!) 99/51   Pulse: 54  56    Resp: 21  20    Temp:       SpO2: 96%  96%    Weight:       Height:          Oxygen Baseline RA    Current needs required  Bipap/Cpap No  LDAs:   Peripheral IV 02/07/22 Right Forearm (Active)   Site Assessment Clean;Dry; Intact 02/07/22 1833   Line Status Flushed 02/07/22 1833   Dressing Status Clean;Dry; Intact 02/07/22 1833     Mobility: Requires assistance * 1  Pending ED orders: none  Present condition: stable        Electronically signed by Blayne Frazier RN on 2/7/2022 at 8:27 PM     Blayne Frazier [facility]  02/07/22 2027

## 2022-02-08 NOTE — RT PROTOCOL NOTE
RT Inhaler-Nebulizer Bronchodilator Protocol Note    There is a bronchodilator order in the chart from a provider indicating to follow the RT Bronchodilator Protocol and there is an Initiate RT Inhaler-Nebulizer Bronchodilator Protocol order as well (see protocol at bottom of note). CXR Findings:  XR CHEST PORTABLE    Result Date: 2/7/2022  Stable chest no acute cardiopulmonary process **This report has been created using voice recognition software. It may contain minor errors which are inherent in voice recognition technology. ** Final report electronically signed by Dr. Kristel Hernandez on 2/7/2022 12:22 PM      The findings from the last RT Protocol Assessment were as follows:   History Pulmonary Disease: None or smoker <15 pack years  Respiratory Pattern: Dyspnea on exertion or RR 21-25 bpm  Breath Sounds: Slightly diminished and/or crackles  Cough: Strong, productive  Indication for Bronchodilator Therapy: Decreased or absent breath sounds  Bronchodilator Assessment Score: 5    Aerosolized bronchodilator medication orders have been revised according to the RT Inhaler-Nebulizer Bronchodilator Protocol below. Respiratory Therapist to perform RT Therapy Protocol Assessment initially then follow the protocol. Repeat RT Therapy Protocol Assessment PRN for score 0-3 or on second treatment, BID, and PRN for scores above 3. No Indications - adjust the frequency to every 6 hours PRN wheezing or bronchospasm, if no treatments needed after 48 hours then discontinue using Per Protocol order mode. If indication present, adjust the RT bronchodilator orders based on the Bronchodilator Assessment Score as indicated below.   Use Inhaler orders unless patient has one or more of the following: on home nebulizer, not able to hold breath for 10 seconds, is not alert and oriented, cannot activate and use MDI correctly, or respiratory rate 25 breaths per minute or more, then use the equivalent nebulizer order(s) with same Frequency and PRN reasons based on the score. If a patient is on this medication at home then do not decrease Frequency below that used at home. 0-3 - enter or revise RT bronchodilator order(s) to equivalent RT Bronchodilator order with Frequency of every 4 hours PRN for wheezing or increased work of breathing using Per Protocol order mode. 4-6 - enter or revise RT Bronchodilator order(s) to two equivalent RT bronchodilator orders with one order with BID Frequency and one order with Frequency of every 4 hours PRN wheezing or increased work of breathing using Per Protocol order mode. 7-10 - enter or revise RT Bronchodilator order(s) to two equivalent RT bronchodilator orders with one order with TID Frequency and one order with Frequency of every 4 hours PRN wheezing or increased work of breathing using Per Protocol order mode. 11-13 - enter or revise RT Bronchodilator order(s) to one equivalent RT bronchodilator order with QID Frequency and an Albuterol order with Frequency of every 4 hours PRN wheezing or increased work of breathing using Per Protocol order mode. Greater than 13 - enter or revise RT Bronchodilator order(s) to one equivalent RT bronchodilator order with every 4 hours Frequency and an Albuterol order with Frequency of every 2 hours PRN wheezing or increased work of breathing using Per Protocol order mode. RT to enter RT Home Evaluation for COPD & MDI Assessment order using Per Protocol order mode.     Electronically signed by Jeffrey Seals RCP on 2/8/2022 at 9:10 AM

## 2022-02-08 NOTE — PROGRESS NOTES
Physician Progress Note      Jamie Lewis  CSN #:                  [id number]  :                       1950  ADMIT DATE:       2022 11:05 AM  DISCH DATE:  RESPONDING  PROVIDER #:        Suzanne Garcia MD          QUERY TEXT:    Patient admitted with BMI 47.89. If possible, please make selection below ,   document in progress notes and discharge summary if you are evaluating and /or   treating any of the following: The medical record reflects the following:  Risk Factors: Age, Chronic Health Conditions, Limited Mobility, population   health issues  Clinical Indicators: Ht  6ft 2 in ---Wt 373 lbs --- BMI 47.89  Treatment: Fall precautions, bariatric equipment and weight based medications   as required, Blood sugar and carb control Diet, lifestyle management info   available upon request .    Thank Azam Terrazas  RN, BSN, [facility]  Clinical   P: [phone]  F: [phone]  Options provided:  -- Morbid obesity  -- Severe obesity  -- Other - I will add my own diagnosis  -- Disagree - Not applicable / Not valid  -- Disagree - Clinically unable to determine / Unknown  -- Refer to Clinical Documentation Reviewer    PROVIDER RESPONSE TEXT:    This patient has morbid obesity.     Query created by: Garfield Collet on 2022 7:20 AM      Electronically signed by:  Suzanne Garcia MD 2022 8:15 AM

## 2022-02-08 NOTE — CARE COORDINATION
2/8/22, 9:14 AM EST  DISCHARGE PLANNING EVALUATION:    Jericho Crews       Admitted: 2/7/2022/ [address] day: 1   Location: -08/008-A Reason for admit: Dehydration [E86.0]  COPD exacerbation ([facility] [J44.1]  RAUL (acute kidney injury) ([facility] [N17.9]  Hypotension, unspecified hypotension type [I95.9]   PMH:  has a past medical history of RAUL (acute kidney injury) ([facility] ASHD (arteriosclerotic heart disease), Depression, Diabetic peripheral neuropathy ([facility] Fracture, HTN (hypertension), Hypercholesteremia, IDDM (insulin dependent diabetes mellitus), Low back pain, Morbid obesity with BMI of 45.0-49.9, adult ([facility] and Osteoarthritis. Procedure: none  Barriers to Discharge:  Creat 1.6, Ca+ 8.1, trops elev, Hgb 8.5. IVF, IV levaquin, duonebs. PCP: Nathan Card MD  Readmission Risk Score: 17.1 ( )%    Patient Goals/Plan/Treatment Preferences: Met with \"Moose\", he is from home independently. He has a cane and walker. [facility]. He reports being able to do his own cooking, cleaning, and running errands. His daughter would like to hire a cleaning lady to do his floor scrubbing as he has two titanium knees but he prefers to take care of everything himself. He denies all needs at this time. Transportation/Food Security/Housekeeping Addressed:  No issues identified.

## 2022-02-09 PROBLEM — N17.9 ACUTE KIDNEY INJURY (HCC): Status: ACTIVE | Noted: 2022-02-09

## 2022-02-09 LAB
ANION GAP SERPL CALCULATED.3IONS-SCNC: 9 MEQ/L (ref 8–16)
BASOPHILS # BLD: 0.3 %
BASOPHILS ABSOLUTE: 0 THOU/MM3 (ref 0–0.1)
BUN BLDV-MCNC: 39 MG/DL (ref 7–22)
CALCIUM SERPL-MCNC: 7.8 MG/DL (ref 8.5–10.5)
CHLORIDE BLD-SCNC: 105 MEQ/L (ref 98–111)
CO2: 22 MEQ/L (ref 23–33)
CREAT SERPL-MCNC: 1.5 MG/DL (ref 0.4–1.2)
EOSINOPHIL # BLD: 3.3 %
EOSINOPHILS ABSOLUTE: 0.2 THOU/MM3 (ref 0–0.4)
ERYTHROCYTE [DISTWIDTH] IN BLOOD BY AUTOMATED COUNT: 12.4 % (ref 11.5–14.5)
ERYTHROCYTE [DISTWIDTH] IN BLOOD BY AUTOMATED COUNT: 43.1 FL (ref 35–45)
GFR SERPL CREATININE-BSD FRML MDRD: 46 ML/MIN/1.73M2
GLUCOSE BLD-MCNC: 214 MG/DL (ref 70–108)
GLUCOSE BLD-MCNC: 221 MG/DL (ref 70–108)
GLUCOSE BLD-MCNC: 232 MG/DL (ref 70–108)
GLUCOSE BLD-MCNC: 269 MG/DL (ref 70–108)
GLUCOSE BLD-MCNC: 286 MG/DL (ref 70–108)
HCT VFR BLD CALC: 24.8 % (ref 42–52)
HEMOGLOBIN: 8.2 GM/DL (ref 14–18)
IMMATURE GRANS (ABS): 0.03 THOU/MM3 (ref 0–0.07)
IMMATURE GRANULOCYTES: 0.5 %
LYMPHOCYTES # BLD: 23.1 %
LYMPHOCYTES ABSOLUTE: 1.5 THOU/MM3 (ref 1–4.8)
MCH RBC QN AUTO: 31.5 PG (ref 26–33)
MCHC RBC AUTO-ENTMCNC: 33.1 GM/DL (ref 32.2–35.5)
MCV RBC AUTO: 95.4 FL (ref 80–94)
MONOCYTES # BLD: 9.5 %
MONOCYTES ABSOLUTE: 0.6 THOU/MM3 (ref 0.4–1.3)
NUCLEATED RED BLOOD CELLS: 0 /100 WBC
PLATELET # BLD: 173 THOU/MM3 (ref 130–400)
PMV BLD AUTO: 9.1 FL (ref 9.4–12.4)
POTASSIUM SERPL-SCNC: 4.4 MEQ/L (ref 3.5–5.2)
RBC # BLD: 2.6 MILL/MM3 (ref 4.7–6.1)
SEG NEUTROPHILS: 63.3 %
SEGMENTED NEUTROPHILS ABSOLUTE COUNT: 4.1 THOU/MM3 (ref 1.8–7.7)
SODIUM BLD-SCNC: 136 MEQ/L (ref 135–145)
THROAT/NOSE CULTURE: NORMAL
WBC # BLD: 6.4 THOU/MM3 (ref 4.8–10.8)

## 2022-02-09 PROCEDURE — 2580000003 HC RX 258: Performed by: FAMILY MEDICINE

## 2022-02-09 PROCEDURE — 6370000000 HC RX 637 (ALT 250 FOR IP): Performed by: INTERNAL MEDICINE

## 2022-02-09 PROCEDURE — 6370000000 HC RX 637 (ALT 250 FOR IP): Performed by: HOSPITALIST

## 2022-02-09 PROCEDURE — 94760 N-INVAS EAR/PLS OXIMETRY 1: CPT

## 2022-02-09 PROCEDURE — 6360000002 HC RX W HCPCS: Performed by: HOSPITALIST

## 2022-02-09 PROCEDURE — 94640 AIRWAY INHALATION TREATMENT: CPT

## 2022-02-09 PROCEDURE — 6370000000 HC RX 637 (ALT 250 FOR IP): Performed by: FAMILY MEDICINE

## 2022-02-09 PROCEDURE — 82948 REAGENT STRIP/BLOOD GLUCOSE: CPT

## 2022-02-09 PROCEDURE — 96372 THER/PROPH/DIAG INJ SC/IM: CPT

## 2022-02-09 PROCEDURE — 6360000002 HC RX W HCPCS: Performed by: FAMILY MEDICINE

## 2022-02-09 PROCEDURE — 96366 THER/PROPH/DIAG IV INF ADDON: CPT

## 2022-02-09 PROCEDURE — 2580000003 HC RX 258: Performed by: HOSPITALIST

## 2022-02-09 PROCEDURE — G0378 HOSPITAL OBSERVATION PER HR: HCPCS

## 2022-02-09 PROCEDURE — 85025 COMPLETE CBC W/AUTO DIFF WBC: CPT

## 2022-02-09 PROCEDURE — 80048 BASIC METABOLIC PNL TOTAL CA: CPT

## 2022-02-09 PROCEDURE — 36415 COLL VENOUS BLD VENIPUNCTURE: CPT

## 2022-02-09 RX ORDER — INSULIN GLARGINE 100 [IU]/ML
20 INJECTION, SOLUTION SUBCUTANEOUS DAILY
Status: DISCONTINUED | OUTPATIENT
Start: 2022-02-09 | End: 2022-02-10 | Stop reason: HOSPADM

## 2022-02-09 RX ADMIN — CIPROFLOXACIN 1 DROP: 3 SOLUTION OPHTHALMIC at 06:48

## 2022-02-09 RX ADMIN — CARVEDILOL 12.5 MG: 6.25 TABLET, FILM COATED ORAL at 21:01

## 2022-02-09 RX ADMIN — ENOXAPARIN SODIUM 40 MG: 100 INJECTION SUBCUTANEOUS at 21:01

## 2022-02-09 RX ADMIN — AMLODIPINE BESYLATE 5 MG: 5 TABLET ORAL at 10:52

## 2022-02-09 RX ADMIN — TICAGRELOR 90 MG: 90 TABLET ORAL at 10:57

## 2022-02-09 RX ADMIN — CARVEDILOL 12.5 MG: 6.25 TABLET, FILM COATED ORAL at 10:52

## 2022-02-09 RX ADMIN — ALBUTEROL SULFATE 2 PUFF: 90 AEROSOL, METERED RESPIRATORY (INHALATION) at 17:00

## 2022-02-09 RX ADMIN — ASPIRIN 81 MG: 81 TABLET, COATED ORAL at 10:52

## 2022-02-09 RX ADMIN — INSULIN GLARGINE 20 UNITS: 100 INJECTION, SOLUTION SUBCUTANEOUS at 21:11

## 2022-02-09 RX ADMIN — INSULIN LISPRO 3 UNITS: 100 INJECTION, SOLUTION INTRAVENOUS; SUBCUTANEOUS at 21:11

## 2022-02-09 RX ADMIN — FERROUS SULFATE TAB 325 MG (65 MG ELEMENTAL FE) 325 MG: 325 (65 FE) TAB at 21:01

## 2022-02-09 RX ADMIN — ATORVASTATIN CALCIUM 10 MG: 10 TABLET, FILM COATED ORAL at 10:52

## 2022-02-09 RX ADMIN — LEVOFLOXACIN 500 MG: 5 INJECTION, SOLUTION INTRAVENOUS at 11:02

## 2022-02-09 RX ADMIN — CIPROFLOXACIN 1 DROP: 3 SOLUTION OPHTHALMIC at 21:02

## 2022-02-09 RX ADMIN — SODIUM CHLORIDE, PRESERVATIVE FREE 10 ML: 5 INJECTION INTRAVENOUS at 21:41

## 2022-02-09 RX ADMIN — PANTOPRAZOLE SODIUM 40 MG: 40 TABLET, DELAYED RELEASE ORAL at 06:47

## 2022-02-09 RX ADMIN — SODIUM CHLORIDE, PRESERVATIVE FREE 10 ML: 5 INJECTION INTRAVENOUS at 10:58

## 2022-02-09 RX ADMIN — FERROUS SULFATE TAB 325 MG (65 MG ELEMENTAL FE) 325 MG: 325 (65 FE) TAB at 10:52

## 2022-02-09 RX ADMIN — Medication 1 TABLET: at 10:52

## 2022-02-09 RX ADMIN — ALBUTEROL SULFATE 2 PUFF: 90 AEROSOL, METERED RESPIRATORY (INHALATION) at 09:50

## 2022-02-09 RX ADMIN — TICAGRELOR 90 MG: 90 TABLET ORAL at 21:01

## 2022-02-09 RX ADMIN — SODIUM CHLORIDE: 9 INJECTION, SOLUTION INTRAVENOUS at 23:03

## 2022-02-09 RX ADMIN — CIPROFLOXACIN 1 DROP: 3 SOLUTION OPHTHALMIC at 17:35

## 2022-02-09 ASSESSMENT — ENCOUNTER SYMPTOMS
EYE DISCHARGE: 1
SHORTNESS OF BREATH: 1
SORE THROAT: 1
ABDOMINAL DISTENTION: 1
BACK PAIN: 1
CONSTIPATION: 1
SINUS PRESSURE: 1
COUGH: 1

## 2022-02-09 ASSESSMENT — PAIN SCALES - GENERAL
PAINLEVEL_OUTOF10: 0
PAINLEVEL_OUTOF10: 0

## 2022-02-09 NOTE — PROGRESS NOTES
Progress Note  Date:2/8/2022       JVQT:[id number]  Patient Name:Tuan Hassan     YOB: 1950     Age:71 y.o. Subjective    Subjective:  Symptoms:  Stable. He reports shortness of breath, cough and weakness. No chest pain. Diet:  Poor intake. Pain:  He complains of pain that is mild. Pain is well controlled. (Back and knee pain).       Current Facility-Administered Medications   Medication Dose Route Frequency Provider Last Rate Last Admin    benzonatate (TESSALON) capsule 100 mg  100 mg Oral TID PRN LUCIUS Blair   100 mg at 02/08/22 2146    ciprofloxacin (CILOXAN) 0.3 % ophthalmic solution 1 drop  1 drop Both Eyes Q4H While awake Maryann Bess MD   1 drop at 02/08/22 2146    levoFLOXacin (LEVAQUIN) 500 MG/100ML infusion 500 mg  500 mg IntraVENous Q24H Maryann Bess MD   Stopped at 02/08/22 1126    Urea (CARMOL) 40 % cream   Topical PRN Maryann Bess MD        albuterol sulfate  (90 Base) MCG/ACT inhaler 2 puff  2 puff Inhalation BID Maryann Bess MD   2 puff at 02/08/22 1639    aspirin EC tablet 81 mg  81 mg Oral Daily Daron Christian MD   81 mg at 02/08/22 0927    nitroGLYCERIN (NITROSTAT) SL tablet 0.4 mg  0.4 mg SubLINGual Q5 Min PRN Daron Christian MD        pantoprazole (PROTONIX) tablet 40 mg  40 mg Oral QAM AC Daron Christian MD   40 mg at 02/08/22 0612    [Held by provider] furosemide (LASIX) tablet 40 mg  40 mg Oral Daily Daron Christian MD   40 mg at 02/08/22 0001    [Held by provider] furosemide (LASIX) tablet 80 mg  80 mg Oral Daily Daron Christian MD        atorvastatin (LIPITOR) tablet 10 mg  10 mg Oral Daily Daron Christian MD   10 mg at 02/08/22 4046    ferrous sulfate (IRON 325) tablet 325 mg  325 mg Oral BID Daron Christian MD   325 mg at 02/08/22 2146    multivitamin 1 tablet  1 tablet Oral Daily Daron Christian MD   1 tablet at 02/08/22 0927    amLODIPine (NORVASC) tablet 5 mg  5 mg Oral Daily Amara Elias MD        carvedilol (COREG) tablet 12.5 mg  12.5 mg Oral BID Redd Adame MD   12.5 mg at 02/08/22 2146    tiZANidine (ZANAFLEX) tablet 4 mg  4 mg Oral Q6H PRN Rory Murphy MD        ticagrelor (BRILINTA) tablet 90 mg  90 mg Oral BID Daron Christian MD   90 mg at 02/08/22 2146    insulin lispro (HUMALOG) injection vial 0-12 Units  0-12 Units SubCUTAneous TID WC Daron Christian MD   3 Units at 02/08/22 1700    insulin lispro (HUMALOG) injection vial 0-6 Units  0-6 Units SubCUTAneous Nightly Daron Christian MD   3 Units at 02/08/22 2150    sodium chloride flush 0.9 % injection 5-40 mL  5-40 mL IntraVENous 2 times per day Daron Christian MD   10 mL at 02/08/22 0017    sodium chloride flush 0.9 % injection 5-40 mL  5-40 mL IntraVENous PRN Daron Christian MD        0.9 % sodium chloride infusion  25 mL IntraVENous PRN Daron Christian MD        enoxaparin (LOVENOX) injection 40 mg  40 mg SubCUTAneous BID Daron Christian MD   40 mg at 02/08/22 2045    ondansetron (ZOFRAN-ODT) disintegrating tablet 4 mg  4 mg Oral Q8H PRN Daron Christian MD        Or    ondansetron (ZOFRAN) injection 4 mg  4 mg IntraVENous Q6H PRN Daron Christian MD        acetaminophen (TYLENOL) tablet 650 mg  650 mg Oral Q6H PRN Daron Christian MD   650 mg at 02/08/22 2045    Or    acetaminophen (TYLENOL) suppository 650 mg  650 mg Rectal Q6H PRN Daron Christian MD        0.9 % sodium chloride infusion   IntraVENous Continuous Daron Christian  mL/hr at 02/08/22 2142 New Bag at 02/08/22 2142    glucose (GLUTOSE) 40 % oral gel 15 g  15 g Oral PRN Daron Christian MD        glucagon (rDNA) injection 1 mg  1 mg IntraMUSCular PRN Daron Christian MD        dextrose 5 % solution  100 mL/hr IntraVENous PRN Daron Christian MD        dextrose bolus (hypoglycemia) 10% 125 mL  125 mL IntraVENous PRN Daron Christian MD        Or    dextrose bolus (hypoglycemia) 10% 250 mL  250 mL IntraVENous PRN Daron Christian MD          Review of Systems   Constitutional: Positive for activity change (weakness) and fever. HENT: Positive for congestion, postnasal drip, sinus pressure and sore throat.     Eyes: range of motion. There is dependent edema.  (3 + edema with stasis dermatitis with scaling of the lower legs)  Neurological: Patient is alert and oriented to person, place and time. Patient has normal reflexes. (Weakness generalized of lower legs with back pain). Skin:  Dry. There is a rash. (Scaling and dryness of the skin)    Labs/Imaging/Diagnostics    Labs:  CBC:  Recent Labs     02/07/22  1130 02/08/22  0415   WBC 9.9 8.9   RBC 3.12* 2.70*   HGB 9.9* 8.5*   HCT 29.8* 25.7*   MCV 95.5* 95.2*    195     CHEMISTRIES:  Recent Labs     02/07/22 1130 02/08/22  0415    135   K 4.3 4.6    102   CO2 22* 22*   BUN 55* 52*   CREATININE 1.9* 1.6*   GLUCOSE 82 167*     PT/INR:No results for input(s): PROTIME, INR in the last 72 hours. APTT:No results for input(s): APTT in the last 72 hours. LIVER PROFILE:  Recent Labs     02/07/22 1130 02/08/22  0415   AST 27 18   ALT 17 14   BILITOT 0.4 0.3   ALKPHOS 82 70       Imaging Last 24 Hours:  CTA CHEST W WO CONTRAST - r/o Pulmonary Embolism    Result Date: 2/7/2022  PROCEDURE: CTA CHEST W WO CONTRAST CLINICAL INFORMATION: r/o Pulmonary Embolism. COMPARISON: 5/28/2017 TECHNIQUE: 3 mm axial images were obtained through the chest after the administration of IV contrast.  A non-contrast localizer was obtained. 3D reconstructions were performed on the scanner to include MIP coronal and sagittal images through the chest. Isovue was the intravenous contrast utilized. All CT scans at this facility use dose modulation, iterative reconstruction, and/or weight-based dosing when appropriate to reduce radiation dose to as low as reasonably achievable. FINDINGS: There is adequate opacification of the pulmonary arterial system. No pulmonary emboli are present. The aorta is within acceptable limits. Heart size normal prior CABG. There is no pericardial or pleural effusion. There is no mediastinal, axillary or hilar adenopathy. The lungs are clear.  There are no infiltrates. Midline sternotomy. Postsurgical changes cervical spine. Postsurgical changes right shoulder. No suspicious bone lesions  There are no suspicious findings in the imaged aspects of the upper abdomen. No pulmonary emboli or pulmonary infiltrates. **This report has been created using voice recognition software. It may contain minor errors which are inherent in voice recognition technology. ** Final report electronically signed by Dr. Li Garay on 2/7/2022 2:42 PM    XR CHEST PORTABLE    Result Date: 2/7/2022  PROCEDURE: XR CHEST PORTABLE CLINICAL INFORMATION: sob . TECHNIQUE: Portable upright COMPARISON: 10/21/2021 FINDINGS: Heart size is borderline. It is time is not widened. No infiltrates or effusions. Vascularity is normal. Surgical clip right apex. Midline sternotomy. Postsurgical changes thoracic spine. EKG leads overlie the chest.     Stable chest no acute cardiopulmonary process **This report has been created using voice recognition software. It may contain minor errors which are inherent in voice recognition technology. ** Final report electronically signed by Dr. Li Garay on 2/7/2022 12:22 PM    Assessment//Plan           Hospital Problems           Last Modified POA    RAUL (acute kidney injury) ([facility] 2/7/2022 Yes        Assessment:    Condition: In stable condition. Unchanged. (Acute dehydration   Noted with some loose stool but only 3 to 4 total    Acute kidney injury with elevated bun and creat     copd with exacerbation noted with persist  Cough  And dyspnea  And ct of chest no pneumonia or pulmonary embolus     sinusitis as eyes with matting and congestion and post nasal drip with throat irritation     niddm with long term insulin only fair control    htn noted      ashd stable      diastolic chf  Feel stable        Chronic  Low  Back pain    ). Plan:   Out of bed and up to chair. Add bronchodilators. Advance diet as tolerated. Administer medications as ordered.    ( Needs  Gentle  Hydration as some loose stools now better      feel copd with exacerbation with cough as swabs negative for flu and covid and strep     sinusitis as eye drainage and post nasal drip with sore throat     sliding scale  On insulin      repeat labs in am).        Electronically signed by Nathan Card MD on 2/9/22 at 4:35 AM EST

## 2022-02-09 NOTE — PLAN OF CARE
Problem: Falls - Risk of:  Goal: Will remain free from falls  Description: Will remain free from falls  Outcome: Met This Shift  Goal: Absence of physical injury  Description: Absence of physical injury  Outcome: Met This Shift     Problem: Infection:  Goal: Will remain free from infection  Description: Will remain free from infection  Outcome: Met This Shift     Problem: Safety:  Goal: Free from accidental physical injury  Description: Free from accidental physical injury  Outcome: Met This Shift  Goal: Free from intentional harm  Description: Free from intentional harm  Outcome: Met This Shift     Problem: Daily Care:  Goal: Daily care needs are met  Description: Daily care needs are met  Outcome: Met This Shift     Problem: Pain:  Goal: Patient's pain/discomfort is manageable  Description: Patient's pain/discomfort is manageable  Outcome: Met This Shift  Goal: Pain level will decrease  Description: Pain level will decrease  Outcome: Met This Shift  Goal: Control of acute pain  Description: Control of acute pain  Outcome: Met This Shift  Goal: Control of chronic pain  Description: Control of chronic pain  Outcome: Met This Shift     Problem: Skin Integrity:  Goal: Skin integrity will stabilize  Description: Skin integrity will stabilize  Outcome: Met This Shift     Problem:  Activity:  Goal: Ability to tolerate increased activity will improve  Description: Ability to tolerate increased activity will improve  Outcome: Met This Shift     Problem: Coping:  Goal: Ability to adjust to condition or change in health will improve  Description: Ability to adjust to condition or change in health will improve  Outcome: Met This Shift     Problem: Fluid Volume:  Goal: Ability to maintain a balanced intake and output will improve  Description: Ability to maintain a balanced intake and output will improve  Outcome: Met This Shift     Problem: Health Behavior:  Goal: Ability to identify and utilize available resources and services will improve  Description: Ability to identify and utilize available resources and services will improve  Outcome: Met This Shift     Problem: Physical Regulation:  Goal: Complications related to the disease process, condition or treatment will be avoided or minimized  Description: Complications related to the disease process, condition or treatment will be avoided or minimized  Outcome: Met This Shift     Problem: Skin Integrity:  Goal: Risk for impaired skin integrity will decrease  Description: Risk for impaired skin integrity will decrease  Outcome: Met This Shift     Problem: Tissue Perfusion:  Goal: Adequacy of tissue perfusion will improve  Description: Adequacy of tissue perfusion will improve  Outcome: Met This Shift     Problem: Diarrhea:  Goal: Bowel elimination is within specified parameters  Description: Bowel elimination is within specified parameters  Outcome: Met This Shift

## 2022-02-09 NOTE — PROGRESS NOTES
Notified ALLY Bowman that I just reinforced the dressing on the right foot. This is the second time it's been reinforced since the dressing was changed yesterday due to drainage. I know he wanted to take a look at it today so I didn't want to do the full dressing change. ALLY Bowman replied and said \"Dr. Beth Marti will be coming by once his surgeries are done for the day. If you could, please obtain coban (2inch is preferable, if unavailable, then 4inch) for the dressing change so he can apply some pressure to that area. Thanks for the heads up though. Also, have him elevate his foot. \"     Patient aware of conversation

## 2022-02-09 NOTE — CARE COORDINATION
Condition Code 44 conditions met, a Utilization Review Committee member reviewed this case and agrees that this patient does not meet evidenced based criteria for inpatient services, requiring a change in patient status from \"admit as inpatient\" to \"place in observation\", in accordance with condition code 44. Medical care will continue to be provided by Chary Chapa MD, who agrees with the decision and has documented the agreement in the patient's medical record, as evidenced by the observation order/additional documentation. MARTIN was signed by the patient and placed in the chart. Copy of the MOON was given to the patient. Explanation to the patient that they are in Observation status and Code 44 letter of Explanation was given to the patient. All questions addressed.

## 2022-02-10 VITALS
RESPIRATION RATE: 20 BRPM | DIASTOLIC BLOOD PRESSURE: 77 MMHG | OXYGEN SATURATION: 95 % | HEIGHT: 74 IN | TEMPERATURE: 97.9 F | WEIGHT: 315 LBS | BODY MASS INDEX: 40.43 KG/M2 | SYSTOLIC BLOOD PRESSURE: 173 MMHG | HEART RATE: 73 BPM

## 2022-02-10 PROBLEM — M51.37 DEGENERATION OF LUMBOSACRAL INTERVERTEBRAL DISC: Status: ACTIVE | Noted: 2022-02-10

## 2022-02-10 PROBLEM — M48.061 SPINAL STENOSIS OF LUMBAR REGION: Status: ACTIVE | Noted: 2022-02-10

## 2022-02-10 PROBLEM — M51.379 DEGENERATION OF LUMBOSACRAL INTERVERTEBRAL DISC: Status: ACTIVE | Noted: 2022-02-10

## 2022-02-10 LAB
ANION GAP SERPL CALCULATED.3IONS-SCNC: 9 MEQ/L (ref 8–16)
BASOPHILS # BLD: 0.3 %
BASOPHILS ABSOLUTE: 0 THOU/MM3 (ref 0–0.1)
BUN BLDV-MCNC: 28 MG/DL (ref 7–22)
CALCIUM SERPL-MCNC: 8.3 MG/DL (ref 8.5–10.5)
CHLORIDE BLD-SCNC: 104 MEQ/L (ref 98–111)
CO2: 22 MEQ/L (ref 23–33)
CREAT SERPL-MCNC: 1.3 MG/DL (ref 0.4–1.2)
EOSINOPHIL # BLD: 3.5 %
EOSINOPHILS ABSOLUTE: 0.2 THOU/MM3 (ref 0–0.4)
ERYTHROCYTE [DISTWIDTH] IN BLOOD BY AUTOMATED COUNT: 12.3 % (ref 11.5–14.5)
ERYTHROCYTE [DISTWIDTH] IN BLOOD BY AUTOMATED COUNT: 42.7 FL (ref 35–45)
GFR SERPL CREATININE-BSD FRML MDRD: 54 ML/MIN/1.73M2
GLUCOSE BLD-MCNC: 195 MG/DL (ref 70–108)
GLUCOSE BLD-MCNC: 211 MG/DL (ref 70–108)
GLUCOSE BLD-MCNC: 216 MG/DL (ref 70–108)
GLUCOSE BLD-MCNC: 260 MG/DL (ref 70–108)
HCT VFR BLD CALC: 23.8 % (ref 42–52)
HEMOGLOBIN: 8 GM/DL (ref 14–18)
IMMATURE GRANS (ABS): 0.04 THOU/MM3 (ref 0–0.07)
IMMATURE GRANULOCYTES: 0.6 %
LYMPHOCYTES # BLD: 23.6 %
LYMPHOCYTES ABSOLUTE: 1.6 THOU/MM3 (ref 1–4.8)
MCH RBC QN AUTO: 31.7 PG (ref 26–33)
MCHC RBC AUTO-ENTMCNC: 33.6 GM/DL (ref 32.2–35.5)
MCV RBC AUTO: 94.4 FL (ref 80–94)
MONOCYTES # BLD: 8.6 %
MONOCYTES ABSOLUTE: 0.6 THOU/MM3 (ref 0.4–1.3)
NUCLEATED RED BLOOD CELLS: 0 /100 WBC
PLATELET # BLD: 178 THOU/MM3 (ref 130–400)
PMV BLD AUTO: 8.8 FL (ref 9.4–12.4)
POTASSIUM SERPL-SCNC: 4.5 MEQ/L (ref 3.5–5.2)
RBC # BLD: 2.52 MILL/MM3 (ref 4.7–6.1)
SEG NEUTROPHILS: 63.4 %
SEGMENTED NEUTROPHILS ABSOLUTE COUNT: 4.4 THOU/MM3 (ref 1.8–7.7)
SODIUM BLD-SCNC: 135 MEQ/L (ref 135–145)
WBC # BLD: 6.9 THOU/MM3 (ref 4.8–10.8)

## 2022-02-10 PROCEDURE — 85025 COMPLETE CBC W/AUTO DIFF WBC: CPT

## 2022-02-10 PROCEDURE — 82948 REAGENT STRIP/BLOOD GLUCOSE: CPT

## 2022-02-10 PROCEDURE — 96366 THER/PROPH/DIAG IV INF ADDON: CPT

## 2022-02-10 PROCEDURE — 36415 COLL VENOUS BLD VENIPUNCTURE: CPT

## 2022-02-10 PROCEDURE — 6370000000 HC RX 637 (ALT 250 FOR IP): Performed by: INTERNAL MEDICINE

## 2022-02-10 PROCEDURE — 94640 AIRWAY INHALATION TREATMENT: CPT

## 2022-02-10 PROCEDURE — G0378 HOSPITAL OBSERVATION PER HR: HCPCS

## 2022-02-10 PROCEDURE — 94760 N-INVAS EAR/PLS OXIMETRY 1: CPT

## 2022-02-10 PROCEDURE — 2580000003 HC RX 258: Performed by: HOSPITALIST

## 2022-02-10 PROCEDURE — 6360000002 HC RX W HCPCS: Performed by: FAMILY MEDICINE

## 2022-02-10 PROCEDURE — 6370000000 HC RX 637 (ALT 250 FOR IP): Performed by: HOSPITALIST

## 2022-02-10 PROCEDURE — 96372 THER/PROPH/DIAG INJ SC/IM: CPT

## 2022-02-10 PROCEDURE — 80048 BASIC METABOLIC PNL TOTAL CA: CPT

## 2022-02-10 PROCEDURE — 6360000002 HC RX W HCPCS: Performed by: HOSPITALIST

## 2022-02-10 RX ORDER — BENZONATATE 100 MG/1
100 CAPSULE ORAL 3 TIMES DAILY PRN
Qty: 21 CAPSULE | Refills: 1 | Status: SHIPPED | OUTPATIENT
Start: 2022-02-10 | End: 2022-02-17

## 2022-02-10 RX ORDER — LEVOFLOXACIN 500 MG/1
500 TABLET, FILM COATED ORAL DAILY
Qty: 7 TABLET | Refills: 0 | Status: SHIPPED | OUTPATIENT
Start: 2022-02-10 | End: 2022-02-17

## 2022-02-10 RX ORDER — CIPROFLOXACIN HYDROCHLORIDE 3.5 MG/ML
1 SOLUTION/ DROPS TOPICAL
Qty: 1 EACH | Refills: 0 | Status: SHIPPED | OUTPATIENT
Start: 2022-02-10 | End: 2022-02-20

## 2022-02-10 RX ORDER — ALBUTEROL SULFATE 90 UG/1
2 AEROSOL, METERED RESPIRATORY (INHALATION) 2 TIMES DAILY
Qty: 18 G | Refills: 3 | Status: SHIPPED | OUTPATIENT
Start: 2022-02-10

## 2022-02-10 RX ORDER — UREA 40 %
CREAM (GRAM) TOPICAL
Qty: 85 G | Refills: 0 | Status: SHIPPED | OUTPATIENT
Start: 2022-02-10 | End: 2022-09-02

## 2022-02-10 RX ADMIN — ASPIRIN 81 MG: 81 TABLET, COATED ORAL at 10:11

## 2022-02-10 RX ADMIN — ALBUTEROL SULFATE 2 PUFF: 90 AEROSOL, METERED RESPIRATORY (INHALATION) at 15:56

## 2022-02-10 RX ADMIN — CARVEDILOL 12.5 MG: 6.25 TABLET, FILM COATED ORAL at 10:11

## 2022-02-10 RX ADMIN — CIPROFLOXACIN 1 DROP: 3 SOLUTION OPHTHALMIC at 15:42

## 2022-02-10 RX ADMIN — FERROUS SULFATE TAB 325 MG (65 MG ELEMENTAL FE) 325 MG: 325 (65 FE) TAB at 10:11

## 2022-02-10 RX ADMIN — TICAGRELOR 90 MG: 90 TABLET ORAL at 10:11

## 2022-02-10 RX ADMIN — ENOXAPARIN SODIUM 40 MG: 100 INJECTION SUBCUTANEOUS at 10:12

## 2022-02-10 RX ADMIN — ALBUTEROL SULFATE 2 PUFF: 90 AEROSOL, METERED RESPIRATORY (INHALATION) at 07:50

## 2022-02-10 RX ADMIN — CIPROFLOXACIN 1 DROP: 3 SOLUTION OPHTHALMIC at 05:29

## 2022-02-10 RX ADMIN — SODIUM CHLORIDE, PRESERVATIVE FREE 10 ML: 5 INJECTION INTRAVENOUS at 10:22

## 2022-02-10 RX ADMIN — LEVOFLOXACIN 500 MG: 5 INJECTION, SOLUTION INTRAVENOUS at 10:21

## 2022-02-10 RX ADMIN — PANTOPRAZOLE SODIUM 40 MG: 40 TABLET, DELAYED RELEASE ORAL at 05:29

## 2022-02-10 RX ADMIN — CIPROFLOXACIN 1 DROP: 3 SOLUTION OPHTHALMIC at 10:22

## 2022-02-10 RX ADMIN — Medication 1 TABLET: at 10:11

## 2022-02-10 RX ADMIN — AMLODIPINE BESYLATE 5 MG: 5 TABLET ORAL at 10:11

## 2022-02-10 RX ADMIN — ATORVASTATIN CALCIUM 10 MG: 10 TABLET, FILM COATED ORAL at 10:11

## 2022-02-10 ASSESSMENT — ENCOUNTER SYMPTOMS
ABDOMINAL DISTENTION: 0
NAUSEA: 0
COUGH: 1
CONSTIPATION: 0
SHORTNESS OF BREATH: 0
COUGH: 0
APNEA: 0
DIARRHEA: 0
ABDOMINAL PAIN: 0
EYE DISCHARGE: 0
SHORTNESS OF BREATH: 1
WHEEZING: 0

## 2022-02-10 ASSESSMENT — PAIN SCALES - GENERAL: PAINLEVEL_OUTOF10: 0

## 2022-02-10 NOTE — PROGRESS NOTES
Progress Note  Date:2/9/2022      OZLR:[id number]  Patient Name:Tuan Hassan     YOB: 1950     Age:71 y.o. Subjective    Subjective:  Symptoms:  Stable. He reports shortness of breath and cough. No weakness or diarrhea. (Right toenail  injury). Diet:  Adequate intake. No nausea. Activity level: Impaired due to weakness. Pain:  He reports no pain.        Current Facility-Administered Medications   Medication Dose Route Frequency Provider Last Rate Last Admin    insulin glargine (LANTUS) injection vial 20 Units  20 Units SubCUTAneous Daily Noemi Bourgeois MD   20 Units at 02/09/22 2111    benzonatate (TESSALON) capsule 100 mg  100 mg Oral TID PRN LUCIUS Riley   100 mg at 02/08/22 2146    ciprofloxacin (CILOXAN) 0.3 % ophthalmic solution 1 drop  1 drop Both Eyes Q4H While awake Noemi Bourgeois MD   1 drop at 02/09/22 2102    levoFLOXacin (LEVAQUIN) 500 MG/100ML infusion 500 mg  500 mg IntraVENous Q24H Noemi Bourgeois MD   Stopped at 02/09/22 1243    Urea (CARMOL) 40 % cream   Topical PRN Noemi Bourgeois MD        albuterol sulfate  (90 Base) MCG/ACT inhaler 2 puff  2 puff Inhalation BID Noemi Bourgeois MD   2 puff at 02/09/22 1700    aspirin EC tablet 81 mg  81 mg Oral Daily Daron Christian MD   81 mg at 02/09/22 1052    nitroGLYCERIN (NITROSTAT) SL tablet 0.4 mg  0.4 mg SubLINGual Q5 Min PRN Daron Christian MD        pantoprazole (PROTONIX) tablet 40 mg  40 mg Oral QAM AC Daron Christian MD   40 mg at 02/09/22 0647    [Held by provider] furosemide (LASIX) tablet 40 mg  40 mg Oral Daily Daron Christian MD   40 mg at 02/08/22 0001    [Held by provider] furosemide (LASIX) tablet 80 mg  80 mg Oral Daily Daron Christian MD        atorvastatin (LIPITOR) tablet 10 mg  10 mg Oral Daily Daron Christian MD   10 mg at 02/09/22 1052    ferrous sulfate (IRON 325) tablet 325 mg  325 mg Oral BID Daron Christian MD   325 mg at 02/09/22 2101    multivitamin 1 tablet  1 tablet Oral Daily Daron Christian MD   1 tablet at 02/09/22 1052    amLODIPine (NORVASC) tablet 5 mg  5 mg Oral Daily Mary Dahl MD   5 mg at 02/09/22 1052    carvedilol (COREG) tablet 12.5 mg  12.5 mg Oral BID Mary Dahl MD   12.5 mg at 02/09/22 2101    tiZANidine (ZANAFLEX) tablet 4 mg  4 mg Oral Q6H PRN Quintin Gabriel MD        ticagrelor (BRILINTA) tablet 90 mg  90 mg Oral BID Daron Christian MD   90 mg at 02/09/22 2101    insulin lispro (HUMALOG) injection vial 0-12 Units  0-12 Units SubCUTAneous TID WC Daron Christian MD   4 Units at 02/09/22 1735    insulin lispro (HUMALOG) injection vial 0-6 Units  0-6 Units SubCUTAneous Nightly Daron Christian MD   3 Units at 02/09/22 2111    sodium chloride flush 0.9 % injection 5-40 mL  5-40 mL IntraVENous 2 times per day Daorn Christian MD   10 mL at 02/09/22 2141    sodium chloride flush 0.9 % injection 5-40 mL  5-40 mL IntraVENous PRN Daron Christian MD        0.9 % sodium chloride infusion  25 mL IntraVENous PRN Daron Christian MD        enoxaparin (LOVENOX) injection 40 mg  40 mg SubCUTAneous BID Daron Christian MD   40 mg at 02/09/22 2101    ondansetron (ZOFRAN-ODT) disintegrating tablet 4 mg  4 mg Oral Q8H PRN Daron Christian MD        Or    ondansetron (ZOFRAN) injection 4 mg  4 mg IntraVENous Q6H PRN Daron Christian MD        acetaminophen (TYLENOL) tablet 650 mg  650 mg Oral Q6H PRN Daron Christian MD   650 mg at 02/08/22 2045    Or    acetaminophen (TYLENOL) suppository 650 mg  650 mg Rectal Q6H PRN Daron Christian MD        0.9 % sodium chloride infusion   IntraVENous Continuous Ana Rosa Valentin MD 75 mL/hr at 02/10/22 0332 Rate Verify at 02/10/22 0332    glucose (GLUTOSE) 40 % oral gel 15 g  15 g Oral PRN Daron Christian MD        glucagon (rDNA) injection 1 mg  1 mg IntraMUSCular PRN Daron Christian MD        dextrose 5 % solution  100 mL/hr IntraVENous PRN Daron Christian MD        dextrose bolus (hypoglycemia) 10% 125 mL  125 mL IntraVENous PRN Daron Christian MD        Or    dextrose bolus (hypoglycemia) 10% 250 mL  250 mL IntraVENous PRN Quintin Gabriel MD Review of Systems   Constitutional: Negative for activity change, fatigue, fever and unexpected weight change. HENT: Negative for congestion. Eyes:        Eye discharge resolving   Respiratory: Positive for cough and shortness of breath. Cough and dyspnea   Now better   Gastrointestinal: Negative for abdominal distention, constipation, diarrhea and nausea. Has had no loose stools   Genitourinary: Negative for difficulty urinating. Musculoskeletal:         Right toenail with bleeding as tore toenail   Skin: Negative for rash and wound. Neurological: Negative for weakness. Objective         Vitals Last 24 Hours:  TEMPERATURE:  Temp  Av.4 °F (36.9 °C)  Min: 98 °F (36.7 °C)  Max: 98.8 °F (37.1 °C)  RESPIRATIONS RANGE: Resp  Av.8  Min: 18  Max: 20  PULSE OXIMETRY RANGE: SpO2  Av.2 %  Min: 93 %  Max: 96 %  PULSE RANGE: Pulse  Av.8  Min: 71  Max: 75  BLOOD PRESSURE RANGE: Systolic (34YWT), HTJ:312 , Min:129 , PHK:090   ; Diastolic (02NTR), PR, Min:64, Max:72    I/O (24Hr): Intake/Output Summary (Last 24 hours) at 2/10/2022 0457  Last data filed at 2/10/2022 0332  Gross per 24 hour   Intake 2487.83 ml   Output 4 ml   Net 2483.83 ml     Objective:  General Appearance:  Comfortable. Vital signs: (most recent): Blood pressure 137/65, pulse 71, temperature 98 °F (36.7 °C), temperature source Oral, resp. rate 20, height 6' 2\" (1.88 m), weight (!) 373 lb (169.2 kg), SpO2 96 %. Vital signs are normal.  No fever. Output: Producing urine. Stool output assessment:  no stool. HEENT: Normal HEENT exam.  (Eyes with no matting)    Lungs:  Normal effort and normal respiratory rate. Breath sounds clear to auscultation. There are decreased breath sounds. Heart: Normal rate. Regular rhythm. S1 normal and S2 normal.  Positive for murmur (1 to 2/merced). Abdomen: Abdomen is soft and non-distended. There is no abdominal tenderness. There is no mass. Extremities: Normal range of motion. ( Right  Foot dressing as toenail removed as pulled off the nail accidentally and removed by podiatry)  Neurological: Patient is alert and oriented to person, place and time. Patient has normal reflexes and normal muscle tone. Skin:  Warm and dry. Labs/Imaging/Diagnostics    Labs:  CBC:  Recent Labs     02/08/22  0415 02/09/22  0430 02/10/22  0411   WBC 8.9 6.4 6.9   RBC 2.70* 2.60* 2.52*   HGB 8.5* 8.2* 8.0*   HCT 25.7* 24.8* 23.8*   MCV 95.2* 95.4* 94.4*    173 178     CHEMISTRIES:  Recent Labs     02/08/22  0415 02/09/22  0430 02/10/22  0411    136 135   K 4.6 4.4 4.5    105 104   CO2 22* 22* 22*   BUN 52* 39* 28*   CREATININE 1.6* 1.5* 1.3*   GLUCOSE 167* 221* 195*     PT/INR:No results for input(s): PROTIME, INR in the last 72 hours. APTT:No results for input(s): APTT in the last 72 hours. LIVER PROFILE:  Recent Labs     02/07/22  1130 02/08/22 0415   AST 27 18   ALT 17 14   BILITOT 0.4 0.3   ALKPHOS 82 70       Imaging Last 24 Hours:  No results found. Assessment//Plan           Hospital Problems           Last Modified POA    RAUL (acute kidney injury) ([facility] [address] 2/7/2022 Yes    Acute kidney injury ([facility] [address] 2/9/2022 Yes        Assessment:    Condition: In stable condition. Improving. (Gastroenteritis  Noted and better and no stools      acute kidney injury better and decrease fluids      copd with exacerbation  Better  As less wheeze     sinusitis better as throat less sore and no eye matting      niddm  Resume  back ht insulin      right toe slight bleeding  And better      htn stable     ashd stable ). Plan:   Out of bed and up to chair. Continue respiratory treatments. Consults: podiatry on toe   Advance diet as tolerated. Administer medications as ordered. ( Better      advance diet      up and get  Moving      decrease  Iv rate      foolow kidney function as all better      needs another  24 hours and home in am ). Electronically signed by Claribel Dc MD on 2/10/22 at 4:57 AM EST

## 2022-02-10 NOTE — CARE COORDINATION
2/10/22, 11:38 AM EST    Patient goals/plan/ treatment preferences discussed by  and . Patient goals/plan/ treatment preferences reviewed with patient/ family. Patient/ family verbalize understanding of discharge plan and are in agreement with goal/plan/treatment preferences. Understanding was demonstrated using the teach back method. AVS provided by RN at time of discharge, which includes all necessary medical information pertaining to the patients current course of illness, treatment, post-discharge goals of care, and treatment preferences. Discharging home independently, denied needs. New  orders for cardiopulmonary assessments, med teaching, wound care/teaching. Spoke with [facility], he has used [facility] in past but does not have a preference as long as in network with insurance. He reports he is willing to learn wound care as this likely will not be [facility] unable to accept. Spoke with P Arlene/Marcie, they are able to accept new patients and are  in network with insurance. Referral given, they can obtain information from [facility]. CM provided number in case issues arise. 1445 Call placed to confirm patient has been accepting. They will message intake and have her call CM.   1505 Received call from Taty Walton at [facility], they are accepting and have all needed information. Primary RN updated.         IMM Letter  IMM Letter given to Patient/Family/Significant other/Guardian/POA/by[de-identified] staff  IMM Letter date given[de-identified] 02/07/22  IMM Letter time given[de-identified] 2026  Observation Status Letter date given[de-identified] 02/09/22  Observation Status Letter time given[de-identified] 6090  Observation Status Letter given to Patient/Family/Significant other/Guardian/POA/by[de-identified] Michael Lino CM

## 2022-02-10 NOTE — RT PROTOCOL NOTE
RT Inhaler-Nebulizer Bronchodilator Protocol Note    There is a bronchodilator order in the chart from a provider indicating to follow the RT Bronchodilator Protocol and there is an Initiate RT Inhaler-Nebulizer Bronchodilator Protocol order as well (see protocol at bottom of note). CXR Findings:  No results found. The findings from the last RT Protocol Assessment were as follows:   History Pulmonary Disease: None or smoker <15 pack years  Respiratory Pattern: Dyspnea on exertion or RR 21-25 bpm  Breath Sounds: Slightly diminished and/or crackles  Cough: Strong, productive  Indication for Bronchodilator Therapy: Decreased or absent breath sounds  Bronchodilator Assessment Score: 5    Aerosolized bronchodilator medication orders have been revised according to the RT Inhaler-Nebulizer Bronchodilator Protocol below. Respiratory Therapist to perform RT Therapy Protocol Assessment initially then follow the protocol. Repeat RT Therapy Protocol Assessment PRN for score 0-3 or on second treatment, BID, and PRN for scores above 3. No Indications - adjust the frequency to every 6 hours PRN wheezing or bronchospasm, if no treatments needed after 48 hours then discontinue using Per Protocol order mode. If indication present, adjust the RT bronchodilator orders based on the Bronchodilator Assessment Score as indicated below. Use Inhaler orders unless patient has one or more of the following: on home nebulizer, not able to hold breath for 10 seconds, is not alert and oriented, cannot activate and use MDI correctly, or respiratory rate 25 breaths per minute or more, then use the equivalent nebulizer order(s) with same Frequency and PRN reasons based on the score. If a patient is on this medication at home then do not decrease Frequency below that used at home.     0-3 - enter or revise RT bronchodilator order(s) to equivalent RT Bronchodilator order with Frequency of every 4 hours PRN for wheezing or increased work of breathing using Per Protocol order mode. 4-6 - enter or revise RT Bronchodilator order(s) to two equivalent RT bronchodilator orders with one order with BID Frequency and one order with Frequency of every 4 hours PRN wheezing or increased work of breathing using Per Protocol order mode. 7-10 - enter or revise RT Bronchodilator order(s) to two equivalent RT bronchodilator orders with one order with TID Frequency and one order with Frequency of every 4 hours PRN wheezing or increased work of breathing using Per Protocol order mode. 11-13 - enter or revise RT Bronchodilator order(s) to one equivalent RT bronchodilator order with QID Frequency and an Albuterol order with Frequency of every 4 hours PRN wheezing or increased work of breathing using Per Protocol order mode. Greater than 13 - enter or revise RT Bronchodilator order(s) to one equivalent RT bronchodilator order with every 4 hours Frequency and an Albuterol order with Frequency of every 2 hours PRN wheezing or increased work of breathing using Per Protocol order mode. RT to enter RT Home Evaluation for COPD & MDI Assessment order using Per Protocol order mode.     Electronically signed by Karley Herr RCP on 2/10/2022 at 7:57 AM

## 2022-02-11 NOTE — PROGRESS NOTES
Progress Note  Date:2/10/2022       Room:[address] [place] [address]  Patient Name:Tuan Hassan     YOB: 1950     Age:71 y.o. Subjective    Subjective:  Symptoms:  Stable. No shortness of breath, cough or chest pain. Diet:  Adequate intake. Activity level: Returning to normal.    Pain:  He reports no pain. ( Right  Foot  improved).       Current Facility-Administered Medications   Medication Dose Route Frequency Provider Last Rate Last Admin    insulin glargine (LANTUS) injection vial 20 Units  20 Units SubCUTAneous Daily Bridgette Robb MD   20 Units at 02/09/22 2111    benzonatate (TESSALON) capsule 100 mg  100 mg Oral TID PRN LUCIUS Cowart   100 mg at 02/08/22 2146    ciprofloxacin (CILOXAN) 0.3 % ophthalmic solution 1 drop  1 drop Both Eyes Q4H While awake Bridgette Robb MD   1 drop at 02/10/22 1542    levoFLOXacin (LEVAQUIN) 500 MG/100ML infusion 500 mg  500 mg IntraVENous Q24H Bridgette Robb MD   Stopped at 02/10/22 1157    Urea (CARMOL) 40 % cream   Topical PRN Bridgette Robb MD        albuterol sulfate  (90 Base) MCG/ACT inhaler 2 puff  2 puff Inhalation BID Bridgette Robb MD   2 puff at 02/10/22 1556    aspirin EC tablet 81 mg  81 mg Oral Daily Daron Christian MD   81 mg at 02/10/22 1011    nitroGLYCERIN (NITROSTAT) SL tablet 0.4 mg  0.4 mg SubLINGual Q5 Min PRN Daron Christian MD        pantoprazole (PROTONIX) tablet 40 mg  40 mg Oral QAM AC Daron Christian MD   40 mg at 02/10/22 0529    furosemide (LASIX) tablet 40 mg  40 mg Oral Daily Daron Christian MD   40 mg at 02/08/22 0001    [Held by provider] furosemide (LASIX) tablet 80 mg  80 mg Oral Daily Daron Christian MD        atorvastatin (LIPITOR) tablet 10 mg  10 mg Oral Daily Daron Christian MD   10 mg at 02/10/22 1011    ferrous sulfate (IRON 325) tablet 325 mg  325 mg Oral BID Daron Christian MD   325 mg at 02/10/22 1011    multivitamin 1 tablet  1 tablet Oral Daily Daron Christian MD   1 tablet at 02/10/22 1011    amLODIPine (NORVASC) tablet 5 mg  5 mg Oral Daily Darrick Funk MD   5 mg at 02/10/22 1011    carvedilol (COREG) tablet 12.5 mg  12.5 mg Oral BID Darrick Funk MD   12.5 mg at 02/10/22 1011    tiZANidine (ZANAFLEX) tablet 4 mg  4 mg Oral Q6H PRN Iwona Corona MD        ticagrelor (BRILINTA) tablet 90 mg  90 mg Oral BID Daron Christian MD   90 mg at 02/10/22 1011    insulin lispro (HUMALOG) injection vial 0-12 Units  0-12 Units SubCUTAneous TID WC Daron Christian MD   6 Units at 02/10/22 1239    insulin lispro (HUMALOG) injection vial 0-6 Units  0-6 Units SubCUTAneous Nightly Daron Christian MD   3 Units at 02/09/22 2111    sodium chloride flush 0.9 % injection 5-40 mL  5-40 mL IntraVENous 2 times per day Daron Christian MD   10 mL at 02/10/22 1022    sodium chloride flush 0.9 % injection 5-40 mL  5-40 mL IntraVENous PRN Daron Christian MD        0.9 % sodium chloride infusion  25 mL IntraVENous PRN Daron Christian MD        enoxaparin (LOVENOX) injection 40 mg  40 mg SubCUTAneous BID Daron Christian MD   40 mg at 02/10/22 1012    ondansetron (ZOFRAN-ODT) disintegrating tablet 4 mg  4 mg Oral Q8H PRN Daron Christian MD        Or    ondansetron (ZOFRAN) injection 4 mg  4 mg IntraVENous Q6H PRN Daron Christian MD        acetaminophen (TYLENOL) tablet 650 mg  650 mg Oral Q6H PRN Daron Christian MD   650 mg at 02/08/22 2045    Or    acetaminophen (TYLENOL) suppository 650 mg  650 mg Rectal Q6H PRN Daron Christian MD        0.9 % sodium chloride infusion   IntraVENous Continuous Laruth Opitz, MD 75 mL/hr at 02/10/22 0332 Rate Verify at 02/10/22 0332    glucose (GLUTOSE) 40 % oral gel 15 g  15 g Oral PRN Daron Christian MD        glucagon (rDNA) injection 1 mg  1 mg IntraMUSCular PRN Daron Christian MD        dextrose 5 % solution  100 mL/hr IntraVENous PRN Daron Christian MD        dextrose bolus (hypoglycemia) 10% 125 mL  125 mL IntraVENous PRN Daron Christian MD        Or    dextrose bolus (hypoglycemia) 10% 250 mL  250 mL IntraVENous PRN Daron Christian MD          Review of Systems   Constitutional: Negative for activity change, appetite change and fatigue. HENT: Negative for congestion. Congestion and throat  Better    Eyes: Negative for discharge. Matting has  stopped   Respiratory: Negative for apnea, cough, shortness of breath and wheezing. Cough less   Cardiovascular: Negative for chest pain, palpitations and leg swelling. Gastrointestinal: Negative for abdominal distention and abdominal pain. Genitourinary: Negative for difficulty urinating and dysuria. Musculoskeletal:          Right  Foot  No further bleeding    Neurological: Negative for dizziness, seizures, light-headedness and headaches. Objective         Vitals Last 24 Hours:  TEMPERATURE:  Temp  Av.1 °F (36.7 °C)  Min: 97.9 °F (36.6 °C)  Max: 98.3 °F (36.8 °C)  RESPIRATIONS RANGE: Resp  Av  Min: 20  Max: 20  PULSE OXIMETRY RANGE: SpO2  Av.5 %  Min: 94 %  Max: 97 %  PULSE RANGE: Pulse  Av.4  Min: 54  Max: 75  BLOOD PRESSURE RANGE: Systolic (53RVS), OQJ:064 , Min:137 , KVV:590   ; Diastolic (81IAI), GIANNI:89, Min:65, Max:77    I/O (24Hr): Intake/Output Summary (Last 24 hours) at 2/10/2022 1901  Last data filed at 2/10/2022 1344  Gross per 24 hour   Intake 3137.83 ml   Output 600 ml   Net 2537.83 ml     Objective:  General Appearance:  Comfortable. Vital signs: (most recent): Blood pressure (!) 173/77, pulse 73, temperature 97.9 °F (36.6 °C), temperature source Oral, resp. rate 20, height 6' 2\" (1.88 m), weight (!) 372 lb 12.8 oz (169.1 kg), SpO2 95 %. Vital signs are normal.    Output: Producing urine. HEENT: Normal HEENT exam.  (Less  congestion and eye  Matting  Resolved )    Lungs:  Normal effort and normal respiratory rate. Breath sounds clear to auscultation. No decreased breath sounds or rhonchi. Heart: Normal rate. Regular rhythm. S1 normal and S2 normal.  Positive for murmur (1/6 merced). Abdomen: Abdomen is soft and non-distended.   Bowel sounds are normal.   There is no abdominal tenderness. Extremities: Normal range of motion. (  Right toenail  Removed and no  Further  bleeding)  Neurological: Patient is alert and oriented to person, place and time. Patient has normal reflexes. Skin:  Warm and dry. Labs/Imaging/Diagnostics    Labs:  CBC:  Recent Labs     02/08/22  0415 02/09/22  0430 02/10/22  0411   WBC 8.9 6.4 6.9   RBC 2.70* 2.60* 2.52*   HGB 8.5* 8.2* 8.0*   HCT 25.7* 24.8* 23.8*   MCV 95.2* 95.4* 94.4*    173 178     CHEMISTRIES:  Recent Labs     02/08/22  0415 02/09/22  0430 02/10/22  0411    136 135   K 4.6 4.4 4.5    105 104   CO2 22* 22* 22*   BUN 52* 39* 28*   CREATININE 1.6* 1.5* 1.3*   GLUCOSE 167* 221* 195*     PT/INR:No results for input(s): PROTIME, INR in the last 72 hours. APTT:No results for input(s): APTT in the last 72 hours. LIVER PROFILE:  Recent Labs     02/08/22 0415   AST 18   ALT 14   BILITOT 0.3   ALKPHOS 70       Imaging Last 24 Hours:  No results found. Assessment//Plan           Hospital Problems           Last Modified POA    RAUL (acute kidney injury) ([facility] [address] 2/7/2022 Yes    Acute kidney injury ([facility] [address] 2/9/2022 Yes        Assessment:    Condition: In stable condition. Improving. (Acute  Kidney injury  Now  Resolved and better and eating  Well  And  Bun  and  Creat  Better        Gastroenteritis  Never  once admitted  With stools       Copd  With  exacerbation and now  Better  As lungs  Clear  And  colugh  Resolved       niddm  With long  Term insulin  Stable d     ashd  Stable     htn  Stable      Chronic  Back pain and  With   Walker       peripheral  Neuropathy and no  Foot  Pain and  Right  Toe  Nail  Removed ). Plan:   Discharge home. Consults: podiatry and signed  off. Regular diet. Administer medications as ordered. (     stable       kidney  Function  Better     home  Today   As pulmonary and  Kidney  Better      Syeda Rue will require the following home care treatments or therapies: nursing  And pt. Home care will be necessary because of physical condition and at  Home with weakness and foot  wound. The patient is in agreement to receiving home care. patient  meds  Reviewed and chart reviewed and  With discharge  Summary spent  35 minutes on disposition of the patient ).        Electronically signed by Claribel Dc MD on 2/10/22 at 7:01 PM EST

## 2022-02-14 ENCOUNTER — CARE COORDINATION (OUTPATIENT)
Dept: FAMILY MEDICINE CLINIC | Age: 72
End: 2022-02-14

## 2022-02-14 NOTE — CARE COORDINATION
[facility] Transitions Initial Follow Up Call    Outreach made within 2 business days of discharge: Yes    Patient: Mary Brito Patient : 1950   MRN: [id number]  Reason for Admission: Dehydration/COPD Exacebation    Discharge Date: 2/10/22       Spoke with: [facility]    Discharge department/facility:[facility] [address] Interactive Patient Contact:  Was patient able to fill all prescriptions: Yes  Was patient instructed to bring all medications to the follow-up visit: Yes  Is patient taking all medications as directed in the discharge summary? Yes  Does patient understand their discharge instructions: Yes  Does patient have questions or concerns that need addressed prior to 7-14 day follow up office visit: no    Scheduled appointment with PCP within 7-14 days    Follow Up  Future Appointments   Date Time Provider Anel Long   2/15/2022  1:40 PM [id number] DO ANGELICA Savage   2022 11:10 AM MD TRISH [facility]   3/2/2022  1:45 PM MD ANGELICA Sanchez [facility] [email or web address]   3/23/2022 11:30 AM MD TRISH Gaines [address]   He is feeling much better than when he was in the hospital. [facility] was able to reconcile his medications and has a follow up appointment scheduled. Home health has been to see him and he has no additional home needs.     Mariela Sanders RN

## 2022-02-21 ENCOUNTER — CARE COORDINATION (OUTPATIENT)
Dept: FAMILY MEDICINE CLINIC | Age: 72
End: 2022-02-21

## 2022-02-21 ENCOUNTER — OFFICE VISIT (OUTPATIENT)
Dept: FAMILY MEDICINE CLINIC | Age: 72
End: 2022-02-21

## 2022-02-21 VITALS
HEIGHT: 74 IN | DIASTOLIC BLOOD PRESSURE: 86 MMHG | SYSTOLIC BLOOD PRESSURE: 134 MMHG | HEART RATE: 64 BPM | RESPIRATION RATE: 12 BRPM | WEIGHT: 315 LBS | TEMPERATURE: 96.7 F | BODY MASS INDEX: 40.43 KG/M2

## 2022-02-21 DIAGNOSIS — N18.31 ACUTE RENAL FAILURE SUPERIMPOSED ON STAGE 3A CHRONIC KIDNEY DISEASE, UNSPECIFIED ACUTE RENAL FAILURE TYPE (HCC): ICD-10-CM

## 2022-02-21 DIAGNOSIS — I50.32 CHRONIC DIASTOLIC (CONGESTIVE) HEART FAILURE (HCC): ICD-10-CM

## 2022-02-21 DIAGNOSIS — J44.1 COPD EXACERBATION (HCC): Primary | ICD-10-CM

## 2022-02-21 DIAGNOSIS — N17.9 ACUTE RENAL FAILURE SUPERIMPOSED ON STAGE 3A CHRONIC KIDNEY DISEASE, UNSPECIFIED ACUTE RENAL FAILURE TYPE (HCC): ICD-10-CM

## 2022-02-21 DIAGNOSIS — Z79.4 TYPE 2 DIABETES MELLITUS TREATED WITH INSULIN (HCC): ICD-10-CM

## 2022-02-21 DIAGNOSIS — E11.9 TYPE 2 DIABETES MELLITUS TREATED WITH INSULIN (HCC): ICD-10-CM

## 2022-02-21 DIAGNOSIS — I10 ESSENTIAL HYPERTENSION: ICD-10-CM

## 2022-02-21 PROCEDURE — 1111F DSCHRG MED/CURRENT MED MERGE: CPT | Performed by: FAMILY MEDICINE

## 2022-02-21 PROCEDURE — 99214 OFFICE O/P EST MOD 30 MIN: CPT | Performed by: FAMILY MEDICINE

## 2022-02-21 ASSESSMENT — ENCOUNTER SYMPTOMS
EYE PAIN: 0
CONSTIPATION: 0
NAUSEA: 0
SHORTNESS OF BREATH: 0
SORE THROAT: 0
BACK PAIN: 0
CHEST TIGHTNESS: 0
TROUBLE SWALLOWING: 0
ABDOMINAL PAIN: 0
BLOOD IN STOOL: 0
COUGH: 0

## 2022-02-21 ASSESSMENT — PATIENT HEALTH QUESTIONNAIRE - PHQ9
1. LITTLE INTEREST OR PLEASURE IN DOING THINGS: 0
3. TROUBLE FALLING OR STAYING ASLEEP: 1
10. IF YOU CHECKED OFF ANY PROBLEMS, HOW DIFFICULT HAVE THESE PROBLEMS MADE IT FOR YOU TO DO YOUR WORK, TAKE CARE OF THINGS AT HOME, OR GET ALONG WITH OTHER PEOPLE: 0
4. FEELING TIRED OR HAVING LITTLE ENERGY: 1
7. TROUBLE CONCENTRATING ON THINGS, SUCH AS READING THE NEWSPAPER OR WATCHING TELEVISION: 0
6. FEELING BAD ABOUT YOURSELF - OR THAT YOU ARE A FAILURE OR HAVE LET YOURSELF OR YOUR FAMILY DOWN: 0
2. FEELING DOWN, DEPRESSED OR HOPELESS: 0
SUM OF ALL RESPONSES TO PHQ QUESTIONS 1-9: 2
5. POOR APPETITE OR OVEREATING: 0
SUM OF ALL RESPONSES TO PHQ9 QUESTIONS 1 & 2: 0
9. THOUGHTS THAT YOU WOULD BE BETTER OFF DEAD, OR OF HURTING YOURSELF: 0
8. MOVING OR SPEAKING SO SLOWLY THAT OTHER PEOPLE COULD HAVE NOTICED. OR THE OPPOSITE, BEING SO FIGETY OR RESTLESS THAT YOU HAVE BEEN MOVING AROUND A LOT MORE THAN USUAL: 0
SUM OF ALL RESPONSES TO PHQ QUESTIONS 1-9: 2

## 2022-02-21 NOTE — PROGRESS NOTES
Post-Discharge Transitional Care Follow Up      Gauri De Oliveira   YOB: 1950    Date of Office Visit:  2/21/2022  Date of Hospital Admission: 2/7/22  Date of Hospital Discharge: 2/10/22  Readmission Risk Score (high >=14%. Medium >=10%):Readmission Risk Score: 17.5 ( )      Care management risk score Rising risk (score 2-5) and Complex Care (Scores >=6): 3     Non face to face  following discharge, date last encounter closed (first attempt may have been earlier): 2/17/2022  9:34 AM     Call initiated 2 business days of discharge: Yes         Subjective:   HPI    Inpatient course: Discharge summary reviewed- see chart.     Interval history/Current status:  Copd  And  Exacerbation and d  With   Acute  Kidney  Injury a dn  At  Diarrhea    Noted and  Better     Lipid  Stable       htn  Stable       niddm   stable       Had  Sinus  With  matting   Of  Eyes and  sinus     Patient Active Problem List   Diagnosis    HTN (hypertension)    Type 2 diabetes mellitus, with long-term current use of insulin (HCC)    Hypercholesteremia    Osteoarthritis    Diabetic peripheral neuropathy (HCC)    Coronary artery disease of bypass graft of native heart with stable angina pectoris ([address]    S/P percutaneous transluminal coronary angioplasty    Sprain/Injury of anterior ligaments of thoracic spine    Venous stasis dermatitis of both lower extremities    Lumbar spondylosis    Orthostatic hypotension after exercise    Lumbar foraminal stenosis    Lumbar radiculitis    Sacroiliac inflammation (HCC)    Chronic diastolic (congestive) heart failure (HCC)    Recurrent major depressive disorder, in partial remission (HCC)    Class 3 severe obesity with serious comorbidity and body mass index (BMI) of 45.0 to 49.9 in adult ([address]    RAUL (acute kidney injury) ([address]    Acute kidney injury ([address]    Degeneration of lumbosacral intervertebral disc    Spinal stenosis of lumbar region       Medications listed as ordered at the time of discharge from hospital      Medications marked \"taking\" at this time  Outpatient Medications Marked as Taking for the 2/21/22 encounter (Office Visit) with Jeronimo Bhandari MD   Medication Sig Dispense Refill    albuterol sulfate  (90 Base) MCG/ACT inhaler Inhale 2 puffs into the lungs 2 times daily 18 g 3    Urea (CARMOL) 40 % cream Use   Bid  To  The  legs 85 g 0    carvedilol (COREG) 12.5 MG tablet Take 1 tablet by mouth 2 times daily 180 tablet 2    tiZANidine (ZANAFLEX) 4 MG tablet Take 1 tablet by mouth every 6 hours as needed (Muscle Spasms) 30 tablet 1    insulin aspart (NOVOLOG FLEXPEN) 100 UNIT/ML injection pen Inject 18 Units into the skin 3 times daily (before meals) INJECT 12 UNITS SUBCUTANEOUSLY 3 TIMES DAILY BEFORE MEALS 10 pen 3    amLODIPine (NORVASC) 5 MG tablet Take 1 tablet by mouth daily 90 tablet 3    ticagrelor (BRILINTA) 90 MG TABS tablet Take 1 tablet by mouth twice daily 180 tablet 1    metFORMIN (GLUCOPHAGE) 500 MG tablet TAKE TWO TABLETS BY MOUTH TWICE DAILY WITH MEALS 360 tablet 1    ferrous sulfate (IRON 325) 325 (65 Fe) MG tablet Take 1 tablet by mouth 2 times daily 60 tablet 5    Multiple Vitamins-Minerals (THERAPEUTIC MULTIVITAMIN-MINERALS) tablet Take 1 tablet by mouth daily 30 tablet 11    enalapril (VASOTEC) 10 MG tablet One a day 90 tablet 1    atorvastatin (LIPITOR) 10 MG tablet TAKE 1 TABLET BY MOUTH ONCE DAILY 90 tablet 1    insulin glargine (LANTUS SOLOSTAR) 100 UNIT/ML injection pen INJECT 25 UNITS SUBCUTANEOUSLY ONCE DAILY 15 pen 1    furosemide (LASIX) 40 MG tablet Take 1 tablet by mouth daily 60 tablet 1    furosemide (LASIX) 80 MG tablet Take 1 tablet by mouth daily Patient takes both 80mg and 40 mg 60 tablet 3    ondansetron (ZOFRAN) 4 MG tablet Take 1 tablet by mouth every 8 hours as needed for Nausea or Vomiting 30 tablet 0    pantoprazole (PROTONIX) 40 MG tablet Take 1 tablet by mouth every morning (before breakfast) 30 tablet 0    Blood Glucose Monitoring Suppl (ACCU-CHEK WAQAR SMARTVIEW) w/Device KIT Pt is to use to test blood sugar three times a day DX : E11.9 1 kit 0    ACCU-CHEK SMARTVIEW strip Use to test Blood Sugar 3x daily, Dx: E11.9 100 each 11    nitroGLYCERIN (NITROSTAT) 0.4 MG SL tablet Place 1 tablet under the tongue every 5 minutes as needed for Chest pain 25 tablet 3    aspirin 81 MG tablet Take 1 tablet by mouth daily      acetaminophen (TYLENOL) 500 MG tablet Take 500 mg by mouth as needed for Pain          Medications patient taking as of now reconciled against medications ordered at time of hospital discharge: Yes    Review of Systems   Constitutional: Negative for fatigue and fever. HENT: Negative for congestion, ear pain, postnasal drip, sore throat and trouble swallowing. Eyes: Negative for pain. Respiratory: Negative for cough, chest tightness and shortness of breath. Cardiovascular: Negative for chest pain, palpitations and leg swelling. Gastrointestinal: Negative for abdominal pain, blood in stool, constipation and nausea. Genitourinary: Negative for difficulty urinating, frequency and urgency. Musculoskeletal: Negative for arthralgias, back pain, joint swelling and neck stiffness. Skin: Negative for rash. Neurological: Negative for dizziness, weakness and headaches. Hematological: Negative for adenopathy. Does not bruise/bleed easily. Psychiatric/Behavioral: Negative for behavioral problems, dysphoric mood and sleep disturbance. Objective:    /86 (Site: Right Upper Arm, Position: Sitting, Cuff Size: Medium Adult)   Pulse 64   Temp 96.7 °F (35.9 °C) (Infrared)   Resp 12   Ht 6' 2\" (1.88 m)   Wt (!) 372 lb (168.7 kg)   BMI 47.76 kg/m²   Physical Exam  Vitals and nursing note reviewed. Constitutional:       Appearance: He is well-developed. HENT:      Head: Normocephalic and atraumatic.       Right Ear: External ear normal.      Left Ear: External ear normal.      Nose: Nose normal.   Eyes:      Conjunctiva/sclera: Conjunctivae normal.      Pupils: Pupils are equal, round, and reactive to light. Comments: Fundi nl   Neck:      Thyroid: No thyromegaly. Cardiovascular:      Rate and Rhythm: Normal rate and regular rhythm. Heart sounds: Normal heart sounds. Pulmonary:      Effort: Pulmonary effort is normal.      Breath sounds: Normal breath sounds. No wheezing or rales. Abdominal:      General: Bowel sounds are normal.      Palpations: Abdomen is soft. There is no mass. Tenderness: There is no abdominal tenderness. Musculoskeletal:         General: Normal range of motion. Cervical back: Normal range of motion and neck supple. Lymphadenopathy:      Cervical: No cervical adenopathy. Skin:     General: Skin is warm and dry. Findings: No rash. Neurological:      Mental Status: He is alert and oriented to person, place, and time. Cranial Nerves: No cranial nerve deficit. Deep Tendon Reflexes: Reflexes are normal and symmetric. Impression        Diagnosis Orders   1. COPD exacerbation ([facility]     2. Essential hypertension  Basic Metabolic Panel    CBC with Auto Differential   3. Type 2 diabetes mellitus treated with insulin ([facility]     4. Chronic diastolic (congestive) heart failure ([facility]     5.  Acute renal failure superimposed on stage 3a chronic kidney disease, unspecified acute renal failure type (HCC)           PLAN       Current Outpatient Medications   Medication Sig Dispense Refill    albuterol sulfate  (90 Base) MCG/ACT inhaler Inhale 2 puffs into the lungs 2 times daily 18 g 3    Urea (CARMOL) 40 % cream Use   Bid  To  The  legs 85 g 0    carvedilol (COREG) 12.5 MG tablet Take 1 tablet by mouth 2 times daily 180 tablet 2    tiZANidine (ZANAFLEX) 4 MG tablet Take 1 tablet by mouth every 6 hours as needed (Muscle Spasms) 30 tablet 1    insulin aspart (NOVOLOG FLEXPEN) 100 UNIT/ML injection pen Inject 18 Units into the skin 3 times daily (before meals) INJECT 12 UNITS SUBCUTANEOUSLY 3 TIMES DAILY BEFORE MEALS 10 pen 3    amLODIPine (NORVASC) 5 MG tablet Take 1 tablet by mouth daily 90 tablet 3    ticagrelor (BRILINTA) 90 MG TABS tablet Take 1 tablet by mouth twice daily 180 tablet 1    metFORMIN (GLUCOPHAGE) 500 MG tablet TAKE TWO TABLETS BY MOUTH TWICE DAILY WITH MEALS 360 tablet 1    ferrous sulfate (IRON 325) 325 (65 Fe) MG tablet Take 1 tablet by mouth 2 times daily 60 tablet 5    Multiple Vitamins-Minerals (THERAPEUTIC MULTIVITAMIN-MINERALS) tablet Take 1 tablet by mouth daily 30 tablet 11    enalapril (VASOTEC) 10 MG tablet One a day 90 tablet 1    atorvastatin (LIPITOR) 10 MG tablet TAKE 1 TABLET BY MOUTH ONCE DAILY 90 tablet 1    insulin glargine (LANTUS SOLOSTAR) 100 UNIT/ML injection pen INJECT 25 UNITS SUBCUTANEOUSLY ONCE DAILY 15 pen 1    furosemide (LASIX) 40 MG tablet Take 1 tablet by mouth daily 60 tablet 1    furosemide (LASIX) 80 MG tablet Take 1 tablet by mouth daily Patient takes both 80mg and 40 mg 60 tablet 3    ondansetron (ZOFRAN) 4 MG tablet Take 1 tablet by mouth every 8 hours as needed for Nausea or Vomiting 30 tablet 0    pantoprazole (PROTONIX) 40 MG tablet Take 1 tablet by mouth every morning (before breakfast) 30 tablet 0    Blood Glucose Monitoring Suppl (ACCU-CHEK WAQAR SMARTVIEW) w/Device KIT Pt is to use to test blood sugar three times a day DX : E11.9 1 kit 0    ACCU-CHEK SMARTVIEW strip Use to test Blood Sugar 3x daily, Dx: E11.9 100 each 11    nitroGLYCERIN (NITROSTAT) 0.4 MG SL tablet Place 1 tablet under the tongue every 5 minutes as needed for Chest pain 25 tablet 3    aspirin 81 MG tablet Take 1 tablet by mouth daily      acetaminophen (TYLENOL) 500 MG tablet Take 500 mg by mouth as needed for Pain       No current facility-administered medications for this visit.          See in one  Month    Orders Placed This Encounter   Procedures    Basic Metabolic Panel     Standing Status:   Future     Standing Expiration Date:   2/21/2023    CBC with Auto Differential     Standing Status:   Future     Standing Expiration Date:   2/21/2023     An electronic signature was used to authenticate this note.   --Eduardo Gaona MD

## 2022-02-21 NOTE — PROGRESS NOTES
BP Readings from Last 2 Encounters:   02/21/22 134/86   02/10/22 (!) 173/77     Hemoglobin A1C (%)   Date Value   12/20/2021 7.3 (H)     LDL Calculated (mg/dL)   Date Value   10/17/2018 56              Tobacco use:  Patient  reports that he has never smoked. He has never used smokeless tobacco.    If Smoker - Cessation materials given? NA    Is Daily aspirin on medication list?:  Yes    Diabetic retinal exam done? No   If yes, document in Health Maintenance. Monofilament placed on counter? No    Shoes and socks removed? No    BP taken with correct size cuff? Yes   Repeated if > 140/90 NA     Is patient taking any medications for diabetes    Yes   If yes, see medication list    Is the patient reporting any side effects of diabetic medications? No    Microalbumin performed if applicable? NA      Is patient taking any over the counter medications    Yes   If yes, see medication list      Patient Self-Management Goal for Chronic Condition  Goal: I will take all medications as prescribed by my doctor, and I will call the office if I am having any medication problems. Barriers to success: none  Plan for overcoming my barriers: N/A     Confidence: 10/10  Date goal set: 2/21/22  Date goal attained:     Have you seen any other physician or provider since your last visit no    Have you had any other diagnostic tests since your last visit? no    Have you changed or stopped any medications since your last visit including any over-the-counter medicines, vitamins, or herbal medicines? no     Are you taking all your prescribed medications?  Yes    If NO, why?

## 2022-02-22 NOTE — CARE COORDINATION
Edgardo Castro is feeling tired since getting out of the hospital. He is able to move around in his apartment with his cane but uses the walker for longer distances. He is having lower back pain with walking longer distances. He is taking his medications as ordered and denies any home needs at this time.

## 2022-02-24 ENCOUNTER — TELEPHONE (OUTPATIENT)
Dept: FAMILY MEDICINE CLINIC | Age: 72
End: 2022-02-24

## 2022-02-24 NOTE — TELEPHONE ENCOUNTER
Kurtis Diaz called me to tell me his blood pressure was 75/49 this am with a pulse of 75. I talked with him on the phone and he said he took all his meds this am. After I talked to him about 5 minutes I had him repeat his BP and it was 86/50 with a pulse of 68. I spoke with [facility] who stated he has had this problem in the past and she would let the Dr know when he came out from seeing his current pt. I told Kurtis Diaz I would call him back and let him know what Dr Liliana Patton wants him to do.

## 2022-02-24 NOTE — TELEPHONE ENCOUNTER
Per verbal order from Dr Doroteo Dexter: Decrease Lasix to 80mg in the AM only (Discontinue the Lasix 40mg in the Afternoon), Discontinue the Norvasc. Med List updated. Dru Meckel informed by Phone.

## 2022-03-01 ENCOUNTER — TELEPHONE (OUTPATIENT)
Dept: NEPHROLOGY | Age: 72
End: 2022-03-01

## 2022-03-01 ENCOUNTER — CARE COORDINATION (OUTPATIENT)
Dept: FAMILY MEDICINE CLINIC | Age: 72
End: 2022-03-01

## 2022-03-01 DIAGNOSIS — N18.2 CKD (CHRONIC KIDNEY DISEASE), STAGE II: ICD-10-CM

## 2022-03-01 DIAGNOSIS — I10 ESSENTIAL HYPERTENSION: Primary | ICD-10-CM

## 2022-03-01 NOTE — TELEPHONE ENCOUNTER
Patient r/s appt to first available. 04/14/2022. Patient said hes had labs done with other doctors that he sees but no recent labs for hemmelgarn. I did not see active orders?  appt notes says BMP  Please advise

## 2022-03-01 NOTE — CARE COORDINATION
Loraine Shepherd is doing better. His blood pressure is back to his norm. He is able to get around in his apartment with his cane but uses the walker outside. He is feeling good and feels like he is stronger.  He said he has some wax in his ears that he forgot to mention but he has an ear wax removal kit and he is going to try that and if it doesn't work he is going to call for an appointment

## 2022-03-02 ENCOUNTER — TELEPHONE (OUTPATIENT)
Dept: CARDIOLOGY CLINIC | Age: 72
End: 2022-03-02

## 2022-03-02 NOTE — TELEPHONE ENCOUNTER
Spoke with patient and referred him to PCP for this matter. Offered to transfer call to Dr Martha Zuniga' office; pt declined.

## 2022-03-02 NOTE — TELEPHONE ENCOUNTER
Looks like those labs were when he was in the hospital so yes I would like another bmp prior to the appointment.  thanks

## 2022-03-02 NOTE — TELEPHONE ENCOUNTER
Pt is calling and states his sugar was down to the 80's this morning and when he just took it it was in the 60's. Pt states he has eaten a meal and eaten a candy bar and still having issues. Please advise pt at [phone].

## 2022-03-02 NOTE — TELEPHONE ENCOUNTER
I apologize. He had a BMP drawn on 2/10, appt was moved to 4/14/22 did you want him to repeat a BMP at the beginning of April? Please advise.

## 2022-03-03 ENCOUNTER — NURSE ONLY (OUTPATIENT)
Dept: LAB | Age: 72
End: 2022-03-03

## 2022-03-03 DIAGNOSIS — I10 ESSENTIAL HYPERTENSION: ICD-10-CM

## 2022-03-03 DIAGNOSIS — N18.2 CKD (CHRONIC KIDNEY DISEASE), STAGE II: ICD-10-CM

## 2022-03-03 LAB
ANION GAP SERPL CALCULATED.3IONS-SCNC: 13 MEQ/L (ref 8–16)
BASOPHILS # BLD: 0.5 %
BASOPHILS ABSOLUTE: 0 THOU/MM3 (ref 0–0.1)
BUN BLDV-MCNC: 25 MG/DL (ref 7–22)
CALCIUM SERPL-MCNC: 9.1 MG/DL (ref 8.5–10.5)
CHLORIDE BLD-SCNC: 106 MEQ/L (ref 98–111)
CO2: 24 MEQ/L (ref 23–33)
CREAT SERPL-MCNC: 1.4 MG/DL (ref 0.4–1.2)
EOSINOPHIL # BLD: 4.2 %
EOSINOPHILS ABSOLUTE: 0.3 THOU/MM3 (ref 0–0.4)
ERYTHROCYTE [DISTWIDTH] IN BLOOD BY AUTOMATED COUNT: 13.7 % (ref 11.5–14.5)
ERYTHROCYTE [DISTWIDTH] IN BLOOD BY AUTOMATED COUNT: 50.4 FL (ref 35–45)
GFR SERPL CREATININE-BSD FRML MDRD: 50 ML/MIN/1.73M2
GLUCOSE BLD-MCNC: 90 MG/DL (ref 70–108)
HCT VFR BLD CALC: 30.3 % (ref 42–52)
HEMOGLOBIN: 9.6 GM/DL (ref 14–18)
IMMATURE GRANS (ABS): 0.02 THOU/MM3 (ref 0–0.07)
IMMATURE GRANULOCYTES: 0.3 %
LYMPHOCYTES # BLD: 31.1 %
LYMPHOCYTES ABSOLUTE: 2.3 THOU/MM3 (ref 1–4.8)
MCH RBC QN AUTO: 31.8 PG (ref 26–33)
MCHC RBC AUTO-ENTMCNC: 31.7 GM/DL (ref 32.2–35.5)
MCV RBC AUTO: 100.3 FL (ref 80–94)
MONOCYTES # BLD: 9.6 %
MONOCYTES ABSOLUTE: 0.7 THOU/MM3 (ref 0.4–1.3)
NUCLEATED RED BLOOD CELLS: 0 /100 WBC
PLATELET # BLD: 175 THOU/MM3 (ref 130–400)
PMV BLD AUTO: 9.5 FL (ref 9.4–12.4)
POTASSIUM SERPL-SCNC: 4.8 MEQ/L (ref 3.5–5.2)
RBC # BLD: 3.02 MILL/MM3 (ref 4.7–6.1)
SEG NEUTROPHILS: 54.3 %
SEGMENTED NEUTROPHILS ABSOLUTE COUNT: 4 THOU/MM3 (ref 1.8–7.7)
SODIUM BLD-SCNC: 143 MEQ/L (ref 135–145)
WBC # BLD: 7.4 THOU/MM3 (ref 4.8–10.8)

## 2022-03-07 ENCOUNTER — TELEPHONE (OUTPATIENT)
Dept: FAMILY MEDICINE CLINIC | Age: 72
End: 2022-03-07

## 2022-03-07 NOTE — TELEPHONE ENCOUNTER
----- Message from Weldon Bamberger, MD sent at 3/6/2022  3:41 PM EST -----  Call as labs and kidney function about the  same as [email or web address]

## 2022-03-08 ENCOUNTER — CARE COORDINATION (OUTPATIENT)
Dept: FAMILY MEDICINE CLINIC | Age: 72
End: 2022-03-08

## 2022-03-08 NOTE — CARE COORDINATION
Anam Menjivar is at [facility] to find out from his MRI whether he will need to have surgery for his bulging disc in his back. He said he is in pain. He said his blood sugar has been OK and his labs were OK. He has no additional needs at this time. I will follow up with him next week.

## 2022-03-14 ENCOUNTER — CARE COORDINATION (OUTPATIENT)
Dept: FAMILY MEDICINE CLINIC | Age: 72
End: 2022-03-14

## 2022-03-15 NOTE — DISCHARGE SUMMARY
[address] [place] [address]                               DISCHARGE SUMMARY    PATIENT NAME: Twin ORELLANA                   :        1950  MED REC NO:   [id number]                           ROOM:       0008  ACCOUNT NO:   [de-identified]                           ADMIT DATE: 2022  PROVIDER:     JORGE Mcgarryita: 02/10/2022    HISTORY:  This is a 77-year-old male with history of underlying chronic  diastolic congestive heart failure, insulin-dependent diabetes along  with some COPD, who presents with complaints of shortness of breath,  cough, eye discharge, and lightheadedness being present. The patient  states he has been having increased respiratory symptoms about three to  four days. He has had some increased eye burning and matting, discharge  being present. In addition, he has been gradually developed increasing  amounts of lightheadedness with ambulation. He has had some mucus loose  stool being present in addition. He also has had increasing cough noted  in addition. Last loose stool was at last p.m.. Because of the above  symptoms, he comes in for further evaluation and treatment at this time. MEDICATIONS AT THE TIME OF ADMISSION:  1. Tylenol. 2.  Norvasc. 3.  Aspirin. 4.  Lipitor. 5.  Coreg. 6.  Had been on just starting some Keflex at one point. 7.  Vasotec. 8.  Ferrous sulfate. 9.  Lasix, which he takes 80 in the a.m. and 40 in the p.m.  10.  He is on insulin with NovoLog and Lantus, taken 25 units of Lantus  and 12 units before each meal.  11.  He is on metformin. 12.  Multivitamin. 13.  Zofran as needed. 14.  Protonix. 15.  Brilinta. 16.  Zanaflex as needed. ALLERGIES:  [employer] PRODUCTS.     PAST SURGICAL HISTORY:  Notes he has had left great toe amputation,  appendectomy, had low back ablations, cervical disk surgery,  cholecystectomy, colon polyps with colonoscopy, coronary angioplasty  with stent in 2007 with a bypass graft which was done in 2015, left leg  fracture surgery. He has had bilateral knee replacements in addition to  shoulder surgery. He did have spine surgery at one point, T and A.    HOSPITALIZATIONS:  Last being for some pneumonia type symptoms. ILLNESSES:  He has a history of hypertension, atherosclerotic heart  disease, diabetic peripheral neuropathy, hyperlipidemia,  non-insulin-dependent diabetes with long-term use of insulin, chronic  low back pain, underlying obesity, generalized osteoarthritis present. HOSPITAL COURSE:  Discussed above. The patient was being referred for  evaluation. Initially, he was given IV fluids, and blood pressures  watched, which remained stable. Once with some increased fluid, repeat  creatinine noted this had remained at 1.9 and BUN of 55 showing  underlying dehydration. D-dimer, however, was markedly elevated at  2800. Both flu and COVID antibiotics were noted to be negative. Swabs  of the throat were noted to be negative. Following some hydration, CTA  of the chest was obtained, noted no pulmonary infiltrate or pulmonary  emboli. The patient was administered on some IV fluids for dehydration. Troponin level also with slight elevation at 0.016 felt to be related to  kidney function. This remained same on repeat. Following with  hydration, BUN was still slightly elevated at 52 with a creatinine of  1.6. Liver functions remained normal.  Hemoglobin had dropped down to  8.5. Sliding scale insulin was started. The patient did not have any  more stool; however, was having significant congestion symptoms. There  was also increased amount of cough. The patient developed some  increased laryngitis with persistent matting eyes to suggest some sinus  infection with postnasal drainage, development of cough.   He also had  some increased wheeze, which felt was related to some COPD component  being present in addition. The patient did have underlying acute kidney  injury with appropriate IV hydration continuing. The patient continues  today with IV fluids. There was no further loose stool, and at home,  the patient had about three to five stools being present. There were no  signs of any diastolic congestive heart failure and his chronic low back  pain remained stable. He was started on aerosols, continued with gentle  IV hydration. Sliding scale insulin was started in addition. He was  started on some ciprofloxacin eye drops, and we started him on some IV  Levaquin because of the cough, drainage, and symptoms felt to be  exacerbation of COPD. We did not give him steroids because of his blood  sugars and started with aerosols. Following day, the patient did show  some definite improvement. He did go into the bathroom, stubbed his  toenail, resulting of the right foot bleeding nail, for which podiatrist  asked to see the patient and now was removed along with appropriate nail  care. By 02/09/2022, the patient was much improved. Eyes had decreased  drainage with decreased cough being present. He has had the foot  dressing, and the nail was placed. Following hydration, kidney function  at one point was with BUN of 55 and creatinine 1.9, had corrected nicely  to 28 and 1.3, so his acute kidney injury showed steady improvement. Continued with gentle IV fluids. By 02/10/2022, the patient had shown  marked improvement and was felt to be stable for discharge at this time. Gastritis had resolved and the patient's acute kidney injury was better  and it was felt that he could be discharged home. FINAL DIAGNOSES:  1. COPD with exacerbation. 2.  Acute kidney injury secondary to dehydration. 3.  Moderate dehydration. 4.  Gastroenteritis. 5.  Underlying sinusitis symptoms. 6.  Non-insulin-dependent diabetes with  Long term_insulin. 7.  Atherosclerotic heart disease. 8.  Hypertension.   9.  Chronic low back pain. 10.  Peripheral neuropathy. CONDITION ON DISCHARGE:  Stable. DIET:  [address] DISCHARGE MEDICATIONS:  1. The patient will stay on Ciloxan eye drops for another three to four  days. 2.  He will stay on Levaquin 500 mg once a day for seven days. 3.  Tessalon Perles 100 mg three times a day as needed for cough. 4.  Continue on his Lantus 22 units at home dose along with continued  use of NovoLog, which he takes 15 units with each meal.  5.  He is on Brilinta 90 mg twice a day. 6.  Zanaflex as needed 4 mg three times a day. 7.  Coreg 12.5 mg b.i.d.  8.  Norvasc 5 mg a day. 9.  Multivitamin. 10.  Iron supplements twice a day. 11.  Lipitor 10 mg a day. 12.  Start back on his Lasix just 40 mg a day, starting on 02/11/2022. 13.  Albuterol inhaler four times a day and then as needed. 14.  Aspirin 81 mg a day. 15.  For skin lower fd Caramel lotion with urea 40% and then use  moisturizers. DISPOSITION:  Home. PLAN:  With home health care with nursing, PT, and OT. CONDITION:  Stable. FOLLOWUP:  With Dr. Leoncio Figueroa in approximately one week, overall  condition improved.         Tru Hendricks M.D.    D: 03/14/2022 5:20:46       T: 03/14/2022 23:28:15     [email or web address]  Job#: [id number]     Doc#: [id number]    CC:

## 2022-03-15 NOTE — CARE COORDINATION
Estefania Kramer is doing OK. His knee is bothering him today but he is going to get some icy hot when he goes to  his script at the pharmacy and he is going to do some [employer]. He saw Dr Josiane Wilde about his back and he is not a surgical candidate for that. It is starting to fuse together by itself. He has no home needs at this time and he will call if he does.

## 2022-03-31 ENCOUNTER — NURSE ONLY (OUTPATIENT)
Dept: LAB | Age: 72
End: 2022-03-31

## 2022-03-31 ENCOUNTER — OFFICE VISIT (OUTPATIENT)
Dept: CARDIOLOGY CLINIC | Age: 72
End: 2022-03-31
Payer: MEDICARE

## 2022-03-31 VITALS
DIASTOLIC BLOOD PRESSURE: 74 MMHG | BODY MASS INDEX: 40.43 KG/M2 | WEIGHT: 315 LBS | HEIGHT: 74 IN | HEART RATE: 68 BPM | SYSTOLIC BLOOD PRESSURE: 122 MMHG

## 2022-03-31 DIAGNOSIS — R06.02 SHORTNESS OF BREATH: ICD-10-CM

## 2022-03-31 DIAGNOSIS — E78.01 FAMILIAL HYPERCHOLESTEROLEMIA: ICD-10-CM

## 2022-03-31 DIAGNOSIS — I10 PRIMARY HYPERTENSION: ICD-10-CM

## 2022-03-31 DIAGNOSIS — I25.810 CORONARY ARTERY DISEASE INVOLVING CORONARY BYPASS GRAFT OF NATIVE HEART WITHOUT ANGINA PECTORIS: Primary | ICD-10-CM

## 2022-03-31 DIAGNOSIS — I25.810 CORONARY ARTERY DISEASE INVOLVING CORONARY BYPASS GRAFT OF NATIVE HEART WITHOUT ANGINA PECTORIS: ICD-10-CM

## 2022-03-31 LAB
ANION GAP SERPL CALCULATED.3IONS-SCNC: 13 MEQ/L (ref 8–16)
BUN BLDV-MCNC: 34 MG/DL (ref 7–22)
CALCIUM SERPL-MCNC: 9.7 MG/DL (ref 8.5–10.5)
CHLORIDE BLD-SCNC: 106 MEQ/L (ref 98–111)
CO2: 26 MEQ/L (ref 23–33)
CREAT SERPL-MCNC: 1.3 MG/DL (ref 0.4–1.2)
ERYTHROCYTE [DISTWIDTH] IN BLOOD BY AUTOMATED COUNT: 13.2 % (ref 11.5–14.5)
ERYTHROCYTE [DISTWIDTH] IN BLOOD BY AUTOMATED COUNT: 47.4 FL (ref 35–45)
GFR SERPL CREATININE-BSD FRML MDRD: 54 ML/MIN/1.73M2
GLUCOSE BLD-MCNC: 47 MG/DL (ref 70–108)
HCT VFR BLD CALC: 32 % (ref 42–52)
HEMOGLOBIN: 10.5 GM/DL (ref 14–18)
MCH RBC QN AUTO: 31.7 PG (ref 26–33)
MCHC RBC AUTO-ENTMCNC: 32.8 GM/DL (ref 32.2–35.5)
MCV RBC AUTO: 96.7 FL (ref 80–94)
PLATELET # BLD: 182 THOU/MM3 (ref 130–400)
PMV BLD AUTO: 9.3 FL (ref 9.4–12.4)
POTASSIUM SERPL-SCNC: 4.6 MEQ/L (ref 3.5–5.2)
RBC # BLD: 3.31 MILL/MM3 (ref 4.7–6.1)
SODIUM BLD-SCNC: 145 MEQ/L (ref 135–145)
WBC # BLD: 7.4 THOU/MM3 (ref 4.8–10.8)

## 2022-03-31 PROCEDURE — 1036F TOBACCO NON-USER: CPT | Performed by: NUCLEAR MEDICINE

## 2022-03-31 PROCEDURE — G8482 FLU IMMUNIZE ORDER/ADMIN: HCPCS | Performed by: NUCLEAR MEDICINE

## 2022-03-31 PROCEDURE — 4040F PNEUMOC VAC/ADMIN/RCVD: CPT | Performed by: NUCLEAR MEDICINE

## 2022-03-31 PROCEDURE — 1123F ACP DISCUSS/DSCN MKR DOCD: CPT | Performed by: NUCLEAR MEDICINE

## 2022-03-31 PROCEDURE — 99214 OFFICE O/P EST MOD 30 MIN: CPT | Performed by: NUCLEAR MEDICINE

## 2022-03-31 PROCEDURE — G8427 DOCREV CUR MEDS BY ELIG CLIN: HCPCS | Performed by: NUCLEAR MEDICINE

## 2022-03-31 PROCEDURE — 3017F COLORECTAL CA SCREEN DOC REV: CPT | Performed by: NUCLEAR MEDICINE

## 2022-03-31 PROCEDURE — G8417 CALC BMI ABV UP PARAM F/U: HCPCS | Performed by: NUCLEAR MEDICINE

## 2022-03-31 NOTE — PROGRESS NOTES
Follow-up. He has had intermittent shortness of breath and fatigue. He states he is going to have a colonoscopy in a few weeks but doesn't believe he needs cleared.

## 2022-03-31 NOTE — PROGRESS NOTES
[facility] [place] [address] [address]  [address]  [facility] [address]  Dept: [phone]  Dept Fax: [phone]  Loc: [phone]    Visit Date: 3/31/2022    Toen Pryor is a 66 y.o. male who presents todayfor:  Chief Complaint   Patient presents with    Follow-up    Shortness of Breath    Fatigue    Diabetes    Coronary Artery Disease    Hyperlipidemia   continues to have more dyspnea  More than usual   Exertional in nature  Very limited by the weight   Know CABG and stents  No chest pain per se  Know untreated sleep apnea  BP is stable   No dizziness  No syncope  DM is so so controlled  On statins for hyeprlipidemia'      HPI:  HPI  Past Medical History:   Diagnosis Date    RAUL (acute kidney injury) ([facility] [address] 2/13/2020    ASHD (arteriosclerotic heart disease) 2005 2006    stent  baki    Depression     Diabetic peripheral neuropathy ([facility] [address] 2014    Fracture Oct 1984    left lower extremity     HTN (hypertension)     Hypercholesteremia     IDDM (insulin dependent diabetes mellitus)     Low back pain     Morbid obesity with BMI of 45.0-49.9, adult ([facility] [address]     Osteoarthritis       Past Surgical History:   Procedure Laterality Date    AMPUTATION Left 9/10/14    partial versus complete left hallux amputation, left great toe amputation    APPENDECTOMY      BACK INJECTION Bilateral 1/18/2021    Bilateral Si MBB #1 performed by Jony Lugo MD at [address] Bilateral 3/1/2021    Bilateral SI MBB #2 performed [facility] at [facility]    fusion of C5-C6    CHOLECYSTECTOMY      COLONOSCOPY  2007 and 2011    colonn polyps  lorenzo    CORONARY ANGIOPLASTY WITH STENT PLACEMENT  08/07/2017    Dr Evette Thomason @ [facility]   lad    CORONARY ARTERY BYPASS GRAFT  2015 august     dox    EKG 12-LEAD  8/14/2015         FACET JOINT INJECTION Bilateral 5/22/2020    Lumbar Facet MBB @L3-4,4-5 bilateral #2 performed by Jony Lugo MD at [address]      left leg    JOINT REPLACEMENT      bilateral knees gurvinder    PAIN MANAGEMENT PROCEDURE Bilateral 1/28/2020    Lumbar Facet MBB @ L3-4,4-5,5-S1 bilateral #1 LUMBAR FACET performed by Jony Lugo MD at [facility] [address] Right 7/14/2020    Lumbar RFA Bilateral L3-4,4-5  right side first performed by Jony Lugo MD at [facility] [address] Left 10/1/2020    Lumbar RFA Left side L3-4, 4-5 performed by Jony Lugo MD at [place] [address] Bilateral 11/17/2020    TFLESI @L5 bilateral #1 performed by Jony Lugo MD at [place] [address] Bilateral 12/10/2020    TFLESI @L5 bilateral #1 performed by Jony Lugo MD at [address] [facility] N/A 12/28/2018    T7-T9 DECOMPRESSION, T7-T9 POSTERIOR FUSION performed by Naveen Ardon MD at [address] [facility]  2010    fumich    TONSILLECTOMY      VASCULAR SURGERY      cabg harvests from left leg     Family History   Problem Relation Age of Onset    Cancer Mother         uterine     Social History     Tobacco Use    Smoking status: Never Smoker    Smokeless tobacco: Never Used   Substance Use Topics    Alcohol use: Not Currently     Comment: rarely      Current Outpatient Medications   Medication Sig Dispense Refill    albuterol sulfate  (90 Base) MCG/ACT inhaler Inhale 2 puffs into the lungs 2 times daily 18 g 3    Urea (CARMOL) 40 % cream Use   Bid  To  The  legs 85 g 0    carvedilol (COREG) 12.5 MG tablet Take 1 tablet by mouth 2 times daily 180 tablet 2    tiZANidine (ZANAFLEX) 4 MG tablet Take 1 tablet by mouth every 6 hours as needed (Muscle Spasms) 30 tablet 1    insulin aspart (NOVOLOG FLEXPEN) 100 UNIT/ML injection pen Inject 18 Units into the skin 3 times daily (before meals) INJECT 12 UNITS SUBCUTANEOUSLY 3 TIMES DAILY BEFORE MEALS 10 pen 3    ticagrelor (BRILINTA) 90 MG TABS tablet Take 1 tablet by mouth twice daily 180 tablet 1    metFORMIN (GLUCOPHAGE) 500 MG tablet TAKE TWO TABLETS BY MOUTH TWICE DAILY WITH MEALS 360 tablet 1    ferrous sulfate (IRON 325) 325 (65 Fe) MG tablet Take 1 tablet by mouth 2 times daily 60 tablet 5    Multiple Vitamins-Minerals (THERAPEUTIC MULTIVITAMIN-MINERALS) tablet Take 1 tablet by mouth daily 30 tablet 11    enalapril (VASOTEC) 10 MG tablet One a day 90 tablet 1    atorvastatin (LIPITOR) 10 MG tablet TAKE 1 TABLET BY MOUTH ONCE DAILY 90 tablet 1    insulin glargine (LANTUS SOLOSTAR) 100 UNIT/ML injection pen INJECT 25 UNITS SUBCUTANEOUSLY ONCE DAILY 15 pen 1    furosemide (LASIX) 80 MG tablet Take 1 tablet by mouth daily Patient takes both 80mg and 40 mg (Patient taking differently: Take 80 mg by mouth daily ) 60 tablet 3    ondansetron (ZOFRAN) 4 MG tablet Take 1 tablet by mouth every 8 hours as needed for Nausea or Vomiting 30 tablet 0    pantoprazole (PROTONIX) 40 MG tablet Take 1 tablet by mouth every morning (before breakfast) 30 tablet 0    Blood Glucose Monitoring Suppl (ACCU-CHEK WAQAR SMARTVIEW) w/Device KIT Pt is to use to test blood sugar three times a day DX : E11.9 1 kit 0    ACCU-CHEK SMARTVIEW strip Use to test Blood Sugar 3x daily, Dx: E11.9 100 each 11    nitroGLYCERIN (NITROSTAT) 0.4 MG SL tablet Place 1 tablet under the tongue every 5 minutes as needed for Chest pain 25 tablet 3    aspirin 81 MG tablet Take 1 tablet by mouth daily      acetaminophen (TYLENOL) 500 MG tablet Take 500 mg by mouth as needed for Pain       No current facility-administered medications for this visit.      Allergies   Allergen Reactions    Horse-Derived Products      Health Maintenance   Topic Date Due    Shingles Vaccine (1 of 2) Never done    Pneumococcal 65+ years Vaccine (2 of 2 - PPSV23) 06/30/2017    Diabetic microalbuminuria test  01/11/2019    Diabetic retinal exam  06/20/2019    Colorectal Cancer Screen  09/21/2021    Annual Wellness Visit (AWV)  09/30/2021    Lipid screen  05/18/2022    PSA counseling  05/18/2022    A1C test (Diabetic or Prediabetic)  12/20/2022    Diabetic foot exam  02/08/2023    Depression Monitoring  02/21/2023    Potassium monitoring  03/03/2023    Creatinine monitoring  03/03/2023    DTaP/Tdap/Td vaccine (3 - Td or Tdap) 01/08/2028    Flu vaccine  Completed    COVID-19 Vaccine  Completed    Hepatitis C screen  Completed    Hepatitis A vaccine  Aged Out    Hib vaccine  Aged Out    Meningococcal (ACWY) vaccine  Aged Out       Subjective:  Review of Systems  General:   No fever, no chills, some fatigue or weight loss  Pulmonary:    some more dyspnea, no wheezing  Cardiac:    Denies recent chest pain,   GI:     No nausea or vomiting, no abdominal pain  Neuro:     No dizziness or light headedness,   Musculoskeletal:  No recent active issues  Extremities:   No edema, no obvious claudication       Objective:  Physical Exam  /74   Pulse 68   Ht 6' 2\" (1.88 m)   Wt (!) 376 lb (170.6 kg)   BMI 48.28 kg/m²   General:   Well developed, well nourished  Lungs:    Clear to auscultation  Heart:    Normal S1 S2, Slight murmur. no rubs, no gallops  Abdomen:   Soft, non tender, no organomegalies, positive bowel sounds  Extremities:   No edema, no cyanosis, good peripheral pulses  Neurological:   Awake, alert, oriented. No obvious focal deficits  Musculoskelatal:  No obvious deformities    Assessment:      Diagnosis Orders   1. Coronary artery disease involving coronary bypass graft of native heart without angina pectoris     2. Primary hypertension     3. Familial hypercholesterolemia     4.  Shortness of breath     as above  Concerning patient   Likely sleep apnea issues  Deconditioning       Plan:  No follow-ups on file. As above  Non invasive cardiac testing will be ordered to further evaluate for any ischemic or structural heart disease as a cause of the patient symptoms. We will proceed with a Stress Cardiolite test and echo soon. Decide after that   Continue risk factor modification and medical management  Thank you for allowing me to participate in the care of your patient. Please don't hesitate to contact me regarding any further issues related to the patient care    Orders Placed:  No orders of the defined types were placed in this encounter. Medications Prescribed:  No orders of the defined types were placed in this encounter. Discussed use, benefit, and side effects of prescribed medications. All patient questions answered. Pt voicedunderstanding. Instructed to continue current medications, diet and exercise. Continue risk factor modification and medical management. Patient agreed with treatment plan. Follow up as directed.     Electronically signedby Rachid Vieyra MD on 3/31/2022 at 12:18 PM

## 2022-04-05 ENCOUNTER — OFFICE VISIT (OUTPATIENT)
Dept: FAMILY MEDICINE CLINIC | Age: 72
End: 2022-04-05

## 2022-04-05 VITALS
TEMPERATURE: 96.3 F | HEIGHT: 74 IN | BODY MASS INDEX: 40.43 KG/M2 | HEART RATE: 76 BPM | WEIGHT: 315 LBS | SYSTOLIC BLOOD PRESSURE: 106 MMHG | DIASTOLIC BLOOD PRESSURE: 64 MMHG | RESPIRATION RATE: 16 BRPM

## 2022-04-05 DIAGNOSIS — I50.32 CHRONIC DIASTOLIC (CONGESTIVE) HEART FAILURE (HCC): ICD-10-CM

## 2022-04-05 DIAGNOSIS — Z00.00 MEDICARE ANNUAL WELLNESS VISIT, SUBSEQUENT: ICD-10-CM

## 2022-04-05 DIAGNOSIS — E66.01 CLASS 3 SEVERE OBESITY WITH SERIOUS COMORBIDITY AND BODY MASS INDEX (BMI) OF 45.0 TO 49.9 IN ADULT, UNSPECIFIED OBESITY TYPE (HCC): ICD-10-CM

## 2022-04-05 DIAGNOSIS — F33.41 RECURRENT MAJOR DEPRESSIVE DISORDER, IN PARTIAL REMISSION (HCC): ICD-10-CM

## 2022-04-05 DIAGNOSIS — E11.42 DIABETIC PERIPHERAL NEUROPATHY (HCC): ICD-10-CM

## 2022-04-05 DIAGNOSIS — I87.2 VENOUS STASIS DERMATITIS OF BOTH LOWER EXTREMITIES: ICD-10-CM

## 2022-04-05 DIAGNOSIS — E78.00 HYPERCHOLESTEREMIA: ICD-10-CM

## 2022-04-05 DIAGNOSIS — E11.42 TYPE 2 DIABETES MELLITUS WITH DIABETIC POLYNEUROPATHY, WITH LONG-TERM CURRENT USE OF INSULIN (HCC): Primary | ICD-10-CM

## 2022-04-05 DIAGNOSIS — I25.708 CORONARY ARTERY DISEASE OF BYPASS GRAFT OF NATIVE HEART WITH STABLE ANGINA PECTORIS (HCC): ICD-10-CM

## 2022-04-05 DIAGNOSIS — M47.816 LUMBAR SPONDYLOSIS: ICD-10-CM

## 2022-04-05 DIAGNOSIS — I10 PRIMARY HYPERTENSION: ICD-10-CM

## 2022-04-05 DIAGNOSIS — M48.061 SPINAL STENOSIS OF LUMBAR REGION WITHOUT NEUROGENIC CLAUDICATION: ICD-10-CM

## 2022-04-05 DIAGNOSIS — Z79.4 TYPE 2 DIABETES MELLITUS WITH DIABETIC POLYNEUROPATHY, WITH LONG-TERM CURRENT USE OF INSULIN (HCC): Primary | ICD-10-CM

## 2022-04-05 PROBLEM — N17.9 AKI (ACUTE KIDNEY INJURY) (HCC): Status: RESOLVED | Noted: 2022-02-07 | Resolved: 2022-04-05

## 2022-04-05 PROBLEM — N17.9 ACUTE KIDNEY INJURY (HCC): Status: RESOLVED | Noted: 2022-02-09 | Resolved: 2022-04-05

## 2022-04-05 LAB
CHP ED QC CHECK: ABNORMAL
GLUCOSE BLD-MCNC: 192 MG/DL
HBA1C MFR BLD: 6.5 %

## 2022-04-05 PROCEDURE — 83036 HEMOGLOBIN GLYCOSYLATED A1C: CPT | Performed by: FAMILY MEDICINE

## 2022-04-05 PROCEDURE — 82962 GLUCOSE BLOOD TEST: CPT | Performed by: FAMILY MEDICINE

## 2022-04-05 PROCEDURE — G8427 DOCREV CUR MEDS BY ELIG CLIN: HCPCS | Performed by: FAMILY MEDICINE

## 2022-04-05 PROCEDURE — G0439 PPPS, SUBSEQ VISIT: HCPCS | Performed by: FAMILY MEDICINE

## 2022-04-05 PROCEDURE — 1123F ACP DISCUSS/DSCN MKR DOCD: CPT | Performed by: FAMILY MEDICINE

## 2022-04-05 PROCEDURE — 1036F TOBACCO NON-USER: CPT | Performed by: FAMILY MEDICINE

## 2022-04-05 PROCEDURE — 3017F COLORECTAL CA SCREEN DOC REV: CPT | Performed by: FAMILY MEDICINE

## 2022-04-05 PROCEDURE — 99213 OFFICE O/P EST LOW 20 MIN: CPT | Performed by: FAMILY MEDICINE

## 2022-04-05 PROCEDURE — G8417 CALC BMI ABV UP PARAM F/U: HCPCS | Performed by: FAMILY MEDICINE

## 2022-04-05 ASSESSMENT — PATIENT HEALTH QUESTIONNAIRE - PHQ9
SUM OF ALL RESPONSES TO PHQ QUESTIONS 1-9: 0
SUM OF ALL RESPONSES TO PHQ QUESTIONS 1-9: 0
SUM OF ALL RESPONSES TO PHQ9 QUESTIONS 1 & 2: 0
2. FEELING DOWN, DEPRESSED OR HOPELESS: 0
SUM OF ALL RESPONSES TO PHQ QUESTIONS 1-9: 0
1. LITTLE INTEREST OR PLEASURE IN DOING THINGS: 0
SUM OF ALL RESPONSES TO PHQ QUESTIONS 1-9: 0

## 2022-04-05 ASSESSMENT — LIFESTYLE VARIABLES: HOW OFTEN DO YOU HAVE A DRINK CONTAINING ALCOHOL: NEVER

## 2022-04-05 ASSESSMENT — ENCOUNTER SYMPTOMS
CHEST TIGHTNESS: 0
NAUSEA: 0
SORE THROAT: 0
TROUBLE SWALLOWING: 0
CONSTIPATION: 0
COUGH: 0
SHORTNESS OF BREATH: 0
BLOOD IN STOOL: 0
ABDOMINAL PAIN: 0
BACK PAIN: 1
EYE PAIN: 0

## 2022-04-05 NOTE — PATIENT INSTRUCTIONS
Personalized Preventive Plan for Gayatri Chew - 4/5/2022  Medicare offers a range of preventive health benefits. Some of the tests and screenings are paid in full while other may be subject to a deductible, co-insurance, and/or copay. Some of these benefits include a comprehensive review of your medical history including lifestyle, illnesses that may run in your family, and various assessments and screenings as appropriate. After reviewing your medical record and screening and assessments performed today your provider may have ordered immunizations, labs, imaging, and/or referrals for you. A list of these orders (if applicable) as well as your Preventive Care list are included within your After Visit Summary for your review. Other Preventive Recommendations:    · A preventive eye exam performed by an eye specialist is recommended every 1-2 years to screen for glaucoma; cataracts, macular degeneration, and other eye disorders. · A preventive dental visit is recommended every 6 months. · Try to get at least 150 minutes of exercise per week or 10,000 steps per day on a pedometer . · Order or download the FREE \"Exercise & Physical Activity: Your Everyday Guide\" from [employer]. Call 5-370.950.3045 or search [employer] online. · You need 3297-3001 mg of calcium and 1921-8488 IU of vitamin D per day. It is possible to meet your calcium requirement with diet alone, but a vitamin D supplement is usually necessary to meet this goal.  · When exposed to the sun, use a sunscreen that protects against both UVA and UVB radiation with an SPF of 30 or greater. Reapply every 2 to 3 hours or after sweating, drying off with a towel, or swimming. · Always wear a seat belt when traveling in a car. Always wear a helmet when riding a bicycle or motorcycle.

## 2022-04-05 NOTE — PROGRESS NOTES
Medicare Annual Wellness Visit    Leoncio Mathur is here for Medicare AWV    Assessment & Plan   Type 2 diabetes mellitus with diabetic polyneuropathy, with long-term current use of insulin (HCC)  -     POCT Glucose  -     POCT glycosylated hemoglobin (Hb A1C)      Recommendations for Preventive Services Due: see orders and patient instructions/AVS.  Recommended screening schedule for the next 5-10 years is provided to the patient in written form: see Patient Instructions/AVS.     No follow-ups on file. Subjective   Patient's complete Health Risk Assessment and screening values have been reviewed and are found in Flowsheets. The following problems were reviewed today and where indicated follow up appointments were made and/or referrals ordered.     Positive Risk Factor Screenings with Interventions:             General Health and ACP:  General  In general, how would you say your health is?: Good  In the past 7 days, have you experienced any of the following: New or Increased Pain, New or Increased Fatigue, Loneliness, Social Isolation, Stress or Anger?: (!) Yes  Select all that apply: (!) New or Increased Pain  Do you get the social and emotional support that you need?: Yes  Do you have a Living Will?: Yes    Advance Directives     Power of  Living Will ACP-Advance Directive ACP-Power of     Not on File Filed on 09/07/14 Filed Not on File      General Health Risk Interventions:  ·   stable as  worrried  about  80  old mother     Health Habits/Nutrition:     Physical Activity: Inactive    Days of Exercise per Week: 0 days    Minutes of Exercise per Session: 10 min     Have you lost any weight without trying in the past 3 months?: No  Body mass index: (!) 48.14  Have you seen the dentist within the past year?: (!) No    Health Habits/Nutrition Interventions:  · Dental exam overdue:  patient encouraged to make appointment with his/her dentist    Hearing/Vision:  Do you or your family notice any trouble with your hearing that hasn't been managed with hearing aids?: No  Do you have difficulty driving, watching TV, or doing any of your daily activities because of your eyesight?: No  Have you had an eye exam within the past year?: (!) No  No exam data present    Hearing/Vision Interventions:  · Vision concerns:  patient encouraged to make appointment with his/her eye specialist    Safety:  Do you have working smoke detectors?: Yes  Do you have any tripping hazards - loose or unsecured carpets or rugs?: No  Do you have any tripping hazards - clutter in doorways, halls, or stairs?: No  Do you have either shower bars, grab bars, non-slip mats or non-slip surfaces in your shower or bathtub?: Yes  Do all of your stairways have a railing or banister?: (!) No  Do you always fasten your seatbelt when you are in a car?: Yes    Safety Interventions:  · Home safety tips provided           Objective   Vitals:    04/05/22 1352   BP: 106/64   Site: Right Upper Arm   Position: Sitting   Cuff Size: Medium Adult   Pulse: 76   Resp: 16   Temp: 96.3 °F (35.7 °C)   TempSrc: Infrared   Weight: (!) 375 lb (170.1 kg)   Height: 6' 2\" (1.88 m)      Body mass index is 48.15 kg/m². Allergies   Allergen Reactions    Horse-Derived Products      Prior to Visit Medications    Medication Sig Taking?  Authorizing Provider   albuterol sulfate  (90 Base) MCG/ACT inhaler Inhale 2 puffs into the lungs 2 times daily Yes Rafaela Capone MD   Urea (CARMOL) 40 % cream Use   Bid  To  The  legs Yes Rafaela Capone MD   carvedilol (COREG) 12.5 MG tablet Take 1 tablet by mouth 2 times daily Yes Rafaela Capone MD   tiZANidine (ZANAFLEX) 4 MG tablet Take 1 tablet by mouth every 6 hours as needed (Muscle Spasms) Yes Rafaela Capone MD   insulin aspart (NOVOLOG FLEXPEN) 100 UNIT/ML injection pen Inject 18 Units into the skin 3 times daily (before meals) INJECT 12 UNITS SUBCUTANEOUSLY 3 TIMES DAILY BEFORE MEALS Yes Jen Dong MD Fr[place]   ticagrelor (BRILINTA) 90 MG TABS tablet Take 1 tablet by mouth twice daily Yes Steve Diggs MD   metFORMIN (GLUCOPHAGE) 500 MG tablet TAKE TWO TABLETS BY MOUTH TWICE DAILY WITH MEALS Yes Steve Diggs MD   ferrous sulfate (IRON 325) 325 (65 Fe) MG tablet Take 1 tablet by mouth 2 times daily Yes Steve iDggs MD   Multiple Vitamins-Minerals (THERAPEUTIC MULTIVITAMIN-MINERALS) tablet Take 1 tablet by mouth daily Yes Steve Diggs MD   enalapril (VASOTEC) 10 MG tablet One a day Yes Steve Diggs MD   atorvastatin (LIPITOR) 10 MG tablet TAKE 1 TABLET BY MOUTH ONCE DAILY Yes Steve Diggs MD   insulin glargine (LANTUS SOLOSTAR) 100 UNIT/ML injection pen INJECT 25 UNITS SUBCUTANEOUSLY ONCE DAILY Yes Steve Diggs MD   furosemide (LASIX) 80 MG tablet Take 1 tablet by mouth daily Patient takes both 80mg and 40 mg  Patient taking differently: Take 80 mg by mouth daily  Yes Steve Diggs MD   ondansetron (ZOFRAN) 4 MG tablet Take 1 tablet by mouth every 8 hours as needed for Nausea or Vomiting Yes Steve Diggs MD   pantoprazole (PROTONIX) 40 MG tablet Take 1 tablet by mouth every morning (before breakfast) Yes Steve Diggs MD   ACCU-CHEK SMARTVIEW strip Use to test Blood Sugar 3x daily, Dx: E11.9 Yes Steve Diggs MD   nitroGLYCERIN (NITROSTAT) 0.4 MG SL tablet Place 1 tablet under the tongue every 5 minutes as needed for Chest pain Yes Steve Diggs MD   aspirin 81 MG tablet Take 1 tablet by mouth daily Yes Alona Marcial MD   acetaminophen (TYLENOL) 500 MG tablet Take 500 mg by mouth as needed for Pain Yes Historical Provider, MD Finch (Including outside providers/suppliers regularly involved in providing care):   Patient Care Team:  Steve Diggs MD as PCP - General (Family Medicine)  Steve Diggs MD as PCP - [facility]  Mari Vang MD as Cardiologist (Cardiology)    Reviewed and updated this visit:  Tobacco  Allergies  Meds  Med Hx  Surg Hx  Soc Hx  Fam Hx                      Paty Garzon (:  1950) is a 66 y.o. male,Established patient, here for evaluation of the following chief complaint(s):  Medicare AWV         ASSESSMENT/     Diagnosis Orders   1. Type 2 diabetes mellitus with diabetic polyneuropathy, with long-term current use of insulin (HCC)  POCT Glucose    POCT glycosylated hemoglobin (Hb A1C)   2. Spinal stenosis of lumbar region without neurogenic claudication     3. Class 3 severe obesity with serious comorbidity and body mass index (BMI) of 45.0 to 49.9 in adult, unspecified obesity type ([address]     4. Coronary artery disease of bypass graft of native heart with stable angina pectoris ([facility]     5. Diabetic peripheral neuropathy ([facility] [facility]     6. Venous stasis dermatitis of both lower extremities     7. Hypercholesteremia     8. Lumbar spondylosis     9. Primary hypertension     10. Chronic diastolic (congestive) heart failure (HCC)     11.  Recurrent major depressive disorder, in partial remission (HCC)         PLAN:  Current Outpatient Medications   Medication Sig Dispense Refill    albuterol sulfate  (90 Base) MCG/ACT inhaler Inhale 2 puffs into the lungs 2 times daily 18 g 3    Urea (CARMOL) 40 % cream Use   Bid  To  The  legs 85 g 0    carvedilol (COREG) 12.5 MG tablet Take 1 tablet by mouth 2 times daily 180 tablet 2    tiZANidine (ZANAFLEX) 4 MG tablet Take 1 tablet by mouth every 6 hours as needed (Muscle Spasms) 30 tablet 1    insulin aspart (NOVOLOG FLEXPEN) 100 UNIT/ML injection pen Inject 18 Units into the skin 3 times daily (before meals) INJECT 12 UNITS SUBCUTANEOUSLY 3 TIMES DAILY BEFORE MEALS 10 pen 3    ticagrelor (BRILINTA) 90 MG TABS tablet Take 1 tablet by mouth twice daily 180 tablet 1    metFORMIN (GLUCOPHAGE) 500 MG tablet TAKE TWO TABLETS BY MOUTH TWICE DAILY WITH MEALS 360 tablet 1    ferrous sulfate (IRON 325) 325 (65 Fe) MG tablet Take 1 tablet by mouth 2 times daily 60 tablet 5    Multiple Vitamins-Minerals (THERAPEUTIC MULTIVITAMIN-MINERALS) tablet Take 1 tablet by mouth daily 30 tablet 11    enalapril (VASOTEC) 10 MG tablet One a day 90 tablet 1    atorvastatin (LIPITOR) 10 MG tablet TAKE 1 TABLET BY MOUTH ONCE DAILY 90 tablet 1    insulin glargine (LANTUS SOLOSTAR) 100 UNIT/ML injection pen INJECT 25 UNITS SUBCUTANEOUSLY ONCE DAILY 15 pen 1    furosemide (LASIX) 80 MG tablet Take 1 tablet by mouth daily Patient takes both 80mg and 40 mg (Patient taking differently: Take 80 mg by mouth daily ) 60 tablet 3    ondansetron (ZOFRAN) 4 MG tablet Take 1 tablet by mouth every 8 hours as needed for Nausea or Vomiting 30 tablet 0    pantoprazole (PROTONIX) 40 MG tablet Take 1 tablet by mouth every morning (before breakfast) 30 tablet 0    ACCU-CHEK SMARTVIEW strip Use to test Blood Sugar 3x daily, Dx: E11.9 100 each 11    nitroGLYCERIN (NITROSTAT) 0.4 MG SL tablet Place 1 tablet under the tongue every 5 minutes as needed for Chest pain 25 tablet 3    aspirin 81 MG tablet Take 1 tablet by mouth daily      acetaminophen (TYLENOL) 500 MG tablet Take 500 mg by mouth as needed for Pain       No current facility-administered medications for this visit. Orders Placed This Encounter   Procedures    POCT Glucose    POCT glycosylated hemoglobin (Hb A1C)     Results for orders placed or performed in visit on 04/05/22   POCT Glucose   Result Value Ref Range    Glucose 192 (A) mg/dL    QC OK? POCT glycosylated hemoglobin (Hb A1C)   Result Value Ref Range    Hemoglobin A1C 6.5 (A) %     Tuan received counseling on the following healthy behaviors: nutrition and exercise    Patient given educational materials on Diabetes and Hyperlipidemia    I have instructed Merlin Tariq to complete a self tracking handout on Blood Sugars  and Blood Pressures  and instructed them to bring it with them to his next appointment.      Discussed use, benefit, and side effects of prescribed medications. Barriers to medication compliance addressed. All patient questions answered. Pt voiced understanding. No follow-ups on file.          Subjective   SUBJECTIVE/OBJECTIVE:     Hx  Of  Colon polyps          Niddm  Stable       htn  Stable     ashd  sttabe      diastolic  chf  Noted       Lumbar  Disc  Noted       depression noted     Copd  Stable          Past Medical History:   Diagnosis Date    RAUL (acute kidney injury) ([facility] 2/13/2020    ASHD (arteriosclerotic heart disease) 2005 2006    stent  baki    Depression     Diabetic peripheral neuropathy [facility]) 2014    Fracture Oct 1984    left lower extremity     HTN (hypertension)     Hypercholesteremia     IDDM (insulin dependent diabetes mellitus)     Low back pain     Morbid obesity with BMI of 45.0-49.9, adult ([facility]     Osteoarthritis      Past Surgical History:   Procedure Laterality Date    AMPUTATION Left 9/10/14    partial versus complete left hallux amputation, left great toe amputation    APPENDECTOMY      BACK INJECTION Bilateral 1/18/2021    Bilateral Si MBB #1 performed by Amber Mcclendon MD at [address] Bilateral 3/1/2021    Bilateral SI MBB #2 performed by Amber Mcclendon MD at [facility]    fusion of C5-C6    CHOLECYSTECTOMY      COLONOSCOPY  2007 and 2011    colonn polyps  lorenzo    CORONARY ANGIOPLASTY WITH STENT PLACEMENT  08/07/2017    Dr Severino Martinez @ [address] GRAFT  2015 august dox    EKG 12-LEAD  8/14/2015         FACET JOINT INJECTION Bilateral 5/22/2020    Lumbar Facet MBB @L3-4,4-5 bilateral #2 performed by Amber Mcclendon MD at [address] [place] [address] Drive      left leg    JOINT REPLACEMENT      bilateral knees gurvinder    PAIN MANAGEMENT PROCEDURE Bilateral 1/28/2020    Lumbar Facet MBB @ L3-4,4-5,5-S1 bilateral #1 LUMBAR FACET performed by Robert Laughlin MD at [facility] Right 7/14/2020    Lumbar RFA Bilateral L3-4,4-5  right side first performed by Robert Laughlin MD at [place] [address] Left 10/1/2020    Lumbar RFA Left side L3-4, 4-5 performed by Robert Laughlin MD at [place] [address] Bilateral 11/17/2020    TFLESI @L5 bilateral #1 performed by Robert Laughlin MD at [place] [address] Bilateral 12/10/2020    TFLESI @L5 bilateral #1 performed by Robert Laughlin MD at [address] [place] [address] N/A 12/28/2018    T7-T9 DECOMPRESSION, T7-T9 POSTERIOR FUSION performed by Marcella Sinclair MD at [facility]  2010    fumich    TONSILLECTOMY      VASCULAR SURGERY      cabg harvests from left leg     Social History     Socioeconomic History    Marital status:      Spouse name: Not on file    Number of children: 2    Years of education: Not on file    Highest education level: Not on file   Occupational History    Not on file   Tobacco Use    Smoking status: Never Smoker    Smokeless tobacco: Never Used   Vaping Use    Vaping Use: Never used   Substance and Sexual Activity    Alcohol use: Not Currently     Comment: rarely    Drug use: No    Sexual activity: Never   Other Topics Concern    Not on file   Social History Narrative    Not on file     Social Determinants of Health     Financial Resource Strain: Low Risk     Difficulty of Paying Living Expenses: Not hard at all   Food Insecurity: No Food Insecurity    Worried About [address] in the Last Year: Never true    [address] in the Last Year: Never true   Transportation Needs:     Lack of Transportation (Medical): Not on file    Lack of Transportation (Non-Medical):  Not on file   Physical Activity: Inactive    Days of Exercise per Week: 0 days    Minutes of Exercise per Session: 10 min   Stress:     Feeling of Stress : Not on file   Social Connections:     Frequency of Communication with Friends and Family: Not on file    Frequency of Social Gatherings with Friends and Family: Not on file    Attends Baptism Services: Not on file    Active Member of [address] [place] [address] [employer] or Organizations: Not on file    Attends Club or Organization Meetings: Not on file    Marital Status: Not on file   Intimate Partner Violence:     Fear of Current or Ex-Partner: Not on file    Emotionally Abused: Not on file    Physically Abused: Not on file    Sexually Abused: Not on file   Housing Stability:     Unable to Pay for Housing in the Last Year: Not on file    Number of [place] in the Last Year: Not on file    Unstable Housing in the Last Year: Not on file     Family History   Problem Relation Age of Onset    Cancer Mother         uterine          Review of Systems   Constitutional: Negative for fatigue and fever. HENT: Negative for congestion, ear pain, postnasal drip, sore throat and trouble swallowing. Eyes: Negative for pain. Respiratory: Negative for cough, chest tightness and shortness of breath. Cardiovascular: Negative for chest pain, palpitations and leg swelling. Gastrointestinal: Negative for abdominal pain, blood in stool, constipation and nausea. Genitourinary: Negative for difficulty urinating, frequency and urgency. Musculoskeletal: Positive for back pain. Negative for arthralgias, joint swelling and neck stiffness. Skin: Negative for rash. Neurological: Negative for dizziness, weakness and headaches. Hematological: Negative for adenopathy. Does not bruise/bleed easily. Psychiatric/Behavioral: Negative for behavioral problems, dysphoric mood and sleep disturbance.         /64 (Site: Right Upper Arm, Position: Sitting, Cuff Size: Medium Adult)   Pulse 76   Temp 96.3 °F (35.7 °C) (Infrared)   Resp 16 Ht 6' 2\" (1.88 m)   Wt (!) 375 lb (170.1 kg)   BMI 48.15 kg/m²   Objective   Physical Exam  Vitals and nursing note reviewed. Constitutional:       Appearance: He is well-developed. He is obese. HENT:      Head: Normocephalic and atraumatic. Right Ear: External ear normal.      Left Ear: External ear normal.      Nose: Nose normal.   Eyes:      Conjunctiva/sclera: Conjunctivae normal.      Pupils: Pupils are equal, round, and reactive to light. Comments: Fundi nl   Neck:      Thyroid: No thyromegaly. Cardiovascular:      Rate and Rhythm: Normal rate and regular rhythm. Heart sounds: Normal heart sounds. Pulmonary:      Effort: Pulmonary effort is normal.      Breath sounds: Normal breath sounds. No wheezing or rales. Abdominal:      General: Bowel sounds are normal.      Palpations: Abdomen is soft. There is no mass. Tenderness: There is no abdominal tenderness. Musculoskeletal:         General: Normal range of motion. Cervical back: Normal range of motion and neck supple. Right lower leg: Edema present. Left lower leg: Edema present. Comments:   3  To 4 +  Both  Legs    Lymphadenopathy:      Cervical: No cervical adenopathy. Skin:     General: Skin is warm and dry. Findings: No rash. Neurological:      Mental Status: He is alert and oriented to person, place, and time. Cranial Nerves: No cranial nerve deficit. Deep Tendon Reflexes: Reflexes are normal and symmetric. An electronic signature was used to authenticate this note.     --Marlee Shepherd MD

## 2022-04-07 ENCOUNTER — TELEPHONE (OUTPATIENT)
Dept: FAMILY MEDICINE CLINIC | Age: 72
End: 2022-04-07

## 2022-04-07 NOTE — TELEPHONE ENCOUNTER
Torrey Orozco called to say that he is having a sharp pain in his back and it is tolerable except for when he moves. He had not taken anything but tylenol. I suggested he take a zaniflex to see if that helps and if it continues to be 6/10 after that he needs to go to the urgent care or ED. He is under a lot of stress with his mother being sick and on hospice. I offered him support and reassurance. I also congratulated him on the fact that his hgbA1c has come down.

## 2022-04-13 ENCOUNTER — HOSPITAL ENCOUNTER (OUTPATIENT)
Dept: NON INVASIVE DIAGNOSTICS | Age: 72
Discharge: HOME | End: 2022-04-13
Payer: MEDICARE

## 2022-04-13 VITALS — BODY MASS INDEX: 47.38 KG/M2 | WEIGHT: 315 LBS

## 2022-04-13 DIAGNOSIS — I10 PRIMARY HYPERTENSION: ICD-10-CM

## 2022-04-13 DIAGNOSIS — I25.810 CORONARY ARTERY DISEASE INVOLVING CORONARY BYPASS GRAFT OF NATIVE HEART WITHOUT ANGINA PECTORIS: ICD-10-CM

## 2022-04-13 DIAGNOSIS — R06.02 SHORTNESS OF BREATH: ICD-10-CM

## 2022-04-13 DIAGNOSIS — E78.01 FAMILIAL HYPERCHOLESTEROLEMIA: ICD-10-CM

## 2022-04-13 LAB
LV EF: 55 %
LVEF MODALITY: NORMAL

## 2022-04-13 PROCEDURE — 78452 HT MUSCLE IMAGE SPECT MULT: CPT

## 2022-04-13 PROCEDURE — 6360000002 HC RX W HCPCS

## 2022-04-13 PROCEDURE — 93306 TTE W/DOPPLER COMPLETE: CPT

## 2022-04-13 PROCEDURE — 93017 CV STRESS TEST TRACING ONLY: CPT | Performed by: NUCLEAR MEDICINE

## 2022-04-13 PROCEDURE — 3430000000 HC RX DIAGNOSTIC RADIOPHARMACEUTICAL: Performed by: NUCLEAR MEDICINE

## 2022-04-13 PROCEDURE — A9500 TC99M SESTAMIBI: HCPCS | Performed by: NUCLEAR MEDICINE

## 2022-04-13 RX ADMIN — Medication 9.8 MILLICURIE: at 13:45

## 2022-04-13 RX ADMIN — Medication 35 MILLICURIE: at 14:40

## 2022-04-18 DIAGNOSIS — E11.9 TYPE 2 DIABETES MELLITUS WITHOUT COMPLICATION, WITHOUT LONG-TERM CURRENT USE OF INSULIN (HCC): ICD-10-CM

## 2022-04-18 NOTE — TELEPHONE ENCOUNTER
Cam Hatchet called requesting a refill of their:    ACCU-CHEK SMARTVIEW strip TID    Send to [facility]

## 2022-04-18 NOTE — TELEPHONE ENCOUNTER
Date of last visit:  4/5/2022  Date of next visit:  7/12/2022    Requested Prescriptions     Pending Prescriptions Disp Refills    ACCU-CHEK SMARTVIEW strip 100 each 11     Sig: Use to test Blood Sugar 3x daily, Dx: E11.9

## 2022-04-19 RX ORDER — BLOOD SUGAR DIAGNOSTIC
STRIP MISCELLANEOUS
Qty: 100 EACH | Refills: 11 | Status: SHIPPED | OUTPATIENT
Start: 2022-04-19

## 2022-04-25 DIAGNOSIS — E78.00 HYPERCHOLESTEREMIA: ICD-10-CM

## 2022-04-25 NOTE — TELEPHONE ENCOUNTER
Date of last visit:  4/5/2022  Date of next visit:  7/12/2022    Requested Prescriptions     Pending Prescriptions Disp Refills    atorvastatin (LIPITOR) 10 MG tablet 90 tablet 1     Sig: TAKE 1 TABLET BY MOUTH ONCE DAILY Number Of Freeze-Thaw Cycles: 1 freeze-thaw cycle Duration Of Freeze Thaw-Cycle (Seconds): 5 Consent: The patient's consent was obtained including but not limited to risks of crusting, scabbing, blistering, scarring, darker or lighter pigmentary change, recurrence, incomplete removal and infection. Render Post-Care Instructions In Note?: no Post-Care Instructions: I reviewed with the patient in detail post-care instructions. Patient is to wear sunprotection, and avoid picking at any of the treated lesions. Pt may apply Vaseline to crusted or scabbing areas. Detail Level: Detailed Aperture Size (Optional): B

## 2022-04-25 NOTE — TELEPHONE ENCOUNTER
Jesse Rivas called requesting a refill of their:    atorvastatin (LIPITOR) 10 MG tablet QD    Send 90 day supply to [facility] [employer]

## 2022-04-26 ENCOUNTER — NURSE ONLY (OUTPATIENT)
Dept: LAB | Age: 72
End: 2022-04-26

## 2022-04-26 RX ORDER — ATORVASTATIN CALCIUM 10 MG/1
TABLET, FILM COATED ORAL
Qty: 90 TABLET | Refills: 1 | Status: SHIPPED | OUTPATIENT
Start: 2022-04-26

## 2022-04-27 ENCOUNTER — TELEPHONE (OUTPATIENT)
Dept: FAMILY MEDICINE CLINIC | Age: 72
End: 2022-04-27

## 2022-04-27 LAB
FOLATE: > 20 NG/ML (ref 4.8–24.2)
VITAMIN B-12: 367 PG/ML (ref 211–911)

## 2022-04-27 NOTE — TELEPHONE ENCOUNTER
Elena Centeno called to see if he should get the shingles vaccine and I told him that the Dr says it would be fine for him to do that. He thanked me.

## 2022-04-28 LAB — SOLUBLE TRANSFERRIN RECEPT: 2.2 MG/L (ref 2.2–5)

## 2022-05-05 DIAGNOSIS — I15.0 RENOVASCULAR HYPERTENSION: ICD-10-CM

## 2022-05-05 NOTE — TELEPHONE ENCOUNTER
Date of last visit:  4/5/2022  Date of next visit:  7/12/2022    Requested Prescriptions     Pending Prescriptions Disp Refills    enalapril (VASOTEC) 10 MG tablet 90 tablet 1     Sig: One a day

## 2022-05-06 ENCOUNTER — HOSPITAL ENCOUNTER (OUTPATIENT)
Age: 72
Setting detail: OBSERVATION
Discharge: NURSING, REHAB OR LONG-TERM CARE | End: 2022-05-13
Attending: EMERGENCY MEDICINE | Admitting: SURGERY
Payer: MEDICARE

## 2022-05-06 ENCOUNTER — APPOINTMENT (OUTPATIENT)
Dept: GENERAL RADIOLOGY | Age: 72
End: 2022-05-06
Payer: MEDICARE

## 2022-05-06 ENCOUNTER — APPOINTMENT (OUTPATIENT)
Dept: CT IMAGING | Age: 72
End: 2022-05-06
Payer: MEDICARE

## 2022-05-06 DIAGNOSIS — S22.088A OTHER CLOSED FRACTURE OF TWELFTH THORACIC VERTEBRA, INITIAL ENCOUNTER (HCC): ICD-10-CM

## 2022-05-06 DIAGNOSIS — S32.018A OTHER CLOSED FRACTURE OF FIRST LUMBAR VERTEBRA, INITIAL ENCOUNTER (HCC): Primary | ICD-10-CM

## 2022-05-06 DIAGNOSIS — S01.21XA LACERATION OF NOSE, INITIAL ENCOUNTER: ICD-10-CM

## 2022-05-06 DIAGNOSIS — I10 ESSENTIAL HYPERTENSION: ICD-10-CM

## 2022-05-06 DIAGNOSIS — I87.2 VENOUS STASIS DERMATITIS OF BOTH LOWER EXTREMITIES: ICD-10-CM

## 2022-05-06 PROBLEM — S22.089A CLOSED T12 FRACTURE (HCC): Status: ACTIVE | Noted: 2022-05-06

## 2022-05-06 PROBLEM — S32.010A COMPRESSION FRACTURE OF L1 LUMBAR VERTEBRA, CLOSED, INITIAL ENCOUNTER (HCC): Status: ACTIVE | Noted: 2022-05-06

## 2022-05-06 PROBLEM — S32.019A CLOSED FRACTURE OF FIRST LUMBAR VERTEBRA (HCC): Status: ACTIVE | Noted: 2022-05-06

## 2022-05-06 LAB
ALBUMIN SERPL-MCNC: 4.1 G/DL (ref 3.5–5.1)
ALP BLD-CCNC: 65 U/L (ref 38–126)
ALT SERPL-CCNC: 11 U/L (ref 11–66)
ANION GAP SERPL CALCULATED.3IONS-SCNC: 14 MEQ/L (ref 8–16)
AST SERPL-CCNC: 15 U/L (ref 5–40)
BASOPHILS # BLD: 0.5 %
BASOPHILS ABSOLUTE: 0 THOU/MM3 (ref 0–0.1)
BILIRUB SERPL-MCNC: 0.4 MG/DL (ref 0.3–1.2)
BUN BLDV-MCNC: 37 MG/DL (ref 7–22)
CALCIUM SERPL-MCNC: 8.8 MG/DL (ref 8.5–10.5)
CHLORIDE BLD-SCNC: 100 MEQ/L (ref 98–111)
CO2: 24 MEQ/L (ref 23–33)
CREAT SERPL-MCNC: 1.4 MG/DL (ref 0.4–1.2)
EOSINOPHIL # BLD: 3 %
EOSINOPHILS ABSOLUTE: 0.2 THOU/MM3 (ref 0–0.4)
ERYTHROCYTE [DISTWIDTH] IN BLOOD BY AUTOMATED COUNT: 12.4 % (ref 11.5–14.5)
ERYTHROCYTE [DISTWIDTH] IN BLOOD BY AUTOMATED COUNT: 42.5 FL (ref 35–45)
ETHYL ALCOHOL, SERUM: < 0.01 %
GFR SERPL CREATININE-BSD FRML MDRD: 50 ML/MIN/1.73M2
GLUCOSE BLD-MCNC: 169 MG/DL (ref 70–108)
HCT VFR BLD CALC: 30.7 % (ref 42–52)
HEMOGLOBIN: 10.6 GM/DL (ref 14–18)
IMMATURE GRANS (ABS): 0.04 THOU/MM3 (ref 0–0.07)
IMMATURE GRANULOCYTES: 0.6 %
LYMPHOCYTES # BLD: 27.2 %
LYMPHOCYTES ABSOLUTE: 1.7 THOU/MM3 (ref 1–4.8)
MCH RBC QN AUTO: 32.5 PG (ref 26–33)
MCHC RBC AUTO-ENTMCNC: 34.5 GM/DL (ref 32.2–35.5)
MCV RBC AUTO: 94.2 FL (ref 80–94)
MONOCYTES # BLD: 9.1 %
MONOCYTES ABSOLUTE: 0.6 THOU/MM3 (ref 0.4–1.3)
NUCLEATED RED BLOOD CELLS: 0 /100 WBC
OSMOLALITY CALCULATION: 288.3 MOSMOL/KG (ref 275–300)
PLATELET # BLD: 140 THOU/MM3 (ref 130–400)
PMV BLD AUTO: 9.5 FL (ref 9.4–12.4)
POTASSIUM SERPL-SCNC: 4.1 MEQ/L (ref 3.5–5.2)
RBC # BLD: 3.26 MILL/MM3 (ref 4.7–6.1)
SEG NEUTROPHILS: 59.6 %
SEGMENTED NEUTROPHILS ABSOLUTE COUNT: 3.8 THOU/MM3 (ref 1.8–7.7)
SODIUM BLD-SCNC: 138 MEQ/L (ref 135–145)
TOTAL PROTEIN: 6.7 G/DL (ref 6.1–8)
TROPONIN T: < 0.01 NG/ML
WBC # BLD: 6.4 THOU/MM3 (ref 4.8–10.8)

## 2022-05-06 PROCEDURE — 72125 CT NECK SPINE W/O DYE: CPT

## 2022-05-06 PROCEDURE — APPSS180 APP SPLIT SHARED TIME > 60 MINUTES: Performed by: PHYSICIAN ASSISTANT

## 2022-05-06 PROCEDURE — G0378 HOSPITAL OBSERVATION PER HR: HCPCS

## 2022-05-06 PROCEDURE — 93005 ELECTROCARDIOGRAM TRACING: CPT

## 2022-05-06 PROCEDURE — 70450 CT HEAD/BRAIN W/O DYE: CPT

## 2022-05-06 PROCEDURE — 2500000003 HC RX 250 WO HCPCS: Performed by: PHYSICIAN ASSISTANT

## 2022-05-06 PROCEDURE — 85025 COMPLETE CBC W/AUTO DIFF WBC: CPT

## 2022-05-06 PROCEDURE — 71045 X-RAY EXAM CHEST 1 VIEW: CPT

## 2022-05-06 PROCEDURE — 6820000002 HC L2 INJURY CALL ACTIVATION: Performed by: SURGERY

## 2022-05-06 PROCEDURE — 96375 TX/PRO/DX INJ NEW DRUG ADDON: CPT

## 2022-05-06 PROCEDURE — 2580000003 HC RX 258: Performed by: PHYSICIAN ASSISTANT

## 2022-05-06 PROCEDURE — 96374 THER/PROPH/DIAG INJ IV PUSH: CPT

## 2022-05-06 PROCEDURE — 6370000000 HC RX 637 (ALT 250 FOR IP): Performed by: PHYSICIAN ASSISTANT

## 2022-05-06 PROCEDURE — 6360000002 HC RX W HCPCS

## 2022-05-06 PROCEDURE — 84484 ASSAY OF TROPONIN QUANT: CPT

## 2022-05-06 PROCEDURE — 82077 ASSAY SPEC XCP UR&BREATH IA: CPT

## 2022-05-06 PROCEDURE — 99285 EMERGENCY DEPT VISIT HI MDM: CPT

## 2022-05-06 PROCEDURE — 80053 COMPREHEN METABOLIC PANEL: CPT

## 2022-05-06 PROCEDURE — 6360000002 HC RX W HCPCS: Performed by: PHYSICIAN ASSISTANT

## 2022-05-06 PROCEDURE — 76376 3D RENDER W/INTRP POSTPROCES: CPT

## 2022-05-06 PROCEDURE — 99222 1ST HOSP IP/OBS MODERATE 55: CPT | Performed by: SURGERY

## 2022-05-06 PROCEDURE — 70486 CT MAXILLOFACIAL W/O DYE: CPT

## 2022-05-06 PROCEDURE — 71250 CT THORAX DX C-: CPT

## 2022-05-06 PROCEDURE — 6360000004 HC RX CONTRAST MEDICATION: Performed by: EMERGENCY MEDICINE

## 2022-05-06 PROCEDURE — 74176 CT ABD & PELVIS W/O CONTRAST: CPT

## 2022-05-06 PROCEDURE — 1200000003 HC TELEMETRY R&B

## 2022-05-06 RX ORDER — ONDANSETRON 2 MG/ML
INJECTION INTRAMUSCULAR; INTRAVENOUS
Status: COMPLETED
Start: 2022-05-06 | End: 2022-05-06

## 2022-05-06 RX ORDER — OXYCODONE HYDROCHLORIDE 5 MG/1
10 TABLET ORAL EVERY 4 HOURS PRN
Status: DISCONTINUED | OUTPATIENT
Start: 2022-05-06 | End: 2022-05-13 | Stop reason: HOSPADM

## 2022-05-06 RX ORDER — FENTANYL CITRATE 50 UG/ML
50 INJECTION, SOLUTION INTRAMUSCULAR; INTRAVENOUS ONCE
Status: COMPLETED | OUTPATIENT
Start: 2022-05-06 | End: 2022-05-06

## 2022-05-06 RX ORDER — SODIUM CHLORIDE 0.9 % (FLUSH) 0.9 %
5-40 SYRINGE (ML) INJECTION EVERY 12 HOURS SCHEDULED
Status: DISCONTINUED | OUTPATIENT
Start: 2022-05-06 | End: 2022-05-13 | Stop reason: HOSPADM

## 2022-05-06 RX ORDER — ONDANSETRON 2 MG/ML
4 INJECTION INTRAMUSCULAR; INTRAVENOUS ONCE
Status: COMPLETED | OUTPATIENT
Start: 2022-05-06 | End: 2022-05-06

## 2022-05-06 RX ORDER — ACETAMINOPHEN 325 MG/1
650 TABLET ORAL EVERY 4 HOURS PRN
Status: DISCONTINUED | OUTPATIENT
Start: 2022-05-06 | End: 2022-05-13 | Stop reason: HOSPADM

## 2022-05-06 RX ORDER — ONDANSETRON 4 MG/1
4 TABLET, ORALLY DISINTEGRATING ORAL EVERY 8 HOURS PRN
Status: DISCONTINUED | OUTPATIENT
Start: 2022-05-06 | End: 2022-05-13 | Stop reason: HOSPADM

## 2022-05-06 RX ORDER — ONDANSETRON 2 MG/ML
4 INJECTION INTRAMUSCULAR; INTRAVENOUS EVERY 6 HOURS PRN
Status: DISCONTINUED | OUTPATIENT
Start: 2022-05-06 | End: 2022-05-13 | Stop reason: HOSPADM

## 2022-05-06 RX ORDER — SODIUM CHLORIDE 0.9 % (FLUSH) 0.9 %
5-40 SYRINGE (ML) INJECTION PRN
Status: DISCONTINUED | OUTPATIENT
Start: 2022-05-06 | End: 2022-05-13 | Stop reason: HOSPADM

## 2022-05-06 RX ORDER — SENNA PLUS 8.6 MG/1
1 TABLET ORAL DAILY PRN
Status: DISCONTINUED | OUTPATIENT
Start: 2022-05-06 | End: 2022-05-13 | Stop reason: HOSPADM

## 2022-05-06 RX ORDER — FAMOTIDINE 10 MG/ML
20 INJECTION, SOLUTION INTRAVENOUS 2 TIMES DAILY
Status: DISCONTINUED | OUTPATIENT
Start: 2022-05-06 | End: 2022-05-07

## 2022-05-06 RX ORDER — FENTANYL CITRATE 50 UG/ML
INJECTION, SOLUTION INTRAMUSCULAR; INTRAVENOUS
Status: COMPLETED
Start: 2022-05-06 | End: 2022-05-06

## 2022-05-06 RX ORDER — ENALAPRIL MALEATE 10 MG/1
TABLET ORAL
Qty: 90 TABLET | Refills: 1 | Status: SHIPPED | OUTPATIENT
Start: 2022-05-06 | End: 2022-10-14

## 2022-05-06 RX ORDER — SODIUM CHLORIDE 9 MG/ML
25 INJECTION, SOLUTION INTRAVENOUS PRN
Status: DISCONTINUED | OUTPATIENT
Start: 2022-05-06 | End: 2022-05-13 | Stop reason: HOSPADM

## 2022-05-06 RX ORDER — SODIUM CHLORIDE 9 MG/ML
INJECTION, SOLUTION INTRAVENOUS CONTINUOUS
Status: DISCONTINUED | OUTPATIENT
Start: 2022-05-06 | End: 2022-05-07

## 2022-05-06 RX ORDER — OXYCODONE HYDROCHLORIDE 5 MG/1
5 TABLET ORAL EVERY 4 HOURS PRN
Status: DISCONTINUED | OUTPATIENT
Start: 2022-05-06 | End: 2022-05-13 | Stop reason: HOSPADM

## 2022-05-06 RX ORDER — POLYETHYLENE GLYCOL 3350 17 G/17G
17 POWDER, FOR SOLUTION ORAL DAILY
Status: DISCONTINUED | OUTPATIENT
Start: 2022-05-06 | End: 2022-05-13 | Stop reason: HOSPADM

## 2022-05-06 RX ORDER — BISACODYL 10 MG
10 SUPPOSITORY, RECTAL RECTAL DAILY
Status: DISCONTINUED | OUTPATIENT
Start: 2022-05-06 | End: 2022-05-13 | Stop reason: HOSPADM

## 2022-05-06 RX ADMIN — POLYETHYLENE GLYCOL 3350 17 G: 17 POWDER, FOR SOLUTION ORAL at 23:18

## 2022-05-06 RX ADMIN — FENTANYL CITRATE 50 MCG: 50 INJECTION, SOLUTION INTRAMUSCULAR; INTRAVENOUS at 17:22

## 2022-05-06 RX ADMIN — IOPAMIDOL 80 ML: 755 INJECTION, SOLUTION INTRAVENOUS at 17:31

## 2022-05-06 RX ADMIN — FENTANYL CITRATE 50 MCG: 50 INJECTION, SOLUTION INTRAMUSCULAR; INTRAVENOUS at 17:47

## 2022-05-06 RX ADMIN — BISACODYL 10 MG: 10 SUPPOSITORY RECTAL at 23:17

## 2022-05-06 RX ADMIN — SODIUM CHLORIDE: 9 INJECTION, SOLUTION INTRAVENOUS at 23:37

## 2022-05-06 RX ADMIN — FAMOTIDINE 20 MG: 10 INJECTION, SOLUTION INTRAVENOUS at 23:18

## 2022-05-06 RX ADMIN — ONDANSETRON 4 MG: 2 INJECTION INTRAMUSCULAR; INTRAVENOUS at 17:23

## 2022-05-06 RX ADMIN — ONDANSETRON 4 MG: 2 INJECTION INTRAMUSCULAR; INTRAVENOUS at 17:47

## 2022-05-06 RX ADMIN — SODIUM CHLORIDE, PRESERVATIVE FREE 10 ML: 5 INJECTION INTRAVENOUS at 23:37

## 2022-05-06 ASSESSMENT — ENCOUNTER SYMPTOMS
EYE PAIN: 0
SINUS PRESSURE: 0
CHEST TIGHTNESS: 0
BACK PAIN: 1
SINUS PAIN: 0
COUGH: 0
NAUSEA: 0
SHORTNESS OF BREATH: 0
FACIAL SWELLING: 0
EYES NEGATIVE: 1
ABDOMINAL PAIN: 0
ABDOMINAL DISTENTION: 0
DIARRHEA: 0
GASTROINTESTINAL NEGATIVE: 1
COLOR CHANGE: 0
CONSTIPATION: 0
ALLERGIC/IMMUNOLOGIC NEGATIVE: 1
RESPIRATORY NEGATIVE: 1
PHOTOPHOBIA: 0
VOMITING: 0

## 2022-05-06 ASSESSMENT — PAIN DESCRIPTION - DESCRIPTORS: DESCRIPTORS: ACHING

## 2022-05-06 ASSESSMENT — PAIN SCALES - GENERAL
PAINLEVEL_OUTOF10: 3
PAINLEVEL_OUTOF10: 2
PAINLEVEL_OUTOF10: 10
PAINLEVEL_OUTOF10: 10

## 2022-05-06 ASSESSMENT — PAIN DESCRIPTION - ONSET: ONSET: ON-GOING

## 2022-05-06 ASSESSMENT — PAIN DESCRIPTION - FREQUENCY: FREQUENCY: CONTINUOUS

## 2022-05-06 ASSESSMENT — PAIN DESCRIPTION - ORIENTATION: ORIENTATION: LOWER

## 2022-05-06 ASSESSMENT — PAIN DESCRIPTION - PAIN TYPE: TYPE: ACUTE PAIN

## 2022-05-06 ASSESSMENT — PAIN DESCRIPTION - LOCATION: LOCATION: BACK

## 2022-05-06 ASSESSMENT — PAIN - FUNCTIONAL ASSESSMENT: PAIN_FUNCTIONAL_ASSESSMENT: ACTIVITIES ARE NOT PREVENTED

## 2022-05-06 NOTE — ED NOTES
patient to ED via EMS after tripping/ stumbling causing a fall. Patient has a laceration to the bridge of his nose. Patient denies loss of consciousness. Patient on anticoagulant. Patient is alert and oriented.  Patient complaining of lower back pain with hx of back surgery       Gayatri Logan RN  05/06/22 1199

## 2022-05-06 NOTE — TELEPHONE ENCOUNTER
Date of last visit:  4/5/2022  Date of next visit:  7/12/2022    Requested Prescriptions     Pending Prescriptions Disp Refills    furosemide (LASIX) 80 MG tablet 60 tablet 3     Sig: Take 1 tablet by mouth daily Patient takes both 80mg and 40 mg

## 2022-05-06 NOTE — ED PROVIDER NOTES
[address] [facility]          Pt Name: Deb Miner  MRN: [id number]  [place] 1950  Date of evaluation: 5/6/2022  Treating Resident Physician: Tyshawn Willard MD  Supervising Physician: Dr Nanda Roque   Patient presents with   Levonia Halo    Facial Injury     History obtained from the patient. HISTORY OF PRESENT ILLNESS    HPI  Deb Miner is a 66 y.o. male who presents to the emergency department for evaluation of Fall on thinners      A: Airway intact patient is conversational  B: Bilateral breath sounds on auscultation  C: Palpable pulses in all 4 extremities. Bilateral IV catheters placed  D: GCS 15 pupils equal round reactive      Patient states he had a mechanical fall outside the hospital.  Patient states he did not lift his foot up high enough and tripped over the curb and landed on his face. Patient denies loss of consciousness. Patient does take Brilinta for significant cardiovascular history. Patient states that after the fall he was having significant lumbosacral back pain. Patient does have a history of degenerative disc disease and bulging disks for which the patient has had prior back surgery for. Patient does have diabetic neuropathy and has numbness in his bilateral lower extremities at baseline. No saddle anesthesia present. No involuntary loss of bladder or bowel function. Patient is denying any chest pain lightheadedness dizziness weakness, changes in bladder or bowel function. Patient endorses a pack per day smoking history denies any alcohol or recreational drug use. The patient has no other acute complaints at this time. REVIEW OF SYSTEMS   Review of Systems   Constitutional: Negative. Negative for activity change, chills, fatigue and fever. HENT: Negative. Negative for sinus pressure and sinus pain. Eyes: Negative.   Negative for photophobia and visual disturbance. Respiratory: Negative. Negative for cough, chest tightness and shortness of breath. Cardiovascular: Negative. Negative for chest pain and leg swelling. Gastrointestinal: Negative. Negative for abdominal distention, abdominal pain, constipation, diarrhea, nausea and vomiting. Endocrine: Negative. Genitourinary: Negative. Negative for dysuria and hematuria. Musculoskeletal: Positive for back pain. Negative for gait problem, joint swelling, neck pain and neck stiffness. Skin: Positive for wound (Facial Laceration). Negative for rash. Allergic/Immunologic: Negative. Neurological: Negative. Negative for dizziness, light-headedness, numbness and headaches. Hematological: Negative. Psychiatric/Behavioral: Negative. Negative for agitation and confusion. All other systems reviewed and are negative.         PAST MEDICAL AND SURGICAL HISTORY     Past Medical History:   Diagnosis Date    RAUL (acute kidney injury) ([facility] [address] 2/13/2020    ASHD (arteriosclerotic heart disease) 2005 2006    stent  baki    Depression     Diabetic peripheral neuropathy [facility]) 2014    Fracture Oct 1984    left lower extremity     HTN (hypertension)     Hypercholesteremia     IDDM (insulin dependent diabetes mellitus)     Low back pain     Morbid obesity with BMI of 45.0-49.9, adult ([facility] [address]     Osteoarthritis      Past Surgical History:   Procedure Laterality Date    AMPUTATION Left 9/10/14    partial versus complete left hallux amputation, left great toe amputation    APPENDECTOMY      BACK INJECTION Bilateral 1/18/2021    Bilateral Si MBB #1 performed by Ruben Alvarez MD at [address] Bilateral 3/1/2021    Bilateral SI MBB #2 performed by Ruben Alvarez MD at [facility]    fusion of C5-C6    CHOLECYSTECTOMY      COLONOSCOPY  2007 and 2011    colonn polyps  lorenzo    CORONARY ANGIOPLASTY WITH STENT PLACEMENT  08/07/2017    Dr Francis Riley @ [address] CORONARY ARTERY BYPASS GRAFT  2015 august dox    EKG 12-LEAD  8/14/2015         FACET JOINT INJECTION Bilateral 5/22/2020    Lumbar Facet MBB @L3-4,4-5 bilateral #2 performed by Misty Stauffer MD at [place] [address]      left leg    JOINT REPLACEMENT      bilateral knees gurvinder    PAIN MANAGEMENT PROCEDURE Bilateral 1/28/2020    Lumbar Facet MBB @ L3-4,4-5,5-S1 bilateral #1 LUMBAR FACET performed by Misty Stauffer MD at [place] [address] Right 7/14/2020    Lumbar RFA Bilateral L3-4,4-5  right side first performed by Misty Stauffer MD at [place] [address] Left 10/1/2020    Lumbar RFA Left side L3-4, 4-5 performed by Misty Stauffer MD at [place] [address] Bilateral 11/17/2020    TFLESI @L5 bilateral #1 performed by Misty Stauffer MD at [place] [address] Bilateral 12/10/2020    TFLESI @L5 bilateral #1 performed by Misty Stauffer MD at [address] [place] [address] N/A 12/28/2018    T7-T9 DECOMPRESSION, T7-T9 POSTERIOR FUSION performed by Francois Hinojosa MD at [place] [address]      VASCULAR SURGERY      cabg harvests from left leg         MEDICATIONS     Current Facility-Administered Medications:     0.9 % sodium chloride infusion, , IntraVENous, Continuous, Familia Pappa, PA    sodium chloride flush 0.9 % injection 5-40 mL, 5-40 mL, IntraVENous, 2 times per day, Tegan Borer Pappa, PA    sodium chloride flush 0.9 % injection 5-40 mL, 5-40 mL, IntraVENous, PRN, Tegan Borer Pappa, PA    0.9 % sodium chloride infusion, 25 mL, IntraVENous, PRN, Tegan Borer Pappa, PA    acetaminophen (TYLENOL) tablet 650 mg, 650 mg, Oral, Q4H PRN, LUCIUS Johnson   ondansetron (ZOFRAN-ODT) disintegrating tablet 4 mg, 4 mg, Oral, Q8H PRN **OR** ondansetron (ZOFRAN) injection 4 mg, 4 mg, IntraVENous, Q6H PRN, LUCIUS Musa    polyethylene glycol (GLYCOLAX) packet 17 g, 17 g, Oral, Daily, LUCIUS Musa    bisacodyl (DULCOLAX) suppository 10 mg, 10 mg, Rectal, Daily, LUCIUS Musa    senna (SENOKOT) tablet 8.6 mg, 1 tablet, Oral, Daily PRN, LUCIUS Musa    HYDROmorphone (DILAUDID) injection 0.25 mg, 0.25 mg, IntraVENous, Q3H PRN **OR** HYDROmorphone (DILAUDID) injection 0.5 mg, 0.5 mg, IntraVENous, Q3H PRN, LUCIUS Musa    oxyCODONE (ROXICODONE) immediate release tablet 5 mg, 5 mg, Oral, Q4H PRN **OR** oxyCODONE (ROXICODONE) immediate release tablet 10 mg, 10 mg, Oral, Q4H PRN, LUCIUS Musa    famotidine (PEPCID) injection 20 mg, 20 mg, IntraVENous, BID, LUCIUS Patton      SOCIAL HISTORY     Social History     Social History Narrative    Not on file     Social History     Tobacco Use    Smoking status: Never Smoker    Smokeless tobacco: Never Used   Vaping Use    Vaping Use: Never used   Substance Use Topics    Alcohol use: Not Currently     Comment: rarely    Drug use: No         ALLERGIES     Allergies   Allergen Reactions    Horse-Derived Products          FAMILY HISTORY     Family History   Problem Relation Age of Onset    Cancer Mother         uterine         PREVIOUS RECORDS   Previous records reviewed: Patient was seen at [facility] on April 19, 2022 for an endoscopy. PHYSICAL EXAM     ED Triage Vitals [05/06/22 1709]   BP Temp Temp src Pulse Resp SpO2 Height Weight   (!) 165/72 98.1 °F (36.7 °C) -- 65 15 99 % 6' 2\" (1.88 m) (!) 370 lb (167.8 kg)     Initial vital signs and nursing assessment reviewed and abnormal from Poorly controlled hypertension and obesity. Body mass index is 47.51 kg/m². Pulsoximetry is normal per my interpretation.     Additional Vital Signs:  Vitals:    05/06/22 1945   BP: (!) 161/77   Pulse: 73   Resp: 16   Temp:    SpO2:        Physical Exam  Vitals and nursing note reviewed. Constitutional:       Appearance: He is normal weight. HENT:      Head: Normocephalic and atraumatic. Eyes:      Extraocular Movements: Extraocular movements intact. Pupils: Pupils are equal, round, and reactive to light. Cardiovascular:      Rate and Rhythm: Normal rate and regular rhythm. Pulses: Normal pulses. Heart sounds: Normal heart sounds. Pulmonary:      Effort: Pulmonary effort is normal.      Breath sounds: Normal breath sounds. Abdominal:      General: Bowel sounds are normal.      Palpations: Abdomen is soft. Musculoskeletal:         General: Normal range of motion. Arms:    Skin:     General: Skin is warm and dry. Capillary Refill: Capillary refill takes less than 2 seconds. Neurological:      General: No focal deficit present. Mental Status: He is alert. MEDICAL DECISION MAKING   Initial Assessment:   1. Patient is 20-year-old male fall on blood thinners presenting with face pain and lumbosacral pain. Exam is remarkable for midline spinous tenderness in the lumbosacral region as well as a small laceration of the nasal bridge noted as above. She denies any loss of consciousness. Mechanical fall.   Plan:    CBC, CMP, troponin, ethanol, urine drug, urinalysis, CT thoracic cervical and lumbar spine, CT head without contrast, CT face, formal chest x-ray, EKG        ED RESULTS   Laboratory results:  Labs Reviewed   COMPREHENSIVE METABOLIC PANEL - Abnormal; Notable for the following components:       Result Value    Glucose 169 (*)     CREATININE 1.4 (*)     BUN 37 (*)     All other components within normal limits   CBC WITH AUTO DIFFERENTIAL - Abnormal; Notable for the following components:    RBC 3.26 (*)     Hemoglobin 10.6 (*)     Hematocrit 30.7 (*)     MCV 94.2 (*)     All other components within normal limits   GLOMERULAR FILTRATION RATE, ESTIMATED - Abnormal; Notable for the following components:    Est, Glom Filt Rate 50 (*)     All other components within normal limits   ETHANOL   ANION GAP   OSMOLALITY   TROPONIN   URINE DRUG SCREEN   COMPREHENSIVE METABOLIC PANEL W/ REFLEX TO MG FOR LOW K   CBC WITH AUTO DIFFERENTIAL   URINALYSIS WITH MICROSCOPIC       Radiologic studies results:  CT CERVICAL SPINE WO CONTRAST   Final Result   No acute cervical spine fracture. This document has been electronically signed by: Chau Jefferson MD on    05/06/2022 06:12 PM      All CTs at this facility use dose modulation techniques and iterative    reconstructions, and/or weight-based dosing   when appropriate to reduce radiation to a low as reasonably achievable. CT HEAD WO CONTRAST   Final Result   No evidence of intracranial hemorrhage. This document has been electronically signed by: Chau Jefferson MD on    05/06/2022 06:09 PM      All CTs at this facility use dose modulation techniques and iterative    reconstructions, and/or weight-based dosing   when appropriate to reduce radiation to a low as reasonably achievable. CT LUMBAR RECONSTRUCTION WO POST PROCESS   Final Result   Acute fracture of the L1 vertebral body extending through a partially    ossified anterior longitudinal ligament. Additional fracture of the    anterolateral aspect of the inferior endplate of M10 on the right side. No    significant displacement. No alignment abnormality. This document has been electronically signed by: Chau Jefferson MD on    05/06/2022 06:29 PM      All CTs at this facility use dose modulation techniques and iterative    reconstructions, and/or weight-based dosing   when appropriate to reduce radiation to a low as reasonably achievable. CT THORACIC RECONSTRUCTION WO POST PROCESS   Final Result   There is an acute fracture through the inferior endplate of B36.       This document has been electronically signed by: Chau Jefferson MD on    05/06/2022 06:32 PM      All CTs at this facility use dose modulation techniques and iterative    reconstructions, and/or weight-based dosing   when appropriate to reduce radiation to a low as reasonably achievable. CT FACIAL BONES WO CONTRAST   Final Result   No acute displaced facial bone fracture. This document has been electronically signed by: Jeanne Guevara MD on    05/06/2022 06:13 PM      All CTs at this facility use dose modulation techniques and iterative    reconstructions, and/or weight-based dosing   when appropriate to reduce radiation to a low as reasonably achievable. CT ABDOMEN PELVIS WO CONTRAST Additional Contrast? Radiologist Recommendation   Final Result   1. No evidence of hemoperitoneum or abdominal organ injury. 2. Acute fracture of the L1 vertebral body extending through a partially    ossified anterior longitudinal ligament. Additional fracture of the    anterolateral aspect of the inferior endplate of A08 on the right side. No    significant displacement. No alignment abnormality. 3. Severe distention of the urinary bladder. 4. Infiltration of fat adjacent to the kidneys and ureters. Correlate with    urinalysis to exclude pyelonephritis. This document has been electronically signed by: Jeanne Guevara MD on    05/06/2022 06:23 PM      All CTs at this facility use dose modulation techniques and iterative    reconstructions, and/or weight-based dosing   when appropriate to reduce radiation to a low as reasonably achievable. CT CHEST WO CONTRAST   Final Result   There is a fracture of the inferior endplate of F32. This document has been electronically signed by: Jeanne Guevara MD on    05/06/2022 06:25 PM      All CTs at this facility use dose modulation techniques and iterative    reconstructions, and/or weight-based dosing   when appropriate to reduce radiation to a low as reasonably achievable.       XR CHEST PORTABLE   Final Result   Bandlike focus of atelectasis or scarring within the right mid lung,    similar to the prior study.       This document has been electronically signed by: Liz Nguyen MD on    05/06/2022 05:29 PM          ED Medications administered this visit:   Medications   0.9 % sodium chloride infusion (has no administration in time range)   sodium chloride flush 0.9 % injection 5-40 mL (has no administration in time range)   sodium chloride flush 0.9 % injection 5-40 mL (has no administration in time range)   0.9 % sodium chloride infusion (has no administration in time range)   acetaminophen (TYLENOL) tablet 650 mg (has no administration in time range)   ondansetron (ZOFRAN-ODT) disintegrating tablet 4 mg (has no administration in time range)     Or   ondansetron (ZOFRAN) injection 4 mg (has no administration in time range)   polyethylene glycol (GLYCOLAX) packet 17 g (has no administration in time range)   bisacodyl (DULCOLAX) suppository 10 mg (has no administration in time range)   senna (SENOKOT) tablet 8.6 mg (has no administration in time range)   HYDROmorphone (DILAUDID) injection 0.25 mg (has no administration in time range)     Or   HYDROmorphone (DILAUDID) injection 0.5 mg (has no administration in time range)   oxyCODONE (ROXICODONE) immediate release tablet 5 mg (has no administration in time range)     Or   oxyCODONE (ROXICODONE) immediate release tablet 10 mg (has no administration in time range)   famotidine (PEPCID) injection 20 mg (has no administration in time range)   fentaNYL (SUBLIMAZE) injection 50 mcg (50 mcg IntraVENous Given 5/6/22 1722)   ondansetron (ZOFRAN) injection 4 mg (4 mg IntraVENous Given 5/6/22 1723)   iopamidol (ISOVUE-370) 76 % injection 80 mL (80 mLs IntraVENous Given 5/6/22 1731)   fentaNYL (SUBLIMAZE) injection 50 mcg (50 mcg IntraVENous Given 5/6/22 1747)   ondansetron (ZOFRAN) injection 4 mg (4 mg IntraVENous Given 5/6/22 1747)         ED COURSE     ED Course as of 05/06/22 2044   Fri May 06, 2022 2042 Patient's initial labs overall unremarkable. Imaging as above, notable fracture of the L1 vertebral body. Patient to be admitted to the trauma service. Patient pain control with fentanyl and Zofran while in the emergency department. Patient states that this is working for his pain and has no other complaints at this time. Trauma team involved with patient on arrival admit orders already placed. [HUE]      ED Course User Index  [HUE] Evaristo Jackson MD             MEDICATION CHANGES     Current Discharge Medication List            FINAL DISPOSITION     Final diagnoses:   Other closed fracture of first lumbar vertebra, initial encounter [facility])   Other closed fracture of twelfth thoracic vertebra, initial encounter ([facility] [address]   Laceration of nose, initial encounter     Condition: condition: stable  Dispo: Admit to med/surg floor      This transcription was electronically signed. Parts of this transcriptions may have been dictated by use of voice recognition software and electronically transcribed, and parts may have been transcribed with the assistance of an ED scribe. The transcription may contain errors not detected in proofreading. Please refer to my supervising physician's documentation if my documentation differs.     Electronically Signed: Purvi Paz MD, 05/06/22, 8:44 PM         Evaristo Jackson MD  Resident  05/06/22 [id number]

## 2022-05-06 NOTE — TELEPHONE ENCOUNTER
[facility] called requesting a refill of their:    furosemide (LASIX) 40 MG tablet QD    Send 90 day supply to [facility] on [employer]

## 2022-05-06 NOTE — H&P
Trauma H&P     Patient:  Juan C Laguna date: 5/6/2022   YOB: 1950 Date of Evaluation: 5/6/2022  MRN: [id number]  Acct: [de-identified]    Injury Date: 5/6/2022  Injury time: Afternoon  PCP: Arabella Ge MD   Referring physician: Dr. Vern Givens    Time of Trauma Surgeon Notification: [phone]  Time of WISAM Arrival: 3770  Time of Trauma Surgeon Arrival: 1720    Assessment:    Active Problems:    Fall    Closed T12 fracture [facility])    Closed fracture of first lumbar vertebra ([facility] [address]  Resolved Problems:    * No resolved hospital problems.  *    Plan:    Patient admitted under Trauma Services 5K on telemetry    Mechanical fall   -PT/OT when stable    Acute L1 fracture/T12 fracture   -Fractures as noted on imaging low   -Bedrest until spine specialist evaluates    -Orthopedic spine consulted for further management   -PT/OT when appropriate   -Pain control    New LBBB previously seen on EKGs   -Patient denies chest pain or new onset/worsening shortness of breath   -Troponins within normal limits   -48 hours telemetry we will continue to monitor    Infiltration adjacent to kidneys and ureters   -UA pending   -Patient afebrile WBC within normal limits   -No CVA tenderness low likelihood of pyelonephritis, will continue work-up    Nasal bridge laceration   - Local wound care   - Monitor for signs of infection    Consults orthopedic spine    Pain Management   -Dilaudid, oxycodone    Prophylaxis: SCD's, Incentive Spirometry, GlycoLax, Pepcid, Zofran    General Diet n.p.o. at midnight for orthospine evaluation tomorrow    IVF Management  Regular Neurovascular Checks  Repeat Labs Tomorrow AM  PT/OT/SLP Eval and Treat  Activity as tolerated, pt up with assistance    Planned Discharge discharge pending clinical course    Activation:    [] Level I (Trauma Alert)   [x] Level II (Injury Call)   [] Level III (Trauma Consult)   [] Downgraded (Time: )   Mode of Arrival: EMS transportation  Referring Facility: None  Loss of Consciousness [x]No []Yes[]Unknown  Duration(min):  Mechanism of Injury:  []Motor Vehicle crash   []Single Vehicle [] []Passenger []Scene Fatality []Front Seat  []Restrained   []Air Bag Deployed   []Ejected []Rollover []Pedestrian []Trapped   Type of vehicle:   Protective Devices:   []Motorcycle  Wearing Helmet []Yes []No  []Bicycle  Wearing Helmet []Yes []No  [x]Fall   Distance -standing  []Assault    Abuse Reported []Yes []No  []Gunshot  []Stabbing  []Work Related  []Burn: []Flame []Scald []Electrical []Chemical []Contact []Inhalation []House Fire  []Other:     Specialties contacted for 30 minute response:  Neurosurgery time of contact: N/A  Orthopedic surgery time of contact: 2000 contacted orthopedic spine  Interventional radiology time of contact: N/A    Subjective   Chief Complaint: Lumbar back pain    History of Present Illness:    He is a 66 y.o. male presenting at [facility] by activation of level 2 trauma, brought by EMS following a mechanical fall with no LOC; past medical history CAD with CABG and stenting x5 on Brilinta, previous cervical surgery, previous lumbar surgery, DM with diabetic neuropathy, hypercholesterolemia, hypertension, depression. Per port patient was attempting to ambulate when he tripped over his foot causing him to fall face first onto the ground and hyperextended his lumbar spine much when he heard a crack and a pop and immediate pain. Patient reports 10/10 pain in the low back. Patient states he does have a known lumbar herniated disc. Reports nausea and vomiting secondary to pain. Patient denies chest pain, shortness of breath, cough, headache, dizziness, lightheadedness, numbness, paraesthesias, weakness, chills, fevers, abdominal pain, dysuria, frequent urination, and neck pain. Care in coordination with trauma surgeon Dr. Eva Smith. Review of Systems:   Review of Systems   Constitutional: Negative for chills and fever.    HENT: Negative for facial swelling, mouth sores and nosebleeds. Eyes: Negative for photophobia, pain and visual disturbance. Respiratory: Negative for chest tightness and shortness of breath. Cardiovascular: Negative for chest pain and palpitations. Gastrointestinal: Negative for abdominal pain, nausea and vomiting. Musculoskeletal: Positive for back pain. Negative for arthralgias and neck pain. Skin: Positive for wound (Small laceration to bridge of nose). Negative for color change and pallor. Neurological: Negative for dizziness, seizures, syncope, weakness, light-headedness, numbness and headaches. Hematological: Bruises/bleeds easily (On Brilinta). Psychiatric/Behavioral: Negative for agitation and confusion. The patient is not nervous/anxious.       Horse-derived products  Past Surgical History:   Procedure Laterality Date    AMPUTATION Left 9/10/14    partial versus complete left hallux amputation, left great toe amputation    APPENDECTOMY      BACK INJECTION Bilateral 1/18/2021    Bilateral Si MBB #1 performed by Heather Mcduffie MD at [address] Bilateral 3/1/2021    Bilateral SI MBB #2 performed by Heather Mcduffie MD at [facility]    fusion of C5-C6    CHOLECYSTECTOMY      COLONOSCOPY  2007 and 2011    colonn polyps  lorenzo    CORONARY ANGIOPLASTY WITH STENT PLACEMENT  08/07/2017    Dr Irvin Lozano @ [address] [place] [address] CORONARY ARTERY BYPASS GRAFT  2015 august dox    EKG 12-LEAD  8/14/2015         FACET JOINT INJECTION Bilateral 5/22/2020    Lumbar Facet MBB @L3-4,4-5 bilateral #2 performed by Heather Mcduffie MD at [place] [address]      left leg    JOINT REPLACEMENT      bilateral knees gurvinder    PAIN MANAGEMENT PROCEDURE Bilateral 1/28/2020    Lumbar Facet MBB @ L3-4,4-5,5-S1 bilateral #1 LUMBAR FACET performed by Heather Mcduffie MD at \[facility]"" Jeffrey Fox @ [facility]   lad    CORONARY ARTERY BYPASS GRAFT  2015 august     dox    EKG 12-LEAD  8/14/2015         FACET JOINT INJECTION Bilateral 5/22/2020    Lumbar Facet MBB @L3-4,4-5 bilateral #2 performed by Mary West MD at [address]      left leg    JOINT REPLACEMENT      bilateral knees gurvinder    PAIN MANAGEMENT PROCEDURE Bilateral 1/28/2020    Lumbar Facet MBB @ L3-4,4-5,5-S1 bilateral #1 LUMBAR FACET performed by Mary West MD at [facility] [address] Right 7/14/2020    Lumbar RFA Bilateral L3-4,4-5  right side first performed by Mary West MD at [facility] [address] Left 10/1/2020    Lumbar RFA Left side L3-4, 4-5 performed by Mary West MD at [place] [address] Bilateral 11/17/2020    TFLESI @L5 bilateral #1 performed by Mary West MD at [place] [address] Bilateral 12/10/2020    TFLESI @L5 bilateral #1 performed by Mary West MD at [address] N/A 12/28/2018    T7-T9 DECOMPRESSION, T7-T9 POSTERIOR FUSION performed by Tee Carr MD at [facility]  2010    fumich    TONSILLECTOMY      VASCULAR SURGERY      cabg harvests from left leg     Social History     Socioeconomic History    Marital status:       Spouse name: Not on file    Number of children: 2    Years of education: Not on file    Highest education level: Not on file   Occupational History    Not on file   Tobacco Use    Smoking status: Never Smoker    Smokeless tobacco: Never Used   Vaping Use    Vaping Use: Never used   Substance and Sexual Activity    Alcohol use: Not Currently     Comment: rarely    Drug use: No    Sexual activity: Never   Other Topics Concern    Not on file   Social History Narrative    Not on file Social Determinants of Health     Financial Resource Strain: Low Risk     Difficulty of Paying Living Expenses: Not hard at all   Food Insecurity: No Food Insecurity    Worried About Running Out of Food in the Last Year: Never true    [place] of Food in the Last Year: Never true   Transportation Needs:     Lack of Transportation (Medical): Not on file    Lack of Transportation (Non-Medical):  Not on file   Physical Activity: Inactive    Days of Exercise per Week: 0 days    Minutes of Exercise per Session: 10 min   Stress:     Feeling of Stress : Not on file   Social Connections:     Frequency of Communication with Friends and Family: Not on file    Frequency of Social Gatherings with Friends and Family: Not on file    Attends Judaism Services: Not on file    Active Member of [address] [place] [address] [employer] or Organizations: Not on file    Attends Club or Organization Meetings: Not on file    Marital Status: Not on file   Intimate Partner Violence:     Fear of Current or Ex-Partner: Not on file    Emotionally Abused: Not on file    Physically Abused: Not on file    Sexually Abused: Not on file   Housing Stability:     Unable to Pay for Housing in the Last Year: Not on file    Number of [place] in the Last Year: Not on file    Unstable Housing in the Last Year: Not on file     Family History   Problem Relation Age of Onset    Cancer Mother         uterine       Home medications:    Current Discharge Medication List      CONTINUE these medications which have NOT CHANGED    Details   enalapril (VASOTEC) 10 MG tablet One a day  Qty: 90 tablet, Refills: 1    Associated Diagnoses: Renovascular hypertension      atorvastatin (LIPITOR) 10 MG tablet TAKE 1 TABLET BY MOUTH ONCE DAILY  Qty: 90 tablet, Refills: 1    Associated Diagnoses: Hypercholesteremia      ACCU-CHEK SMARTVIEW strip Use to test Blood Sugar 3x daily, Dx: E11.9  Qty: 100 each, Refills: 11    Associated Diagnoses: Type 2 diabetes mellitus without complication, without long-term current use of insulin ([facility])      albuterol sulfate  (90 Base) MCG/ACT inhaler Inhale 2 puffs into the lungs 2 times daily  Qty: 18 g, Refills: 3      Urea (CARMOL) 40 % cream Use   Bid  To  The  legs  Qty: 85 g, Refills: 0      carvedilol (COREG) 12.5 MG tablet Take 1 tablet by mouth 2 times daily  Qty: 180 tablet, Refills: 2    Associated Diagnoses: Essential hypertension;  Hyperkalemia; CKD (chronic kidney disease), stage II      tiZANidine (ZANAFLEX) 4 MG tablet Take 1 tablet by mouth every 6 hours as needed (Muscle Spasms)  Qty: 30 tablet, Refills: 1    Associated Diagnoses: Back muscle spasm      insulin aspart (NOVOLOG FLEXPEN) 100 UNIT/ML injection pen Inject 18 Units into the skin 3 times daily (before meals) INJECT 12 UNITS SUBCUTANEOUSLY 3 TIMES DAILY BEFORE MEALS  Qty: 10 pen, Refills: 3    Associated Diagnoses: Type 2 diabetes mellitus treated with insulin ([facility])      ticagrelor (BRILINTA) 90 MG TABS tablet Take 1 tablet by mouth twice daily  Qty: 180 tablet, Refills: 1      metFORMIN (GLUCOPHAGE) 500 MG tablet TAKE TWO TABLETS BY MOUTH TWICE DAILY WITH MEALS  Qty: 360 tablet, Refills: 1    Associated Diagnoses: Type 2 diabetes mellitus with other specified complication, with long-term current use of insulin ([facility])      ferrous sulfate (IRON 325) 325 (65 Fe) MG tablet Take 1 tablet by mouth 2 times daily  Qty: 60 tablet, Refills: 5    Associated Diagnoses: Anemia, unspecified type      Multiple Vitamins-Minerals (THERAPEUTIC MULTIVITAMIN-MINERALS) tablet Take 1 tablet by mouth daily  Qty: 30 tablet, Refills: 11    Associated Diagnoses: Anemia, unspecified type      insulin glargine (LANTUS SOLOSTAR) 100 UNIT/ML injection pen INJECT 25 UNITS SUBCUTANEOUSLY ONCE DAILY  Qty: 15 pen, Refills: 1    Comments: Please consider 90 day supplies to promote better adherence      furosemide (LASIX) 80 MG tablet Take 1 tablet by mouth daily Patient takes both 80mg and 40 mg  Qty: 60 tablet, Refills: 3    Associated Diagnoses: Venous stasis dermatitis of both lower extremities; Essential hypertension      ondansetron (ZOFRAN) 4 MG tablet Take 1 tablet by mouth every 8 hours as needed for Nausea or Vomiting  Qty: 30 tablet, Refills: 0      pantoprazole (PROTONIX) 40 MG tablet Take 1 tablet by mouth every morning (before breakfast)  Qty: 30 tablet, Refills: 0    Associated Diagnoses: Gastroesophageal reflux disease without esophagitis      nitroGLYCERIN (NITROSTAT) 0.4 MG SL tablet Place 1 tablet under the tongue every 5 minutes as needed for Chest pain  Qty: 25 tablet, Refills: 3    Associated Diagnoses: Coronary artery disease of bypass graft of native heart with stable angina pectoris (HCC)      aspirin 81 MG tablet Take 1 tablet by mouth daily      acetaminophen (TYLENOL) 500 MG tablet Take 500 mg by mouth as needed for Pain             Hospital medications:  Scheduled Meds:  Continuous Infusions:  PRN Meds:  Objective   ED TRIAGE VITALS  BP: (!) 161/77, Temp: 98.1 °F (36.7 °C), Pulse: 73, Resp: 16, SpO2: 98 %  Gamal Coma Scale  Eye Opening: Spontaneous  Best Verbal Response: Oriented  Best Motor Response: Obeys commands  Gamal Coma Scale Score: 15  Results for orders placed or performed during the hospital encounter of 05/06/22   Comprehensive Metabolic Panel   Result Value Ref Range    Glucose 169 (H) 70 - 108 mg/dL    CREATININE 1.4 (H) 0.4 - 1.2 mg/dL    BUN 37 (H) 7 - 22 mg/dL    Sodium 138 135 - 145 meq/L    Potassium 4.1 3.5 - 5.2 meq/L    Chloride 100 98 - 111 meq/L    CO2 24 23 - 33 meq/L    Calcium 8.8 8.5 - 10.5 mg/dL    AST 15 5 - 40 U/L    Alkaline Phosphatase 65 38 - 126 U/L    Total Protein 6.7 6.1 - 8.0 g/dL    Albumin 4.1 3.5 - 5.1 g/dL    Total Bilirubin 0.4 0.3 - 1.2 mg/dL    ALT 11 11 - 66 U/L   Ethanol   Result Value Ref Range    ETHYL ALCOHOL, SERUM < 0.01 0.00 %   CBC with Auto Differential   Result Value Ref Range    WBC 6.4 4.8 - 10.8 thou/mm3    RBC 3.26 (L) 4.70 - 6.10 mill/mm3    Hemoglobin 10.6 (L) 14.0 - 18.0 gm/dl    Hematocrit 30.7 (L) 42.0 - 52.0 %    MCV 94.2 (H) 80.0 - 94.0 fL    MCH 32.5 26.0 - 33.0 pg    MCHC 34.5 32.2 - 35.5 gm/dl    RDW-CV 12.4 11.5 - 14.5 %    RDW-SD 42.5 35.0 - 45.0 fL    Platelets 275 459 - 140 thou/mm3    MPV 9.5 9.4 - 12.4 fL    Seg Neutrophils 59.6 %    Lymphocytes 27.2 %    Monocytes 9.1 %    Eosinophils 3.0 %    Basophils 0.5 %    Immature Granulocytes 0.6 %    Segs Absolute 3.8 1.8 - 7.7 thou/mm3    Lymphocytes Absolute 1.7 1.0 - 4.8 thou/mm3    Monocytes Absolute 0.6 0.4 - 1.3 thou/mm3    Eosinophils Absolute 0.2 0.0 - 0.4 thou/mm3    Basophils Absolute 0.0 0.0 - 0.1 thou/mm3    Immature Grans (Abs) 0.04 0.00 - 0.07 thou/mm3    nRBC 0 /100 wbc   Anion Gap   Result Value Ref Range    Anion Gap 14.0 8.0 - 16.0 meq/L   Osmolality   Result Value Ref Range    Osmolality Calc 288. 3 275.0 - 300.0 mOsmol/kg   Glomerular Filtration Rate, Estimated   Result Value Ref Range    Est, Glom Filt Rate 50 (A) ml/min/1.73m2   EKG 12 Lead   Result Value Ref Range    Ventricular Rate 65 BPM    Atrial Rate 65 BPM    P-R Interval 210 ms    QRS Duration 146 ms    Q-T Interval 452 ms    QTc Calculation (Bazett) 470 ms    P Axis 88 degrees    R Axis -36 degrees    T Axis 58 degrees       Physical Exam:  Patient Vitals for the past 24 hrs:   BP Temp Pulse Resp SpO2 Height Weight   05/06/22 1945 (!) 161/77 -- 73 16 -- -- --   05/06/22 1850 (!) 150/70 -- 64 16 98 % -- --   05/06/22 1739 (!) 160/86 -- 70 18 98 % -- --   05/06/22 1709 (!) 165/72 98.1 °F (36.7 °C) 65 15 99 % 6' 2\" (1.88 m) (!) 370 lb (167.8 kg)     Primary Assessment:  Airway: Patent, trachea midline  Breathing: Breath sounds present and equal bilaterally, spontaneous, and unlabored  Circulation: Hemodynamically stable, 2+ central and peripheral pulses. Disability: WILL x 4, following commands.  GCS =15    Secondary Assessment:  GENERAL: Presents sitting upright in bed unassisted, A&Ox4 to person, place, time, and events; acute distress from pain in her lower spine  HEAD: Small laceration with oozing blood over bridge of nose. . No tenderness to palpation. No raccoon eyes, oneal signs or visible CSF leakage. EYES: No apparent trauma, discharge, or hematoma bilaterally. PERRL at 3mm  EARS: Set evenly on head. No apparent external trauma. NECK: Neck is atraumatic, trachea midline, no visible lacerations, step off deformity, expanding swelling or midline tenderness. CARDIO: No visible chest wall deformity or palpable crepitus. No heaves or lifts. Strong/regular S1/S2 rate and rhythm. No rubs murmurs, or gallops. 2+ radial, posterior tibial, and dorsalis pedal pulses. Capillary refill <2 sec. No extremity edema noted. PULMONARY:  Trachea midline, no audible wheezing, increased respiratory effort, accessory muscle use, or asymmetrical chest wall movement. Lung sounds are clear and audible to ascultation in superior and inferior fields. ABDOMEN: Abdomen protuberant though without lacerations. Abdomen soft and nontender to palpation in all quadrants. MSK: Moving all extremities without pain. Pelvis stable to compression. No other deformity, contusion, or bleeding.  strength 5/5 and equal bilaterally. Significant tenderness to palpation of mid lumbar spine at the midline. No tenderness to palpation at the midline of cervical, thoracicr spine. NEURO: Follows commands, reasoning intact, recalls recent events. PMS intact, moves limbs freely. No focal neurological deficits  SKIN: Appropriate for ethnicity, warm and dry. No visible deformity, contusions, abrasions, punctures, burns, tenderness, lacerations, or swelling. WOUND: Mild 1 cm laceration horizontally over bridge of nose with small amount of oozing blood, wound edges well approximated with no dehiscence. Medical Decision Making: On arrival patient vital signs stable.   Due to significant lumbar pain high suspicion of lumbar fracture. Patient sent for CT pan scan showing L1 and T12 fractures. EKG shows LBBB not present on prior EKG, patient not complaining of chest pain or new onset shortness of breath, troponins within normal limits, will monitor on telemetry. Orthopedic spine consulted for management of spine fractures, service recommended bedrest until evaluations in a.m. Patient to be admitted to [address] [place] [address] on bedrest for fracture evaluation by orthospine. Radiology:     CT CERVICAL SPINE WO CONTRAST   Final Result   No acute cervical spine fracture. This document has been electronically signed by: Lesly Puentes MD on    05/06/2022 06:12 PM      All CTs at this facility use dose modulation techniques and iterative    reconstructions, and/or weight-based dosing   when appropriate to reduce radiation to a low as reasonably achievable. CT HEAD WO CONTRAST   Final Result   No evidence of intracranial hemorrhage. This document has been electronically signed by: Lesly Puentes MD on    05/06/2022 06:09 PM      All CTs at this facility use dose modulation techniques and iterative    reconstructions, and/or weight-based dosing   when appropriate to reduce radiation to a low as reasonably achievable. CT LUMBAR RECONSTRUCTION WO POST PROCESS   Final Result   Acute fracture of the L1 vertebral body extending through a partially    ossified anterior longitudinal ligament. Additional fracture of the    anterolateral aspect of the inferior endplate of D16 on the right side. No    significant displacement. No alignment abnormality. This document has been electronically signed by: Lesly Puentes MD on    05/06/2022 06:29 PM      All CTs at this facility use dose modulation techniques and iterative    reconstructions, and/or weight-based dosing   when appropriate to reduce radiation to a low as reasonably achievable.       CT THORACIC RECONSTRUCTION WO POST PROCESS   Final Result   There is an acute fracture through the inferior endplate of H20. This document has been electronically signed by: Angel Davis MD on    05/06/2022 06:32 PM      All CTs at this facility use dose modulation techniques and iterative    reconstructions, and/or weight-based dosing   when appropriate to reduce radiation to a low as reasonably achievable. CT FACIAL BONES WO CONTRAST   Final Result   No acute displaced facial bone fracture. This document has been electronically signed by: Angel Davis MD on    05/06/2022 06:13 PM      All CTs at this facility use dose modulation techniques and iterative    reconstructions, and/or weight-based dosing   when appropriate to reduce radiation to a low as reasonably achievable. CT ABDOMEN PELVIS WO CONTRAST Additional Contrast? Radiologist Recommendation   Final Result   1. No evidence of hemoperitoneum or abdominal organ injury. 2. Acute fracture of the L1 vertebral body extending through a partially    ossified anterior longitudinal ligament. Additional fracture of the    anterolateral aspect of the inferior endplate of I49 on the right side. No    significant displacement. No alignment abnormality. 3. Severe distention of the urinary bladder. 4. Infiltration of fat adjacent to the kidneys and ureters. Correlate with    urinalysis to exclude pyelonephritis. This document has been electronically signed by: Angel Davis MD on    05/06/2022 06:23 PM      All CTs at this facility use dose modulation techniques and iterative    reconstructions, and/or weight-based dosing   when appropriate to reduce radiation to a low as reasonably achievable. CT CHEST WO CONTRAST   Final Result   There is a fracture of the inferior endplate of O12.       This document has been electronically signed by: Angel Davis MD on    05/06/2022 06:25 PM      All CTs at this facility use dose modulation techniques and iterative    reconstructions, and/or weight-based dosing   when appropriate to reduce radiation to a low as reasonably achievable. XR CHEST PORTABLE   Final Result   Bandlike focus of atelectasis or scarring within the right mid lung,    similar to the prior study. This document has been electronically signed by: Danika Hallman MD on    05/06/2022 05:29 PM        Fast Exam: Yes    FAST EXAM:  A limited, bedside FAST exam was performed. The medical necessity was to evaluate for the presence or absence of intraperitoneal or pericardial fluid. The structures studied were the hepatorenal space, splenorenal space, pericardium, and bladder. FINDINGS:  negative for free intra-abdominal fluid. The study was technically adequate.   Formed by ED resident and myself at bedside    Electronically signed by LUCIUS Raya on 5/6/2022 at 8:00 PM

## 2022-05-07 LAB
ALBUMIN SERPL-MCNC: 3.7 G/DL (ref 3.5–5.1)
ALP BLD-CCNC: 60 U/L (ref 38–126)
ALT SERPL-CCNC: 9 U/L (ref 11–66)
AMPHETAMINE+METHAMPHETAMINE URINE SCREEN: NEGATIVE
ANION GAP SERPL CALCULATED.3IONS-SCNC: 9 MEQ/L (ref 8–16)
AST SERPL-CCNC: 14 U/L (ref 5–40)
BACTERIA: NORMAL
BARBITURATE QUANTITATIVE URINE: NEGATIVE
BASOPHILS # BLD: 0.4 %
BASOPHILS ABSOLUTE: 0 THOU/MM3 (ref 0–0.1)
BENZODIAZEPINE QUANTITATIVE URINE: NEGATIVE
BILIRUB SERPL-MCNC: 0.5 MG/DL (ref 0.3–1.2)
BILIRUBIN URINE: NEGATIVE
BLOOD, URINE: NEGATIVE
BUN BLDV-MCNC: 33 MG/DL (ref 7–22)
CALCIUM SERPL-MCNC: 8.8 MG/DL (ref 8.5–10.5)
CANNABINOID QUANTITATIVE URINE: NEGATIVE
CASTS: NORMAL /LPF
CASTS: NORMAL /LPF
CHARACTER, URINE: CLEAR
CHLORIDE BLD-SCNC: 105 MEQ/L (ref 98–111)
CO2: 26 MEQ/L (ref 23–33)
COCAINE METABOLITE QUANTITATIVE URINE: NEGATIVE
COLOR: YELLOW
CREAT SERPL-MCNC: 1.2 MG/DL (ref 0.4–1.2)
CRYSTALS: NORMAL
EKG ATRIAL RATE: 65 BPM
EKG P AXIS: 88 DEGREES
EKG P-R INTERVAL: 210 MS
EKG Q-T INTERVAL: 452 MS
EKG QRS DURATION: 146 MS
EKG QTC CALCULATION (BAZETT): 470 MS
EKG R AXIS: -36 DEGREES
EKG T AXIS: 58 DEGREES
EKG VENTRICULAR RATE: 65 BPM
EOSINOPHIL # BLD: 2.7 %
EOSINOPHILS ABSOLUTE: 0.2 THOU/MM3 (ref 0–0.4)
EPITHELIAL CELLS, UA: NORMAL /HPF
ERYTHROCYTE [DISTWIDTH] IN BLOOD BY AUTOMATED COUNT: 12.2 % (ref 11.5–14.5)
ERYTHROCYTE [DISTWIDTH] IN BLOOD BY AUTOMATED COUNT: 42.5 FL (ref 35–45)
GFR SERPL CREATININE-BSD FRML MDRD: 59 ML/MIN/1.73M2
GLUCOSE BLD-MCNC: 142 MG/DL (ref 70–108)
GLUCOSE BLD-MCNC: 161 MG/DL (ref 70–108)
GLUCOSE BLD-MCNC: 177 MG/DL (ref 70–108)
GLUCOSE BLD-MCNC: 189 MG/DL (ref 70–108)
GLUCOSE BLD-MCNC: 192 MG/DL (ref 70–108)
GLUCOSE, URINE: NEGATIVE MG/DL
HCT VFR BLD CALC: 27.5 % (ref 42–52)
HEMOGLOBIN: 9.3 GM/DL (ref 14–18)
IMMATURE GRANS (ABS): 0.01 THOU/MM3 (ref 0–0.07)
IMMATURE GRANULOCYTES: 0.1 %
KETONES, URINE: NEGATIVE
LEUKOCYTE ESTERASE, URINE: NEGATIVE
LYMPHOCYTES # BLD: 28.9 %
LYMPHOCYTES ABSOLUTE: 2.1 THOU/MM3 (ref 1–4.8)
MCH RBC QN AUTO: 32 PG (ref 26–33)
MCHC RBC AUTO-ENTMCNC: 33.8 GM/DL (ref 32.2–35.5)
MCV RBC AUTO: 94.5 FL (ref 80–94)
MISCELLANEOUS LAB TEST RESULT: NORMAL
MONOCYTES # BLD: 10.2 %
MONOCYTES ABSOLUTE: 0.8 THOU/MM3 (ref 0.4–1.3)
NITRITE, URINE: NEGATIVE
NUCLEATED RED BLOOD CELLS: 0 /100 WBC
OPIATES, URINE: NEGATIVE
OSMOLALITY CALCULATION: 289.1 MOSMOL/KG (ref 275–300)
OXYCODONE: POSITIVE
PH UA: 5 (ref 5–9)
PHENCYCLIDINE QUANTITATIVE URINE: NEGATIVE
PLATELET # BLD: 128 THOU/MM3 (ref 130–400)
PMV BLD AUTO: 9.5 FL (ref 9.4–12.4)
POTASSIUM REFLEX MAGNESIUM: 3.8 MEQ/L (ref 3.5–5.2)
PROTEIN UA: NEGATIVE MG/DL
RBC # BLD: 2.91 MILL/MM3 (ref 4.7–6.1)
RBC URINE: NORMAL /HPF
RENAL EPITHELIAL, UA: NORMAL
SEG NEUTROPHILS: 57.7 %
SEGMENTED NEUTROPHILS ABSOLUTE COUNT: 4.3 THOU/MM3 (ref 1.8–7.7)
SODIUM BLD-SCNC: 140 MEQ/L (ref 135–145)
SPECIFIC GRAVITY UA: 1.01 (ref 1–1.03)
TOTAL PROTEIN: 5.7 G/DL (ref 6.1–8)
UROBILINOGEN, URINE: 0.2 EU/DL (ref 0–1)
WBC # BLD: 7.4 THOU/MM3 (ref 4.8–10.8)
WBC UA: NORMAL /HPF
YEAST: NORMAL

## 2022-05-07 PROCEDURE — 2580000003 HC RX 258: Performed by: PHYSICIAN ASSISTANT

## 2022-05-07 PROCEDURE — G0378 HOSPITAL OBSERVATION PER HR: HCPCS

## 2022-05-07 PROCEDURE — 36415 COLL VENOUS BLD VENIPUNCTURE: CPT

## 2022-05-07 PROCEDURE — 82948 REAGENT STRIP/BLOOD GLUCOSE: CPT

## 2022-05-07 PROCEDURE — 93010 ELECTROCARDIOGRAM REPORT: CPT | Performed by: INTERNAL MEDICINE

## 2022-05-07 PROCEDURE — 80307 DRUG TEST PRSMV CHEM ANLYZR: CPT

## 2022-05-07 PROCEDURE — 85025 COMPLETE CBC W/AUTO DIFF WBC: CPT

## 2022-05-07 PROCEDURE — 94760 N-INVAS EAR/PLS OXIMETRY 1: CPT

## 2022-05-07 PROCEDURE — 1200000003 HC TELEMETRY R&B

## 2022-05-07 PROCEDURE — 80053 COMPREHEN METABOLIC PANEL: CPT

## 2022-05-07 PROCEDURE — 6370000000 HC RX 637 (ALT 250 FOR IP): Performed by: FAMILY MEDICINE

## 2022-05-07 PROCEDURE — 99232 SBSQ HOSP IP/OBS MODERATE 35: CPT | Performed by: SURGERY

## 2022-05-07 PROCEDURE — 6370000000 HC RX 637 (ALT 250 FOR IP): Performed by: PHYSICIAN ASSISTANT

## 2022-05-07 PROCEDURE — 6370000000 HC RX 637 (ALT 250 FOR IP): Performed by: NURSE PRACTITIONER

## 2022-05-07 PROCEDURE — APPSS60 APP SPLIT SHARED TIME 46-60 MINUTES: Performed by: NURSE PRACTITIONER

## 2022-05-07 PROCEDURE — 81001 URINALYSIS AUTO W/SCOPE: CPT

## 2022-05-07 PROCEDURE — 2500000003 HC RX 250 WO HCPCS: Performed by: PHYSICIAN ASSISTANT

## 2022-05-07 PROCEDURE — 96376 TX/PRO/DX INJ SAME DRUG ADON: CPT

## 2022-05-07 RX ORDER — MULTIVITAMIN WITH IRON
1 TABLET ORAL DAILY
Status: DISCONTINUED | OUTPATIENT
Start: 2022-05-07 | End: 2022-05-13 | Stop reason: HOSPADM

## 2022-05-07 RX ORDER — ENALAPRIL MALEATE 10 MG/1
10 TABLET ORAL DAILY
Status: DISCONTINUED | OUTPATIENT
Start: 2022-05-07 | End: 2022-05-13 | Stop reason: HOSPADM

## 2022-05-07 RX ORDER — DEXTROSE MONOHYDRATE 50 MG/ML
100 INJECTION, SOLUTION INTRAVENOUS PRN
Status: DISCONTINUED | OUTPATIENT
Start: 2022-05-07 | End: 2022-05-13 | Stop reason: HOSPADM

## 2022-05-07 RX ORDER — INSULIN LISPRO 100 [IU]/ML
0-3 INJECTION, SOLUTION INTRAVENOUS; SUBCUTANEOUS NIGHTLY
Status: DISCONTINUED | OUTPATIENT
Start: 2022-05-07 | End: 2022-05-13 | Stop reason: HOSPADM

## 2022-05-07 RX ORDER — TIZANIDINE 4 MG/1
4 TABLET ORAL EVERY 6 HOURS PRN
Status: DISCONTINUED | OUTPATIENT
Start: 2022-05-07 | End: 2022-05-13 | Stop reason: HOSPADM

## 2022-05-07 RX ORDER — FERROUS SULFATE 325(65) MG
325 TABLET ORAL 2 TIMES DAILY
Status: DISCONTINUED | OUTPATIENT
Start: 2022-05-07 | End: 2022-05-13 | Stop reason: HOSPADM

## 2022-05-07 RX ORDER — FUROSEMIDE 80 MG
TABLET ORAL
Qty: 60 TABLET | Refills: 3 | Status: SHIPPED | OUTPATIENT
Start: 2022-05-07

## 2022-05-07 RX ORDER — INSULIN LISPRO 100 [IU]/ML
0-6 INJECTION, SOLUTION INTRAVENOUS; SUBCUTANEOUS
Status: DISCONTINUED | OUTPATIENT
Start: 2022-05-07 | End: 2022-05-13 | Stop reason: HOSPADM

## 2022-05-07 RX ORDER — ASPIRIN 81 MG/1
81 TABLET, CHEWABLE ORAL DAILY
Status: DISCONTINUED | OUTPATIENT
Start: 2022-05-07 | End: 2022-05-13 | Stop reason: HOSPADM

## 2022-05-07 RX ORDER — DEXTROSE MONOHYDRATE 25 G/50ML
12.5 INJECTION, SOLUTION INTRAVENOUS PRN
Status: DISCONTINUED | OUTPATIENT
Start: 2022-05-07 | End: 2022-05-07 | Stop reason: RX

## 2022-05-07 RX ORDER — INSULIN LISPRO 100 [IU]/ML
12 INJECTION, SOLUTION INTRAVENOUS; SUBCUTANEOUS
Status: DISCONTINUED | OUTPATIENT
Start: 2022-05-07 | End: 2022-05-10

## 2022-05-07 RX ORDER — NICOTINE POLACRILEX 4 MG
15 LOZENGE BUCCAL PRN
Status: DISCONTINUED | OUTPATIENT
Start: 2022-05-07 | End: 2022-05-07 | Stop reason: RX

## 2022-05-07 RX ORDER — FUROSEMIDE 40 MG/1
40 TABLET ORAL
Status: DISCONTINUED | OUTPATIENT
Start: 2022-05-07 | End: 2022-05-13 | Stop reason: HOSPADM

## 2022-05-07 RX ORDER — INSULIN GLARGINE 100 [IU]/ML
25 INJECTION, SOLUTION SUBCUTANEOUS NIGHTLY
Status: DISCONTINUED | OUTPATIENT
Start: 2022-05-07 | End: 2022-05-10

## 2022-05-07 RX ORDER — ATORVASTATIN CALCIUM 10 MG/1
10 TABLET, FILM COATED ORAL DAILY
Status: DISCONTINUED | OUTPATIENT
Start: 2022-05-07 | End: 2022-05-13 | Stop reason: HOSPADM

## 2022-05-07 RX ORDER — PANTOPRAZOLE SODIUM 40 MG/1
40 TABLET, DELAYED RELEASE ORAL
Status: DISCONTINUED | OUTPATIENT
Start: 2022-05-08 | End: 2022-05-13 | Stop reason: HOSPADM

## 2022-05-07 RX ORDER — CARVEDILOL 6.25 MG/1
12.5 TABLET ORAL 2 TIMES DAILY
Status: DISCONTINUED | OUTPATIENT
Start: 2022-05-07 | End: 2022-05-13 | Stop reason: HOSPADM

## 2022-05-07 RX ORDER — FUROSEMIDE 40 MG/1
80 TABLET ORAL EVERY MORNING
Status: DISCONTINUED | OUTPATIENT
Start: 2022-05-08 | End: 2022-05-13 | Stop reason: HOSPADM

## 2022-05-07 RX ORDER — UREA 40 %
CREAM (GRAM) TOPICAL PRN
Status: DISCONTINUED | OUTPATIENT
Start: 2022-05-07 | End: 2022-05-13 | Stop reason: HOSPADM

## 2022-05-07 RX ADMIN — TICAGRELOR 90 MG: 90 TABLET ORAL at 17:11

## 2022-05-07 RX ADMIN — INSULIN LISPRO 1 UNITS: 100 INJECTION, SOLUTION INTRAVENOUS; SUBCUTANEOUS at 22:06

## 2022-05-07 RX ADMIN — OXYCODONE HYDROCHLORIDE 5 MG: 5 TABLET ORAL at 02:24

## 2022-05-07 RX ADMIN — INSULIN GLARGINE 25 UNITS: 100 INJECTION, SOLUTION SUBCUTANEOUS at 22:06

## 2022-05-07 RX ADMIN — TICAGRELOR 90 MG: 90 TABLET ORAL at 22:03

## 2022-05-07 RX ADMIN — FUROSEMIDE 40 MG: 40 TABLET ORAL at 17:11

## 2022-05-07 RX ADMIN — FERROUS SULFATE TAB 325 MG (65 MG ELEMENTAL FE) 325 MG: 325 (65 FE) TAB at 22:02

## 2022-05-07 RX ADMIN — INSULIN LISPRO 1 UNITS: 100 INJECTION, SOLUTION INTRAVENOUS; SUBCUTANEOUS at 17:11

## 2022-05-07 RX ADMIN — SODIUM CHLORIDE, PRESERVATIVE FREE 10 ML: 5 INJECTION INTRAVENOUS at 22:03

## 2022-05-07 RX ADMIN — INSULIN LISPRO 12 UNITS: 100 INJECTION, SOLUTION INTRAVENOUS; SUBCUTANEOUS at 17:11

## 2022-05-07 RX ADMIN — FAMOTIDINE 20 MG: 10 INJECTION, SOLUTION INTRAVENOUS at 08:26

## 2022-05-07 RX ADMIN — ASPIRIN 81 MG CHEWABLE TABLET 81 MG: 81 TABLET CHEWABLE at 17:11

## 2022-05-07 RX ADMIN — Medication 1 TABLET: at 17:11

## 2022-05-07 RX ADMIN — CARVEDILOL 12.5 MG: 6.25 TABLET, FILM COATED ORAL at 22:02

## 2022-05-07 RX ADMIN — ENALAPRIL MALEATE 10 MG: 10 TABLET ORAL at 17:11

## 2022-05-07 RX ADMIN — ATORVASTATIN CALCIUM 10 MG: 10 TABLET, FILM COATED ORAL at 22:05

## 2022-05-07 ASSESSMENT — PAIN DESCRIPTION - DESCRIPTORS
DESCRIPTORS: ACHING
DESCRIPTORS: ACHING

## 2022-05-07 ASSESSMENT — PAIN DESCRIPTION - ORIENTATION
ORIENTATION: LOWER
ORIENTATION: LOWER

## 2022-05-07 ASSESSMENT — PAIN DESCRIPTION - LOCATION
LOCATION: BACK
LOCATION: BACK

## 2022-05-07 ASSESSMENT — PAIN SCALES - GENERAL
PAINLEVEL_OUTOF10: 4
PAINLEVEL_OUTOF10: 0
PAINLEVEL_OUTOF10: 3
PAINLEVEL_OUTOF10: 2

## 2022-05-07 ASSESSMENT — PAIN - FUNCTIONAL ASSESSMENT: PAIN_FUNCTIONAL_ASSESSMENT: ACTIVITIES ARE NOT PREVENTED

## 2022-05-07 ASSESSMENT — PAIN DESCRIPTION - ONSET: ONSET: ON-GOING

## 2022-05-07 ASSESSMENT — PAIN DESCRIPTION - FREQUENCY: FREQUENCY: CONTINUOUS

## 2022-05-07 ASSESSMENT — PAIN DESCRIPTION - PAIN TYPE: TYPE: ACUTE PAIN

## 2022-05-07 NOTE — PROGRESS NOTES
Bernie Falk  Daily Progress Note  Pt Name: Arya Frausto  Medical Record Number: [id number]  Date of Birth 1950   Today's Date: 5/7/2022    HD: # 1    CC: Back pain     ASSESSMENT  1. Active Hospital Problems    Diagnosis Date Noted    Fall [W19. XXXA]      Priority: High    Closed T12 fracture ([facility] [S22.089A] 05/06/2022     Priority: Medium    Closed fracture of first lumbar vertebra ([facility] [S32.019A] 05/06/2022     Priority: Medium         PLAN  Patient admitted under Trauma Services to  with telemetry     Mechanical fall              -PT/OT when brace from [facility] arrives     Acute L1 fracture/T12 fracture              -Inferior endplate fracture of T41 and vertebral body fracture of L1              -Ortho Spine has evaluated and recommends non op management with brace   - Bedrest with BRP until brace arrives from [facility]              -PT/OT when appropriate              -Pain control     New LBBB previously seen on EKGs              -Patient denies chest pain or new onset/worsening shortness of breath              -Troponins within normal limits              -48 hours telemetry we will continue to monitor     Infiltration adjacent to kidneys and ureters              -UA negative               -Patient afebrile WBC within normal limits              -No CVA tenderness low likelihood of pyelonephritis, will continue work-up     Nasal bridge laceration              - Local wound care              - Monitor for signs of infection     Consults orthopedic spine     Pain Management              -Dilaudid, oxycodone     Prophylaxis: SCD's, Incentive Spirometry, GlycoLax, Pepcid, Zofran     Diabetic diet     Stop IVF Management  Regular Neurovascular Checks  Repeat Labs Tomorrow AM  PT/OT/SLP Eval and Treat once brace arrives  Activity as tolerated, pt up with assistance     Discharge disposition pending clinical course     SUBJECTIVE  Rosalba Cruz continues on 5K. He reports that he continues to have back pain but it is intermittent and not as bad as it was when he initially came in. He has ambulated to the bathroom with a walker. Denies any urinary or bowel incontinence. He has not had anything to eat yet as he has been NPO in the event that Ortho Spine wanted to complete any procedures today. Will advance diet. He continues to deny chest pain. He is anxious for the brace to arrive from [facility]. Case discussed with Dr. Trung Colorado. Wt Readings from Last 3 Encounters:   05/07/22 (!) 366 lb 4.8 oz (166.2 kg)   04/13/22 (!) 369 lb (167.4 kg)   04/05/22 (!) 375 lb (170.1 kg)     Temp Readings from Last 3 Encounters:   05/07/22 98.6 °F (37 °C) (Oral)   04/05/22 96.3 °F (35.7 °C) (Infrared)   02/21/22 96.7 °F (35.9 °C) (Infrared)     BP Readings from Last 3 Encounters:   05/07/22 134/61   04/05/22 106/64   03/31/22 122/74     Pulse Readings from Last 3 Encounters:   05/07/22 65   04/05/22 76   03/31/22 68       24 HR INTAKE/OUTPUT :     Intake/Output Summary (Last 24 hours) at 5/7/2022 0754  Last data filed at 5/7/2022 0651  Gross per 24 hour   Intake 721.63 ml   Output --   Net 721.63 ml     Diet NPO    OBJECTIVE  CURRENT VITALS /61   Pulse 65   Temp 98.6 °F (37 °C) (Oral)   Resp 18   Ht 6' 2\" (1.88 m)   Wt (!) 366 lb 4.8 oz (166.2 kg)   SpO2 98%   BMI 47.03 kg/m²       OBJECTIVE  CURRENT VITALS BP (!) 149/62   Pulse 65   Temp 98.1 °F (36.7 °C) (Oral)   Resp 16   Ht 6' 2\" (1.88 m)   Wt (!) 366 lb 4.8 oz (166.2 kg)   SpO2 100%   BMI 47.03 kg/m²   GENERAL: Awake, alert, no acute distress, pleasant and cooperative with exam  HEENT: Normocephalic, atraumatic, pupils equal and reactive to light, nares patent bilaterally  NEURO: Alert and orient x3, GCS 15, follows commands, PMS intact in all four extremities, no signs of focal neurological deficits  CSPINE/BACK: No midline cervical tenderness. Lower thoracic and upper lumbar tenderness to palpation noted  HEART: Regular rate and rhythm with no obvious murmurs, rubs, gallops. Distal pulses intact. LUNGS/CHEST WALL: Lungs are clear to auscultation bilaterally with no wheezes, rales, rhonchi. No respiratory distress or increased work of breathing. No chest wall tenderness to palpation. ABDOMEN: Abdomen soft, nondistended, with no tenderness to palpation. No guarding or peritoneal signs. Bowel sounds normal active  EXTREMITIES: No cyanosis or edema. PMS intact in all four extremities. No extremity tenderness to palpation. Range of motion intact. Strength 5/5 bilaterally with  strength and plantar/dorsiflexion. 2+ lower extremity edema  SKIN: Warm and dry  WOUNDS: nasal bridge laceration with no active drainage          LABS  CBC :   Recent Labs     05/06/22 1725 05/07/22 0517   WBC 6.4 7.4   HGB 10.6* 9.3*   HCT 30.7* 27.5*   MCV 94.2* 94.5*    128*     BMP:   Recent Labs     05/06/22 1725 05/07/22 0517    140   K 4.1 3.8    105   CO2 24 26   BUN 37* 33*   CREATININE 1.4* 1.2     COAGS:   Recent Labs     05/06/22 1725 05/07/22  0517   PROT 6.7 5.7*     Pancreas/HFP:  No results for input(s): LIPASE, AMYLASE in the last 72 hours. Recent Labs     05/06/22 1725 05/07/22 0517   AST 15 14   ALT 11 9*   BILITOT 0.4 0.5   ALKPHOS 65 60     RADIOLOGY:  No new results      Electronically signed by ABHISHEK Wilkins CNP on 5/7/2022 at 7:54 AM    Patient seen and examined independently by me earlier this AM. Above discussed and I agree with Willis Mcmahon CNP. See my additional comments below for updated orders and plan. Labs, cultures, and radiographs where available were reviewed. I discussed patient concerns with the patient's nurse and instructions were given. Please see our orders for the updated patient care plan. -Orthopedic consultation. Nonoperative management with brace only.   Bedrest until brace arrives from limb and brace. PT/OT when appropriate. Pain control. DVT prophylaxis. Telemetry. Advance diet as tolerated. SCDs for DVT prophylaxis. Chemical prophylaxis tomorrow if doing well. Pulmonary toileting. A.m. labs. Discharge planning in process.     Electronically signed by Leydi Pastrana MD on 5/7/22 at 9:08 PM EDT

## 2022-05-07 NOTE — CONSULTS
Inpatient Consultation    Eduard Cevallos (1950)  5/7/2022    Reason for Consult:  T12 and L1 fractures    CHIEF COMPLAINT:  Back pain    History Obtained From:  patient    HISTORY OF PRESENT ILLNESS:                The patient is a 66 y.o. male who presents with above chief complaint. Patient sustained a fall yesterday. Patient tripped over his foot and fall onto his face. He reports he heard and felt a crack in his back. He reports lumbar pain with no radicular symptoms. Denies bladder or bowel dysfunction. Denies saddle paresthesia. Patient has had prior lumbar surgery in the past with Dr. Zan Ruano and a prior T6-9 fusion with Dr. Jessa Lake. CT lumbar and thoracic shows T12 and L1 acute fractures.      Past Medical History:        Diagnosis Date    RAUL (acute kidney injury) ([facility] 2/13/2020    ASHD (arteriosclerotic heart disease) 2005 2006    stent  baki    Depression     Diabetic peripheral neuropathy [facility]) 2014    Fracture Oct 1984    left lower extremity     HTN (hypertension)     Hypercholesteremia     IDDM (insulin dependent diabetes mellitus)     Low back pain     Morbid obesity with BMI of 45.0-49.9, adult ([facility]     Osteoarthritis      Past Surgical History:        Procedure Laterality Date    AMPUTATION Left 9/10/14    partial versus complete left hallux amputation, left great toe amputation    APPENDECTOMY      BACK INJECTION Bilateral 1/18/2021    Bilateral Si MBB #1 performed by Reza Ignacio MD at [address] Bilateral 3/1/2021    Bilateral SI MBB #2 performed by Reza Ignacio MD at [facility]    fusion of C5-C6    CHOLECYSTECTOMY      COLONOSCOPY  2007 and 2011    colonn polyps  lorenzo    CORONARY ANGIOPLASTY WITH STENT PLACEMENT  08/07/2017    Dr Horacio Acosta @ [facility]   lad    CORONARY ARTERY BYPASS GRAFT  2015 august dox    EKG 12-LEAD  8/14/2015         FACET JOINT INJECTION Bilateral 5/22/2020    Lumbar Facet MBB @L3-4,4-5 bilateral #2 performed by Rudy Olea MD at [address] [facility]      left leg    JOINT REPLACEMENT      bilateral knees gurvinder    PAIN MANAGEMENT PROCEDURE Bilateral 1/28/2020    Lumbar Facet MBB @ L3-4,4-5,5-S1 bilateral #1 LUMBAR FACET performed by Rudy Olea MD at [facility] [address] Right 7/14/2020    Lumbar RFA Bilateral L3-4,4-5  right side first performed by Rudy Olea MD at [facility] [address] Left 10/1/2020    Lumbar RFA Left side L3-4, 4-5 performed by Rudy Olea MD at [place] [address] Bilateral 11/17/2020    TFLESI @L5 bilateral #1 performed by Rudy Olea MD at [place] [address] Bilateral 12/10/2020    TFLESI @L5 bilateral #1 performed by Rudy Olea MD at [address] N/A 12/28/2018    T7-T9 DECOMPRESSION, T7-T9 POSTERIOR FUSION performed by Verona Najera MD at [address]  2010    [facility]    TONSILLECTOMY      VASCULAR SURGERY      cabg harvests from left leg     Current Medications:   Current Facility-Administered Medications: glucagon (rDNA) injection 1 mg, 1 mg, IntraMUSCular, PRN  dextrose 5 % solution, 100 mL/hr, IntraVENous, PRN  insulin lispro (HUMALOG) injection vial 0-6 Units, 0-6 Units, SubCUTAneous, TID WC  insulin lispro (HUMALOG) injection vial 0-3 Units, 0-3 Units, SubCUTAneous, Nightly  glucose chewable tablet 16 g, 4 tablet, Oral, PRN  dextrose bolus 10% 125 mL, 125 mL, IntraVENous, PRN **OR** dextrose bolus 10% 250 mL, 250 mL, IntraVENous, PRN  0.9 % sodium chloride infusion, , IntraVENous, Continuous  sodium chloride flush 0.9 % injection 5-40 mL, 5-40 mL, IntraVENous, 2 times per day  sodium chloride flush 0.9 % injection 5-40 mL, 5-40 mL, IntraVENous, PRN  0.9 % sodium chloride infusion, 25 mL, IntraVENous, PRN  acetaminophen (TYLENOL) tablet 650 mg, 650 mg, Oral, Q4H PRN  ondansetron (ZOFRAN-ODT) disintegrating tablet 4 mg, 4 mg, Oral, Q8H PRN **OR** ondansetron (ZOFRAN) injection 4 mg, 4 mg, IntraVENous, Q6H PRN  polyethylene glycol (GLYCOLAX) packet 17 g, 17 g, Oral, Daily  bisacodyl (DULCOLAX) suppository 10 mg, 10 mg, Rectal, Daily  senna (SENOKOT) tablet 8.6 mg, 1 tablet, Oral, Daily PRN  HYDROmorphone (DILAUDID) injection 0.25 mg, 0.25 mg, IntraVENous, Q3H PRN **OR** HYDROmorphone (DILAUDID) injection 0.5 mg, 0.5 mg, IntraVENous, Q3H PRN  oxyCODONE (ROXICODONE) immediate release tablet 5 mg, 5 mg, Oral, Q4H PRN **OR** oxyCODONE (ROXICODONE) immediate release tablet 10 mg, 10 mg, Oral, Q4H PRN  famotidine (PEPCID) injection 20 mg, 20 mg, IntraVENous, BID  Allergies:  Horse-derived products    Social History:   TOBACCO:   reports that he has never smoked. He has never used smokeless tobacco.  ETOH:   reports previous alcohol use. DRUGS:   reports no history of drug use.   Family History:       Problem Relation Age of Onset   [place] Cancer Mother         uterine       REVIEW OF SYSTEMS:    CONSTITUTIONAL:  negative for  fevers, chills, fatigue and weight loss  RESPIRATORY:  negative for  cough with sputum and dyspnea  CARDIOVASCULAR:  negative for  chest pain, dyspnea, exertional chest pressure/discomfort  GASTROINTESTINAL:  negative for nausea, vomiting, diarrhea and abdominal pain  GENITOURINARY:  negative for frequency and urinary incontinence  MUSCULOSKELETAL:  positive for back Pain  NEUROLOGICAL:  negative for headaches, dizziness and syncope  BEHAVIOR/PSYCH:  negative for depressed mood, increased agitation and anxiety    PHYSICAL EXAM:      CONSTITUTIONAL:  awake, alert, cooperative, no apparent distress, and appears stated age  BACK:  Limited ROM with tenderness throughout lumbar spine  LUNGS:  good air exchange, clear to auscultation bilaterally  CARDIOVASCULAR:  Normal apical impulse, regular rate and rhythm, normal S1 and S2  ABDOMEN:  No scars, normal bowel sounds, soft, non-distended, non-tender, no masses palpated, no hepatosplenomegally  MUSCULOSKELETAL:  There is no redness, warmth, or swelling of the joints. Full range of motion noted. NEUROLOGIC:  Awake, alert, oriented to name, place and time. Motor is 5 out of 5 bilaterally. Sensory is intact. DATA:    CBC:   Lab Results   Component Value Date    WBC 7.4 05/07/2022    RBC 2.91 05/07/2022    RBC 3.52 10/17/2018    HGB 9.3 05/07/2022    HCT 27.5 05/07/2022    MCV 94.5 05/07/2022    MCH 32.0 05/07/2022    MCHC 33.8 05/07/2022    RDW 12.7 10/17/2018     05/07/2022    MPV 9.5 05/07/2022     WBC:    Lab Results   Component Value Date    WBC 7.4 05/07/2022     Hemoglobin/Hematocrit:    Lab Results   Component Value Date    HGB 9.3 05/07/2022    HCT 27.5 05/07/2022     CMP:    Lab Results   Component Value Date     05/07/2022    K 3.8 05/07/2022     05/07/2022    CO2 26 05/07/2022    BUN 33 05/07/2022    CREATININE 1.2 05/07/2022    LABGLOM 59 05/07/2022    GLUCOSE 142 05/07/2022    GLUCOSE 125 10/17/2018    PROT 5.7 05/07/2022    LABALBU 3.7 05/07/2022    LABALBU 4.4 09/18/2011    CALCIUM 8.8 05/07/2022    BILITOT 0.5 05/07/2022    ALKPHOS 60 05/07/2022    AST 14 05/07/2022    ALT 9 05/07/2022         Radiology:   CT thoracic and lumbar  Impression   Acute fracture of the L1 vertebral body extending through a partially    ossified anterior longitudinal ligament. Additional fracture of the    anterolateral aspect of the inferior endplate of Y66 on the right side. No    significant displacement. No alignment abnormality.             IMPRESSION/RECOMMENDATIONS:    Assessment:   1. Acute fractures of T12 and L1  2. T9-S1 degenerative disc disease  3. Prior T6-9 Posterior spinal fusion    Plan:  Discussed with Dr. Jessa Lake.  Patient has acute fractures at T12 and L1. Patient complains of lumbar pain with no radicular symptoms. Lumbar brace ordered. Patient is to wear the brace for 4 weeks. Follow up with Dr. Mag Harris in 4 weeks. Sign off. Evelin Smith PA-C

## 2022-05-07 NOTE — PLAN OF CARE
Problem: Pain  Goal: Verbalizes/displays adequate comfort level or baseline comfort level  5/7/2022 0335 by Courtney Espinosa RN  Outcome: Progressing  Note: Pain assessed, patient rates 4/10, pain medication given, pain reassessed     Problem: Safety - Adult  Goal: Free from fall injury  5/7/2022 0335 by Courtney Espinosa RN  Outcome: Progressing  Note: Call light within reach, bed alarm, non skid socks, bed rest, no falls     Problem: Discharge Planning  Goal: Discharge to home or other facility with appropriate resources  5/7/2022 0335 by Courtney Espinosa RN  Outcome: Progressing  Note: Patient awaits orthopedic review.  Anticipates discharge to home

## 2022-05-07 NOTE — PROGRESS NOTES
Patient: Alvin Cruz  Unit/Bed: 5K-26/026-A  YOB: 1950  MRN: [id number] Acct: [de-identified]   Admitting Diagnosis: Compression fracture of L1 lumbar vertebra, closed, initial encounter [facility]) [F38.110L]  Admit Date:  5/6/2022  Hospital Day: 1    Assessment:     Active Problems:    Fall    Closed T12 fracture ([address]    Closed fracture of first lumbar vertebra ([address]  Resolved Problems:    * No resolved hospital problems. *      Plan:     Seen for Dr Doris Urrutia  Use low dose insulin correction scale for now        Subjective:     Patient has no complaint of CP, SOB, or GI upset and has complaints of back pain when supine. .   Medication side effects: none    Scheduled Meds:   insulin lispro  0-6 Units SubCUTAneous TID WC    insulin lispro  0-3 Units SubCUTAneous Nightly    sodium chloride flush  5-40 mL IntraVENous 2 times per day    polyethylene glycol  17 g Oral Daily    bisacodyl  10 mg Rectal Daily    famotidine (PEPCID) injection  20 mg IntraVENous BID     Continuous Infusions:   dextrose      sodium chloride 100 mL/hr at 05/07/22 0651    sodium chloride       PRN Meds:dextrose, glucagon (rDNA), dextrose, sodium chloride flush, sodium chloride, acetaminophen, ondansetron **OR** ondansetron, senna, HYDROmorphone **OR** HYDROmorphone, oxyCODONE **OR** oxyCODONE    Review of Systems  Pertinent items are noted in HPI. Objective:     Patient Vitals for the past 8 hrs:   BP Temp Temp src Pulse Resp SpO2 Weight   05/07/22 0815 (!) 141/59 98.1 °F (36.7 °C) Oral 68 18 96 % --   05/07/22 0813 -- -- -- -- -- 98 % --   05/07/22 0414 134/61 98.6 °F (37 °C) Oral 65 18 98 % (!) 366 lb 4.8 oz (166.2 kg)   05/07/22 0254 -- -- -- -- 18 -- --   05/07/22 0145 137/66 98.8 °F (37.1 °C) Oral 65 18 -- --     I/O last 3 completed shifts: In: 721.6 [I.V.:721.6]  Out: -   No intake/output data recorded.     BP (!) 141/59   Pulse 68   Temp 98.1 °F (36.7 °C) (Oral)   Resp 18   Ht 6' 2\" (1.88 m)   Wt (!) 366 lb 4.8 oz (166.2 kg)   SpO2 96%   BMI 47.03 kg/m²     General appearance: alert, appears stated age and cooperative  Head: Normocephalic, without obvious abnormality, laceration across the bridge of the nose  Lungs: clear to auscultation bilaterally  Chest wall: no tenderness  Heart: regular rate and rhythm, S1, S2 normal, no murmur, click, rub or gallop  Abdomen: soft, non-tender; bowel sounds normal; no masses,  no organomegaly  Extremities: venous stasis dermatitis noted  Skin: Skin color, texture, turgor normal. No rashes or lesions  Neurologic: Grossly normal    Data Review:   Results for Tanya Ray" (MRN [id number]) as of 5/7/2022 08:35   Ref. Range 2/9/2022 20:19 2/10/2022 05:51 2/10/2022 10:46 2/10/2022 16:07 5/7/2022 07:54   POC Glucose Latest Ref Range: 70 - 108 mg/dl 286 (H) 211 (H) 260 (H) 216 (H) 192 (H)     Results for Tanya Ray" (MRN [id number]) as of 5/7/2022 08:35   Ref. Range 5/6/2022 17:25 5/7/2022 05:17   Sodium Latest Ref Range: 135 - 145 meq/L 138 140   Potassium Latest Ref Range: 3.5 - 5.2 meq/L 4.1 3.8   Chloride Latest Ref Range: 98 - 111 meq/L 100 105   CO2 Latest Ref Range: 23 - 33 meq/L 24 26   BUN,BUNPL Latest Ref Range: 7 - 22 mg/dL 37 (H) 33 (H)   Creatinine Latest Ref Range: 0.4 - 1.2 mg/dL 1.4 (H) 1.2   Anion Gap Latest Ref Range: 8.0 - 16.0 meq/L 14.0 9.0   Est, Glom Filt Rate Latest Units: ml/min/1.73m2 50 (A) 59 (A)   GLUCOSE, FASTING,GF Latest Ref Range: 70 - 108 mg/dL 169 (H) 142 (H)     Results for Néstor ORELLANA" (MRN [id number]) as of 5/7/2022 08:35   Ref.  Range 5/6/2022 17:25 5/7/2022 05:17   WBC Latest Ref Range: 4.8 - 10.8 thou/mm3 6.4 7.4   RBC Latest Ref Range: 4.70 - 6.10 mill/mm3 3.26 (L) 2.91 (L)   Hemoglobin Quant Latest Ref Range: 14.0 - 18.0 gm/dl 10.6 (L) 9.3 (L)   Hematocrit Latest Ref Range: 42.0 - 52.0 % 30.7 (L) 27.5 (L)   MCV Latest Ref Range: 80.0 - 94.0 fL 94.2 (H) 94.5 (H)   MCH Latest Ref Range: 26.0 - 33.0 pg 32.5 32.0 MCHC Latest Ref Range: 32.2 - 35.5 gm/dl 34.5 33.8   MPV Latest Ref Range: 9.4 - 12.4 fL 9.5 9.5   RDW-CV Latest Ref Range: 11.5 - 14.5 % 12.4 12.2   RDW-SD Latest Ref Range: 35.0 - 45.0 fL 42.5 42.5   Platelet Count Latest Ref Range: 130 - 400 thou/mm3 140 128 (L)     Electronically signed by Alexys Yoder MD on 5/7/2022 at 8:44 AM

## 2022-05-08 ENCOUNTER — APPOINTMENT (OUTPATIENT)
Dept: CT IMAGING | Age: 72
End: 2022-05-08
Payer: MEDICARE

## 2022-05-08 ENCOUNTER — APPOINTMENT (OUTPATIENT)
Dept: GENERAL RADIOLOGY | Age: 72
End: 2022-05-08
Payer: MEDICARE

## 2022-05-08 LAB
ALBUMIN SERPL-MCNC: 3.4 G/DL (ref 3.5–5.1)
ALP BLD-CCNC: 62 U/L (ref 38–126)
ALT SERPL-CCNC: 8 U/L (ref 11–66)
ANION GAP SERPL CALCULATED.3IONS-SCNC: 10 MEQ/L (ref 8–16)
AST SERPL-CCNC: 13 U/L (ref 5–40)
BASOPHILS # BLD: 0.4 %
BASOPHILS ABSOLUTE: 0 THOU/MM3 (ref 0–0.1)
BILIRUB SERPL-MCNC: 1 MG/DL (ref 0.3–1.2)
BUN BLDV-MCNC: 26 MG/DL (ref 7–22)
CALCIUM SERPL-MCNC: 8.5 MG/DL (ref 8.5–10.5)
CHLORIDE BLD-SCNC: 102 MEQ/L (ref 98–111)
CO2: 25 MEQ/L (ref 23–33)
CREAT SERPL-MCNC: 1.3 MG/DL (ref 0.4–1.2)
EOSINOPHIL # BLD: 1.6 %
EOSINOPHILS ABSOLUTE: 0.1 THOU/MM3 (ref 0–0.4)
ERYTHROCYTE [DISTWIDTH] IN BLOOD BY AUTOMATED COUNT: 12.2 % (ref 11.5–14.5)
ERYTHROCYTE [DISTWIDTH] IN BLOOD BY AUTOMATED COUNT: 42.4 FL (ref 35–45)
GFR SERPL CREATININE-BSD FRML MDRD: 54 ML/MIN/1.73M2
GLUCOSE BLD-MCNC: 174 MG/DL (ref 70–108)
GLUCOSE BLD-MCNC: 176 MG/DL (ref 70–108)
GLUCOSE BLD-MCNC: 203 MG/DL (ref 70–108)
GLUCOSE BLD-MCNC: 215 MG/DL (ref 70–108)
GLUCOSE BLD-MCNC: 261 MG/DL (ref 70–108)
HCT VFR BLD CALC: 29.6 % (ref 42–52)
HEMOGLOBIN: 9.7 GM/DL (ref 14–18)
IMMATURE GRANS (ABS): 0.02 THOU/MM3 (ref 0–0.07)
IMMATURE GRANULOCYTES: 0.3 %
LYMPHOCYTES # BLD: 15.4 %
LYMPHOCYTES ABSOLUTE: 1.2 THOU/MM3 (ref 1–4.8)
MCH RBC QN AUTO: 31.6 PG (ref 26–33)
MCHC RBC AUTO-ENTMCNC: 32.8 GM/DL (ref 32.2–35.5)
MCV RBC AUTO: 96.4 FL (ref 80–94)
MONOCYTES # BLD: 10.1 %
MONOCYTES ABSOLUTE: 0.8 THOU/MM3 (ref 0.4–1.3)
NUCLEATED RED BLOOD CELLS: 0 /100 WBC
PLATELET # BLD: 114 THOU/MM3 (ref 130–400)
PMV BLD AUTO: 9.6 FL (ref 9.4–12.4)
POTASSIUM REFLEX MAGNESIUM: 3.9 MEQ/L (ref 3.5–5.2)
RBC # BLD: 3.07 MILL/MM3 (ref 4.7–6.1)
SEG NEUTROPHILS: 72.2 %
SEGMENTED NEUTROPHILS ABSOLUTE COUNT: 5.6 THOU/MM3 (ref 1.8–7.7)
SODIUM BLD-SCNC: 137 MEQ/L (ref 135–145)
TOTAL PROTEIN: 6 G/DL (ref 6.1–8)
WBC # BLD: 7.7 THOU/MM3 (ref 4.8–10.8)

## 2022-05-08 PROCEDURE — 82948 REAGENT STRIP/BLOOD GLUCOSE: CPT

## 2022-05-08 PROCEDURE — 6370000000 HC RX 637 (ALT 250 FOR IP): Performed by: PHYSICIAN ASSISTANT

## 2022-05-08 PROCEDURE — G0378 HOSPITAL OBSERVATION PER HR: HCPCS

## 2022-05-08 PROCEDURE — 36415 COLL VENOUS BLD VENIPUNCTURE: CPT

## 2022-05-08 PROCEDURE — 6360000002 HC RX W HCPCS: Performed by: PHYSICIAN ASSISTANT

## 2022-05-08 PROCEDURE — 73564 X-RAY EXAM KNEE 4 OR MORE: CPT

## 2022-05-08 PROCEDURE — APPSS45 APP SPLIT SHARED TIME 31-45 MINUTES: Performed by: PHYSICIAN ASSISTANT

## 2022-05-08 PROCEDURE — 6370000000 HC RX 637 (ALT 250 FOR IP): Performed by: NURSE PRACTITIONER

## 2022-05-08 PROCEDURE — 96372 THER/PROPH/DIAG INJ SC/IM: CPT

## 2022-05-08 PROCEDURE — 72100 X-RAY EXAM L-S SPINE 2/3 VWS: CPT

## 2022-05-08 PROCEDURE — 99232 SBSQ HOSP IP/OBS MODERATE 35: CPT | Performed by: SURGERY

## 2022-05-08 PROCEDURE — 73700 CT LOWER EXTREMITY W/O DYE: CPT

## 2022-05-08 PROCEDURE — 1200000003 HC TELEMETRY R&B

## 2022-05-08 PROCEDURE — 80053 COMPREHEN METABOLIC PANEL: CPT

## 2022-05-08 PROCEDURE — 6370000000 HC RX 637 (ALT 250 FOR IP): Performed by: FAMILY MEDICINE

## 2022-05-08 PROCEDURE — 2580000003 HC RX 258: Performed by: PHYSICIAN ASSISTANT

## 2022-05-08 PROCEDURE — 85025 COMPLETE CBC W/AUTO DIFF WBC: CPT

## 2022-05-08 RX ORDER — ENOXAPARIN SODIUM 100 MG/ML
40 INJECTION SUBCUTANEOUS 2 TIMES DAILY
Status: DISCONTINUED | OUTPATIENT
Start: 2022-05-08 | End: 2022-05-13 | Stop reason: HOSPADM

## 2022-05-08 RX ADMIN — INSULIN LISPRO 1 UNITS: 100 INJECTION, SOLUTION INTRAVENOUS; SUBCUTANEOUS at 08:38

## 2022-05-08 RX ADMIN — PANTOPRAZOLE SODIUM 40 MG: 40 TABLET, DELAYED RELEASE ORAL at 06:38

## 2022-05-08 RX ADMIN — TICAGRELOR 90 MG: 90 TABLET ORAL at 08:38

## 2022-05-08 RX ADMIN — FUROSEMIDE 80 MG: 40 TABLET ORAL at 08:38

## 2022-05-08 RX ADMIN — INSULIN GLARGINE 25 UNITS: 100 INJECTION, SOLUTION SUBCUTANEOUS at 21:36

## 2022-05-08 RX ADMIN — ENOXAPARIN SODIUM 40 MG: 100 INJECTION SUBCUTANEOUS at 21:40

## 2022-05-08 RX ADMIN — INSULIN LISPRO 12 UNITS: 100 INJECTION, SOLUTION INTRAVENOUS; SUBCUTANEOUS at 12:01

## 2022-05-08 RX ADMIN — Medication 1 TABLET: at 08:44

## 2022-05-08 RX ADMIN — OXYCODONE HYDROCHLORIDE 10 MG: 5 TABLET ORAL at 21:39

## 2022-05-08 RX ADMIN — BISACODYL 10 MG: 10 SUPPOSITORY RECTAL at 08:41

## 2022-05-08 RX ADMIN — FERROUS SULFATE TAB 325 MG (65 MG ELEMENTAL FE) 325 MG: 325 (65 FE) TAB at 08:38

## 2022-05-08 RX ADMIN — INSULIN LISPRO 2 UNITS: 100 INJECTION, SOLUTION INTRAVENOUS; SUBCUTANEOUS at 12:02

## 2022-05-08 RX ADMIN — INSULIN LISPRO 2 UNITS: 100 INJECTION, SOLUTION INTRAVENOUS; SUBCUTANEOUS at 21:36

## 2022-05-08 RX ADMIN — ENALAPRIL MALEATE 10 MG: 10 TABLET ORAL at 08:38

## 2022-05-08 RX ADMIN — ATORVASTATIN CALCIUM 10 MG: 10 TABLET, FILM COATED ORAL at 21:39

## 2022-05-08 RX ADMIN — TIZANIDINE 4 MG: 4 TABLET ORAL at 21:39

## 2022-05-08 RX ADMIN — CARVEDILOL 12.5 MG: 6.25 TABLET, FILM COATED ORAL at 08:38

## 2022-05-08 RX ADMIN — ASPIRIN 81 MG CHEWABLE TABLET 81 MG: 81 TABLET CHEWABLE at 08:38

## 2022-05-08 RX ADMIN — ENOXAPARIN SODIUM 40 MG: 100 INJECTION SUBCUTANEOUS at 08:38

## 2022-05-08 RX ADMIN — INSULIN LISPRO 12 UNITS: 100 INJECTION, SOLUTION INTRAVENOUS; SUBCUTANEOUS at 08:39

## 2022-05-08 RX ADMIN — OXYCODONE HYDROCHLORIDE 10 MG: 5 TABLET ORAL at 12:52

## 2022-05-08 RX ADMIN — SODIUM CHLORIDE, PRESERVATIVE FREE 10 ML: 5 INJECTION INTRAVENOUS at 21:39

## 2022-05-08 RX ADMIN — CARVEDILOL 12.5 MG: 6.25 TABLET, FILM COATED ORAL at 21:39

## 2022-05-08 RX ADMIN — OXYCODONE HYDROCHLORIDE 10 MG: 5 TABLET ORAL at 04:31

## 2022-05-08 RX ADMIN — TICAGRELOR 90 MG: 90 TABLET ORAL at 21:39

## 2022-05-08 RX ADMIN — FERROUS SULFATE TAB 325 MG (65 MG ELEMENTAL FE) 325 MG: 325 (65 FE) TAB at 21:39

## 2022-05-08 ASSESSMENT — PAIN SCALES - GENERAL
PAINLEVEL_OUTOF10: 10
PAINLEVEL_OUTOF10: 2
PAINLEVEL_OUTOF10: 7
PAINLEVEL_OUTOF10: 9

## 2022-05-08 ASSESSMENT — PAIN - FUNCTIONAL ASSESSMENT
PAIN_FUNCTIONAL_ASSESSMENT: PREVENTS OR INTERFERES SOME ACTIVE ACTIVITIES AND ADLS
PAIN_FUNCTIONAL_ASSESSMENT: ACTIVITIES ARE NOT PREVENTED

## 2022-05-08 ASSESSMENT — PAIN DESCRIPTION - PAIN TYPE
TYPE: ACUTE PAIN
TYPE: ACUTE PAIN

## 2022-05-08 ASSESSMENT — PAIN DESCRIPTION - ONSET: ONSET: ON-GOING

## 2022-05-08 ASSESSMENT — PAIN DESCRIPTION - LOCATION
LOCATION: KNEE
LOCATION: BACK
LOCATION: KNEE

## 2022-05-08 ASSESSMENT — PAIN DESCRIPTION - ORIENTATION
ORIENTATION: LOWER
ORIENTATION: RIGHT
ORIENTATION: RIGHT

## 2022-05-08 ASSESSMENT — PAIN DESCRIPTION - DESCRIPTORS
DESCRIPTORS: SHARP
DESCRIPTORS: ACHING

## 2022-05-08 ASSESSMENT — PAIN DESCRIPTION - FREQUENCY: FREQUENCY: CONTINUOUS

## 2022-05-08 NOTE — PROGRESS NOTES
Wally Boyer nurse aid called this RN to notified for the need to come in to room. RN entered room to patient on floor leaning on right elbow. Patient stated he was getting up to use restroom with nurse doc zepeda and nurse doc Harden, when his right knee \"gave out\" and was falling when the techs helped lower him to the floor. Patient states he did not hit his head. Vitals obtained. Alert and oriented x4. Safely placed patient back into bed with Charge RN Yonatan Chun RN and this RN along with nurse daniel Zepeda and Dereck. Patient states the pain is worse in right knee and back. Patient able to wiggle toes bilaterally, pedal push and pull moderate bilaterally. Patient does have laceration to left forearm to where a petroleum linda and abd were applied. This RN notified house supervisor, LUCIUS Jarvis and patients sister, Love Puentes. Updated LUCIUS Moyer with ortho group regarding fall and Amalia KAN-ELIDA  See all orders placed.

## 2022-05-08 NOTE — PROGRESS NOTES
[address] [facility] THERAPY MISSED TREATMENT NOTE  Raul Ennis [facility] 5K  5K-26/026-A      Date: 2022  Patient Name: Amber Kay        CSN: [id number]   : 1950  (66 y.o.)  Gender: male                REASON FOR MISSED TREATMENT: Awaiting arrival for back brace. OT staff to attempt as able/appropriate.

## 2022-05-08 NOTE — PLAN OF CARE
Problem: Pain  Goal: Verbalizes/displays adequate comfort level or baseline comfort level  Outcome: Progressing  Note: Pain assessed, patient reports adequate pain control     Problem: Safety - Adult  Goal: Free from fall injury  Outcome: Progressing  Note: Call light within reach, bed alarm on, non skid socks, patient instructed to call for help, no falls     Problem: Discharge Planning  Goal: Discharge to home or other facility with appropriate resources  Outcome: Progressing  Note: Patient awaits a brace from [facility], anticipates to discharge home

## 2022-05-08 NOTE — PROGRESS NOTES
[address]  PHYSICAL THERAPY MISSED TREATMENT NOTE  [facility] 5K    Date: 2022  Patient Name: Marcella Troncoso        MRN: [id number]   : 1950  (76 y.o.)  Gender: male                REASON FOR MISSED TREATMENT:  Hold treatment per nursing request.  Awaiting back brace from [facility], pending knee xrays, will hold.

## 2022-05-08 NOTE — PROGRESS NOTES
[facility]  Daily Progress Note  Pt Name: Eduard Cevallos  Medical Record Number: [id number]  Date of Birth 1950   Today's Date: 5/8/2022    HD: # 2    CC: Back pain, right knee pain    ASSESSMENT  1. Active Hospital Problems    Diagnosis Date Noted    Fall [W19. XXXA]      Priority: High    Closed T12 fracture ([facility] [S22.089A] 05/06/2022     Priority: Medium    Closed fracture of first lumbar vertebra ([facility] [S32.019A] 05/06/2022     Priority: Medium         PLAN  Patient admitted under Trauma Services to  with telemetry     Mechanical fall              -PT/OT when brace from [facility] arrives     Acute L1 fracture/T12 fracture              -Inferior endplate fracture of D38 and vertebral body fracture of L1              -Ortho Spine has evaluated and recommends non op management with brace   - Bedrest with BRP until brace arrives from [facility]              -PT/OT when appropriate              -Pain control     New LBBB previously seen on EKGs              -Patient denies chest pain or new onset/worsening shortness of breath              -Troponins within normal limits              -48 hours telemetry we will continue to monitor     Infiltration adjacent to kidneys and ureters              -UA negative               -Patient afebrile WBC within normal limits              -No CVA tenderness low likelihood of pyelonephritis, will continue work-up     Nasal bridge laceration              - Local wound care              - Monitor for signs of infection    Right knee pain   -X-ray right knee to evaluate    Lower extremity edema with keratotic skin   -Seems to resemble venous stasis disease   -Wound consulted for evaluation     Consults orthopedic spine     Pain Management              -Dilaudid, oxycodone     Prophylaxis: SCD's, Lovenox, incentive Spirometry, GlycoLax, Pepcid, Zofran     Diabetic diet     Stop IVF Management  Regular Neurovascular Checks  Repeat Labs Tomorrow AM  PT/OT/SLP Eval and Treat once brace arrives  Activity as tolerated, pt up with assistance     Discharge disposition pending clinical course     SUBJECTIVE  He is a 66 y.o. male who continues to present at [address] [place] [address] on 5K following a fall with L1/T12 fracture. Patient reports worsening right knee pain. Patient states he has been able to ambulate with significant discomfort using walker, he is anxious for back brace to arrive. Patient denies chest pain, shortness of breath, cough, headache, dizziness, lightheadedness, worsened numbness, paraesthesias, weakness, chills, fevers, abdominal pain, nausea, vomiting,neck pain, or back pain. Plan to work with therapy once brace arrives, right knee x-ray, wound/ostomy to see. Hemoglobin stable, WBC stable, no significant electrolyte abnormalities, vital signs stable on exam. Care in coordination with trauma surgeon Dr. Shawn Alvarez. Wt Readings from Last 3 Encounters:   05/08/22 (!) 361 lb 8 oz (164 kg)   04/13/22 (!) 369 lb (167.4 kg)   04/05/22 (!) 375 lb (170.1 kg)     Temp Readings from Last 3 Encounters:   05/08/22 98.7 °F (37.1 °C) (Oral)   04/05/22 96.3 °F (35.7 °C) (Infrared)   02/21/22 96.7 °F (35.9 °C) (Infrared)     BP Readings from Last 3 Encounters:   05/08/22 137/66   04/05/22 106/64   03/31/22 122/74     Pulse Readings from Last 3 Encounters:   05/08/22 68   04/05/22 76   03/31/22 68       24 HR INTAKE/OUTPUT :     Intake/Output Summary (Last 24 hours) at 5/8/2022 0746  Last data filed at 5/7/2022 2202  Gross per 24 hour   Intake 878.17 ml   Output 500 ml   Net 378.17 ml     ADULT DIET;  Regular; 4 carb choices (60 gm/meal)    OBJECTIVE  CURRENT VITALS /66   Pulse 68   Temp 98.7 °F (37.1 °C) (Oral)   Resp 18   Ht 6' 2\" (1.88 m)   Wt (!) 361 lb 8 oz (164 kg)   SpO2 97%   BMI 46.41 kg/m²       OBJECTIVE  CURRENT VITALS /66   Pulse 68   Temp 98.7 °F (37.1 °C) (Oral)   Resp 18   Ht 6' 2\" (1.88 m)   Wt (!) 361 lb 8 oz (164 kg)   SpO2 97%   BMI 46.41 kg/m²   GENERAL: Presents sitting upright in bed unassisted, weeping on entry but awakens to verbal stimuli, awake and alert; In no acute distress and well nourished. SKIN: Appropriate for ethnicity, warm and dry. ENT: No apparent trauma, discharge, or hematoma bilaterally. PERRL at 3mm. Nares patient, membranes moist  CARDIO: No visible chest wall deformity. Strong/regular S1/S2. 2+ radial and DP pulses bilaterally. Capillary refill <2 sec. No extremity edema noted. PULMONARY:  Trachea midline, no increased respiratory effort, or paradoxical chest movement. Lung sounds are clear to ascultation in all fields without adventitious sounds. ABDOMEN: Abdomen is protuberant but soft soft. Bowel sounds present in all four quadrants. NTTP in all quadrants, absent of peritoneal signs. NEURO: GCS 15. Follows commands. Chronic decreased bilateral lower extremity sensation. Otherwise PMS intact, moves limbs freely. No focal neurological deficits  MSK: Extremities intact and present. Right anterior knee pain to palpation, Scabbed Abrasion over Right Knee. No new bruising, swelling, deformity, discoloration or bleeding. NTTP over major muscle groups.  strength 5/5 and equal bilaterally. WOUND: No surrounding erythema, edema, purulent discharge or bleeding            LABS  CBC :   Recent Labs     05/06/22 1725 05/07/22 0517 05/08/22 0527   WBC 6.4 7.4 7.7   HGB 10.6* 9.3* 9.7*   HCT 30.7* 27.5* 29.6*   MCV 94.2* 94.5* 96.4*    128* 114*     BMP:   Recent Labs     05/06/22 1725 05/07/22 0517 05/08/22  0527    140 137   K 4.1 3.8 3.9    105 102   CO2 24 26 25   BUN 37* 33* 26*   CREATININE 1.4* 1.2 1.3*     COAGS:   Recent Labs     05/06/22 1725 05/07/22 0517 05/08/22 0527   PROT 6.7 5.7* 6.0*     Pancreas/HFP:  No results for input(s): LIPASE, AMYLASE in the last 72 hours.   Recent Labs     05/06/22  1725 05/07/22  4792 05/08/22  0527   AST 15 14 13   ALT 11 9* 8*   BILITOT 0.4 0.5 1.0   ALKPHOS 65 60 62     RADIOLOGY:  No new results      Electronically signed by LUCIUS Maher on 5/8/2022 at 7:46 AM

## 2022-05-08 NOTE — PROGRESS NOTES
Patient: Pantera Soto  Unit/Bed: 5K-26/026-A  YOB: 1950  MRN: [id number] Acct: [de-identified]   Admitting Diagnosis: Compression fracture of L1 lumbar vertebra, closed, initial encounter [facility]) [E46.376T]  Admit Date:  5/6/2022  Hospital Day: 2    Assessment:     Active Problems:    Fall    Closed T12 fracture ([address]    Closed fracture of first lumbar vertebra ([address]  Resolved Problems:    * No resolved hospital problems. *      Plan:     I asked his nurse to contact surgery to see how they wanted to evaluate the worsened back pain with movement since the right knee gave out and he was lowered to the ground. Also ask then for dressing orders for the superficial skin tears to the right forearm   The CS are acceptable for now          Subjective:     Patient has no complaint of CP or SOB, he mentions that when he tries to move his back hurts worse. .   Medication side effects: none    Scheduled Meds:   enoxaparin  40 mg SubCUTAneous BID    insulin lispro  0-6 Units SubCUTAneous TID WC    insulin lispro  0-3 Units SubCUTAneous Nightly    aspirin  81 mg Oral Daily    atorvastatin  10 mg Oral Daily    carvedilol  12.5 mg Oral BID    enalapril  10 mg Oral Daily    ferrous sulfate  325 mg Oral BID    insulin glargine  25 Units SubCUTAneous Nightly    [Held by provider] metFORMIN  1,000 mg Oral BID WC    multivitamin  1 tablet Oral Daily    ticagrelor  90 mg Oral BID    pantoprazole  40 mg Oral QAM AC    insulin lispro  12 Units SubCUTAneous TID WC    furosemide  80 mg Oral QAM    furosemide  40 mg Oral Dinner    sodium chloride flush  5-40 mL IntraVENous 2 times per day    polyethylene glycol  17 g Oral Daily    bisacodyl  10 mg Rectal Daily     Continuous Infusions:   dextrose      sodium chloride       PRN Meds:glucagon (rDNA), dextrose, glucose, dextrose bolus **OR** dextrose bolus, tiZANidine, Urea, sodium chloride flush, sodium chloride, acetaminophen, ondansetron **OR** ondansetron, senna, HYDROmorphone **OR** HYDROmorphone, oxyCODONE **OR** oxyCODONE    Review of Systems  Pertinent items are noted in HPI. Objective:     Patient Vitals for the past 8 hrs:   BP Temp Temp src Pulse Resp SpO2 Weight   05/08/22 1245 (!) 152/67 -- -- 74 18 95 % --   05/08/22 0830 (!) 141/64 98.5 °F (36.9 °C) Oral 75 18 98 % --   05/08/22 0600 -- -- -- -- -- -- (!) 361 lb 8 oz (164 kg)     I/O last 3 completed shifts: In: 1599.8 [I.V.:1599.8]  Out: 500 [Urine:500]  I/O this shift:  In: 600 [P.O.:600]  Out: -     BP (!) 152/67   Pulse 74   Temp 98.5 °F (36.9 °C) (Oral)   Resp 18   Ht 6' 2\" (1.88 m)   Wt (!) 361 lb 8 oz (164 kg)   SpO2 95%   BMI 46.41 kg/m²     General appearance: alert, appears stated age and cooperative  Head: Normocephalic, without obvious abnormality, atraumatic  Lungs: clear to auscultation bilaterally  Chest wall: no tenderness  Heart: regular rate and rhythm, S1, S2 normal, no murmur, click, rub or gallop  Abdomen: soft, non-tender; bowel sounds normal; no masses,  no organomegaly  Extremities: venous stasis dermatitis noted and there are two superficial skin tears on the right forearm  Skin: Skin color, texture, turgor normal. No rashes or lesions  Neurologic: weak    ECG:   tele NSR so will discontinue    Data Review:   Results for Jesus Dc" (MRN [id number]) as of 5/8/2022 13:08   Ref.  Range 5/7/2022 12:30 5/7/2022 16:48 5/7/2022 19:53 5/8/2022 07:35 5/8/2022 11:32   POC Glucose Latest Ref Range: 70 - 108 mg/dl 189 (H) 177 (H) 161 (H) 174 (H) 215 (H)     Electronically signed by Niall Silver MD on 5/8/2022 at 1:01 PM

## 2022-05-09 ENCOUNTER — APPOINTMENT (OUTPATIENT)
Dept: MRI IMAGING | Age: 72
End: 2022-05-09
Payer: MEDICARE
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
## 2022-05-09 LAB
ALBUMIN SERPL-MCNC: 3.3 G/DL (ref 3.5–5.1)
ALP BLD-CCNC: 57 U/L (ref 38–126)
ALT SERPL-CCNC: 8 U/L (ref 11–66)
ANION GAP SERPL CALCULATED.3IONS-SCNC: 11 MEQ/L (ref 8–16)
AST SERPL-CCNC: 12 U/L (ref 5–40)
BASOPHILS # BLD: 0.1 %
BASOPHILS ABSOLUTE: 0 THOU/MM3 (ref 0–0.1)
BILIRUB SERPL-MCNC: 1.2 MG/DL (ref 0.3–1.2)
BUN BLDV-MCNC: 29 MG/DL (ref 7–22)
CALCIUM SERPL-MCNC: 8.4 MG/DL (ref 8.5–10.5)
CHLORIDE BLD-SCNC: 102 MEQ/L (ref 98–111)
CO2: 24 MEQ/L (ref 23–33)
CREAT SERPL-MCNC: 1.4 MG/DL (ref 0.4–1.2)
EKG ATRIAL RATE: 65 BPM
EKG P AXIS: 22 DEGREES
EKG P-R INTERVAL: 192 MS
EKG Q-T INTERVAL: 438 MS
EKG QRS DURATION: 140 MS
EKG QTC CALCULATION (BAZETT): 455 MS
EKG R AXIS: -31 DEGREES
EKG T AXIS: 61 DEGREES
EKG VENTRICULAR RATE: 65 BPM
EOSINOPHIL # BLD: 0.4 %
EOSINOPHILS ABSOLUTE: 0 THOU/MM3 (ref 0–0.4)
ERYTHROCYTE [DISTWIDTH] IN BLOOD BY AUTOMATED COUNT: 12.1 % (ref 11.5–14.5)
ERYTHROCYTE [DISTWIDTH] IN BLOOD BY AUTOMATED COUNT: 42.2 FL (ref 35–45)
GFR SERPL CREATININE-BSD FRML MDRD: 50 ML/MIN/1.73M2
GLUCOSE BLD-MCNC: 201 MG/DL (ref 70–108)
GLUCOSE BLD-MCNC: 226 MG/DL (ref 70–108)
GLUCOSE BLD-MCNC: 230 MG/DL (ref 70–108)
GLUCOSE BLD-MCNC: 256 MG/DL (ref 70–108)
GLUCOSE BLD-MCNC: 283 MG/DL (ref 70–108)
HCT VFR BLD CALC: 27.4 % (ref 42–52)
HEMOGLOBIN: 9.1 GM/DL (ref 14–18)
IMMATURE GRANS (ABS): 0.04 THOU/MM3 (ref 0–0.07)
IMMATURE GRANULOCYTES: 0.5 %
LYMPHOCYTES # BLD: 15 %
LYMPHOCYTES ABSOLUTE: 1.2 THOU/MM3 (ref 1–4.8)
MCH RBC QN AUTO: 31.5 PG (ref 26–33)
MCHC RBC AUTO-ENTMCNC: 33.2 GM/DL (ref 32.2–35.5)
MCV RBC AUTO: 94.8 FL (ref 80–94)
MONOCYTES # BLD: 10.7 %
MONOCYTES ABSOLUTE: 0.9 THOU/MM3 (ref 0.4–1.3)
NUCLEATED RED BLOOD CELLS: 0 /100 WBC
PLATELET # BLD: 107 THOU/MM3 (ref 130–400)
PMV BLD AUTO: 9.5 FL (ref 9.4–12.4)
POTASSIUM REFLEX MAGNESIUM: 3.7 MEQ/L (ref 3.5–5.2)
RBC # BLD: 2.89 MILL/MM3 (ref 4.7–6.1)
SEG NEUTROPHILS: 73.3 %
SEGMENTED NEUTROPHILS ABSOLUTE COUNT: 6 THOU/MM3 (ref 1.8–7.7)
SODIUM BLD-SCNC: 137 MEQ/L (ref 135–145)
TOTAL PROTEIN: 5.8 G/DL (ref 6.1–8)
WBC # BLD: 8.2 THOU/MM3 (ref 4.8–10.8)

## 2022-05-09 PROCEDURE — 72148 MRI LUMBAR SPINE W/O DYE: CPT

## 2022-05-09 PROCEDURE — 99225 PR SBSQ OBSERVATION CARE/DAY 25 MINUTES: CPT | Performed by: SURGERY

## 2022-05-09 PROCEDURE — 6370000000 HC RX 637 (ALT 250 FOR IP): Performed by: NURSE PRACTITIONER

## 2022-05-09 PROCEDURE — 80053 COMPREHEN METABOLIC PANEL: CPT

## 2022-05-09 PROCEDURE — APPSS60 APP SPLIT SHARED TIME 46-60 MINUTES: Performed by: NURSE PRACTITIONER

## 2022-05-09 PROCEDURE — 82948 REAGENT STRIP/BLOOD GLUCOSE: CPT

## 2022-05-09 PROCEDURE — 93005 ELECTROCARDIOGRAM TRACING: CPT | Performed by: SURGERY

## 2022-05-09 PROCEDURE — G0378 HOSPITAL OBSERVATION PER HR: HCPCS

## 2022-05-09 PROCEDURE — 6370000000 HC RX 637 (ALT 250 FOR IP): Performed by: FAMILY MEDICINE

## 2022-05-09 PROCEDURE — 93010 ELECTROCARDIOGRAM REPORT: CPT | Performed by: NUCLEAR MEDICINE

## 2022-05-09 PROCEDURE — 96375 TX/PRO/DX INJ NEW DRUG ADDON: CPT

## 2022-05-09 PROCEDURE — 85025 COMPLETE CBC W/AUTO DIFF WBC: CPT

## 2022-05-09 PROCEDURE — 6370000000 HC RX 637 (ALT 250 FOR IP): Performed by: PHYSICIAN ASSISTANT

## 2022-05-09 PROCEDURE — 96372 THER/PROPH/DIAG INJ SC/IM: CPT

## 2022-05-09 PROCEDURE — 6360000002 HC RX W HCPCS: Performed by: NURSE PRACTITIONER

## 2022-05-09 PROCEDURE — 36415 COLL VENOUS BLD VENIPUNCTURE: CPT

## 2022-05-09 PROCEDURE — 6360000002 HC RX W HCPCS: Performed by: PHYSICIAN ASSISTANT

## 2022-05-09 PROCEDURE — 51798 US URINE CAPACITY MEASURE: CPT

## 2022-05-09 PROCEDURE — 2580000003 HC RX 258: Performed by: PHYSICIAN ASSISTANT

## 2022-05-09 RX ORDER — LORAZEPAM 2 MG/ML
0.5 INJECTION INTRAMUSCULAR ONCE
Status: COMPLETED | OUTPATIENT
Start: 2022-05-09 | End: 2022-05-09

## 2022-05-09 RX ADMIN — INSULIN LISPRO 2 UNITS: 100 INJECTION, SOLUTION INTRAVENOUS; SUBCUTANEOUS at 20:43

## 2022-05-09 RX ADMIN — FERROUS SULFATE TAB 325 MG (65 MG ELEMENTAL FE) 325 MG: 325 (65 FE) TAB at 20:43

## 2022-05-09 RX ADMIN — HYDROMORPHONE HYDROCHLORIDE 0.5 MG: 1 INJECTION, SOLUTION INTRAMUSCULAR; INTRAVENOUS; SUBCUTANEOUS at 13:20

## 2022-05-09 RX ADMIN — OXYCODONE HYDROCHLORIDE 10 MG: 5 TABLET ORAL at 10:47

## 2022-05-09 RX ADMIN — FERROUS SULFATE TAB 325 MG (65 MG ELEMENTAL FE) 325 MG: 325 (65 FE) TAB at 09:02

## 2022-05-09 RX ADMIN — LORAZEPAM 0.5 MG: 2 INJECTION INTRAMUSCULAR; INTRAVENOUS at 14:35

## 2022-05-09 RX ADMIN — SODIUM CHLORIDE, PRESERVATIVE FREE 10 ML: 5 INJECTION INTRAVENOUS at 23:40

## 2022-05-09 RX ADMIN — CARVEDILOL 12.5 MG: 6.25 TABLET, FILM COATED ORAL at 20:43

## 2022-05-09 RX ADMIN — INSULIN LISPRO 12 UNITS: 100 INJECTION, SOLUTION INTRAVENOUS; SUBCUTANEOUS at 12:11

## 2022-05-09 RX ADMIN — ASPIRIN 81 MG CHEWABLE TABLET 81 MG: 81 TABLET CHEWABLE at 09:02

## 2022-05-09 RX ADMIN — TIZANIDINE 4 MG: 4 TABLET ORAL at 05:33

## 2022-05-09 RX ADMIN — FUROSEMIDE 80 MG: 40 TABLET ORAL at 08:58

## 2022-05-09 RX ADMIN — INSULIN LISPRO 12 UNITS: 100 INJECTION, SOLUTION INTRAVENOUS; SUBCUTANEOUS at 17:26

## 2022-05-09 RX ADMIN — PANTOPRAZOLE SODIUM 40 MG: 40 TABLET, DELAYED RELEASE ORAL at 05:31

## 2022-05-09 RX ADMIN — METFORMIN HYDROCHLORIDE 1000 MG: 500 TABLET ORAL at 17:24

## 2022-05-09 RX ADMIN — ENOXAPARIN SODIUM 40 MG: 100 INJECTION SUBCUTANEOUS at 20:43

## 2022-05-09 RX ADMIN — INSULIN LISPRO 2 UNITS: 100 INJECTION, SOLUTION INTRAVENOUS; SUBCUTANEOUS at 12:11

## 2022-05-09 RX ADMIN — Medication 1 TABLET: at 09:02

## 2022-05-09 RX ADMIN — BISACODYL 10 MG: 10 SUPPOSITORY RECTAL at 09:02

## 2022-05-09 RX ADMIN — ENALAPRIL MALEATE 10 MG: 10 TABLET ORAL at 09:02

## 2022-05-09 RX ADMIN — INSULIN LISPRO 12 UNITS: 100 INJECTION, SOLUTION INTRAVENOUS; SUBCUTANEOUS at 08:57

## 2022-05-09 RX ADMIN — INSULIN GLARGINE 25 UNITS: 100 INJECTION, SOLUTION SUBCUTANEOUS at 20:43

## 2022-05-09 RX ADMIN — OXYCODONE HYDROCHLORIDE 10 MG: 5 TABLET ORAL at 05:31

## 2022-05-09 RX ADMIN — CARVEDILOL 12.5 MG: 6.25 TABLET, FILM COATED ORAL at 09:02

## 2022-05-09 RX ADMIN — TICAGRELOR 90 MG: 90 TABLET ORAL at 09:04

## 2022-05-09 RX ADMIN — ATORVASTATIN CALCIUM 10 MG: 10 TABLET, FILM COATED ORAL at 20:43

## 2022-05-09 RX ADMIN — INSULIN LISPRO 3 UNITS: 100 INJECTION, SOLUTION INTRAVENOUS; SUBCUTANEOUS at 08:57

## 2022-05-09 RX ADMIN — TICAGRELOR 90 MG: 90 TABLET ORAL at 20:43

## 2022-05-09 RX ADMIN — ENOXAPARIN SODIUM 40 MG: 100 INJECTION SUBCUTANEOUS at 08:58

## 2022-05-09 RX ADMIN — FUROSEMIDE 40 MG: 40 TABLET ORAL at 17:24

## 2022-05-09 RX ADMIN — INSULIN LISPRO 2 UNITS: 100 INJECTION, SOLUTION INTRAVENOUS; SUBCUTANEOUS at 17:26

## 2022-05-09 RX ADMIN — SODIUM CHLORIDE, PRESERVATIVE FREE 10 ML: 5 INJECTION INTRAVENOUS at 09:03

## 2022-05-09 ASSESSMENT — PAIN - FUNCTIONAL ASSESSMENT
PAIN_FUNCTIONAL_ASSESSMENT: ACTIVITIES ARE NOT PREVENTED
PAIN_FUNCTIONAL_ASSESSMENT: ACTIVITIES ARE NOT PREVENTED
PAIN_FUNCTIONAL_ASSESSMENT: PREVENTS OR INTERFERES SOME ACTIVE ACTIVITIES AND ADLS
PAIN_FUNCTIONAL_ASSESSMENT: ACTIVITIES ARE NOT PREVENTED

## 2022-05-09 ASSESSMENT — PAIN SCALES - GENERAL
PAINLEVEL_OUTOF10: 8
PAINLEVEL_OUTOF10: 3
PAINLEVEL_OUTOF10: 0
PAINLEVEL_OUTOF10: 9
PAINLEVEL_OUTOF10: 0
PAINLEVEL_OUTOF10: 9
PAINLEVEL_OUTOF10: 2

## 2022-05-09 ASSESSMENT — PAIN DESCRIPTION - DESCRIPTORS
DESCRIPTORS: SPASM;SHARP
DESCRIPTORS: SPASM;ACHING
DESCRIPTORS: SHARP

## 2022-05-09 ASSESSMENT — PAIN DESCRIPTION - FREQUENCY: FREQUENCY: CONTINUOUS

## 2022-05-09 ASSESSMENT — ENCOUNTER SYMPTOMS
ABDOMINAL DISTENTION: 0
BACK PAIN: 1
DIARRHEA: 0
ABDOMINAL PAIN: 0
NAUSEA: 1
ANAL BLEEDING: 0

## 2022-05-09 ASSESSMENT — PAIN DESCRIPTION - PAIN TYPE
TYPE: ACUTE PAIN
TYPE: ACUTE PAIN

## 2022-05-09 ASSESSMENT — PAIN DESCRIPTION - LOCATION
LOCATION: BACK
LOCATION: FLANK

## 2022-05-09 ASSESSMENT — PAIN DESCRIPTION - ORIENTATION
ORIENTATION: LOWER
ORIENTATION: LOWER
ORIENTATION: LEFT
ORIENTATION: LOWER

## 2022-05-09 ASSESSMENT — PAIN DESCRIPTION - ONSET: ONSET: ON-GOING

## 2022-05-09 NOTE — PROGRESS NOTES
[facility]a [facility]on[facility]  Daily Progress Note  Pt Name: Marcella Troncoso  Medical Record Number: [id number]  Date of Birth 1950   Today's Date: 5/9/2022    HD: # 3    CC: Back pain, right knee pain    ASSESSMENT  1. Active Hospital Problems    Diagnosis Date Noted    Fall [W19. XXXA]      Priority: High    Closed T12 fracture ([address] [S22.089A] 05/06/2022     Priority: Medium    Closed fracture of first lumbar vertebra ([address] [S32.019A] 05/06/2022     Priority: Medium         PLAN  Patient admitted under Trauma Services to  with telemetry     Mechanical fall              -PT/OT when brace from [facility] arrives     Acute L1 fracture/T12 fracture              -Inferior endplate fracture of Q05 and vertebral body fracture of L1              -Ortho Spine has evaluated and recommends non op management with brace   - Bedrest with BRP until brace arrives from [facility]              -PT/OT when appropriate              -Pain control   - MRI of the lumbar spine pending for today      New LBBB previously seen on EKGs              -Patient denies chest pain or new onset/worsening shortness of breath              -Troponins within normal limits              -48 hours telemetry we will continue to monitor     Infiltration adjacent to kidneys and ureters              -UA negative               -Patient afebrile WBC within normal limits              -No CVA tenderness low likelihood of pyelonephritis, will continue work-up     Nasal bridge laceration              - Local wound care              - Monitor for signs of infection    Right knee pain   -X-ray right knee negative for acute fractures   - CT of the right knee notes small to moderate effusion, prepatellar and infrapatellar edema/bursitis    Lower extremity edema with keratotic skin   -Seems to resemble venous stasis disease   -Wound consulted for evaluation     Consults orthopedic spine, Ortho      Pain Management              -Dilaudid, oxycodone     Prophylaxis: SCD's, Lovenox, incentive Spirometry, GlycoLax, Pepcid, Zofran     Diabetic diet     Regular Neurovascular Checks  Repeat Labs Tomorrow AM  PT/OT/SLP Eval and Treat once brace arrives  Activity as tolerated, pt up with assistance     Discharge disposition pending clinical course     SUBJECTIVE  Moose continues on 5K. He continues to have lower back pain that has worsened since his fall yesterday and also complains of right knee pain. CT of the right knee has been reviewed. He continues to wait for his back brace [facility]. MRI of the lumbar spine to be completed today for review due to worsened back pain. He is tolerating a general diabetic diet and has had a bowel movement during this admission. Care in coordination with trauma surgeon Dr. Jaqui Malagon. Wt Readings from Last 3 Encounters:   05/09/22 (!) 370 lb 4 oz (167.9 kg)   04/13/22 (!) 369 lb (167.4 kg)   04/05/22 (!) 375 lb (170.1 kg)     Temp Readings from Last 3 Encounters:   05/09/22 98.2 °F (36.8 °C) (Oral)   04/05/22 96.3 °F (35.7 °C) (Infrared)   02/21/22 96.7 °F (35.9 °C) (Infrared)     BP Readings from Last 3 Encounters:   05/09/22 (!) 142/60   04/05/22 106/64   03/31/22 122/74     Pulse Readings from Last 3 Encounters:   05/09/22 60   04/05/22 76   03/31/22 68       24 HR INTAKE/OUTPUT :     Intake/Output Summary (Last 24 hours) at 5/9/2022 0818  Last data filed at 5/9/2022 0530  Gross per 24 hour   Intake 1465 ml   Output 400 ml   Net 1065 ml     ADULT DIET;  Regular; 4 carb choices (60 gm/meal)    OBJECTIVE  CURRENT VITALS BP (!) 142/60   Pulse 60   Temp 98.2 °F (36.8 °C) (Oral)   Resp 16   Ht 6' 2\" (1.88 m)   Wt (!) 370 lb 4 oz (167.9 kg)   SpO2 96%   BMI 47.54 kg/m²       OBJECTIVE  CURRENT VITALS BP (!) 142/60   Pulse 60   Temp 98.2 °F (36.8 °C) (Oral)   Resp 16   Ht 6' 2\" (1.88 m)   Wt (!) 370 lb 4 oz (167.9 kg)   SpO2 96%   BMI 47.54 kg/m² GENERAL: Lying in bed, awake and alert; In no acute distress and well nourished. SKIN: Appropriate for ethnicity, warm and dry. ENT: No apparent trauma, discharge, or hematoma bilaterally. PERRL at 3mm. Nares patient, membranes moist  CARDIO: No visible chest wall deformity. Strong/regular S1/S2. 2+ radial and DP pulses bilaterally. Capillary refill <2 sec. No extremity edema noted. PULMONARY:  Trachea midline, no increased respiratory effort, or paradoxical chest movement. Lung sounds are clear to ascultation in all fields without adventitious sounds. ABDOMEN: Abdomen is protuberant but soft soft. Bowel sounds present in all four quadrants. NTTP in all quadrants, absent of peritoneal signs. NEURO: GCS 15. Follows commands. Chronic decreased bilateral lower extremity sensation. Otherwise PMS intact, moves limbs freely. No focal neurological deficits  MSK: Right anterior knee pain to palpation, Scabbed Abrasion over Right Knee. No new bruising, swelling, deformity, discoloration or bleeding. NTTP over major muscle groups.  strength 5/5 and equal bilaterally. WOUND: No surrounding erythema, edema, purulent discharge or bleeding            LABS  CBC :   Recent Labs     05/07/22 0517 05/08/22 0527 05/09/22  0510   WBC 7.4 7.7 8.2   HGB 9.3* 9.7* 9.1*   HCT 27.5* 29.6* 27.4*   MCV 94.5* 96.4* 94.8*   * 114* 107*     BMP:   Recent Labs     05/07/22 0517 05/08/22 0527 05/09/22  0510    137 137   K 3.8 3.9 3.7    102 102   CO2 26 25 24   BUN 33* 26* 29*   CREATININE 1.2 1.3* 1.4*     COAGS:   Recent Labs     05/07/22 0517 05/08/22 0527 05/09/22  0510   PROT 5.7* 6.0* 5.8*     Pancreas/HFP:  No results for input(s): LIPASE, AMYLASE in the last 72 hours.   Recent Labs     05/07/22 0517 05/08/22 0527 05/09/22  0510   AST 14 13 12   ALT 9* 8* 8*   BILITOT 0.5 1.0 1.2   ALKPHOS 60 62 57     RADIOLOGY:  MRI of the lumbar spine pending      Electronically signed by Chris Blake, ABHISHEK - CNP on 5/9/2022 at 8:18 AM     Patient seen and examined independently by me early this AM. Above discussed and I agree with Terri Kenyon CNP. See my additional comments below for updated orders and plan. Labs, cultures, and radiographs where available were reviewed. I discussed patient concerns with the patient's nurse and instructions were given. Please see our orders for the updated patient care plan. -Nonoperative management per orthopedic spine. Continue with brace hopefully today. PT/OT after brace arrives. MRI. Pain control. DVT prophylaxis. Regular diet. Wound care. Disposition in process.     Electronically signed by Jimmy Mcintyre MD on 5/9/22 at 4:59 PM EDT

## 2022-05-09 NOTE — PROGRESS NOTES
Wound ostomy consulted for \"leg wounds\". No evidence of leg wounds documented in flow sheet or mentioned in charting. Called nurse to clarify consult. Nurse unsure at this time if patient has leg wounds. States she was told in report that patient has a foot wound but hasnt been seen. Informed nurse to call wound ostomy if needed after patient assessed.  If patient has concerning foot wounds, may need to consult podiatry as patient has followed with podiatry services in the past.

## 2022-05-09 NOTE — CARE COORDINATION
Condition Code 44 conditions met, a Utilization Review Committee member reviewed this case and agrees that this patient does not meet evidenced based criteria for inpatient services, requiring a change in patient status from \"admit as inpatient\" to \"place in observation\", in accordance with condition code 44. Medical care will continue to be provided by Syd Figueroa MD, who agrees with the decision and has documented the agreement in the patient's medical record, as evidenced by the observation order/additional documentation. MARTIN was signed by the patient and placed in the chart. Copy of the MOON was given to the patient. Explanation to the patient that they are in Observation status and Code 44 letter of Explanation was given to the patient. All questions addressed.

## 2022-05-09 NOTE — CARE COORDINATION
5/9/22, 11:58 AM EDT  DISCHARGE PLANNING EVALUATION:    Aury Anderson       Admitted: 5/6/2022/ [facility] day: 0   [facility]: [facility] Reason for admit: Compression fracture of L1 lumbar vertebra, closed, initial encounter ([facility] [B14.553H]  Fall at home, sequela [W19. XXXS, W89.670]   PMH:  has a past medical history of RAUL (acute kidney injury) ([facility] ASHD (arteriosclerotic heart disease), Depression, Diabetic peripheral neuropathy ([facility] Fracture, HTN (hypertension), Hypercholesteremia, IDDM (insulin dependent diabetes mellitus), Low back pain, Morbid obesity with BMI of 45.0-49.9, adult ([facility] and Osteoarthritis. Procedure: N/A  Barriers to Discharge:  Patient presents after tripping on a curb and falling face forward onto the ground. Ortho consulted, Lovenox, DM management, Brilinta, prn pain medications and Zofran, MRI of lumbar spine, carb choice diet, LS corset back brace, PT/OT/ST. PCP: Manjeet Mendiola MD  Readmission Risk Score: 17.6 ( )%  Patient's Healthcare Decision Maker: Named in Scanned ACP Document    Patient Goals/Plan/Treatment Preferences: Met with Kiera Hernandez. He resides at home alone. Kiera Hernandez verifies his insurance and a PCP. He is able to afford his medications and has all the DME needed at [facility]. He states he receive SSI only and resources are limited. He was able to drive prior to his injury. He is not able to ambulate and feels he will need rehab prior to returning to home. Monitoring for therapy evaluations. Home with HH vs. SNF/Acute Rehab. Transportation/Food Security/Housekeeping Addressed:  No issues identified.

## 2022-05-09 NOTE — PLAN OF CARE
Problem: Pain  Goal: Verbalizes/displays adequate comfort level or baseline comfort level  Outcome: Progressing  Flowsheets (Taken 5/9/2022 1800)  Verbalizes/displays adequate comfort level or baseline comfort level:   Encourage patient to monitor pain and request assistance   Assess pain using appropriate pain scale   Administer analgesics based on type and severity of pain and evaluate response   Implement non-pharmacological measures as appropriate and evaluate response  Note: Pain Assessment: 0-10  Pain Level: 0   Patient's Stated Pain Goal: 0 - No pain   Is pain goal met at this time? Yes     Non-Pharmaceutical Pain Intervention(s): Rest,Emotional support       Problem: Safety - Adult  Goal: Free from fall injury  Outcome: Progressing  Flowsheets (Taken 5/9/2022 1355)  Free From Fall Injury: Instruct family/caregiver on patient safety  Note: Patient remains free from falls this shift. Fall risk assessment complete. Fall sign posted. Non skid socks on. Bed in lowest position, 2/4 siderails up. Bed alarm on. Call light and all possessions within reach.  Rounding hourly       Problem: Discharge Planning  Goal: Discharge to home or other facility with appropriate resources  Outcome: Progressing  Flowsheets (Taken 5/9/2022 0845)  Discharge to home or other facility with appropriate resources:   Identify barriers to discharge with patient and caregiver   Arrange for needed discharge resources and transportation as appropriate   Identify discharge learning needs (meds, wound care, etc)     Problem: Chronic Conditions and Co-morbidities  Goal: Patient's chronic conditions and co-morbidity symptoms are monitored and maintained or improved  Outcome: Progressing  Flowsheets (Taken 5/9/2022 0845)  Care Plan - Patient's Chronic Conditions and Co-Morbidity Symptoms are Monitored and Maintained or Improved:   Monitor and assess patient's chronic conditions and comorbid symptoms for stability, deterioration, or improvement Collaborate with multidisciplinary team to address chronic and comorbid conditions and prevent exacerbation or deterioration     Problem: ABCDS Injury Assessment  Goal: Absence of physical injury  Outcome: Progressing  Flowsheets (Taken 5/9/2022 1355)  Absence of Physical Injury: Implement safety measures based on patient assessment     Problem: Skin/Tissue Integrity - Adult  Goal: Incisions, wounds, or drain sites healing without S/S of infection  Flowsheets (Taken 5/9/2022 1801)  Incisions, Wounds, or Drain Sites Healing Without Sign and Symptoms of Infection:   ADMISSION and DAILY: Assess and document risk factors for pressure ulcer development   TWICE DAILY: Assess and document skin integrity   Implement wound care per orders     Problem: Musculoskeletal - Adult  Goal: Return mobility to safest level of function  Flowsheets (Taken 5/9/2022 1801)  Return Mobility to Safest Level of Function:   Assess patient stability and activity tolerance for standing, transferring and ambulating with or without assistive devices   Assist with transfers and ambulation using safe patient handling equipment as needed   Ensure adequate protection for wounds/incisions during mobilization   Obtain physical therapy/occupational therapy consults as needed   Instruct patient/family in ordered activity level  Goal: Maintain proper alignment of affected body part  Flowsheets (Taken 5/9/2022 1801)  Maintain proper alignment of affected body part: Support and protect limb and body alignment per provider's orders  Note: New brace delivered to patient today from [address] [place] [address] and Limb     Care plan reviewed with patient. Patient verbalize understanding of the plan of care and contribute to goal setting.

## 2022-05-09 NOTE — CARE COORDINATION
[facility] contacted for LS Corset back brace. Requested documentation faxed.  Electronically signed by Hayder Montgomery RN on 5/9/22 at 10:04 AM EDT

## 2022-05-09 NOTE — PROGRESS NOTES
Department of Orthopedic Surgery  Spine Service  Attending Progress Note        Subjective:  Patient had a fell yesterday due to right knee giving out. XR lumbar and right knee negative for fracture. Vitals  VITALS:  BP (!) 142/60   Pulse 60   Temp 98.2 °F (36.8 °C) (Oral)   Resp 16   Ht 6' 2\" (1.88 m)   Wt (!) 370 lb 4 oz (167.9 kg)   SpO2 96%   BMI 47.54 kg/m²   24HR INTAKE/OUTPUT:    Intake/Output Summary (Last 24 hours) at 5/9/2022 0715  Last data filed at 5/9/2022 0530  Gross per 24 hour   Intake 1465 ml   Output 400 ml   Net 1065 ml     URINARY CATHETER OUTPUT (Liu):     DRAIN/TUBE OUTPUT:       PHYSICAL EXAM:    Orientation:  alert and oriented to person, place and time  Patient resting in bed    Lower Extremity Motor : 5/5 bilateral pedal push/pull, able to bend bilateral knees, pain with movement right knee  Lower Extremity Sensory:  Intact L1-S1      LABS:    HgB:    Lab Results   Component Value Date    HGB 9.1 05/09/2022         ASSESSMENT AND PLAN:    Acute T12 and L1 fx  Prior T6-9 PSF  T9-S1 DDD    1:  MRI lumbar spine pending. Patient to be fitted for back brace, to be worn when ambulating and prn.      Vanessa Bar

## 2022-05-09 NOTE — PROGRESS NOTES
[facility]  PHYSICAL THERAPY MISSED TREATMENT NOTE  STR ONC MED 5K    Date: 2022  Patient Name: Garrett Kwan        MRN: [id number]   : 1950  (76 y.o.)  Gender: male                REASON FOR MISSED TREATMENT:  Hold treatment per nursing request.  Per nursing, pt on bedrest until therapy sees due to recent fall, however, brace is not in room and has not arrived yet. Brace is required with OOB tasks per ortho. Will check back next available date.      Domitila Dawn PT, DPT

## 2022-05-09 NOTE — PROGRESS NOTES
[facility] [employer] THERAPY MISSED TREATMENT NOTE  [facility] 5K  5K-26/026-A      Date: 2022  Patient Name: Oswaldo Gary        CSN: [id number]   : 1950  (66 y.o.)  Gender: male                REASON FOR MISSED TREATMENT: Pt noted to have MRI of Lspine ordered, in addition to still waiting for back brace to be delivered.  Will hold today and check back as able

## 2022-05-09 NOTE — PROGRESS NOTES
Clinical Pharmacy Note  Metformin-IV Contrast Interaction Monitoring  Serum Creatinine Follow-up    Height:   Ht Readings from Last 1 Encounters:   05/06/22 6' 2\" (1.88 m)     Weight:  Wt Readings from Last 1 Encounters:   05/09/22 (!) 370 lb 4 oz (167.9 kg)       Recent Labs     05/08/22  0527 05/09/22  0510   CREATININE 1.3* 1.4*       Estimated Creatinine Clearance: 77 mL/min (A) (based on SCr of 1.4 mg/dL (H)). Assessment/Plan:  1. Metformin has been held > 48 hours from time of IV contrast dye administration. Serum creatinine is within normal limits per FDA Guidelines to resume metformin. 2.  Will resume Metformin 1000mg po bid      Thank you.

## 2022-05-10 LAB
ANION GAP SERPL CALCULATED.3IONS-SCNC: 11 MEQ/L (ref 8–16)
BUN BLDV-MCNC: 34 MG/DL (ref 7–22)
CALCIUM SERPL-MCNC: 8.1 MG/DL (ref 8.5–10.5)
CHLORIDE BLD-SCNC: 99 MEQ/L (ref 98–111)
CO2: 25 MEQ/L (ref 23–33)
CREAT SERPL-MCNC: 1.5 MG/DL (ref 0.4–1.2)
ERYTHROCYTE [DISTWIDTH] IN BLOOD BY AUTOMATED COUNT: 12 % (ref 11.5–14.5)
ERYTHROCYTE [DISTWIDTH] IN BLOOD BY AUTOMATED COUNT: 41 FL (ref 35–45)
GFR SERPL CREATININE-BSD FRML MDRD: 46 ML/MIN/1.73M2
GLUCOSE BLD-MCNC: 209 MG/DL (ref 70–108)
GLUCOSE BLD-MCNC: 211 MG/DL (ref 70–108)
GLUCOSE BLD-MCNC: 275 MG/DL (ref 70–108)
GLUCOSE BLD-MCNC: 285 MG/DL (ref 70–108)
GLUCOSE BLD-MCNC: 289 MG/DL (ref 70–108)
HCT VFR BLD CALC: 25.2 % (ref 42–52)
HEMOGLOBIN: 8.4 GM/DL (ref 14–18)
MCH RBC QN AUTO: 31.3 PG (ref 26–33)
MCHC RBC AUTO-ENTMCNC: 33.3 GM/DL (ref 32.2–35.5)
MCV RBC AUTO: 94 FL (ref 80–94)
PLATELET # BLD: 131 THOU/MM3 (ref 130–400)
PMV BLD AUTO: 9.8 FL (ref 9.4–12.4)
POTASSIUM SERPL-SCNC: 4.2 MEQ/L (ref 3.5–5.2)
RBC # BLD: 2.68 MILL/MM3 (ref 4.7–6.1)
SODIUM BLD-SCNC: 135 MEQ/L (ref 135–145)
WBC # BLD: 8.5 THOU/MM3 (ref 4.8–10.8)

## 2022-05-10 PROCEDURE — 82948 REAGENT STRIP/BLOOD GLUCOSE: CPT

## 2022-05-10 PROCEDURE — G0378 HOSPITAL OBSERVATION PER HR: HCPCS

## 2022-05-10 PROCEDURE — 6370000000 HC RX 637 (ALT 250 FOR IP): Performed by: PHYSICIAN ASSISTANT

## 2022-05-10 PROCEDURE — 6370000000 HC RX 637 (ALT 250 FOR IP): Performed by: NURSE PRACTITIONER

## 2022-05-10 PROCEDURE — 97110 THERAPEUTIC EXERCISES: CPT

## 2022-05-10 PROCEDURE — 97535 SELF CARE MNGMENT TRAINING: CPT

## 2022-05-10 PROCEDURE — 97166 OT EVAL MOD COMPLEX 45 MIN: CPT

## 2022-05-10 PROCEDURE — 96376 TX/PRO/DX INJ SAME DRUG ADON: CPT

## 2022-05-10 PROCEDURE — 97162 PT EVAL MOD COMPLEX 30 MIN: CPT

## 2022-05-10 PROCEDURE — 96372 THER/PROPH/DIAG INJ SC/IM: CPT

## 2022-05-10 PROCEDURE — 85027 COMPLETE CBC AUTOMATED: CPT

## 2022-05-10 PROCEDURE — 6370000000 HC RX 637 (ALT 250 FOR IP): Performed by: FAMILY MEDICINE

## 2022-05-10 PROCEDURE — 97530 THERAPEUTIC ACTIVITIES: CPT

## 2022-05-10 PROCEDURE — 80048 BASIC METABOLIC PNL TOTAL CA: CPT

## 2022-05-10 PROCEDURE — 36415 COLL VENOUS BLD VENIPUNCTURE: CPT

## 2022-05-10 PROCEDURE — 6360000002 HC RX W HCPCS: Performed by: PHYSICIAN ASSISTANT

## 2022-05-10 PROCEDURE — APPSS45 APP SPLIT SHARED TIME 31-45 MINUTES: Performed by: PHYSICIAN ASSISTANT

## 2022-05-10 PROCEDURE — 2580000003 HC RX 258: Performed by: PHYSICIAN ASSISTANT

## 2022-05-10 PROCEDURE — 99221 1ST HOSP IP/OBS SF/LOW 40: CPT | Performed by: NURSE PRACTITIONER

## 2022-05-10 PROCEDURE — 99225 PR SBSQ OBSERVATION CARE/DAY 25 MINUTES: CPT | Performed by: SURGERY

## 2022-05-10 RX ORDER — INSULIN LISPRO 100 [IU]/ML
18 INJECTION, SOLUTION INTRAVENOUS; SUBCUTANEOUS
Status: DISCONTINUED | OUTPATIENT
Start: 2022-05-10 | End: 2022-05-13 | Stop reason: HOSPADM

## 2022-05-10 RX ORDER — INSULIN GLARGINE 100 [IU]/ML
30 INJECTION, SOLUTION SUBCUTANEOUS NIGHTLY
Status: DISCONTINUED | OUTPATIENT
Start: 2022-05-10 | End: 2022-05-13 | Stop reason: HOSPADM

## 2022-05-10 RX ADMIN — TICAGRELOR 90 MG: 90 TABLET ORAL at 20:35

## 2022-05-10 RX ADMIN — METFORMIN HYDROCHLORIDE 1000 MG: 500 TABLET ORAL at 17:11

## 2022-05-10 RX ADMIN — OXYCODONE HYDROCHLORIDE 10 MG: 5 TABLET ORAL at 17:13

## 2022-05-10 RX ADMIN — ATORVASTATIN CALCIUM 10 MG: 10 TABLET, FILM COATED ORAL at 20:32

## 2022-05-10 RX ADMIN — CARVEDILOL 12.5 MG: 6.25 TABLET, FILM COATED ORAL at 20:32

## 2022-05-10 RX ADMIN — FERROUS SULFATE TAB 325 MG (65 MG ELEMENTAL FE) 325 MG: 325 (65 FE) TAB at 20:32

## 2022-05-10 RX ADMIN — INSULIN LISPRO 18 UNITS: 100 INJECTION, SOLUTION INTRAVENOUS; SUBCUTANEOUS at 12:16

## 2022-05-10 RX ADMIN — ENALAPRIL MALEATE 10 MG: 10 TABLET ORAL at 07:53

## 2022-05-10 RX ADMIN — INSULIN LISPRO 3 UNITS: 100 INJECTION, SOLUTION INTRAVENOUS; SUBCUTANEOUS at 09:02

## 2022-05-10 RX ADMIN — ENOXAPARIN SODIUM 40 MG: 100 INJECTION SUBCUTANEOUS at 20:35

## 2022-05-10 RX ADMIN — OXYCODONE HYDROCHLORIDE 10 MG: 5 TABLET ORAL at 01:23

## 2022-05-10 RX ADMIN — TIZANIDINE 4 MG: 4 TABLET ORAL at 01:45

## 2022-05-10 RX ADMIN — TIZANIDINE 4 MG: 4 TABLET ORAL at 20:32

## 2022-05-10 RX ADMIN — SODIUM CHLORIDE, PRESERVATIVE FREE 5 ML: 5 INJECTION INTRAVENOUS at 07:53

## 2022-05-10 RX ADMIN — INSULIN LISPRO 3 UNITS: 100 INJECTION, SOLUTION INTRAVENOUS; SUBCUTANEOUS at 12:16

## 2022-05-10 RX ADMIN — ASPIRIN 81 MG CHEWABLE TABLET 81 MG: 81 TABLET CHEWABLE at 07:53

## 2022-05-10 RX ADMIN — INSULIN GLARGINE 30 UNITS: 100 INJECTION, SOLUTION SUBCUTANEOUS at 20:54

## 2022-05-10 RX ADMIN — FUROSEMIDE 40 MG: 40 TABLET ORAL at 17:11

## 2022-05-10 RX ADMIN — INSULIN LISPRO 18 UNITS: 100 INJECTION, SOLUTION INTRAVENOUS; SUBCUTANEOUS at 17:10

## 2022-05-10 RX ADMIN — METFORMIN HYDROCHLORIDE 1000 MG: 500 TABLET ORAL at 07:53

## 2022-05-10 RX ADMIN — ONDANSETRON 4 MG: 2 INJECTION INTRAMUSCULAR; INTRAVENOUS at 01:39

## 2022-05-10 RX ADMIN — ACETAMINOPHEN 650 MG: 325 TABLET ORAL at 07:53

## 2022-05-10 RX ADMIN — FUROSEMIDE 80 MG: 40 TABLET ORAL at 07:53

## 2022-05-10 RX ADMIN — PANTOPRAZOLE SODIUM 40 MG: 40 TABLET, DELAYED RELEASE ORAL at 07:53

## 2022-05-10 RX ADMIN — FERROUS SULFATE TAB 325 MG (65 MG ELEMENTAL FE) 325 MG: 325 (65 FE) TAB at 07:53

## 2022-05-10 RX ADMIN — INSULIN LISPRO 2 UNITS: 100 INJECTION, SOLUTION INTRAVENOUS; SUBCUTANEOUS at 17:10

## 2022-05-10 RX ADMIN — OXYCODONE HYDROCHLORIDE 10 MG: 5 TABLET ORAL at 21:26

## 2022-05-10 RX ADMIN — SODIUM CHLORIDE, PRESERVATIVE FREE 10 ML: 5 INJECTION INTRAVENOUS at 20:33

## 2022-05-10 RX ADMIN — HYDROMORPHONE HYDROCHLORIDE 0.5 MG: 1 INJECTION, SOLUTION INTRAMUSCULAR; INTRAVENOUS; SUBCUTANEOUS at 02:34

## 2022-05-10 RX ADMIN — ENOXAPARIN SODIUM 40 MG: 100 INJECTION SUBCUTANEOUS at 07:54

## 2022-05-10 RX ADMIN — TICAGRELOR 90 MG: 90 TABLET ORAL at 07:53

## 2022-05-10 RX ADMIN — OXYCODONE HYDROCHLORIDE 5 MG: 5 TABLET ORAL at 13:20

## 2022-05-10 RX ADMIN — Medication 1 TABLET: at 07:53

## 2022-05-10 RX ADMIN — CARVEDILOL 12.5 MG: 6.25 TABLET, FILM COATED ORAL at 07:53

## 2022-05-10 RX ADMIN — INSULIN LISPRO 1 UNITS: 100 INJECTION, SOLUTION INTRAVENOUS; SUBCUTANEOUS at 20:52

## 2022-05-10 RX ADMIN — INSULIN LISPRO 18 UNITS: 100 INJECTION, SOLUTION INTRAVENOUS; SUBCUTANEOUS at 09:02

## 2022-05-10 ASSESSMENT — ENCOUNTER SYMPTOMS
BACK PAIN: 1
ABDOMINAL PAIN: 0
SHORTNESS OF BREATH: 0
ABDOMINAL DISTENTION: 0
CHOKING: 0
DIARRHEA: 0
APNEA: 0
CHEST TIGHTNESS: 0
CONSTIPATION: 0
COUGH: 0

## 2022-05-10 ASSESSMENT — PAIN SCALES - GENERAL
PAINLEVEL_OUTOF10: 5
PAINLEVEL_OUTOF10: 1
PAINLEVEL_OUTOF10: 4
PAINLEVEL_OUTOF10: 3
PAINLEVEL_OUTOF10: 1
PAINLEVEL_OUTOF10: 7
PAINLEVEL_OUTOF10: 7

## 2022-05-10 ASSESSMENT — PAIN DESCRIPTION - PAIN TYPE
TYPE: ACUTE PAIN
TYPE: ACUTE PAIN

## 2022-05-10 ASSESSMENT — PAIN DESCRIPTION - LOCATION
LOCATION: BACK
LOCATION: BACK;KNEE
LOCATION: BACK

## 2022-05-10 ASSESSMENT — PAIN - FUNCTIONAL ASSESSMENT
PAIN_FUNCTIONAL_ASSESSMENT: PREVENTS OR INTERFERES SOME ACTIVE ACTIVITIES AND ADLS
PAIN_FUNCTIONAL_ASSESSMENT: PREVENTS OR INTERFERES WITH MANY ACTIVE NOT PASSIVE ACTIVITIES
PAIN_FUNCTIONAL_ASSESSMENT: ACTIVITIES ARE NOT PREVENTED
PAIN_FUNCTIONAL_ASSESSMENT: PREVENTS OR INTERFERES SOME ACTIVE ACTIVITIES AND ADLS

## 2022-05-10 ASSESSMENT — PAIN DESCRIPTION - FREQUENCY
FREQUENCY: CONTINUOUS
FREQUENCY: INTERMITTENT

## 2022-05-10 ASSESSMENT — PAIN DESCRIPTION - ONSET
ONSET: PROGRESSIVE
ONSET: ON-GOING

## 2022-05-10 ASSESSMENT — PAIN DESCRIPTION - DESCRIPTORS
DESCRIPTORS: ACHING;SORE

## 2022-05-10 ASSESSMENT — PAIN DESCRIPTION - ORIENTATION
ORIENTATION: LOWER
ORIENTATION: LOWER
ORIENTATION: LOWER;RIGHT

## 2022-05-10 NOTE — PROGRESS NOTES
[facility]  Daily Progress Note  Pt Name: Gayatri Chew  Medical Record Number: [id number]  Date of Birth 1950   Today's Date: 5/10/2022    HD: # 4    CC: Back pain    ASSESSMENT  1. Active Hospital Problems    Diagnosis Date Noted    Fall [W19. XXXA]      Priority: High    Fall at home, sequela [W19. Crispin Garay, Y38.618] 05/09/2022     Priority: Medium    Closed T12 fracture ([address] [K53.687O] 05/06/2022     Priority: Medium    Closed fracture of first lumbar vertebra ([address] [S32.019A] 05/06/2022     Priority: Medium         PLAN  Patient admitted under Trauma Services to  with telemetry     Mechanical fall              -PT/OT with brace in place     Acute L1 fracture/T12 fracture              -Inferior endplate fracture of J34 and vertebral body fracture of L1              -Ortho Spine has evaluated and recommends non op management with brace              -PT/OT with brace              -Pain control   -5/9: MRI lumbar spine showed redemonstration of obliquely oriented fracture through L1 vertebral body with mild displacement and disruption of the anterior longitudinal ligament. No injury to the posterior elements to suggest instability. Small fracture of T12 inferior endplate seen on previous CT not well visualized   -5/10: Orthospine noted activity as tolerated with brace.   Okay for discharge from orthospine standpoint with outpatient follow-up in 2 weeks     New LBBB previously seen on EKGs              -Patient denies chest pain or new onset/worsening shortness of breath              -Troponins within normal limits              -48 hours telemetry we will continue to monitor   -Family medicine following for assistance with medical management     Infiltration adjacent to kidneys and ureters              -UA negative               -Patient afebrile WBC within normal limits              -No CVA tenderness low likelihood of pyelonephritis, will continue work-up   -Family medicine following for assistance with medical management     Nasal bridge laceration              - Local wound care              - Monitor for signs of infection    Right knee pain   -X-ray right knee negative for acute fractures   - CT of the right knee notes small to moderate effusion, prepatellar and infrapatellar edema/bursitis   - Continue to monitor, PT/OT    Lower extremity edema with keratotic skin   -Seems to resemble venous stasis disease   -Wound consulted for evaluation    Chronic issues: History of atherosclerotic heart disease, depression, peripheral neuropathy, hypertension, hypercholesterolemia, insulin-dependent diabetes   -Family medicine following for assistance with medical management   -Home medications reordered   -Thrombocytopenia noted on labs during admission, trending down. On Brilinta. Monitor, repeat labs.      Consults orthopedic spine, Ortho      Pain Management              -Dilaudid, oxycodone     Prophylaxis: SCD's, Lovenox, incentive Spirometry, GlycoLax, Pepcid, Zofran     Diabetic diet     Regular Neurovascular Checks  Repeat Labs Tomorrow AM  PT/OT/SLP Eval and Treat once brace arrives  Activity as tolerated, pt up with assistance     Discharge disposition pending clinical course   - 5/10: PT recommending IPR. PM&R consulted     SUBJECTIVE  Patient seen on 5K this morning. Patient endorsed having 2/10 pain in back, pain controlled. Patient also endorsed having some mild pain and stiffness in right knee. Patient denied any other pain or complaints. Patient denied any headaches, lightheadedness, dizziness, neck pain, chest pain, shortness of breath, abdominal pain, nausea/vomiting, other pain in extremities, and paresthesias. Patient in recliner during rounds this morning with back brace in place. He noted he was assisted to recliner this morning and tolerated. All imaging reviewed. Orthospine noted activity as tolerated with brace.   Okay for discharge from orthospine standpoint with outpatient follow-up in 2 weeks. Family medicine following for assistance with medication management. Labs and vital signs reviewed. Patient afebrile, vital signs stable. AM labs yesterday, no leukocytosis. Hgb stable 9.1. Platelets trending down during admission, 107 yesterday. Repeat labs and monitor. Patient stable from a trauma surgery perspective. PT recommending IPR, PM&R consulted. Case discussed with trauma surgeon, Dr. Quintin High. Wt Readings from Last 3 Encounters:   05/09/22 (!) 370 lb 4 oz (167.9 kg)   04/13/22 (!) 369 lb (167.4 kg)   04/05/22 (!) 375 lb (170.1 kg)     Temp Readings from Last 3 Encounters:   05/10/22 97.9 °F (36.6 °C) (Oral)   04/05/22 96.3 °F (35.7 °C) (Infrared)   02/21/22 96.7 °F (35.9 °C) (Infrared)     BP Readings from Last 3 Encounters:   05/10/22 (!) 146/62   04/05/22 106/64   03/31/22 122/74     Pulse Readings from Last 3 Encounters:   05/10/22 64   04/05/22 76   03/31/22 68       24 HR INTAKE/OUTPUT :     Intake/Output Summary (Last 24 hours) at 5/10/2022 1035  Last data filed at 5/10/2022 0753  Gross per 24 hour   Intake 245 ml   Output 650 ml   Net -405 ml     ADULT DIET; Regular; 4 carb choices (60 gm/meal)    OBJECTIVE  CURRENT VITALS BP (!) 146/62   Pulse 64   Temp 97.9 °F (36.6 °C) (Oral)   Resp 18   Ht 6' 2\" (1.88 m)   Wt (!) 370 lb 4 oz (167.9 kg)   SpO2 96%   BMI 47.54 kg/m²       OBJECTIVE  CURRENT VITALS BP (!) 146/62   Pulse 64   Temp 97.9 °F (36.6 °C) (Oral)   Resp 18   Ht 6' 2\" (1.88 m)   Wt (!) 370 lb 4 oz (167.9 kg)   SpO2 96%   BMI 47.54 kg/m²   GENERAL: Awake, alert, no acute distress, pleasant and cooperative with exam, sitting in recliner  HEENT: Normocephalic, pupils equal and reactive to light, nares patent bilaterally.  Wound to near nasal bridge with no bleeding or drainage  NEURO: Alert and orient, GCS 15, follows commands, PMS intact in all four extremities, no signs of focal neurological deficits  CSPINE/BACK: No midline cervical tenderness to palpation. Back brace in place. HEART: Regular rate and rhythm with no obvious murmurs, rubs, gallops. Distal pulses intact. LUNGS/CHEST WALL: Lungs are clear to auscultation bilaterally with no wheezes, rales, rhonchi. No respiratory distress or increased work of breathing. No chest wall tenderness to palpation. ABDOMEN: Abdomen soft, nondistended, with no tenderness to palpation. No guarding or peritoneal signs. EXTREMITIES: No cyanosis. PMS intact in all four extremities. Scabbed abrasion noted to right knee with no significant tenderness to palpation. No other extremity tenderness to palpation. Strength 5/5 bilaterally with  strength and plantar/dorsiflexion. SKIN: Warm and dry, venous stasis appearance to bilaterally lower extremities      LABS  CBC :   Recent Labs     05/08/22 0527 05/09/22  0510   WBC 7.7 8.2   HGB 9.7* 9.1*   HCT 29.6* 27.4*   MCV 96.4* 94.8*   * 107*     BMP:   Recent Labs     05/08/22 0527 05/09/22  0510    137   K 3.9 3.7    102   CO2 25 24   BUN 26* 29*   CREATININE 1.3* 1.4*     COAGS:   Recent Labs     05/08/22 0527 05/09/22  0510   PROT 6.0* 5.8*     Pancreas/HFP:  No results for input(s): LIPASE, AMYLASE in the last 72 hours. Recent Labs     05/08/22 0527 05/09/22  0510   AST 13 12   ALT 8* 8*   BILITOT 1.0 1.2   ALKPHOS 62 57     RADIOLOGY:  XR LUMBAR SPINE (2-3 VIEWS)    Result Date: 5/8/2022  LUMBAR SPINE 2 VIEWS: CLINICAL INFORMATION: lumbar pain COMPARISON: 10/26/2020 TECHNIQUE: Standard AP and lateral views of lumbar spine were obtained. 1. . Laminectomy defects are seen in the lower lumbar spine. 2. Moderately severe hypertrophic degenerative spurring is scattered in the lumbar spine. 3. There is a mild thoracolumbar levoscoliosis. 4. No fracture is seen. Moderate multilevel degenerative facet arthropathy involving at least the lower 3 lumbar levels.  Mild degenerative changes left sacroiliac joint. **This report has been created using voice recognition software. It may contain minor errors which are inherent in voice recognition technology. ** Final report electronically signed by Dr. Amador Smith on 5/8/2022 4:18 PM    CT KNEE RIGHT WO CONTRAST    Result Date: 5/9/2022  WET READ: A total knee prosthesis is present. Multiple artifacts are present from the prosthesis, superiorly limiting detail on this study. No acute fracture is seen. There is no definite evidence for prosthesis loosening. There appears be mild tilting of the patella laterally, 5 mm images. There is moderate degenerative spurring of the knee. A small ossicle seen along the medial aspect of the knee is another seen in the lateral aspect of the knee. One or more loose articular bodies cannot excluded. This examination will be formally read by one of our [facility] radiologists tomorrow. Please see that report. PROCEDURE: CT KNEE RIGHT WO CONTRAST CLINICAL INFORMATION: right knee pain and weakness COMPARISON: Right knee radiographs 4/2/1599 TECHNIQUE: Helical CT acquisition of the right knee without contrast. Multiplanar reformats were provided. All CT scans at this facility use dose modulation, iterative reconstruction, and/or weight based dosing when appropriate to reduce the radiation dose to as low as reasonably achievable. FINDINGS: POSTOPERATIVE CHANGES: Total knee arthroplasty is seen. There is marked narrowing of the patellofemoral interval. There is subsidence of the patellar component. ALIGNMENT: Anatomic. BONE: No acute fracture or dislocation. The bones are mildly demineralized. . No destructive bony lesion. JOINTS: Knee arthroplasty. MUSCULATURE: Generalized marked atrophy particularly of calf muscles. SOFT TISSUES: Marked subcutaneous thickening and edema is seen particularly in the leg region. Small to moderate joint effusion. There is marked prepatellar and superficial infrapatellar edema/bursitis.  Varicose veins are seen. OTHER: Loose bodies are seen in the suprapatellar region. 1. Total right knee arthroplasty performed. There is subsidence of the patellar component. 2. Small to moderate joint effusion. 3. No acute fracture is seen. 4. Marked subcutaneous edema/thickening particularly in the leg region. 5. Marked prepatellar and superficial infrapatellar edema/bursitis. 6. Extensive fatty atrophy in the calf musculature **This report has been created using voice recognition software. It may contain minor errors which are inherent in voice recognition technology. ** Final report electronically signed by Dr Rachel Byrd on 5/9/2022 9:01 AM    MRI LUMBAR SPINE WO CONTRAST    Result Date: 5/9/2022  PROCEDURE: MRI LUMBAR SPINE WO CONTRAST CLINICAL INFORMATION: fall. Back pain following T12 and L1 fractures. COMPARISON: CT lumbar spine dated 5/6/2022 and MRI lumbar spine dated 11/10/2021. TECHNIQUE: Sagittal and axial T1 and T2-weighted images were obtained through the lumbar spine. FINDINGS: There is an obliquely oriented bandlike low T1 and hyperintense T2/STIR signal abnormality extending from the mid to inferior anterior aspect of the L1 vertebral body to the posterior superior endplate of L1 with mild displacement of the anterior fracture fragment. There is otherwise anatomic vertebral body height and alignment. Small fracture at the right anterolateral aspect of T12 seen on previous CT is not well-visualized on this exam. There is disruption of the anterior longitudinal ligament  at the anterior inferior aspect of the L1 vertebral body at the site of the fracture. Marrow signal is otherwise within normal limits. There is mild hyperintense STIR signal in the T12-L1 intervertebral disc. The posterior longitudinal ligaments appear intact. The conus terminates in a normal fashion at the T12-L1 level. There is no cauda equina nerve root thickening or clumping identified.  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 There are laminectomies at L3-S1, stable compared to prior exam. No focal fluid collection is identified in the laminectomy bed. There is subcutaneous edema dorsal to the lumbar spine. Paraspinal soft tissues are otherwise unremarkable. At T12-L1 there is no significant spinal canal or neuroforaminal stenosis. At L1-2 there is stable mild congenital spinal canal narrowing and mild bilateral neural foraminal stenosis in association with mild facet hypertrophy and ligamentum flavum thickening. At L2-3 the spinal canal is decompressed. There is a shallow disc bulge, stable compared to prior exam. There is moderate bilateral neural foraminal stenosis, similar to prior exam. At L3-4 the spinal canal is decompressed. There is moderate right and mild-to-moderate left neural foraminal stenosis. At L4-5 the spinal canal is decompressed. There is mild to moderate bilateral neural foraminal stenosis. At L5-S1 the spinal canal is decompressed. There is mild bilateral neural foraminal stenosis. 1. Redemonstration of obliquely oriented fracture of the L1 vertebral body extending from the anterior-inferior aspect of the vertebral body into the posterior aspect of the superior endplate with mild displacement and disruption of the anterior longitudinal ligament. No injury of the posterior elements to suggest instability. 2. Small fracture of the T12 inferior endplates seen on previous CT is not well-visualized on this exam. 3. Mild edema in the subcutaneous fat dorsal to the lumbar spine. 4. Redemonstration of laminectomies at L2-3 through L5-S1. **This report has been created using voice recognition software. It may contain minor errors which are inherent in voice recognition technology. ** Final report electronically signed by Dr. Marcus Mckeon MD on 5/9/2022 5:15 PM        Electronically signed by Parmjit Sy PA-C on 5/10/2022 at 10:35 AM     Patient seen and examined independently by me early this AM. Above discussed and I agree with LUCIUS Munson. See my additional comments below for updated orders and plan. Labs, cultures, and radiographs where available were reviewed. I discussed patient concerns with the patient's nurse and instructions were given. Please see our orders for the updated patient care plan. -MRI completed. Orthopedic spine still continue with nonoperative treatment plan. Brace. PT/OT. Outpatient follow-up with orthopedic spine. Pain control. DVT prophylaxis. Neurovascular checks. Physiatry consulted. Possible inpatient rehab.     Electronically signed by Pricilla Lal MD on 5/10/22 at 3:57 PM EDT

## 2022-05-10 NOTE — PROGRESS NOTES
[facility]  INPATIENT PHYSICAL THERAPY  EVALUATION  [facility] ONC MED 5K - 5K-26/026-A    Time In: 0561  Time Out: 7499  Timed Code Treatment Minutes: 23 Minutes  Minutes: 33          Date: 5/10/2022  Patient Name: Ana Good,  Gender:  male        MRN: [id number]  : 1950  (66 y.o.)      Referring Practitioner: LUCIUS Martinez  Diagnosis: Compression fracture of L1 lumbar vertebra, closed  Additional Pertinent Hx: Per H&P, \"He is a 66 y.o. male presenting at [facility] by activation of level 2 trauma, brought by EMS following a mechanical fall with no LOC; past medical history CAD with CABG and stenting x5 on Brilinta, previous cervical surgery, previous lumbar surgery, DM with diabetic neuropathy, hypercholesterolemia, hypertension, depression. Per port patient was attempting to ambulate when he tripped over his foot causing him to fall face first onto the ground and hyperextended his lumbar spine much when he heard a crack and a pop and immediate pain. Patient reports 10/10 pain in the low back. Patient states he does have a known lumbar herniated disc. Reports nausea and vomiting secondary to pain. Patient denies chest pain, shortness of breath, cough, headache, dizziness, lightheadedness, numbness, paraesthesias, weakness, chills, fevers, abdominal pain, dysuria, frequent urination, and neck pain. Care in coordination with trauma surgeon Dr. Marquita Martinez. \"     Restrictions/Precautions:  Restrictions/Precautions: Fall Risk,General Precautions  Required Braces or Orthoses  Spinal: Lumbar Corset  Position Activity Restriction  Other position/activity restrictions: Back brace when up, may don/doff in sitting    Subjective:  Chart Reviewed: Yes  Patient assessed for rehabilitation services?: Yes  Subjective: Pt resting in bed and agrees to therapy and trying to get up to chair. RN approved session.     General:     Vision Exceptions: Wears glasses at all times  Vision  Vision: Impaired  Vision Exceptions: Wears glasses at all times  Hearing  Hearing: Within functional limits  Hearing: Within functional limits       Pain: 5/10: back    Vitals: Vitals not assessed per clinical judgement, see nursing flowsheet    Social/Functional History:    Lives With: Alone  Type of Home: Apartment  Home Layout: One level  Home Access: Elevator (2nd floor)  Home Equipment: Cane,Walker, 4 wheeled                   Ambulation Assistance: Independent  Transfer Assistance: Independent    Active : Yes          OBJECTIVE:  Range of Motion:  Bilateral Lower Extremity: WFL    Strength:  Right Lower Extremity: grossly 3+/5  Left Lower Extremity: grossly 4-/5    Balance:  Static Sitting Balance:  Supervision  Dynamic Sitting Balance: Stand By Assistance  Static Standing Balance: Contact Guard Assistance, Minimal Assistance, with walker  Dynamic Standing Balance: Minimal Assistance, with walker    Bed Mobility:  Rolling to Left: Moderate Assistance   Supine to Sit: Moderate Assistance, with head of bed flat, with rail, with verbal cues , with increased time for completion    Transfers:  Sit to Stand: Moderate Assistance, X 1, and CGA of another, with increased time for completion, cues for hand placement  Stand to [place] [id number], with increased time for completion, cues for hand placement    Ambulation:  Contact Guard Assistance, Minimal Assistance  Distance: 15 ft  Surface: Level Tile  Device:Rolling Walker  Gait Deviations:  Decreased Step Length Bilaterally, Decreased Gait Speed and Decreased Heel Strike Bilaterally    Exercise:  Patient was guided in 1 set(s) 10 reps of exercise to both lower extremities. Ankle pumps, Glut sets, Quad sets and Long arc quads. Exercises were completed for increased independence with functional mobility.     Functional Outcome Measures: Completed  AM-PAC Inpatient Mobility without Stair Climbing Raw Score : 12  AM-PAC Inpatient without Stair Climbing T-Scale Score : 37.26    ASSESSMENT:  Activity Tolerance:  Patient tolerance of  treatment: good. Treatment Initiated: Treatment and education initiated within context of evaluation. Evaluation time included review of current medical information, gathering information related to past medical, social and functional history, completion of standardized testing, formal and informal observation of tasks, assessment of data and development of plan of care and goals. Treatment time included skilled education and facilitation of tasks to increase safety and independence with functional mobility for improved independence and quality of life. Assessment: Body Structures, Functions, Activity Limitations Requiring Skilled Therapeutic Intervention: Decreased functional mobility ,Decreased endurance,Decreased balance,Decreased strength,Increased pain,Decreased posture  Assessment: Pt is a 65 yo male that is s/p fall with L1 fracture. Pt participated well with mobility assessment and exercises. Pt was Mod I in PLOF with transfers and ambulated without AD. Pt is currently requiring Mod A for bed mobility and Min A for transfers and ambulation with RW. Pt would benefit from continued skilled PT to address strengthening, bed mobility, transfers, and ambulation before he will be safe to return home alone. Therapy Prognosis: Fair,Good    Requires PT Follow-Up: Yes    Discharge Recommendations:  Discharge Recommendations: Continue to assess pending progress, IP Rehab, Patient able to tolerate 3hrs of therapy a day, Patient would benefit from continued therapy after discharge, Therapy recommended at discharge    Patient Education:      .     Patient Education  Education Given To: Patient  Education Provided: Role of Therapy,Plan of Care,Home Exercise Program  Education Method: Demonstration,Verbal  Barriers to Learning: None  Education Outcome: Verbalized understanding,Demonstrated understanding,Continued education needed       Equipment Recommendations:  Equipment Needed: Yes  Other: monitor for needs - Will likely need new RW/4WW    Plan:  Current Treatment Recommendations: Strengthening,Balance training,Gait training,Functional mobility training,Transfer training,Neuromuscular re-education,Endurance training,Patient/Caregiver education & training,Safety education & training,Home exercise program,Equipment evaluation, education, & procurement,Therapeutic activities  Plan:  (6x O/T)    Goals:  Patient goals : go home  Short Term Goals  Time Frame for Short term goals: at discharge  Short term goal 1: Pt to be SBA for supine <> sit to get in/out of bed  Short term goal 2: Pt to be SBA for sit <> stand to get up to ambulate  Short term goal 3: Pt to ambulate >30 ft with RW with SBA for household distances  Short term goal 4: Pt to be able to don/doff back brace with Supervision for stabilization of lumbar fracture when up. Long Term Goals  Time Frame for Long term goals : not set due to short ELOS    Following session, patient left in safe position with all fall risk precautions in place. Tonio Rios.  Dorantesbarak Luis, [place]

## 2022-05-10 NOTE — PROGRESS NOTES
Ken Armando 58  INPATIENT OCCUPATIONAL THERAPY  [facility] ON[facility] MED 5K  EVALUATION    Time:   Time In: 1320  Time Out: 1357  Timed Code Treatment Minutes: 23 Minutes  Minutes: 37          Date: 5/10/2022  Patient Name: Cheli Macias,   Gender: male      MRN: [id number]  : 1950  (66 y.o.)  Referring Practitioner: LUCIUS Duran  Diagnosis: compression fx of L1 lumbar vertebra, closed, initial encounter  Additional Pertinent Hx: Per EMR:Patient reported to [facility] ER after suffering a fall at home. Patient heard a crack with immediate pain. patient was brought in by EMS, 10/10 pain. Patient with noted laceration on his nose from the fall. Patient was found to have L1 and T12 fractures. Patient was admitted. Ortho recommended conservative treatment with bracing. Restrictions/Precautions:  Restrictions/Precautions: Fall Risk,General Precautions  Required Braces or Orthoses  Spinal: Lumbar Corset  Position Activity Restriction  Spinal Precautions: No Bending,No Lifting,No Twisting  Other position/activity restrictions: Back brace when up, may don/doff in sitting    Subjective  Chart Reviewed: Yes,Orders,Progress Notes,History and Physical,Imaging  Patient assessed for rehabilitation services?: Yes  Family / Caregiver Present: No    Subjective: Pt seated in bedside chair upon arrival, agreeable to OT session. At end of session pt stating \"that wore me out way more than it should have. \"    Pain: Pt c/o significant back pain although does not quantify. RN present on arrival giving pain meds.     Vitals: Vitals not assessed per clinical judgement, see nursing flowsheet    Social/Functional History:  Lives With: Alone  Type of Home: Apartment  Home Layout: One level  Home Access: Elevator (2nd floor)  Home Equipment: Cane,Walker, 4 wheeled   Bathroom Shower/Tub: Tub/Shower unit  Bathroom Toilet: Handicap height  Bathroom Equipment: Shower chair       ADL Assistance: [facility] [address]: Independent  Ambulation Assistance: Independent  Transfer Assistance: Independent    Active : Yes     Additional Comments: Pt reported was using cane for mobility prior to admission. Pt does not have any family close that would be able to assist at discharge. VISION:WFL    HEARING:  WFL    COGNITION: WFL    RANGE OF MOTION:  Bilateral Upper Extremity:  WFL    STRENGTH:  Bilateral Upper Extremity:  WFL, although UB fatigues with mobility due to increased pressure/weight that UEs put through RW     ADL:   Upper Extremity Dressing: Minimal Assistance. doffing lumbar corset  Lower Extremity Dressing: Stand By Assistance. for removing slip on shoes  Toileting: with set-up.  use of urinal; increased time to complete while seated at EOB. BALANCE:  Sitting Balance:  Supervision. Standing Balance: Contact Guard Assistance, X 2.  Pt tolerated standing ~30 seconds in prep for mobility. BED MOBILITY:  Sit to Supine: Minimal Assistance, with verbal cues , with increased time for completion ; assist for LEs    TRANSFERS:  Sit to Stand:  [address], with increased time for completion. Stand to Sit: [employer], X 2.      FUNCTIONAL MOBILITY:  Assistive Device: Rolling Walker  Assist Level:  Contact Guard Assistance. Distance: bedside chair>around bed  Slow pace, cues for upright posture/gaze. Pt demoes forward flexed posture and increased weight/support placed through UB on RW to compensate for LE weakness and back pain. **Discussed discharge recommendations and alternative options for discharge. Discussed different levels of therapy (IPR, ECF with therapies, HH therapies) and pros/cons of each. Reinforced would require increased assistance at home if discharges straight home from [facility], and at this point would recommend pt have inpatient therapy stay before returning home to maximize independence and overall safety for return to home.      Activity Tolerance:  Patient tolerance of  treatment: good. Assessment:  Assessment: Pt presents requiring increased assistance for ADLs, transfers, and functional mobility compared to PLOF. Pt will continue to benefit from OT services to improve independence with these tasks, in addition to overall strength/endurance to facilitate return to PLOF. Performance deficits / Impairments: Decreased functional mobility ,Decreased ADL status,Decreased strength,Decreased endurance,Decreased balance,Decreased high-level IADLs  Prognosis: Good  REQUIRES OT FOLLOW-UP: Yes  Decision Making: Medium Complexity    Treatment Initiated: Treatment and education initiated within context of evaluation. Evaluation time included review of current medical information, gathering information related to past medical, social and functional history, completion of standardized testing, formal and informal observation of tasks, assessment of data and development of plan of care and goals. Treatment time included skilled education and facilitation of tasks to increase safety and independence with ADL's for improved functional independence and quality of life. Discharge Recommendations:  IP Rehab    Patient Education:     Patient Education  Education Given To: Patient  Education Provided: Role of Therapy,Plan of [address] [place] [address],[address] Training (discharge options/recommendations)  Education Method: Demonstration  Barriers to Learning: None  Education Outcome: Verbalized understanding    Equipment Recommendations: Other: will continue to monitor pending progress, discharge location    Plan:  Times per Week: 5-6x  Current Treatment Recommendations: Strengthening,Balance training,Functional mobility training,Endurance training,Patient/Caregiver education & training,Safety education & training,Equipment evaluation, education, & procurement,Self-Care / ADL,Home management training. See long-term goal time frame for expected duration of plan of care.   If no long-term goals established, a short length of stay is anticipated. Goals:     Short Term Goals  Time Frame for Short term goals: by discharge  Short Term Goal 1: Pt will increase activity tolerance for functional mobility household distances with MI in prep for ADL/IADL completion. Short Term Goal 2: Pt will complete BADL/IADL tasks with MI to increase independence with self care tasks. Short Term Goal 3: Pt will complete functional transfers with MI in prep for toilet/shower transfers. Short Term Goal 4: Pt will tolerate dynamic standing X5 minutes with MI in prep for sinkside grooming tasks. Following session, patient left in safe position with all fall risk precautions in place.

## 2022-05-10 NOTE — CONSULTS
Physical Medicine & Rehabilitation   Consult Note      Admitting Physician: Olayinka Cheema MD    Primary Care Provider: Andrés Hwang MD     Reason for Consult:  Fall. L1 compression fracture. Rehab needs    History of Present Illness:  Geneva Cade is a 66 y.o. male admitted to [facility] on 5/6/2022. Patient  has a past medical history of RAUL (acute kidney injury) ([facility] [address] ASHD (arteriosclerotic heart disease), Depression, Diabetic peripheral neuropathy ([facility] [address] Fracture, HTN (hypertension), Hypercholesteremia, IDDM (insulin dependent diabetes mellitus), Low back pain, Morbid obesity with BMI of 45.0-49.9, adult ([address] and Osteoarthritis. Patient reported to [facility] ER after suffering a fall at home. Patient heard a crack with immediate pain. patient was brought in by EMS, 10/10 pain. Patient with noted laceration on his nose from the fall. Patient was found to have L1 and T12 fractures. Patient was admitted. Ortho recommended conservative treatment with bracing. Patient was bedrest until bracing arrives. Patient with ongoing right knee pain, CT without fracture + effusion. Patient seen today, sitting up in chair. Patient with complaints of right knee stiffness and pain. Discussed CT findings. Patient with ongoing need for therapy at discharge. Discussed with patient about restriction with insurance and bed availability this week. Patient understanding but not too keen on going to SNF. Discussed those recommendations vs  without 24 hour care not being recommended. Patient states he is unsure what he will do.        Current Rehabilitation Assessments:  PT:    Range of Motion:  Bilateral Lower Extremity: WFL  Strength:  Right Lower Extremity: grossly 3+/5  Left Lower Extremity: grossly 4-/5  Balance:  Static Sitting Balance:  Supervision  Dynamic Sitting Balance: Stand By Assistance  Static Standing Balance: Contact Guard Assistance, Minimal Assistance, with walker  Dynamic Standing Balance: Minimal Assistance, with walker  Bed Mobility:  Rolling to Left: Moderate Assistance   Supine to Sit: Moderate Assistance, with head of bed flat, with rail, with verbal cues , with increased time for completion  Transfers:  Sit to Stand: Moderate Assistance, X 1, and CGA of another, with increased time for completion, cues for hand placement  Stand to [place] [id number], with increased time for completion, cues for hand placement  Ambulation:  Contact Guard Assistance, Minimal Assistance  Distance: 15 ft  Surface: Level Tile  Device:Rolling Walker  Gait Deviations:  Decreased Step Length Bilaterally, Decreased Gait Speed and Decreased Heel Strike Bilaterally  Exercise:  Patient was guided in 1 set(s) 10 reps of exercise to both lower extremities. Ankle pumps, Glut sets, Quad sets and Long arc quads. Exercises were completed for increased independence with functional mobility.        Past Medical History:        Diagnosis Date    RAUL (acute kidney injury) ([facility] [address] 2/13/2020    ASHD (arteriosclerotic heart disease) 2005 2006    stent  baki    Depression     Diabetic peripheral neuropathy [facility]) 2014    Fracture Oct 1984    left lower extremity     HTN (hypertension)     Hypercholesteremia     IDDM (insulin dependent diabetes mellitus)     Low back pain     Morbid obesity with BMI of 45.0-49.9, adult ([facility] [address]     Osteoarthritis        Past Surgical History:        Procedure Laterality Date    AMPUTATION Left 9/10/14    partial versus complete left hallux amputation, left great toe amputation    APPENDECTOMY      BACK INJECTION Bilateral 1/18/2021    Bilateral Si MBB #1 performed by Ruben Alvarez MD at [address] Bilateral 3/1/2021    Bilateral SI MBB #2 performed by Ruben Alvarez MD at [facility]    fusion of C5-C6    CHOLECYSTECTOMY      COLONOSCOPY  2007 and 2011    colonn polyps  lorenzo    CORONARY ANGIOPLASTY WITH STENT PLACEMENT  08/07/2017    Dr Owen Sam @ [address] CORONARY ARTERY BYPASS GRAFT  2015 august dox    EKG 12-LEAD  8/14/2015         FACET JOINT INJECTION Bilateral 5/22/2020    Lumbar Facet MBB @L3-4,4-5 bilateral #2 performed by Lucio Rodriguez MD at [place] [address]      left leg    JOINT REPLACEMENT      bilateral knees gurvinder    PAIN MANAGEMENT PROCEDURE Bilateral 1/28/2020    Lumbar Facet MBB @ L3-4,4-5,5-S1 bilateral #1 LUMBAR FACET performed by Lucio Rodriguez MD at [place] [address] Right 7/14/2020    Lumbar RFA Bilateral L3-4,4-5  right side first performed by Lucio Rodriguez MD at [place] [address] Left 10/1/2020    Lumbar RFA Left side L3-4, 4-5 performed by Lucio Rodriguez MD at [place] [address] Bilateral 11/17/2020    TFLESI @L5 bilateral #1 performed by Lucio Rodriguez MD at [place] [address] Bilateral 12/10/2020    TFLESI @L5 bilateral #1 performed by Lucio Rodriguez MD at [address] [facility] N/A 12/28/2018    T7-T9 DECOMPRESSION, T7-T9 POSTERIOR FUSION performed by Ace Pavon MD at [address]  2010    [facility]    TONSILLECTOMY      VASCULAR SURGERY      cabg harvests from left leg       Allergies:     Allergies   Allergen Reactions    Horse-Derived Products         Current Medications:   Current Facility-Administered Medications: insulin lispro (HUMALOG) injection vial 18 Units, 18 Units, SubCUTAneous, TID WC  insulin glargine (LANTUS) injection vial 30 Units, 30 Units, SubCUTAneous, Nightly  enoxaparin (LOVENOX) injection 40 mg, 40 mg, SubCUTAneous, BID  glucagon (rDNA) injection 1 mg, 1 mg, IntraMUSCular, PRN  dextrose 5 % solution, 100 mL/hr, IntraVENous, PRN  insulin lispro (HUMALOG) injection vial 0-6 Units, 0-6 Units, SubCUTAneous, TID WC  insulin lispro (HUMALOG) injection vial 0-3 Units, 0-3 Units, SubCUTAneous, Nightly  glucose chewable tablet 16 g, 4 tablet, Oral, PRN  dextrose bolus 10% 125 mL, 125 mL, IntraVENous, PRN **OR** dextrose bolus 10% 250 mL, 250 mL, IntraVENous, PRN  aspirin chewable tablet 81 mg, 81 mg, Oral, Daily  atorvastatin (LIPITOR) tablet 10 mg, 10 mg, Oral, Daily  carvedilol (COREG) tablet 12.5 mg, 12.5 mg, Oral, BID  enalapril (VASOTEC) tablet 10 mg, 10 mg, Oral, Daily  ferrous sulfate (IRON 325) tablet 325 mg, 325 mg, Oral, BID  metFORMIN (GLUCOPHAGE) tablet 1,000 mg, 1,000 mg, Oral, BID WC  multivitamin 1 tablet, 1 tablet, Oral, Daily  ticagrelor (BRILINTA) tablet 90 mg, 90 mg, Oral, BID  tiZANidine (ZANAFLEX) tablet 4 mg, 4 mg, Oral, Q6H PRN  Urea (CARMOL) 40 % cream, , Topical, PRN  pantoprazole (PROTONIX) tablet 40 mg, 40 mg, Oral, QAM AC  furosemide (LASIX) tablet 80 mg, 80 mg, Oral, QAM  furosemide (LASIX) tablet 40 mg, 40 mg, Oral, Dinner  sodium chloride flush 0.9 % injection 5-40 mL, 5-40 mL, IntraVENous, 2 times per day  sodium chloride flush 0.9 % injection 5-40 mL, 5-40 mL, IntraVENous, PRN  0.9 % sodium chloride infusion, 25 mL, IntraVENous, PRN  acetaminophen (TYLENOL) tablet 650 mg, 650 mg, Oral, Q4H PRN  ondansetron (ZOFRAN-ODT) disintegrating tablet 4 mg, 4 mg, Oral, Q8H PRN **OR** ondansetron (ZOFRAN) injection 4 mg, 4 mg, IntraVENous, Q6H PRN  polyethylene glycol (GLYCOLAX) packet 17 g, 17 g, Oral, Daily  bisacodyl (DULCOLAX) suppository 10 mg, 10 mg, Rectal, Daily  senna (SENOKOT) tablet 8.6 mg, 1 tablet, Oral, Daily PRN  HYDROmorphone (DILAUDID) injection 0.25 mg, 0.25 mg, IntraVENous, Q3H PRN **OR** HYDROmorphone (DILAUDID) injection 0.5 mg, 0.5 mg, IntraVENous, Q3H PRN  oxyCODONE (ROXICODONE) immediate release tablet 5 mg, 5 mg, Oral, Q4H PRN **OR** oxyCODONE (ROXICODONE) immediate release tablet 10 mg, 10 mg, Oral, Q4H PRN    Social History:  Social History     Socioeconomic History    Marital status:      Spouse name: Not on file    Number of children: 2    Years of education: Not on file    Highest education level: Not on file   Occupational History    Not on file   Tobacco Use    Smoking status: Never Smoker    Smokeless tobacco: Never Used   Vaping Use    Vaping Use: Never used   Substance and Sexual Activity    Alcohol use: Not Currently     Comment: rarely    Drug use: No    Sexual activity: Never   Other Topics Concern    Not on file   Social History Narrative    Not on file     Social Determinants of Health     Financial Resource Strain: Low Risk     Difficulty of Paying Living Expenses: Not hard at all   Food Insecurity: No Food Insecurity    Worried About [address] in the Last Year: Never true    [address] [employer] in the Last Year: Never true   Transportation Needs:     Lack of Transportation (Medical): Not on file    Lack of Transportation (Non-Medical):  Not on file   Physical Activity: Inactive    Days of Exercise per Week: 0 days    Minutes of Exercise per Session: 10 min   Stress:     Feeling of Stress : Not on file   Social Connections:     Frequency of Communication with Friends and Family: Not on file    Frequency of Social Gatherings with Friends and Family: Not on file    Attends [employer] Services: Not on file    Active Member of Clubs or Organizations: Not on file    Attends Club or Organization Meetings: Not on file    Marital Status: Not on file   Intimate Partner Violence:     Fear of Current or Ex-Partner: Not on file    Emotionally Abused: Not on file    Physically Abused: Not on file    Sexually Abused: Not on file   Housing Stability:     Unable to Pay for Housing in the Last Year: Not on file    Number of [place] in the Last Year: Not on file    Unstable Housing in the Last Year: Not on file     Lives With: Alone  Type of Home: [address] [place] [address]  Layout: One level  Home Access: Elevator (2nd floor)  Home Equipment: Cane,Walker, 4 wheeled     Ambulation Assistance: Independent  Transfer Assistance: Independent     Active : Yes    Family History:       Problem Relation Age of Onset    Cancer Mother         uterine       Review of Systems:  CONSTITUTIONAL:  positive for  fatigue  EYES:  negative  HEENT:  negative  RESPIRATORY:  negative  CARDIOVASCULAR:  negative  GASTROINTESTINAL:  negative  GENITOURINARY:  negative  SKIN:  Multiple healing scabs and thickened skin in the BLE  HEMATOLOGIC/LYMPHATIC:  positive for swelling/edema  MUSCULOSKELETAL:  positive for  pain and muscle weakness  NEUROLOGICAL:  positive for gait problems, weakness and pain  BEHAVIOR/PSYCH:  negative  System review otherwise negative    Physical Exam:  BP (!) 146/62   Pulse 64   Temp 97.9 °F (36.6 °C) (Oral)   Resp 18   Ht 6' 2\" (1.88 m)   Wt (!) 370 lb 4 oz (167.9 kg)   SpO2 96%   BMI 47.54 kg/m²   awake  Orientation:   person, place, time  Mood: within normal limits  Affect: calm and spontaneous  General appearance: Patient is well nourished, well developed, well groomed and in no acute distress, obese    Memory:   normal,   Attention/Concentration: normal  Language:  normal    Cranial Nerves:  cranial nerves appears intact  ROM:  abnormal - limited BLE  Muscle bulk: within normal limits    Skin: multiple abrasions at different levels of healing.  BLE with swelling and thickened skin throughout  Peripheral vascular: Edema: 2+      Diagnostics:  Recent Results (from the past 24 hour(s))   POCT Glucose    Collection Time: 05/09/22 11:42 AM   Result Value Ref Range    POC Glucose 226 (H) 70 - 108 mg/dl   EKG 12 Lead    Collection Time: 05/09/22 11:54 AM   Result Value Ref Range    Ventricular Rate 65 BPM    Atrial Rate 65 BPM    P-R Interval 192 ms    QRS Duration 140 ms    Q-T Interval 438 ms    QTc Calculation (Bazett) 455 ms    P Axis 22 degrees    R Axis -31 degrees    T Axis 61 degrees   POCT Glucose    Collection Time: 05/09/22  4:48 PM   Result Value Ref Range    POC Glucose 201 (H) 70 - 108 mg/dl   POCT Glucose    Collection Time: 05/09/22  7:59 PM   Result Value Ref Range    POC Glucose 283 (H) 70 - 108 mg/dl   POCT Glucose    Collection Time: 05/10/22  7:58 AM   Result Value Ref Range    POC Glucose 275 (H) 70 - 108 mg/dl       MRI Lumbar Spine  Impression       1. Redemonstration of obliquely oriented fracture of the L1 vertebral body extending from the anterior-inferior aspect of the vertebral body into the posterior aspect of the superior endplate with mild displacement and disruption of the anterior    longitudinal ligament. No injury of the posterior elements to suggest instability. 2. Small fracture of the T12 inferior endplates seen on previous CT is not well-visualized on this exam.   3. Mild edema in the subcutaneous fat dorsal to the lumbar spine. 4. Redemonstration of laminectomies at L2-3 through L5-S1.            Impression:  · Fall  · L1 and T12 fracture  · LBBB  · Nasal bridge laceration  · BLE edema with possible venous stasis  · Right knee pain    Recommendations:  · Continue current therapies  · Patient would benefit from further therapy prior to returning home. Unfortunately, patient's insurance is restrictive on IPR admission and without strong rehab diagnosis or medical complexity patient is very unlikely to get approved. Recommend focus on SNF for ongoing therapy. It was my pleasure to evaluate Alfredo Vaughn today. Please call with questions.     Marion Gomez, APRN - CNP

## 2022-05-10 NOTE — PLAN OF CARE
Problem: Pain  Goal: Verbalizes/displays adequate comfort level or baseline comfort level  5/10/2022 0159 by Nuria Morrell RN  Outcome: Progressing  Flowsheets (Taken 5/10/2022 0159)  Verbalizes/displays adequate comfort level or baseline comfort level:   Encourage patient to monitor pain and request assistance   Assess pain using appropriate pain scale   Administer analgesics based on type and severity of pain and evaluate response   Implement non-pharmacological measures as appropriate and evaluate response     Problem: Safety - Adult  Goal: Free from fall injury  5/10/2022 0159 by Nuria Morrell RN  Outcome: Progressing  Flowsheets  Taken 5/10/2022 0159  Free From Fall Injury:   Instruct family/caregiver on patient safety   Based on caregiver fall risk screen, instruct family/caregiver to ask for assistance with transferring infant if caregiver noted to have fall risk factors  Taken 5/9/2022 2339  Free From Fall Injury: Instruct family/caregiver on patient safety     Problem: Discharge Planning  Goal: Discharge to home or other facility with appropriate resources  5/10/2022 0159 by Nuria Morrell RN  Outcome: Progressing  Flowsheets  Taken 5/10/2022 0159  Discharge to home or other facility with appropriate resources: Identify barriers to discharge with patient and caregiver  Taken 5/9/2022 2030  Discharge to home or other facility with appropriate resources: Identify barriers to discharge with patient and caregiver     Problem: Chronic Conditions and Co-morbidities  Goal: Patient's chronic conditions and co-morbidity symptoms are monitored and maintained or improved  5/10/2022 0159 by Nuria Morrell RN  Outcome: Progressing  Flowsheets  Taken 5/10/2022 0159  Care Plan - Patient's Chronic Conditions and Co-Morbidity Symptoms are Monitored and Maintained or Improved:   Monitor and assess patient's chronic conditions and comorbid symptoms for stability, deterioration, or improvement   Collaborate with multidisciplinary team to address chronic and comorbid conditions and prevent exacerbation or deterioration  Taken 5/9/2022 2030  Care Plan - Patient's Chronic Conditions and Co-Morbidity Symptoms are Monitored and Maintained or Improved: Monitor and assess patient's chronic conditions and comorbid symptoms for stability, deterioration, or improvement     Problem: ABCDS Injury Assessment  Goal: Absence of physical injury  5/10/2022 0159 by Kalina Mon RN  Outcome: Progressing  Flowsheets  Taken 5/10/2022 0159  Absence of Physical Injury: Implement safety measures based on patient assessment  Taken 5/9/2022 2339  Absence of Physical Injury: Implement safety measures based on patient assessment     Problem: Skin/Tissue Integrity - Adult  Goal: Incisions, wounds, or drain sites healing without S/S of infection  5/10/2022 0159 by Kalina Mon RN  Outcome: Progressing  Flowsheets  Taken 5/9/2022 2339  Incisions, Wounds, or Drain Sites Healing Without Sign and Symptoms of Infection: ADMISSION and DAILY: Assess and document risk factors for pressure ulcer development  Taken 5/9/2022 2030  Incisions, Wounds, or Drain Sites Healing Without Sign and Symptoms of Infection: ADMISSION and DAILY: Assess and document risk factors for pressure ulcer development     Problem: Musculoskeletal - Adult  Goal: Return mobility to safest level of function  5/10/2022 0159 by Kalina Mon RN  Outcome: Progressing  Flowsheets  Taken 5/10/2022 0159  Return Mobility to Safest Level of Function:   Assess patient stability and activity tolerance for standing, transferring and ambulating with or without assistive devices   Assist with transfers and ambulation using safe patient handling equipment as needed   Obtain physical therapy/occupational therapy consults as needed   Instruct patient/family in ordered activity level  Taken 5/9/2022 2030  Return Mobility to Safest Level of Function: Assess patient stability and activity tolerance for standing, transferring and ambulating with or without assistive devices     Problem: Musculoskeletal - Adult  Goal: Maintain proper alignment of affected body part  5/10/2022 0159 by Yury Bower RN  Outcome: Progressing  Flowsheets  Taken 5/10/2022 0159  Maintain proper alignment of affected body part: Support and protect limb and body alignment per provider's orders  Taken 5/9/2022 2030  Maintain proper alignment of affected body part: Support and protect limb and body alignment per provider's orders   Care plan reviewed with patient. Patient verbalize understanding of the plan of care and contribute to goal setting.     Electronically signed by Yury Bower RN on 5/10/2022 at 2:01 AM

## 2022-05-10 NOTE — PROGRESS NOTES
Department of Orthopedic Surgery  Spine Service  Attending Progress Note        Subjective:  Patient c/o back pain with movement, patient reports his right knee giving out is an isolated incident and does not typically happen. MRI ordered and shows foraminal stenosis    Vitals  VITALS:  BP (!) 146/62   Pulse 64   Temp 97.9 °F (36.6 °C) (Oral)   Resp 18   Ht 6' 2\" (1.88 m)   Wt (!) 370 lb 4 oz (167.9 kg)   SpO2 96%   BMI 47.54 kg/m²   24HR INTAKE/OUTPUT:    Intake/Output Summary (Last 24 hours) at 5/10/2022 1098  Last data filed at 5/10/2022 0753  Gross per 24 hour   Intake 485 ml   Output 1150 ml   Net -665 ml     URINARY CATHETER OUTPUT (Liu):     DRAIN/TUBE OUTPUT:       PHYSICAL EXAM:    Orientation:  alert and oriented to person, place and time    Lower Extremity Motor :  5/5 bilateral pedal push/pull, able to move lower extremities pain with pain in right knee  Lower Extremity Sensory:  Intact L1-S1      LABS:    HgB:    Lab Results   Component Value Date    HGB 9.1 05/09/2022     MRI Lumbar Spine  Impression       1. Redemonstration of obliquely oriented fracture of the L1 vertebral body extending from the anterior-inferior aspect of the vertebral body into the posterior aspect of the superior endplate with mild displacement and disruption of the anterior    longitudinal ligament. No injury of the posterior elements to suggest instability. 2. Small fracture of the T12 inferior endplates seen on previous CT is not well-visualized on this exam.   3. Mild edema in the subcutaneous fat dorsal to the lumbar spine. 4. Redemonstration of laminectomies at L2-3 through L5-S1.         ASSESSMENT AND PLAN:    L1 vertebral body fracture with ALL disruption    1:  Activity Level:  As tolerated with brace on when out of bed, encouraged patient to increase activity today.  PT/OT  2:  Pain Control:  Ok  3:  Discharge Planning:  Pending - okay for discharge from ortho spine standpoint once patient moves with

## 2022-05-10 NOTE — PLAN OF CARE
Problem: Pain  Goal: Verbalizes/displays adequate comfort level or baseline comfort level  Outcome: Progressing  Verbalizes/displays adequate comfort level or baseline comfort level:   Encourage patient to monitor pain and request assistance   Assess pain using appropriate pain scale   Administer analgesics based on type and severity of pain and evaluate response   Implement non-pharmacological measures as appropriate and evaluate response  Pain Assessment: 0-10  Pain Level: 5   Patient's Stated Pain Goal: 6   Is pain goal met at this time? Yes     Non-Pharmaceutical Pain Intervention(s): Rest,Repositioned       Problem: Safety - Adult  Goal: Free from fall injury  Outcome: Progressing  Free From Fall Injury: Instruct family/caregiver on patient safety  Note: Patient remains free from falls this shift. Fall risk assessment complete. Fall sign posted. Non skid socks on. Bed in lowest position, 2/4 siderails up. Bed alarm on. Call light and all possessions within reach.  Rounding hourly       Problem: Discharge Planning  Goal: Discharge to home or other facility with appropriate resources  Outcome: Progressing  Discharge to home or other facility with appropriate resources:   Identify barriers to discharge with patient and caregiver   Arrange for needed discharge resources and transportation as appropriate   Identify discharge learning needs (meds, wound care, etc)     Problem: Chronic Conditions and Co-morbidities  Goal: Patient's chronic conditions and co-morbidity symptoms are monitored and maintained or improved  Outcome: Progressing    Care Plan - Patient's Chronic Conditions and Co-Morbidity Symptoms are Monitored and Maintained or Improved:   Monitor and assess patient's chronic conditions and comorbid symptoms for stability, deterioration, or improvement   Collaborate with multidisciplinary team to address chronic and comorbid conditions and prevent exacerbation or deterioration     Problem: ABCDS

## 2022-05-10 NOTE — PROGRESS NOTES
Progress Note  Date:5/9/2022       KXYE:[id number]  Patient Name:Tuan Hassan     YOB: 1950     Age:71 y.o. Subjective    Subjective:  Symptoms:  Stable. He reports weakness. No chest pain or diarrhea. Diet:  Adequate intake. He reports  nausea. Activity level: Impaired due to weakness. Pain:  He complains of pain that is moderate. Pain is partially controlled.        Current Facility-Administered Medications   Medication Dose Route Frequency Provider Last Rate Last Admin    enoxaparin (LOVENOX) injection 40 mg  40 mg SubCUTAneous BID LUCIUS Haji   40 mg at 05/09/22 0858    glucagon (rDNA) injection 1 mg  1 mg IntraMUSCular PRN Josie Mendoza MD        dextrose 5 % solution  100 mL/hr IntraVENous PRN Josie Mendoza MD        insulin lispro (HUMALOG) injection vial 0-6 Units  0-6 Units SubCUTAneous TID WC Josie Mendoza MD   2 Units at 05/09/22 1726    insulin lispro (HUMALOG) injection vial 0-3 Units  0-3 Units SubCUTAneous Nightly Josie Mendoza MD   2 Units at 05/08/22 2136    glucose chewable tablet 16 g  4 tablet Oral PRN Josie Mendoza MD        dextrose bolus 10% 125 mL  125 mL IntraVENous PRN Josie Mendoza MD        Or    dextrose bolus 10% 250 mL  250 mL IntraVENous PRN Josie Mendoza MD        aspirin chewable tablet 81 mg  81 mg Oral Daily Hiram Damian, APRN - CNP   81 mg at 05/09/22 0902    atorvastatin (LIPITOR) tablet 10 mg  10 mg Oral Daily Hiram Damian, APRN - CNP   10 mg at 05/08/22 2139    carvedilol (COREG) tablet 12.5 mg  12.5 mg Oral BID Hiram Bors, APRN - CNP   12.5 mg at 05/09/22 0902    enalapril (VASOTEC) tablet 10 mg  10 mg Oral Daily Hiram Bors, APRN - CNP   10 mg at 05/09/22 1813    ferrous sulfate (IRON 325) tablet 325 mg  325 mg Oral BID Hiram Bors, APRN - CNP   325 mg at 05/09/22 0902    insulin glargine (LANTUS) injection vial 25 Units  25 Units SubCUTAneous Nightly Hiram Damian APRN - CNP   25 Units at 05/08/22 2136    metFORMIN (GLUCOPHAGE) tablet 1,000 mg  1,000 mg Oral BID  Stevie Actis, APRN - CNP   1,000 mg at 05/09/22 1724    multivitamin 1 tablet  1 tablet Oral Daily Stevie Actis, APRN - CNP   1 tablet at 05/09/22 0902    ticagrelor (BRILINTA) tablet 90 mg  90 mg Oral BID Stevie Actis, APRN - CNP   90 mg at 05/09/22 0904    tiZANidine (ZANAFLEX) tablet 4 mg  4 mg Oral Q6H PRN Stevie Actis, APRN - CNP   4 mg at 05/09/22 0533    Urea (CARMOL) 40 % cream   Topical PRN Stevie Actis, APRN - CNP        pantoprazole (PROTONIX) tablet 40 mg  40 mg Oral QAM  Stevie Actis, APRN - CNP   40 mg at 05/09/22 0531    insulin lispro (HUMALOG) injection vial 12 Units  12 Units SubCUTAneous TID  Stevie Actis, APRN - CNP   12 Units at 05/09/22 1726    furosemide (LASIX) tablet 80 mg  80 mg Oral QAM Stevie Actis, APRN - CNP   80 mg at 05/09/22 0858    furosemide (LASIX) tablet 40 mg  40 mg Oral Dinner Stevie Actis, APRN - CNP   40 mg at 05/09/22 1724    sodium chloride flush 0.9 % injection 5-40 mL  5-40 mL IntraVENous 2 times per day LUCIUS Langley   10 mL at 05/09/22 0903    sodium chloride flush 0.9 % injection 5-40 mL  5-40 mL IntraVENous PRN LUCIUS Haji        0.9 % sodium chloride infusion  25 mL IntraVENous PRN [employer] PA        acetaminophen (TYLENOL) tablet 650 mg  650 mg Oral Q4H PRN LUCIUS Haji        ondansetron (ZOFRAN-ODT) disintegrating tablet 4 mg  4 mg Oral Q8H PRN LUCIUS Haji        Or    ondansetron (ZOFRAN) injection 4 mg  4 mg IntraVENous Q6H PRN LUCIUS Haji        polyethylene glycol (GLYCOLAX) packet 17 g  17 g Oral Daily LUCIUS Haji   17 g at 05/06/22 2318    bisacodyl (DULCOLAX) suppository 10 mg  10 mg Rectal Daily LUCIUS Haji   10 mg at 05/09/22 0902    senna (SENOKOT) tablet 8.6 mg  1 tablet Oral Daily PRN LUCIUS Langley        HYDROmorphone (DILAUDID) injection 0.25 mg  0.25 mg IntraVENous Q3H PRN Gisellkarlie Miranda PA        Or    HYDROmorphone (DILAUDID) injection 0.5 mg  0.5 mg IntraVENous Q3H PRN Familia Miranda, PA   0.5 mg at 22 1320    oxyCODONE (ROXICODONE) immediate release tablet 5 mg  5 mg Oral Q4H PRN Familia Tranpa, PA   5 mg at 22 0224    Or    oxyCODONE (ROXICODONE) immediate release tablet 10 mg  10 mg Oral Q4H PRN Familia Tranpa, PA   10 mg at 22 1047      Review of Systems   Constitutional: Positive for activity change. Back pain with spasm   HENT: Negative for congestion, nosebleeds and postnasal drip. Cardiovascular: Negative for chest pain, palpitations and leg swelling. Gastrointestinal: Positive for nausea. Negative for abdominal distention, abdominal pain, anal bleeding and diarrhea. Musculoskeletal: Positive for back pain. Negative for gait problem, myalgias, neck pain and neck stiffness. No leg pain     right  2nd toe healing  But  Some   Blood  Of  The  Nail     no  redness   Neurological: Positive for weakness. Negative for dizziness, facial asymmetry and numbness. Psychiatric/Behavioral: Negative for agitation and behavioral problems. Objective         Vitals Last 24 Hours:  TEMPERATURE:  Temp  Av.5 °F (36.9 °C)  Min: 97.6 °F (36.4 °C)  Max: 100.4 °F (38 °C)  RESPIRATIONS RANGE: Resp  Av.6  Min: 16  Max: 20  PULSE OXIMETRY RANGE: SpO2  Av %  Min: 92 %  Max: 96 %  PULSE RANGE: Pulse  Av.8  Min: 60  Max: 72  BLOOD PRESSURE RANGE: Systolic (62BXW), WBW:425 , Min:120 , ZTU:786   ; Diastolic (31SIZ), JJP:59, Min:60, Max:83    I/O (24Hr): Intake/Output Summary (Last 24 hours) at 2022  Last data filed at 2022 1938  Gross per 24 hour   Intake 585 ml   Output 1300 ml   Net -715 ml     Objective:  General Appearance:  Comfortable. Vital signs: (most recent): Blood pressure (!) 159/72, pulse 66, temperature 98.1 °F (36.7 °C), temperature source Oral, resp.  rate 19, height 6' 2\" (1.88 m), weight (!) 370 lb 4 oz (167.9 kg), SpO2 92 %. Vital signs are normal.    Output: Producing urine. HEENT: Normal HEENT exam.    Lungs:  Normal effort and normal respiratory rate. Breath sounds clear to auscultation. Heart: Normal rate. Regular rhythm. S1 normal and S2 normal.  Positive for murmur (1 to 2/6 merced). Abdomen: Abdomen is soft and non-distended. Bowel sounds are normal.   There is epigastric tenderness. Extremities: Normal range of motion. (  Mid  Back pian  Noted  with  spasm)  Neurological: Patient has normal reflexes. Skin:  Warm. Labs/Imaging/Diagnostics    Labs:  CBC:  Recent Labs     05/07/22 0517 05/08/22 0527 05/09/22  0510   WBC 7.4 7.7 8.2   RBC 2.91* 3.07* 2.89*   HGB 9.3* 9.7* 9.1*   HCT 27.5* 29.6* 27.4*   MCV 94.5* 96.4* 94.8*   * 114* 107*     CHEMISTRIES:  Recent Labs     05/07/22 0517 05/08/22 0527 05/09/22  0510    137 137   K 3.8 3.9 3.7    102 102   CO2 26 25 24   BUN 33* 26* 29*   CREATININE 1.2 1.3* 1.4*   GLUCOSE 142* 176* 230*     PT/INR:No results for input(s): PROTIME, INR in the last 72 hours. APTT:No results for input(s): APTT in the last 72 hours. LIVER PROFILE:  Recent Labs     05/07/22 0517 05/08/22 0527 05/09/22  0510   AST 14 13 12   ALT 9* 8* 8*   BILITOT 0.5 1.0 1.2   ALKPHOS 60 62 57       Imaging Last 24 Hours:  XR LUMBAR SPINE (2-3 VIEWS)    Result Date: 5/8/2022  LUMBAR SPINE 2 VIEWS: CLINICAL INFORMATION: lumbar pain COMPARISON: 10/26/2020 TECHNIQUE: Standard AP and lateral views of lumbar spine were obtained. 1. . Laminectomy defects are seen in the lower lumbar spine. 2. Moderately severe hypertrophic degenerative spurring is scattered in the lumbar spine. 3. There is a mild thoracolumbar levoscoliosis. 4. No fracture is seen. Moderate multilevel degenerative facet arthropathy involving at least the lower 3 lumbar levels. Mild degenerative changes left sacroiliac joint.  **This report has been created using voice recognition software. It may contain minor errors which are inherent in voice recognition technology. ** Final report electronically signed by Dr. William Lovell on 5/8/2022 4:18 PM    XR KNEE RIGHT (MIN 4 VIEWS)    Result Date: 5/8/2022  PROCEDURE: XR KNEE RIGHT (MIN 4 VIEWS) CLINICAL INFORMATION: Right knee pain following a fall TECHNIQUE: 4 views of the right knee COMPARISON: None FINDINGS: There is no fracture or dislocation. The visualized femoral and tibial components of a knee prosthesis are in satisfactory position. Bones are osteopenic. There are extensive chronic calcifications in the soft tissues throughout the knee. No fracture or dislocation. Final report electronically signed by Dr. Zina Ayala on 5/8/2022 9:03 AM    CT KNEE RIGHT WO CONTRAST    Result Date: 5/9/2022  WET READ: A total knee prosthesis is present. Multiple artifacts are present from the prosthesis, superiorly limiting detail on this study. No acute fracture is seen. There is no definite evidence for prosthesis loosening. There appears be mild tilting of the patella laterally, 5 mm images. There is moderate degenerative spurring of the knee. A small ossicle seen along the medial aspect of the knee is another seen in the lateral aspect of the knee. One or more loose articular bodies cannot excluded. This examination will be formally read by one of our [facility] radiologists tomorrow. Please see that report. PROCEDURE: CT KNEE RIGHT WO CONTRAST CLINICAL INFORMATION: right knee pain and weakness COMPARISON: Right knee radiographs 0/9/7237 TECHNIQUE: Helical CT acquisition of the right knee without contrast. Multiplanar reformats were provided. All CT scans at this facility use dose modulation, iterative reconstruction, and/or weight based dosing when appropriate to reduce the radiation dose to as low as reasonably achievable. FINDINGS: POSTOPERATIVE CHANGES: Total knee arthroplasty is seen.  There is marked narrowing of the patellofemoral interval. There is subsidence of the patellar component. ALIGNMENT: Anatomic. BONE: No acute fracture or dislocation. The bones are mildly demineralized. . No destructive bony lesion. JOINTS: Knee arthroplasty. MUSCULATURE: Generalized marked atrophy particularly of calf muscles. SOFT TISSUES: Marked subcutaneous thickening and edema is seen particularly in the leg region. Small to moderate joint effusion. There is marked prepatellar and superficial infrapatellar edema/bursitis. Varicose veins are seen. OTHER: Loose bodies are seen in the suprapatellar region. 1. Total right knee arthroplasty performed. There is subsidence of the patellar component. 2. Small to moderate joint effusion. 3. No acute fracture is seen. 4. Marked subcutaneous edema/thickening particularly in the leg region. 5. Marked prepatellar and superficial infrapatellar edema/bursitis. 6. Extensive fatty atrophy in the calf musculature **This report has been created using voice recognition software. It may contain minor errors which are inherent in voice recognition technology. ** Final report electronically signed by Dr Brittanie Riley on 5/9/2022 9:01 AM    MRI LUMBAR SPINE WO CONTRAST    Result Date: 5/9/2022  PROCEDURE: MRI LUMBAR SPINE WO CONTRAST CLINICAL INFORMATION: fall. Back pain following T12 and L1 fractures. COMPARISON: CT lumbar spine dated 5/6/2022 and MRI lumbar spine dated 11/10/2021. TECHNIQUE: Sagittal and axial T1 and T2-weighted images were obtained through the lumbar spine. FINDINGS: There is an obliquely oriented bandlike low T1 and hyperintense T2/STIR signal abnormality extending from the mid to inferior anterior aspect of the L1 vertebral body to the posterior superior endplate of L1 with mild displacement of the anterior fracture fragment. There is otherwise anatomic vertebral body height and alignment.  Small fracture at the right anterolateral aspect of T12 seen on previous CT is not well-visualized on this exam. There is disruption of the anterior longitudinal ligament  at the anterior inferior aspect of the L1 vertebral body at the site of the fracture. Marrow signal is otherwise within normal limits. There is mild hyperintense STIR signal in the T12-L1 intervertebral disc. The posterior longitudinal ligaments appear intact. The conus terminates in a normal fashion at the T12-L1 level. There is no cauda equina nerve root thickening or clumping identified. 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 There are laminectomies at L3-S1, stable compared to prior exam. No focal fluid collection is identified in the laminectomy bed. There is subcutaneous edema dorsal to the lumbar spine. Paraspinal soft tissues are otherwise unremarkable. At T12-L1 there is no significant spinal canal or neuroforaminal stenosis. At L1-2 there is stable mild congenital spinal canal narrowing and mild bilateral neural foraminal stenosis in association with mild facet hypertrophy and ligamentum flavum thickening. At L2-3 the spinal canal is decompressed. There is a shallow disc bulge, stable compared to prior exam. There is moderate bilateral neural foraminal stenosis, similar to prior exam. At L3-4 the spinal canal is decompressed. There is moderate right and mild-to-moderate left neural foraminal stenosis. At L4-5 the spinal canal is decompressed. There is mild to moderate bilateral neural foraminal stenosis. At L5-S1 the spinal canal is decompressed. There is mild bilateral neural foraminal stenosis. 1. Redemonstration of obliquely oriented fracture of the L1 vertebral body extending from the anterior-inferior aspect of the vertebral body into the posterior aspect of the superior endplate with mild displacement and disruption of the anterior longitudinal ligament. No injury of the posterior elements to suggest instability.  2. Small fracture of the T12 inferior endplates seen on previous CT is not well-visualized on this exam. 3. Mild edema in the subcutaneous fat dorsal to the lumbar spine. 4. Redemonstration of laminectomies at L2-3 through L5-S1. **This report has been created using voice recognition software. It may contain minor errors which are inherent in voice recognition technology. ** Final report electronically signed by Dr. Whitney Rose MD on 5/9/2022 5:15 PM    Assessment//Plan           Hospital Problems           Last Modified POA    * (Principal) Fall at home, sequela 5/9/2022 Yes    Fall 5/6/2022 Yes    Closed T12 fracture ([address] 5/6/2022 Yes    Closed fracture of first lumbar vertebra ([address] 5/6/2022 Yes              Assessment:    Condition: In stable condition. Improving.   (  Closed  T12 fracture  from fall   Still  With pain    Diabetic per neuropathy stable    Hypertension stable    Coronary artery bypass stable    History congestive heart    Venous stasis of lower legs  No  change     ). Plan:   Per physical therapy. Wean off oxygen. Consults: physical therapy and occupational therapy. Advance diet as tolerated. Administer medications as ordered. ( Major issue is that with ambulation with back  Spasm and  May  Need  Rehab  Or  To  Nursing  Home  For  therapy  Diabetes overall stable    Hypertension and ongoing    Has diabetic neuropathy  But regardless enough pain and  Weakness  needs 24 hour  care).        Electronically signed by Arabella Ge MD on 5/9/22 at 8:11 PM EDT

## 2022-05-11 LAB
ANION GAP SERPL CALCULATED.3IONS-SCNC: 11 MEQ/L (ref 8–16)
BASOPHILS # BLD: 0.3 %
BASOPHILS ABSOLUTE: 0 THOU/MM3 (ref 0–0.1)
BUN BLDV-MCNC: 35 MG/DL (ref 7–22)
CALCIUM SERPL-MCNC: 8.3 MG/DL (ref 8.5–10.5)
CHLORIDE BLD-SCNC: 97 MEQ/L (ref 98–111)
CO2: 26 MEQ/L (ref 23–33)
CREAT SERPL-MCNC: 1.5 MG/DL (ref 0.4–1.2)
EOSINOPHIL # BLD: 2.4 %
EOSINOPHILS ABSOLUTE: 0.2 THOU/MM3 (ref 0–0.4)
ERYTHROCYTE [DISTWIDTH] IN BLOOD BY AUTOMATED COUNT: 12 % (ref 11.5–14.5)
ERYTHROCYTE [DISTWIDTH] IN BLOOD BY AUTOMATED COUNT: 40.6 FL (ref 35–45)
GFR SERPL CREATININE-BSD FRML MDRD: 46 ML/MIN/1.73M2
GLUCOSE BLD-MCNC: 143 MG/DL (ref 70–108)
GLUCOSE BLD-MCNC: 146 MG/DL (ref 70–108)
GLUCOSE BLD-MCNC: 147 MG/DL (ref 70–108)
GLUCOSE BLD-MCNC: 181 MG/DL (ref 70–108)
GLUCOSE BLD-MCNC: 200 MG/DL (ref 70–108)
HCT VFR BLD CALC: 26.5 % (ref 42–52)
HEMOGLOBIN: 9 GM/DL (ref 14–18)
IMMATURE GRANS (ABS): 0.03 THOU/MM3 (ref 0–0.07)
IMMATURE GRANULOCYTES: 0.4 %
LYMPHOCYTES # BLD: 20.9 %
LYMPHOCYTES ABSOLUTE: 1.7 THOU/MM3 (ref 1–4.8)
MCH RBC QN AUTO: 31.3 PG (ref 26–33)
MCHC RBC AUTO-ENTMCNC: 34 GM/DL (ref 32.2–35.5)
MCV RBC AUTO: 92 FL (ref 80–94)
MONOCYTES # BLD: 10.9 %
MONOCYTES ABSOLUTE: 0.9 THOU/MM3 (ref 0.4–1.3)
NUCLEATED RED BLOOD CELLS: 0 /100 WBC
PLATELET # BLD: 148 THOU/MM3 (ref 130–400)
PMV BLD AUTO: 9.6 FL (ref 9.4–12.4)
POTASSIUM SERPL-SCNC: 3.7 MEQ/L (ref 3.5–5.2)
RBC # BLD: 2.88 MILL/MM3 (ref 4.7–6.1)
SEG NEUTROPHILS: 65.1 %
SEGMENTED NEUTROPHILS ABSOLUTE COUNT: 5.1 THOU/MM3 (ref 1.8–7.7)
SODIUM BLD-SCNC: 134 MEQ/L (ref 135–145)
WBC # BLD: 7.9 THOU/MM3 (ref 4.8–10.8)

## 2022-05-11 PROCEDURE — 97535 SELF CARE MNGMENT TRAINING: CPT

## 2022-05-11 PROCEDURE — 96372 THER/PROPH/DIAG INJ SC/IM: CPT

## 2022-05-11 PROCEDURE — 6370000000 HC RX 637 (ALT 250 FOR IP): Performed by: NURSE PRACTITIONER

## 2022-05-11 PROCEDURE — 6370000000 HC RX 637 (ALT 250 FOR IP): Performed by: FAMILY MEDICINE

## 2022-05-11 PROCEDURE — 82948 REAGENT STRIP/BLOOD GLUCOSE: CPT

## 2022-05-11 PROCEDURE — G0378 HOSPITAL OBSERVATION PER HR: HCPCS

## 2022-05-11 PROCEDURE — 96376 TX/PRO/DX INJ SAME DRUG ADON: CPT

## 2022-05-11 PROCEDURE — 6360000002 HC RX W HCPCS: Performed by: PHYSICIAN ASSISTANT

## 2022-05-11 PROCEDURE — 6370000000 HC RX 637 (ALT 250 FOR IP): Performed by: PHYSICIAN ASSISTANT

## 2022-05-11 PROCEDURE — 99225 PR SBSQ OBSERVATION CARE/DAY 25 MINUTES: CPT | Performed by: SURGERY

## 2022-05-11 PROCEDURE — 80048 BASIC METABOLIC PNL TOTAL CA: CPT

## 2022-05-11 PROCEDURE — 97110 THERAPEUTIC EXERCISES: CPT

## 2022-05-11 PROCEDURE — APPSS45 APP SPLIT SHARED TIME 31-45 MINUTES: Performed by: PHYSICIAN ASSISTANT

## 2022-05-11 PROCEDURE — 85025 COMPLETE CBC W/AUTO DIFF WBC: CPT

## 2022-05-11 PROCEDURE — 36415 COLL VENOUS BLD VENIPUNCTURE: CPT

## 2022-05-11 PROCEDURE — 97530 THERAPEUTIC ACTIVITIES: CPT

## 2022-05-11 PROCEDURE — 2580000003 HC RX 258: Performed by: PHYSICIAN ASSISTANT

## 2022-05-11 RX ADMIN — INSULIN LISPRO 18 UNITS: 100 INJECTION, SOLUTION INTRAVENOUS; SUBCUTANEOUS at 09:47

## 2022-05-11 RX ADMIN — INSULIN LISPRO 1 UNITS: 100 INJECTION, SOLUTION INTRAVENOUS; SUBCUTANEOUS at 09:47

## 2022-05-11 RX ADMIN — ENALAPRIL MALEATE 10 MG: 10 TABLET ORAL at 09:54

## 2022-05-11 RX ADMIN — TIZANIDINE 4 MG: 4 TABLET ORAL at 09:52

## 2022-05-11 RX ADMIN — ENOXAPARIN SODIUM 40 MG: 100 INJECTION SUBCUTANEOUS at 21:58

## 2022-05-11 RX ADMIN — METFORMIN HYDROCHLORIDE 1000 MG: 500 TABLET ORAL at 18:50

## 2022-05-11 RX ADMIN — FERROUS SULFATE TAB 325 MG (65 MG ELEMENTAL FE) 325 MG: 325 (65 FE) TAB at 09:54

## 2022-05-11 RX ADMIN — HYDROMORPHONE HYDROCHLORIDE 0.5 MG: 1 INJECTION, SOLUTION INTRAMUSCULAR; INTRAVENOUS; SUBCUTANEOUS at 18:54

## 2022-05-11 RX ADMIN — INSULIN LISPRO 18 UNITS: 100 INJECTION, SOLUTION INTRAVENOUS; SUBCUTANEOUS at 18:50

## 2022-05-11 RX ADMIN — TICAGRELOR 90 MG: 90 TABLET ORAL at 21:58

## 2022-05-11 RX ADMIN — METFORMIN HYDROCHLORIDE 1000 MG: 500 TABLET ORAL at 09:53

## 2022-05-11 RX ADMIN — Medication 1 TABLET: at 09:53

## 2022-05-11 RX ADMIN — CARVEDILOL 12.5 MG: 6.25 TABLET, FILM COATED ORAL at 21:58

## 2022-05-11 RX ADMIN — OXYCODONE HYDROCHLORIDE 10 MG: 5 TABLET ORAL at 06:53

## 2022-05-11 RX ADMIN — TICAGRELOR 90 MG: 90 TABLET ORAL at 09:51

## 2022-05-11 RX ADMIN — INSULIN LISPRO 1 UNITS: 100 INJECTION, SOLUTION INTRAVENOUS; SUBCUTANEOUS at 12:53

## 2022-05-11 RX ADMIN — ENOXAPARIN SODIUM 40 MG: 100 INJECTION SUBCUTANEOUS at 09:51

## 2022-05-11 RX ADMIN — SODIUM CHLORIDE, PRESERVATIVE FREE 10 ML: 5 INJECTION INTRAVENOUS at 22:00

## 2022-05-11 RX ADMIN — FUROSEMIDE 80 MG: 40 TABLET ORAL at 09:54

## 2022-05-11 RX ADMIN — FUROSEMIDE 40 MG: 40 TABLET ORAL at 18:50

## 2022-05-11 RX ADMIN — ONDANSETRON 4 MG: 2 INJECTION INTRAMUSCULAR; INTRAVENOUS at 18:53

## 2022-05-11 RX ADMIN — CARVEDILOL 12.5 MG: 6.25 TABLET, FILM COATED ORAL at 09:55

## 2022-05-11 RX ADMIN — SODIUM CHLORIDE, PRESERVATIVE FREE 10 ML: 5 INJECTION INTRAVENOUS at 09:57

## 2022-05-11 RX ADMIN — ATORVASTATIN CALCIUM 10 MG: 10 TABLET, FILM COATED ORAL at 21:58

## 2022-05-11 RX ADMIN — INSULIN LISPRO 1 UNITS: 100 INJECTION, SOLUTION INTRAVENOUS; SUBCUTANEOUS at 18:50

## 2022-05-11 RX ADMIN — BISACODYL 10 MG: 10 SUPPOSITORY RECTAL at 09:51

## 2022-05-11 RX ADMIN — POLYETHYLENE GLYCOL 3350 17 G: 17 POWDER, FOR SOLUTION ORAL at 09:51

## 2022-05-11 RX ADMIN — INSULIN LISPRO 18 UNITS: 100 INJECTION, SOLUTION INTRAVENOUS; SUBCUTANEOUS at 12:53

## 2022-05-11 RX ADMIN — PANTOPRAZOLE SODIUM 40 MG: 40 TABLET, DELAYED RELEASE ORAL at 06:53

## 2022-05-11 RX ADMIN — FERROUS SULFATE TAB 325 MG (65 MG ELEMENTAL FE) 325 MG: 325 (65 FE) TAB at 21:58

## 2022-05-11 RX ADMIN — ASPIRIN 81 MG CHEWABLE TABLET 81 MG: 81 TABLET CHEWABLE at 09:51

## 2022-05-11 RX ADMIN — INSULIN LISPRO 1 UNITS: 100 INJECTION, SOLUTION INTRAVENOUS; SUBCUTANEOUS at 22:00

## 2022-05-11 RX ADMIN — INSULIN GLARGINE 30 UNITS: 100 INJECTION, SOLUTION SUBCUTANEOUS at 22:00

## 2022-05-11 ASSESSMENT — PAIN SCALES - GENERAL
PAINLEVEL_OUTOF10: 4
PAINLEVEL_OUTOF10: 3
PAINLEVEL_OUTOF10: 7
PAINLEVEL_OUTOF10: 4
PAINLEVEL_OUTOF10: 6
PAINLEVEL_OUTOF10: 1

## 2022-05-11 ASSESSMENT — PAIN - FUNCTIONAL ASSESSMENT: PAIN_FUNCTIONAL_ASSESSMENT: PREVENTS OR INTERFERES SOME ACTIVE ACTIVITIES AND ADLS

## 2022-05-11 ASSESSMENT — PAIN DESCRIPTION - FREQUENCY: FREQUENCY: CONTINUOUS

## 2022-05-11 ASSESSMENT — PAIN DESCRIPTION - ONSET: ONSET: ON-GOING

## 2022-05-11 ASSESSMENT — PAIN DESCRIPTION - LOCATION
LOCATION: BACK

## 2022-05-11 ASSESSMENT — PAIN DESCRIPTION - DESCRIPTORS
DESCRIPTORS: ACHING
DESCRIPTORS: ACHING

## 2022-05-11 ASSESSMENT — PAIN DESCRIPTION - PAIN TYPE
TYPE: ACUTE PAIN
TYPE: ACUTE PAIN

## 2022-05-11 ASSESSMENT — PAIN DESCRIPTION - ORIENTATION
ORIENTATION: LOWER
ORIENTATION: LOWER

## 2022-05-11 NOTE — CARE COORDINATION
DISCHARGE/PLANNING EVALUATION  5/11/22, 2:13 PM EDT    Reason for Referral: SNF vs HH  Mental Status: alert and appropriate in conversation  Decision Making: self  Family/Social/Home Environment: lives at home alone   Current Services including food security, transportation and housekeeping: denies concerns, reports he was independent in his cares prior to admission  Current Equipment:reports he has an older walker  [employer]  Concerns or Barriers to Discharge: none  Post acute provider list with quality measures, geographic area and applicable managed care information provided. Questions regarding selection process answered:provided    Teach Back Method used with Mulugeta William regarding care plan and needs  Patient verbalize understanding of the plan of care and contribute to goal setting. Patient goals, treatment preferences and discharge plan: New SNF, patient has SNF list and list of agencies his insurance is in network with.      Pt reports he really does not want to go to a nursing facility, but understand at this time is the best stop for him to recover    Pt denies needing any family contacted, reports he has been keeping in contact with them    Electronically signed by NATALYA Osorio on 5/11/2022 at 2:13 PM

## 2022-05-11 NOTE — ACP (ADVANCE CARE PLANNING)
5/11/22, 1:41 PM EDT    DISCHARGE ON [address] day: 0  Location: [facility]26/026-A Reason for admit: Compression fracture of L1 lumbar vertebra, closed, initial encounter ([facility] [address] [P31.344O]  Fall at home, sequela [W19. Yonathan Arslan, Y92.009]   Barriers to Discharge: Ortho and Family Medicine following, PM & R consulted, SNF recommended due to payer source, [facility] Medicare. PT/OT/ST, Lovenox, DM management, Brilinta, prn pain medications and Zofran, LS Corset back brace when up, activity as tolerated, daily weights, SCD's. PCP: Rubi Galeas MD  Readmission Risk Score: 17.6 ( )%  Patient Goals/Plan/Treatment Preferences: Tiny Soto is from home alone. SNF recommended for rehab. He will be a pre-cert.

## 2022-05-11 NOTE — PROGRESS NOTES
Progress Note  Date:5/10/2022       [id number]  Patient Name:Tuan Hassan     YOB: 1950     Age:71 y.o. Subjective    Subjective:  Symptoms:  Stable. He reports weakness. No shortness of breath, cough, chest pain or diarrhea. Diet:  Adequate intake. Activity level: Impaired due to pain. Pain:  He complains of pain that is moderate. Pain is partially controlled.        Current Facility-Administered Medications   Medication Dose Route Frequency Provider Last Rate Last Admin    insulin lispro (HUMALOG) injection vial 18 Units  18 Units SubCUTAneous TID  Sofia Lerma MD   18 Units at 05/10/22 1710    insulin glargine (LANTUS) injection vial 30 Units  30 Units SubCUTAneous Nightly Sofia Lerma MD   30 Units at 05/10/22 2054    enoxaparin (LOVENOX) injection 40 mg  40 mg SubCUTAneous BID LUCIUS Haji   40 mg at 05/10/22 2035    glucagon (rDNA) injection 1 mg  1 mg IntraMUSCular PRN Bhakti Harman MD        dextrose 5 % solution  100 mL/hr IntraVENous CHUCK Harman MD        insulin lispro (HUMALOG) injection vial 0-6 Units  0-6 Units SubCUTAneous TID  Bhakti Harman MD   2 Units at 05/10/22 1710    insulin lispro (HUMALOG) injection vial 0-3 Units  0-3 Units SubCUTAneous Nightly Bhakti Harman MD   1 Units at 05/10/22 2052    glucose chewable tablet 16 g  4 tablet Oral PRN Bhakti Harman MD        dextrose bolus 10% 125 mL  125 mL IntraVENous CHUCK Harman MD        Or    dextrose bolus 10% 250 mL  250 mL IntraVENous CHUCK Harman MD        aspirin chewable tablet 81 mg  81 mg Oral Daily ABHISHEK Ponce CNP   81 mg at 05/10/22 0753    atorvastatin (LIPITOR) tablet 10 mg  10 mg Oral Daily ABHISHEK Ponce CNP   10 mg at 05/10/22 2032    carvedilol (COREG) tablet 12.5 mg  12.5 mg Oral BID ABHISHEK Ponce CNP   12.5 mg at 05/10/22 2032    enalapril (VASOTEC) tablet 10 mg  10 mg Oral Daily Juanjose Haste, APRN - CNP   10 mg at 05/10/22 5410    ferrous sulfate (IRON 325) tablet 325 mg  325 mg Oral BID Juanjose Haste, APRN - CNP   325 mg at 05/10/22 2032    metFORMIN (GLUCOPHAGE) tablet 1,000 mg  1,000 mg Oral BID WC Juanjose Haste, APRN - CNP   1,000 mg at 05/10/22 1711    multivitamin 1 tablet  1 tablet Oral Daily Juanjose Haste, APRN - CNP   1 tablet at 05/10/22 0753    ticagrelor (BRILINTA) tablet 90 mg  90 mg Oral BID Juanjose Haste, APRN - CNP   90 mg at 05/10/22 2035    tiZANidine (ZANAFLEX) tablet 4 mg  4 mg Oral Q6H PRN Juanjose Haste, APRN - CNP   4 mg at 05/10/22 2032    Urea (CARMOL) 40 % cream   Topical PRN Juanjose Haste, APRN - CNP        pantoprazole (PROTONIX) tablet 40 mg  40 mg Oral QAM AC Juanjose Haste, APRN - CNP   40 mg at 05/10/22 0753    furosemide (LASIX) tablet 80 mg  80 mg Oral QAM Juanjose Haste, APRN - CNP   80 mg at 05/10/22 0753    furosemide (LASIX) tablet 40 mg  40 mg Oral Dinner Juanjose Haste, APRN - CNP   40 mg at 05/10/22 1711    sodium chloride flush 0.9 % injection 5-40 mL  5-40 mL IntraVENous 2 times per day LUCIUS Telles   10 mL at 05/10/22 2033    sodium chloride flush 0.9 % injection 5-40 mL  5-40 mL IntraVENous PRN LUCIUS Haji        0.9 % sodium chloride infusion  25 mL IntraVENous PRN LUCIUS Sears        acetaminophen (TYLENOL) tablet 650 mg  650 mg Oral Q4H PRN LUCIUS Haji   650 mg at 05/10/22 0753    ondansetron (ZOFRAN-ODT) disintegrating tablet 4 mg  4 mg Oral Q8H PRN LUCIUS Haji        Or    ondansetron (ZOFRAN) injection 4 mg  4 mg IntraVENous Q6H PRN LUCIUS Haji   4 mg at 05/10/22 0139    polyethylene glycol (GLYCOLAX) packet 17 g  17 g Oral Daily LUCIUS Haji   17 g at 05/06/22 2318    bisacodyl (DULCOLAX) suppository 10 mg  10 mg Rectal Daily LUCIUS Haji   10 mg at 05/09/22 0902    senna (SENOKOT) tablet 8.6 mg  1 tablet Oral Daily PRN LUCIUS Sears        HYDROmorphone (DILAUDID) injection 0.25 mg  0.25 mg IntraVENous Q3H PRN Ajit Vences Pappa, PA        Or    HYDROmorphone (DILAUDID) injection 0.5 mg  0.5 mg IntraVENous Q3H PRN Familia Pappa, PA   0.5 mg at 05/10/22 0234    oxyCODONE (ROXICODONE) immediate release tablet 5 mg  5 mg Oral Q4H PRN Familia Pappa, PA   5 mg at 05/10/22 1320    Or    oxyCODONE (ROXICODONE) immediate release tablet 10 mg  10 mg Oral Q4H PRN Familia Pappa, PA   10 mg at 05/10/22 2126      Review of Systems   HENT: Negative for congestion, ear discharge and postnasal drip. Respiratory: Negative for apnea, cough, choking, chest tightness and shortness of breath. Cardiovascular: Negative for chest pain and palpitations. Gastrointestinal: Negative for abdominal distention, abdominal pain, constipation and diarrhea. Genitourinary: Negative for difficulty urinating, dysuria and frequency. Musculoskeletal: Positive for back pain. T12 and L1 vertebral endplate fractures   Neurological: Positive for weakness. Negative for dizziness. Hematological: Negative for adenopathy. Psychiatric/Behavioral: Negative for agitation. Objective         Vitals Last 24 Hours:  TEMPERATURE:  Temp  Av.2 °F (36.8 °C)  Min: 97.8 °F (36.6 °C)  Max: 98.5 °F (36.9 °C)  RESPIRATIONS RANGE: Resp  Av.3  Min: 18  Max: 20  PULSE OXIMETRY RANGE: SpO2  Av.6 %  Min: 93 %  Max: 96 %  PULSE RANGE: Pulse  Av.2  Min: 63  Max: 68  BLOOD PRESSURE RANGE: Systolic (23OQW), OZB:914 , Min:135 , RSS:802   ; Diastolic (48IXZ), PXO:46, Min:59, Max:76    I/O (24Hr): Intake/Output Summary (Last 24 hours) at 5/10/2022 2311  Last data filed at 5/10/2022 1822  Gross per 24 hour   Intake 5 ml   Output 900 ml   Net -895 ml     Objective:  General Appearance:  Comfortable. Vital signs: (most recent): Blood pressure (!) 140/61, pulse 66, temperature 98.5 °F (36.9 °C), temperature source Oral, resp.  rate 18, height 6' 2\" (1.88 m), weight (!) 370 lb 4 oz (167.9 kg), SpO2 94 %. Vital signs are normal.    Output: Producing urine and producing stool. HEENT: Normal HEENT exam.    Lungs:  Normal effort and normal respiratory rate. Breath sounds clear to auscultation. Heart: Normal rate. Regular rhythm. S1 normal and S2 normal.  No murmur. Abdomen: Abdomen is soft and non-distended. Bowel sounds are normal.   There is no abdominal tenderness. Extremities: Normal range of motion. (T12 area with pain noted)  Neurological: Patient is alert and oriented to person, place and time. Normal strength. Patient has normal muscle tone. Skin:  Warm and dry. Labs/Imaging/Diagnostics    Labs:  CBC:  Recent Labs     05/08/22 0527 05/09/22  0510 05/10/22  1116   WBC 7.7 8.2 8.5   RBC 3.07* 2.89* 2.68*   HGB 9.7* 9.1* 8.4*   HCT 29.6* 27.4* 25.2*   MCV 96.4* 94.8* 94.0   * 107* 131     CHEMISTRIES:  Recent Labs     05/08/22 0527 05/09/22  0510 05/10/22  1116    137 135   K 3.9 3.7 4.2    102 99   CO2 25 24 25   BUN 26* 29* 34*   CREATININE 1.3* 1.4* 1.5*   GLUCOSE 176* 230* 289*     PT/INR:No results for input(s): PROTIME, INR in the last 72 hours. APTT:No results for input(s): APTT in the last 72 hours. LIVER PROFILE:  Recent Labs     05/08/22 0527 05/09/22  0510   AST 13 12   ALT 8* 8*   BILITOT 1.0 1.2   ALKPHOS 62 57       Imaging Last 24 Hours:  MRI LUMBAR SPINE WO CONTRAST    Result Date: 5/9/2022  PROCEDURE: MRI LUMBAR SPINE WO CONTRAST CLINICAL INFORMATION: fall. Back pain following T12 and L1 fractures. COMPARISON: CT lumbar spine dated 5/6/2022 and MRI lumbar spine dated 11/10/2021. TECHNIQUE: Sagittal and axial T1 and T2-weighted images were obtained through the lumbar spine.  FINDINGS: There is an obliquely oriented bandlike low T1 and hyperintense T2/STIR signal abnormality extending from the mid to inferior anterior aspect of the L1 vertebral body to the posterior superior endplate of L1 with mild displacement of the anterior fracture fragment. There is otherwise anatomic vertebral body height and alignment. Small fracture at the right anterolateral aspect of T12 seen on previous CT is not well-visualized on this exam. There is disruption of the anterior longitudinal ligament  at the anterior inferior aspect of the L1 vertebral body at the site of the fracture. Marrow signal is otherwise within normal limits. There is mild hyperintense STIR signal in the T12-L1 intervertebral disc. The posterior longitudinal ligaments appear intact. The conus terminates in a normal fashion at the T12-L1 level. There is no cauda equina nerve root thickening or clumping identified. 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 There are laminectomies at L3-S1, stable compared to prior exam. No focal fluid collection is identified in the laminectomy bed. There is subcutaneous edema dorsal to the lumbar spine. Paraspinal soft tissues are otherwise unremarkable. At T12-L1 there is no significant spinal canal or neuroforaminal stenosis. At L1-2 there is stable mild congenital spinal canal narrowing and mild bilateral neural foraminal stenosis in association with mild facet hypertrophy and ligamentum flavum thickening. At L2-3 the spinal canal is decompressed. There is a shallow disc bulge, stable compared to prior exam. There is moderate bilateral neural foraminal stenosis, similar to prior exam. At L3-4 the spinal canal is decompressed. There is moderate right and mild-to-moderate left neural foraminal stenosis. At L4-5 the spinal canal is decompressed. There is mild to moderate bilateral neural foraminal stenosis. At L5-S1 the spinal canal is decompressed. There is mild bilateral neural foraminal stenosis.       1. Redemonstration of obliquely oriented fracture of the L1 vertebral body extending from the anterior-inferior aspect of the vertebral body into the posterior aspect of the superior endplate with mild displacement and disruption of the anterior longitudinal ligament. No injury of the posterior elements to suggest instability. 2. Small fracture of the T12 inferior endplates seen on previous CT is not well-visualized on this exam. 3. Mild edema in the subcutaneous fat dorsal to the lumbar spine. 4. Redemonstration of laminectomies at L2-3 through L5-S1. **This report has been created using voice recognition software. It may contain minor errors which are inherent in voice recognition technology. ** Final report electronically signed by Dr. Noel Avendaño MD on 5/9/2022 5:15 PM    Assessment//Plan           Hospital Problems           Last Modified POA    * (Principal) Fall at home, sequela 5/9/2022 Yes    Fall 5/6/2022 Yes    Closed T12 fracture ([address] 5/6/2022 Yes    Closed fracture of first lumbar vertebra ([address] 5/6/2022 Yes              Assessment:    Condition: In stable condition. Improving. (Closed T12 and L1 fractures from fall     Niddm with long term insulin and adjust dose of  humalog     ashd stable      htn stable      gerd stable     Peripheral neuropathy lower legs due to diabetes    Chronic diastolic chf stable). Plan:   Per physical therapy. Consults: physical therapy and occupational therapy. Advance diet as tolerated. Administer medications as ordered. (Stable   But pain with ambulation      back brace arrived last pm and see if improved pain control     physical therapy evaluation to determine if home or need nursing home      sugars up and increase dose of humalog).        Electronically signed by Charmayne Reil, MD on 5/10/22 at 11:11 PM EDT

## 2022-05-11 NOTE — PROGRESS NOTES
[address] [place] [address]  INPATIENT PHYSICAL THERAPY  DAILY NOTE  [facility] 5K - 5L-76/395-L     Time In: 1450  Time Out: 0466  Timed Code Treatment Minutes: 14 Minutes  Minutes: 14          Date: 2022  Patient Name: Roger Lanier,  Gender:  male        MRN: [id number]  : 1950  (66 y.o.)     Referring Practitioner: LUCIUS Espinoza  Diagnosis: Compression fracture of L1 lumbar vertebra, closed  Additional Pertinent Hx: Per H&P, \"He is a 66 y.o. male presenting at [facility] by activation of level 2 trauma, brought by EMS following a mechanical fall with no LOC; past medical history CAD with CABG and stenting x5 on Brilinta, previous cervical surgery, previous lumbar surgery, DM with diabetic neuropathy, hypercholesterolemia, hypertension, depression. Per port patient was attempting to ambulate when he tripped over his foot causing him to fall face first onto the ground and hyperextended his lumbar spine much when he heard a crack and a pop and immediate pain. Patient reports 10/10 pain in the low back. Patient states he does have a known lumbar herniated disc. Reports nausea and vomiting secondary to pain. Patient denies chest pain, shortness of breath, cough, headache, dizziness, lightheadedness, numbness, paraesthesias, weakness, chills, fevers, abdominal pain, dysuria, frequent urination, and neck pain. Care in coordination with trauma surgeon Dr. Jonathan Armas. \"     Prior Level of Function:  Lives With: Alone  Type of Home: Apartment  Home Layout: One level  Home Access: Elevator (2nd floor)  Home Equipment: Cane,Walker, 4 wheeled   Bathroom Shower/Tub: Tub/Shower unit  Bathroom Toilet: Handicap height  Bathroom Equipment: Shower chair    ADL Assistance: [address]: Independent  Transfer Assistance: Independent  Active : Yes  Additional Comments: Pt reported was using cane for mobility prior to admission.  Pt does not have any family close that would be able to assist at discharge. Restrictions/Precautions:  Restrictions/Precautions: Fall Risk,General Precautions  Required Braces or Orthoses  Spinal: Lumbar Corset  Position Activity Restriction  Spinal Precautions: No Bending,No Lifting,No Twisting  Other position/activity restrictions: Back brace when up, may don/doff in sitting      SUBJECTIVE: Pt just finished with OT and only wanted to do LE exercises today. PAIN: 8/10: back    Vitals: Vitals not assessed per clinical judgement, see nursing flowsheet    OBJECTIVE:  Bed Mobility:  Not Tested    Transfers:  Not Tested    Ambulation:  Not tested    Exercise:  Patient was guided in 1 set(s) 10 reps of exercise to both lower extremities. Ankle pumps, Glut sets, Quad sets, Hip abduction/adduction, Seated marches and Long arc quads. Exercises were completed for increased independence with functional mobility. Functional Outcome Measures: Not completed       ASSESSMENT:  Assessment: Patient progressing toward established goals. Activity Tolerance:  Patient tolerance of  treatment: good.        Equipment Recommendations:Equipment Needed: Yes  Other: monitor for needs - Will likely need new RW/4WW  Discharge Recommendations: Continue to assess pending progress  Plan: Current Treatment Recommendations: Strengthening,Balance training,Gait training,Functional mobility training,Transfer training,Neuromuscular re-education,Endurance training,Patient/Caregiver education & training,Safety education & training,Home exercise program,Equipment evaluation, education, & procurement,Therapeutic activities  Plan:  (6x O/T)    Patient Education  Patient Education: Plan of Care, Home Exercise Program    Goals:  Patient goals : go home  Short Term Goals  Time Frame for Short term goals: at discharge  Short term goal 1: Pt to be SBA for supine <> sit to get in/out of bed  Short term goal 2: Pt to be SBA for sit <> stand to get up to ambulate  Short term goal 3: Pt to ambulate >30 ft with RW with SBA for household distances  Short term goal 4: Pt to be able to don/doff back brace with Supervision for stabilization of lumbar fracture when up. Long Term Goals  Time Frame for Long term goals : not set due to short ELOS    Following session, patient left in safe position with all fall risk precautions in place. Tiburcio Prather.  Siobhan Yung, AlexandruGundersen Lutheran Medical Centerkayy Valliant 8

## 2022-05-11 NOTE — PLAN OF CARE
Problem: Discharge Planning  Goal: Discharge to home or other facility with appropriate resources  Outcome: Progressing   SW consult received, see SW note 5/11/22

## 2022-05-11 NOTE — PROGRESS NOTES
[facility] 5K  Occupational Therapy  Daily Note  Time:  Time In: 9  Time Out: 1445  Timed Code Treatment Minutes: 23 Minutes  Minutes: 23          Date: 2022  Patient Name: Leatha Knott,   Gender: male      Room: [facility]26/026-A  MRN: [id number]  : 1950  (66 y.o.)  Referring Practitioner: LUCIUS Anguiano  Diagnosis: compression fx of L1 lumbar vertebra, closed, initial encounter  Additional Pertinent Hx: Per EMR:Patient reported to [facility] ER after suffering a fall at home. Patient heard a crack with immediate pain. patient was brought in by EMS, 10/10 pain. Patient with noted laceration on his nose from the fall. Patient was found to have L1 and T12 fractures. Patient was admitted. Ortho recommended conservative treatment with bracing. Restrictions/Precautions:  Restrictions/Precautions: Fall Risk,General Precautions  Required Braces or Orthoses  Spinal: Lumbar Corset  Position Activity Restriction  Spinal Precautions: No Bending,No Lifting,No Twisting  Other position/activity restrictions: Back brace when up, may don/doff in sitting     SUBJECTIVE: Pt laying in bed upon arrival, pt required vc's for encouragement to participate in session, RN gave verbal approval for session, pt agreeable to OT session    PAIN: 2/10: back     Vitals: Nurse checked vitals prior to session    COGNITION: WFL    ADL:   Upper Extremity Dressing: Minimal Assistance. for donning back brace sitting at the EOB  Lower Extremity Dressing: Maximum Assistance. for donning shoes. BALANCE:  Sitting Balance:  Stand By Assistance. during ADL routine with BUE release from the EOB  Standing Balance: Contact Guard Assistance. with RW, with BUE support on walker due to increased pain, and pt c/o pain with knees    BED MOBILITY:  Supine to Sit: Minimal Assistance to adhere to back precautions with support at the trunk    TRANSFERS:  Sit to Stand: Moderate Assistance.  with vc's to push off of the bed, with attempts x3 and bed raised  Stand to Sit: [employer]. to recliner, with vc's for safety due to throwing hips back into chair    FUNCTIONAL MOBILITY:  Assistive Device: Rolling Walker  Assist Level:  Contact Guard Assistance. Distance: HH distances      ASSESSMENT:     Activity Tolerance:  Patient tolerance of  treatment: good. Discharge Recommendations: ECF with OT  Equipment Recommendations: Other: will continue to monitor pending progress, discharge location  Plan: Times per Week: 5-6x  Current Treatment Recommendations: Strengthening,Balance training,Functional mobility training,Endurance training,Patient/Caregiver education & training,Safety education & training,Equipment evaluation, education, & procurement,Self-Care / ADL,Home management training    Patient Education  Patient Education: Precautions    Goals  Short Term Goals  Time Frame for Short term goals: by discharge  Short Term Goal 1: Pt will increase activity tolerance for functional mobility household distances with MI in prep for ADL/IADL completion. Short Term Goal 2: Pt will complete BADL/IADL tasks with MI to increase independence with self care tasks. Short Term Goal 3: Pt will complete functional transfers with MI in prep for toilet/shower transfers. Short Term Goal 4: Pt will tolerate dynamic standing X5 minutes with MI in prep for sinkside grooming tasks. Following session, patient left in safe position with all fall risk precautions in place.

## 2022-05-11 NOTE — PROGRESS NOTES
Surya Botello  Daily Progress Note  Pt Name: Trang Wallace  Medical Record Number: [id number]  Date of Birth 1950   Today's Date: 5/11/2022    HD: # 4    CC: Back pain, worsening right knee pain    ASSESSMENT  1. Active Hospital Problems    Diagnosis Date Noted    Fall [W19. XXXA]      Priority: High    Fall at home, sequela [W19. Kimmy Thurman, I41.749] 05/09/2022     Priority: Medium    Closed T12 fracture ([address] [R25.526N] 05/06/2022     Priority: Medium    Closed fracture of first lumbar vertebra ([address] [S32.019A] 05/06/2022     Priority: Medium         PLAN  Patient admitted under Trauma Services to  with telemetry     Mechanical fall              -PT/OT with brace in place     Acute L1 fracture/T12 fracture              -Inferior endplate fracture of M18 and vertebral body fracture of L1              -Ortho Spine has evaluated and recommends non op management with brace              -PT/OT with brace              -Pain control   -5/9: MRI lumbar spine showed redemonstration of obliquely oriented fracture through L1 vertebral body with mild displacement and disruption of the anterior longitudinal ligament. No injury to the posterior elements to suggest instability. Small fracture of T12 inferior endplate seen on previous CT not well visualized   -5/10: Orthospine noted activity as tolerated with brace.   Okay for discharge from orthospine standpoint with outpatient follow-up in 2 weeks     New LBBB previously seen on EKGs              -Patient denies chest pain or new onset/worsening shortness of breath              -Troponins within normal limits              -48 hours telemetry we will continue to monitor   -Family medicine following for assistance with medical management     Infiltration adjacent to kidneys and ureters              -UA negative               -Patient afebrile WBC within normal limits              -No CVA tenderness low likelihood of pyelonephritis, will continue work-up   -Family medicine following for assistance with medical management     Nasal bridge laceration              - Local wound care              - Monitor for signs of infection    Right knee pain   -X-ray right knee negative for acute fractures   - CT of the right knee notes small to moderate effusion, prepatellar and infrapatellar edema/bursitis   - Continue to monitor, PT/OT   -Consult orthopedic surgery due to continued weakness and pain in right knee with prior history of arthroplasty with Dr. Chay Wood    Lower extremity edema with keratotic skin   -Seems to resemble venous stasis disease   -Wound consulted for evaluation    Chronic issues: History of atherosclerotic heart disease, depression, peripheral neuropathy, hypertension, hypercholesterolemia, insulin-dependent diabetes   -Family medicine following for assistance with medical management   -Home medications reordered   -Thrombocytopenia noted on labs during admission, trending down. On Brilinta. Monitor, repeat labs.      Consults orthopedic spine, Ortho      Pain Management              -Dilaudid, oxycodone     Prophylaxis: SCD's, Lovenox, incentive Spirometry, GlycoLax, Pepcid, Zofran     Diabetic diet     Regular Neurovascular Checks  Repeat Labs Tomorrow AM  PT/OT/SLP Eval and Treat once brace arrives  Activity as tolerated, pt up with assistance     Discharge disposition pending clinical course   - 5/10: PT recommending IPR. PM&R consulted    -5/11: IPR recommending ECF as insurance will likely not cover IPR    SUBJECTIVE  He is a 66 y.o. male who continues to present at [address] [place] [address] on 5K following a chemical fall. Patient reports worsening right knee pain, some abdominal tenderness following sitting up in chair yesterday with brace. Patient reports being distraught as he does not want to go to an ECF however he does understand this is likely the best place for him to successfully recover.   She did discuss with case management later today. Patient denies chest pain, shortness of breath, cough, headache, dizziness, lightheadedness, numbness, paraesthesias, weakness, chills, fevers, nausea, vomiting,neck pain. Plan to do recommend therapy, orthopedic surgery to evaluate right knee. Hemoglobin stable, WBC stable, no clinically significant electrolyte abnormality, CT studies reviewed, vital signs stable on exam. Care in coordination with trauma surgeon Dr. Angelica Resendiz. Wt Readings from Last 3 Encounters:   05/09/22 (!) 370 lb 4 oz (167.9 kg)   04/13/22 (!) 369 lb (167.4 kg)   04/05/22 (!) 375 lb (170.1 kg)     Temp Readings from Last 3 Encounters:   05/11/22 98.8 °F (37.1 °C) (Oral)   04/05/22 96.3 °F (35.7 °C) (Infrared)   02/21/22 96.7 °F (35.9 °C) (Infrared)     BP Readings from Last 3 Encounters:   05/11/22 135/61   04/05/22 106/64   03/31/22 122/74     Pulse Readings from Last 3 Encounters:   05/11/22 65   04/05/22 76   03/31/22 68       24 HR INTAKE/OUTPUT :     Intake/Output Summary (Last 24 hours) at 5/11/2022 1119  Last data filed at 5/11/2022 0655  Gross per 24 hour   Intake --   Output 1700 ml   Net -1700 ml     ADULT DIET; Regular; 4 carb choices (60 gm/meal)    OBJECTIVE  CURRENT VITALS /61   Pulse 65   Temp 98.8 °F (37.1 °C) (Oral)   Resp 18   Ht 6' 2\" (1.88 m)   Wt (!) 370 lb 4 oz (167.9 kg)   SpO2 96%   BMI 47.54 kg/m²       OBJECTIVE  CURRENT VITALS /61   Pulse 65   Temp 98.8 °F (37.1 °C) (Oral)   Resp 18   Ht 6' 2\" (1.88 m)   Wt (!) 370 lb 4 oz (167.9 kg)   SpO2 96%   BMI 47.54 kg/m²   GENERAL: Presents sitting upright in bed unassisted, weeping on entry but awakens to verbal stimuli, awake and alert; In no acute distress and well nourished. SKIN: Appropriate for ethnicity, warm and dry. ENT: No apparent trauma, discharge, or hematoma bilaterally. PERRL at 3mm. Nares patient, membranes moist  CARDIO: No visible chest wall deformity.  Strong/regular S1/S2. 2+ radial and DP pulses bilaterally. Capillary refill <2 sec. No extremity edema noted. PULMONARY:  Trachea midline, no increased respiratory effort, or paradoxical chest movement. Lung sounds are clear to ascultation in all fields without adventitious sounds. ABDOMEN: Abdomen is protuberant but soft. Bowel sounds present in all four quadrants. NTTP in all quadrants, absent of peritoneal signs. Flanks tender to deep palpation over lateral musculature. NEURO: GCS 15. Follows commands. Chronic decreased bilateral lower extremity sensation. Otherwise PMS intact, moves limbs freely. No focal neurological deficits  MSK: Extremities intact and present. Right anterior knee pain to palpation, Scabbed Abrasion over Right Knee. No new bruising, swelling, deformity, discoloration or bleeding. NTTP over major muscle groups.  strength 5/5 and equal bilaterally. WOUND: Laceration of bridge of nose dry and intact, interval healing. No surrounding erythema, edema, purulent discharge or bleeding      LABS  CBC :   Recent Labs     05/09/22  0510 05/10/22  1116 05/11/22  0538   WBC 8.2 8.5 7.9   HGB 9.1* 8.4* 9.0*   HCT 27.4* 25.2* 26.5*   MCV 94.8* 94.0 92.0   * 131 148     BMP:   Recent Labs     05/09/22  0510 05/10/22  1116 05/11/22  0538    135 134*   K 3.7 4.2 3.7    99 97*   CO2 24 25 26   BUN 29* 34* 35*   CREATININE 1.4* 1.5* 1.5*     COAGS:   Recent Labs     05/09/22  0510   PROT 5.8*     Pancreas/HFP:  No results for input(s): LIPASE, AMYLASE in the last 72 hours. Recent Labs     05/09/22  0510   AST 12   ALT 8*   BILITOT 1.2   ALKPHOS 57     RADIOLOGY:  XR LUMBAR SPINE (2-3 VIEWS)    Result Date: 5/8/2022  LUMBAR SPINE 2 VIEWS: CLINICAL INFORMATION: lumbar pain COMPARISON: 10/26/2020 TECHNIQUE: Standard AP and lateral views of lumbar spine were obtained. 1. . Laminectomy defects are seen in the lower lumbar spine.  2. Moderately severe hypertrophic degenerative spurring is scattered in the lumbar spine. 3. There is a mild thoracolumbar levoscoliosis. 4. No fracture is seen. Moderate multilevel degenerative facet arthropathy involving at least the lower 3 lumbar levels. Mild degenerative changes left sacroiliac joint. **This report has been created using voice recognition software. It may contain minor errors which are inherent in voice recognition technology. ** Final report electronically signed by Dr. Amber Morris on 5/8/2022 4:18 PM    CT KNEE RIGHT WO CONTRAST    Result Date: 5/9/2022  WET READ: A total knee prosthesis is present. Multiple artifacts are present from the prosthesis, superiorly limiting detail on this study. No acute fracture is seen. There is no definite evidence for prosthesis loosening. There appears be mild tilting of the patella laterally, 5 mm images. There is moderate degenerative spurring of the knee. A small ossicle seen along the medial aspect of the knee is another seen in the lateral aspect of the knee. One or more loose articular bodies cannot excluded. This examination will be formally read by one of our [facility] radiologists tomorrow. Please see that report. PROCEDURE: CT KNEE RIGHT WO CONTRAST CLINICAL INFORMATION: right knee pain and weakness COMPARISON: Right knee radiographs 2/7/9760 TECHNIQUE: Helical CT acquisition of the right knee without contrast. Multiplanar reformats were provided. All CT scans at this facility use dose modulation, iterative reconstruction, and/or weight based dosing when appropriate to reduce the radiation dose to as low as reasonably achievable. FINDINGS: POSTOPERATIVE CHANGES: Total knee arthroplasty is seen. There is marked narrowing of the patellofemoral interval. There is subsidence of the patellar component. ALIGNMENT: Anatomic. BONE: No acute fracture or dislocation. The bones are mildly demineralized. . No destructive bony lesion. JOINTS: Knee arthroplasty. MUSCULATURE: Generalized marked atrophy particularly of calf muscles.  SOFT TISSUES: Marked subcutaneous thickening and edema is seen particularly in the leg region. Small to moderate joint effusion. There is marked prepatellar and superficial infrapatellar edema/bursitis. Varicose veins are seen. OTHER: Loose bodies are seen in the suprapatellar region. 1. Total right knee arthroplasty performed. There is subsidence of the patellar component. 2. Small to moderate joint effusion. 3. No acute fracture is seen. 4. Marked subcutaneous edema/thickening particularly in the leg region. 5. Marked prepatellar and superficial infrapatellar edema/bursitis. 6. Extensive fatty atrophy in the calf musculature **This report has been created using voice recognition software. It may contain minor errors which are inherent in voice recognition technology. ** Final report electronically signed by Dr Elke Shaw on 5/9/2022 9:01 AM    MRI LUMBAR SPINE WO CONTRAST    Result Date: 5/9/2022  PROCEDURE: MRI LUMBAR SPINE WO CONTRAST CLINICAL INFORMATION: fall. Back pain following T12 and L1 fractures. COMPARISON: CT lumbar spine dated 5/6/2022 and MRI lumbar spine dated 11/10/2021. TECHNIQUE: Sagittal and axial T1 and T2-weighted images were obtained through the lumbar spine. FINDINGS: There is an obliquely oriented bandlike low T1 and hyperintense T2/STIR signal abnormality extending from the mid to inferior anterior aspect of the L1 vertebral body to the posterior superior endplate of L1 with mild displacement of the anterior fracture fragment. There is otherwise anatomic vertebral body height and alignment. Small fracture at the right anterolateral aspect of T12 seen on previous CT is not well-visualized on this exam. There is disruption of the anterior longitudinal ligament  at the anterior inferior aspect of the L1 vertebral body at the site of the fracture. Marrow signal is otherwise within normal limits. There is mild hyperintense STIR signal in the T12-L1 intervertebral disc.  The posterior longitudinal ligaments appear intact. The conus terminates in a normal fashion at the T12-L1 level. There is no cauda equina nerve root thickening or clumping identified. 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 There are laminectomies at L3-S1, stable compared to prior exam. No focal fluid collection is identified in the laminectomy bed. There is subcutaneous edema dorsal to the lumbar spine. Paraspinal soft tissues are otherwise unremarkable. At T12-L1 there is no significant spinal canal or neuroforaminal stenosis. At L1-2 there is stable mild congenital spinal canal narrowing and mild bilateral neural foraminal stenosis in association with mild facet hypertrophy and ligamentum flavum thickening. At L2-3 the spinal canal is decompressed. There is a shallow disc bulge, stable compared to prior exam. There is moderate bilateral neural foraminal stenosis, similar to prior exam. At L3-4 the spinal canal is decompressed. There is moderate right and mild-to-moderate left neural foraminal stenosis. At L4-5 the spinal canal is decompressed. There is mild to moderate bilateral neural foraminal stenosis. At L5-S1 the spinal canal is decompressed. There is mild bilateral neural foraminal stenosis. 1. Redemonstration of obliquely oriented fracture of the L1 vertebral body extending from the anterior-inferior aspect of the vertebral body into the posterior aspect of the superior endplate with mild displacement and disruption of the anterior longitudinal ligament. No injury of the posterior elements to suggest instability. 2. Small fracture of the T12 inferior endplates seen on previous CT is not well-visualized on this exam. 3. Mild edema in the subcutaneous fat dorsal to the lumbar spine. 4. Redemonstration of laminectomies at L2-3 through L5-S1. **This report has been created using voice recognition software. It may contain minor errors which are inherent in voice recognition technology. ** Final report electronically signed by Dr. Moshe Friedman MD on 5/9/2022 5:15 PM        Electronically signed by LUCIUS Carpenter on 5/11/2022 at 11:19 AM     Patient seen and examined independently by me early this AM. Above discussed and I agree with LUCIUS Lynch. See my additional comments below for updated orders and plan. Labs, cultures, and radiographs where available were reviewed. I discussed patient concerns with the patient's nurse and instructions were given. Please see our orders for the updated patient care plan. -Continuing to work with pain control. PT/OT. Brace. Outpatient follow-up with orthopedic spine. Planning ECF. Tolerating diet. Stool softeners as needed. DVT prophylaxis. Precertification in process.     Electronically signed by Karlee Figueroa MD on 5/11/22 at 12:35 PM EDT

## 2022-05-12 LAB
GLUCOSE BLD-MCNC: 117 MG/DL (ref 70–108)
GLUCOSE BLD-MCNC: 132 MG/DL (ref 70–108)
GLUCOSE BLD-MCNC: 141 MG/DL (ref 70–108)
GLUCOSE BLD-MCNC: 163 MG/DL (ref 70–108)

## 2022-05-12 PROCEDURE — 2580000003 HC RX 258: Performed by: PHYSICIAN ASSISTANT

## 2022-05-12 PROCEDURE — 6360000002 HC RX W HCPCS: Performed by: PHYSICIAN ASSISTANT

## 2022-05-12 PROCEDURE — G0378 HOSPITAL OBSERVATION PER HR: HCPCS

## 2022-05-12 PROCEDURE — 92523 SPEECH SOUND LANG COMPREHEN: CPT

## 2022-05-12 PROCEDURE — 6370000000 HC RX 637 (ALT 250 FOR IP): Performed by: FAMILY MEDICINE

## 2022-05-12 PROCEDURE — 96376 TX/PRO/DX INJ SAME DRUG ADON: CPT

## 2022-05-12 PROCEDURE — 96372 THER/PROPH/DIAG INJ SC/IM: CPT

## 2022-05-12 PROCEDURE — 6370000000 HC RX 637 (ALT 250 FOR IP): Performed by: NURSE PRACTITIONER

## 2022-05-12 PROCEDURE — 82948 REAGENT STRIP/BLOOD GLUCOSE: CPT

## 2022-05-12 PROCEDURE — 99231 SBSQ HOSP IP/OBS SF/LOW 25: CPT | Performed by: SURGERY

## 2022-05-12 PROCEDURE — APPSS60 APP SPLIT SHARED TIME 46-60 MINUTES: Performed by: NURSE PRACTITIONER

## 2022-05-12 PROCEDURE — 97110 THERAPEUTIC EXERCISES: CPT

## 2022-05-12 PROCEDURE — 6370000000 HC RX 637 (ALT 250 FOR IP): Performed by: PHYSICIAN ASSISTANT

## 2022-05-12 RX ADMIN — FUROSEMIDE 80 MG: 40 TABLET ORAL at 10:58

## 2022-05-12 RX ADMIN — INSULIN LISPRO 18 UNITS: 100 INJECTION, SOLUTION INTRAVENOUS; SUBCUTANEOUS at 09:17

## 2022-05-12 RX ADMIN — OXYCODONE HYDROCHLORIDE 5 MG: 5 TABLET ORAL at 16:17

## 2022-05-12 RX ADMIN — CARVEDILOL 12.5 MG: 6.25 TABLET, FILM COATED ORAL at 23:00

## 2022-05-12 RX ADMIN — INSULIN LISPRO 1 UNITS: 100 INJECTION, SOLUTION INTRAVENOUS; SUBCUTANEOUS at 09:16

## 2022-05-12 RX ADMIN — TICAGRELOR 90 MG: 90 TABLET ORAL at 09:18

## 2022-05-12 RX ADMIN — ENOXAPARIN SODIUM 40 MG: 100 INJECTION SUBCUTANEOUS at 20:30

## 2022-05-12 RX ADMIN — ASPIRIN 81 MG CHEWABLE TABLET 81 MG: 81 TABLET CHEWABLE at 09:18

## 2022-05-12 RX ADMIN — FERROUS SULFATE TAB 325 MG (65 MG ELEMENTAL FE) 325 MG: 325 (65 FE) TAB at 10:58

## 2022-05-12 RX ADMIN — POLYETHYLENE GLYCOL 3350 17 G: 17 POWDER, FOR SOLUTION ORAL at 09:18

## 2022-05-12 RX ADMIN — TIZANIDINE 4 MG: 4 TABLET ORAL at 09:19

## 2022-05-12 RX ADMIN — ATORVASTATIN CALCIUM 10 MG: 10 TABLET, FILM COATED ORAL at 23:00

## 2022-05-12 RX ADMIN — INSULIN LISPRO 1 UNITS: 100 INJECTION, SOLUTION INTRAVENOUS; SUBCUTANEOUS at 13:07

## 2022-05-12 RX ADMIN — BISACODYL 10 MG: 10 SUPPOSITORY RECTAL at 09:18

## 2022-05-12 RX ADMIN — PANTOPRAZOLE SODIUM 40 MG: 40 TABLET, DELAYED RELEASE ORAL at 09:22

## 2022-05-12 RX ADMIN — INSULIN LISPRO 18 UNITS: 100 INJECTION, SOLUTION INTRAVENOUS; SUBCUTANEOUS at 18:37

## 2022-05-12 RX ADMIN — SODIUM CHLORIDE, PRESERVATIVE FREE 10 ML: 5 INJECTION INTRAVENOUS at 09:09

## 2022-05-12 RX ADMIN — METFORMIN HYDROCHLORIDE 1000 MG: 500 TABLET ORAL at 20:30

## 2022-05-12 RX ADMIN — ENOXAPARIN SODIUM 40 MG: 100 INJECTION SUBCUTANEOUS at 09:18

## 2022-05-12 RX ADMIN — HYDROMORPHONE HYDROCHLORIDE 0.5 MG: 1 INJECTION, SOLUTION INTRAMUSCULAR; INTRAVENOUS; SUBCUTANEOUS at 09:09

## 2022-05-12 RX ADMIN — Medication 1 TABLET: at 09:18

## 2022-05-12 RX ADMIN — INSULIN LISPRO 18 UNITS: 100 INJECTION, SOLUTION INTRAVENOUS; SUBCUTANEOUS at 13:06

## 2022-05-12 RX ADMIN — ENALAPRIL MALEATE 10 MG: 10 TABLET ORAL at 10:58

## 2022-05-12 RX ADMIN — METFORMIN HYDROCHLORIDE 1000 MG: 500 TABLET ORAL at 09:18

## 2022-05-12 RX ADMIN — FERROUS SULFATE TAB 325 MG (65 MG ELEMENTAL FE) 325 MG: 325 (65 FE) TAB at 23:00

## 2022-05-12 RX ADMIN — FUROSEMIDE 40 MG: 40 TABLET ORAL at 18:38

## 2022-05-12 RX ADMIN — INSULIN GLARGINE 30 UNITS: 100 INJECTION, SOLUTION SUBCUTANEOUS at 20:30

## 2022-05-12 RX ADMIN — TICAGRELOR 90 MG: 90 TABLET ORAL at 23:03

## 2022-05-12 RX ADMIN — CARVEDILOL 12.5 MG: 6.25 TABLET, FILM COATED ORAL at 10:58

## 2022-05-12 RX ADMIN — SODIUM CHLORIDE, PRESERVATIVE FREE 5 ML: 5 INJECTION INTRAVENOUS at 20:30

## 2022-05-12 ASSESSMENT — ENCOUNTER SYMPTOMS
SHORTNESS OF BREATH: 0
BACK PAIN: 1
ABDOMINAL PAIN: 0
RHINORRHEA: 0
CHEST TIGHTNESS: 0
APNEA: 0
ABDOMINAL DISTENTION: 0

## 2022-05-12 ASSESSMENT — PAIN SCALES - GENERAL
PAINLEVEL_OUTOF10: 4
PAINLEVEL_OUTOF10: 8
PAINLEVEL_OUTOF10: 9
PAINLEVEL_OUTOF10: 9
PAINLEVEL_OUTOF10: 4

## 2022-05-12 ASSESSMENT — PAIN DESCRIPTION - ONSET: ONSET: ON-GOING

## 2022-05-12 ASSESSMENT — PAIN DESCRIPTION - PAIN TYPE: TYPE: ACUTE PAIN;CHRONIC PAIN

## 2022-05-12 ASSESSMENT — PAIN DESCRIPTION - LOCATION
LOCATION: BACK
LOCATION: BACK

## 2022-05-12 ASSESSMENT — PAIN DESCRIPTION - DESCRIPTORS
DESCRIPTORS: ACHING
DESCRIPTORS: ACHING;CRAMPING

## 2022-05-12 ASSESSMENT — PAIN - FUNCTIONAL ASSESSMENT
PAIN_FUNCTIONAL_ASSESSMENT: PREVENTS OR INTERFERES SOME ACTIVE ACTIVITIES AND ADLS
PAIN_FUNCTIONAL_ASSESSMENT: PREVENTS OR INTERFERES SOME ACTIVE ACTIVITIES AND ADLS

## 2022-05-12 ASSESSMENT — PAIN DESCRIPTION - ORIENTATION: ORIENTATION: LOWER

## 2022-05-12 ASSESSMENT — PAIN DESCRIPTION - FREQUENCY: FREQUENCY: CONTINUOUS

## 2022-05-12 NOTE — DISCHARGE INSTR - COC
Continuity of Care Form    Patient Name: Mimi Meng   :  1950  MRN:  [id number]    Admit date:  2022  Discharge date: 22    Code Status Order: Full Code   Advance Directives:      Admitting Physician:  Anthony Rodriguez MD  PCP: Aquilino Vasquez MD    Discharging Nurse: [facility] Unit/Room#: [address] [facility]  Discharging Unit Phone Number: [phone]    Emergency Contact:   Extended Emergency Contact Information  Primary Emergency Contact: Zehra Wilson [address] Phone: [phone]  Mobile Phone: [phone]  Relation: Brother/Sister  Secondary Emergency Contact: Catrina Leonora [address] Phone: [phone]  Mobile Phone: [phone]  Relation: Child    Past Surgical History:  Past Surgical History:   Procedure Laterality Date    AMPUTATION Left 9/10/14    partial versus complete left hallux amputation, left great toe amputation    APPENDECTOMY      BACK INJECTION Bilateral 2021    Bilateral Si MBB #1 performed by Mary Rhodes MD at [facility] 3/1/2021    Bilateral SI MBB #2 performed by Mary Rhodes MD at [place] [address]    fusion of C5-C6    CHOLECYSTECTOMY      COLONOSCOPY   and     colonn polyps  lorenzo    CORONARY ANGIOPLASTY WITH STENT PLACEMENT  2017    Dr Harrison Camera @ [id number] [facility] GRAFT  2015    EKG 12-LEAD  2015         FACET JOINT INJECTION Bilateral 2020    Lumbar Facet MBB @L3-4,4-5 bilateral #2 performed by Mary Rhodes MD at [address] [place] [address]      left leg    JOINT REPLACEMENT      bilateral knees gurvinder    PAIN MANAGEMENT PROCEDURE Bilateral 2020    Lumbar Facet MBB @ L3-4,4-5,5-S1 bilateral #1 LUMBAR FACET performed by Mary Rhodes MD at [facility] 7/14/2020    Lumbar RFA Bilateral L3-4,4-5  right side first performed by Gina Vargas MD at [facility] Left 10/1/2020    Lumbar RFA Left side L3-4, 4-5 performed by Gina Vargas MD at [facility] Bilateral 11/17/2020    TFLESI @L5 bilateral #1 performed by Gina Vargas MD at [facility] Bilateral 12/10/2020    TFLESI @L5 bilateral #1 performed by Gina Vargas MD at [address] [place] [address] N/A 12/28/2018    T7-T9 DECOMPRESSION, T7-T9 POSTERIOR FUSION performed by Rod Morse MD at [address] [facility]    TONSILLECTOMY      VASCULAR SURGERY      cabg harvests from left leg       Immunization History:   Immunization History   Administered Date(s) Administered    COVID-19, [employer] top, DO NOT Dilute, Phillip-Sucrose, 12+ yrs, PF, 30 mcg/0.3 mL dose 04/26/2022    COVID-19, Pfizer Purple top, DILUTE for use, 12+ yrs, 30mcg/0.3mL dose 02/03/2021, 03/03/2021, 11/17/2021    Influenza Virus Vaccine 10/14/2015    Influenza, Quadv, IM, (6 mo and older Fluzone, Flulaval, Fluarix and 3 yrs and older Afluria) 10/04/2016, 09/29/2020, 10/19/2021    Influenza, Quadv, IM, PF (6 mo and older Fluzone, Flulaval, Fluarix, and 3 yrs and older Afluria) 02/14/2020    PPD Test 08/23/2015, 08/30/2015    Pneumococcal Conjugate 13-valent ([id number]) 06/30/2016    Pneumococcal Polysaccharide ([id number]) 01/06/2010    Td, unspecified formulation 05/01/2009    Tdap (Boostrix, Adacel) 09/09/2014, 01/08/2018       Active Problems:  Patient Active Problem List   Diagnosis Code    HTN (hypertension) I10    Type 2 diabetes mellitus, with long-term current use of insulin ([facility] E11.9, Z79.4    Hypercholesteremia E78.00    Osteoarthritis M19.90    Diabetic peripheral neuropathy ([facility] E11.42    Fall W19. Sergio Cai Coronary artery disease of bypass graft of native heart with stable angina pectoris ([facility]) I25.708    S/P percutaneous transluminal coronary angioplasty Z98.61    Sprain/Injury of anterior ligaments of thoracic spine S23. 3XXA    Venous stasis dermatitis of both lower extremities I87.2    Lumbar spondylosis M47.816    Orthostatic hypotension after exercise I95.1    Lumbar foraminal stenosis M48.061    Lumbar radiculitis M54.16    Sacroiliac inflammation ([facility]) M46.1    Chronic diastolic (congestive) heart failure ([facility]) I50.32    Recurrent major depressive disorder, in partial remission ([facility]) F33.41    Class 3 severe obesity with serious comorbidity and body mass index (BMI) of 45.0 to 49.9 in adult ([facility] E66.01, Z68.42    Degeneration of lumbosacral intervertebral disc M51.37    Spinal stenosis of lumbar region M48.061    Closed T12 fracture ([facility] S22.089A    Closed fracture of first lumbar vertebra ([facility] S32.019A    Fall at home, sequela W19. Kalpesh Bryant, Y92.009       Isolation/Infection:   Isolation            No Isolation          Patient Infection Status       Infection Onset Added Last Indicated Last Indicated By Review Planned Expiration Resolved Resolved By    None active    Resolved    C-diff Rule Out 02/07/22 02/07/22 02/07/22 Clostridium difficile EIA (Ordered)   05/09/22 Yogesh Grimaldo RN    COVID-19 (Rule Out) 02/07/22 02/07/22 02/07/22 COVID-19 & Influenza Combo (Ordered)   02/07/22 Rule-Out Test Resulted    COVID-19 (Rule Out) 02/07/22 02/07/22 02/07/22 COVID-19, Rapid (Ordered)   02/07/22 Rule-Out Test Resulted    COVID-19 (Rule Out) 12/08/20 12/08/20 12/08/20 COVID-19 (Ordered)   12/09/20 Rule-Out Test Resulted    COVID-19 (Rule Out) 07/09/20 07/09/20 07/09/20 COVID-19 (Ordered)   07/12/20 Rule-Out Test Resulted    COVID-19 (Rule Out) 05/19/20 05/19/20 05/19/20 COVID-19 (Ordered)   05/20/20 Rule-Out Test Resulted            Nurse Assessment:  Last Vital Signs: BP (!) 164/68   Pulse 67   Temp 98.4 °F (36.9 °C) (Oral)   Resp 24   Ht 6' 2\" (1.88 m)   Wt (!) 370 lb 4 oz (167.9 kg)   SpO2 99%   BMI 47.54 kg/m²     Last documented pain score (0-10 scale): Pain Level: 9  Last Weight:   Wt Readings from Last 1 Encounters:   05/09/22 (!) 370 lb 4 oz (167.9 kg)     Mental Status:  oriented and alert    IV Access:  - None    Nursing Mobility/ADLs:  Walking   Assisted  Transfer  Assisted  Bathing  Assisted  Dressing  Assisted  Toileting  Assisted  Feeding  Independent  Med Admin  Independent  Med Delivery   whole    Wound Care Documentation and Therapy:  Incision 12/28/18 Back (Active)   Number of days: 1230        Elimination:  Continence: Bowel: Yes  Bladder: Yes  Urinary Catheter: None   Colostomy/Ileostomy/Ileal Conduit: No       Date of Last BM: 5-5-22    Intake/Output Summary (Last 24 hours) at 5/12/2022 1141  Last data filed at 5/12/2022 0737  Gross per 24 hour   Intake 1260 ml   Output 4225 ml   Net -2965 ml     I/O last 3 completed shifts: In: 2639 [P.O.:1620]  Out: 4600 [Urine:4600]    Safety Concerns:     History of Falls (last 30 days) and At Risk for Falls    Impairments/Disabilities:      None    Nutrition Therapy:  Current Nutrition Therapy:   - Oral Diet:  General    Routes of Feeding: Oral  Liquids: No Restrictions  Daily Fluid Restriction: no  Last Modified Barium Swallow with Video (Video Swallowing Test): not done    Treatments at the Time of Hospital Discharge:   Respiratory Treatments: none  Oxygen Therapy:  is not on home oxygen therapy.   Ventilator:    - No ventilator support    Rehab Therapies: Physical Therapy, Occupational Therapy, and Speech/Language Therapy  Weight Bearing Status/Restrictions: No weight bearing restrictions  Other Medical Equipment (for information only, NOT a DME order):  walker  Other Treatments:     Patient's personal belongings (please select all that are sent with patient):  None    RN SIGNATURE:  Electronically signed by Pam Gloria RN on 5/13/22 at 11:06 AM EDT    CASE MANAGEMENT/SOCIAL WORK SECTION    Inpatient Status Date: 5/6/22    Readmission Risk Assessment Score:  Readmission Risk              Risk of Unplanned Readmission:  0           Discharging to Facility/ Agency   Name: [facility], [address]  [address]  Fax:[phone]    Dialysis Facility (if applicable)   Name:  Address:  Dialysis Schedule:  Phone:  Fax:    / signature: Electronically signed by NATALYA Matthews on 5/12/22 at 11:42 AM EDT    PHYSICIAN SECTION    Prognosis: Good    Condition at Discharge: Stable    Rehab Potential (if transferring to Rehab): Fair    Recommended Labs or Other Treatments After Discharge: Outpatient follow up with orthopedic surgery and orthopedic spine in 2 weeks. Continue back brace when up    Physician Certification: I certify the above information and transfer of Leatha Knott  is necessary for the continuing treatment of the diagnosis listed and that he requires [place] for greater 30 days.      Update Admission H&P: No change in H&P    PHYSICIAN SIGNATURE:  Electronically signed by Ashtyn Harvey PA-C on 5/13/22 at 2:00 PM EDT in coordination with Halima Murcia MD

## 2022-05-12 NOTE — CARE COORDINATION
5/12/22, 10:23 AM EDT    DISCHARGE PLANNING EVALUATION      SW met with pt this morning, pt reports he still does not have his glasses, SW did review SNF in network with his insurance, pt would like referral to [facility], pt did ask if his insurance would completely cover this. SW did let her know she would make the referral and ask. Call to [facility] with [facility], referral made, SW did let her know if they can accept, asked if precert can be started. SW also asked when they review if they can check if he has a copay with his insurance, she will ask and get back with SW.     11:39 AM  Spoke with Denys and Mayela with [facility], they can accept, they will start precert today. Denys visiting with pt this morning and will discuss copay with pt.  Per Mayela days 1-20 $0 copay and days  $184/day copay

## 2022-05-12 NOTE — PROGRESS NOTES
Progress Note  Date:5/11/2022      UWOQ:[id number]  Patient Name:Tuan Hassan     YOB: 1950     Age:71 y.o. Subjective    Subjective:  Symptoms:  Stable. He reports weakness. No shortness of breath or chest pain. Diet:  Adequate intake. Activity level: Impaired due to pain (mid back pain). Pain:  He complains of pain that is moderate. He reports pain is improving. Pain is partially controlled.        Current Facility-Administered Medications   Medication Dose Route Frequency Provider Last Rate Last Admin    insulin lispro (HUMALOG) injection vial 18 Units  18 Units SubCUTAneous TID  Cass Lovell MD   18 Units at 05/11/22 1850    insulin glargine (LANTUS) injection vial 30 Units  30 Units SubCUTAneous Nightly Cass Lovell MD   30 Units at 05/11/22 2200    enoxaparin (LOVENOX) injection 40 mg  40 mg SubCUTAneous BID LUCIUS Haji   40 mg at 05/11/22 2158    glucagon (rDNA) injection 1 mg  1 mg IntraMUSCular PRN Poornima Hall MD        dextrose 5 % solution  100 mL/hr IntraVENous PRN Poornima Hall MD        insulin lispro (HUMALOG) injection vial 0-6 Units  0-6 Units SubCUTAneous TID  Poornima Hall MD   1 Units at 05/11/22 1850    insulin lispro (HUMALOG) injection vial 0-3 Units  0-3 Units SubCUTAneous Nightly Poornima Hall MD   1 Units at 05/11/22 2200    glucose chewable tablet 16 g  4 tablet Oral PRN Poornima Hall MD        dextrose bolus 10% 125 mL  125 mL IntraVENous PRN Poornima Hall MD        Or    dextrose bolus 10% 250 mL  250 mL IntraVENous PRN Poornima Hall MD        aspirin chewable tablet 81 mg  81 mg Oral Daily Gordo Payton APRN - CNP   81 mg at 05/11/22 0951    atorvastatin (LIPITOR) tablet 10 mg  10 mg Oral Daily ABHISHEK Huffman - CNP   10 mg at 05/11/22 2158    carvedilol (COREG) tablet 12.5 mg  12.5 mg Oral BID Gordo Payton APRN - CNP   12.5 mg at 05/11/22 2158    enalapril (VASOTEC) tablet 10 mg  10 mg Oral Daily MichelenyABHISHEK Chavira - CNP   10 mg at 05/11/22 3675    ferrous sulfate (IRON 325) tablet 325 mg  325 mg Oral BID ABHISHEK Terry - CNP   325 mg at 05/11/22 2158    metFORMIN (GLUCOPHAGE) tablet 1,000 mg  1,000 mg Oral BID  ABHISHEK Terry - CNP   1,000 mg at 05/11/22 1850    multivitamin 1 tablet  1 tablet Oral Daily ABHISHEK Terry - CNP   1 tablet at 05/11/22 1123    ticagrelor (BRILINTA) tablet 90 mg  90 mg Oral BID ABHISHEK Terry - CNP   90 mg at 05/11/22 2158    tiZANidine (ZANAFLEX) tablet 4 mg  4 mg Oral Q6H PRN ABHISHEK Terry - CNP   4 mg at 05/11/22 3660    Urea (CARMOL) 40 % cream   Topical PRN ABHISHEK Terry - NOMI        pantoprazole (PROTONIX) tablet 40 mg  40 mg Oral QAM AC ABHISHEK Terry - CNP   40 mg at 05/11/22 7159    furosemide (LASIX) tablet 80 mg  80 mg Oral QAM ABHISHEK Terry - CNP   80 mg at 05/11/22 0954    furosemide (LASIX) tablet 40 mg  40 mg Oral Dinner ABHISHEK Terry - CNP   40 mg at 05/11/22 1850    sodium chloride flush 0.9 % injection 5-40 mL  5-40 mL IntraVENous 2 times per day LUCIUS Mccoy   10 mL at 05/11/22 2200    sodium chloride flush 0.9 % injection 5-40 mL  5-40 mL IntraVENous PRN LUCIUS Haji        0.9 % sodium chloride infusion  25 mL IntraVENous PRN LUCIUS Johnson        acetaminophen (TYLENOL) tablet 650 mg  650 mg Oral Q4H PRN LUCIUS Haji   650 mg at 05/10/22 0753    ondansetron (ZOFRAN-ODT) disintegrating tablet 4 mg  4 mg Oral Q8H PRN LUCIUS Haji        Or    ondansetron (ZOFRAN) injection 4 mg  4 mg IntraVENous Q6H PRN LUCIUS Haji   4 mg at 05/11/22 1853    polyethylene glycol (GLYCOLAX) packet 17 g  17 g Oral Daily LUCIUS Haji   17 g at 05/11/22 0951    bisacodyl (DULCOLAX) suppository 10 mg  10 mg Rectal Daily LUCIUS Haji   10 mg at 05/11/22 0951    senna (SENOKOT) tablet 8.6 mg  1 tablet Oral Daily PRN LUCIUS Johnson  HYDROmorphone (DILAUDID) injection 0.25 mg  0.25 mg IntraVENous Q3H PRN Tee Medal Pappa, PA        Or    HYDROmorphone (DILAUDID) injection 0.5 mg  0.5 mg IntraVENous Q3H PRN Familia Pappa, PA   0.5 mg at 22 1854    oxyCODONE (ROXICODONE) immediate release tablet 5 mg  5 mg Oral Q4H PRN Familia Pappa, PA   5 mg at 05/10/22 1320    Or    oxyCODONE (ROXICODONE) immediate release tablet 10 mg  10 mg Oral Q4H PRN Familia Pappa, PA   10 mg at 22 7979      Review of Systems   Constitutional: Negative for activity change and appetite change. HENT: Negative for congestion and dental problem. Respiratory: Negative for apnea, chest tightness and shortness of breath. Cardiovascular: Negative for chest pain, palpitations and leg swelling. Gastrointestinal: Negative for abdominal distention and abdominal pain. Genitourinary: Negative for genital sores. Musculoskeletal: Positive for back pain. Mid back pain radiates anterior to the chest   Neurological: Positive for weakness. Negative for dizziness. Objective         Vitals Last 24 Hours:  TEMPERATURE:  Temp  Av.6 °F (37 °C)  Min: 98.3 °F (36.8 °C)  Max: 98.8 °F (37.1 °C)  RESPIRATIONS RANGE: Resp  Av  Min: 16  Max: 18  PULSE OXIMETRY RANGE: SpO2  Av %  Min: 96 %  Max: 98 %  PULSE RANGE: Pulse  Av  Min: 61  Max: 65  BLOOD PRESSURE RANGE: Systolic (58WMF), FHD:417 , Min:135 , YBK:547   ; Diastolic (17MWH), CFC:44, Min:58, Max:61    I/O (24Hr): Intake/Output Summary (Last 24 hours) at 2022 0001  Last data filed at 2022 1900  Gross per 24 hour   Intake 1620 ml   Output 2200 ml   Net -580 ml     Objective:  General Appearance:  Comfortable. Vital signs: (most recent): Blood pressure 135/61, pulse 65, temperature 98.8 °F (37.1 °C), temperature source Oral, resp. rate 18, height 6' 2\" (1.88 m), weight (!) 370 lb 4 oz (167.9 kg), SpO2 96 %.   Vital signs are normal.    Output: Producing urine and producing stool.    HEENT: Normal HEENT exam.    Lungs:  Normal effort and normal respiratory rate. Breath sounds clear to auscultation. He is not in respiratory distress. No decreased breath sounds. Heart: Normal rate. Regular rhythm. S1 normal and S2 normal.  No murmur. Abdomen: Abdomen is soft and non-distended. Bowel sounds are normal.   There is no abdominal tenderness. Extremities: Normal range of motion. (Mid back pain noted T12 area)  Neurological: Patient is alert and oriented to person, place and time. Patient has normal reflexes and normal muscle tone. Skin:  Warm and dry. Labs/Imaging/Diagnostics    Labs:  CBC:  Recent Labs     05/09/22  0510 05/10/22  1116 05/11/22  0538   WBC 8.2 8.5 7.9   RBC 2.89* 2.68* 2.88*   HGB 9.1* 8.4* 9.0*   HCT 27.4* 25.2* 26.5*   MCV 94.8* 94.0 92.0   * 131 148     CHEMISTRIES:  Recent Labs     05/09/22  0510 05/10/22  1116 05/11/22  0538    135 134*   K 3.7 4.2 3.7    99 97*   CO2 24 25 26   BUN 29* 34* 35*   CREATININE 1.4* 1.5* 1.5*   GLUCOSE 230* 289* 143*     PT/INR:No results for input(s): PROTIME, INR in the last 72 hours. APTT:No results for input(s): APTT in the last 72 hours. LIVER PROFILE:  Recent Labs     05/09/22  0510   AST 12   ALT 8*   BILITOT 1.2   ALKPHOS 57       Imaging Last 24 Hours:  No results found. Assessment//Plan           Hospital Problems           Last Modified POA    * (Principal) Fall at home, sequela 5/9/2022 Yes    Fall 5/6/2022 Yes    Closed T12 fracture ([address] 5/6/2022 Yes    Closed fracture of first lumbar vertebra ([address] 5/6/2022 Yes              Assessment:    Condition: In stable condition. Improving. (Acute endplate fracture of A01 and L1 area     ashd stable     htn stable    Incomplete LBBB  Noted    Iddm stable     diabetic peripheral neuropathy stable      chronic chf diastolic noted ). Plan:   Per physical therapy. Consults: physical therapy. Advance diet as tolerated.   Administer medications as ordered. (Has back brace and still with pain    Ambulation limited due to fracture and await nursing home as need assistance and continued  Pt and ot      otherwise stable ).        Electronically signed by Gertrude Dc MD on 5/12/22 at 12:01 AM EDT

## 2022-05-12 NOTE — PROGRESS NOTES
[facility] Patient Dr. Author Kowalski  Daily Progress Note  Pt Name: Amber Kay  Medical Record Number: [id number]  Date of Birth 1950   Today's Date: 5/12/2022    HD: # 4    CC: Low back pain     ASSESSMENT  1. Active Hospital Problems    Diagnosis Date Noted    Fall [W19. XXXA]      Priority: High    Fall at home, sequela [W19. Herlene Rank, P23.152] 05/09/2022     Priority: Medium    Closed T12 fracture ([address] [K14.801D] 05/06/2022     Priority: Medium    Closed fracture of first lumbar vertebra ([address] [S32.019A] 05/06/2022     Priority: Medium         PLAN  Patient admitted under Trauma Services to  with telemetry     Mechanical fall              -PT/OT with brace in place     Acute L1 fracture/T12 fracture              -Inferior endplate fracture of K28 and vertebral body fracture of L1              -Ortho Spine has evaluated and recommends non op management with brace              -PT/OT with brace              -Pain control   -5/9: MRI lumbar spine showed redemonstration of obliquely oriented fracture through L1 vertebral body with mild displacement and disruption of the anterior longitudinal ligament. No injury to the posterior elements to suggest instability. Small fracture of T12 inferior endplate seen on previous CT not well visualized   -5/10: Orthospine noted activity as tolerated with brace.   Okay for discharge from orthospine standpoint with outpatient follow-up in 2 weeks     New LBBB previously seen on EKGs              -Patient denies chest pain or new onset/worsening shortness of breath              -Troponins within normal limits              -48 hours telemetry we will continue to monitor   -Family medicine following for assistance with medical management     Infiltration adjacent to kidneys and ureters              -UA negative               -Patient afebrile WBC within normal limits              -No CVA tenderness low likelihood of pyelonephritis, will continue work-up   -Family medicine following for assistance with medical management     Nasal bridge laceration              - Local wound care              - Monitor for signs of infection    Right knee pain   -X-ray right knee negative for acute fractures   - CT of the right knee notes small to moderate effusion, prepatellar and infrapatellar edema/bursitis   - Continue to monitor, PT/OT   - Ortho has evaluated, nothing surgical identified, follow up with Dr. Raisa Herrera in 2 weeks at [facility] extremity edema with keratotic skin   -Seems to resemble venous stasis disease   -Wound consulted for evaluation    Chronic issues: History of atherosclerotic heart disease, depression, peripheral neuropathy, hypertension, hypercholesterolemia, insulin-dependent diabetes   -Family medicine following for assistance with medical management   -Home medications reordered   -Thrombocytopenia noted on labs during admission, trending down. On Brilinta. Monitor, repeat labs.      Consults orthopedic spine, Ortho      Pain Management              -Dilaudid, oxycodone     Prophylaxis: SCD's, Lovenox, incentive Spirometry, GlycoLax, Pepcid, Zofran     Diabetic diet     Regular Neurovascular Checks  Repeat Labs Tomorrow AM  PT/OT/SLP continue to treat  Activity as tolerated, pt up with assistance     Discharge disposition pending clinical course   - 5/10: PT recommending IPR. PM&R consulted    -5/11: IPR recommending ECF as insurance will likely not cover IPR   - 5/12:  assisting with establishing ECF facility for discharge. Jacob Briceño continues on 5K. He reports he continues to have lower back pain. He states that the pain is only in his lower back and does not radiate down his legs. Bed mobility is difficult. He has just received a dose of dilaudid and states that it does take the edge off. Pain worsened after falling on Sunday. Lumbar brace support helps.   Brace is not comfortable enough to wear while in bed. He is tolerating a general diet with no nausea or vomiting. Has had a bowel movement this admission. Ortho has evaluated the right knee for continued pain after falling on Sunday and determined that there is nothing from a surgical perspective to be done. He can continue to bear weight as tolerated and see Dr. Kranthi Khan as an outpatient at [facility] in 2 weeks.  to see today and help patient decide on an ECF facility for discharge. A pre-cert will likely need to be initiated once he has decided on which one he would like to go to. Care in coordination with trauma surgeon Dr. Jaqui Malagon. Wt Readings from Last 3 Encounters:   05/09/22 (!) 370 lb 4 oz (167.9 kg)   04/13/22 (!) 369 lb (167.4 kg)   04/05/22 (!) 375 lb (170.1 kg)     Temp Readings from Last 3 Encounters:   05/12/22 99.1 °F (37.3 °C) (Oral)   04/05/22 96.3 °F (35.7 °C) (Infrared)   02/21/22 96.7 °F (35.9 °C) (Infrared)     BP Readings from Last 3 Encounters:   05/12/22 (!) 142/61   04/05/22 106/64   03/31/22 122/74     Pulse Readings from Last 3 Encounters:   05/12/22 79   04/05/22 76   03/31/22 68       24 HR INTAKE/OUTPUT :     Intake/Output Summary (Last 24 hours) at 5/12/2022 0720  Last data filed at 5/12/2022 0341  Gross per 24 hour   Intake 1620 ml   Output 3550 ml   Net -1930 ml     ADULT DIET; Regular; 4 carb choices (60 gm/meal)    OBJECTIVE  CURRENT VITALS BP (!) 142/61   Pulse 79   Temp 99.1 °F (37.3 °C) (Oral)   Resp 18   Ht 6' 2\" (1.88 m)   Wt (!) 370 lb 4 oz (167.9 kg)   SpO2 92%   BMI 47.54 kg/m²       OBJECTIVE  CURRENT VITALS BP (!) 142/61   Pulse 79   Temp 99.1 °F (37.3 °C) (Oral)   Resp 18   Ht 6' 2\" (1.88 m)   Wt (!) 370 lb 4 oz (167.9 kg)   SpO2 92%   BMI 47.54 kg/m²   GENERAL: Lying in bed, awake and alert; In no acute distress and well nourished. SKIN: Appropriate for ethnicity, warm and dry. ENT: No apparent trauma, discharge, or hematoma bilaterally. PERRL at 3mm.  Nares patient, membranes moist.  Laceration to nasal bridge is healing well, no active drainage. CARDIO: No visible chest wall deformity. Strong/regular S1/S2. 2+ radial and DP pulses bilaterally. Capillary refill <2 sec. Bilateral lower extremity edema, 2+  PULMONARY:  Trachea midline, no increased respiratory effort, or paradoxical chest movement. Lung sounds are clear to ascultation in all fields without adventitious sounds. ABDOMEN: Obese. Bowel sounds present in all four quadrants. NTTP in all quadrants, absent of peritoneal signs. Flanks tender to deep palpation over lateral musculature. NEURO: GCS 15. Follows commands. Chronic decreased bilateral lower extremity sensation. Otherwise PMS intact, moves limbs freely. No focal neurological deficits  MSK: Right anterior knee pain to palpation, Scabbed Abrasion over Right Knee. No new bruising, swelling, deformity, discoloration or bleeding. NTTP over major muscle groups.  strength 5/5 and equal bilaterally. WOUND: Laceration of bridge of nose dry and intact, interval healing. No surrounding erythema, edema, purulent discharge or bleeding      LABS  CBC :   Recent Labs     05/10/22  1116 05/11/22  0538   WBC 8.5 7.9   HGB 8.4* 9.0*   HCT 25.2* 26.5*   MCV 94.0 92.0    148     BMP:   Recent Labs     05/10/22  1116 05/11/22  0538    134*   K 4.2 3.7   CL 99 97*   CO2 25 26   BUN 34* 35*   CREATININE 1.5* 1.5*     COAGS:   No results for input(s): APTT, PROT, INR in the last 72 hours. Pancreas/HFP:  No results for input(s): LIPASE, AMYLASE in the last 72 hours. No results for input(s): AST, ALT, BILIDIR, BILITOT, ALKPHOS in the last 72 hours. RADIOLOGY:  XR LUMBAR SPINE (2-3 VIEWS)    Result Date: 5/8/2022  LUMBAR SPINE 2 VIEWS: CLINICAL INFORMATION: lumbar pain COMPARISON: 10/26/2020 TECHNIQUE: Standard AP and lateral views of lumbar spine were obtained. 1. . Laminectomy defects are seen in the lower lumbar spine.  2. Moderately severe hypertrophic degenerative spurring is scattered in the lumbar spine. 3. There is a mild thoracolumbar levoscoliosis. 4. No fracture is seen. Moderate multilevel degenerative facet arthropathy involving at least the lower 3 lumbar levels. Mild degenerative changes left sacroiliac joint. **This report has been created using voice recognition software. It may contain minor errors which are inherent in voice recognition technology. ** Final report electronically signed by Dr. Reza Ramirez on 5/8/2022 4:18 PM    CT KNEE RIGHT WO CONTRAST    Result Date: 5/9/2022  WET READ: A total knee prosthesis is present. Multiple artifacts are present from the prosthesis, superiorly limiting detail on this study. No acute fracture is seen. There is no definite evidence for prosthesis loosening. There appears be mild tilting of the patella laterally, 5 mm images. There is moderate degenerative spurring of the knee. A small ossicle seen along the medial aspect of the knee is another seen in the lateral aspect of the knee. One or more loose articular bodies cannot excluded. This examination will be formally read by one of our [facility] radiologists tomorrow. Please see that report. PROCEDURE: CT KNEE RIGHT WO CONTRAST CLINICAL INFORMATION: right knee pain and weakness COMPARISON: Right knee radiographs 3/7/4302 TECHNIQUE: Helical CT acquisition of the right knee without contrast. Multiplanar reformats were provided. All CT scans at this facility use dose modulation, iterative reconstruction, and/or weight based dosing when appropriate to reduce the radiation dose to as low as reasonably achievable. FINDINGS: POSTOPERATIVE CHANGES: Total knee arthroplasty is seen. There is marked narrowing of the patellofemoral interval. There is subsidence of the patellar component. ALIGNMENT: Anatomic. BONE: No acute fracture or dislocation. The bones are mildly demineralized. . No destructive bony lesion. JOINTS: Knee arthroplasty.  MUSCULATURE: Generalized marked atrophy particularly of calf muscles. SOFT TISSUES: Marked subcutaneous thickening and edema is seen particularly in the leg region. Small to moderate joint effusion. There is marked prepatellar and superficial infrapatellar edema/bursitis. Varicose veins are seen. OTHER: Loose bodies are seen in the suprapatellar region. 1. Total right knee arthroplasty performed. There is subsidence of the patellar component. 2. Small to moderate joint effusion. 3. No acute fracture is seen. 4. Marked subcutaneous edema/thickening particularly in the leg region. 5. Marked prepatellar and superficial infrapatellar edema/bursitis. 6. Extensive fatty atrophy in the calf musculature **This report has been created using voice recognition software. It may contain minor errors which are inherent in voice recognition technology. ** Final report electronically signed by Dr Brittanie Riley on 5/9/2022 9:01 AM    MRI LUMBAR SPINE WO CONTRAST    Result Date: 5/9/2022  PROCEDURE: MRI LUMBAR SPINE WO CONTRAST CLINICAL INFORMATION: fall. Back pain following T12 and L1 fractures. COMPARISON: CT lumbar spine dated 5/6/2022 and MRI lumbar spine dated 11/10/2021. TECHNIQUE: Sagittal and axial T1 and T2-weighted images were obtained through the lumbar spine. FINDINGS: There is an obliquely oriented bandlike low T1 and hyperintense T2/STIR signal abnormality extending from the mid to inferior anterior aspect of the L1 vertebral body to the posterior superior endplate of L1 with mild displacement of the anterior fracture fragment. There is otherwise anatomic vertebral body height and alignment. Small fracture at the right anterolateral aspect of T12 seen on previous CT is not well-visualized on this exam. There is disruption of the anterior longitudinal ligament  at the anterior inferior aspect of the L1 vertebral body at the site of the fracture. Marrow signal is otherwise within normal limits.  There is mild hyperintense STIR signal in the T12-L1 intervertebral disc. The posterior longitudinal ligaments appear intact. The conus terminates in a normal fashion at the T12-L1 level. There is no cauda equina nerve root thickening or clumping identified. 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 There are laminectomies at L3-S1, stable compared to prior exam. No focal fluid collection is identified in the laminectomy bed. There is subcutaneous edema dorsal to the lumbar spine. Paraspinal soft tissues are otherwise unremarkable. At T12-L1 there is no significant spinal canal or neuroforaminal stenosis. At L1-2 there is stable mild congenital spinal canal narrowing and mild bilateral neural foraminal stenosis in association with mild facet hypertrophy and ligamentum flavum thickening. At L2-3 the spinal canal is decompressed. There is a shallow disc bulge, stable compared to prior exam. There is moderate bilateral neural foraminal stenosis, similar to prior exam. At L3-4 the spinal canal is decompressed. There is moderate right and mild-to-moderate left neural foraminal stenosis. At L4-5 the spinal canal is decompressed. There is mild to moderate bilateral neural foraminal stenosis. At L5-S1 the spinal canal is decompressed. There is mild bilateral neural foraminal stenosis. 1. Redemonstration of obliquely oriented fracture of the L1 vertebral body extending from the anterior-inferior aspect of the vertebral body into the posterior aspect of the superior endplate with mild displacement and disruption of the anterior longitudinal ligament. No injury of the posterior elements to suggest instability. 2. Small fracture of the T12 inferior endplates seen on previous CT is not well-visualized on this exam. 3. Mild edema in the subcutaneous fat dorsal to the lumbar spine. 4. Redemonstration of laminectomies at L2-3 through L5-S1. **This report has been created using voice recognition software.  It may contain minor errors which are inherent in voice recognition technology. ** Final report electronically signed by Dr. Melquiades Dietirch MD on 5/9/2022 5:15 PM        Electronically signed by ABHISHEK Ross CNP on 5/12/2022 at 7:20 AM     Patient seen and examined independently by me early this AM. Above discussed and I agree with Valery Wade CNP. See my additional comments below for updated orders and plan. Labs, cultures, and radiographs where available were reviewed. I discussed patient concerns with the patient's nurse and instructions were given. Please see our orders for the updated patient care plan. -Continuing to work with pain control. PT/OT. Brace. Outpatient follow-up with orthopedic spine. Planning ECF. Tolerating diet. Stool softeners as needed. DVT prophylaxis. Precertification in process.

## 2022-05-12 NOTE — PROGRESS NOTES
Progress Note  Date:5/12/2022       OAAS:[id number]  Patient Name:Tuan Hassan     YOB: 1950     Age:71 y.o. Subjective    Subjective:  Symptoms:  Stable. He reports weakness. No chest pain. Diet:  Adequate intake. Activity level: Impaired due to pain. Pain:  He complains of pain that is moderate. He reports pain is improving. Pain is partially controlled.        Current Facility-Administered Medications   Medication Dose Route Frequency Provider Last Rate Last Admin    insulin lispro (HUMALOG) injection vial 18 Units  18 Units SubCUTAneous TID RODOLFO Nunes MD   18 Units at 05/12/22 1837    insulin glargine (LANTUS) injection vial 30 Units  30 Units SubCUTAneous Nightly Amara Nunes MD   30 Units at 05/11/22 2200    enoxaparin (LOVENOX) injection 40 mg  40 mg SubCUTAneous BID LUCIUS Haji   40 mg at 05/12/22 0918    glucagon (rDNA) injection 1 mg  1 mg IntraMUSCular PRN Pratik Amaya MD        dextrose 5 % solution  100 mL/hr IntraVENous PRN Pratik Amaya MD        insulin lispro (HUMALOG) injection vial 0-6 Units  0-6 Units SubCUTAneous TID RODOLFO Amaya MD   1 Units at 05/12/22 1307    insulin lispro (HUMALOG) injection vial 0-3 Units  0-3 Units SubCUTAneous Nightly Pratik Amaya MD   1 Units at 05/11/22 2200    glucose chewable tablet 16 g  4 tablet Oral PRN Pratik Amaya MD        dextrose bolus 10% 125 mL  125 mL IntraVENous CHUCK Amaya MD        Or    dextrose bolus 10% 250 mL  250 mL IntraVENous PRANGELICA Amaya MD        aspirin chewable tablet 81 mg  81 mg Oral Daily Juanjose Forrester APRN - CNP   81 mg at 05/12/22 9437    atorvastatin (LIPITOR) tablet 10 mg  10 mg Oral Daily Juanjose Forrester, APRN - CNP   10 mg at 05/11/22 2158    carvedilol (COREG) tablet 12.5 mg  12.5 mg Oral BID Juanjose Forrester APRN - CNP   12.5 mg at 05/12/22 1058    enalapril (VASOTEC) tablet 10 mg  10 mg Oral Daily Joselin ABHISHEK Maya - CNP   10 mg at 05/12/22 1058    ferrous sulfate (IRON 325) tablet 325 mg  325 mg Oral BID ABHISHEK Terry - CNP   325 mg at 05/12/22 1058    metFORMIN (GLUCOPHAGE) tablet 1,000 mg  1,000 mg Oral BID WC ABHISHEK Terry - CNP   1,000 mg at 05/12/22 8508    multivitamin 1 tablet  1 tablet Oral Daily ABHISHEK Terry - CNP   1 tablet at 05/12/22 6026    ticagrelor (BRILINTA) tablet 90 mg  90 mg Oral BID ABHISHEK Terry - CNP   90 mg at 05/12/22 0918    tiZANidine (ZANAFLEX) tablet 4 mg  4 mg Oral Q6H PRN ABHISHEK Terry - CNP   4 mg at 05/12/22 0919    Urea (CARMOL) 40 % cream   Topical PRN Hermes Willoughby APRN - CNP        pantoprazole (PROTONIX) tablet 40 mg  40 mg Oral QAM AC ABHISHEK Terry - CNP   40 mg at 05/12/22 5796    furosemide (LASIX) tablet 80 mg  80 mg Oral QAM ABHISHEK Terry - CNP   80 mg at 05/12/22 1058    furosemide (LASIX) tablet 40 mg  40 mg Oral Dinner ABHISHEK Terry - CNP   40 mg at 05/12/22 1838    sodium chloride flush 0.9 % injection 5-40 mL  5-40 mL IntraVENous 2 times per day LUCIUS Mccoy   10 mL at 05/12/22 0909    sodium chloride flush 0.9 % injection 5-40 mL  5-40 mL IntraVENous PRN LUCIUS Haji        0.9 % sodium chloride infusion  25 mL IntraVENous PRN LUCIUS Johnson        acetaminophen (TYLENOL) tablet 650 mg  650 mg Oral Q4H PRN LUCIUS Haji   650 mg at 05/10/22 0753    ondansetron (ZOFRAN-ODT) disintegrating tablet 4 mg  4 mg Oral Q8H PRN LCUIUS Haji        Or    ondansetron (ZOFRAN) injection 4 mg  4 mg IntraVENous Q6H PRN LUCIUS Haji   4 mg at 05/11/22 1853    polyethylene glycol (GLYCOLAX) packet 17 g  17 g Oral Daily LUCIUS Haji   17 g at 05/12/22 0918    bisacodyl (DULCOLAX) suppository 10 mg  10 mg Rectal Daily LUCIUS Haji   10 mg at 05/12/22 0918    senna (SENOKOT) tablet 8.6 mg  1 tablet Oral Daily PRN TeganLUCIUS Aguilar        HYDROmorphone (DILAUDID) injection 0.25 mg  0.25 mg IntraVENous Q3H PRN Forrest Miranda PA        Or    HYDROmorphone (DILAUDID) injection 0.5 mg  0.5 mg IntraVENous Q3H PRN Faimlia Pappa, PA   0.5 mg at 22 0909    oxyCODONE (ROXICODONE) immediate release tablet 5 mg  5 mg Oral Q4H PRN Familia Pappa, PA   5 mg at 22 1617    Or    oxyCODONE (ROXICODONE) immediate release tablet 10 mg  10 mg Oral Q4H PRN Familia Pappa, PA   10 mg at 22 1036      Review of Systems   Constitutional: Positive for activity change (due  to pain). Negative for fatigue and fever. HENT: Negative for congestion, postnasal drip and rhinorrhea. Cardiovascular: Negative for chest pain. Referred pain from the back to lateral chest bilaterally    Gastrointestinal: Negative for abdominal distention and abdominal pain. Genitourinary: Negative for difficulty urinating, dysuria and frequency. Musculoskeletal: Positive for back pain. Negative for arthralgias. Skin: Negative for rash and wound. Neurological: Positive for weakness and headaches. Hematological: Negative for adenopathy. Psychiatric/Behavioral: Negative for agitation. Objective         Vitals Last 24 Hours:  TEMPERATURE:  Temp  Av.5 °F (36.9 °C)  Min: 98.1 °F (36.7 °C)  Max: 99.1 °F (37.3 °C)  RESPIRATIONS RANGE: Resp  Av.5  Min: 18  Max: 24  PULSE OXIMETRY RANGE: SpO2  Av.3 %  Min: 92 %  Max: 99 %  PULSE RANGE: Pulse  Av.5  Min: 63  Max: 79  BLOOD PRESSURE RANGE: Systolic (73FJN), LRT:324 , Min:134 , ZUT:946   ; Diastolic (31HVO), WQE:12, Min:50, Max:68    I/O (24Hr): Intake/Output Summary (Last 24 hours) at 2022  Last data filed at 2022 1809  Gross per 24 hour   Intake 720 ml   Output 3800 ml   Net -3080 ml     Objective:  General Appearance:  Comfortable. Vital signs: (most recent): Blood pressure (!) 134/50, pulse 63, temperature 98.1 °F (36.7 °C), temperature source Oral, resp.  rate 18, height 6' 2\" (1.88 m), weight (!) 370 lb 4 oz (167.9 kg), SpO2 97 %. Vital signs are normal.  No fever. Output: Producing stool. HEENT: Normal HEENT exam.    Lungs:  Normal effort and normal respiratory rate. Breath sounds clear to auscultation. He is not in respiratory distress. Heart: Normal rate. Regular rhythm. S1 normal and S2 normal.  No murmur (1/6 merced). Abdomen: Abdomen is soft and non-distended. Bowel sounds are normal.   There is no abdominal tenderness. There is no mass. Extremities: Normal range of motion. (Mid  Back pain  noted)  Neurological: Patient is alert and oriented to person, place and time. Patient has normal reflexes. Labs/Imaging/Diagnostics    Labs:  CBC:  Recent Labs     05/10/22  1116 05/11/22  0538   WBC 8.5 7.9   RBC 2.68* 2.88*   HGB 8.4* 9.0*   HCT 25.2* 26.5*   MCV 94.0 92.0    148     CHEMISTRIES:  Recent Labs     05/10/22  1116 05/11/22  0538    134*   K 4.2 3.7   CL 99 97*   CO2 25 26   BUN 34* 35*   CREATININE 1.5* 1.5*   GLUCOSE 289* 143*     PT/INR:No results for input(s): PROTIME, INR in the last 72 hours. APTT:No results for input(s): APTT in the last 72 hours. LIVER PROFILE:No results for input(s): AST, ALT, BILIDIR, BILITOT, ALKPHOS in the last 72 hours. Imaging Last 24 Hours:  No results found. Assessment//Plan           Hospital Problems           Last Modified POA    * (Principal) Fall at home, sequela 5/9/2022 Yes    Fall 5/6/2022 Yes    Closed T12 fracture ([address] 5/6/2022 Yes    Closed fracture of first lumbar vertebra ([address] 5/6/2022 Yes              Assessment:    Condition: In stable condition. Improving. (  Chf diastolic  noted and stable      ashd stable      Niddm with long  Term insulin stable      endplate fractures of T1 and  L1  Noted and with pain despite back brace as limited ambulation and ability  To stand     peripheral neuropathy  Noted ). Plan:   Per physical therapy. Consults: physical therapy and occupational therapy.   Advance diet as tolerated. Administer medications as ordered. (  Limited  ambulation  Due to pain     difficult to ambulate and  inability  To care for  self as will need nursing home placement     pain control is  Better ).        Electronically signed by Keyshawn Lopez MD on 5/12/22 at 7:11 PM EDT

## 2022-05-12 NOTE — CARE COORDINATION
5/12/22, 2:41 PM EDT    DISCHARGE [place] [address] [address] day: 0  Location: [facility]26/026-A Reason for admit: Compression fracture of L1 lumbar vertebra, closed, initial encounter ([facility] [address] [S61.601A]  Fall at home, sequela [W19. XXXS, T90.744]   Procedure: None  Barriers to Discharge: LS back brace (T12 fracture). PT/OT. Pain control  PCP: Ritchie Shah MD  Readmission Risk Score: 17.6 ( )%  Patient Goals/Plan/Treatment Preferences: SW following.  Precert started for [facility]

## 2022-05-12 NOTE — PROGRESS NOTES
[address] [address]  SPEECH THERAPY  [facility] ONC MED 5K  Speech - Language - Cognitive Evaluation    SLP Individual Minutes  Time In: 2200  Time Out: 2685  Minutes: 18  Timed Code Treatment Minutes: 0 Minutes       Date: 2022  Patient Name: Kerline Pino      CSN: [id number]   : 1950  (66 y.o.)  Gender: male   Referring Physician: LUCIUS Zimmerman  Diagnosis: Compression fracture of L1 lumbar vertebra, closed, initial encounter   Precautions: Fall risk   History of Present Illness/Injury: Patient admit to [facility] with above medical dx. Per physician H&P, He is a 66 y.o. male presenting at [facility] by activation of level 2 trauma, brought by EMS following a mechanical fall with no LOC; past medical history CAD with CABG and stenting x5 on Brilinta, previous cervical surgery, previous lumbar surgery, DM with diabetic neuropathy, hypercholesterolemia, hypertension, depression. Per port patient was attempting to ambulate when he tripped over his foot causing him to fall face first onto the ground and hyperextended his lumbar spine much when he heard a crack and a pop and immediate pain. Patient reports 10/10 pain in the low back. Patient states he does have a known lumbar herniated disc. Reports nausea and vomiting secondary to pain. Patient denies chest pain, shortness of breath, cough, headache, dizziness, lightheadedness, numbness, paraesthesias, weakness, chills, fevers, abdominal pain, dysuria, frequent urination, and neck pain. Care in coordination with trauma surgeon Dr. Richmond Chen. CT of head completed and negative. ST consult for cognitive assessment to r/o deficits and determine need for further follow up.    Past Medical History:   Diagnosis Date    RAUL (acute kidney injury) ([facility] [address] 2020    ASHD (arteriosclerotic heart disease)  2006    stent  baki    Depression     Diabetic peripheral neuropathy [facility]) 2014    Fracture Oct 1984    left lower extremity     HTN (hypertension)     Hypercholesteremia     IDDM (insulin dependent diabetes mellitus)     Low back pain     Morbid obesity with BMI of 45.0-49.9, adult (HCC)     Osteoarthritis        Pain: No pain reported. Subjective:  Session approved by RN, Gail Cardona. Patient seen upright in bed. Highly tangential. Max redirections back to task. Appropriate topic maintenance and focus on task thereafter. Alert and cooperative. SOCIAL HISTORY:   Living Arrangements: Lives at home alone. Work History: Retired  Education Level: 12th grade   Driving Status: Active   Finance Management: Independent  Medication Management: Independent  ADL's: Independent. Hobbies: Hunting/fishing, spending time with family   Vision Status: glasses   Hearing: WFL   Type of Home: Apartment  Home Layout: One level  Home Access: Elevator (2nd floor)  Home Equipment: Cane,Walker, 4 wheeled    SPEECH / VOICE:  Speech and Voice appear to be grossly intact for basic and complex daily communication    LANGUAGE:  Receptive:  Receptive language skills appear to be grossly intact for basic and complex daily communication. Expressive:  Expressive language skills appear to be grossly intact for basic and complex daily communication. COGNITION:  Yogi Cognitive Assessment (MOCA) version 7.2 completed. Pt scored 19/30. Normal is greater than or equal to 26/30.   Inclusion of +1 point given highest level of education achieved less than/equal to 12th grade or GED with limited-0 post-secondary schooling     Orientation: 6  Immediate Recall: 4/5  Short-Term Recall: 2/5 indep, 1/5 min cue, 2/5 FO2  Divergent Namin wpm (N=11+)  Problem Solvin/1  Reasonin/2  Thought Organization: mild high level impairments  Attention: high level impairments  Math Computation: /3  Executive Functionin/5    SWALLOWING:  Current Diet: Regular diet and thin liquids   Not Tested         RECOMMENDATIONS/ASSESSMENT:  DIAGNOSTIC IMPRESSIONS:  Patient presents with mild high level cognitive impairments with deficits within the domains of memory (immediate/delayed recall), decreased mental flexibility, high level thought organization, mathematical computation and executive functioning. Concerns for cognitive declines with high PLOF (I.e. driving, management of medications/finances). Recommendations for DRIVING EVALUATION post discharge. Expressive/receptive language skills relatively intact. No overt dysarthria/dysphonia and/or reported dysphagia. Patient would highly benefit from ongoing rehabilitation to enhance skills towards Mod I level of function for safe, functional return to independent living post discharge. Rehabilitation Potential: excellent    EDUCATION:  Learner: Patient  Education:  Reviewed results and recommendations of this evaluation, Reviewed ST goals and Plan of Care and Reviewed recommendations for follow-up  Evaluation of Education: [employer] understanding, Needs further instruction and Family not present    PLAN:  Skilled SLP intervention on acute care 3-5 x per week or until goals met and/or pt plateaus in function. Specific interventions for next session may include: cognitive rehabilitation . PATIENT GOAL:    Did not state. Will further assess during treatment. SHORT TERM GOALS:  Short-term Goals  Timeframe for Short-term Goals: 2 weeks  Goal 1: Patient will complete memory tasks (i.e. immediate, delayed, working) with use of compensatory memory strategies and 70% accuracy, mod cues for improved retention of novel information. Goal 2: Patient will complete high level executive functioning tasks (i.e. medications, finances, scheduling, time management, etc) with 70% accuracy, min cues for successful return management of IADLs post discharge. Goal 3: Patient will complete high level attention tasks with no more than x2-3 errors within task completion for appropriate patient return to driving post discharge.   Goal 4: Patient will complete high level divergent naming tasks with 80% accuracy, min cues for improved mental flexibility/thought processing.     LONG TERM GOALS:  No established LTG's given short ELOS       Arabella Paez M.S. [id number] [address] 5/12/2022

## 2022-05-12 NOTE — CONSULTS
Orthopedic Consult    Requesting Physician: Dr. Greta Masters:  Right knee pain    HISTORY OF PRESENT ILLNESS:      The patient is a 66 y.o. medically comorbid male who ortho was consulted for the above noted complaint. Patient had a fall on Monday, landing on the right knee. He has a history of right TKA 5 years ago and has been doing well. He notes his right foot caught on the carpet causing the fall. He denies numbness and tingling. Reports pain primarily in the patella. He has worsening pain with movement. X-rays and CT scans reviewed.        Past Medical History:    Past Medical History:   Diagnosis Date    RAUL (acute kidney injury) ([facility] 2/13/2020    ASHD (arteriosclerotic heart disease) 2005 2006    stent  baki    Depression     Diabetic peripheral neuropathy [facility]) 2014    Fracture Oct 1984    left lower extremity     HTN (hypertension)     Hypercholesteremia     IDDM (insulin dependent diabetes mellitus)     Low back pain     Morbid obesity with BMI of 45.0-49.9, adult ([facility]     Osteoarthritis        Past Surgical History:    Past Surgical History:   Procedure Laterality Date    AMPUTATION Left 9/10/14    partial versus complete left hallux amputation, left great toe amputation    APPENDECTOMY      BACK INJECTION Bilateral 1/18/2021    Bilateral Si MBB #1 performed by Cassi Chaudhari MD at [address] Bilateral 3/1/2021    Bilateral SI MBB #2 performed by Cassi Chaudhari MD at [facility]    fusion of C5-C6    CHOLECYSTECTOMY      COLONOSCOPY  2007 and 2011    colonn polyps  lorenzo    CORONARY ANGIOPLASTY WITH STENT PLACEMENT  08/07/2017    Dr Rico Cohn @ [facility]   lad    CORONARY ARTERY BYPASS GRAFT  2015 august dox    EKG 12-LEAD  8/14/2015         FACET JOINT INJECTION Bilateral 5/22/2020    Lumbar Facet MBB @L3-4,4-5 bilateral #2 performed by Cassi Chaudhari MD at [address]      left leg    JOINT REPLACEMENT      bilateral knees gurvinder    PAIN MANAGEMENT PROCEDURE Bilateral 1/28/2020    Lumbar Facet MBB @ L3-4,4-5,5-S1 bilateral #1 LUMBAR FACET performed by Trina Macedo MD at [facility] Right 7/14/2020    Lumbar RFA Bilateral L3-4,4-5  right side first performed by Trina Macedo MD at [facility] Left 10/1/2020    Lumbar RFA Left side L3-4, 4-5 performed by Trina Macedo MD at [facility] Bilateral 11/17/2020    TFLESI @L5 bilateral #1 performed by Trina Macedo MD at [facility] Bilateral 12/10/2020    TFLESI @L5 bilateral #1 performed by Trina Macedo MD at [facility] N/A 12/28/2018    T7-T9 DECOMPRESSION, T7-T9 POSTERIOR FUSION performed by Lisa Card MD at [facility]  2010    fumich    TONSILLECTOMY      VASCULAR SURGERY      cabg harvests from left leg       Medications Prior to Admission:   Current Facility-Administered Medications   Medication Dose Route Frequency Provider Last Rate Last Admin    insulin lispro (HUMALOG) injection vial 18 Units  18 Units SubCUTAneous TID  Loc Ray MD   18 Units at 05/11/22 1850    insulin glargine (LANTUS) injection vial 30 Units  30 Units SubCUTAneous Nightly Loc Ray MD   30 Units at 05/11/22 2200    enoxaparin (LOVENOX) injection 40 mg  40 mg SubCUTAneous BID LUCIUS Haji   40 mg at 05/11/22 2158    glucagon (rDNA) injection 1 mg  1 mg IntraMUSCular PRN Edie Olszewski, MD        dextrose 5 % solution  100 mL/hr IntraVENous PRN Edie Olszewski, MD        insulin lispro (HUMALOG) injection vial 0-6 Units  0-6 Units SubCUTAneous TID  Edie Olszewski, MD   1 Units at 05/11/22 1850    insulin lispro (HUMALOG) injection vial 0-3 Units  0-3 Units SubCUTAneous Nightly Alona Marcial MD   1 Units at 05/11/22 2200    glucose chewable tablet 16 g  4 tablet Oral PRN Alona Marcial MD        dextrose bolus 10% 125 mL  125 mL IntraVENous PRN Alona Marcial MD        Or    dextrose bolus 10% 250 mL  250 mL IntraVENous PRN Alona Marcial MD        aspirin chewable tablet 81 mg  81 mg Oral Daily Laveta Pae, APRN - CNP   81 mg at 05/11/22 0951    atorvastatin (LIPITOR) tablet 10 mg  10 mg Oral Daily Laveta Pae, APRN - CNP   10 mg at 05/11/22 2158    carvedilol (COREG) tablet 12.5 mg  12.5 mg Oral BID Laveta Pae, APRN - CNP   12.5 mg at 05/11/22 2158    enalapril (VASOTEC) tablet 10 mg  10 mg Oral Daily Laveta Pae, APRN - CNP   10 mg at 05/11/22 4000    ferrous sulfate (IRON 325) tablet 325 mg  325 mg Oral BID Laveta Pae, APRN - CNP   325 mg at 05/11/22 2158    metFORMIN (GLUCOPHAGE) tablet 1,000 mg  1,000 mg Oral BID WC Laveta Pae, APRN - CNP   1,000 mg at 05/11/22 1850    multivitamin 1 tablet  1 tablet Oral Daily Laveta Pae, APRN - CNP   1 tablet at 05/11/22 0624    ticagrelor (BRILINTA) tablet 90 mg  90 mg Oral BID Laveta Pae, APRN - CNP   90 mg at 05/11/22 2158    tiZANidine (ZANAFLEX) tablet 4 mg  4 mg Oral Q6H PRN Laveta Pae, APRN - CNP   4 mg at 05/11/22 1959    Urea (CARMOL) 40 % cream   Topical PRN Laveta Pae, APRN - CNP        pantoprazole (PROTONIX) tablet 40 mg  40 mg Oral QAM AC Laveta Pae, APRN - CNP   40 mg at 05/11/22 0653    furosemide (LASIX) tablet 80 mg  80 mg Oral QAM Laveta Pae, APRN - CNP   80 mg at 05/11/22 0954    furosemide (LASIX) tablet 40 mg  40 mg Oral Dinner Laveta Pae, APRN - CNP   40 mg at 05/11/22 1850    sodium chloride flush 0.9 % injection 5-40 mL  5-40 mL IntraVENous 2 times per day LUCIUS Govea   10 mL at 05/11/22 2200    sodium chloride flush 0.9 % injection 5-40 mL  5-40 mL IntraVENous PRN Mulugeta Miranda PA        0.9 % sodium chloride infusion  25 mL IntraVENous PRN LUCIUS Maher        acetaminophen (TYLENOL) tablet 650 mg  650 mg Oral Q4H PRN Familia Miranda PA   650 mg at 05/10/22 0753    ondansetron (ZOFRAN-ODT) disintegrating tablet 4 mg  4 mg Oral Q8H PRN LUCIUS Haji        Or    ondansetron (ZOFRAN) injection 4 mg  4 mg IntraVENous Q6H PRN Familia Miranda PA   4 mg at 05/11/22 1853    polyethylene glycol (GLYCOLAX) packet 17 g  17 g Oral Daily Familia Miranda PA   17 g at 05/11/22 2602    bisacodyl (DULCOLAX) suppository 10 mg  10 mg Rectal Daily Familia Miranda PA   10 mg at 05/11/22 3745    senna (SENOKOT) tablet 8.6 mg  1 tablet Oral Daily PRN LUCIUS Maher        HYDROmorphone (DILAUDID) injection 0.25 mg  0.25 mg IntraVENous Q3H PRN LUCIUS Lloyd        Or    HYDROmorphone (DILAUDID) injection 0.5 mg  0.5 mg IntraVENous Q3H PRN Familia Miranda PA   0.5 mg at 05/11/22 1854    oxyCODONE (ROXICODONE) immediate release tablet 5 mg  5 mg Oral Q4H PRN Familia Miranda PA   5 mg at 05/10/22 1320    Or    oxyCODONE (ROXICODONE) immediate release tablet 10 mg  10 mg Oral Q4H PRN Familia Miranda PA   10 mg at 05/11/22 2977       Allergies:  Horse-derived products    Social History:   Social History     Tobacco Use   Smoking Status Never Smoker   Smokeless Tobacco Never Used     Social History     Substance and Sexual Activity   Alcohol Use Not Currently    Comment: rarely     Social History     Substance and Sexual Activity   Drug Use No       Family History:  Family History   Problem Relation Age of Onset    Cancer Mother         uterine       REVIEW OF SYSTEMS:  Constitutional: Denies any fever, chills. Derm: Denies any rash or skin color change. Eyes: Denies blurred or decreased in vision. Ent: Denies any tinnitus or vertigo. Resp: Denies any cough or shortness of breath. CV: Denies any syncope, palpitations or chest pain.   GI:  Denies any abdominal pain, nausea, vomiting, constipation or diarrhea. : Denies any hematuria, hesitancy, or dysuria. Heme/Lymph: Denies any bleeding. Musculoskeletal: Positive for right knee pain  Neuro: Denies any dizziness, paresthesia or weakness. PHYSICAL EXAM:  Patient Vitals for the past 24 hrs:   BP Temp Temp src Pulse Resp SpO2   05/12/22 0430 (!) 142/61 99.1 °F (37.3 °C) Oral 79 18 92 %   05/11/22 2000 (!) 152/68 98.5 °F (36.9 °C) Oral 69 18 97 %   05/11/22 0945 135/61 98.8 °F (37.1 °C) Oral 65 18 96 %     General appearance:  Alert and oriented x 3. No apparent distress, appears stated age and cooperative. HEENT:  Normal cephalic, atraumatic without obvious deformity. Conjunctivae/corneas clear. Neck: Supple, with full range of motion. Respiratory:  Normal respiratory effort. No audible Wheezes or Rhonchi. Cardiovascular:  Regular rate and rhythm. Musculoskeletal: RLE abrasion and small prepatellar effusion. TTP over the patella. Flex and extends toes and ankle. ROM knee limited 2/2 pain. Calf and compartments soft. SILT. Skin: Skin color, texture, turgor normal.  No rashes or lesions. Neurologic:  Neurovascularly intact without any focal sensory/motor deficits. Sensation intact. DATA:  CBC:   Lab Results   Component Value Date    WBC 7.9 05/11/2022    HGB 9.0 05/11/2022     05/11/2022     BMP:    Lab Results   Component Value Date     05/11/2022    K 3.7 05/11/2022    K 3.7 05/09/2022    CL 97 05/11/2022    CO2 26 05/11/2022    BUN 35 05/11/2022    CREATININE 1.5 05/11/2022    CALCIUM 8.3 05/11/2022    GLUCOSE 143 05/11/2022    GLUCOSE 125 10/17/2018     PT/INR:    Lab Results   Component Value Date    INR 1.00 02/08/2019     Troponin:    Lab Results   Component Value Date    TROPONINI <0.006 09/19/2011     No results for input(s): LIPASE, AMYLASE in the last 72 hours. No results for input(s): AST, ALT, BILIDIR, BILITOT, ALKPHOS in the last 72 hours.     Radiology:   ECHO Complete 2D W Doppler W Color    Result Date: 4/13/2022  Transthoracic Echocardiography Report (TTE)  Demographics   Patient Name   Ryanne Chowdhury  Gender              Male                 L   MR #           [id number]       Race                                                  Ethnicity   Account #      [de-identified]       Room Number   Accession      [id number]      Date of Study       04/13/2022  Number   Date of Birth  1950      Referring Physician Kris Luis MD                                                     Ap Nava   Age            66 year(s)      Sonographer         Chay Carter,                                                     T                                  Interpreting        Kris Luis MD                                 Physician  Procedure Type of Study   TTE procedure:ECHOCARDIOGRAM COMPLETE 2D W DOPPLER W COLOR. Procedure Date Date: 04/13/2022 Start: 01:00 PM Study Location: Echo Lab Technical Quality: Poor visualization due to body habitus. Indications:Coronary artery disease. Additional Medical History:diabetes, hyperlipidemia, CABG, arthritis, hypertension Patient Status: Routine Height: 74 inches Weight: 375.01 pounds BSA: 2.84 m^2 BMI: 48.15 kg/m^2 BP: 106/64 mmHg Allergies   - See Epic. Conclusions   Summary  Ejection fraction is visually estimated at 55%. Overall left ventricular function is normal.  Technically difficult study due to poor acoustic windows. Signature   ----------------------------------------------------------------  Electronically signed by Kris Luis MD (Interpreting  physician) on 04/13/2022 at 04:18 PM  ----------------------------------------------------------------   Findings   Mitral Valve  Trace mitral regurgitation is present. Aortic Valve  The aortic valve was trileaflet with normal thickness and cuspal  separation.  DOPPLER: Transaortic velocity was within the normal range with  no evidence of aortic stenosis. There was no evidence of aortic  regurgitation. Tricuspid Valve  Trivial tricuspid regurgitation visualized. Pulmonic Valve  The pulmonic valve was not well visualized . Trivial pulmonic regurgitation visualized. Left Atrium  Left atrial size was normal.   Left Ventricle  Ejection fraction is visually estimated at 55%. Overall left ventricular function is normal.   Right Atrium  Right atrial size was normal.   Right Ventricle  The right ventricular size was normal with normal systolic function and  wall thickness. Pericardial Effusion  The pericardium was normal in appearance with no evidence of a pericardial  effusion. Pleural Effusion  No evidence of pleural effusion. Aorta / Great Vessels  -Aortic root dimension within normal limits.  -The Pulmonary artery is within normal limits. -IVC size is within normal limits with normal respiratory phasic changes.   M-Mode/2D Measurements & Calculations   LV Diastolic   LV Systolic Dimension:    AV Cusp Separation: 2.2 cmLA  Dimension: 5.5 3.9 cm                    Dimension: 4.6 cmAO Root  cm             LV Volume Diastolic: 347  Dimension: 3.7 cmLA Area: 25.9  LV FS:29.1 %   ml                        cm^2  LV PW          LV Volume Systolic: 96.9  Diastolic: 1.3 ml  cm             LV EDV/LV EDV Index: 166  Septum         ml/58 m^2LV ESV/LV ESV    RV Diastolic Dimension: 3.7 cm  Diastolic: 1.4 Index: 68.8 ml/21 m^2  cm             EF Calculated: 64.3 %     LA/Aorta: 1.24                                            LA volume/Index: 85.3 ml /30m^2  Doppler Measurements & Calculations   MV Peak E-Wave: 81.4     AV Peak Velocity: 132  LVOT Peak Velocity: 96.8  cm/s                     cm/s                   cm/s  MV Peak A-Wave: 104 cm/s AV Peak Gradient: 6.97 LVOT Peak Gradient: 4 mmHg  MV E/A Ratio: 0.78       mmHg  MV Peak Gradient: 2.65                          TV Peak E-Wave: 45.4 cm/s  mmHg                                            TV Peak A-Wave: 53.1 cm/s   MV Deceleration Time:                           TV Peak Gradient: 0.82  352 msec                                        mmHg  MV P1/2t: 103 msec                              TR Velocity:287 cm/s  MVA by PHT:2.14 cm^2                            TR Gradient:32.95 mmHg                           AV DVI (Vmax):0.73     PV Peak Velocity: 90.4                                                  cm/s                                                  PV Peak Gradient: 3.27                                                  mmHg  [email or web address] [id number]    CT ABDOMEN PELVIS WO CONTRAST Additional Contrast? Radiologist Recommendation    Result Date: 5/6/2022  CT abdomen and pelvis without contrast Comparison: CT - CT ABDOMEN /T/ PELVIS WITH CONTRAST - 05/28/2017 08:48 PM EDT Findings: Findings at the level of the lung bases are reported separately. Evaluation of abdominal organs is limited by artifact and lack of contrast. No obvious focus of contusion or laceration. No free fluid. Infiltration of fat noted adjacent to the bilateral kidneys and within the retroperitoneum. Prior cholecystectomy. No bowel obstruction, pneumoperitoneum, or pneumatosis. Severe distention of the urinary bladder. Normal size prostate. Nonvisualization of the appendix. No obvious secondary findings of appendicitis. Mildly limited evaluation with incomplete visualization of peritoneal contents within the right lower quadrant secondary to body habitus. Oblique fracture of the anterior cortex and superior endplate of L1 also extending through a partially ossified anterior longitudinal ligament. No associated alignment abnormality. Additional fracture of the anterolateral aspect of the inferior endplate of R34 on the right side without significant displacement. 1. No evidence of hemoperitoneum or abdominal organ injury.  2. Acute fracture of the L1 vertebral body extending through a partially ossified anterior longitudinal ligament. Additional fracture of the anterolateral aspect of the inferior endplate of Z55 on the right side. No significant displacement. No alignment abnormality. 3. Severe distention of the urinary bladder. 4. Infiltration of fat adjacent to the kidneys and ureters. Correlate with urinalysis to exclude pyelonephritis. This document has been electronically signed by: Roger Craft MD on 05/06/2022 06:23 PM All CTs at this facility use dose modulation techniques and iterative reconstructions, and/or weight-based dosing when appropriate to reduce radiation to a low as reasonably achievable. XR LUMBAR SPINE (2-3 VIEWS)    Result Date: 5/8/2022  LUMBAR SPINE 2 VIEWS: CLINICAL INFORMATION: lumbar pain COMPARISON: 10/26/2020 TECHNIQUE: Standard AP and lateral views of lumbar spine were obtained. 1. . Laminectomy defects are seen in the lower lumbar spine. 2. Moderately severe hypertrophic degenerative spurring is scattered in the lumbar spine. 3. There is a mild thoracolumbar levoscoliosis. 4. No fracture is seen. Moderate multilevel degenerative facet arthropathy involving at least the lower 3 lumbar levels. Mild degenerative changes left sacroiliac joint. **This report has been created using voice recognition software. It may contain minor errors which are inherent in voice recognition technology. ** Final report electronically signed by Dr. Dilma Roberts on 5/8/2022 4:18 PM    XR KNEE RIGHT (MIN 4 VIEWS)    Result Date: 5/8/2022  PROCEDURE: XR KNEE RIGHT (MIN 4 VIEWS) CLINICAL INFORMATION: Right knee pain following a fall TECHNIQUE: 4 views of the right knee COMPARISON: None FINDINGS: There is no fracture or dislocation. The visualized femoral and tibial components of a knee prosthesis are in satisfactory position. Bones are osteopenic.  There are extensive chronic calcifications in the soft tissues throughout the knee.     No fracture or dislocation. Final report electronically signed by Dr. Russell Garcia on 5/8/2022 9:03 AM    CT HEAD WO CONTRAST    Result Date: 5/6/2022  CT head without contrast Comparison: CT,SR - CT HEAD WO CONTRAST - 12/19/2018 07:46 PM EST Findings: No intra-axial mass, midline shift, hydrocephalus, or acute hemorrhage. Involutional change of brain parenchyma, compatible with age. Mild white matter disease. Mild mucosal thickening and mucus retention cyst formation within the paranasal sinuses. The orbits are within normal limits. There is no acute fracture. No evidence of intracranial hemorrhage. This document has been electronically signed by: Claritza Zazueta MD on 05/06/2022 06:09 PM All CTs at this facility use dose modulation techniques and iterative reconstructions, and/or weight-based dosing when appropriate to reduce radiation to a low as reasonably achievable. CT FACIAL BONES WO CONTRAST    Result Date: 5/6/2022  CT maxillofacial without contrast Comparison: CT,SR - CT HEAD WO CONTRAST - 12/19/2018 07:46 PM EST Findings: No acute fractures. Temporomandibular joints are intact. Mild mucosal thickening and mucus retention cyst formation within the paranasal sinuses. No air-fluid levels. Visualized intracranial contents are within normal limits. No foreign bodies. No acute displaced facial bone fracture. This document has been electronically signed by: Claritza Zazueta MD on 05/06/2022 06:13 PM All CTs at this facility use dose modulation techniques and iterative reconstructions, and/or weight-based dosing when appropriate to reduce radiation to a low as reasonably achievable. CT CHEST WO CONTRAST    Result Date: 5/6/2022  CT chest with contrast. Comparison: CT,SR - CTA CHEST W WO CONTRAST - 02/07/2022 02:12 PM EST Findings: Normal heart size. Prior sternotomy with CABG. Unremarkable thyroid gland. Calcified right hilar lymph nodes noted.  Linear foci of atelectasis at the base of the right upper lobe. No consolidation or pleural effusion. The visualized upper abdomen is unremarkable. There is a fracture of the inferior endplate of D00. Please see dedicated CT spine report for further details. Postsurgical and degenerative changes of the spine. Remaining bony structures intact. Prior sternotomy noted. There is a fracture of the inferior endplate of M92. This document has been electronically signed by: Ju Marshall MD on 05/06/2022 06:25 PM All CTs at this facility use dose modulation techniques and iterative reconstructions, and/or weight-based dosing when appropriate to reduce radiation to a low as reasonably achievable. CT CERVICAL SPINE WO CONTRAST    Result Date: 5/6/2022  CT cervical spine without contrast Comparison: CT,SR - CT CERVICAL SPINE WO CONTRAST - 12/19/2018 07:46 PM EST Findings: No acute findings on limited view of the intracranial contents. Soft tissues of the neck are normal. Lung apices are clear. Vertebral alignment is within normal limits. Prior anterior metallic and interbody fusion at C5-C6. Ankylosis of the vertebral bodies at C6-C7. Appropriate alignment. No evidence of hardware failure. No acute fracture. Multilevel degenerative disc disease and facet osteoarthritis. No acute cervical spine fracture. This document has been electronically signed by: Ju Marshall MD on 05/06/2022 06:12 PM All CTs at this facility use dose modulation techniques and iterative reconstructions, and/or weight-based dosing when appropriate to reduce radiation to a low as reasonably achievable. CT KNEE RIGHT WO CONTRAST    Result Date: 5/9/2022  WET READ: A total knee prosthesis is present. Multiple artifacts are present from the prosthesis, superiorly limiting detail on this study. No acute fracture is seen. There is no definite evidence for prosthesis loosening. There appears be mild tilting of the patella laterally, 5 mm images.  There is moderate degenerative spurring of the knee. A small ossicle seen along the medial aspect of the knee is another seen in the lateral aspect of the knee. One or more loose articular bodies cannot excluded. This examination will be formally read by one of our [facility] radiologists tomorrow. Please see that report. PROCEDURE: CT KNEE RIGHT WO CONTRAST CLINICAL INFORMATION: right knee pain and weakness COMPARISON: Right knee radiographs 1/6/4965 TECHNIQUE: Helical CT acquisition of the right knee without contrast. Multiplanar reformats were provided. All CT scans at this facility use dose modulation, iterative reconstruction, and/or weight based dosing when appropriate to reduce the radiation dose to as low as reasonably achievable. FINDINGS: POSTOPERATIVE CHANGES: Total knee arthroplasty is seen. There is marked narrowing of the patellofemoral interval. There is subsidence of the patellar component. ALIGNMENT: Anatomic. BONE: No acute fracture or dislocation. The bones are mildly demineralized. . No destructive bony lesion. JOINTS: Knee arthroplasty. MUSCULATURE: Generalized marked atrophy particularly of calf muscles. SOFT TISSUES: Marked subcutaneous thickening and edema is seen particularly in the leg region. Small to moderate joint effusion. There is marked prepatellar and superficial infrapatellar edema/bursitis. Varicose veins are seen. OTHER: Loose bodies are seen in the suprapatellar region. 1. Total right knee arthroplasty performed. There is subsidence of the patellar component. 2. Small to moderate joint effusion. 3. No acute fracture is seen. 4. Marked subcutaneous edema/thickening particularly in the leg region. 5. Marked prepatellar and superficial infrapatellar edema/bursitis. 6. Extensive fatty atrophy in the calf musculature **This report has been created using voice recognition software. It may contain minor errors which are inherent in voice recognition technology. ** Final report electronically signed by Dr Yamilet Noel on 5/9/2022 9:01 AM    MRI LUMBAR SPINE WO CONTRAST    Result Date: 5/9/2022  PROCEDURE: MRI LUMBAR SPINE WO CONTRAST CLINICAL INFORMATION: fall. Back pain following T12 and L1 fractures. COMPARISON: CT lumbar spine dated 5/6/2022 and MRI lumbar spine dated 11/10/2021. TECHNIQUE: Sagittal and axial T1 and T2-weighted images were obtained through the lumbar spine. FINDINGS: There is an obliquely oriented bandlike low T1 and hyperintense T2/STIR signal abnormality extending from the mid to inferior anterior aspect of the L1 vertebral body to the posterior superior endplate of L1 with mild displacement of the anterior fracture fragment. There is otherwise anatomic vertebral body height and alignment. Small fracture at the right anterolateral aspect of T12 seen on previous CT is not well-visualized on this exam. There is disruption of the anterior longitudinal ligament  at the anterior inferior aspect of the L1 vertebral body at the site of the fracture. Marrow signal is otherwise within normal limits. There is mild hyperintense STIR signal in the T12-L1 intervertebral disc. The posterior longitudinal ligaments appear intact. The conus terminates in a normal fashion at the T12-L1 level. There is no cauda equina nerve root thickening or clumping identified. 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 There are laminectomies at L3-S1, stable compared to prior exam. No focal fluid collection is identified in the laminectomy bed. There is subcutaneous edema dorsal to the lumbar spine. Paraspinal soft tissues are otherwise unremarkable. At T12-L1 there is no significant spinal canal or neuroforaminal stenosis. At L1-2 there is stable mild congenital spinal canal narrowing and mild bilateral neural foraminal stenosis in association with mild facet hypertrophy and ligamentum flavum thickening. At L2-3 the spinal canal is decompressed.  There is a shallow disc bulge, stable compared to prior exam. There is moderate bilateral neural foraminal stenosis, similar to prior exam. At L3-4 the spinal canal is decompressed. There is moderate right and mild-to-moderate left neural foraminal stenosis. At L4-5 the spinal canal is decompressed. There is mild to moderate bilateral neural foraminal stenosis. At L5-S1 the spinal canal is decompressed. There is mild bilateral neural foraminal stenosis. 1. Redemonstration of obliquely oriented fracture of the L1 vertebral body extending from the anterior-inferior aspect of the vertebral body into the posterior aspect of the superior endplate with mild displacement and disruption of the anterior longitudinal ligament. No injury of the posterior elements to suggest instability. 2. Small fracture of the T12 inferior endplates seen on previous CT is not well-visualized on this exam. 3. Mild edema in the subcutaneous fat dorsal to the lumbar spine. 4. Redemonstration of laminectomies at L2-3 through L5-S1. **This report has been created using voice recognition software. It may contain minor errors which are inherent in voice recognition technology. ** Final report electronically signed by Dr. Melquiades Dietrich MD on 5/9/2022 5:15 PM    XR CHEST PORTABLE    Result Date: 5/6/2022  1 view chest x-ray Comparison: CR,SR - XR CHEST PORTABLE - 02/07/2022 12:01 PM EST Findings: 7 mm curvilinear metallic foreign body overlying the right lung apex without change. Bandlike focus within the right mid lung, similar to the prior study. No consolidative process. Mildly enlarged heart. Prior sternotomy. Postsurgical changes of the cervical and thoracic spine. No acute displaced fracture. Bandlike focus of atelectasis or scarring within the right mid lung, similar to the prior study.  This document has been electronically signed by: Eliceo Harvey MD on 05/06/2022 05:29 PM    CT LUMBAR RECONSTRUCTION WO POST PROCESS    Result Date: 5/6/2022  CT lumbar spine without contrast Comparison: None Findings: Normal vertebral body alignment. Oblique fracture of the anterior cortex and superior endplate of L1 also extending through a partially ossified anterior longitudinal ligament. No associated alignment abnormality. Additional fracture of the anterolateral aspect of the inferior endplate of A06 on the right side without significant displacement. Multilevel ligamentous ossification. Multilevel degenerative disc disease and facet osteoarthritis. Limited evaluation for central canal stenosis. Multilevel neural foraminal narrowing. Acute fracture of the L1 vertebral body extending through a partially ossified anterior longitudinal ligament. Additional fracture of the anterolateral aspect of the inferior endplate of O71 on the right side. No significant displacement. No alignment abnormality. This document has been electronically signed by: Anderson James MD on 05/06/2022 06:29 PM All CTs at this facility use dose modulation techniques and iterative reconstructions, and/or weight-based dosing when appropriate to reduce radiation to a low as reasonably achievable. CT THORACIC RECONSTRUCTION WO POST PROCESS    Result Date: 5/6/2022  CT thoracic spine without contrast Comparison: CT,SR - CTA CHEST W WO CONTRAST - 02/07/2022 02:12 PM EST Findings: Normal vertebral body alignment. Evaluation limited by artifact. There is a fracture through the anterolateral aspect of the inferior endplate of S05 without significant displacement. Mild compression fracture of the T4 vertebral body without change. Prior posterior metallic fusion extending from T6-T9. Appropriate alignment. No evidence of hardware failure. Significant degenerative changes with endplate proliferation and foci of ligament is ossification. Facet osteoarthritis is also present. Evaluation for central canal narrowing is limited by artifact and poor resolution on this study. This could be further evaluated with MRI if indicated.  No significant degenerative change. Findings at the level of the chest and abdomen reported separately. There is an acute fracture through the inferior endplate of U68. This document has been electronically signed by: Sheryl Mcgregor MD on 05/06/2022 06:32 PM All CTs at this facility use dose modulation techniques and iterative reconstructions, and/or weight-based dosing when appropriate to reduce radiation to a low as reasonably achievable. ASSESSMENT:Principal Problem:    Fall at home, sequela  Active Problems:    Fall    Closed T12 fracture ([address]    Closed fracture of first lumbar vertebra ([address]  Resolved Problems:    * No resolved hospital problems. *       PLAN as discussed with Dr. Ruiz Kraus:  Right knee pain after a fall. Nothing surgical identified. Ice and elevation. Pain control. WBAT RLE  FU with Dr. Reji Chan in 2 weeks.        Electronically signed by ABHISHEK Sykes CNP on 5/12/2022 at 8:39 AM

## 2022-05-12 NOTE — PROGRESS NOTES
[facility] THERAPY MISSED TREATMENT NOTE  [facility] MED 5K  5K-26/026-A      Date: 2022  Patient Name: Paty Garzon        CSN: [id number]   : 1950  (66 y.o.)  Gender: male   Referring Practitioner: LUCIUS Mccoy  Diagnosis: compression fx of L1 lumbar vertebra, closed, initial encounter         REASON FOR MISSED TREATMENT: PT in pt's room on OT arrival. Will try back later as able

## 2022-05-12 NOTE — PROGRESS NOTES
[address] [address]  INPATIENT PHYSICAL THERAPY  DAILY NOTE  [facility] MED 5K - 5K-26/026-A     Time In: 0733  Time Out: 1506  Timed Code Treatment Minutes: 15 Minutes  Minutes: 15          Date: 2022  Patient Name: Aury Anderson,  Gender:  male        MRN: [id number]  : 1950  (66 y.o.)     Referring Practitioner: LUCIUS Calderon  Diagnosis: Compression fracture of L1 lumbar vertebra, closed  Additional Pertinent Hx: Per H&P, \"He is a 66 y.o. male presenting at [address] [place] [address] by activation of level 2 trauma, brought by EMS following a mechanical fall with no LOC; past medical history CAD with CABG and stenting x5 on Brilinta, previous cervical surgery, previous lumbar surgery, DM with diabetic neuropathy, hypercholesterolemia, hypertension, depression. Per port patient was attempting to ambulate when he tripped over his foot causing him to fall face first onto the ground and hyperextended his lumbar spine much when he heard a crack and a pop and immediate pain. Patient reports 10/10 pain in the low back. Patient states he does have a known lumbar herniated disc. Reports nausea and vomiting secondary to pain. Patient denies chest pain, shortness of breath, cough, headache, dizziness, lightheadedness, numbness, paraesthesias, weakness, chills, fevers, abdominal pain, dysuria, frequent urination, and neck pain. Care in coordination with trauma surgeon Dr. Roby Hopkins. \"     Prior Level of Function:  Lives With: Alone  Type of Home: Apartment  Home Layout: One level  Home Access: Elevator (2nd floor)  Home Equipment: Cane,Walker, 4 wheeled   Bathroom Shower/Tub: Tub/Shower unit  Bathroom Toilet: Handicap height  Bathroom Equipment: Shower chair    ADL Assistance: [address]: Independent  Transfer Assistance: Independent  Active : Yes  Additional Comments: Pt reported was using cane for mobility prior to admission.  Pt does not have any for sit <> stand to get up to ambulate  Short term goal 3: Pt to ambulate >30 ft with RW with SBA for household distances  Short term goal 4: Pt to be able to don/doff back brace with Supervision for stabilization of lumbar fracture when up. Long Term Goals  Time Frame for Long term goals : not set due to short ELOS    Following session, patient left in safe position with all fall risk precautions in place. Mulu Rutherford.  Chance Embs, [address]

## 2022-05-13 VITALS
WEIGHT: 315 LBS | OXYGEN SATURATION: 96 % | HEART RATE: 61 BPM | DIASTOLIC BLOOD PRESSURE: 74 MMHG | HEIGHT: 74 IN | TEMPERATURE: 98.5 F | RESPIRATION RATE: 22 BRPM | SYSTOLIC BLOOD PRESSURE: 173 MMHG | BODY MASS INDEX: 40.43 KG/M2

## 2022-05-13 LAB
GLUCOSE BLD-MCNC: 105 MG/DL (ref 70–108)
GLUCOSE BLD-MCNC: 143 MG/DL (ref 70–108)
GLUCOSE BLD-MCNC: 151 MG/DL (ref 70–108)

## 2022-05-13 PROCEDURE — 96372 THER/PROPH/DIAG INJ SC/IM: CPT

## 2022-05-13 PROCEDURE — 36591 DRAW BLOOD OFF VENOUS DEVICE: CPT

## 2022-05-13 PROCEDURE — APPSS45 APP SPLIT SHARED TIME 31-45 MINUTES: Performed by: PHYSICIAN ASSISTANT

## 2022-05-13 PROCEDURE — 99231 SBSQ HOSP IP/OBS SF/LOW 25: CPT | Performed by: SURGERY

## 2022-05-13 PROCEDURE — APPNB45 APP NON BILLABLE 31-45 MINUTES: Performed by: PHYSICIAN ASSISTANT

## 2022-05-13 PROCEDURE — 97110 THERAPEUTIC EXERCISES: CPT

## 2022-05-13 PROCEDURE — 6360000002 HC RX W HCPCS: Performed by: PHYSICIAN ASSISTANT

## 2022-05-13 PROCEDURE — G0378 HOSPITAL OBSERVATION PER HR: HCPCS

## 2022-05-13 PROCEDURE — 6370000000 HC RX 637 (ALT 250 FOR IP): Performed by: NURSE PRACTITIONER

## 2022-05-13 PROCEDURE — 2580000003 HC RX 258: Performed by: PHYSICIAN ASSISTANT

## 2022-05-13 PROCEDURE — 6370000000 HC RX 637 (ALT 250 FOR IP): Performed by: FAMILY MEDICINE

## 2022-05-13 PROCEDURE — 97530 THERAPEUTIC ACTIVITIES: CPT

## 2022-05-13 PROCEDURE — 6370000000 HC RX 637 (ALT 250 FOR IP): Performed by: PHYSICIAN ASSISTANT

## 2022-05-13 PROCEDURE — 82948 REAGENT STRIP/BLOOD GLUCOSE: CPT

## 2022-05-13 RX ORDER — OXYCODONE HYDROCHLORIDE 5 MG/1
5 TABLET ORAL EVERY 6 HOURS PRN
Qty: 20 TABLET | Refills: 0 | Status: SHIPPED | OUTPATIENT
Start: 2022-05-13 | End: 2022-05-18

## 2022-05-13 RX ADMIN — CARVEDILOL 12.5 MG: 6.25 TABLET, FILM COATED ORAL at 08:36

## 2022-05-13 RX ADMIN — INSULIN LISPRO 18 UNITS: 100 INJECTION, SOLUTION INTRAVENOUS; SUBCUTANEOUS at 13:53

## 2022-05-13 RX ADMIN — POLYETHYLENE GLYCOL 3350 17 G: 17 POWDER, FOR SOLUTION ORAL at 08:27

## 2022-05-13 RX ADMIN — TIZANIDINE 4 MG: 4 TABLET ORAL at 08:36

## 2022-05-13 RX ADMIN — ENOXAPARIN SODIUM 40 MG: 100 INJECTION SUBCUTANEOUS at 08:35

## 2022-05-13 RX ADMIN — INSULIN LISPRO 18 UNITS: 100 INJECTION, SOLUTION INTRAVENOUS; SUBCUTANEOUS at 08:26

## 2022-05-13 RX ADMIN — INSULIN LISPRO 1 UNITS: 100 INJECTION, SOLUTION INTRAVENOUS; SUBCUTANEOUS at 13:52

## 2022-05-13 RX ADMIN — ENALAPRIL MALEATE 10 MG: 10 TABLET ORAL at 08:37

## 2022-05-13 RX ADMIN — FUROSEMIDE 80 MG: 40 TABLET ORAL at 08:36

## 2022-05-13 RX ADMIN — BISACODYL 10 MG: 10 SUPPOSITORY RECTAL at 08:35

## 2022-05-13 RX ADMIN — FERROUS SULFATE TAB 325 MG (65 MG ELEMENTAL FE) 325 MG: 325 (65 FE) TAB at 08:36

## 2022-05-13 RX ADMIN — METFORMIN HYDROCHLORIDE 1000 MG: 500 TABLET ORAL at 08:36

## 2022-05-13 RX ADMIN — SODIUM CHLORIDE, PRESERVATIVE FREE 10 ML: 5 INJECTION INTRAVENOUS at 08:38

## 2022-05-13 RX ADMIN — TICAGRELOR 90 MG: 90 TABLET ORAL at 08:35

## 2022-05-13 RX ADMIN — ASPIRIN 81 MG CHEWABLE TABLET 81 MG: 81 TABLET CHEWABLE at 08:35

## 2022-05-13 RX ADMIN — Medication 1 TABLET: at 08:37

## 2022-05-13 RX ADMIN — PANTOPRAZOLE SODIUM 40 MG: 40 TABLET, DELAYED RELEASE ORAL at 05:24

## 2022-05-13 ASSESSMENT — PAIN DESCRIPTION - ONSET: ONSET: ON-GOING

## 2022-05-13 ASSESSMENT — ENCOUNTER SYMPTOMS
DIARRHEA: 0
COUGH: 0
SHORTNESS OF BREATH: 0
CONSTIPATION: 0
APNEA: 0
ABDOMINAL DISTENTION: 0
NAUSEA: 0
BACK PAIN: 1

## 2022-05-13 ASSESSMENT — PAIN - FUNCTIONAL ASSESSMENT: PAIN_FUNCTIONAL_ASSESSMENT: PREVENTS OR INTERFERES SOME ACTIVE ACTIVITIES AND ADLS

## 2022-05-13 ASSESSMENT — PAIN SCALES - GENERAL: PAINLEVEL_OUTOF10: 4

## 2022-05-13 ASSESSMENT — PAIN DESCRIPTION - FREQUENCY: FREQUENCY: CONTINUOUS

## 2022-05-13 ASSESSMENT — PAIN DESCRIPTION - ORIENTATION: ORIENTATION: LOWER

## 2022-05-13 ASSESSMENT — PAIN DESCRIPTION - LOCATION: LOCATION: BACK

## 2022-05-13 ASSESSMENT — PAIN DESCRIPTION - DESCRIPTORS: DESCRIPTORS: ACHING

## 2022-05-13 ASSESSMENT — PAIN DESCRIPTION - PAIN TYPE: TYPE: CHRONIC PAIN;ACUTE PAIN

## 2022-05-13 NOTE — PROGRESS NOTES
Attempted to call report on patient x2 to [facility], was first transferred to [address] [place] [address] office. Second time RN was hung up on.

## 2022-05-13 NOTE — PROGRESS NOTES
Patient refused back brace when getting up to commode throughout shift. Patient educated on the importance of using back brace as ordered.

## 2022-05-13 NOTE — PROGRESS NOTES
[facility]  Daily Progress Note  Pt Name: Valentine Fleming  Medical Record Number: [id number]  Date of Birth 1950   Today's Date: 5/13/2022    HD: # 7    CC: Low back pain     ASSESSMENT  1. Active Hospital Problems    Diagnosis Date Noted    Fall [W19. XXXA]      Priority: High    Fall at home, sequela [W19. Oral Montano, I65.486] 05/09/2022     Priority: Medium    Closed T12 fracture ([address] [R95.230Q] 05/06/2022     Priority: Medium    Closed fracture of first lumbar vertebra ([address] [S32.019A] 05/06/2022     Priority: Medium         PLAN  Patient admitted under Trauma Services to  with telemetry     Mechanical fall              -PT/OT with brace in place     Acute L1 fracture/T12 fracture              -Inferior endplate fracture of I63 and vertebral body fracture of L1              -Ortho Spine has evaluated and recommends non op management with brace              -PT/OT with brace              -Pain control   -5/9: MRI lumbar spine showed redemonstration of obliquely oriented fracture through L1 vertebral body with mild displacement and disruption of the anterior longitudinal ligament. No injury to the posterior elements to suggest instability. Small fracture of T12 inferior endplate seen on previous CT not well visualized   -5/10: Orthospine noted activity as tolerated with brace.   Okay for discharge from orthospine standpoint with outpatient follow-up in 2 weeks     New LBBB previously seen on EKGs              -Patient denies chest pain or new onset/worsening shortness of breath              -Troponins within normal limits              -48 hours telemetry we will continue to monitor   -Family medicine following for assistance with medical management     Infiltration adjacent to kidneys and ureters              -UA negative               -Patient afebrile WBC within normal limits              -No CVA tenderness low likelihood of pyelonephritis, will continue work-up   -Family medicine following for assistance with medical management     Nasal bridge laceration              - Local wound care              - Monitor for signs of infection    Right knee pain   -X-ray right knee negative for acute fractures   - CT of the right knee notes small to moderate effusion, prepatellar and infrapatellar edema/bursitis   - Continue to monitor, PT/OT   - Ortho has evaluated, nothing surgical identified, follow up with Dr. Lisa Kirkland in 2 weeks at [facility] extremity edema with keratotic skin   -Seems to resemble venous stasis disease   -Wound consulted for evaluation    Chronic issues: History of atherosclerotic heart disease, depression, peripheral neuropathy, hypertension, hypercholesterolemia, insulin-dependent diabetes   -Family medicine following for assistance with medical management   -Home medications reordered   -Thrombocytopenia resolved      Consults orthopedic spine, Ortho      Pain Management              -Dilaudid, oxycodone     Prophylaxis: SCD's, Lovenox, incentive Spirometry, GlycoLax, Pepcid, Zofran     Diabetic diet     Regular Neurovascular Checks  Repeat Labs stable  PT/OT/SLP continue to treat  Activity as tolerated, pt up with assistance     Discharge disposition pending clinical course   - 5/10: PT recommending IPR. PM&R consulted    -5/11: IPR recommending ECF as insurance will likely not cover IPR   - 5/12:  assisting with establishing ECF facility for discharge. - 3/78: Awaiting precert to [facility]      - Update: precert returned, discharge today    SUBJECTIVE  Patient seen on 5K this morning. Patient endorsed having 5/10 pain in back, pain controlled. Patient denied any other acute pain or complaints. Patient denied any headaches, lightheadedness, dizziness, neck pain, chest pain, shortness of breath, abdominal pain, nausea/vomiting, pain in extremities, and paresthesias.  Patient in recliner during rounds this morning with back brace in place. Labs and vital signs reviewed. Patient afebrile, vital signs stable. AM labs 5/11 no leukocytosis. Hgb stable 9.0. Platelets normal. Patient stable from a trauma surgery perspective. Plan for SNF at discharge, precert pending to [facility]. Stable for discharge when precert returns. Patient tolerating diet and bowel movements documented during admission, continue bowel regime as needed. Discussed with trauma surgeon, Dr. Trung Colorado. Wt Readings from Last 3 Encounters:   05/09/22 (!) 370 lb 4 oz (167.9 kg)   04/13/22 (!) 369 lb (167.4 kg)   04/05/22 (!) 375 lb (170.1 kg)     Temp Readings from Last 3 Encounters:   05/13/22 98.5 °F (36.9 °C) (Oral)   04/05/22 96.3 °F (35.7 °C) (Infrared)   02/21/22 96.7 °F (35.9 °C) (Infrared)     BP Readings from Last 3 Encounters:   05/13/22 (!) 173/74   04/05/22 106/64   03/31/22 122/74     Pulse Readings from Last 3 Encounters:   05/13/22 61   04/05/22 76   03/31/22 68       24 HR INTAKE/OUTPUT :     Intake/Output Summary (Last 24 hours) at 5/13/2022 1025  Last data filed at 5/13/2022 0825  Gross per 24 hour   Intake 365 ml   Output 3425 ml   Net -3060 ml     ADULT DIET; Regular; 4 carb choices (60 gm/meal)    OBJECTIVE  CURRENT VITALS BP (!) 173/74   Pulse 61   Temp 98.5 °F (36.9 °C) (Oral)   Resp 22   Ht 6' 2\" (1.88 m)   Wt (!) 370 lb 4 oz (167.9 kg)   SpO2 96%   BMI 47.54 kg/m²       OBJECTIVE  CURRENT VITALS BP (!) 173/74   Pulse 61   Temp 98.5 °F (36.9 °C) (Oral)   Resp 22   Ht 6' 2\" (1.88 m)   Wt (!) 370 lb 4 oz (167.9 kg)   SpO2 96%   BMI 47.54 kg/m²   GENERAL: Awake, alert, no acute distress, pleasant and cooperative with exam, sitting in recliner  HEENT: Normocephalic, pupils equal and reactive to light, nares patent bilaterally.  Wound to near nasal bridge healing appropriately with no bleeding or drainage  NEURO: Alert and orient, GCS 15, follows commands, PMS intact in all four extremities, no signs of focal neurological deficits  CSPINE/BACK: No midline cervical tenderness to palpation. Back brace in place. HEART: Regular rate and rhythm with no obvious murmurs, rubs, gallops. Distal pulses intact. LUNGS/CHEST WALL: Lungs are clear to auscultation bilaterally with no wheezes, rales, rhonchi. No respiratory distress or increased work of breathing. No chest wall tenderness to palpation. ABDOMEN: Abdomen soft, nondistended, with no tenderness to palpation. No guarding or peritoneal signs. EXTREMITIES: No cyanosis. PMS intact in all four extremities. Scabbed abrasion noted to right knee with no significant tenderness to palpation. Abrasions to right forearm with no bleeding or drainage. No other extremity tenderness to palpation. Strength 5/5 bilaterally with  strength and plantar/dorsiflexion. SKIN: Warm and dry, venous stasis appearance to bilaterally lower extremities    LABS  CBC :   Recent Labs     05/10/22  1116 05/11/22  0538   WBC 8.5 7.9   HGB 8.4* 9.0*   HCT 25.2* 26.5*   MCV 94.0 92.0    148     BMP:   Recent Labs     05/10/22  1116 05/11/22  0538    134*   K 4.2 3.7   CL 99 97*   CO2 25 26   BUN 34* 35*   CREATININE 1.5* 1.5*     COAGS:   No results for input(s): APTT, PROT, INR in the last 72 hours. Pancreas/HFP:  No results for input(s): LIPASE, AMYLASE in the last 72 hours. No results for input(s): AST, ALT, BILIDIR, BILITOT, ALKPHOS in the last 72 hours. RADIOLOGY:  XR LUMBAR SPINE (2-3 VIEWS)    Result Date: 5/8/2022  LUMBAR SPINE 2 VIEWS: CLINICAL INFORMATION: lumbar pain COMPARISON: 10/26/2020 TECHNIQUE: Standard AP and lateral views of lumbar spine were obtained. 1. . Laminectomy defects are seen in the lower lumbar spine. 2. Moderately severe hypertrophic degenerative spurring is scattered in the lumbar spine. 3. There is a mild thoracolumbar levoscoliosis. 4. No fracture is seen. Moderate multilevel degenerative facet arthropathy involving at least the lower 3 lumbar levels. Mild degenerative changes left sacroiliac joint. **This report has been created using voice recognition software. It may contain minor errors which are inherent in voice recognition technology. ** Final report electronically signed by Dr. Shira Akbar on 5/8/2022 4:18 PM    CT KNEE RIGHT WO CONTRAST    Result Date: 5/9/2022  WET READ: A total knee prosthesis is present. Multiple artifacts are present from the prosthesis, superiorly limiting detail on this study. No acute fracture is seen. There is no definite evidence for prosthesis loosening. There appears be mild tilting of the patella laterally, 5 mm images. There is moderate degenerative spurring of the knee. A small ossicle seen along the medial aspect of the knee is another seen in the lateral aspect of the knee. One or more loose articular bodies cannot excluded. This examination will be formally read by one of our [facility] radiologists tomorrow. Please see that report. PROCEDURE: CT KNEE RIGHT WO CONTRAST CLINICAL INFORMATION: right knee pain and weakness COMPARISON: Right knee radiographs 4/0/0811 TECHNIQUE: Helical CT acquisition of the right knee without contrast. Multiplanar reformats were provided. All CT scans at this facility use dose modulation, iterative reconstruction, and/or weight based dosing when appropriate to reduce the radiation dose to as low as reasonably achievable. FINDINGS: POSTOPERATIVE CHANGES: Total knee arthroplasty is seen. There is marked narrowing of the patellofemoral interval. There is subsidence of the patellar component. ALIGNMENT: Anatomic. BONE: No acute fracture or dislocation. The bones are mildly demineralized. . No destructive bony lesion. JOINTS: Knee arthroplasty. MUSCULATURE: Generalized marked atrophy particularly of calf muscles. SOFT TISSUES: Marked subcutaneous thickening and edema is seen particularly in the leg region. Small to moderate joint effusion.  There is marked prepatellar and superficial infrapatellar edema/bursitis. Varicose veins are seen. OTHER: Loose bodies are seen in the suprapatellar region. 1. Total right knee arthroplasty performed. There is subsidence of the patellar component. 2. Small to moderate joint effusion. 3. No acute fracture is seen. 4. Marked subcutaneous edema/thickening particularly in the leg region. 5. Marked prepatellar and superficial infrapatellar edema/bursitis. 6. Extensive fatty atrophy in the calf musculature **This report has been created using voice recognition software. It may contain minor errors which are inherent in voice recognition technology. ** Final report electronically signed by Dr Rita Donovan on 5/9/2022 9:01 AM    MRI LUMBAR SPINE WO CONTRAST    Result Date: 5/9/2022  PROCEDURE: MRI LUMBAR SPINE WO CONTRAST CLINICAL INFORMATION: fall. Back pain following T12 and L1 fractures. COMPARISON: CT lumbar spine dated 5/6/2022 and MRI lumbar spine dated 11/10/2021. TECHNIQUE: Sagittal and axial T1 and T2-weighted images were obtained through the lumbar spine. FINDINGS: There is an obliquely oriented bandlike low T1 and hyperintense T2/STIR signal abnormality extending from the mid to inferior anterior aspect of the L1 vertebral body to the posterior superior endplate of L1 with mild displacement of the anterior fracture fragment. There is otherwise anatomic vertebral body height and alignment. Small fracture at the right anterolateral aspect of T12 seen on previous CT is not well-visualized on this exam. There is disruption of the anterior longitudinal ligament  at the anterior inferior aspect of the L1 vertebral body at the site of the fracture. Marrow signal is otherwise within normal limits. There is mild hyperintense STIR signal in the T12-L1 intervertebral disc. The posterior longitudinal ligaments appear intact. The conus terminates in a normal fashion at the T12-L1 level. There is no cauda equina nerve root thickening or clumping identified.  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 There are laminectomies at L3-S1, stable compared to prior exam. No focal fluid collection is identified in the laminectomy bed. There is subcutaneous edema dorsal to the lumbar spine. Paraspinal soft tissues are otherwise unremarkable. At T12-L1 there is no significant spinal canal or neuroforaminal stenosis. At L1-2 there is stable mild congenital spinal canal narrowing and mild bilateral neural foraminal stenosis in association with mild facet hypertrophy and ligamentum flavum thickening. At L2-3 the spinal canal is decompressed. There is a shallow disc bulge, stable compared to prior exam. There is moderate bilateral neural foraminal stenosis, similar to prior exam. At L3-4 the spinal canal is decompressed. There is moderate right and mild-to-moderate left neural foraminal stenosis. At L4-5 the spinal canal is decompressed. There is mild to moderate bilateral neural foraminal stenosis. At L5-S1 the spinal canal is decompressed. There is mild bilateral neural foraminal stenosis. 1. Redemonstration of obliquely oriented fracture of the L1 vertebral body extending from the anterior-inferior aspect of the vertebral body into the posterior aspect of the superior endplate with mild displacement and disruption of the anterior longitudinal ligament. No injury of the posterior elements to suggest instability. 2. Small fracture of the T12 inferior endplates seen on previous CT is not well-visualized on this exam. 3. Mild edema in the subcutaneous fat dorsal to the lumbar spine. 4. Redemonstration of laminectomies at L2-3 through L5-S1. **This report has been created using voice recognition software. It may contain minor errors which are inherent in voice recognition technology. ** Final report electronically signed by Dr. Damien Coleman MD on 5/9/2022 5:15 PM        Electronically signed by Gina Rodriguez PA-C on 5/13/2022 at 10:25 AM       Patient seen and examined independently by me early this AM. Above discussed and I agree with LUCIUS Villafana. See my additional comments below for updated orders and plan. Labs, cultures, and radiographs where available were reviewed. I discussed patient concerns with the patient's nurse and instructions were given. Please see our orders for the updated patient care plan. -Stable from a trauma standpoint. Continuing with therapy. Planning ECF today. Otherwise, follow-up with outpatient services as directed.     Electronically signed by Tania Frias MD on 5/13/22 at 11:09 AM EDT

## 2022-05-13 NOTE — PLAN OF CARE
Problem: Pain  Goal: Verbalizes/displays adequate comfort level or baseline comfort level  5/13/2022 1122 by Taisha Beverly RN  Outcome: Completed  Flowsheets (Taken 5/13/2022 3114)  Verbalizes/displays adequate comfort level or baseline comfort level:   Encourage patient to monitor pain and request assistance   Assess pain using appropriate pain scale   Administer analgesics based on type and severity of pain and evaluate response   Implement non-pharmacological measures as appropriate and evaluate response     Problem: Safety - Adult  Goal: Free from fall injury  5/13/2022 1122 by Taisha Beverly RN  Outcome: Completed     Problem: Discharge Planning  Goal: Discharge to home or other facility with appropriate resources  5/13/2022 1122 by Taisha Beverly RN  Outcome: Completed  Flowsheets (Taken 5/13/2022 0804)  Discharge to home or other facility with appropriate resources: Identify barriers to discharge with patient and caregiver     Problem: Chronic Conditions and Co-morbidities  Goal: Patient's chronic conditions and co-morbidity symptoms are monitored and maintained or improved  5/13/2022 1122 by Taisha Beverly RN  Outcome: Completed  Flowsheets (Taken 5/13/2022 0804)  Care Plan - Patient's Chronic Conditions and Co-Morbidity Symptoms are Monitored and Maintained or Improved: Monitor and assess patient's chronic conditions and comorbid symptoms for stability, deterioration, or improvement     Problem: ABCDS Injury Assessment  Goal: Absence of physical injury  5/13/2022 1122 by Taisha Beverly RN  Outcome: Completed     Problem: Skin/Tissue Integrity - Adult  Goal: Incisions, wounds, or drain sites healing without S/S of infection  5/13/2022 1122 by Taisha Beverly RN  Outcome: Completed     Problem: Musculoskeletal - Adult  Goal: Return mobility to safest level of function  5/13/2022 1122 by Taisha Beverly RN  Outcome: Completed  Flowsheets (Taken 5/13/2022 0804)  Return Mobility to [facility] Level of Function: Assess patient stability and activity tolerance for standing, transferring and ambulating with or without assistive devices     Problem: Musculoskeletal - Adult  Goal: Maintain proper alignment of affected body part  5/13/2022 1122 by Alexis Lopez RN  Outcome: Completed  Flowsheets (Taken 5/13/2022 0804)  Maintain proper alignment of affected body part: Support and protect limb and body alignment per provider's orders

## 2022-05-13 NOTE — DISCHARGE SUMMARY
Discharge Summary   Trauma Services    Patient Identification:  Mimi Meng  : 1950  MRN: [id number]   Account: [de-identified]     Admit date: 2022  Discharge date: 22  Attending provider: Anthony Rodriguez MD        Primary care provider: Aquilino Vasquez MD     Discharge Diagnoses:   Principal Problem:    Fall at home, sequela  Active Problems:    Fall    Closed T12 fracture ([address]    Closed fracture of first lumbar vertebra ([address]  Resolved Problems:    * No resolved hospital problems. *    H&P  He is a 66 y.o. male presenting at [facility] by activation of level 2 trauma, brought by EMS following a mechanical fall with no LOC; past medical history CAD with CABG and stenting x5 on Brilinta, previous cervical surgery, previous lumbar surgery, DM with diabetic neuropathy, hypercholesterolemia, hypertension, depression. Per port patient was attempting to ambulate when he tripped over his foot causing him to fall face first onto the ground and hyperextended his lumbar spine much when he heard a crack and a pop and immediate pain. Patient reports 10/10 pain in the low back. Patient states he does have a known lumbar herniated disc. Reports nausea and vomiting secondary to pain. Patient denies chest pain, shortness of breath, cough, headache, dizziness, lightheadedness, numbness, paraesthesias, weakness, chills, fevers, abdominal pain, dysuria, frequent urination, and neck pain. Care in coordination with trauma surgeon Dr. Malachi Newton. Hospital Course:   Mimi Meng is a 66 y.o. male admitted to [address] [place] [address] on 2022 under trauma surgery following a fall. Trauma workup revealed acute T12 and L1 fracture. Orthopedic spine was consulted recommends non op management with brace. Family medicine followed during admission for assistance with medical management. During admission a MRI of lumbar spine was ordered for worsening back pain.  MRI lumbar spine showed redemonstration of obliquely oriented fracture through L1 vertebral body with mild displacement and disruption of the anterior longitudinal ligament. No injury to the posterior elements to suggest instability. Small fracture of T12 inferior endplate seen on previous CT not well visualized. On 5/10 Orthospine noted activity as tolerated with brace. Okay for discharge from orthospine standpoint with outpatient follow-up in 2 weeks. PM&R was consulted on 5/10 for rehab recommendations and recommended SNF. Orthopedic surgery was consulted on 5/11 for right knee pain and weakness after plain films and CT imaging of right knee were negative for acute fractures but noted moderate effusion with prepatellar and infrapatellar bursitis/edema. Orthopedic surgery noted no surgical indications, pain control, weightbearing as tolerated, and outpatient follow-up in 2 weeks. On 5/13/2022 patient remained stable from a trauma surgery perspective and was discharged when pre-cert to [facility] returned. Discharge Medications:     Medication List      START taking these medications    oxyCODONE 5 MG immediate release tablet  Commonly known as: ROXICODONE  Take 1 tablet by mouth every 6 hours as needed for Pain for up to 5 days.         CHANGE how you take these medications    furosemide 80 MG tablet  Commonly known as: Lasix  Take one in am (80) and 1/2 tab (40) in pm  What changed:   · how much to take  · how to take this  · when to take this  · additional instructions        CONTINUE taking these medications    Accu-Chek SmartView strip  Generic drug: blood glucose test strips  Use to test Blood Sugar 3x daily, Dx: E11.9     acetaminophen 500 MG tablet  Commonly known as: TYLENOL     albuterol sulfate  (90 Base) MCG/ACT inhaler  Inhale 2 puffs into the lungs 2 times daily     aspirin 81 MG tablet  Take 1 tablet by mouth daily     atorvastatin 10 MG tablet  Commonly known as: LIPITOR  TAKE 1 TABLET BY MOUTH ONCE DAILY     carvedilol 12.5 MG tablet  Commonly known as: COREG  Take 1 tablet by mouth 2 times daily     enalapril 10 MG tablet  Commonly known as: VASOTEC  One a day     ferrous sulfate 325 (65 Fe) MG tablet  Commonly known as: IRON 325  Take 1 tablet by mouth 2 times daily     Lantus SoloStar 100 UNIT/ML injection pen  Generic drug: insulin glargine  INJECT 25 UNITS SUBCUTANEOUSLY ONCE DAILY     metFORMIN 500 MG tablet  Commonly known as: GLUCOPHAGE  TAKE TWO TABLETS BY MOUTH TWICE DAILY WITH MEALS     nitroGLYCERIN 0.4 MG SL tablet  Commonly known as: NITROSTAT  Place 1 tablet under the tongue every 5 minutes as needed for Chest pain     NovoLOG FlexPen 100 UNIT/ML injection pen  Generic drug: insulin aspart  Inject 18 Units into the skin 3 times daily (before meals) INJECT 12 UNITS SUBCUTANEOUSLY 3 TIMES DAILY BEFORE MEALS     ondansetron 4 MG tablet  Commonly known as: ZOFRAN  Take 1 tablet by mouth every 8 hours as needed for Nausea or Vomiting     pantoprazole 40 MG tablet  Commonly known as: PROTONIX  Take 1 tablet by mouth every morning (before breakfast)     therapeutic multivitamin-minerals tablet  Take 1 tablet by mouth daily     ticagrelor 90 MG Tabs tablet  Commonly known as: Brilinta  Take 1 tablet by mouth twice daily     tiZANidine 4 MG tablet  Commonly known as: Zanaflex  Take 1 tablet by mouth every 6 hours as needed (Muscle Spasms)     Urea 40 % cream  Commonly known as: CARMOL  Use   Bid  To  The  legs           Where to Get Your Medications      These medications were sent to [address] [place] [address] - [address]  [address], [address]    Phone: [phone]   · furosemide 80 MG tablet     You can get these medications from any pharmacy    Bring a paper prescription for each of these medications  · oxyCODONE 5 MG immediate release tablet         Patient Instructions:    Discharge lab work: none  Activity: activity as tolerated with back brace and no driving while on analgesics  Diet: ADULT DIET; Regular; 4 carb choices (60 gm/meal)    Code Status: Full Code    Follow-up visits:   Alona Rocha MD  [address]  [phone]    Schedule an appointment as soon as possible for a visit in 2 weeks      [facility]  [address]           Procedures:   none    Consults:   Spine   Orthopedic surgery  PM&R    Examination:  Vitals:  Vitals:    05/12/22 2245 05/13/22 0015 05/13/22 0515 05/13/22 0836   BP: (!) 164/70 (!) 141/64 (!) 140/65 (!) 173/74   Pulse: 63 66 59 61   Resp:   16 22   Temp:   98.6 °F (37 °C) 98.5 °F (36.9 °C)   TempSrc:   Oral Oral   SpO2:   95% 96%   Weight:       Height:         Weight: Weight: (!) 370 lb 4 oz (167.9 kg)     24 hour intake/output:    Intake/Output Summary (Last 24 hours) at 5/13/2022 1105  Last data filed at 5/13/2022 0825  Gross per 24 hour   Intake 365 ml   Output 3425 ml   Net -3060 ml       GENERAL: Awake, alert, no acute distress, pleasant and cooperative with exam, sitting in recliner  HEENT: Normocephalic, pupils equal and reactive to light, nares patent bilaterally. Wound to near nasal bridge healing appropriately with no bleeding or drainage  NEURO: Alert and orient, GCS 15, follows commands, PMS intact in all four extremities, no signs of focal neurological deficits  CSPINE/BACK: No midline cervical tenderness to palpation. Back brace in place. HEART: Regular rate and rhythm with no obvious murmurs, rubs, gallops.  Distal pulses intact. LUNGS/CHEST WALL: Lungs are clear to auscultation bilaterally with no wheezes, rales, rhonchi.  No respiratory distress or increased work of breathing.  No chest wall tenderness to palpation.    ABDOMEN: Abdomen soft, nondistended, with no tenderness to palpation.  No guarding or peritoneal signs. EXTREMITIES: No cyanosis.  PMS intact in all four extremities.  Scabbed abrasion noted to right knee with no significant tenderness to palpation. Abrasions to right forearm with no bleeding or drainage. No other extremity tenderness to palpation. Strength 5/5 bilaterally with  strength and plantar/dorsiflexion. SKIN: Warm and dry, venous stasis appearance to bilaterally lower extremities    Significant Diagnostics:   Radiology: CT ABDOMEN PELVIS WO CONTRAST Additional Contrast? Radiologist Recommendation    Result Date: 5/6/2022  CT abdomen and pelvis without contrast Comparison: CT - CT ABDOMEN /T/ PELVIS WITH CONTRAST - 05/28/2017 08:48 PM EDT Findings: Findings at the level of the lung bases are reported separately. Evaluation of abdominal organs is limited by artifact and lack of contrast. No obvious focus of contusion or laceration. No free fluid. Infiltration of fat noted adjacent to the bilateral kidneys and within the retroperitoneum. Prior cholecystectomy. No bowel obstruction, pneumoperitoneum, or pneumatosis. Severe distention of the urinary bladder. Normal size prostate. Nonvisualization of the appendix. No obvious secondary findings of appendicitis. Mildly limited evaluation with incomplete visualization of peritoneal contents within the right lower quadrant secondary to body habitus. Oblique fracture of the anterior cortex and superior endplate of L1 also extending through a partially ossified anterior longitudinal ligament. No associated alignment abnormality. Additional fracture of the anterolateral aspect of the inferior endplate of R08 on the right side without significant displacement. 1. No evidence of hemoperitoneum or abdominal organ injury. 2. Acute fracture of the L1 vertebral body extending through a partially ossified anterior longitudinal ligament. Additional fracture of the anterolateral aspect of the inferior endplate of R75 on the right side. No significant displacement. No alignment abnormality. 3. Severe distention of the urinary bladder.  4. Infiltration of fat adjacent to the kidneys and ureters. Correlate with urinalysis to exclude pyelonephritis. This document has been electronically signed by: Goldy Sethi MD on 05/06/2022 06:23 PM All CTs at this facility use dose modulation techniques and iterative reconstructions, and/or weight-based dosing when appropriate to reduce radiation to a low as reasonably achievable. CT HEAD WO CONTRAST    Result Date: 5/6/2022  CT head without contrast Comparison: CT,SR - CT HEAD WO CONTRAST - 12/19/2018 07:46 PM EST Findings: No intra-axial mass, midline shift, hydrocephalus, or acute hemorrhage. Involutional change of brain parenchyma, compatible with age. Mild white matter disease. Mild mucosal thickening and mucus retention cyst formation within the paranasal sinuses. The orbits are within normal limits. There is no acute fracture. No evidence of intracranial hemorrhage. This document has been electronically signed by: Goldy Sethi MD on 05/06/2022 06:09 PM All CTs at this facility use dose modulation techniques and iterative reconstructions, and/or weight-based dosing when appropriate to reduce radiation to a low as reasonably achievable. CT FACIAL BONES WO CONTRAST    Result Date: 5/6/2022  CT maxillofacial without contrast Comparison: CT,SR - CT HEAD WO CONTRAST - 12/19/2018 07:46 PM EST Findings: No acute fractures. Temporomandibular joints are intact. Mild mucosal thickening and mucus retention cyst formation within the paranasal sinuses. No air-fluid levels. Visualized intracranial contents are within normal limits. No foreign bodies. No acute displaced facial bone fracture. This document has been electronically signed by: Goldy Sethi MD on 05/06/2022 06:13 PM All CTs at this facility use dose modulation techniques and iterative reconstructions, and/or weight-based dosing when appropriate to reduce radiation to a low as reasonably achievable.     CT CHEST WO CONTRAST    Result Date: 5/6/2022  CT chest with contrast. Comparison: CT,SR - CTA CHEST W WO CONTRAST - 02/07/2022 02:12 PM EST Findings: Normal heart size. Prior sternotomy with CABG. Unremarkable thyroid gland. Calcified right hilar lymph nodes noted. Linear foci of atelectasis at the base of the right upper lobe. No consolidation or pleural effusion. The visualized upper abdomen is unremarkable. There is a fracture of the inferior endplate of M43. Please see dedicated CT spine report for further details. Postsurgical and degenerative changes of the spine. Remaining bony structures intact. Prior sternotomy noted. There is a fracture of the inferior endplate of T35. This document has been electronically signed by: Carmenza Mckeon MD on 05/06/2022 06:25 PM All CTs at this facility use dose modulation techniques and iterative reconstructions, and/or weight-based dosing when appropriate to reduce radiation to a low as reasonably achievable. CT CERVICAL SPINE WO CONTRAST    Result Date: 5/6/2022  CT cervical spine without contrast Comparison: CT,SR - CT CERVICAL SPINE WO CONTRAST - 12/19/2018 07:46 PM EST Findings: No acute findings on limited view of the intracranial contents. Soft tissues of the neck are normal. Lung apices are clear. Vertebral alignment is within normal limits. Prior anterior metallic and interbody fusion at C5-C6. Ankylosis of the vertebral bodies at C6-C7. Appropriate alignment. No evidence of hardware failure. No acute fracture. Multilevel degenerative disc disease and facet osteoarthritis. No acute cervical spine fracture. This document has been electronically signed by: Carmenza Mckeon MD on 05/06/2022 06:12 PM All CTs at this facility use dose modulation techniques and iterative reconstructions, and/or weight-based dosing when appropriate to reduce radiation to a low as reasonably achievable.     XR CHEST PORTABLE    Result Date: 5/6/2022  1 view chest x-ray Comparison: CR,SR - XR CHEST PORTABLE - 02/07/2022 12:01 PM EST Findings: 7 mm curvilinear metallic foreign body overlying the right lung apex without change. Bandlike focus within the right mid lung, similar to the prior study. No consolidative process. Mildly enlarged heart. Prior sternotomy. Postsurgical changes of the cervical and thoracic spine. No acute displaced fracture. Bandlike focus of atelectasis or scarring within the right mid lung, similar to the prior study. This document has been electronically signed by: Brady Dugan MD on 05/06/2022 05:29 PM    CT LUMBAR RECONSTRUCTION WO POST PROCESS    Result Date: 5/6/2022  CT lumbar spine without contrast Comparison: None Findings: Normal vertebral body alignment. Oblique fracture of the anterior cortex and superior endplate of L1 also extending through a partially ossified anterior longitudinal ligament. No associated alignment abnormality. Additional fracture of the anterolateral aspect of the inferior endplate of C87 on the right side without significant displacement. Multilevel ligamentous ossification. Multilevel degenerative disc disease and facet osteoarthritis. Limited evaluation for central canal stenosis. Multilevel neural foraminal narrowing. Acute fracture of the L1 vertebral body extending through a partially ossified anterior longitudinal ligament. Additional fracture of the anterolateral aspect of the inferior endplate of N08 on the right side. No significant displacement. No alignment abnormality. This document has been electronically signed by: Brady Dugan MD on 05/06/2022 06:29 PM All CTs at this facility use dose modulation techniques and iterative reconstructions, and/or weight-based dosing when appropriate to reduce radiation to a low as reasonably achievable. CT THORACIC RECONSTRUCTION WO POST PROCESS    Result Date: 5/6/2022  CT thoracic spine without contrast Comparison: CT,SR - CTA CHEST W WO CONTRAST - 02/07/2022 02:12 PM EST Findings: Normal vertebral body alignment. Evaluation limited by artifact.  There is a fracture through the anterolateral aspect of the inferior endplate of S97 without significant displacement. Mild compression fracture of the T4 vertebral body without change. Prior posterior metallic fusion extending from T6-T9. Appropriate alignment. No evidence of hardware failure. Significant degenerative changes with endplate proliferation and foci of ligament is ossification. Facet osteoarthritis is also present. Evaluation for central canal narrowing is limited by artifact and poor resolution on this study. This could be further evaluated with MRI if indicated. No significant degenerative change. Findings at the level of the chest and abdomen reported separately. There is an acute fracture through the inferior endplate of U33. This document has been electronically signed by: Law Gaviria MD on 05/06/2022 06:32 PM All CTs at this facility use dose modulation techniques and iterative reconstructions, and/or weight-based dosing when appropriate to reduce radiation to a low as reasonably achievable.       Labs:   Recent Results (from the past 72 hour(s))   CBC    Collection Time: 05/10/22 11:16 AM   Result Value Ref Range    WBC 8.5 4.8 - 10.8 thou/mm3    RBC 2.68 (L) 4.70 - 6.10 mill/mm3    Hemoglobin 8.4 (L) 14.0 - 18.0 gm/dl    Hematocrit 25.2 (L) 42.0 - 52.0 %    MCV 94.0 80.0 - 94.0 fL    MCH 31.3 26.0 - 33.0 pg    MCHC 33.3 32.2 - 35.5 gm/dl    RDW-CV 12.0 11.5 - 14.5 %    RDW-SD 41.0 35.0 - 45.0 fL    Platelets 643 234 - 891 thou/mm3    MPV 9.8 9.4 - 12.4 fL   Basic Metabolic Panel    Collection Time: 05/10/22 11:16 AM   Result Value Ref Range    Sodium 135 135 - 145 meq/L    Potassium 4.2 3.5 - 5.2 meq/L    Chloride 99 98 - 111 meq/L    CO2 25 23 - 33 meq/L    Glucose 289 (H) 70 - 108 mg/dL    BUN 34 (H) 7 - 22 mg/dL    CREATININE 1.5 (H) 0.4 - 1.2 mg/dL    Calcium 8.1 (L) 8.5 - 10.5 mg/dL   Anion Gap    Collection Time: 05/10/22 11:16 AM   Result Value Ref Range    Anion Gap 11.0 8.0 - 16.0 meq/L   Glomerular Filtration Rate, Estimated    Collection Time: 05/10/22 11:16 AM   Result Value Ref Range    Est, Glom Filt Rate 46 (A) ml/min/1.73m2   POCT Glucose    Collection Time: 05/10/22 11:44 AM   Result Value Ref Range    POC Glucose 285 (H) 70 - 108 mg/dl   POCT Glucose    Collection Time: 05/10/22  4:51 PM   Result Value Ref Range    POC Glucose 209 (H) 70 - 108 mg/dl   POCT Glucose    Collection Time: 05/10/22  8:03 PM   Result Value Ref Range    POC Glucose 211 (H) 70 - 108 mg/dl   Basic Metabolic Panel    Collection Time: 05/11/22  5:38 AM   Result Value Ref Range    Sodium 134 (L) 135 - 145 meq/L    Potassium 3.7 3.5 - 5.2 meq/L    Chloride 97 (L) 98 - 111 meq/L    CO2 26 23 - 33 meq/L    Glucose 143 (H) 70 - 108 mg/dL    BUN 35 (H) 7 - 22 mg/dL    CREATININE 1.5 (H) 0.4 - 1.2 mg/dL    Calcium 8.3 (L) 8.5 - 10.5 mg/dL   CBC with Auto Differential    Collection Time: 05/11/22  5:38 AM   Result Value Ref Range    WBC 7.9 4.8 - 10.8 thou/mm3    RBC 2.88 (L) 4.70 - 6.10 mill/mm3    Hemoglobin 9.0 (L) 14.0 - 18.0 gm/dl    Hematocrit 26.5 (L) 42.0 - 52.0 %    MCV 92.0 80.0 - 94.0 fL    MCH 31.3 26.0 - 33.0 pg    MCHC 34.0 32.2 - 35.5 gm/dl    RDW-CV 12.0 11.5 - 14.5 %    RDW-SD 40.6 35.0 - 45.0 fL    Platelets 582 547 - 504 thou/mm3    MPV 9.6 9.4 - 12.4 fL    Seg Neutrophils 65.1 %    Lymphocytes 20.9 %    Monocytes 10.9 %    Eosinophils 2.4 %    Basophils 0.3 %    Immature Granulocytes 0.4 %    Segs Absolute 5.1 1.8 - 7.7 thou/mm3    Lymphocytes Absolute 1.7 1.0 - 4.8 thou/mm3    Monocytes Absolute 0.9 0.4 - 1.3 thou/mm3    Eosinophils Absolute 0.2 0.0 - 0.4 thou/mm3    Basophils Absolute 0.0 0.0 - 0.1 thou/mm3    Immature Grans (Abs) 0.03 0.00 - 0.07 thou/mm3    nRBC 0 /100 wbc   Anion Gap    Collection Time: 05/11/22  5:38 AM   Result Value Ref Range    Anion Gap 11.0 8.0 - 16.0 meq/L   Glomerular Filtration Rate, Estimated    Collection Time: 05/11/22  5:38 AM   Result Value Ref Range    Est, Glom Filt Rate 46 (A) ml/min/1.73m2   POCT Glucose    Collection Time: 05/11/22  8:00 AM   Result Value Ref Range    POC Glucose 147 (H) 70 - 108 mg/dl   POCT Glucose    Collection Time: 05/11/22 11:29 AM   Result Value Ref Range    POC Glucose 181 (H) 70 - 108 mg/dl   POCT Glucose    Collection Time: 05/11/22  4:22 PM   Result Value Ref Range    POC Glucose 146 (H) 70 - 108 mg/dl   POCT Glucose    Collection Time: 05/11/22  7:52 PM   Result Value Ref Range    POC Glucose 200 (H) 70 - 108 mg/dl   POCT Glucose    Collection Time: 05/12/22  7:48 AM   Result Value Ref Range    POC Glucose 141 (H) 70 - 108 mg/dl   POCT Glucose    Collection Time: 05/12/22 11:58 AM   Result Value Ref Range    POC Glucose 163 (H) 70 - 108 mg/dl   POCT Glucose    Collection Time: 05/12/22  4:34 PM   Result Value Ref Range    POC Glucose 117 (H) 70 - 108 mg/dl   POCT Glucose    Collection Time: 05/12/22  8:16 PM   Result Value Ref Range    POC Glucose 132 (H) 70 - 108 mg/dl   POCT Glucose    Collection Time: 05/13/22  2:26 AM   Result Value Ref Range    POC Glucose 105 70 - 108 mg/dl   POCT Glucose    Collection Time: 05/13/22  8:06 AM   Result Value Ref Range    POC Glucose 151 (H) 70 - 108 mg/dl       Discharge condition: Stable  Disposition: SNF      Electronically signed by Heber Mckeon PA-C on 5/13/2022 at 11:05 AM

## 2022-05-13 NOTE — PROGRESS NOTES
Progress Note  Date:5/13/2022       [facility]:[id number]  Patient Name:Tuan Hassan     YOB: 1950     Age:71 y.o. Subjective    Subjective:  Symptoms:  Stable. He reports weakness. No shortness of breath, cough, chest pain or diarrhea. (Back pain noted). Diet:  Adequate intake. No nausea. Activity level: Impaired due to pain. Pain:  He complains of pain that is moderate. ( Mid  Back pain). No current facility-administered medications for this encounter. Current Outpatient Medications   Medication Sig Dispense Refill    oxyCODONE (ROXICODONE) 5 MG immediate release tablet Take 1 tablet by mouth every 6 hours as needed for Pain for up to 5 days.  20 tablet 0    furosemide (LASIX) 80 MG tablet Take one in am (80) and 1/2 tab (40) in pm 60 tablet 3    enalapril (VASOTEC) 10 MG tablet One a day 90 tablet 1    atorvastatin (LIPITOR) 10 MG tablet TAKE 1 TABLET BY MOUTH ONCE DAILY 90 tablet 1    ACCU-CHEK SMARTVIEW strip Use to test Blood Sugar 3x daily, Dx: E11.9 100 each 11    albuterol sulfate  (90 Base) MCG/ACT inhaler Inhale 2 puffs into the lungs 2 times daily (Patient not taking: Reported on 5/7/2022) 18 g 3    Urea (CARMOL) 40 % cream Use   Bid  To  The  legs 85 g 0    carvedilol (COREG) 12.5 MG tablet Take 1 tablet by mouth 2 times daily 180 tablet 2    tiZANidine (ZANAFLEX) 4 MG tablet Take 1 tablet by mouth every 6 hours as needed (Muscle Spasms) 30 tablet 1    insulin aspart (NOVOLOG FLEXPEN) 100 UNIT/ML injection pen Inject 18 Units into the skin 3 times daily (before meals) INJECT 12 UNITS SUBCUTANEOUSLY 3 TIMES DAILY BEFORE MEALS 10 pen 3    ticagrelor (BRILINTA) 90 MG TABS tablet Take 1 tablet by mouth twice daily 180 tablet 1    metFORMIN (GLUCOPHAGE) 500 MG tablet TAKE TWO TABLETS BY MOUTH TWICE DAILY WITH MEALS 360 tablet 1    ferrous sulfate (IRON 325) 325 (65 Fe) MG tablet Take 1 tablet by mouth 2 times daily 60 tablet 5    Multiple Vitamins-Minerals (THERAPEUTIC MULTIVITAMIN-MINERALS) tablet Take 1 tablet by mouth daily 30 tablet 11    insulin glargine (LANTUS SOLOSTAR) 100 UNIT/ML injection pen INJECT 25 UNITS SUBCUTANEOUSLY ONCE DAILY 15 pen 1    ondansetron (ZOFRAN) 4 MG tablet Take 1 tablet by mouth every 8 hours as needed for Nausea or Vomiting 30 tablet 0    pantoprazole (PROTONIX) 40 MG tablet Take 1 tablet by mouth every morning (before breakfast) 30 tablet 0    nitroGLYCERIN (NITROSTAT) 0.4 MG SL tablet Place 1 tablet under the tongue every 5 minutes as needed for Chest pain 25 tablet 3    aspirin 81 MG tablet Take 1 tablet by mouth daily      acetaminophen (TYLENOL) 500 MG tablet Take 500 mg by mouth as needed for Pain        Review of Systems   Constitutional: Negative for activity change, appetite change, fatigue and fever. HENT: Negative for congestion and postnasal drip. Respiratory: Negative for apnea, cough and shortness of breath. Cardiovascular: Negative for chest pain and palpitations. Gastrointestinal: Negative for abdominal distention, constipation, diarrhea and nausea. Genitourinary: Negative for difficulty urinating, dysuria and hematuria. Musculoskeletal: Positive for back pain. Mid  Back pain   Neurological: Positive for weakness. Negative for dizziness, seizures, light-headedness and numbness. Neuropathy pain     Objective         Vitals Last 24 Hours:  TEMPERATURE:  Temp  Av.6 °F (37 °C)  Min: 98.5 °F (36.9 °C)  Max: 98.8 °F (37.1 °C)  RESPIRATIONS RANGE: Resp  Av.7  Min: 16  Max: 22  PULSE OXIMETRY RANGE: SpO2  Av.7 %  Min: 95 %  Max: 96 %  PULSE RANGE: Pulse  Av.4  Min: 59  Max: 66  BLOOD PRESSURE RANGE: Systolic (86XXK), XES:402 , Min:132 , RYR:793   ; Diastolic (24WMA), WJC:09, Min:64, Max:94    I/O (24Hr):     Intake/Output Summary (Last 24 hours) at 2022 1810  Last data filed at 2022 1321  Gross per 24 hour   Intake 805 ml   Output 2700 ml   Net -1895 ml     Objective:  General Appearance:  Comfortable. Vital signs: (most recent): Blood pressure (!) 173/74, pulse 61, temperature 98.5 °F (36.9 °C), temperature source Oral, resp. rate 22, height 6' 2\" (1.88 m), weight (!) 370 lb 4 oz (167.9 kg), SpO2 96 %. Vital signs are normal.  No fever. Output: Producing urine and producing stool. HEENT: Normal HEENT exam.    Lungs:  Normal respiratory rate. Breath sounds clear to auscultation. Heart: Normal rate. Regular rhythm. S1 normal and S2 normal.  No murmur (1/6 merced). Abdomen: Abdomen is soft and non-distended. There are no signs of ascites. Hyperactive bowel sounds. There is no abdominal tenderness. Extremities: Normal range of motion. (Mid  Back pain  T12 area)  Neurological: Patient is alert and oriented to person, place and time. Patient has normal reflexes and normal muscle tone. Labs/Imaging/Diagnostics    Labs:  CBC:  Recent Labs     05/11/22  0538   WBC 7.9   RBC 2.88*   HGB 9.0*   HCT 26.5*   MCV 92.0        CHEMISTRIES:  Recent Labs     05/11/22  0538   *   K 3.7   CL 97*   CO2 26   BUN 35*   CREATININE 1.5*   GLUCOSE 143*     PT/INR:No results for input(s): PROTIME, INR in the last 72 hours. APTT:No results for input(s): APTT in the last 72 hours. LIVER PROFILE:No results for input(s): AST, ALT, BILIDIR, BILITOT, ALKPHOS in the last 72 hours. Imaging Last 24 Hours:  No results found. Assessment//Plan           Hospital Problems           Last Modified POA    * (Principal) Fall at home, sequela 5/9/2022 Yes    Fall 5/6/2022 Yes    Closed T12 fracture ([address] 5/6/2022 Yes    Closed fracture of first lumbar vertebra ([address] 5/6/2022 Yes              Assessment:    Condition: In stable condition.   Improving.   (  L1 and  T 12  Fracture vertebrae and needs nursing home as lives alone and too much pain and not safe to ambulate alone    Niddm with long term insulin stable     Pain  Control  Adequate     ashd Stable      htn stable     Peripheral  neuropathy  Noted and use  Of walker      ). Plan:   Transfer Plan:   to  if  possible today. Start/continue incentive spirometry. Consults: physical therapy and occupational therapy. Advance diet as tolerated. Administer medications as ordered. (  Stable      labs  Are  Good     pain control  But  Needs times with fractures      to nursing home as stable when bed approved).        Electronically signed by Rafaela Capone MD on 5/13/22 at 6:10 PM EDT

## 2022-05-13 NOTE — CARE COORDINATION
5/13/22, 11:01 AM EDT    Patient goals/plan/ treatment preferences discussed by  and . Patient goals/plan/ treatment preferences reviewed with patient/ family. Patient/ family verbalize understanding of discharge plan and are in agreement with goal/plan/treatment preferences. Understanding was demonstrated using the teach back method. AVS provided by RN at time of discharge, which includes all necessary medical information pertaining to the patients current course of illness, treatment, post-discharge goals of care, and treatment preferences. Services At/After Discharge: [facility] ([facility]) and In [address] [place] [address] ([facility]) ambulette       IMM Letter  IMM Letter given to Patient/Family/Significant other/Guardian/POA/by[de-identified] staff  IMM Letter date given[de-identified] 05/06/22  IMM Letter time given[de-identified] 1918  Observation Status Letter date given[de-identified] 05/09/22  Observation Status Letter time given[de-identified] 65  Observation Status Letter given to Patient/Family/Significant other/Guardian/POA/by[de-identified] Myron Mgr. Call from [facility], precert was approved. SW did let provider and nursing know this. SW call [facility] for ambulette transport, time set for 2pm. EITAN updated nurse, nurse reports she will let pt know, pt sleeping at this time    Call to [facility], updated on transport time. None known

## 2022-05-13 NOTE — PROGRESS NOTES
[address] [address]  INPATIENT PHYSICAL THERAPY  DAILY NOTE  [facility] MED 5K - 5K-26/026-A     Time In: 8428  Time Out: 0913  Timed Code Treatment Minutes: 23 Minutes  Minutes: 23          Date: 2022  Patient Name: Leoncio Mathur,  Gender:  male        MRN: [id number]  : 1950  (66 y.o.)     Referring Practitioner: LUCIUS Everett  Diagnosis: Compression fracture of L1 lumbar vertebra, closed  Additional Pertinent Hx: Per H&P, \"He is a 66 y.o. male presenting at [facility] by activation of level 2 trauma, brought by EMS following a mechanical fall with no LOC; past medical history CAD with CABG and stenting x5 on Brilinta, previous cervical surgery, previous lumbar surgery, DM with diabetic neuropathy, hypercholesterolemia, hypertension, depression. Per port patient was attempting to ambulate when he tripped over his foot causing him to fall face first onto the ground and hyperextended his lumbar spine much when he heard a crack and a pop and immediate pain. Patient reports 10/10 pain in the low back. Patient states he does have a known lumbar herniated disc. Reports nausea and vomiting secondary to pain. Patient denies chest pain, shortness of breath, cough, headache, dizziness, lightheadedness, numbness, paraesthesias, weakness, chills, fevers, abdominal pain, dysuria, frequent urination, and neck pain. Care in coordination with trauma surgeon Dr. Sindy Infante. \"     Prior Level of Function:  Lives With: Alone  Type of Home: Apartment  Home Layout: One level  Home Access: Elevator (2nd floor)  Home Equipment: Cane,Walker, 4 wheeled   Bathroom Shower/Tub: Tub/Shower unit  Bathroom Toilet: Handicap height  Bathroom Equipment: Shower chair    ADL Assistance: [address]: Independent  Transfer Assistance: Independent  Active : Yes  Additional Comments: Pt reported was using cane for mobility prior to admission.  Pt does not have any family close that would be able to assist at discharge. Restrictions/Precautions:  Restrictions/Precautions: Fall Risk,General Precautions  Required Braces or Orthoses  Spinal: Lumbar Corset  Position Activity Restriction  Spinal Precautions: No Bending,No Lifting,No Twisting  Other position/activity restrictions: Back brace when up, may don/doff in sitting      SUBJECTIVE: Pt resting in bed and reports being cold, but willing to participate. PAIN: 5/10: back    Vitals: Vitals not assessed per clinical judgement, see nursing flowsheet    OBJECTIVE:  Bed Mobility:  Supine to Sit: Minimal Assistance    Transfers:  Sit to Stand: Contact Guard Assistance  Stand to Sit:Stand By Assistance, with verbal cues to get centered on chair and hand placement    Ambulation:  Contact Guard Assistance  Distance: 15 ft  Surface: Level Tile  Device:Rolling Walker  Gait Deviations:  Decreased Step Length Bilaterally and Decreased Gait Speed    Balance:  Static Sitting Balance:  Supervision  Dynamic Sitting Balance: Stand By Assistance  Static Standing Balance: Contact Guard Assistance  Dynamic Standing Balance: Contact Guard Assistance    Exercise:  Patient was guided in 1 set(s) 10 reps of exercise to both lower extremities. Ankle pumps, Glut sets, Quad sets, Heelslides, Seated marches and Long arc quads. Exercises were completed for increased independence with functional mobility. Functional Outcome Measures: Completed  AM-PAC Inpatient Mobility without Stair Climbing Raw Score : 14  AM-PAC Inpatient without Stair Climbing T-Scale Score : 40.85    ASSESSMENT:  Assessment: Patient progressing toward established goals. Activity Tolerance:  Patient tolerance of  treatment: good.        Equipment Recommendations:Equipment Needed: Yes  Other: monitor for needs - Will likely need new RW/4WW  Discharge Recommendations: Continue to assess pending progress and Subacte/Skilled Nursing Facility  Plan: Current Treatment Recommendations: Strengthening,Balance training,Gait training,Functional mobility training,Transfer training,Neuromuscular re-education,Endurance training,Patient/Caregiver education & training,Safety education & training,Home exercise program,Equipment evaluation, education, & procurement,Therapeutic activities  Plan:  (6x O/T)    Patient Education  Patient Education: Plan of Care, Home Exercise Program    Goals:  Patient goals : go home  Short Term Goals  Time Frame for Short term goals: at discharge  Short term goal 1: Pt to be SBA for supine <> sit to get in/out of bed  Short term goal 2: Pt to be SBA for sit <> stand to get up to ambulate  Short term goal 3: Pt to ambulate >30 ft with RW with SBA for household distances  Short term goal 4: Pt to be able to don/doff back brace with Supervision for stabilization of lumbar fracture when up. Long Term Goals  Time Frame for Long term goals : not set due to short ELOS    Following session, patient left in safe position with all fall risk precautions in place. Inis Earing.  June Socks, [place] [address]

## 2022-05-13 NOTE — PLAN OF CARE
Problem: Musculoskeletal - Adult  Goal: Maintain proper alignment of affected body part  Outcome: Progressing  Flowsheets (Taken 5/12/2022 2000)  Maintain proper alignment of affected body part: Support and protect limb and body alignment per provider's orders     Problem: Musculoskeletal - Adult  Goal: Return mobility to safest level of function  Outcome: Progressing  Flowsheets (Taken 5/12/2022 2000)  Return Mobility to Safest Level of Function: Assess patient stability and activity tolerance for standing, transferring and ambulating with or without assistive devices     Problem: Skin/Tissue Integrity - Adult  Goal: Incisions, wounds, or drain sites healing without S/S of infection  Outcome: Progressing  Flowsheets  Taken 5/13/2022 0151  Incisions, Wounds, or Drain Sites Healing Without Sign and Symptoms of Infection: ADMISSION and DAILY: Assess and document risk factors for pressure ulcer development  Taken 5/12/2022 2000  Incisions, Wounds, or Drain Sites Healing Without Sign and Symptoms of Infection: ADMISSION and DAILY: Assess and document risk factors for pressure ulcer development     Problem: ABCDS Injury Assessment  Goal: Absence of physical injury  Outcome: Progressing  Flowsheets (Taken 5/13/2022 0151)  Absence of Physical Injury: Implement safety measures based on patient assessment     Problem: Chronic Conditions and Co-morbidities  Goal: Patient's chronic conditions and co-morbidity symptoms are monitored and maintained or improved  Outcome: Progressing  Flowsheets (Taken 5/12/2022 2000)  Care Plan - Patient's Chronic Conditions and Co-Morbidity Symptoms are Monitored and Maintained or Improved: Monitor and assess patient's chronic conditions and comorbid symptoms for stability, deterioration, or improvement       Problem: Safety - Adult  Goal: Free from fall injury  Outcome: Progressing  Flowsheets (Taken 5/13/2022 0151)  Free From Fall Injury: Instruct family/caregiver on patient safety Problem: Pain  Goal: Verbalizes/displays adequate comfort level or baseline comfort level  Outcome: Progressing  Flowsheets (Taken 5/12/2022 2000)  Verbalizes/displays adequate comfort level or baseline comfort level: Encourage patient to monitor pain and request assistance     Problem: Discharge Planning  Goal: Discharge to home or other facility with appropriate resources  Outcome: Progressing  Flowsheets (Taken 5/12/2022 2000)  Discharge to home or other facility with appropriate resources: Identify barriers to discharge with patient and caregiver    Care plan reviewed with patient. Patient verbalized understanding of the plan of care and contribute to goal setting.

## 2022-06-01 ENCOUNTER — CARE COORDINATION (OUTPATIENT)
Dept: FAMILY MEDICINE CLINIC | Age: 72
End: 2022-06-01

## 2022-06-01 NOTE — CARE COORDINATION
Hayde Montenegro is doing OK. He does have pain in his back and he said that he cannot have surgery d/t his diabetes ,wgt and age. He is wearing a back brace and he does have some pain. He also has some pain in his knees. His walker got bent when he fell but it is still somewhat useable so he is using it all the time. He has visiting nursing, PT and OT. They are working with his insurance to get him a new walker. His is 12years old. He is still suffering some grief from his mothers passing and the changes that come with that. I offered him reassurance with that and will call and check on him next week. His meds were reconciled and he has a follow up appointment next week.

## 2022-06-06 ENCOUNTER — OFFICE VISIT (OUTPATIENT)
Dept: FAMILY MEDICINE CLINIC | Age: 72
End: 2022-06-06

## 2022-06-06 VITALS
BODY MASS INDEX: 40.43 KG/M2 | SYSTOLIC BLOOD PRESSURE: 136 MMHG | RESPIRATION RATE: 16 BRPM | DIASTOLIC BLOOD PRESSURE: 78 MMHG | HEART RATE: 72 BPM | WEIGHT: 315 LBS | HEIGHT: 74 IN

## 2022-06-06 DIAGNOSIS — I25.708 CORONARY ARTERY DISEASE OF BYPASS GRAFT OF NATIVE HEART WITH STABLE ANGINA PECTORIS (HCC): ICD-10-CM

## 2022-06-06 DIAGNOSIS — E66.01 CLASS 3 SEVERE OBESITY WITH SERIOUS COMORBIDITY AND BODY MASS INDEX (BMI) OF 45.0 TO 49.9 IN ADULT, UNSPECIFIED OBESITY TYPE (HCC): ICD-10-CM

## 2022-06-06 DIAGNOSIS — Z79.4 TYPE 2 DIABETES MELLITUS WITH DIABETIC POLYNEUROPATHY, WITH LONG-TERM CURRENT USE OF INSULIN (HCC): ICD-10-CM

## 2022-06-06 DIAGNOSIS — I10 PRIMARY HYPERTENSION: ICD-10-CM

## 2022-06-06 DIAGNOSIS — M47.816 LUMBAR SPONDYLOSIS: ICD-10-CM

## 2022-06-06 DIAGNOSIS — E11.42 DIABETIC PERIPHERAL NEUROPATHY (HCC): ICD-10-CM

## 2022-06-06 DIAGNOSIS — E11.42 TYPE 2 DIABETES MELLITUS WITH DIABETIC POLYNEUROPATHY, WITH LONG-TERM CURRENT USE OF INSULIN (HCC): ICD-10-CM

## 2022-06-06 DIAGNOSIS — M48.061 SPINAL STENOSIS OF LUMBAR REGION WITHOUT NEUROGENIC CLAUDICATION: ICD-10-CM

## 2022-06-06 DIAGNOSIS — M46.1 SACROILIAC INFLAMMATION (HCC): ICD-10-CM

## 2022-06-06 DIAGNOSIS — I87.2 VENOUS STASIS DERMATITIS OF BOTH LOWER EXTREMITIES: Primary | ICD-10-CM

## 2022-06-06 DIAGNOSIS — E78.00 HYPERCHOLESTEREMIA: ICD-10-CM

## 2022-06-06 DIAGNOSIS — Z98.61 S/P PERCUTANEOUS TRANSLUMINAL CORONARY ANGIOPLASTY: ICD-10-CM

## 2022-06-06 DIAGNOSIS — S22.088S OTHER CLOSED FRACTURE OF TWELFTH THORACIC VERTEBRA, SEQUELA: ICD-10-CM

## 2022-06-06 PROCEDURE — G8427 DOCREV CUR MEDS BY ELIG CLIN: HCPCS | Performed by: FAMILY MEDICINE

## 2022-06-06 PROCEDURE — 99214 OFFICE O/P EST MOD 30 MIN: CPT | Performed by: FAMILY MEDICINE

## 2022-06-06 PROCEDURE — 1036F TOBACCO NON-USER: CPT | Performed by: FAMILY MEDICINE

## 2022-06-06 PROCEDURE — G8417 CALC BMI ABV UP PARAM F/U: HCPCS | Performed by: FAMILY MEDICINE

## 2022-06-06 PROCEDURE — 3017F COLORECTAL CA SCREEN DOC REV: CPT | Performed by: FAMILY MEDICINE

## 2022-06-06 PROCEDURE — 1123F ACP DISCUSS/DSCN MKR DOCD: CPT | Performed by: FAMILY MEDICINE

## 2022-06-06 RX ORDER — FUROSEMIDE 40 MG/1
40 TABLET ORAL EVERY EVENING
Qty: 60 TABLET | Refills: 3 | Status: SHIPPED | OUTPATIENT
Start: 2022-06-06

## 2022-06-06 SDOH — ECONOMIC STABILITY: FOOD INSECURITY: WITHIN THE PAST 12 MONTHS, YOU WORRIED THAT YOUR FOOD WOULD RUN OUT BEFORE YOU GOT MONEY TO BUY MORE.: NEVER TRUE

## 2022-06-06 SDOH — ECONOMIC STABILITY: FOOD INSECURITY: WITHIN THE PAST 12 MONTHS, THE FOOD YOU BOUGHT JUST DIDN'T LAST AND YOU DIDN'T HAVE MONEY TO GET MORE.: NEVER TRUE

## 2022-06-06 ASSESSMENT — ENCOUNTER SYMPTOMS
ORTHOPNEA: 0
VISUAL CHANGE: 0

## 2022-06-06 ASSESSMENT — SOCIAL DETERMINANTS OF HEALTH (SDOH): HOW HARD IS IT FOR YOU TO PAY FOR THE VERY BASICS LIKE FOOD, HOUSING, MEDICAL CARE, AND HEATING?: NOT HARD AT ALL

## 2022-06-06 NOTE — PROGRESS NOTES
Subjective:      Patient ID: Aury Anderson is a 66 y.o. male. Memory  At nursing  Home           Compression  fx  Noted and  Stable    T12 and  L1 fx  Stable     In  Hospital   5  Days and then   To    [facility]       Was  At  [address]    With  Back   Brace   And  Better  After  In hospital      Stable and  Pain    Noted   Ortho  does  Follow       Peripheral  Neuropathy  Noted         Venous  Stasis  dermatitis   Noted    With  Venous  stasis Noted and  80  Mg  Am and  40  Mg  In pm       ashd  stable      gerd  Stable     Hypertension  This is a new problem. The current episode started more than 1 year ago. The problem has been resolved since onset. The problem is controlled. Pertinent negatives include no chest pain, orthopnea, palpitations or peripheral edema. The current treatment provides significant improvement. There are no compliance problems. Hyperlipidemia  This is a chronic problem. The current episode started more than 1 year ago. The problem is controlled. Recent lipid tests were reviewed and are normal. Pertinent negatives include no chest pain, focal sensory loss, focal weakness or leg pain. Myalgias:   diabetes. Current antihyperlipidemic treatment includes statins. The current treatment provides significant improvement of lipids. There are no compliance problems. Diabetes  He presents for his follow-up diabetic visit. He has type 2 diabetes mellitus. His disease course has been stable. There are no hypoglycemic associated symptoms. Pertinent negatives for diabetes include no chest pain, no polyphagia, no polyuria and no visual change. There are no hypoglycemic complications. Symptoms are stable. There are no diabetic complications. Current diabetic treatment includes insulin injections and oral agent (monotherapy). He is compliant with treatment all of the time.  His breakfast blood glucose is taken between 7-8 am. His breakfast blood glucose range is generally 110-130 mg/dl.      Past Medical History:   Diagnosis Date    RAUL (acute kidney injury) ([facility] [address] 2/13/2020    ASHD (arteriosclerotic heart disease) 2005 2006    stent  baki    Depression     Diabetic peripheral neuropathy [facility]) 2014    Fracture Oct 1984    left lower extremity     HTN (hypertension)     Hypercholesteremia     IDDM (insulin dependent diabetes mellitus)     Low back pain     Morbid obesity with BMI of 45.0-49.9, adult ([facility]     Osteoarthritis      Past Surgical History:   Procedure Laterality Date    AMPUTATION Left 9/10/14    partial versus complete left hallux amputation, left great toe amputation    APPENDECTOMY      BACK INJECTION Bilateral 1/18/2021    Bilateral Si MBB #1 performed by Dipesh Clifton MD at [address] Bilateral 3/1/2021    Bilateral SI MBB #2 performed by Dipesh Clifton MD at [facility]    fusion of C5-C6    CHOLECYSTECTOMY      COLONOSCOPY  2007 and 2011    colonn polyps  lorenzo    CORONARY ANGIOPLASTY WITH STENT PLACEMENT  08/07/2017    Dr Kaitlynn Schmidt @ [address] [place] [address] CORONARY ARTERY BYPASS GRAFT  2015 august dox    EKG 12-LEAD  8/14/2015         FACET JOINT INJECTION Bilateral 5/22/2020    Lumbar Facet MBB @L3-4,4-5 bilateral #2 performed by Dipesh Clifton MD at [address] [place] [address]      left leg    JOINT REPLACEMENT      bilateral knees gurvinder    PAIN MANAGEMENT PROCEDURE Bilateral 1/28/2020    Lumbar Facet MBB @ L3-4,4-5,5-S1 bilateral #1 LUMBAR FACET performed by Dipesh Clifton MD at [place] [address] Right 7/14/2020    Lumbar RFA Bilateral L3-4,4-5  right side first performed by Dipesh Clifton MD at [place] [address] Left 10/1/2020    Lumbar RFA Left side L3-4, 4-5 performed by Dipesh Clifton MD at [address] [place] [address] MANAGEMENT PROCEDURE Bilateral 11/17/2020    TFLESI @L5 bilateral #1 performed by Ruben Alvarez MD at [facility] Bilateral 12/10/2020    TFLESI @L5 bilateral #1 performed by Ruben Alvarez MD at [address] N/A 12/28/2018    T7-T9 DECOMPRESSION, T7-T9 POSTERIOR FUSION performed by Rafaela King MD at [facility]  2010    fumich    TONSILLECTOMY      VASCULAR SURGERY      cabg harvests from left leg     Social History     Socioeconomic History    Marital status:      Spouse name: Not on file    Number of children: 2    Years of education: Not on file    Highest education level: Not on file   Occupational History    Not on file   Tobacco Use    Smoking status: Never Smoker    Smokeless tobacco: Never Used   Vaping Use    Vaping Use: Never used   Substance and Sexual Activity    Alcohol use: Not Currently     Comment: rarely    Drug use: No    Sexual activity: Never   Other Topics Concern    Not on file   Social History Narrative    Not on file     Social Determinants of Health     Financial Resource Strain: Low Risk     Difficulty of Paying Living Expenses: Not hard at all   Food Insecurity: No Food Insecurity    Worried About [facility] in the Last Year: Never true    [address] [facility] in the Last Year: Never true   Transportation Needs:     Lack of Transportation (Medical): Not on file    Lack of Transportation (Non-Medical):  Not on file   Physical Activity: Inactive    Days of Exercise per Week: 0 days    Minutes of Exercise per Session: 10 min   Stress:     Feeling of Stress : Not on file   Social Connections:     Frequency of Communication with Friends and Family: Not on file    Frequency of Social Gatherings with Friends and Family: Not on file    Attends [employer] Services: Not on file   [employer] or Organizations: Not on file    Attends Club or Organization Meetings: Not on file    Marital Status: Not on file   Intimate Partner Violence:     Fear of Current or Ex-Partner: Not on file    Emotionally Abused: Not on file    Physically Abused: Not on file    Sexually Abused: Not on file   Housing Stability:     Unable to Pay for Housing in the Last Year: Not on file    Number of [place] in the Last Year: Not on file    Unstable Housing in the Last Year: Not on file     Family History   Problem Relation Age of Onset    Cancer Mother         uterine     Review of Systems   Cardiovascular: Negative for chest pain, palpitations and orthopnea. Endocrine: Negative for polyphagia and polyuria. Musculoskeletal: Myalgias:   diabetes. Neurological: Negative for focal weakness. /78 (Site: Right Upper Arm, Position: Sitting, Cuff Size: Medium Adult)   Pulse 72   Resp 16   Ht 6' 2\" (1.88 m)   Wt (!) 359 lb (162.8 kg) Comment: per [facility]  BMI 46.09 kg/m²   Objective:   Physical Exam  Vitals and nursing note reviewed. Constitutional:       Appearance: He is well-developed. HENT:      Head: Normocephalic and atraumatic. Right Ear: External ear normal.      Left Ear: External ear normal.      Nose: Nose normal.   Eyes:      Conjunctiva/sclera: Conjunctivae normal.      Pupils: Pupils are equal, round, and reactive to light. Comments: Fundi nl   Neck:      Thyroid: No thyromegaly. Cardiovascular:      Rate and Rhythm: Normal rate and regular rhythm. Heart sounds: Normal heart sounds. Pulmonary:      Effort: Pulmonary effort is normal.      Breath sounds: Normal breath sounds. No wheezing or rales. Abdominal:      General: Bowel sounds are normal.      Palpations: Abdomen is soft. There is no mass. Tenderness: There is no abdominal tenderness. Musculoskeletal:         General: Normal range of motion. Cervical back: Normal range of motion and neck supple. Right lower leg: Edema present. Left lower leg: Edema present. Comments:   3  To  4 + edema    Lymphadenopathy:      Cervical: No cervical adenopathy. Skin:     General: Skin is warm and dry. Findings: No rash. Neurological:      Mental Status: He is alert and oriented to person, place, and time. Cranial Nerves: No cranial nerve deficit. Deep Tendon Reflexes: Reflexes are normal and symmetric. Assessment:       Diagnosis Orders   1. Venous stasis dermatitis of both lower extremities  furosemide (LASIX) 40 MG tablet   2. Other closed fracture of twelfth thoracic vertebra, sequela     3. Spinal stenosis of lumbar region without neurogenic claudication     4. Sacroiliac inflammation ([address]     5. Lumbar spondylosis     6. Type 2 diabetes mellitus with diabetic polyneuropathy, with long-term current use of insulin ([address]     7. Primary hypertension     8. Hypercholesteremia     9. Coronary artery disease of bypass graft of native heart with stable angina pectoris ([address]     10. Diabetic peripheral neuropathy ([facility] [address]     11. S/P percutaneous transluminal coronary angioplasty     12.  Class 3 severe obesity with serious comorbidity and body mass index (BMI) of 45.0 to 49.9 in adult, unspecified obesity type ([address]           Plan:      Current Outpatient Medications   Medication Sig Dispense Refill    furosemide (LASIX) 40 MG tablet Take 1 tablet by mouth every evening At  6 00 pm 60 tablet 3    furosemide (LASIX) 80 MG tablet Take one in am (80) and 1/2 tab (40) in pm 60 tablet 3    enalapril (VASOTEC) 10 MG tablet One a day 90 tablet 1    atorvastatin (LIPITOR) 10 MG tablet TAKE 1 TABLET BY MOUTH ONCE DAILY 90 tablet 1    ACCU-CHEK SMARTVIEW strip Use to test Blood Sugar 3x daily, Dx: E11.9 100 each 11    albuterol sulfate  (90 Base) MCG/ACT inhaler Inhale 2 puffs into the lungs 2 times daily 18 g 3    Urea (CARMOL) 40 % cream Use   Bid  To  The  legs 85 g 0    tiZANidine (ZANAFLEX) 4 MG tablet Take 1 tablet by mouth every 6 hours as needed (Muscle Spasms) 30 tablet 1    insulin aspart (NOVOLOG FLEXPEN) 100 UNIT/ML injection pen Inject 18 Units into the skin 3 times daily (before meals) INJECT 12 UNITS SUBCUTANEOUSLY 3 TIMES DAILY BEFORE MEALS (Patient taking differently: Inject 12 Units into the skin 3 times daily (before meals) INJECT 12 UNITS SUBCUTANEOUSLY 3 TIMES DAILY BEFORE MEALS) 10 pen 3    ticagrelor (BRILINTA) 90 MG TABS tablet Take 1 tablet by mouth twice daily 180 tablet 1    metFORMIN (GLUCOPHAGE) 500 MG tablet TAKE TWO TABLETS BY MOUTH TWICE DAILY WITH MEALS 360 tablet 1    ferrous sulfate (IRON 325) 325 (65 Fe) MG tablet Take 1 tablet by mouth 2 times daily 60 tablet 5    Multiple Vitamins-Minerals (THERAPEUTIC MULTIVITAMIN-MINERALS) tablet Take 1 tablet by mouth daily 30 tablet 11    insulin glargine (LANTUS SOLOSTAR) 100 UNIT/ML injection pen INJECT 25 UNITS SUBCUTANEOUSLY ONCE DAILY (Patient taking differently: Inject 30 Units into the skin INJECT 30 UNITS SUBCUTANEOUSLY ONCE DAILY) 15 pen 1    ondansetron (ZOFRAN) 4 MG tablet Take 1 tablet by mouth every 8 hours as needed for Nausea or Vomiting 30 tablet 0    pantoprazole (PROTONIX) 40 MG tablet Take 1 tablet by mouth every morning (before breakfast) 30 tablet 0    nitroGLYCERIN (NITROSTAT) 0.4 MG SL tablet Place 1 tablet under the tongue every 5 minutes as needed for Chest pain 25 tablet 3    aspirin 81 MG tablet Take 1 tablet by mouth daily      acetaminophen (TYLENOL) 500 MG tablet Take 500 mg by mouth as needed for Pain      carvedilol (COREG) 12.5 MG tablet Take 1 tablet by mouth 2 times daily 180 tablet 2     No current facility-administered medications for this visit. No orders of the defined types were placed in this encounter.     Orders Placed This Encounter   Procedures    Basic Metabolic Panel     Standing Status:   Future     Standing Expiration Date:   6/6/2023    CBC with Auto Differential     Standing Status:   Future     Standing Expiration Date:   6/6/2023    Hemoglobin A1C     Standing Status:   Future     Standing Expiration Date:   6/6/2023         See  In  2  mths     From nursing  Home      some  Anemia  Needs new walker with back weakness  Compression fx    Valentine Lonnie was evaluated today and a DME order was entered for a wheeled walker because he requires this to successfully complete daily living tasks of eating, personal cares, ambulating, grooming and dressing upper body. A wheeled walker is necessary due to the patient's unsteady gait, upper body weakness, and inability to  an ambulation device; and he can ambulate only by pushing a walker instead of a lesser assistive device such as a cane, crutch, or standard walker. The need for this equipment was discussed with the patient and he understands and is in agreement.    Cara Zimmerman MD

## 2022-06-10 ENCOUNTER — TELEPHONE (OUTPATIENT)
Dept: FAMILY MEDICINE CLINIC | Age: 72
End: 2022-06-10

## 2022-06-10 DIAGNOSIS — M48.061 SPINAL STENOSIS OF LUMBAR REGION WITHOUT NEUROGENIC CLAUDICATION: ICD-10-CM

## 2022-06-10 DIAGNOSIS — S22.088S OTHER CLOSED FRACTURE OF TWELFTH THORACIC VERTEBRA, SEQUELA: Primary | ICD-10-CM

## 2022-06-10 NOTE — TELEPHONE ENCOUNTER
Pt called said that his current walker he fell on it and it is currently bent and very wobbly. He is wanting some place to deliver a new walker if possible. Said it doesn't matter where he gets it from.     Please call pt back at [phone]

## 2022-06-13 DIAGNOSIS — Z79.4 TYPE 2 DIABETES MELLITUS WITH OTHER SPECIFIED COMPLICATION, WITH LONG-TERM CURRENT USE OF INSULIN (HCC): ICD-10-CM

## 2022-06-13 DIAGNOSIS — D64.9 ANEMIA, UNSPECIFIED TYPE: ICD-10-CM

## 2022-06-13 DIAGNOSIS — E11.69 TYPE 2 DIABETES MELLITUS WITH OTHER SPECIFIED COMPLICATION, WITH LONG-TERM CURRENT USE OF INSULIN (HCC): ICD-10-CM

## 2022-06-13 NOTE — TELEPHONE ENCOUNTER
Date of last visit:  6/6/2022  Date of next visit:  7/12/2022    Requested Prescriptions     Pending Prescriptions Disp Refills    ferrous sulfate (IRON 325) 325 (65 Fe) MG tablet 60 tablet 5     Sig: Take 1 tablet by mouth 2 times daily    metFORMIN (GLUCOPHAGE) 500 MG tablet 360 tablet 1     Sig: TAKE TWO TABLETS BY MOUTH TWICE DAILY WITH MEALS

## 2022-06-14 RX ORDER — FERROUS SULFATE 325(65) MG
325 TABLET ORAL 2 TIMES DAILY
Qty: 60 TABLET | Refills: 5 | Status: SHIPPED | OUTPATIENT
Start: 2022-06-14

## 2022-06-14 NOTE — TELEPHONE ENCOUNTER
Andreina Chaudhari informed by Phone. He stated he has a Walker with Double wheels in the front, no seat.

## 2022-06-14 NOTE — TELEPHONE ENCOUNTER
Does he have a wheeled walker  In front as order need to specify    Please call     as can add verbage to the 6-6-22 appt

## 2022-06-28 DIAGNOSIS — N18.31 STAGE 3A CHRONIC KIDNEY DISEASE (HCC): Primary | ICD-10-CM

## 2022-06-28 DIAGNOSIS — I10 ESSENTIAL HYPERTENSION: ICD-10-CM

## 2022-06-28 NOTE — TELEPHONE ENCOUNTER
Date of last visit:  6/6/2022  Date of next visit:  7/12/2022    Requested Prescriptions     Pending Prescriptions Disp Refills    ticagrelor (BRILINTA) 90 MG TABS tablet 180 tablet 1     Sig: Take 1 tablet by mouth twice daily

## 2022-07-06 ENCOUNTER — NURSE ONLY (OUTPATIENT)
Dept: LAB | Age: 72
End: 2022-07-06

## 2022-07-06 DIAGNOSIS — N18.31 STAGE 3A CHRONIC KIDNEY DISEASE (HCC): ICD-10-CM

## 2022-07-06 DIAGNOSIS — I10 ESSENTIAL HYPERTENSION: ICD-10-CM

## 2022-07-06 LAB
ANION GAP SERPL CALCULATED.3IONS-SCNC: 15 MEQ/L (ref 8–16)
BUN BLDV-MCNC: 40 MG/DL (ref 7–22)
CALCIUM SERPL-MCNC: 9.3 MG/DL (ref 8.5–10.5)
CHLORIDE BLD-SCNC: 104 MEQ/L (ref 98–111)
CO2: 23 MEQ/L (ref 23–33)
CREAT SERPL-MCNC: 1.6 MG/DL (ref 0.4–1.2)
GFR SERPL CREATININE-BSD FRML MDRD: 42 ML/MIN/1.73M2
GLUCOSE BLD-MCNC: 122 MG/DL (ref 70–108)
POTASSIUM SERPL-SCNC: 4.9 MEQ/L (ref 3.5–5.2)
SODIUM BLD-SCNC: 142 MEQ/L (ref 135–145)

## 2022-07-08 ENCOUNTER — OFFICE VISIT (OUTPATIENT)
Dept: FAMILY MEDICINE CLINIC | Age: 72
End: 2022-07-08

## 2022-07-08 VITALS
BODY MASS INDEX: 40.43 KG/M2 | SYSTOLIC BLOOD PRESSURE: 122 MMHG | WEIGHT: 315 LBS | DIASTOLIC BLOOD PRESSURE: 70 MMHG | RESPIRATION RATE: 18 BRPM | HEIGHT: 74 IN | HEART RATE: 76 BPM

## 2022-07-08 DIAGNOSIS — N35.916 STRICTURE OF OVERLAPPING SITES OF URETHRA IN MALE, UNSPECIFIED STRICTURE TYPE: ICD-10-CM

## 2022-07-08 DIAGNOSIS — I10 PRIMARY HYPERTENSION: ICD-10-CM

## 2022-07-08 DIAGNOSIS — E11.69 TYPE 2 DIABETES MELLITUS WITH OTHER SPECIFIED COMPLICATION, WITH LONG-TERM CURRENT USE OF INSULIN (HCC): Primary | ICD-10-CM

## 2022-07-08 DIAGNOSIS — Z79.4 TYPE 2 DIABETES MELLITUS WITH OTHER SPECIFIED COMPLICATION, WITH LONG-TERM CURRENT USE OF INSULIN (HCC): Primary | ICD-10-CM

## 2022-07-08 DIAGNOSIS — I25.708 CORONARY ARTERY DISEASE OF BYPASS GRAFT OF NATIVE HEART WITH STABLE ANGINA PECTORIS (HCC): ICD-10-CM

## 2022-07-08 DIAGNOSIS — S22.088S OTHER CLOSED FRACTURE OF TWELFTH THORACIC VERTEBRA, SEQUELA: ICD-10-CM

## 2022-07-08 DIAGNOSIS — E11.42 DIABETIC PERIPHERAL NEUROPATHY (HCC): ICD-10-CM

## 2022-07-08 LAB
CHP ED QC CHECK: ABNORMAL
CHP ED QC CHECK: ABNORMAL
GLUCOSE BLD-MCNC: 56 MG/DL
GLUCOSE BLD-MCNC: 67 MG/DL
HBA1C MFR BLD: 6.3 %

## 2022-07-08 PROCEDURE — 1036F TOBACCO NON-USER: CPT | Performed by: FAMILY MEDICINE

## 2022-07-08 PROCEDURE — 1123F ACP DISCUSS/DSCN MKR DOCD: CPT | Performed by: FAMILY MEDICINE

## 2022-07-08 PROCEDURE — 3017F COLORECTAL CA SCREEN DOC REV: CPT | Performed by: FAMILY MEDICINE

## 2022-07-08 PROCEDURE — 83036 HEMOGLOBIN GLYCOSYLATED A1C: CPT | Performed by: FAMILY MEDICINE

## 2022-07-08 PROCEDURE — 99213 OFFICE O/P EST LOW 20 MIN: CPT | Performed by: FAMILY MEDICINE

## 2022-07-08 PROCEDURE — G8427 DOCREV CUR MEDS BY ELIG CLIN: HCPCS | Performed by: FAMILY MEDICINE

## 2022-07-08 PROCEDURE — 82962 GLUCOSE BLOOD TEST: CPT | Performed by: FAMILY MEDICINE

## 2022-07-08 PROCEDURE — G8417 CALC BMI ABV UP PARAM F/U: HCPCS | Performed by: FAMILY MEDICINE

## 2022-07-08 RX ORDER — AMLODIPINE BESYLATE 5 MG/1
5 TABLET ORAL DAILY
COMMUNITY
Start: 2022-06-18

## 2022-07-08 NOTE — PROGRESS NOTES
Subjective:      Patient ID: Lalo Nolasco is a 74 y.o. male. ashd  Stable       Groin lesion noted      ht n  Stable      Lumbar  fx  stable         Diabetes  He presents for his follow-up diabetic visit. He has type 2 diabetes mellitus. His disease course has been stable. There are no diabetic associated symptoms. There are no hypoglycemic complications. Symptoms are stable. There are no diabetic complications. Current diabetic treatment includes insulin injections. He is compliant with treatment all of the time. His weight is stable. He is following a diabetic diet. Meal planning includes avoidance of concentrated sweets. He monitors blood glucose at home 3-4 x per day. Blood glucose monitoring compliance is good. An ACE inhibitor/angiotensin II receptor blocker is being taken. Past Medical History:   Diagnosis Date    RAUL (acute kidney injury) ([facility] [address] 2/13/2020    ASHD (arteriosclerotic heart disease) 2005 2006    stent  baki    Depression     Diabetic peripheral neuropathy [facility]) 2014    Fracture Oct 1984    left lower extremity     HTN (hypertension)     Hypercholesteremia     IDDM (insulin dependent diabetes mellitus)     Low back pain     Morbid obesity with BMI of 45.0-49.9, adult ([facility] [address] [facility]     Osteoarthritis      Review of Systems    Objective:   Physical Exam  Vitals and nursing note reviewed. Constitutional:       Appearance: He is well-developed. HENT:      Head: Normocephalic and atraumatic. Right Ear: External ear normal.      Left Ear: External ear normal.      Nose: Nose normal.   Eyes:      Conjunctiva/sclera: Conjunctivae normal.      Pupils: Pupils are equal, round, and reactive to light. Comments: Fundi nl   Neck:      Thyroid: No thyromegaly. Cardiovascular:      Rate and Rhythm: Normal rate and regular rhythm. Heart sounds: Normal heart sounds. Pulmonary:      Effort: Pulmonary effort is normal.      Breath sounds: Normal breath sounds.  No wheezing or rales.   Abdominal:      General: Bowel sounds are normal.      Palpations: Abdomen is soft. There is no mass. Tenderness: There is no abdominal tenderness. Genitourinary:     Comments:     Right  testicle  Sac  With  Cyst noted      Short  shaft  Of penis  Musculoskeletal:         General: Normal range of motion. Cervical back: Normal range of motion and neck supple. Lymphadenopathy:      Cervical: No cervical adenopathy. Skin:     General: Skin is warm and dry. Findings: No rash. Neurological:      Mental Status: He is alert and oriented to person, place, and time. Cranial Nerves: No cranial nerve deficit. Deep Tendon Reflexes: Reflexes are normal and symmetric. Assessment:       Diagnosis Orders   1. Type 2 diabetes mellitus with other specified complication, with long-term current use of insulin ([facility])  POCT glycosylated hemoglobin (Hb A1C)    POCT Glucose    POCT Glucose   2. Other closed fracture of twelfth thoracic vertebra, sequela     3. Primary hypertension     4. Coronary artery disease of bypass graft of native heart with stable angina pectoris ([facility] [address]     5.  Diabetic peripheral neuropathy ([facility])     6. Stricture of overlapping sites of urethra in male, unspecified stricture type           Plan:      Current Outpatient Medications   Medication Sig Dispense Refill    amLODIPine (NORVASC) 5 MG tablet       ticagrelor (BRILINTA) 90 MG TABS tablet Take 1 tablet by mouth twice daily 180 tablet 1    ferrous sulfate (IRON 325) 325 (65 Fe) MG tablet Take 1 tablet by mouth 2 times daily 60 tablet 5    metFORMIN (GLUCOPHAGE) 500 MG tablet TAKE TWO TABLETS BY MOUTH TWICE DAILY WITH MEALS 360 tablet 1    furosemide (LASIX) 40 MG tablet Take 1 tablet by mouth every evening At  6 00 pm 60 tablet 3    furosemide (LASIX) 80 MG tablet Take one in am (80) and 1/2 tab (40) in pm 60 tablet 3    enalapril (VASOTEC) 10 MG tablet One a day 90 tablet 1    atorvastatin (LIPITOR) 10 MG tablet TAKE 1 TABLET BY MOUTH ONCE DAILY 90 tablet 1    ACCU-CHEK SMARTVIEW strip Use to test Blood Sugar 3x daily, Dx: E11.9 100 each 11    albuterol sulfate  (90 Base) MCG/ACT inhaler Inhale 2 puffs into the lungs 2 times daily 18 g 3    Urea (CARMOL) 40 % cream Use   Bid  To  The  legs 85 g 0    carvedilol (COREG) 12.5 MG tablet Take 1 tablet by mouth 2 times daily 180 tablet 2    tiZANidine (ZANAFLEX) 4 MG tablet Take 1 tablet by mouth every 6 hours as needed (Muscle Spasms) 30 tablet 1    insulin aspart (NOVOLOG FLEXPEN) 100 UNIT/ML injection pen Inject 18 Units into the skin 3 times daily (before meals) INJECT 12 UNITS SUBCUTANEOUSLY 3 TIMES DAILY BEFORE MEALS (Patient taking differently: Inject 12 Units into the skin 3 times daily (before meals) INJECT 12 UNITS SUBCUTANEOUSLY 3 TIMES DAILY BEFORE MEALS) 10 pen 3    Multiple Vitamins-Minerals (THERAPEUTIC MULTIVITAMIN-MINERALS) tablet Take 1 tablet by mouth daily 30 tablet 11    insulin glargine (LANTUS SOLOSTAR) 100 UNIT/ML injection pen INJECT 25 UNITS SUBCUTANEOUSLY ONCE DAILY (Patient taking differently: Inject 30 Units into the skin INJECT 30 UNITS SUBCUTANEOUSLY ONCE DAILY) 15 pen 1    ondansetron (ZOFRAN) 4 MG tablet Take 1 tablet by mouth every 8 hours as needed for Nausea or Vomiting 30 tablet 0    pantoprazole (PROTONIX) 40 MG tablet Take 1 tablet by mouth every morning (before breakfast) 30 tablet 0    nitroGLYCERIN (NITROSTAT) 0.4 MG SL tablet Place 1 tablet under the tongue every 5 minutes as needed for Chest pain 25 tablet 3    aspirin 81 MG tablet Take 1 tablet by mouth daily      acetaminophen (TYLENOL) 500 MG tablet Take 500 mg by mouth as needed for Pain       No current facility-administered medications for this visit.      Orders Placed This Encounter   Procedures   Diamond Abel MD, Urology, [facility]     Referral Priority:   Routine     Referral Type:   Eval and Treat     Referral Reason:   Specialty Services Required     Referred to Provider:   Fernando Ragsdale MD     Requested Specialty:   Urology     Number of Visits Requested:   1    POCT glycosylated hemoglobin (Hb A1C)    POCT Glucose    POCT Glucose     Results for orders placed or performed in visit on 07/08/22   POCT glycosylated hemoglobin (Hb A1C)   Result Value Ref Range    Hemoglobin A1C 6.3 %   POCT Glucose   Result Value Ref Range    Glucose 56 (A) mg/dL    QC OK? POCT Glucose   Result Value Ref Range    Glucose 67 (A) mg/dL    QC OK? Travis Seaman received counseling on the following healthy behaviors: nutrition and exercise    Patient given educational materials on Diabetes and Hyperlipidemia    I have instructed Travis Seaman to complete a self tracking handout on Blood Sugars  and Blood Pressures  and instructed them to bring it with them to his next appointment. Discussed use, benefit, and side effects of prescribed medications. Barriers to medication compliance addressed. All patient questions answered. Pt voiced understanding.            Right  Sac  Non infected  Cyst  Of the  Testicle  sac  Warm  compresses       Short   Shaft and   stenosis  as  See  urology    Alex Contreras MD

## 2022-07-27 ENCOUNTER — OFFICE VISIT (OUTPATIENT)
Dept: UROLOGY | Age: 72
End: 2022-07-27
Payer: MEDICARE

## 2022-07-27 VITALS
SYSTOLIC BLOOD PRESSURE: 126 MMHG | HEIGHT: 74 IN | WEIGHT: 315 LBS | DIASTOLIC BLOOD PRESSURE: 78 MMHG | BODY MASS INDEX: 40.43 KG/M2

## 2022-07-27 DIAGNOSIS — I86.1 BILATERAL VARICOCELES: ICD-10-CM

## 2022-07-27 DIAGNOSIS — N43.3 HYDROCELE, BILATERAL: Primary | ICD-10-CM

## 2022-07-27 PROCEDURE — G8427 DOCREV CUR MEDS BY ELIG CLIN: HCPCS | Performed by: UROLOGY

## 2022-07-27 PROCEDURE — 99213 OFFICE O/P EST LOW 20 MIN: CPT | Performed by: UROLOGY

## 2022-07-27 PROCEDURE — G8417 CALC BMI ABV UP PARAM F/U: HCPCS | Performed by: UROLOGY

## 2022-07-27 PROCEDURE — 1123F ACP DISCUSS/DSCN MKR DOCD: CPT | Performed by: UROLOGY

## 2022-07-27 PROCEDURE — 1036F TOBACCO NON-USER: CPT | Performed by: UROLOGY

## 2022-07-27 PROCEDURE — 3017F COLORECTAL CA SCREEN DOC REV: CPT | Performed by: UROLOGY

## 2022-07-27 NOTE — PROGRESS NOTES
KARINA Richard MD        [address] [id number]  Dept: [phone]  Dept Fax: [phone]: [address] [address] Urology Office Note -     Patient:  Jem Rosario  YOB: 1950  Date: 7/27/2022    The patient is a 74 y.o. male who presents today for evaluation of the following problems:   Chief Complaint   Patient presents with    New Patient     Urethral stricture     referred/consultation requested by Donald Haque MD.    HISTORY OF PRESENT ILLNESS:     Wants to be called moose    Hydroceles  Bilateral hydroceles  Hx of varicoceles/scrotal edema in the past  Buried penis  Has to sit down to urinate--hard to get it out to urinate        Requested/reviewed records from Donald Haque MD office and/or outside physician/EMR    (Patient's old records have been requested, reviewed and pertinent findings summarized in today's note.)    Procedures Today: N/A      Last several PSA's:  Lab Results   Component Value Date    PSA 0.43 05/18/2021    PSA 0.25 05/30/2017       Last total testosterone:  No results found for: TESTOSTERONE    Urinalysis today:  No results found for this visit on 07/27/22. Last BUN and creatinine:  Lab Results   Component Value Date    BUN 40 (H) 07/06/2022     Lab Results   Component Value Date    CREATININE 1.6 (H) 07/06/2022         Imaging Reviewed during this Office Visit:   Roberto Varela MD independently reviewed the images and verified the radiology reports from:    [address]    Result Date: 8/9/2021  PROCEDURE: US SCROTUM AND TESTICLES CLINICAL INFORMATION: scrotal pain COMPARISON: No prior study. TECHNIQUE: Multiple grayscale and color flow sonographic images of both testicles and other scrotal contents were obtained. Spectral Doppler waveforms were also generated.  FINDINGS: Right Testicle- 4.4 x 2.4 x 2.9 cm Right Epididymis- 1.3 x 0.9 x 1.3 cm Left Testicle - 4.7 x 2.7 x 3.0 cm Left Epididymis - 0.9 x 1.4 x 0.7 cm Testicles: Both testicles are normal in size, contour, and echogenicity. Normal vascular color flow demonstrated to both testicles. Spectral Doppler waveforms of both testicles are normal. Epididymi:  Small 3 mm epididymal head cyst right side. Hydrocele: Moderate size hydroceles bilaterally. Varicocele: Moderate-sized varicocele bilaterally. 1. No acute findings. 2. Moderate-sized bilateral varicoceles. 3. Moderate-sized bilateral hydroceles. **This report has been created using voice recognition software. It may contain minor errors which are inherent in voice recognition technology. ** Final report electronically signed by Dr. Amy Vasques on 8/9/2021 6:43 PM      PAST MEDICAL, FAMILY AND SOCIAL HISTORY:  Past Medical History:   Diagnosis Date    RAUL (acute kidney injury) ([facility] [address] 2/13/2020    ASHD (arteriosclerotic heart disease) 2005 2006    stent  baki    Depression     Diabetic peripheral neuropathy ([facility] [address] 2014    Fracture Oct 1984    left lower extremity     HTN (hypertension)     Hypercholesteremia     IDDM (insulin dependent diabetes mellitus)     Low back pain     Morbid obesity with BMI of 45.0-49.9, adult [facility])     Osteoarthritis      Past Surgical History:   Procedure Laterality Date    AMPUTATION Left 9/10/14    partial versus complete left hallux amputation, left great toe amputation    APPENDECTOMY      BACK INJECTION Bilateral 1/18/2021    Bilateral Si MBB #1 performed by Flavia Mireles MD at [facility] 3/1/2021    Bilateral SI MBB #2 performed by Flavia Mireles MD at [place] [address]    fusion of C5-C6    CHOLECYSTECTOMY      COLONOSCOPY  2007 and 2011    colonn polyps  lorenzo    CORONARY ANGIOPLASTY WITH STENT PLACEMENT  08/07/2017    Dr Sravani Perez @ [address] GRAFT  2015 august furosemide (LASIX) 80 MG tablet Take one in am (80) and 1/2 tab (40) in pm 60 tablet 3    enalapril (VASOTEC) 10 MG tablet One a day 90 tablet 1    atorvastatin (LIPITOR) 10 MG tablet TAKE 1 TABLET BY MOUTH ONCE DAILY 90 tablet 1    ACCU-CHEK SMARTVIEW strip Use to test Blood Sugar 3x daily, Dx: E11.9 100 each 11    albuterol sulfate  (90 Base) MCG/ACT inhaler Inhale 2 puffs into the lungs 2 times daily 18 g 3    Urea (CARMOL) 40 % cream Use   Bid  To  The  legs 85 g 0    tiZANidine (ZANAFLEX) 4 MG tablet Take 1 tablet by mouth every 6 hours as needed (Muscle Spasms) 30 tablet 1    insulin aspart (NOVOLOG FLEXPEN) 100 UNIT/ML injection pen Inject 18 Units into the skin 3 times daily (before meals) INJECT 12 UNITS SUBCUTANEOUSLY 3 TIMES DAILY BEFORE MEALS (Patient taking differently: Inject 12 Units into the skin 3 times daily (before meals) INJECT 12 UNITS SUBCUTANEOUSLY 3 TIMES DAILY BEFORE MEALS) 10 pen 3    Multiple Vitamins-Minerals (THERAPEUTIC MULTIVITAMIN-MINERALS) tablet Take 1 tablet by mouth daily 30 tablet 11    insulin glargine (LANTUS SOLOSTAR) 100 UNIT/ML injection pen INJECT 25 UNITS SUBCUTANEOUSLY ONCE DAILY (Patient taking differently: Inject 30 Units into the skin INJECT 30 UNITS SUBCUTANEOUSLY ONCE DAILY) 15 pen 1    ondansetron (ZOFRAN) 4 MG tablet Take 1 tablet by mouth every 8 hours as needed for Nausea or Vomiting 30 tablet 0    pantoprazole (PROTONIX) 40 MG tablet Take 1 tablet by mouth every morning (before breakfast) 30 tablet 0    nitroGLYCERIN (NITROSTAT) 0.4 MG SL tablet Place 1 tablet under the tongue every 5 minutes as needed for Chest pain 25 tablet 3    aspirin 81 MG tablet Take 1 tablet by mouth daily      acetaminophen (TYLENOL) 500 MG tablet Take 500 mg by mouth as needed for Pain         Horse-derived products  Social History     Tobacco Use   Smoking Status Never   Smokeless Tobacco Never      (If patient a smoker, smoking cessation counseling offered)   Social History Substance and Sexual Activity   Alcohol Use Not Currently    Comment: rarely       REVIEW OF SYSTEMS:  Constitutional: negative  Eyes: negative  Respiratory: negative  Cardiovascular: negative  Gastrointestinal: negative  Genitourinary: see HPI  Musculoskeletal: negative  Skin: negative   Neurological: negative  Hematological/Lymphatic: negative  Psychological: negative    Physical Exam:    This a 74 y.o. male  Vitals:    07/27/22 1021   BP: 126/78     Body mass index is 48.4 kg/m². Constitutional: Patient in no acute distress;       Assessment and Plan        1. Hydrocele, bilateral    2. Bilateral varicoceles               Plan:       Pt with buried penis. Retracted suprapubic fat pad, urethral meatus patent. Painless edema/swelling of lower extremities and scrotum. Needs scrotal elevation  Not surgical candidate. Manage conservatively        Prescriptions Ordered:  No orders of the defined types were placed in this encounter. Orders Placed:  No orders of the defined types were placed in this encounter.            Natalia Cm MD

## 2022-07-29 ENCOUNTER — TELEPHONE (OUTPATIENT)
Dept: FAMILY MEDICINE CLINIC | Age: 72
End: 2022-07-29

## 2022-07-29 DIAGNOSIS — I87.2 VENOUS STASIS DERMATITIS OF BOTH LOWER EXTREMITIES: ICD-10-CM

## 2022-07-29 DIAGNOSIS — I10 ESSENTIAL HYPERTENSION: ICD-10-CM

## 2022-07-29 NOTE — TELEPHONE ENCOUNTER
Patient stated he is \"holding water again\" in his arms, hands and legs. Currently taking Furosemide 80 in the am 40 in the pm.  He is wondering if the evening dose could be increased.       Please call him [phone]

## 2022-07-29 NOTE — TELEPHONE ENCOUNTER
Per verbal order from Dr Rick Zuniga: increase Lasix to 80mg BID x5 days then go back to original dose. Saqib Ruiz informed by Phone.

## 2022-08-09 NOTE — TELEPHONE ENCOUNTER
Date of last visit:  7/8/2022  Date of next visit:  10/13/2022    Requested Prescriptions     Pending Prescriptions Disp Refills    insulin glargine (LANTUS SOLOSTAR) 100 UNIT/ML injection pen [Pharmacy Med Name: Lantus SoloStar 100 UNIT/ML Subcutaneous Solution Pen-injector] 45 mL 0     Sig: INJECT 30 UNITS SUBCUTANEOUSLY ONCE DAILY

## 2022-08-10 RX ORDER — INSULIN GLARGINE 100 [IU]/ML
INJECTION, SOLUTION SUBCUTANEOUS
Qty: 45 ML | Refills: 2 | Status: SHIPPED | OUTPATIENT
Start: 2022-08-10

## 2022-08-23 ENCOUNTER — TELEPHONE (OUTPATIENT)
Dept: FAMILY MEDICINE CLINIC | Age: 72
End: 2022-08-23

## 2022-08-23 NOTE — TELEPHONE ENCOUNTER
Xiomy Riojas calls me periodically just for support . He had fallen a while back and thinks he put the rods in his back out of place. He has a follow up x ray tomorrow at [facility]. He said that pain is gone so he was thinking of cancelling the x ray. I suggested that he go ahead and get it just to make sure. He called yesterday and I was unable to take his call and he left no message. When I called to follow up with him he stated he'd had chest pain yesterday but it was relieved with two nitroglycerine tabs. He is feeling ok today and has no chest pain. I told him that if he has chest pain again and the nitro does not relieve it he should go to the hospital. He agreed. He does have arthritis pain but he takes tylenol 500 mg once a day when it is not bad and if it is more severe he takes 2 tylenol 500mg. I told him that is safe as long as he does not go over 4000 mg a day. He thanked me for calling back to check on him. I told him if he is having chest pain and is concerned enough to call us he can talk to any of the nurses and not just me and they will advise him what to do.

## 2022-09-02 ENCOUNTER — HOSPITAL ENCOUNTER (EMERGENCY)
Age: 72
Discharge: HOME | End: 2022-09-02
Payer: MEDICARE

## 2022-09-02 VITALS
RESPIRATION RATE: 18 BRPM | OXYGEN SATURATION: 97 % | HEART RATE: 78 BPM | WEIGHT: 315 LBS | HEIGHT: 74 IN | TEMPERATURE: 97.4 F | BODY MASS INDEX: 40.43 KG/M2 | SYSTOLIC BLOOD PRESSURE: 112 MMHG | DIASTOLIC BLOOD PRESSURE: 78 MMHG

## 2022-09-02 DIAGNOSIS — R60.0 LOCALIZED EDEMA: ICD-10-CM

## 2022-09-02 DIAGNOSIS — I25.708 CORONARY ARTERY DISEASE OF BYPASS GRAFT OF NATIVE HEART WITH STABLE ANGINA PECTORIS (HCC): ICD-10-CM

## 2022-09-02 DIAGNOSIS — L03.116 CELLULITIS OF LEFT LOWER EXTREMITY: Primary | ICD-10-CM

## 2022-09-02 DIAGNOSIS — I73.9 PVD (PERIPHERAL VASCULAR DISEASE) WITH CLAUDICATION (HCC): ICD-10-CM

## 2022-09-02 PROCEDURE — 2500000003 HC RX 250 WO HCPCS: Performed by: NURSE PRACTITIONER

## 2022-09-02 PROCEDURE — 99212 OFFICE O/P EST SF 10 MIN: CPT | Performed by: NURSE PRACTITIONER

## 2022-09-02 PROCEDURE — 96372 THER/PROPH/DIAG INJ SC/IM: CPT

## 2022-09-02 PROCEDURE — 6360000002 HC RX W HCPCS: Performed by: NURSE PRACTITIONER

## 2022-09-02 PROCEDURE — 99213 OFFICE O/P EST LOW 20 MIN: CPT

## 2022-09-02 RX ADMIN — LIDOCAINE HYDROCHLORIDE 1000 MG: 10 INJECTION, SOLUTION EPIDURAL; INFILTRATION; INTRACAUDAL; PERINEURAL at 14:23

## 2022-09-02 ASSESSMENT — ENCOUNTER SYMPTOMS
CHEST TIGHTNESS: 0
SORE THROAT: 0
COLOR CHANGE: 1
WHEEZING: 0
DIARRHEA: 0
PERI-ORBITAL EDEMA: 1
ABDOMINAL PAIN: 0
VOMITING: 0
HOARSE VOICE: 0
THROAT SWELLING: 0
SHORTNESS OF BREATH: 0
APNEA: 0
NAUSEA: 0
COUGH: 0
STRIDOR: 0
CHOKING: 0

## 2022-09-02 ASSESSMENT — PAIN DESCRIPTION - LOCATION: LOCATION: LEG

## 2022-09-02 ASSESSMENT — PAIN - FUNCTIONAL ASSESSMENT: PAIN_FUNCTIONAL_ASSESSMENT: 0-10

## 2022-09-02 ASSESSMENT — PAIN SCALES - GENERAL: PAINLEVEL_OUTOF10: 1

## 2022-09-02 ASSESSMENT — PAIN DESCRIPTION - ORIENTATION: ORIENTATION: LEFT;LOWER;RIGHT

## 2022-09-02 NOTE — ED TRIAGE NOTES
To room 2 with walker  PRefers to be called \" Moose\"  C/o bilateral lower leg swelling dryness drainage.   Legs are hard with crevice appearance

## 2022-09-02 NOTE — ED PROVIDER NOTES
voice and sore throat. Respiratory:  Negative for apnea, cough, choking, chest tightness, shortness of breath, wheezing and stridor. Cardiovascular:  Negative for chest pain, palpitations and leg swelling. Gastrointestinal:  Negative for abdominal pain, diarrhea, nausea and vomiting. Musculoskeletal:  Positive for gait problem. Negative for arthralgias and myalgias. Skin:  Positive for color change and rash. Neurological:  Negative for dizziness, light-headedness and headaches. PAST MEDICAL HISTORY         Diagnosis Date    RAUL (acute kidney injury) ([facility] [address] 2/13/2020    ASHD (arteriosclerotic heart disease) 2005 2006    stent  baki    Depression     Diabetic peripheral neuropathy [facility]) 2014    Fracture Oct 1984    left lower extremity     HTN (hypertension)     Hypercholesteremia     IDDM (insulin dependent diabetes mellitus)     Low back pain     Morbid obesity with BMI of 45.0-49.9, adult ([facility] [address]     Osteoarthritis        SURGICAL HISTORY     Patient  has a past surgical history that includes Cholecystectomy; Rotator cuff repair; Cervical disc surgery (2000); Appendectomy; Spine surgery (2010); Colonoscopy (2007 and 2011); joint replacement; Tonsillectomy; amputation (Left, 9/10/14); EKG 12 Lead (8/14/2015); Coronary artery bypass graft (2015 august ); Cardiac surgery; vascular surgery; fracture surgery; Coronary angioplasty with stent (08/07/2017); POSTERIOR FUSION THORACIC SPINE (N/A, 12/28/2018); Pain management procedure (Bilateral, 1/28/2020); Facet joint injection (Bilateral, 5/22/2020); Pain management procedure (Right, 7/14/2020); Pain management procedure (Left, 10/1/2020); Pain management procedure (Bilateral, 11/17/2020); Pain management procedure (Bilateral, 12/10/2020); Back Injection (Bilateral, 1/18/2021); and Back Injection (Bilateral, 3/1/2021).     CURRENT MEDICATIONS       Discharge Medication List as of 9/2/2022  2:25 PM        CONTINUE these medications which have NOT CHANGED Details   insulin glargine (LANTUS SOLOSTAR) 100 UNIT/ML injection pen INJECT 30 UNITS SUBCUTANEOUSLY ONCE DAILY, Disp-45 mL, R-2Normal      amLODIPine (NORVASC) 5 MG tablet Historical Med      ticagrelor (BRILINTA) 90 MG TABS tablet Take 1 tablet by mouth twice daily, Disp-180 tablet, R-1Normal      ferrous sulfate (IRON 325) 325 (65 Fe) MG tablet Take 1 tablet by mouth 2 times daily, Disp-60 tablet, R-5Normal      metFORMIN (GLUCOPHAGE) 500 MG tablet TAKE TWO TABLETS BY MOUTH TWICE DAILY WITH MEALS, Disp-360 tablet, R-1Normal      !! furosemide (LASIX) 40 MG tablet Take 1 tablet by mouth every evening At  6 00 pm, Disp-60 tablet, R-3Normal      !! furosemide (LASIX) 80 MG tablet Take one in am (80) and 1/2 tab (40) in pm, Disp-60 tablet, R-3Normal      enalapril (VASOTEC) 10 MG tablet One a day, Disp-90 tablet, R-1Normal      atorvastatin (LIPITOR) 10 MG tablet TAKE 1 TABLET BY MOUTH ONCE DAILY, Disp-90 tablet, R-1Normal      ACCU-CHEK SMARTVIEW strip Use to test Blood Sugar 3x daily, Dx: E11.9, Disp-100 each, R-11, DAWNormal      albuterol sulfate  (90 Base) MCG/ACT inhaler Inhale 2 puffs into the lungs 2 times daily, Disp-18 g, R-3Normal      carvedilol (COREG) 12.5 MG tablet Take 1 tablet by mouth 2 times daily, Disp-180 tablet, R-2Normal      insulin aspart (NOVOLOG FLEXPEN) 100 UNIT/ML injection pen Inject 18 Units into the skin 3 times daily (before meals) INJECT 12 UNITS SUBCUTANEOUSLY 3 TIMES DAILY BEFORE MEALS, Disp-10 pen, R-3Normal      Multiple Vitamins-Minerals (THERAPEUTIC MULTIVITAMIN-MINERALS) tablet Take 1 tablet by mouth daily, Disp-30 tablet, R-11Normal      ondansetron (ZOFRAN) 4 MG tablet Take 1 tablet by mouth every 8 hours as needed for Nausea or Vomiting, Disp-30 tablet, R-0Normal      aspirin 81 MG tablet Take 1 tablet by mouth dailyOTC      acetaminophen (TYLENOL) 500 MG tablet Take 1,000 mg by mouth as needed for PainHistorical Med       !! - Potential duplicate medications found. Please discuss with provider. ALLERGIES     Patient is is allergic to horse-derived products. FAMILY HISTORY     Patient's family history includes Cancer in his mother. SOCIAL HISTORY     Patient  reports that he has never smoked. He has never used smokeless tobacco. He reports that he does not currently use alcohol. He reports that he does not use drugs. PHYSICAL EXAM     ED TRIAGE VITALS  BP: 112/78, Temp: 97.4 °F (36.3 °C), Heart Rate: 78, Resp: 18, SpO2: 97 %  Physical Exam  Vitals and nursing note reviewed. Constitutional:       General: He is not in acute distress. Appearance: Normal appearance. He is obese. He is not ill-appearing, toxic-appearing or diaphoretic. HENT:      Head: Normocephalic and atraumatic. Right Ear: External ear normal.      Left Ear: External ear normal.   Eyes:      Extraocular Movements: Extraocular movements intact. Conjunctiva/sclera: Conjunctivae normal.   Pulmonary:      Effort: Pulmonary effort is normal.   Musculoskeletal:         General: Swelling present. Normal range of motion. Right hand: Swelling present. Left hand: Swelling present. Arms:       Cervical back: Normal range of motion. Right lower leg: Edema present. Left lower leg: Edema present. Legs:    Skin:     General: Skin is warm. Neurological:      General: No focal deficit present. Mental Status: He is alert and oriented to person, place, and time. Motor: Weakness present. Gait: Gait abnormal.   Psychiatric:         Mood and Affect: Mood normal.         Behavior: Behavior normal.         Thought Content: Thought content normal.         Judgment: Judgment normal.       DIAGNOSTIC RESULTS   Labs:No results found for this visit on 09/02/22.     IMAGING:  No orders to display     URGENT CARE COURSE:     Vitals:    09/02/22 1344 09/02/22 1454   BP: (!) 110/56 112/78   Pulse: 77 78   Resp: 20 18   Temp: 97.4 °F (36.3 °C)    SpO2: 97% 97% Weight: (!) 375 lb (170.1 kg)    Height: 6' 2\" (1.88 m)        Medications   cefTRIAXone (ROCEPHIN) 1,000 mg in lidocaine 1 % 2.86 mL IM Injection (1,000 mg IntraMUSCular Given 9/2/22 1423)     PROCEDURES:  None  FINAL IMPRESSION      1. Cellulitis of left lower extremity    2. PVD (peripheral vascular disease) with claudication ([address]    3. Localized edema    4. Coronary artery disease of bypass graft of native heart with stable angina pectoris [facility])        DISPOSITION/PLAN   Decision To Discharge     The patient/Patient representative was advised to rest at home and elevate the affected area. Patient/representative was also advised to apply warm compresses to the affected area up to 15/20 minutes at a time up to 4 times a day. Patient/Patient representative is also advised to monitor any changes such as increasing redness, pain, development of fever or chills, generalized body aches. The patient will be given medication and is advised to take as directed. If the patient develops any changes such as fever not relieved with Motrin and Tylenol chest pain or shortness of breath patient is to dial 911 or go directly to the emergency room for reevaluation and further management of care. The patient does not experience any of these symptoms or concerns. Follow-up with her primary care provider in 2-3 days for reevaluation. The patient/Patient representatives are agreeable to treatment plan at this time and the patient left in stable condition.            PATIENT REFERRED TO:  Alpesh Mejias MD  [address] [facility] [address]  [phone]    Schedule an appointment as soon as possible for a visit       [facility]  [phone]  Go today  If symptoms worsen    Kim Marinelli MD  [address] [facility] [address]  [phone]    Call   For wound re-check    [address] [place] [address]  [phone]    Call   For wound re-check  DISCHARGE MEDICATIONS:  Discharge Medication List as of 9/2/2022  2:25 PM        Discharge Medication List as of 9/2/2022  2:25 PM          ABHISHEK Meneses CNP, APRN - CNP  09/02/22 1506

## 2022-09-03 ENCOUNTER — TELEPHONE (OUTPATIENT)
Dept: FAMILY MEDICINE CLINIC | Age: 72
End: 2022-09-03

## 2022-09-03 NOTE — TELEPHONE ENCOUNTER
Started on antibiotics for cellulitis  .  Please call on leg infection and follow up in 7 to 10 days

## 2022-09-06 NOTE — TELEPHONE ENCOUNTER
Yuliet Laura informed by Phone. Appt scheduled for Tuesday 9-. He stated he was not started on Oral ATB, they only gave him Recephin IM.

## 2022-09-12 ENCOUNTER — CARE COORDINATION (OUTPATIENT)
Dept: FAMILY MEDICINE CLINIC | Age: 72
End: 2022-09-12

## 2022-09-13 ENCOUNTER — OFFICE VISIT (OUTPATIENT)
Dept: FAMILY MEDICINE CLINIC | Age: 72
End: 2022-09-13

## 2022-09-13 ENCOUNTER — TELEPHONE (OUTPATIENT)
Dept: FAMILY MEDICINE CLINIC | Age: 72
End: 2022-09-13

## 2022-09-13 VITALS
WEIGHT: 315 LBS | HEIGHT: 74 IN | HEART RATE: 64 BPM | SYSTOLIC BLOOD PRESSURE: 106 MMHG | DIASTOLIC BLOOD PRESSURE: 72 MMHG | RESPIRATION RATE: 12 BRPM | BODY MASS INDEX: 40.43 KG/M2

## 2022-09-13 DIAGNOSIS — L03.116 CELLULITIS OF LEFT LOWER EXTREMITY: ICD-10-CM

## 2022-09-13 DIAGNOSIS — E11.42 DIABETIC PERIPHERAL NEUROPATHY (HCC): ICD-10-CM

## 2022-09-13 DIAGNOSIS — I87.2 CHRONIC VENOUS STASIS DERMATITIS OF LOWER EXTREMITY: Primary | ICD-10-CM

## 2022-09-13 PROCEDURE — 1123F ACP DISCUSS/DSCN MKR DOCD: CPT | Performed by: FAMILY MEDICINE

## 2022-09-13 PROCEDURE — G8427 DOCREV CUR MEDS BY ELIG CLIN: HCPCS | Performed by: FAMILY MEDICINE

## 2022-09-13 PROCEDURE — 3017F COLORECTAL CA SCREEN DOC REV: CPT | Performed by: FAMILY MEDICINE

## 2022-09-13 PROCEDURE — G8417 CALC BMI ABV UP PARAM F/U: HCPCS | Performed by: FAMILY MEDICINE

## 2022-09-13 PROCEDURE — 1036F TOBACCO NON-USER: CPT | Performed by: FAMILY MEDICINE

## 2022-09-13 PROCEDURE — 99213 OFFICE O/P EST LOW 20 MIN: CPT | Performed by: FAMILY MEDICINE

## 2022-09-13 RX ORDER — CEPHALEXIN 500 MG/1
500 CAPSULE ORAL 3 TIMES DAILY
Qty: 30 CAPSULE | Refills: 0 | Status: SHIPPED | OUTPATIENT
Start: 2022-09-13

## 2022-09-13 ASSESSMENT — ENCOUNTER SYMPTOMS
NAUSEA: 0
TROUBLE SWALLOWING: 0
ABDOMINAL PAIN: 0
SORE THROAT: 0
BLOOD IN STOOL: 0
EYE PAIN: 0
COUGH: 0
SHORTNESS OF BREATH: 0
BACK PAIN: 0
CHEST TIGHTNESS: 0
CONSTIPATION: 0

## 2022-09-13 NOTE — PROGRESS NOTES
Subjective:      Patient ID: Ambrocio Rosado is a 74 y.o. male. Skin Problem  This is a chronic problem. The current episode started in the past 7 days (9-10-22  shot  of  rocephin). The affected locations include the right lower leg and left lower leg. The rash is characterized by redness, draining and scaling. Pertinent negatives include no congestion, cough, eye pain, fatigue, fever, shortness of breath or sore throat. Treatments tried: given  Im injection. The treatment provided mild relief. Past Medical History:   Diagnosis Date    RAUL (acute kidney injury) ([facility] [address] 2/13/2020    ASHD (arteriosclerotic heart disease) 2005 2006    stent  baki    Depression     Diabetic peripheral neuropathy ([facility] [address] 2014    Fracture Oct 1984    left lower extremity     HTN (hypertension)     Hypercholesteremia     IDDM (insulin dependent diabetes mellitus)     Low back pain     Morbid obesity with BMI of 45.0-49.9, adult ([facility])     Osteoarthritis       Review of Systems   Constitutional:  Negative for fatigue and fever. HENT:  Negative for congestion, ear pain, postnasal drip, sore throat and trouble swallowing. Eyes:  Negative for pain. Respiratory:  Negative for cough, chest tightness and shortness of breath. Cardiovascular:  Negative for chest pain, palpitations and leg swelling. Gastrointestinal:  Negative for abdominal pain, blood in stool, constipation and nausea. Genitourinary:  Negative for difficulty urinating, frequency and urgency. Musculoskeletal:  Negative for arthralgias, back pain, joint swelling and neck stiffness. Skin:  Positive for rash. Left and  right and    breakdown and   redness  noted         Wrist  with   pain   Neurological:  Negative for dizziness, weakness and headaches. Hematological:  Negative for adenopathy. Does not bruise/bleed easily. Psychiatric/Behavioral:  Negative for behavioral problems, dysphoric mood and sleep disturbance.     /72 (Site: Right Upper Arm, Position: Sitting, Cuff Size: Large Adult)   Pulse 64   Resp 12   Ht 6' 2\" (1.88 m)   Wt (!) 381 lb (172.8 kg)   BMI 48.92 kg/m²    Objective:   Physical Exam  Vitals and nursing note reviewed. Constitutional:       Appearance: He is well-developed. HENT:      Head: Normocephalic and atraumatic. Right Ear: External ear normal.      Left Ear: External ear normal.      Nose: Nose normal.   Eyes:      Conjunctiva/sclera: Conjunctivae normal.      Pupils: Pupils are equal, round, and reactive to light. Comments: Fundi nl   Neck:      Thyroid: No thyromegaly. Cardiovascular:      Rate and Rhythm: Normal rate and regular rhythm. Heart sounds: Normal heart sounds. Pulmonary:      Effort: Pulmonary effort is normal.      Breath sounds: Normal breath sounds. No wheezing or rales. Abdominal:      General: Bowel sounds are normal.      Palpations: Abdomen is soft. There is no mass. Tenderness: There is no abdominal tenderness. Musculoskeletal:         General: Normal range of motion. Cervical back: Normal range of motion and neck supple. Right lower leg: Edema present. Left lower leg: Edema present. Comments:   3  + noted  edema    Lymphadenopathy:      Cervical: No cervical adenopathy. Skin:     General: Skin is warm and dry. Findings: Erythema present. No rash. Comments: Leg that's chronic stasis dermatitis with chronic skin change present with elevation and he is sick because of the extension was secondary infectious-type change him   Neurological:      Mental Status: He is alert and oriented to person, place, and time. Cranial Nerves: No cranial nerve deficit. Deep Tendon Reflexes: Reflexes are normal and symmetric. Assessment:        ICD-10-CM    1. Chronic venous stasis dermatitis of lower extremity  I87.2 AFL - Chong Hamilton MD, Dermatology, [place]     cephALEXin ) 500 MG capsule      2.  Diabetic peripheral neuropathy ([address] E11.42       3. Cellulitis of left lower extremity  L03. 116              Plan:      Current Outpatient Medications   Medication Sig Dispense Refill    cephALEXin (KEFLEX) 500 MG capsule Take 1 capsule by mouth 3 times daily 30 capsule 0    insulin glargine (LANTUS SOLOSTAR) 100 UNIT/ML injection pen INJECT 30 UNITS SUBCUTANEOUSLY ONCE DAILY 45 mL 2    amLODIPine (NORVASC) 5 MG tablet       ticagrelor (BRILINTA) 90 MG TABS tablet Take 1 tablet by mouth twice daily 180 tablet 1    ferrous sulfate (IRON 325) 325 (65 Fe) MG tablet Take 1 tablet by mouth 2 times daily 60 tablet 5    metFORMIN (GLUCOPHAGE) 500 MG tablet TAKE TWO TABLETS BY MOUTH TWICE DAILY WITH MEALS 360 tablet 1    furosemide (LASIX) 40 MG tablet Take 1 tablet by mouth every evening At  6 00 pm 60 tablet 3    furosemide (LASIX) 80 MG tablet Take one in am (80) and 1/2 tab (40) in pm 60 tablet 3    enalapril (VASOTEC) 10 MG tablet One a day 90 tablet 1    atorvastatin (LIPITOR) 10 MG tablet TAKE 1 TABLET BY MOUTH ONCE DAILY 90 tablet 1    ACCU-CHEK SMARTVIEW strip Use to test Blood Sugar 3x daily, Dx: E11.9 100 each 11    albuterol sulfate  (90 Base) MCG/ACT inhaler Inhale 2 puffs into the lungs 2 times daily 18 g 3    carvedilol (COREG) 12.5 MG tablet Take 1 tablet by mouth 2 times daily 180 tablet 2    insulin aspart (NOVOLOG FLEXPEN) 100 UNIT/ML injection pen Inject 18 Units into the skin 3 times daily (before meals) INJECT 12 UNITS SUBCUTANEOUSLY 3 TIMES DAILY BEFORE MEALS (Patient taking differently: Inject 12 Units into the skin 3 times daily (before meals) INJECT 12 UNITS SUBCUTANEOUSLY 3 TIMES DAILY BEFORE MEALS) 10 pen 3    Multiple Vitamins-Minerals (THERAPEUTIC MULTIVITAMIN-MINERALS) tablet Take 1 tablet by mouth daily 30 tablet 11    ondansetron (ZOFRAN) 4 MG tablet Take 1 tablet by mouth every 8 hours as needed for Nausea or Vomiting 30 tablet 0    aspirin 81 MG tablet Take 1 tablet by mouth daily      acetaminophen (TYLENOL) 500 MG tablet Take 1,000 mg by mouth as needed for Pain       No current facility-administered medications for this visit.       Orders Placed This Encounter   Procedures    JAZLYN - China Pastor MD, Dermatology, [facility]     Referral Priority:   Routine     Referral Type:   Eval and Treat     Referral Reason:   Specialty Services Required     Referred to Provider:   Cheo Feng MD     Requested Specialty:   Dermatology     Number of Visits Requested:   1              See    dr Sonal Petit   on legs  and   with  antibiotic     Saleem Seaman MD

## 2022-09-13 NOTE — CARE COORDINATION
Rhina Haq called with a concern that his sugar is running lower this am. He said it is 60. He ate a bologna sandwich. I reassured him that is still OK and he has a follow up appointment on Tuesday. I will see him then. He was reassured. I saw him when he came in for his appointment. He said his sugars were better. When he was leaving he took a couple of snack size candy bars because he did not want his sugar checked. He felt like he was low so he went to [employer]. He said when he was pulling out of [employer] he was feeling funny(lightheaded, face swollen ) but he drove home. Then he ate his [employer] and fell asleep in his car. When he woke up he was feeling fine. I instructed him if that happens again he should pull into a parking lot and not drive until he feels [address] [address] and then drive to the Emergency Room so he can be checked out by a physician. He agreed he will do that.

## 2022-09-19 DIAGNOSIS — N18.31 STAGE 3A CHRONIC KIDNEY DISEASE (HCC): Primary | ICD-10-CM

## 2022-09-29 ENCOUNTER — APPOINTMENT (OUTPATIENT)
Dept: GENERAL RADIOLOGY | Age: 72
End: 2022-09-29
Payer: MEDICARE

## 2022-09-29 ENCOUNTER — APPOINTMENT (OUTPATIENT)
Dept: CT IMAGING | Age: 72
End: 2022-09-29
Payer: MEDICARE

## 2022-09-29 ENCOUNTER — HOSPITAL ENCOUNTER (EMERGENCY)
Age: 72
Discharge: HOME | End: 2022-09-29
Payer: MEDICARE

## 2022-09-29 VITALS
DIASTOLIC BLOOD PRESSURE: 68 MMHG | OXYGEN SATURATION: 99 % | SYSTOLIC BLOOD PRESSURE: 153 MMHG | BODY MASS INDEX: 40.43 KG/M2 | HEIGHT: 74 IN | TEMPERATURE: 97.3 F | HEART RATE: 77 BPM | WEIGHT: 315 LBS | RESPIRATION RATE: 19 BRPM

## 2022-09-29 DIAGNOSIS — R42 DIZZINESS: ICD-10-CM

## 2022-09-29 DIAGNOSIS — S51.811A SKIN TEAR OF RIGHT FOREARM WITHOUT COMPLICATION, INITIAL ENCOUNTER: ICD-10-CM

## 2022-09-29 DIAGNOSIS — R55 NEAR SYNCOPE: ICD-10-CM
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
## 2022-09-29 LAB
ALBUMIN SERPL-MCNC: 3.5 G/DL (ref 3.5–5.1)
ALP BLD-CCNC: 72 U/L (ref 38–126)
ALT SERPL-CCNC: 7 U/L (ref 11–66)
ANION GAP SERPL CALCULATED.3IONS-SCNC: 14 MEQ/L (ref 8–16)
AST SERPL-CCNC: 12 U/L (ref 5–40)
BACTERIA: NORMAL
BASOPHILS # BLD: 0.8 %
BASOPHILS ABSOLUTE: 0.1 THOU/MM3 (ref 0–0.1)
BILIRUB SERPL-MCNC: 0.3 MG/DL (ref 0.3–1.2)
BILIRUBIN URINE: NEGATIVE
BLOOD, URINE: NEGATIVE
BUN BLDV-MCNC: 44 MG/DL (ref 7–22)
CALCIUM SERPL-MCNC: 9.1 MG/DL (ref 8.5–10.5)
CASTS: NORMAL /LPF
CASTS: NORMAL /LPF
CHARACTER, URINE: CLEAR
CHLORIDE BLD-SCNC: 103 MEQ/L (ref 98–111)
CO2: 22 MEQ/L (ref 23–33)
COLOR: YELLOW
CREAT SERPL-MCNC: 2.3 MG/DL (ref 0.4–1.2)
CRYSTALS: NORMAL
EKG ATRIAL RATE: 73 BPM
EKG P AXIS: 67 DEGREES
EKG P-R INTERVAL: 218 MS
EKG Q-T INTERVAL: 428 MS
EKG QRS DURATION: 138 MS
EKG QTC CALCULATION (BAZETT): 471 MS
EKG R AXIS: -30 DEGREES
EKG T AXIS: 61 DEGREES
EKG VENTRICULAR RATE: 73 BPM
EOSINOPHIL # BLD: 4.1 %
EOSINOPHILS ABSOLUTE: 0.3 THOU/MM3 (ref 0–0.4)
EPITHELIAL CELLS, UA: NORMAL /HPF
ERYTHROCYTE [DISTWIDTH] IN BLOOD BY AUTOMATED COUNT: 12.9 % (ref 11.5–14.5)
ERYTHROCYTE [DISTWIDTH] IN BLOOD BY AUTOMATED COUNT: 44 FL (ref 35–45)
GFR SERPL CREATININE-BSD FRML MDRD: 28 ML/MIN/1.73M2
GLUCOSE BLD-MCNC: 78 MG/DL (ref 70–108)
GLUCOSE, URINE: NEGATIVE MG/DL
HCT VFR BLD CALC: 24.8 % (ref 42–52)
HEMOGLOBIN: 8.3 GM/DL (ref 14–18)
IMMATURE GRANS (ABS): 0.02 THOU/MM3 (ref 0–0.07)
IMMATURE GRANULOCYTES: 0.3 %
KETONES, URINE: NEGATIVE
LEUKOCYTE ESTERASE, URINE: NEGATIVE
LYMPHOCYTES # BLD: 19.6 %
LYMPHOCYTES ABSOLUTE: 1.3 THOU/MM3 (ref 1–4.8)
MCH RBC QN AUTO: 32 PG (ref 26–33)
MCHC RBC AUTO-ENTMCNC: 33.5 GM/DL (ref 32.2–35.5)
MCV RBC AUTO: 95.8 FL (ref 80–94)
MISCELLANEOUS LAB TEST RESULT: NORMAL
MONOCYTES # BLD: 10 %
MONOCYTES ABSOLUTE: 0.7 THOU/MM3 (ref 0.4–1.3)
NITRITE, URINE: NEGATIVE
NUCLEATED RED BLOOD CELLS: 0 /100 WBC
OSMOLALITY CALCULATION: 287.6 MOSMOL/KG (ref 275–300)
PH UA: 5.5 (ref 5–9)
PLATELET # BLD: 148 THOU/MM3 (ref 130–400)
PMV BLD AUTO: 9 FL (ref 9.4–12.4)
POTASSIUM REFLEX MAGNESIUM: 4.4 MEQ/L (ref 3.5–5.2)
PRO-BNP: 798.5 PG/ML (ref 0–900)
PROTEIN UA: NEGATIVE MG/DL
RBC # BLD: 2.59 MILL/MM3 (ref 4.7–6.1)
RBC URINE: NORMAL /HPF
RENAL EPITHELIAL, UA: NORMAL
SEG NEUTROPHILS: 65.2 %
SEGMENTED NEUTROPHILS ABSOLUTE COUNT: 4.2 THOU/MM3 (ref 1.8–7.7)
SODIUM BLD-SCNC: 139 MEQ/L (ref 135–145)
SPECIFIC GRAVITY UA: 1.01 (ref 1–1.03)
TOTAL PROTEIN: 6.7 G/DL (ref 6.1–8)
TROPONIN T: < 0.01 NG/ML
UROBILINOGEN, URINE: 0.2 EU/DL (ref 0–1)
WBC # BLD: 6.5 THOU/MM3 (ref 4.8–10.8)
WBC UA: NORMAL /HPF
YEAST: NORMAL

## 2022-09-29 PROCEDURE — 93005 ELECTROCARDIOGRAM TRACING: CPT | Performed by: PHYSICIAN ASSISTANT

## 2022-09-29 PROCEDURE — 83880 ASSAY OF NATRIURETIC PEPTIDE: CPT

## 2022-09-29 PROCEDURE — 71045 X-RAY EXAM CHEST 1 VIEW: CPT

## 2022-09-29 PROCEDURE — 99285 EMERGENCY DEPT VISIT HI MDM: CPT

## 2022-09-29 PROCEDURE — 84484 ASSAY OF TROPONIN QUANT: CPT

## 2022-09-29 PROCEDURE — 70450 CT HEAD/BRAIN W/O DYE: CPT

## 2022-09-29 PROCEDURE — 85025 COMPLETE CBC W/AUTO DIFF WBC: CPT

## 2022-09-29 PROCEDURE — 80053 COMPREHEN METABOLIC PANEL: CPT

## 2022-09-29 PROCEDURE — 6370000000 HC RX 637 (ALT 250 FOR IP): Performed by: PHYSICIAN ASSISTANT

## 2022-09-29 PROCEDURE — 81001 URINALYSIS AUTO W/SCOPE: CPT

## 2022-09-29 PROCEDURE — 93010 ELECTROCARDIOGRAM REPORT: CPT | Performed by: NUCLEAR MEDICINE

## 2022-09-29 RX ORDER — MECLIZINE HCL 12.5 MG/1
25 TABLET ORAL ONCE
Status: COMPLETED | OUTPATIENT
Start: 2022-09-29 | End: 2022-09-29

## 2022-09-29 RX ORDER — MECLIZINE HYDROCHLORIDE 25 MG/1
25 TABLET ORAL 3 TIMES DAILY PRN
Qty: 15 TABLET | Refills: 0 | Status: SHIPPED | OUTPATIENT
Start: 2022-09-29 | End: 2022-10-09

## 2022-09-29 RX ADMIN — MECLIZINE 25 MG: 12.5 TABLET ORAL at 15:52

## 2022-09-29 NOTE — ED PROVIDER NOTES
[address] [facility] [address] [address] EMERGENCY DEPT  EMERGENCY DEPARTMENT ENCOUNTER      Pt Name: Bruce Garcia  MRN: [id number]  [place] 1950  Date of evaluation: 9/29/2022  Provider: Geni Muñiz PA-C    CHIEF COMPLAINT     Chief Complaint   Patient presents with    Fall    Back Pain       HISTORY OF PRESENT ILLNESS    Bruce Garcia is a 74 y.o. male who presents to the emergency department with complaints of feeling dizzy and almost passing out. He also thought the floor. Patient states he was at his doctor's office getting a appointment. States that he felt slightly dizzy like things are spinning around him when he went to the floor. He denied any loss of conscious. He states that he almost lost consciousness. Patient has a history of back pain but states the back pain is no different from the fall. States he cannot slowly went to the floor and describes his right arm and has a skin tear. His tetanus is current. Otherwise denies any injury or trauma. Again his back pain is not new from the fall and is unchanged in the fall. Denies any chest pain or palpitation. Denies any shortness of breath. Denies any recent illnesses. Triage notes and Nursing notes were reviewed by myself. Any discrepancies are addressed above.     PAST MEDICAL HISTORY     Past Medical History:   Diagnosis Date    RAUL (acute kidney injury) ([facility] [address] 2/13/2020    ASHD (arteriosclerotic heart disease) 2005 2006    stent  baki    Depression     Diabetic peripheral neuropathy [facility]) 2014    Fracture Oct 1984    left lower extremity     HTN (hypertension)     Hypercholesteremia     IDDM (insulin dependent diabetes mellitus)     Low back pain     Morbid obesity with BMI of 45.0-49.9, adult [facility])     Osteoarthritis        SURGICAL HISTORY       Past Surgical History:   Procedure Laterality Date    AMPUTATION Left 9/10/14    partial versus complete left hallux amputation, left great toe amputation    APPENDECTOMY      BACK INJECTION Bilateral 1/18/2021 Bilateral Si MBB #1 performed by Jocelyn Zabala MD at [facility] Bilateral 3/1/2021    Bilateral SI MBB #2 performed by Jocelyn Zabala MD at [place] [address]    fusion of C5-C6    CHOLECYSTECTOMY      COLONOSCOPY  2007 and 2011    colonn polyps  lorenzo    CORONARY ANGIOPLASTY WITH STENT PLACEMENT  08/07/2017    Dr Robe Peter @ [id number] [place] GRAFT  2015 august dox    EKG 12-LEAD  8/14/2015         FACET JOINT INJECTION Bilateral 5/22/2020    Lumbar Facet MBB @L3-4,4-5 bilateral #2 performed by Jocelyn Zabala MD at [address] [place] [address]      left leg    JOINT REPLACEMENT      bilateral knees gurvinder    PAIN MANAGEMENT PROCEDURE Bilateral 1/28/2020    Lumbar Facet MBB @ L3-4,4-5,5-S1 bilateral #1 LUMBAR FACET performed by Jocelyn Zabala MD at [facility] Right 7/14/2020    Lumbar RFA Bilateral L3-4,4-5  right side first performed by Jocelyn Zabala MD at [facility] Left 10/1/2020    Lumbar RFA Left side L3-4, 4-5 performed by Jocelyn Zabala MD at [facility] Bilateral 11/17/2020    TFLESI @L5 bilateral #1 performed by Jocelyn Zabala MD at [facility] Bilateral 12/10/2020    TFLESI @L5 bilateral #1 performed by Jocelyn Zabala MD at [address] [place] [address] N/A 12/28/2018    T7-T9 DECOMPRESSION, T7-T9 POSTERIOR FUSION performed by Regla Chow MD at [address] [place] [address]    TONSILLECTOMY      VASCULAR SURGERY      cabg harvests from left leg       CURRENT MEDICATIONS       Previous Medications    ACCU-CHEK SMARTVIEW STRIP    Use to test Blood Sugar 3x daily, Dx: E11.9    ACETAMINOPHEN (TYLENOL) 500 MG TABLET Take 1,000 mg by mouth as needed for Pain    ALBUTEROL SULFATE  (90 BASE) MCG/ACT INHALER    Inhale 2 puffs into the lungs 2 times daily    AMLODIPINE (NORVASC) 5 MG TABLET        ASPIRIN 81 MG TABLET    Take 1 tablet by mouth daily    ATORVASTATIN (LIPITOR) 10 MG TABLET    TAKE 1 TABLET BY MOUTH ONCE DAILY    CARVEDILOL (COREG) 12.5 MG TABLET    Take 1 tablet by mouth 2 times daily    CEPHALEXIN (KEFLEX) 500 MG CAPSULE    Take 1 capsule by mouth 3 times daily    ENALAPRIL (VASOTEC) 10 MG TABLET    One a day    FERROUS SULFATE (IRON 325) 325 (65 FE) MG TABLET    Take 1 tablet by mouth 2 times daily    FUROSEMIDE (LASIX) 40 MG TABLET    Take 1 tablet by mouth every evening At  6 00 pm    FUROSEMIDE (LASIX) 80 MG TABLET    Take one in am (80) and 1/2 tab (40) in pm    INSULIN ASPART (NOVOLOG FLEXPEN) 100 UNIT/ML INJECTION PEN    Inject 18 Units into the skin 3 times daily (before meals) INJECT 12 UNITS SUBCUTANEOUSLY 3 TIMES DAILY BEFORE MEALS    INSULIN GLARGINE (LANTUS SOLOSTAR) 100 UNIT/ML INJECTION PEN    INJECT 30 UNITS SUBCUTANEOUSLY ONCE DAILY    METFORMIN (GLUCOPHAGE) 500 MG TABLET    TAKE TWO TABLETS BY MOUTH TWICE DAILY WITH MEALS    MULTIPLE VITAMINS-MINERALS (THERAPEUTIC MULTIVITAMIN-MINERALS) TABLET    Take 1 tablet by mouth daily    ONDANSETRON (ZOFRAN) 4 MG TABLET    Take 1 tablet by mouth every 8 hours as needed for Nausea or Vomiting    TICAGRELOR (BRILINTA) 90 MG TABS TABLET    Take 1 tablet by mouth twice daily       ALLERGIES     Horse-derived products    FAMILY HISTORY       Family History   Problem Relation Age of Onset    Cancer Mother         uterine        SOCIAL HISTORY     Social History     Socioeconomic History    Marital status:      Number of children: 2   Tobacco Use    Smoking status: Never    Smokeless tobacco: Never   Vaping Use    Vaping Use: Never used   Substance and Sexual Activity    Alcohol use: Not Currently     Comment: rarely    Drug use: No    Sexual activity: Never     Social Determinants of Health     Financial Resource Strain: Low Risk     Difficulty of Paying Living Expenses: Not hard at all   Food Insecurity: No Food Insecurity    Worried About Running Out of Food in the Last Year: Never true    Ran Out of Food in the Last Year: Never true   Physical Activity: Inactive    Days of Exercise per Week: 0 days    Minutes of Exercise per Session: 10 min       REVIEW OF SYSTEMS       A 10 point review of systems discussed the patient and the pertinent positives and names are listed in the HPI    Except as noted above the remainder of the review of systems was reviewed and is negative. PHYSICAL EXAM    (up to 7 for level 4, 8 or more for level 5)     ED Triage Vitals [09/29/22 1223]   BP Temp Temp Source Heart Rate Resp SpO2 Height Weight   (!) 140/57 97.3 °F (36.3 °C) Oral 72 15 99 % 6' 2\" (1.88 m) (!) 375 lb (170.1 kg)       General: nontoxic appearing. HEENT: Normocephalic/atraumatic. Extraocular muscles are intact. Pupils equal round react light accommodation. TMs are vision is without infection. Canals are clear. Nares patent and clear. The posterior oropharynx is patent. Throat mucosa moist and pink. Neck: Full range of motion. No meningeal signs noted. Lungs: Clear to auscultation in all lung fields. No retractions. No respiratory distress. Heart: Regular rate and rhythm. Abdomen: Soft, nontender. Extremities: Range of motion is full. Radial pulses 2 out of 4 bilaterally. Skin tear in the right forearm and extensor side. It is very superficial.  No weeping or draining. Back: No midline tenderness. Neurologic: Alert and oriented. Normal motor and sensory function. No pronator drift noted. Patient is able to perform finger-to-nose maneuver bilaterally any complication.   Upper and lower extremity strength and sensation are intact and equal.  Skin: Warm, dry, free of rashes  DIAGNOSTIC RESULTS     EKG: (none if blank)  All EKG's areinterpreted by the Emergency Department Physician who either signs or Co-signs this chart in the absence of a cardiologist.    EKG obtained and interpreted by myself as the ED provider showed a sinus rhythm at rate of 73. Parable 218. QTC of 471. No acute apparent identified. RADIOLOGY: (none if blank)   Interpretationper the Radiologist below, if available at the time of this note:    XR CHEST 1 VIEW   Final Result   1. No acute cardiopulmonary finding. Tanda Bun **This report has been created using voice recognition software. It may contain minor errors which are inherent in voice recognition technology. **      Final report electronically signed by Dr Candice Orantes on 9/29/2022 1:27 PM      CT Head WO Contrast   Final Result    No evidence of acute intracranial abnormality. **This report has been created using voice recognition software. It may contain minor errors which are inherent in voice recognition technology. **      Final report electronically signed by Dr. Nasreen Isabel MD on 9/29/2022 1:20 PM          LABS:  Labs Reviewed   CBC WITH AUTO DIFFERENTIAL - Abnormal; Notable for the following components:       Result Value    RBC 2.59 (*)     Hemoglobin 8.3 (*)     Hematocrit 24.8 (*)     MCV 95.8 (*)     MPV 9.0 (*)     All other components within normal limits   COMPREHENSIVE METABOLIC PANEL W/ REFLEX TO MG FOR LOW K - Abnormal; Notable for the following components:    Creatinine 2.3 (*)     BUN 44 (*)     CO2 22 (*)     ALT 7 (*)     All other components within normal limits   GLOMERULAR FILTRATION RATE, ESTIMATED - Abnormal; Notable for the following components:    Est, Glom Filt Rate 28 (*)     All other components within normal limits   TROPONIN   BRAIN NATRIURETIC PEPTIDE   URINALYSIS WITH MICROSCOPIC   ANION GAP   OSMOLALITY       All other labs were within normal range or not returned as of this dictation.     EMERGENCY DEPARTMENT COURSE and Medical Decision Making: MDM  /  ED Course as of 09/29/22 1642   Thu Sep 29, 2022   1536 RBC, UA: 0-2 [JM]      ED Course User Index  [JM] Drarius Mckenzie PA-C     Patient is given his results. At this time patient's work-up essentially unremarkable. Troponins negative no acute related head CT. Patient was given some meclizine as he does describe some room spinning or vertiginous type symptoms. Stated feel much better. Patient is a [place] syncope risk score was low. Given that he is feeling better and asymptomatic this time is discharged to follow with PCP. Return the emerge department symptoms worsen. Strict return precautions and follow up instructions were discussed with the patient with which the patient agrees    CONSULTS: (None if blank)  None    Procedures: (None if blank)       CLINICAL IMPRESSION      1. Fall, initial encounter    2. Near syncope    3. Skin tear of right forearm without complication, initial encounter    4. Dizziness          DISPOSITION/PLAN   DISPOSITION Decision To Discharge 09/29/2022 04:25:03 PM      PATIENT REFERRED TO:  No follow-up provider specified.     DISCHARGE MEDICATIONS:  New Prescriptions    MECLIZINE (ANTIVERT) 25 MG TABLET    Take 1 tablet by mouth 3 times daily as needed for Dizziness              (Please note that portions of this note were completed with a voice recognition program.  Efforts weremade to edit the dictations but occasionally words are mis-transcribed.)      Darrius Mckenzie PA-C(electronically signed)              Darrius Mckenzie PA-C  09/29/22 2983

## 2022-09-29 NOTE — ED NOTES
Pt and vs reassessed. RR Easy and unlabored.  Pt up at bedside with assistance to use urinal. Pt denies any other needs and is stable at this time     Miladis Neely RN  09/29/22 3096

## 2022-09-29 NOTE — ED NOTES
Assumed care at this time. Pt resting in bed with eyes closed and responds to voice. Vs assessed. RR easy and unlabored.  Pt stable at this time and denies any needs     Anthony Seaman RN  09/29/22 9397

## 2022-09-29 NOTE — ED NOTES
Patient presents to the ED via [facility] for having a fall. He didn't loose consciousness and didn't hit his head. He does have a skin tear on his right arm from the fall. He states that he has back pain and is scheduled to have a MRI and CT scan done at 1330 today.       Ken Verduzco LPN  50/50/21 0921

## 2022-09-29 NOTE — ED NOTES
Pt and vs reassessed. RR easy and unlabored. Pt resting in bed alert and medicated per MAR.  Pt denies any needs and is stable at this time     Ajit RileyWell[facility]  09/29/22 5766

## 2022-10-10 ENCOUNTER — OFFICE VISIT (OUTPATIENT)
Dept: CARDIOLOGY CLINIC | Age: 72
End: 2022-10-10
Payer: MEDICARE

## 2022-10-10 VITALS
SYSTOLIC BLOOD PRESSURE: 148 MMHG | WEIGHT: 315 LBS | BODY MASS INDEX: 40.43 KG/M2 | HEART RATE: 76 BPM | DIASTOLIC BLOOD PRESSURE: 76 MMHG | HEIGHT: 74 IN

## 2022-10-10 DIAGNOSIS — E78.01 FAMILIAL HYPERCHOLESTEROLEMIA: ICD-10-CM

## 2022-10-10 DIAGNOSIS — I10 PRIMARY HYPERTENSION: Primary | ICD-10-CM

## 2022-10-10 DIAGNOSIS — I25.10 CORONARY ARTERY DISEASE INVOLVING NATIVE CORONARY ARTERY OF NATIVE HEART WITHOUT ANGINA PECTORIS: ICD-10-CM

## 2022-10-10 PROCEDURE — 1123F ACP DISCUSS/DSCN MKR DOCD: CPT | Performed by: NUCLEAR MEDICINE

## 2022-10-10 PROCEDURE — G8484 FLU IMMUNIZE NO ADMIN: HCPCS | Performed by: NUCLEAR MEDICINE

## 2022-10-10 PROCEDURE — G8427 DOCREV CUR MEDS BY ELIG CLIN: HCPCS | Performed by: NUCLEAR MEDICINE

## 2022-10-10 PROCEDURE — 99213 OFFICE O/P EST LOW 20 MIN: CPT | Performed by: NUCLEAR MEDICINE

## 2022-10-10 PROCEDURE — G8417 CALC BMI ABV UP PARAM F/U: HCPCS | Performed by: NUCLEAR MEDICINE

## 2022-10-10 PROCEDURE — 1036F TOBACCO NON-USER: CPT | Performed by: NUCLEAR MEDICINE

## 2022-10-10 PROCEDURE — 3017F COLORECTAL CA SCREEN DOC REV: CPT | Performed by: NUCLEAR MEDICINE

## 2022-10-10 NOTE — PROGRESS NOTES
Patient here for check up. Patient didn't bring medication list or bottles. Patient states medications haven't changed. Patient has SOB with walking. Patient denies chest pain.

## 2022-10-10 NOTE — PROGRESS NOTES
[address] [place] [address] [address]  [address]  [facility] [address]  Dept: [phone]  Dept Fax: [phone]  Loc: [phone]    Visit Date: 10/10/2022    Em Gillette is a 74 y.o. male who presents todayfor:  Chief Complaint   Patient presents with    Check-Up    Coronary Artery Disease    Hypertension     Know CABG and stents  No chest pain   No changes in breathing  Limited patient  No changes other wise  Mainly aches and pains  BP is stable   No dizziness  did have one syncope episode       HPI:  HPI  Past Medical History:   Diagnosis Date    RAUL (acute kidney injury) ([facility] [address] 2/13/2020    ASHD (arteriosclerotic heart disease) 2005 2006    stent  baki    Depression     Diabetic peripheral neuropathy ([facility] [address] 2014    Fracture Oct 1984    left lower extremity     HTN (hypertension)     Hypercholesteremia     IDDM (insulin dependent diabetes mellitus)     Low back pain     Morbid obesity with BMI of 45.0-49.9, adult [facility])     Osteoarthritis       Past Surgical History:   Procedure Laterality Date    AMPUTATION Left 9/10/14    partial versus complete left hallux amputation, left great toe amputation    APPENDECTOMY      BACK INJECTION Bilateral 1/18/2021    Bilateral Si MBB #1 performed by Bryce Posadas MD at [facility] Bilateral 3/1/2021    Bilateral SI MBB #2 performed by Bryce Posadas MD at [place] [address]    fusion of C5-C6    CHOLECYSTECTOMY      COLONOSCOPY  2007 and 2011    colonn polyps  lorenzo    CORONARY ANGIOPLASTY WITH STENT PLACEMENT  08/07/2017    Dr Olga Low @ [address] [place] GRAFT  2015 august     dox    EKG 12-LEAD  8/14/2015         FACET JOINT INJECTION Bilateral 5/22/2020    Lumbar Facet MBB @L3-4,4-5 bilateral #2 performed by Bryce Posadas MD at [id number] [place] [address]      left leg    JOINT REPLACEMENT bilateral knees gurvinder    PAIN MANAGEMENT PROCEDURE Bilateral 1/28/2020    Lumbar Facet MBB @ L3-4,4-5,5-S1 bilateral #1 LUMBAR FACET performed by Paloma Pritchett MD at [facility] Right 7/14/2020    Lumbar RFA Bilateral L3-4,4-5  right side first performed by Paloma Pritchett MD at [facility] Left 10/1/2020    Lumbar RFA Left side L3-4, 4-5 performed by Paloma Pritchett MD at [facility] Bilateral 11/17/2020    TFLESI @L5 bilateral #1 performed by Paloma Pritchett MD at [facility] Bilateral 12/10/2020    TFLESI @L5 bilateral #1 performed by Paloma Pritchett MD at [address] [place] [address] N/A 12/28/2018    T7-T9 DECOMPRESSION, T7-T9 POSTERIOR FUSION performed by Brandon Flores MD at [address] [place] [address]  2010    fumich    TONSILLECTOMY      VASCULAR SURGERY      cabg harvests from left leg     Family History   Problem Relation Age of Onset    Cancer Mother         uterine     Social History     Tobacco Use    Smoking status: Never    Smokeless tobacco: Never   Substance Use Topics    Alcohol use: Not Currently     Comment: rarely      Current Outpatient Medications   Medication Sig Dispense Refill    cephALEXin (KEFLEX) 500 MG capsule Take 1 capsule by mouth 3 times daily 30 capsule 0    insulin glargine (LANTUS SOLOSTAR) 100 UNIT/ML injection pen INJECT 30 UNITS SUBCUTANEOUSLY ONCE DAILY 45 mL 2    amLODIPine (NORVASC) 5 MG tablet Take 5 mg by mouth daily      ticagrelor (BRILINTA) 90 MG TABS tablet Take 1 tablet by mouth twice daily 180 tablet 1    ferrous sulfate (IRON 325) 325 (65 Fe) MG tablet Take 1 tablet by mouth 2 times daily 60 tablet 5    metFORMIN (GLUCOPHAGE) 500 MG tablet TAKE TWO TABLETS BY MOUTH TWICE DAILY WITH MEALS 360 tablet 1    furosemide (LASIX) 40 MG tablet Take 1 tablet by mouth every evening At  6 00 pm 60 tablet 3    furosemide (LASIX) 80 MG tablet Take one in am (80) and 1/2 tab (40) in pm 60 tablet 3    enalapril (VASOTEC) 10 MG tablet One a day 90 tablet 1    atorvastatin (LIPITOR) 10 MG tablet TAKE 1 TABLET BY MOUTH ONCE DAILY 90 tablet 1    ACCU-CHEK SMARTVIEW strip Use to test Blood Sugar 3x daily, Dx: E11.9 100 each 11    albuterol sulfate  (90 Base) MCG/ACT inhaler Inhale 2 puffs into the lungs 2 times daily 18 g 3    carvedilol (COREG) 12.5 MG tablet Take 1 tablet by mouth 2 times daily 180 tablet 2    insulin aspart (NOVOLOG FLEXPEN) 100 UNIT/ML injection pen Inject 18 Units into the skin 3 times daily (before meals) INJECT 12 UNITS SUBCUTANEOUSLY 3 TIMES DAILY BEFORE MEALS (Patient taking differently: Inject 12 Units into the skin 3 times daily (before meals) INJECT 12 UNITS SUBCUTANEOUSLY 3 TIMES DAILY BEFORE MEALS) 10 pen 3    Multiple Vitamins-Minerals (THERAPEUTIC MULTIVITAMIN-MINERALS) tablet Take 1 tablet by mouth daily 30 tablet 11    ondansetron (ZOFRAN) 4 MG tablet Take 1 tablet by mouth every 8 hours as needed for Nausea or Vomiting 30 tablet 0    aspirin 81 MG tablet Take 1 tablet by mouth daily      acetaminophen (TYLENOL) 500 MG tablet Take 1,000 mg by mouth as needed for Pain       No current facility-administered medications for this visit.      Allergies   Allergen Reactions    Horse-Derived Products      Health Maintenance   Topic Date Due    Shingles vaccine (1 of 2) Never done    Pneumococcal 65+ years Vaccine (3 - PPSV23 or PCV20) 06/30/2017    Diabetic microalbuminuria test  01/11/2019    Diabetic retinal exam  06/20/2019    Colorectal Cancer Screen  09/21/2021    Lipids  05/18/2022    Prostate Specific Antigen (PSA) Screening or Monitoring  05/18/2022    Flu vaccine (1) 08/01/2022    Diabetic foot exam  02/08/2023    Depression Monitoring  04/05/2023    Annual Wellness Visit (AWV)  04/06/2023    A1C test voicedunderstanding. Instructed to continue current medications, diet and exercise. Continue risk factor modification and medical management. Patient agreed with treatment plan. Follow up as directed.     Electronically signedby Shay Saleem MD on 10/10/2022 at 1:17 PM

## 2022-10-11 ENCOUNTER — TELEPHONE (OUTPATIENT)
Dept: FAMILY MEDICINE CLINIC | Age: 72
End: 2022-10-11

## 2022-10-12 NOTE — TELEPHONE ENCOUNTER
Nanci Martinez called me to let me know that his skin tear still bleeds when he takes off the dressing. I asked him how often he is changing the dressing and he said every 2-3 days. I suggested that he moisten the dressing with cleanser so it will come off easier and pull it so the skin covers the open area. He said the skin is all bunched up at the bottom. I told him if he cannot get it to stop bleeding he should go to the Urgent care and have them look at it. Otherwise he has a follow up appointment on Thursday. He said he will be here on Thursday. He denied any foul drainage or fever.

## 2022-10-13 ENCOUNTER — TELEPHONE (OUTPATIENT)
Dept: FAMILY MEDICINE CLINIC | Age: 72
End: 2022-10-13

## 2022-10-13 ENCOUNTER — OFFICE VISIT (OUTPATIENT)
Dept: FAMILY MEDICINE CLINIC | Age: 72
End: 2022-10-13

## 2022-10-13 ENCOUNTER — NURSE ONLY (OUTPATIENT)
Dept: LAB | Age: 72
End: 2022-10-13

## 2022-10-13 VITALS
HEART RATE: 76 BPM | SYSTOLIC BLOOD PRESSURE: 108 MMHG | RESPIRATION RATE: 16 BRPM | BODY MASS INDEX: 46.89 KG/M2 | DIASTOLIC BLOOD PRESSURE: 68 MMHG | HEIGHT: 74 IN

## 2022-10-13 DIAGNOSIS — I87.2 CHRONIC VENOUS STASIS DERMATITIS OF LOWER EXTREMITY: ICD-10-CM

## 2022-10-13 DIAGNOSIS — F33.41 RECURRENT MAJOR DEPRESSIVE DISORDER, IN PARTIAL REMISSION (HCC): ICD-10-CM

## 2022-10-13 DIAGNOSIS — E11.42 DIABETIC PERIPHERAL NEUROPATHY (HCC): ICD-10-CM

## 2022-10-13 DIAGNOSIS — M48.061 SPINAL STENOSIS OF LUMBAR REGION WITHOUT NEUROGENIC CLAUDICATION: ICD-10-CM

## 2022-10-13 DIAGNOSIS — Z79.4 TYPE 2 DIABETES MELLITUS WITH DIABETIC POLYNEUROPATHY, WITH LONG-TERM CURRENT USE OF INSULIN (HCC): ICD-10-CM

## 2022-10-13 DIAGNOSIS — E11.69 TYPE 2 DIABETES MELLITUS WITH OTHER SPECIFIED COMPLICATION, WITH LONG-TERM CURRENT USE OF INSULIN (HCC): ICD-10-CM

## 2022-10-13 DIAGNOSIS — N18.30 CHRONIC RENAL FAILURE, STAGE 3 (MODERATE), UNSPECIFIED WHETHER STAGE 3A OR 3B CKD (HCC): ICD-10-CM

## 2022-10-13 DIAGNOSIS — I50.32 CHRONIC DIASTOLIC (CONGESTIVE) HEART FAILURE (HCC): ICD-10-CM

## 2022-10-13 DIAGNOSIS — E11.42 TYPE 2 DIABETES MELLITUS WITH DIABETIC POLYNEUROPATHY, WITH LONG-TERM CURRENT USE OF INSULIN (HCC): ICD-10-CM

## 2022-10-13 DIAGNOSIS — Z79.4 TYPE 2 DIABETES MELLITUS WITH OTHER SPECIFIED COMPLICATION, WITH LONG-TERM CURRENT USE OF INSULIN (HCC): ICD-10-CM

## 2022-10-13 DIAGNOSIS — I10 ESSENTIAL HYPERTENSION: Primary | ICD-10-CM

## 2022-10-13 DIAGNOSIS — R55 SYNCOPE AND COLLAPSE: ICD-10-CM

## 2022-10-13 DIAGNOSIS — D64.9 ANEMIA, UNSPECIFIED TYPE: ICD-10-CM

## 2022-10-13 LAB
ANION GAP SERPL CALCULATED.3IONS-SCNC: 16 MEQ/L (ref 8–16)
AVERAGE GLUCOSE: 135 MG/DL (ref 70–126)
BASOPHILS # BLD: 0.5 %
BASOPHILS ABSOLUTE: 0 THOU/MM3 (ref 0–0.1)
BUN BLDV-MCNC: 59 MG/DL (ref 7–22)
CALCIUM SERPL-MCNC: 9.1 MG/DL (ref 8.5–10.5)
CHLORIDE BLD-SCNC: 101 MEQ/L (ref 98–111)
CO2: 21 MEQ/L (ref 23–33)
CREAT SERPL-MCNC: 2.8 MG/DL (ref 0.4–1.2)
EOSINOPHIL # BLD: 3.9 %
EOSINOPHILS ABSOLUTE: 0.3 THOU/MM3 (ref 0–0.4)
ERYTHROCYTE [DISTWIDTH] IN BLOOD BY AUTOMATED COUNT: 12.5 % (ref 11.5–14.5)
ERYTHROCYTE [DISTWIDTH] IN BLOOD BY AUTOMATED COUNT: 43.9 FL (ref 35–45)
GFR SERPL CREATININE-BSD FRML MDRD: 22 ML/MIN/1.73M2
GLUCOSE BLD-MCNC: 60 MG/DL (ref 70–108)
HBA1C MFR BLD: 6.5 % (ref 4.4–6.4)
HCT VFR BLD CALC: 26.3 % (ref 42–52)
HEMOGLOBIN: 8.6 GM/DL (ref 14–18)
IMMATURE GRANS (ABS): 0.02 THOU/MM3 (ref 0–0.07)
IMMATURE GRANULOCYTES: 0.3 %
LYMPHOCYTES # BLD: 21.3 %
LYMPHOCYTES ABSOLUTE: 1.7 THOU/MM3 (ref 1–4.8)
MCH RBC QN AUTO: 31.5 PG (ref 26–33)
MCHC RBC AUTO-ENTMCNC: 32.7 GM/DL (ref 32.2–35.5)
MCV RBC AUTO: 96.3 FL (ref 80–94)
MONOCYTES # BLD: 7.1 %
MONOCYTES ABSOLUTE: 0.6 THOU/MM3 (ref 0.4–1.3)
NUCLEATED RED BLOOD CELLS: 0 /100 WBC
PLATELET # BLD: 225 THOU/MM3 (ref 130–400)
PMV BLD AUTO: 9 FL (ref 9.4–12.4)
POTASSIUM SERPL-SCNC: 5.6 MEQ/L (ref 3.5–5.2)
RBC # BLD: 2.73 MILL/MM3 (ref 4.7–6.1)
SEG NEUTROPHILS: 66.9 %
SEGMENTED NEUTROPHILS ABSOLUTE COUNT: 5.3 THOU/MM3 (ref 1.8–7.7)
SODIUM BLD-SCNC: 138 MEQ/L (ref 135–145)
TSH SERPL DL<=0.05 MIU/L-ACNC: 2.28 UIU/ML (ref 0.4–4.2)
WBC # BLD: 7.9 THOU/MM3 (ref 4.8–10.8)

## 2022-10-13 PROCEDURE — 1123F ACP DISCUSS/DSCN MKR DOCD: CPT | Performed by: FAMILY MEDICINE

## 2022-10-13 PROCEDURE — 99214 OFFICE O/P EST MOD 30 MIN: CPT | Performed by: FAMILY MEDICINE

## 2022-10-13 PROCEDURE — 3017F COLORECTAL CA SCREEN DOC REV: CPT | Performed by: FAMILY MEDICINE

## 2022-10-13 PROCEDURE — G8417 CALC BMI ABV UP PARAM F/U: HCPCS | Performed by: FAMILY MEDICINE

## 2022-10-13 PROCEDURE — 1036F TOBACCO NON-USER: CPT | Performed by: FAMILY MEDICINE

## 2022-10-13 PROCEDURE — G8427 DOCREV CUR MEDS BY ELIG CLIN: HCPCS | Performed by: FAMILY MEDICINE

## 2022-10-13 RX ORDER — CEPHALEXIN 500 MG/1
500 CAPSULE ORAL 3 TIMES DAILY
Qty: 30 CAPSULE | Refills: 0 | Status: SHIPPED | OUTPATIENT
Start: 2022-10-13

## 2022-10-13 NOTE — TELEPHONE ENCOUNTER
Carotid Doppler and Holter Monitor @ [address] [place] [address] on Wednesday 10-. Patient to arrive at 10:15am to [place] [address]. No prep. Carotid Doppler at 10:30am with Holter Monitor following at 11am.     Called patient, it went to Voicemail and I could not leave one.

## 2022-10-13 NOTE — PROGRESS NOTES
Subjective:      Patient ID: Carmelo Hoang is a 74 y.o. male. 2  falls and one  with  walker and other  at  dr Rajeev Vogt  as  syncope  but  awake as  soon  on  the  ground and  had  yellow  drainage        Stress and  echo  in  march as  ? Cause  of the  fall         Crf   noted      Prior  anemia      Diabetic  peripheral  neuropathy  noted  with  the  falls   hx  of  left   great  toe amputation          Depression stable     Diabetes  He presents for his follow-up diabetic visit. He has type 2 diabetes mellitus. His disease course has been stable. There are no diabetic associated symptoms. There are no hypoglycemic complications. Symptoms are stable. There are no diabetic complications. Current diabetic treatment includes oral agent (dual therapy). Meal planning includes avoidance of concentrated sweets. His breakfast blood glucose is taken between 6-7 am. His breakfast blood glucose range is generally 110-130 mg/dl. An ACE inhibitor/angiotensin II receptor blocker is being taken. Hypertension  This is a chronic problem. The current episode started more than 1 year ago. The problem has been resolved since onset. The problem is controlled. The current treatment provides significant improvement. There are no compliance problems. Review of Systems    /68 (Site: Right Upper Arm, Position: Sitting, Cuff Size: Large Adult)   Pulse 76   Resp 16   Ht 6' 2\" (1.88 m)   BMI 46.89 kg/m²    Physical Exam  Vitals and nursing note reviewed. Constitutional:       Appearance: He is well-developed. HENT:      Head: Normocephalic and atraumatic. Right Ear: External ear normal.      Left Ear: External ear normal.      Nose: Nose normal.   Eyes:      Conjunctiva/sclera: Conjunctivae normal.      Pupils: Pupils are equal, round, and reactive to light. Comments: Fundi nl   Neck:      Thyroid: No thyromegaly. Cardiovascular:      Rate and Rhythm: Normal rate and regular rhythm.       Heart sounds: Normal heart sounds. Pulmonary:      Effort: Pulmonary effort is normal.      Breath sounds: Normal breath sounds. No wheezing or rales. Abdominal:      General: Bowel sounds are normal.      Palpations: Abdomen is soft. There is no mass. Tenderness: There is no abdominal tenderness. Musculoskeletal:         General: Normal range of motion. Cervical back: Normal range of motion and neck supple. Lymphadenopathy:      Cervical: No cervical adenopathy. Skin:     General: Skin is warm and dry. Findings: No rash. Comments:    Right  arm   2  at  elbow  with  skin   laceration an skin flap    thin   Neurological:      Mental Status: He is alert and oriented to person, place, and time. Cranial Nerves: No cranial nerve deficit. Deep Tendon Reflexes: Reflexes are normal and symmetric. Assessment:        ICD-10-CM    1. Essential hypertension  I10       2. Chronic venous stasis dermatitis of lower extremity  I87.2       3. Diabetic peripheral neuropathy ([facility])  E11.42       4. Type 2 diabetes mellitus with other specified complication, with long-term current use of insulin ([facility])  E11.69     Z79.4       5. Anemia, unspecified type  D64.9       6. Spinal stenosis of lumbar region without neurogenic claudication  M48.061       7. Type 2 diabetes mellitus with diabetic polyneuropathy, with long-term current use of insulin ([facility])  E11.42 Hemoglobin A1C    Z79.4       8. Recurrent major depressive disorder, in partial remission ([facility]  F33.41       9. Chronic diastolic (congestive) heart failure ([facility])  I50.32 TSH with Reflex     CBC with Auto Differential     Basic Metabolic Panel      10. Chronic renal failure, stage 3 (moderate), unspecified whether stage 3a or 3b CKD ([facility])  N18.30       11.  Syncope and collapse  R55 Holter Monitor 48 Hour     VL DUP CAROTID BILATERAL             Plan:      Current Outpatient Medications   Medication Sig Dispense Refill    cephALEXin (KEFLEX) 500 MG capsule Take 1 capsule by mouth 3 times daily 30 capsule 0    insulin glargine (LANTUS SOLOSTAR) 100 UNIT/ML injection pen INJECT 30 UNITS SUBCUTANEOUSLY ONCE DAILY 45 mL 2    amLODIPine (NORVASC) 5 MG tablet Take 5 mg by mouth daily      ticagrelor (BRILINTA) 90 MG TABS tablet Take 1 tablet by mouth twice daily 180 tablet 1    ferrous sulfate (IRON 325) 325 (65 Fe) MG tablet Take 1 tablet by mouth 2 times daily 60 tablet 5    metFORMIN (GLUCOPHAGE) 500 MG tablet TAKE TWO TABLETS BY MOUTH TWICE DAILY WITH MEALS 360 tablet 1    furosemide (LASIX) 40 MG tablet Take 1 tablet by mouth every evening At  6 00 pm 60 tablet 3    furosemide (LASIX) 80 MG tablet Take one in am (80) and 1/2 tab (40) in pm 60 tablet 3    enalapril (VASOTEC) 10 MG tablet One a day 90 tablet 1    atorvastatin (LIPITOR) 10 MG tablet TAKE 1 TABLET BY MOUTH ONCE DAILY 90 tablet 1    ACCU-CHEK SMARTVIEW strip Use to test Blood Sugar 3x daily, Dx: E11.9 100 each 11    albuterol sulfate  (90 Base) MCG/ACT inhaler Inhale 2 puffs into the lungs 2 times daily 18 g 3    carvedilol (COREG) 12.5 MG tablet Take 1 tablet by mouth 2 times daily 180 tablet 2    insulin aspart (NOVOLOG FLEXPEN) 100 UNIT/ML injection pen Inject 18 Units into the skin 3 times daily (before meals) INJECT 12 UNITS SUBCUTANEOUSLY 3 TIMES DAILY BEFORE MEALS (Patient taking differently: Inject 12 Units into the skin 3 times daily (before meals) INJECT 12 UNITS SUBCUTANEOUSLY 3 TIMES DAILY BEFORE MEALS) 10 pen 3    Multiple Vitamins-Minerals (THERAPEUTIC MULTIVITAMIN-MINERALS) tablet Take 1 tablet by mouth daily 30 tablet 11    ondansetron (ZOFRAN) 4 MG tablet Take 1 tablet by mouth every 8 hours as needed for Nausea or Vomiting 30 tablet 0    aspirin 81 MG tablet Take 1 tablet by mouth daily      acetaminophen (TYLENOL) 500 MG tablet Take 1,000 mg by mouth as needed for Pain       No current facility-administered medications for this visit.           Orders Placed This Encounter Procedures    VL DUP CAROTID BILATERAL     Standing Status:   Future     Standing Expiration Date:   10/13/2023     Order Specific Question:   Reason for exam:     Answer:   syncope    TSH with Reflex     Standing Status:   Future     Standing Expiration Date:   10/13/2023    CBC with Auto Differential     Standing Status:   Future     Standing Expiration Date:   07/02/6776    Basic Metabolic Panel     Standing Status:   Future     Standing Expiration Date:   10/13/2023    Hemoglobin A1C     Standing Status:   Future     Standing Expiration Date:   10/13/2023    Holter Monitor 48 Hour     Standing Status:   Future     Standing Expiration Date:   10/13/2023     Order Specific Question:   Reason for Exam?     Answer:   Syncope        Syncope and     may  have  been  lumbar  stenosis      Rule  rhthym and  vascular       See in  2  weeks   Leigh Palomino MD

## 2022-10-13 NOTE — PROGRESS NOTES
Carotid Doppler and Holter Monitor @ [facility] on Wednesday 10-. Patient to arrive at 10:15am to [place] [id number]. No prep.      Carotid Doppler at 10:30am with Holter Monitor following at 11am.

## 2022-10-14 ENCOUNTER — TELEPHONE (OUTPATIENT)
Dept: FAMILY MEDICINE CLINIC | Age: 72
End: 2022-10-14

## 2022-10-14 DIAGNOSIS — I15.0 RENOVASCULAR HYPERTENSION: ICD-10-CM

## 2022-10-14 DIAGNOSIS — N18.30 CHRONIC RENAL FAILURE, STAGE 3 (MODERATE), UNSPECIFIED WHETHER STAGE 3A OR 3B CKD (HCC): Primary | ICD-10-CM

## 2022-10-14 RX ORDER — ENALAPRIL MALEATE 10 MG/1
TABLET ORAL
Qty: 90 TABLET | Refills: 1
Start: 2022-10-14 | End: 2022-11-22 | Stop reason: SDUPTHER

## 2022-10-14 NOTE — TELEPHONE ENCOUNTER
Judit Suarez informed by Phone. Lab orders faxed to [facility] per request. He was unsure when his appt with Dr Sam Torres. He was currently in the bed. He will call us back once he gets up and look at all his paperwork.

## 2022-10-14 NOTE — PROGRESS NOTES
See  note  as lasix  to  80 mg once  a day and  vasotec 10 mg  1/2 tab or  5 mg a day     Lab  on printer

## 2022-10-14 NOTE — TELEPHONE ENCOUNTER
----- Message from Alex Parisi MD sent at 10/14/2022  5:38 AM EDT -----    See order as lab  next wed and lasix to 80mg once a day  only and enalapril to  5 mg     When is appt  with  dr Cande Hernandez of  [facility]

## 2022-10-14 NOTE — TELEPHONE ENCOUNTER
----- Message from Rodney Pablo MD sent at 10/14/2022  5:32 AM EDT -----  Creat is way  up at  2.8   as lasix to just 80mg a day and  the enalapril 10mg to  1/2 tab and repeat lab next wed 10-19-22

## 2022-11-09 ENCOUNTER — CARE COORDINATION (OUTPATIENT)
Dept: FAMILY MEDICINE CLINIC | Age: 72
End: 2022-11-09

## 2022-11-09 NOTE — CARE COORDINATION
Carlee Coleman called me to let me know that he fell a few days ago in his bathroom because he lost his balance. He had bruised his posterior and it is sore. He is able to get up an walk and he acknowledged that it is a little better every day. He did not hit his head and he declined transport when the squad came to help. I suggested that he try heat or cold for comfort times 20 min at a time. He also mentioned he has a pillow that may help. I told him if it does not continue to feel better than he needs to go to have it checked. He agreed. I will follow up with him next week.

## 2022-11-21 DIAGNOSIS — E78.00 HYPERCHOLESTEREMIA: ICD-10-CM

## 2022-11-21 DIAGNOSIS — I15.0 RENOVASCULAR HYPERTENSION: ICD-10-CM

## 2022-11-21 DIAGNOSIS — N18.30 CHRONIC RENAL FAILURE, STAGE 3 (MODERATE), UNSPECIFIED WHETHER STAGE 3A OR 3B CKD (HCC): ICD-10-CM

## 2022-11-21 NOTE — TELEPHONE ENCOUNTER
Date of last visit:  10/13/2022  Date of next visit:  Visit date not found    Requested Prescriptions     Pending Prescriptions Disp Refills    atorvastatin (LIPITOR) 10 MG tablet 90 tablet 1     Sig: TAKE 1 TABLET BY MOUTH ONCE DAILY    enalapril (VASOTEC) 10 MG tablet 90 tablet 1     Si/2 tab Once a day

## 2022-11-22 RX ORDER — ENALAPRIL MALEATE 10 MG/1
TABLET ORAL
Qty: 90 TABLET | Refills: 1 | Status: SHIPPED | OUTPATIENT
Start: 2022-11-22

## 2022-11-22 RX ORDER — ATORVASTATIN CALCIUM 10 MG/1
TABLET, FILM COATED ORAL
Qty: 90 TABLET | Refills: 1 | Status: SHIPPED | OUTPATIENT
Start: 2022-11-22

## 2022-11-29 NOTE — TELEPHONE ENCOUNTER
Date of last visit:  10/13/2022  Date of next visit:  Visit date not found    Requested Prescriptions     Pending Prescriptions Disp Refills    amLODIPine (NORVASC) 5 MG tablet 30 tablet      Sig: Take 1 tablet by mouth daily

## 2022-11-30 RX ORDER — AMLODIPINE BESYLATE 5 MG/1
5 TABLET ORAL DAILY
Qty: 30 TABLET | Refills: 5 | Status: SHIPPED | OUTPATIENT
Start: 2022-11-30

## 2022-12-12 ENCOUNTER — TELEPHONE (OUTPATIENT)
Dept: FAMILY MEDICINE CLINIC | Age: 72
End: 2022-12-12

## 2022-12-12 DIAGNOSIS — D64.9 ANEMIA, UNSPECIFIED TYPE: ICD-10-CM

## 2022-12-12 DIAGNOSIS — E11.69 TYPE 2 DIABETES MELLITUS WITH OTHER SPECIFIED COMPLICATION, WITH LONG-TERM CURRENT USE OF INSULIN (HCC): ICD-10-CM

## 2022-12-12 DIAGNOSIS — E11.9 TYPE 2 DIABETES MELLITUS TREATED WITH INSULIN (HCC): ICD-10-CM

## 2022-12-12 DIAGNOSIS — Z79.4 TYPE 2 DIABETES MELLITUS TREATED WITH INSULIN (HCC): ICD-10-CM

## 2022-12-12 DIAGNOSIS — Z79.4 TYPE 2 DIABETES MELLITUS WITH OTHER SPECIFIED COMPLICATION, WITH LONG-TERM CURRENT USE OF INSULIN (HCC): ICD-10-CM

## 2022-12-12 RX ORDER — FERROUS SULFATE 325(65) MG
325 TABLET ORAL 2 TIMES DAILY
Qty: 60 TABLET | Refills: 5 | Status: SHIPPED | OUTPATIENT
Start: 2022-12-12

## 2022-12-12 RX ORDER — INSULIN ASPART 100 [IU]/ML
12 INJECTION, SOLUTION INTRAVENOUS; SUBCUTANEOUS
Qty: 10 ADJUSTABLE DOSE PRE-FILLED PEN SYRINGE | Refills: 1 | Status: SHIPPED | OUTPATIENT
Start: 2022-12-12

## 2022-12-12 RX ORDER — M-VIT,TX,IRON,MINS/CALC/FOLIC 27MG-0.4MG
1 TABLET ORAL DAILY
Qty: 30 TABLET | Refills: 5 | Status: SHIPPED | OUTPATIENT
Start: 2022-12-12 | End: 2023-12-12

## 2022-12-12 NOTE — TELEPHONE ENCOUNTER
Sarbjit Isidro called requesting a refill of their:    PT STATED THAT HE IS COMPLETELY OUT OF THE FOLLOWING AND WOULD LIKE THEM TO BE CALLED IN FOR HIM TODAY:    ferrous sulfate (IRON 325) 325 (65 Fe) MG tablet BID  insulin aspart (NOVOLOG FLEXPEN) 100 UNIT/ML injection pen Inject 18 Units into the skin 3 times daily (before meals)  Multiple Vitamins-Minerals (THERAPEUTIC MULTIVITAMIN-MINERALS) tablet QD  metFORMIN (GLUCOPHAGE) 500 MG tablet TAKE TWO TABLETS BY MOUTH TWICE DAILY WITH MEALS      Send 90 day supply to [facility] [employer] on [employer]

## 2022-12-12 NOTE — TELEPHONE ENCOUNTER
Date of last visit:  10/13/2022  Date of next visit:  1/11/2023    Requested Prescriptions     Signed Prescriptions Disp Refills    ferrous sulfate (IRON 325) 325 (65 Fe) MG tablet 60 tablet 5     Sig: Take 1 tablet by mouth 2 times daily     Authorizing Provider: Suzi Kaba     Ordering User: ANTON FELDMAN    insulin aspart (NOVOLOG FLEXPEN) 100 UNIT/ML injection pen 10 Adjustable Dose Pre-filled Pen Syringe 1     Sig: Inject 12 Units into the skin 3 times daily (before meals) INJECT 12 UNITS SUBCUTANEOUSLY 3 TIMES DAILY BEFORE MEALS     Authorizing Provider: Suzi Paredes User: ANTON FELDMAN    metFORMIN (GLUCOPHAGE) 500 MG tablet 360 tablet 1     Sig: TAKE TWO TABLETS BY MOUTH TWICE DAILY WITH MEALS     Authorizing Provider: Suzi Kaba     Ordering User: ANTON FELDMAN    Multiple Vitamins-Minerals (THERAPEUTIC MULTIVITAMIN-MINERALS) tablet 30 tablet 5     Sig: Take 1 tablet by mouth daily     Authorizing Provider: Suzi Paredes User: Ej Carmona

## 2022-12-27 RX ORDER — AMLODIPINE BESYLATE 5 MG/1
TABLET ORAL
Qty: 90 TABLET | Refills: 0 | OUTPATIENT
Start: 2022-12-27

## 2022-12-27 NOTE — TELEPHONE ENCOUNTER
Date of last visit:  10/13/2022  Date of next visit:  1/11/2023    Requested Prescriptions     Pending Prescriptions Disp Refills    ticagrelor (BRILINTA) 90 MG TABS tablet 180 tablet 1     Sig: Take 1 tablet by mouth twice daily

## 2023-01-18 ENCOUNTER — TELEPHONE (OUTPATIENT)
Dept: FAMILY MEDICINE CLINIC | Age: 73
End: 2023-01-18

## 2023-01-18 NOTE — TELEPHONE ENCOUNTER
Pt is calling in he said his left big toe where he had the removal of the tip of it is painful this afternoon. Pt states that it feels like someone is stabbing his left big toe around the incision like someone keeps repeatedly stabbing it with a knife. It has been going for about Half hour to 40 min. Pian on pain scale from 1-10. Pts states its 8-9.       [id number] [facility]

## 2023-01-30 DIAGNOSIS — D64.9 ANEMIA, UNSPECIFIED TYPE: ICD-10-CM

## 2023-01-30 DIAGNOSIS — I87.2 VENOUS STASIS DERMATITIS OF BOTH LOWER EXTREMITIES: ICD-10-CM

## 2023-01-30 DIAGNOSIS — I15.0 RENOVASCULAR HYPERTENSION: ICD-10-CM

## 2023-01-30 DIAGNOSIS — N18.30 CHRONIC RENAL FAILURE, STAGE 3 (MODERATE), UNSPECIFIED WHETHER STAGE 3A OR 3B CKD (HCC): ICD-10-CM

## 2023-01-30 NOTE — TELEPHONE ENCOUNTER
Date of last visit:  10/13/2022  Date of next visit:  2/7/2023    Requested Prescriptions     Pending Prescriptions Disp Refills    ferrous sulfate (IRON 325) 325 (65 Fe) MG tablet 60 tablet 5     Sig: Take 1 tablet by mouth 2 times daily    furosemide (LASIX) 40 MG tablet 30 tablet 5     Sig: Take 1 tablet by mouth every evening At  6 00 pm

## 2023-01-31 RX ORDER — FERROUS SULFATE 325(65) MG
325 TABLET ORAL 2 TIMES DAILY
Qty: 60 TABLET | Refills: 5 | Status: SHIPPED | OUTPATIENT
Start: 2023-01-31

## 2023-01-31 RX ORDER — FUROSEMIDE 40 MG/1
40 TABLET ORAL EVERY EVENING
Qty: 30 TABLET | Refills: 5 | Status: SHIPPED | OUTPATIENT
Start: 2023-01-31

## 2023-02-07 ENCOUNTER — OFFICE VISIT (OUTPATIENT)
Dept: FAMILY MEDICINE CLINIC | Age: 73
End: 2023-02-07

## 2023-02-07 ENCOUNTER — NURSE ONLY (OUTPATIENT)
Dept: LAB | Age: 73
End: 2023-02-07

## 2023-02-07 VITALS
BODY MASS INDEX: 40.43 KG/M2 | SYSTOLIC BLOOD PRESSURE: 130 MMHG | WEIGHT: 315 LBS | RESPIRATION RATE: 16 BRPM | DIASTOLIC BLOOD PRESSURE: 78 MMHG | HEIGHT: 74 IN | HEART RATE: 76 BPM

## 2023-02-07 DIAGNOSIS — E11.42 DIABETIC PERIPHERAL NEUROPATHY (HCC): ICD-10-CM

## 2023-02-07 DIAGNOSIS — E66.01 CLASS 3 SEVERE OBESITY WITH SERIOUS COMORBIDITY AND BODY MASS INDEX (BMI) OF 45.0 TO 49.9 IN ADULT, UNSPECIFIED OBESITY TYPE (HCC): ICD-10-CM

## 2023-02-07 DIAGNOSIS — Z79.4 TYPE 2 DIABETES MELLITUS WITH DIABETIC POLYNEUROPATHY, WITH LONG-TERM CURRENT USE OF INSULIN (HCC): Primary | ICD-10-CM

## 2023-02-07 DIAGNOSIS — E11.42 TYPE 2 DIABETES MELLITUS WITH DIABETIC POLYNEUROPATHY, WITH LONG-TERM CURRENT USE OF INSULIN (HCC): ICD-10-CM

## 2023-02-07 DIAGNOSIS — E78.00 HYPERCHOLESTEREMIA: ICD-10-CM

## 2023-02-07 DIAGNOSIS — I10 PRIMARY HYPERTENSION: ICD-10-CM

## 2023-02-07 DIAGNOSIS — F33.41 RECURRENT MAJOR DEPRESSIVE DISORDER, IN PARTIAL REMISSION (HCC): ICD-10-CM

## 2023-02-07 DIAGNOSIS — E11.42 TYPE 2 DIABETES MELLITUS WITH DIABETIC POLYNEUROPATHY, WITH LONG-TERM CURRENT USE OF INSULIN (HCC): Primary | ICD-10-CM

## 2023-02-07 DIAGNOSIS — I50.32 CHRONIC DIASTOLIC (CONGESTIVE) HEART FAILURE (HCC): ICD-10-CM

## 2023-02-07 DIAGNOSIS — Z79.4 TYPE 2 DIABETES MELLITUS WITH DIABETIC POLYNEUROPATHY, WITH LONG-TERM CURRENT USE OF INSULIN (HCC): ICD-10-CM

## 2023-02-07 DIAGNOSIS — M48.061 SPINAL STENOSIS OF LUMBAR REGION WITHOUT NEUROGENIC CLAUDICATION: ICD-10-CM

## 2023-02-07 PROBLEM — S32.019A CLOSED FRACTURE OF FIRST LUMBAR VERTEBRA (HCC): Status: RESOLVED | Noted: 2022-05-06 | Resolved: 2023-02-07
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
## 2023-02-07 PROBLEM — S22.089A CLOSED T12 FRACTURE (HCC): Status: RESOLVED | Noted: 2022-05-06 | Resolved: 2023-02-07
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
## 2023-02-07 LAB
DEPRECATED MEAN GLUCOSE BLD GHB EST-ACNC: 150 MG/DL (ref 70–126)
HBA1C MFR BLD HPLC: 7 % (ref 4.4–6.4)

## 2023-02-07 RX ORDER — INSULIN GLARGINE 100 [IU]/ML
INJECTION, SOLUTION SUBCUTANEOUS
Qty: 45 ML | Refills: 2 | Status: SHIPPED | OUTPATIENT
Start: 2023-02-07

## 2023-02-07 SDOH — ECONOMIC STABILITY: FOOD INSECURITY: WITHIN THE PAST 12 MONTHS, THE FOOD YOU BOUGHT JUST DIDN'T LAST AND YOU DIDN'T HAVE MONEY TO GET MORE.: NEVER TRUE

## 2023-02-07 SDOH — ECONOMIC STABILITY: HOUSING INSECURITY
IN THE LAST 12 MONTHS, WAS THERE A TIME WHEN YOU DID NOT HAVE A STEADY PLACE TO SLEEP OR SLEPT IN A SHELTER (INCLUDING NOW)?: NO

## 2023-02-07 SDOH — ECONOMIC STABILITY: FOOD INSECURITY: WITHIN THE PAST 12 MONTHS, YOU WORRIED THAT YOUR FOOD WOULD RUN OUT BEFORE YOU GOT MONEY TO BUY MORE.: NEVER TRUE

## 2023-02-07 SDOH — ECONOMIC STABILITY: INCOME INSECURITY: HOW HARD IS IT FOR YOU TO PAY FOR THE VERY BASICS LIKE FOOD, HOUSING, MEDICAL CARE, AND HEATING?: NOT HARD AT ALL

## 2023-02-07 ASSESSMENT — PATIENT HEALTH QUESTIONNAIRE - PHQ9
SUM OF ALL RESPONSES TO PHQ QUESTIONS 1-9: 5
5. POOR APPETITE OR OVEREATING: 1
6. FEELING BAD ABOUT YOURSELF - OR THAT YOU ARE A FAILURE OR HAVE LET YOURSELF OR YOUR FAMILY DOWN: 0
SUM OF ALL RESPONSES TO PHQ QUESTIONS 1-9: 5
SUM OF ALL RESPONSES TO PHQ9 QUESTIONS 1 & 2: 2
9. THOUGHTS THAT YOU WOULD BE BETTER OFF DEAD, OR OF HURTING YOURSELF: 0
8. MOVING OR SPEAKING SO SLOWLY THAT OTHER PEOPLE COULD HAVE NOTICED. OR THE OPPOSITE, BEING SO FIGETY OR RESTLESS THAT YOU HAVE BEEN MOVING AROUND A LOT MORE THAN USUAL: 0
10. IF YOU CHECKED OFF ANY PROBLEMS, HOW DIFFICULT HAVE THESE PROBLEMS MADE IT FOR YOU TO DO YOUR WORK, TAKE CARE OF THINGS AT HOME, OR GET ALONG WITH OTHER PEOPLE: 0
4. FEELING TIRED OR HAVING LITTLE ENERGY: 1
3. TROUBLE FALLING OR STAYING ASLEEP: 1
2. FEELING DOWN, DEPRESSED OR HOPELESS: 1
7. TROUBLE CONCENTRATING ON THINGS, SUCH AS READING THE NEWSPAPER OR WATCHING TELEVISION: 0
SUM OF ALL RESPONSES TO PHQ QUESTIONS 1-9: 5
1. LITTLE INTEREST OR PLEASURE IN DOING THINGS: 1
SUM OF ALL RESPONSES TO PHQ QUESTIONS 1-9: 5

## 2023-02-07 ASSESSMENT — ENCOUNTER SYMPTOMS
BACK PAIN: 0
ORTHOPNEA: 0
COUGH: 0
EYE PAIN: 0
ABDOMINAL PAIN: 0
CHEST TIGHTNESS: 0
CONSTIPATION: 0
BLOOD IN STOOL: 0
TROUBLE SWALLOWING: 0
SORE THROAT: 0
NAUSEA: 0
SHORTNESS OF BREATH: 0

## 2023-02-07 NOTE — PROGRESS NOTES
Subjective:      Patient ID: Veronica Blas is a 74 y.o. male. Low  spine  back pain and  last  fall  3  mths  ago   lumbar  stenosis      Chronic  chf   stable   diastolic        Obesity  noted       Cabg  stable         Depression   stable      Chronic  leg  edema   noted         Diabetes  He presents for his follow-up diabetic visit. He has type 2 diabetes mellitus. His disease course has been stable. There are no hypoglycemic associated symptoms. Pertinent negatives for hypoglycemia include no dizziness or headaches. There are no diabetic associated symptoms. Pertinent negatives for diabetes include no chest pain, no fatigue and no weakness. There are no hypoglycemic complications. Symptoms are stable. Current diabetic treatment includes insulin injections. He is compliant with treatment all of the time. His weight is stable. He is following a diabetic diet. His breakfast blood glucose is taken between 7-8 am. His breakfast blood glucose range is generally 110-130 mg/dl. Hypertension  This is a chronic problem. The current episode started more than 1 year ago. The problem has been resolved since onset. The problem is controlled. Pertinent negatives include no chest pain, headaches, orthopnea, palpitations, peripheral edema or shortness of breath. The current treatment provides significant improvement. There are no compliance problems. Past Medical History:   Diagnosis Date    RAUL (acute kidney injury) ([facility] 2/13/2020    ASHD (arteriosclerotic heart disease) 2005 2006    stent  baki    Depression     Diabetic peripheral neuropathy ([facility] 2014    Fracture Oct 1984    left lower extremity     HTN (hypertension)     Hypercholesteremia     IDDM (insulin dependent diabetes mellitus)     Low back pain     Morbid obesity with BMI of 45.0-49.9, adult (HCC)     Osteoarthritis       Review of Systems   Constitutional:  Negative for fatigue and fever.    HENT:  Negative for congestion, ear pain, postnasal drip, sore throat and trouble swallowing. Eyes:  Negative for pain. Respiratory:  Negative for cough, chest tightness and shortness of breath. Cardiovascular:  Negative for chest pain, palpitations, orthopnea and leg swelling. Gastrointestinal:  Negative for abdominal pain, blood in stool, constipation and nausea. Genitourinary:  Negative for difficulty urinating, frequency and urgency. Musculoskeletal:  Negative for arthralgias, back pain, joint swelling and neck stiffness. Skin:  Negative for rash. Neurological:  Negative for dizziness, weakness and headaches. Hematological:  Negative for adenopathy. Does not bruise/bleed easily. Psychiatric/Behavioral:  Negative for behavioral problems, dysphoric mood and sleep disturbance. /78 (Site: Right Upper Arm, Position: Sitting, Cuff Size: Large Adult)   Pulse 76   Resp 16   Ht 6' 2\" (1.88 m)   Wt (!) 361 lb (163.7 kg)   BMI 46.35 kg/m²    Objective:   Physical Exam  Vitals and nursing note reviewed. Constitutional:       Appearance: He is well-developed. HENT:      Head: Normocephalic and atraumatic. Right Ear: External ear normal.      Left Ear: External ear normal.      Nose: Nose normal.   Eyes:      Conjunctiva/sclera: Conjunctivae normal.      Pupils: Pupils are equal, round, and reactive to light. Comments: Fundi nl   Neck:      Thyroid: No thyromegaly. Cardiovascular:      Rate and Rhythm: Normal rate and regular rhythm. Heart sounds: Normal heart sounds. Pulmonary:      Effort: Pulmonary effort is normal.      Breath sounds: Normal breath sounds. No wheezing or rales. Abdominal:      General: Bowel sounds are normal.      Palpations: Abdomen is soft. There is no mass. Tenderness: There is no abdominal tenderness. Musculoskeletal:         General: Normal range of motion. Cervical back: Normal range of motion and neck supple. Right lower leg: Edema present. Left lower leg: Edema present. Comments: 3  to  4 +  edema    Lymphadenopathy:      Cervical: No cervical adenopathy. Skin:     General: Skin is warm and dry. Findings: No rash. Neurological:      Mental Status: He is alert and oriented to person, place, and time. Cranial Nerves: No cranial nerve deficit. Deep Tendon Reflexes: Reflexes are normal and symmetric. Assessment:        ICD-10-CM    1. Type 2 diabetes mellitus with diabetic polyneuropathy, with long-term current use of insulin ([facility])  E11.42 insulin glargine (LANTUS SOLOSTAR) 100 UNIT/ML injection pen    Z79.4 Hemoglobin A1C      2. Class 3 severe obesity with serious comorbidity and body mass index (BMI) of 45.0 to 49.9 in adult, unspecified obesity type ([facility]  E66.01     Z68.42       3. Spinal stenosis of lumbar region without neurogenic claudication  M48.061       4. Chronic diastolic (congestive) heart failure ([facility])  I50.32       5. Recurrent major depressive disorder, in partial remission ([facility]  F33.41       6. Primary hypertension  I10       7. Hypercholesteremia  E78.00       8.  Diabetic peripheral neuropathy ([facility])  E11.42              Plan:     Current Outpatient Medications   Medication Sig Dispense Refill    insulin glargine (LANTUS SOLOSTAR) 100 UNIT/ML injection pen INJECT 30 UNITS SUBCUTANEOUSLY ONCE DAILY 45 mL 2    ferrous sulfate (IRON 325) 325 (65 Fe) MG tablet Take 1 tablet by mouth 2 times daily 60 tablet 5    ticagrelor (BRILINTA) 90 MG TABS tablet Take 1 tablet by mouth twice daily 180 tablet 1    insulin aspart (NOVOLOG FLEXPEN) 100 UNIT/ML injection pen Inject 12 Units into the skin 3 times daily (before meals) INJECT 12 UNITS SUBCUTANEOUSLY 3 TIMES DAILY BEFORE MEALS 10 Adjustable Dose Pre-filled Pen Syringe 1    metFORMIN (GLUCOPHAGE) 500 MG tablet TAKE TWO TABLETS BY MOUTH TWICE DAILY WITH MEALS 360 tablet 1    Multiple Vitamins-Minerals (THERAPEUTIC MULTIVITAMIN-MINERALS) tablet Take 1 tablet by mouth daily 30 tablet 5 amLODIPine (NORVASC) 5 MG tablet Take 1 tablet by mouth daily 30 tablet 5    atorvastatin (LIPITOR) 10 MG tablet TAKE 1 TABLET BY MOUTH ONCE DAILY 90 tablet 1    enalapril (VASOTEC) 10 MG tablet 1/2 tab Once a day 90 tablet 1    furosemide (LASIX) 80 MG tablet Take one in am (80) and 1/2 tab (40) in pm 60 tablet 3    ACCU-CHEK SMARTVIEW strip Use to test Blood Sugar 3x daily, Dx: E11.9 100 each 11    albuterol sulfate  (90 Base) MCG/ACT inhaler Inhale 2 puffs into the lungs 2 times daily 18 g 3    carvedilol (COREG) 12.5 MG tablet Take 1 tablet by mouth 2 times daily 180 tablet 2    ondansetron (ZOFRAN) 4 MG tablet Take 1 tablet by mouth every 8 hours as needed for Nausea or Vomiting 30 tablet 0    aspirin 81 MG tablet Take 1 tablet by mouth daily      acetaminophen (TYLENOL) 500 MG tablet Take 1,000 mg by mouth as needed for Pain       No current facility-administered medications for this visit. Orders Placed This Encounter   Procedures    Hemoglobin A1C     Standing Status:   Future     Standing Expiration Date:   2/7/2024     No results found for this visit on 02/07/23. Georgia Eaton received counseling on the following healthy behaviors: nutrition and exercise    Patient given educational materials on Diabetes and Hyperlipidemia    I have instructed Georgia Eaton to complete a self tracking handout on Blood Sugars  and instructed them to bring it with them to his next appointment. Discussed use, benefit, and side effects of prescribed medications. Barriers to medication compliance addressed. All patient questions answered. Pt voiced understanding.           Rickie Lucas MD

## 2023-02-08 ENCOUNTER — TELEPHONE (OUTPATIENT)
Dept: FAMILY MEDICINE CLINIC | Age: 73
End: 2023-02-08

## 2023-02-08 NOTE — TELEPHONE ENCOUNTER
----- Message from Carmen Bolton MD sent at 2/7/2023 11:02 PM EST -----  [place]as sugar still good control as hgba1c 7.0

## 2023-02-21 ENCOUNTER — APPOINTMENT (OUTPATIENT)
Dept: GENERAL RADIOLOGY | Age: 73
End: 2023-02-21
Payer: MEDICARE

## 2023-02-21 ENCOUNTER — HOSPITAL ENCOUNTER (INPATIENT)
Age: 73
LOS: 4 days | Discharge: NURSING, REHAB OR LONG-TERM CARE | End: 2023-02-25
Attending: FAMILY MEDICINE | Admitting: FAMILY MEDICINE
Payer: MEDICARE

## 2023-02-21 DIAGNOSIS — R77.8 ELEVATED TROPONIN: ICD-10-CM

## 2023-02-21 DIAGNOSIS — M25.561 ACUTE BILATERAL KNEE PAIN: ICD-10-CM

## 2023-02-21 DIAGNOSIS — R27.0 ATAXIA: ICD-10-CM

## 2023-02-21 DIAGNOSIS — R53.1 GENERAL WEAKNESS: ICD-10-CM

## 2023-02-21 DIAGNOSIS — E11.65 UNCONTROLLED TYPE 2 DIABETES MELLITUS WITH HYPERGLYCEMIA (HCC): ICD-10-CM

## 2023-02-21 DIAGNOSIS — M25.562 ACUTE BILATERAL KNEE PAIN: ICD-10-CM
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
## 2023-02-21 DIAGNOSIS — N18.9 CHRONIC KIDNEY DISEASE, UNSPECIFIED CKD STAGE: ICD-10-CM
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
## 2023-02-21 DIAGNOSIS — M54.50 ACUTE BILATERAL LOW BACK PAIN WITHOUT SCIATICA: ICD-10-CM

## 2023-02-21 PROBLEM — N17.9 ACUTE KIDNEY INJURY (HCC): Status: ACTIVE | Noted: 2023-02-21

## 2023-02-21 PROBLEM — E86.0 DEHYDRATION: Status: ACTIVE | Noted: 2023-02-21

## 2023-02-21 LAB
ALBUMIN SERPL BCG-MCNC: 3.4 G/DL (ref 3.5–5.1)
ALP SERPL-CCNC: 75 U/L (ref 38–126)
ALT SERPL W/O P-5'-P-CCNC: 17 U/L (ref 11–66)
ANION GAP SERPL CALC-SCNC: 12 MEQ/L (ref 8–16)
AST SERPL-CCNC: 22 U/L (ref 5–40)
BASOPHILS ABSOLUTE: 0 THOU/MM3 (ref 0–0.1)
BASOPHILS NFR BLD AUTO: 0.1 %
BILIRUB CONJ SERPL-MCNC: < 0.2 MG/DL (ref 0–0.3)
BILIRUB SERPL-MCNC: 0.6 MG/DL (ref 0.3–1.2)
BUN SERPL-MCNC: 60 MG/DL (ref 7–22)
CALCIUM SERPL-MCNC: 8.7 MG/DL (ref 8.5–10.5)
CHLORIDE SERPL-SCNC: 98 MEQ/L (ref 98–111)
CK SERPL-CCNC: 274 U/L (ref 55–170)
CO2 SERPL-SCNC: 24 MEQ/L (ref 23–33)
CREAT SERPL-MCNC: 1.9 MG/DL (ref 0.4–1.2)
CRP SERPL-MCNC: 21.05 MG/DL (ref 0–1)
DEPRECATED RDW RBC AUTO: 42.3 FL (ref 35–45)
EKG ATRIAL RATE: 92 BPM
EKG P AXIS: 84 DEGREES
EKG P-R INTERVAL: 200 MS
EKG Q-T INTERVAL: 370 MS
EKG Q-T INTERVAL: 382 MS
EKG QRS DURATION: 138 MS
EKG QRS DURATION: 138 MS
EKG QTC CALCULATION (BAZETT): 457 MS
EKG QTC CALCULATION (BAZETT): 485 MS
EKG R AXIS: -14 DEGREES
EKG R AXIS: -40 DEGREES
EKG T AXIS: 85 DEGREES
EKG T AXIS: 90 DEGREES
EKG VENTRICULAR RATE: 92 BPM
EKG VENTRICULAR RATE: 97 BPM
EOSINOPHIL NFR BLD AUTO: 0.1 %
EOSINOPHILS ABSOLUTE: 0 THOU/MM3 (ref 0–0.4)
ERYTHROCYTE [DISTWIDTH] IN BLOOD BY AUTOMATED COUNT: 12.4 % (ref 11.5–14.5)
FLUAV RNA RESP QL NAA+PROBE: NOT DETECTED
FLUBV RNA RESP QL NAA+PROBE: NOT DETECTED
GFR SERPL CREATININE-BSD FRML MDRD: 36 ML/MIN/1.73M2
GLUCOSE BLD STRIP.AUTO-MCNC: 236 MG/DL (ref 70–108)
GLUCOSE BLD STRIP.AUTO-MCNC: 341 MG/DL (ref 70–108)
GLUCOSE SERPL-MCNC: 333 MG/DL (ref 70–108)
HCT VFR BLD AUTO: 25.3 % (ref 42–52)
HGB BLD-MCNC: 8.2 GM/DL (ref 14–18)
IMM GRANULOCYTES # BLD AUTO: 0.03 THOU/MM3 (ref 0–0.07)
IMM GRANULOCYTES NFR BLD AUTO: 0.3 %
LACTIC ACID, SEPSIS: 1.3 MMOL/L (ref 0.5–1.9)
LYMPHOCYTES ABSOLUTE: 1.2 THOU/MM3 (ref 1–4.8)
LYMPHOCYTES NFR BLD AUTO: 11.2 %
MCH RBC QN AUTO: 30.4 PG (ref 26–33)
MCHC RBC AUTO-ENTMCNC: 32.4 GM/DL (ref 32.2–35.5)
MCV RBC AUTO: 93.7 FL (ref 80–94)
MONOCYTES ABSOLUTE: 1.1 THOU/MM3 (ref 0.4–1.3)
MONOCYTES NFR BLD AUTO: 10.2 %
NEUTROPHILS NFR BLD AUTO: 78.1 %
NRBC BLD AUTO-RTO: 0 /100 WBC
NT-PROBNP SERPL IA-MCNC: 1284 PG/ML (ref 0–124)
OSMOLALITY SERPL CALC.SUM OF ELEC: 298.2 MOSMOL/KG (ref 275–300)
PLATELET # BLD AUTO: 205 THOU/MM3 (ref 130–400)
PMV BLD AUTO: 9.2 FL (ref 9.4–12.4)
POTASSIUM SERPL-SCNC: 3.9 MEQ/L (ref 3.5–5.2)
PROCALCITONIN SERPL IA-MCNC: 0.49 NG/ML (ref 0.01–0.09)
PROT SERPL-MCNC: 6 G/DL (ref 6.1–8)
RBC # BLD AUTO: 2.7 MILL/MM3 (ref 4.7–6.1)
RHEUMATOID FACT SERPL-ACNC: 20 IU/ML (ref 0–13)
SARS-COV-2 RNA RESP QL NAA+PROBE: NOT DETECTED
SEGMENTED NEUTROPHILS ABSOLUTE COUNT: 8 THOU/MM3 (ref 1.8–7.7)
SODIUM SERPL-SCNC: 134 MEQ/L (ref 135–145)
TROPONIN T: 0.02 NG/ML
TROPONIN T: 0.02 NG/ML
URATE SERPL-MCNC: 11.2 MG/DL (ref 3.7–7)
WBC # BLD AUTO: 10.3 THOU/MM3 (ref 4.8–10.8)

## 2023-02-21 PROCEDURE — 84145 PROCALCITONIN (PCT): CPT

## 2023-02-21 PROCEDURE — 83540 ASSAY OF IRON: CPT

## 2023-02-21 PROCEDURE — 84550 ASSAY OF BLOOD/URIC ACID: CPT

## 2023-02-21 PROCEDURE — 84484 ASSAY OF TROPONIN QUANT: CPT

## 2023-02-21 PROCEDURE — 80053 COMPREHEN METABOLIC PANEL: CPT

## 2023-02-21 PROCEDURE — 1200000003 HC TELEMETRY R&B

## 2023-02-21 PROCEDURE — 2580000003 HC RX 258: Performed by: PHYSICIAN ASSISTANT

## 2023-02-21 PROCEDURE — 94640 AIRWAY INHALATION TREATMENT: CPT

## 2023-02-21 PROCEDURE — 85651 RBC SED RATE NONAUTOMATED: CPT

## 2023-02-21 PROCEDURE — 73564 X-RAY EXAM KNEE 4 OR MORE: CPT

## 2023-02-21 PROCEDURE — 93005 ELECTROCARDIOGRAM TRACING: CPT | Performed by: FAMILY MEDICINE

## 2023-02-21 PROCEDURE — 86430 RHEUMATOID FACTOR TEST QUAL: CPT

## 2023-02-21 PROCEDURE — 6370000000 HC RX 637 (ALT 250 FOR IP): Performed by: FAMILY MEDICINE

## 2023-02-21 PROCEDURE — 82948 REAGENT STRIP/BLOOD GLUCOSE: CPT

## 2023-02-21 PROCEDURE — 87040 BLOOD CULTURE FOR BACTERIA: CPT

## 2023-02-21 PROCEDURE — 87636 SARSCOV2 & INF A&B AMP PRB: CPT

## 2023-02-21 PROCEDURE — 71045 X-RAY EXAM CHEST 1 VIEW: CPT

## 2023-02-21 PROCEDURE — 93010 ELECTROCARDIOGRAM REPORT: CPT | Performed by: NUCLEAR MEDICINE

## 2023-02-21 PROCEDURE — 36415 COLL VENOUS BLD VENIPUNCTURE: CPT

## 2023-02-21 PROCEDURE — 82550 ASSAY OF CK (CPK): CPT

## 2023-02-21 PROCEDURE — 6360000002 HC RX W HCPCS: Performed by: FAMILY MEDICINE

## 2023-02-21 PROCEDURE — 99285 EMERGENCY DEPT VISIT HI MDM: CPT

## 2023-02-21 PROCEDURE — 84238 ASSAY NONENDOCRINE RECEPTOR: CPT

## 2023-02-21 PROCEDURE — 2580000003 HC RX 258: Performed by: FAMILY MEDICINE

## 2023-02-21 PROCEDURE — 85025 COMPLETE CBC W/AUTO DIFF WBC: CPT

## 2023-02-21 PROCEDURE — 86140 C-REACTIVE PROTEIN: CPT

## 2023-02-21 PROCEDURE — 72100 X-RAY EXAM L-S SPINE 2/3 VWS: CPT

## 2023-02-21 PROCEDURE — 82728 ASSAY OF FERRITIN: CPT

## 2023-02-21 PROCEDURE — 83605 ASSAY OF LACTIC ACID: CPT

## 2023-02-21 PROCEDURE — 86038 ANTINUCLEAR ANTIBODIES: CPT

## 2023-02-21 PROCEDURE — 83880 ASSAY OF NATRIURETIC PEPTIDE: CPT

## 2023-02-21 PROCEDURE — 82248 BILIRUBIN DIRECT: CPT

## 2023-02-21 RX ORDER — INSULIN LISPRO 100 [IU]/ML
10 INJECTION, SOLUTION INTRAVENOUS; SUBCUTANEOUS
Status: DISCONTINUED | OUTPATIENT
Start: 2023-02-21 | End: 2023-02-22

## 2023-02-21 RX ORDER — ENOXAPARIN SODIUM 100 MG/ML
40 INJECTION SUBCUTANEOUS 2 TIMES DAILY
Status: DISCONTINUED | OUTPATIENT
Start: 2023-02-21 | End: 2023-02-25 | Stop reason: HOSPADM

## 2023-02-21 RX ORDER — ONDANSETRON 2 MG/ML
4 INJECTION INTRAMUSCULAR; INTRAVENOUS EVERY 6 HOURS PRN
Status: DISCONTINUED | OUTPATIENT
Start: 2023-02-21 | End: 2023-02-25 | Stop reason: HOSPADM

## 2023-02-21 RX ORDER — ACETAMINOPHEN 325 MG/1
650 TABLET ORAL EVERY 6 HOURS PRN
Status: DISCONTINUED | OUTPATIENT
Start: 2023-02-21 | End: 2023-02-25 | Stop reason: HOSPADM

## 2023-02-21 RX ORDER — SODIUM CHLORIDE 9 MG/ML
INJECTION, SOLUTION INTRAVENOUS PRN
Status: DISCONTINUED | OUTPATIENT
Start: 2023-02-21 | End: 2023-02-25 | Stop reason: HOSPADM

## 2023-02-21 RX ORDER — ATORVASTATIN CALCIUM 10 MG/1
10 TABLET, FILM COATED ORAL DAILY
Status: DISCONTINUED | OUTPATIENT
Start: 2023-02-21 | End: 2023-02-25 | Stop reason: HOSPADM

## 2023-02-21 RX ORDER — AMLODIPINE BESYLATE 5 MG/1
5 TABLET ORAL DAILY
Status: DISCONTINUED | OUTPATIENT
Start: 2023-02-21 | End: 2023-02-25 | Stop reason: HOSPADM

## 2023-02-21 RX ORDER — SODIUM CHLORIDE 9 MG/ML
INJECTION, SOLUTION INTRAVENOUS CONTINUOUS
Status: DISCONTINUED | OUTPATIENT
Start: 2023-02-21 | End: 2023-02-21

## 2023-02-21 RX ORDER — CARVEDILOL 6.25 MG/1
12.5 TABLET ORAL 2 TIMES DAILY
Status: DISCONTINUED | OUTPATIENT
Start: 2023-02-21 | End: 2023-02-25 | Stop reason: HOSPADM

## 2023-02-21 RX ORDER — POLYETHYLENE GLYCOL 3350 17 G/17G
17 POWDER, FOR SOLUTION ORAL DAILY PRN
Status: DISCONTINUED | OUTPATIENT
Start: 2023-02-21 | End: 2023-02-25 | Stop reason: HOSPADM

## 2023-02-21 RX ORDER — ALBUTEROL SULFATE 90 UG/1
2 AEROSOL, METERED RESPIRATORY (INHALATION) 2 TIMES DAILY
Status: DISCONTINUED | OUTPATIENT
Start: 2023-02-21 | End: 2023-02-25 | Stop reason: HOSPADM

## 2023-02-21 RX ORDER — ONDANSETRON 4 MG/1
4 TABLET, ORALLY DISINTEGRATING ORAL EVERY 8 HOURS PRN
Status: DISCONTINUED | OUTPATIENT
Start: 2023-02-21 | End: 2023-02-25 | Stop reason: HOSPADM

## 2023-02-21 RX ORDER — MAGNESIUM SULFATE IN WATER 40 MG/ML
2000 INJECTION, SOLUTION INTRAVENOUS PRN
Status: DISCONTINUED | OUTPATIENT
Start: 2023-02-21 | End: 2023-02-25 | Stop reason: HOSPADM

## 2023-02-21 RX ORDER — ASPIRIN 81 MG/1
81 TABLET ORAL DAILY
Status: DISCONTINUED | OUTPATIENT
Start: 2023-02-21 | End: 2023-02-25 | Stop reason: HOSPADM

## 2023-02-21 RX ORDER — SODIUM CHLORIDE 0.9 % (FLUSH) 0.9 %
5-40 SYRINGE (ML) INJECTION EVERY 12 HOURS SCHEDULED
Status: DISCONTINUED | OUTPATIENT
Start: 2023-02-21 | End: 2023-02-25 | Stop reason: HOSPADM

## 2023-02-21 RX ORDER — CEFAZOLIN SODIUM 1 G/50ML
1000 INJECTION, SOLUTION INTRAVENOUS EVERY 8 HOURS
Status: DISCONTINUED | OUTPATIENT
Start: 2023-02-21 | End: 2023-02-25 | Stop reason: HOSPADM

## 2023-02-21 RX ORDER — ACETAMINOPHEN 650 MG/1
650 SUPPOSITORY RECTAL EVERY 6 HOURS PRN
Status: DISCONTINUED | OUTPATIENT
Start: 2023-02-21 | End: 2023-02-25 | Stop reason: HOSPADM

## 2023-02-21 RX ORDER — INSULIN LISPRO 100 [IU]/ML
0-4 INJECTION, SOLUTION INTRAVENOUS; SUBCUTANEOUS NIGHTLY
Status: DISCONTINUED | OUTPATIENT
Start: 2023-02-21 | End: 2023-02-22

## 2023-02-21 RX ORDER — POTASSIUM CHLORIDE 7.45 MG/ML
10 INJECTION INTRAVENOUS PRN
Status: DISCONTINUED | OUTPATIENT
Start: 2023-02-21 | End: 2023-02-25 | Stop reason: HOSPADM

## 2023-02-21 RX ORDER — INSULIN LISPRO 100 [IU]/ML
0-8 INJECTION, SOLUTION INTRAVENOUS; SUBCUTANEOUS
Status: DISCONTINUED | OUTPATIENT
Start: 2023-02-21 | End: 2023-02-22

## 2023-02-21 RX ORDER — SODIUM CHLORIDE 9 MG/ML
INJECTION, SOLUTION INTRAVENOUS CONTINUOUS
Status: DISCONTINUED | OUTPATIENT
Start: 2023-02-21 | End: 2023-02-25 | Stop reason: HOSPADM

## 2023-02-21 RX ORDER — DEXTROSE MONOHYDRATE 100 MG/ML
INJECTION, SOLUTION INTRAVENOUS CONTINUOUS PRN
Status: DISCONTINUED | OUTPATIENT
Start: 2023-02-21 | End: 2023-02-25 | Stop reason: HOSPADM

## 2023-02-21 RX ORDER — INSULIN GLARGINE 100 [IU]/ML
30 INJECTION, SOLUTION SUBCUTANEOUS EVERY MORNING
Status: DISCONTINUED | OUTPATIENT
Start: 2023-02-22 | End: 2023-02-22

## 2023-02-21 RX ORDER — POTASSIUM CHLORIDE 20 MEQ/1
40 TABLET, EXTENDED RELEASE ORAL PRN
Status: DISCONTINUED | OUTPATIENT
Start: 2023-02-21 | End: 2023-02-25 | Stop reason: HOSPADM

## 2023-02-21 RX ORDER — 0.9 % SODIUM CHLORIDE 0.9 %
1000 INTRAVENOUS SOLUTION INTRAVENOUS ONCE
Status: COMPLETED | OUTPATIENT
Start: 2023-02-21 | End: 2023-02-21

## 2023-02-21 RX ORDER — SODIUM CHLORIDE 0.9 % (FLUSH) 0.9 %
5-40 SYRINGE (ML) INJECTION PRN
Status: DISCONTINUED | OUTPATIENT
Start: 2023-02-21 | End: 2023-02-25 | Stop reason: HOSPADM

## 2023-02-21 RX ADMIN — CARVEDILOL 12.5 MG: 6.25 TABLET, FILM COATED ORAL at 21:01

## 2023-02-21 RX ADMIN — AMLODIPINE BESYLATE 5 MG: 5 TABLET ORAL at 17:57

## 2023-02-21 RX ADMIN — ENOXAPARIN SODIUM 40 MG: 100 INJECTION SUBCUTANEOUS at 21:01

## 2023-02-21 RX ADMIN — ALBUTEROL SULFATE 2 PUFF: 90 AEROSOL, METERED RESPIRATORY (INHALATION) at 21:37

## 2023-02-21 RX ADMIN — CEFAZOLIN SODIUM 1000 MG: 1 INJECTION, SOLUTION INTRAVENOUS at 21:11

## 2023-02-21 RX ADMIN — INSULIN LISPRO 6 UNITS: 100 INJECTION, SOLUTION INTRAVENOUS; SUBCUTANEOUS at 17:56

## 2023-02-21 RX ADMIN — INSULIN LISPRO 10 UNITS: 100 INJECTION, SOLUTION INTRAVENOUS; SUBCUTANEOUS at 17:54

## 2023-02-21 RX ADMIN — TICAGRELOR 90 MG: 90 TABLET ORAL at 21:01

## 2023-02-21 RX ADMIN — SODIUM CHLORIDE 1000 ML: 9 INJECTION, SOLUTION INTRAVENOUS at 09:15

## 2023-02-21 RX ADMIN — SODIUM CHLORIDE: 9 INJECTION, SOLUTION INTRAVENOUS at 17:30

## 2023-02-21 RX ADMIN — ASPIRIN 81 MG: 81 TABLET, COATED ORAL at 17:55

## 2023-02-21 RX ADMIN — ATORVASTATIN CALCIUM 10 MG: 10 TABLET, FILM COATED ORAL at 17:57

## 2023-02-21 ASSESSMENT — ENCOUNTER SYMPTOMS
ABDOMINAL PAIN: 0
RHINORRHEA: 0
VOMITING: 0
COUGH: 1
SORE THROAT: 0
SHORTNESS OF BREATH: 0
DIARRHEA: 0
BACK PAIN: 1
NAUSEA: 0

## 2023-02-21 ASSESSMENT — PAIN DESCRIPTION - DESCRIPTORS: DESCRIPTORS: SORE;SHARP

## 2023-02-21 ASSESSMENT — PAIN - FUNCTIONAL ASSESSMENT
PAIN_FUNCTIONAL_ASSESSMENT: NONE - DENIES PAIN
PAIN_FUNCTIONAL_ASSESSMENT: PREVENTS OR INTERFERES WITH ALL ACTIVE AND SOME PASSIVE ACTIVITIES

## 2023-02-21 ASSESSMENT — PAIN DESCRIPTION - LOCATION: LOCATION: KNEE

## 2023-02-21 ASSESSMENT — PAIN SCALES - GENERAL: PAINLEVEL_OUTOF10: 7

## 2023-02-21 ASSESSMENT — PAIN DESCRIPTION - ORIENTATION: ORIENTATION: RIGHT;LEFT

## 2023-02-21 ASSESSMENT — PAIN DESCRIPTION - PAIN TYPE: TYPE: ACUTE PAIN

## 2023-02-21 NOTE — ED NOTES
ED to inpatient nurses report    Chief Complaint   Patient presents with    Fall      Present to ED from home  LOC: alert and orientated to name, place, date  Vital signs   Vitals:    02/21/23 0755 02/21/23 0916 02/21/23 1045 02/21/23 1145   BP: (!) 152/74 (!) 147/82 (!) 141/61 (!) 140/65   Pulse: 96 93 78 81   Resp: 18 21 23 23   Temp: 99.9 °F (37.7 °C)      TempSrc: Oral      SpO2: 98% 98% 99% 98%   Weight: (!) 361 lb (163.7 kg)         Oxygen Baseline 98% on RA    Current needs required none Bipap/Cpap No  LDAs:   Peripheral IV 02/21/23 Left Forearm (Active)   Site Assessment Clean, dry & intact 02/21/23 1151   Line Status Flushed;Blood return noted;Normal saline locked 02/21/23 0800   Dressing Status Clean, dry & intact 02/21/23 1151   Dressing Type Transparent 02/21/23 1151   Dressing Intervention New 02/21/23 0800     Mobility: Requires assistance * 2  Pending ED orders: none  Present condition: stable      C-SSRS Risk of Suicide: No Risk  Swallow Screening    Preferred Language: [place]     Electronically signed by Ioana Landon RN on 2/21/2023 at [address], RN  02/21/23 1222

## 2023-02-21 NOTE — ED PROVIDER NOTES
[address] [facility] [address] [address] EMERGENCY DEPT      Pt Name: Gus Fagan  MRN: [id number]  [place] 1950  Date of evaluation: 2/21/2023  Provider: Teodora Vides PA-C    CHIEF COMPLAINT       Chief Complaint   Patient presents with    Fall       Nurses Notes reviewed and I agree except as noted in the HPI. HISTORY OF PRESENT ILLNESS    Gus Fagan is a 74 y.o. male who arrives by EMS from home presents for weakness. Patient reports at [phone] he tried to get up out of his recliner and his Jeremias [place]rell gave out on me. \"  Patient fell back against the wall and slid down. Patient denies head injury or loss of consciousness. He was unable to get off the floor on his own. This morning he heard some people in the hallway and called for help resulting in EMS arrival.  Patient further explains that for the past 3 days he is really not been able to get out of his chair secondary to knee pain and weakness. He has not gotten up to make food or take his pills. He has been drinking fluids and getting up to urinate. Patient reports shivering which is not a new complaint. It occurs during the winter months. Also he has an intermittent productive cough, also not a new symptom. Patient lives alone in an apartment. Patient denies chest pain, dyspnea, abdominal pain, URI symptoms, UTI symptoms, vomiting, diarrhea, or other complaints. REVIEW OF SYSTEMS     Review of Systems   Constitutional:  Positive for activity change and chills. Negative for diaphoresis and fever. HENT:  Negative for congestion, rhinorrhea and sore throat. Eyes:  Negative for visual disturbance. Respiratory:  Positive for cough. Negative for shortness of breath. Cardiovascular:  Positive for leg swelling (Chronic). Negative for chest pain. Gastrointestinal:  Negative for abdominal pain, diarrhea, nausea and vomiting. Genitourinary:  Negative for decreased urine volume, difficulty urinating, dysuria, flank pain and frequency.    Musculoskeletal: Positive for arthralgias, back pain, gait problem and joint swelling. Skin:  Negative for rash and wound. Neurological:  Positive for weakness. Negative for dizziness, syncope, light-headedness, numbness and headaches. Psychiatric/Behavioral:  Negative for confusion. PAST MEDICAL HISTORY    has a past medical history of RAUL (acute kidney injury) ([facility] ASHD (arteriosclerotic heart disease), Closed fracture of first lumbar vertebra (HCC), Closed T12 fracture ([facility] [facility] Depression, Diabetic peripheral neuropathy ([facility] [facility] Fracture, HTN (hypertension), Hypercholesteremia, IDDM (insulin dependent diabetes mellitus), Low back pain, Morbid obesity with BMI of 45.0-49.9, adult ([facility] [address] and Osteoarthritis. 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     CURRENT MEDICATIONS       Previous Medications    ACCU-CHEK SMARTVIEW STRIP    Use to test Blood Sugar 3x daily, Dx: E11.9    ACETAMINOPHEN (TYLENOL) 500 MG TABLET    Take 1,000 mg by mouth as needed for Pain    ALBUTEROL SULFATE  (90 BASE) MCG/ACT INHALER    Inhale 2 puffs into the lungs 2 times daily    AMLODIPINE (NORVASC) 5 MG TABLET    Take 1 tablet by mouth daily ASPIRIN 81 MG TABLET    Take 1 tablet by mouth daily    ATORVASTATIN (LIPITOR) 10 MG TABLET    TAKE 1 TABLET BY MOUTH ONCE DAILY    CARVEDILOL (COREG) 12.5 MG TABLET    Take 1 tablet by mouth 2 times daily    ENALAPRIL (VASOTEC) 10 MG TABLET    1/2 tab Once a day    FERROUS SULFATE (IRON 325) 325 (65 FE) MG TABLET    Take 1 tablet by mouth 2 times daily    FUROSEMIDE (LASIX) 80 MG TABLET    Take one in am (80) and 1/2 tab (40) in pm    INSULIN ASPART (NOVOLOG FLEXPEN) 100 UNIT/ML INJECTION PEN    Inject 12 Units into the skin 3 times daily (before meals) INJECT 12 UNITS SUBCUTANEOUSLY 3 TIMES DAILY BEFORE MEALS    INSULIN GLARGINE (LANTUS SOLOSTAR) 100 UNIT/ML INJECTION PEN    INJECT 30 UNITS SUBCUTANEOUSLY ONCE DAILY    METFORMIN (GLUCOPHAGE) 500 MG TABLET    TAKE TWO TABLETS BY MOUTH TWICE DAILY WITH MEALS    MULTIPLE VITAMINS-MINERALS (THERAPEUTIC MULTIVITAMIN-MINERALS) TABLET    Take 1 tablet by mouth daily    ONDANSETRON (ZOFRAN) 4 MG TABLET    Take 1 tablet by mouth every 8 hours as needed for Nausea or Vomiting    TICAGRELOR (BRILINTA) 90 MG TABS TABLET    Take 1 tablet by mouth twice daily       ALLERGIES     is allergic to horse-derived products.    FAMILY HISTORY     He indicated that his mother is alive. He indicated that his father is .   family history includes Cancer in his mother.    SOCIAL HISTORY    reports that he has never smoked. He has never used smokeless tobacco. He reports that he does not currently use alcohol. He reports that he does not use drugs.    PHYSICAL EXAM     INITIAL VITALS:  weight is 361 lb (163.7 kg) (abnormal). His oral temperature is 99.9 °F (37.7 °C). His blood pressure is 141/61 (abnormal) and his pulse is 78. His respiration is 23 and oxygen saturation is 99%.    Physical Exam  Vitals and nursing note reviewed.   Constitutional:       General: He is not in acute distress.     Appearance: He is well-developed. He is morbidly obese. He is not toxic-appearing or  diaphoretic. HENT:      Head: Normocephalic and atraumatic. Right Ear: Hearing normal.      Left Ear: Hearing normal.      Nose: Nose normal. No rhinorrhea. Mouth/Throat:      Pharynx: Uvula midline. No oropharyngeal exudate. Eyes:      General: Lids are normal. No scleral icterus. Conjunctiva/sclera: Conjunctivae normal.      Pupils: Pupils are equal, round, and reactive to light. Neck:      Trachea: No tracheal deviation. Cardiovascular:      Rate and Rhythm: Normal rate and regular rhythm. Heart sounds: Normal heart sounds. No murmur heard. Pulmonary:      Effort: Pulmonary effort is normal. No respiratory distress. Breath sounds: Normal breath sounds. No stridor. No decreased breath sounds or wheezing. Abdominal:      General: There is no distension. Palpations: Abdomen is soft. Abdomen is not rigid. Tenderness: There is no abdominal tenderness. There is no guarding. Musculoskeletal:      Cervical back: Normal range of motion and neck supple. No rigidity. Lumbar back: Normal.      Right knee: Swelling present. Decreased range of motion. Tenderness present. Left knee: Swelling present. Decreased range of motion. Tenderness present. Right lower leg: Edema present. Left lower leg: Edema present. Lymphadenopathy:      Cervical: No cervical adenopathy. Skin:     General: Skin is warm and dry. Coloration: Skin is not pale. Findings: No rash. Comments: Foca clinical l low-grade fever   Neurological:      Mental Status: He is alert and oriented to person, place, and time. GCS: GCS eye subscore is 4. GCS verbal subscore is 5. GCS motor subscore is 6. Gait: Gait normal.      Comments: No gross deficits observed   Psychiatric:         Mood and Affect: Mood normal.         Speech: Speech normal.         Behavior: Behavior normal.         Thought Content:  Thought content normal.       DIFFERENTIAL DIAGNOSIS:   Including but not limited to: COVID, influenza, pneumonia, RAUL, rhabdomyolysis, lumbar sprain, compression fracture, osteoarthritis, hyponatremia    Diagnoses Considered but I have low suspicion of:   Septic joint, ACS, deconditioning    DIAGNOSTIC RESULTS     EKG: All E[facility]'s [facility] who either signs or Co-signs this chart in the absence of a cardiologist.  Ventricular rate 92 bpm  HI interval 200 ms  QRS duration 138 ms  QTc interval 458 ms  P-R-T axes 84, -40, 85  Normal sinus rhythm. Nonspecific intraventricular block. No STEMI    RADIOLOGY: non-plain film images(s) such as CT,Ultrasound and MRI are read by the radiologist.  Plain radiographic images are visualized and preliminarily interpreted by the emergency physician unless otherwise stated below. XR KNEE LEFT (MIN 4 VIEWS)   Final Result      1. Left knee prosthesis. 2. Degenerative change. 3. Joint effusion. 4. Surgical clips along the medial aspect of the leg possibly secondary to previous vascular surgery. Please correlate clinically. **This report has been created using voice recognition software. It may contain minor errors which are inherent in voice recognition technology. **      Final report electronically signed by DR Azul Crowe on 2/21/2023 9:34 AM      XR KNEE RIGHT (MIN 4 VIEWS)   Final Result      1. Stable right knee radiographs, no interval change since previous study dated 5/8/2022. **This report has been created using voice recognition software. It may contain minor errors which are inherent in voice recognition technology. **      Final report electronically signed by DR Azul Crowe on 2/21/2023 9:31 AM      XR CHEST PORTABLE   Final Result   No acute cardiopulmonary disease. **This report has been created using voice recognition software. It may contain minor errors which are inherent in voice recognition technology. **      Final report electronically signed by Dr. HILL Bryon Yoder on 2/21/2023 8:24 AM      XR LUMBAR SPINE (2-3 VIEWS)    (Results Pending)       LABS:   Labs Reviewed   CBC WITH AUTO DIFFERENTIAL - Abnormal; Notable for the following components:       Result Value    RBC 2.70 (*)     Hemoglobin 8.2 (*)     Hematocrit 25.3 (*)     MPV 9.2 (*)     Segs Absolute 8.0 (*)     All other components within normal limits   BASIC METABOLIC PANEL - Abnormal; Notable for the following components:    Sodium 134 (*)     Glucose 333 (*)     BUN 60 (*)     Creatinine 1.9 (*)     All other components within normal limits   HEPATIC FUNCTION PANEL - Abnormal; Notable for the following components:    Albumin 3.4 (*)     Total Protein 6.0 (*)     All other components within normal limits   TROPONIN - Abnormal; Notable for the following components:    Troponin T 0.024 (*)     All other components within normal limits   PROCALCITONIN - Abnormal; Notable for the following components:    Procalcitonin 0.49 (*)     All other components within normal limits   CK - Abnormal; Notable for the following components:     Total  (*)     All other components within normal limits   BRAIN NATRIURETIC PEPTIDE - Abnormal; Notable for the following components:    Pro-BNP 1284.0 (*)     All other components within normal limits   GLOMERULAR FILTRATION RATE, ESTIMATED - Abnormal; Notable for the following components:    Est, Glom Filt Rate 36 (*)     All other components within normal limits   COVID-19 & INFLUENZA COMBO   CULTURE, BLOOD 1   CULTURE, BLOOD 2   LACTATE, SEPSIS   ANION GAP   OSMOLALITY   URINALYSIS WITH REFLEX TO CULTURE       EMERGENCY DEPARTMENT COURSE:   Vitals:    Vitals:    02/21/23 0755 02/21/23 0916 02/21/23 1045   BP: (!) 152/74 (!) 147/82 (!) 141/61   Pulse: 96 93 78   Resp: 18 21 23   Temp: 99.9 °F (37.7 °C)     TempSrc: Oral     SpO2: 98% 98% 99%   Weight: (!) 361 lb (163.7 kg)         MDM:  The patient was seen by me in the emergency room for generalized weakness with an episode of 1 fall. Physical exam revealed a pleasant and joking 26-year-old gentleman. There was pedal edema and changes consistent with venous stasis. The patient was neurologically intact. Vital signs reviewed and noted stable although mildly hypertensive. Old records were reviewed. Appropriate laboratory and imaging studies were ordered and reviewed upon completion. Pertinent findings: Hemoglobin 8.2, glucose 333, creatinine 1.9, BUN 60, total , proBNP 1284, and troponin of 0.024    Interventions: Saline, oral fluids, observation    Reexamination: Patient remained stable with good vital signs. He had no complaints while at rest on the cart. Patient was not deemed appropriate for discharge home as he was unable to walk while here. Results were discussed with the patient as well as desire for admission and they were amenable. Dr. Gavino Ramirez was consulted and graciously accepted admission. The patient was admitted to the hospital in fair condition. CRITICAL CARE:   None    CONSULTS:  1050: Dr. Jannet Lafleur (internal medicine)    PROCEDURES:  None    FINAL IMPRESSION      1. Fall in home, initial encounter    2. General weakness    3. Acute bilateral knee pain    4. Elevated troponin    5. Uncontrolled type 2 diabetes mellitus with hyperglycemia ([address]    6. Chronic kidney disease, unspecified CKD stage    7. Acute bilateral low back pain without sciatica    8. Ataxia          DISPOSITION/PLAN     1. Fall in home, initial encounter    2. General weakness    3. Acute bilateral knee pain    4. Elevated troponin    5. Uncontrolled type 2 diabetes mellitus with hyperglycemia ([address]    6. Chronic kidney disease, unspecified CKD stage    7. Acute bilateral low back pain without sciatica    8.  Ataxia    Admission      (Please note that portions of this note were completed with a voice recognition program.  Efforts were made to edit the dictations but occasionally words are mis-transcribed.)    Melonie Monroy Radha Conley 02/21/23 10:59 AM    JESENIA De Anda Luster Miners  02/21/23 1408

## 2023-02-22 ENCOUNTER — APPOINTMENT (OUTPATIENT)
Dept: CT IMAGING | Age: 73
End: 2023-02-22
Payer: MEDICARE

## 2023-02-22 LAB
ALBUMIN SERPL BCG-MCNC: 2.9 G/DL (ref 3.5–5.1)
ALP SERPL-CCNC: 114 U/L (ref 38–126)
ALT SERPL W/O P-5'-P-CCNC: 61 U/L (ref 11–66)
ANION GAP SERPL CALC-SCNC: 14 MEQ/L (ref 8–16)
AST SERPL-CCNC: 85 U/L (ref 5–40)
BACTERIA URNS QL MICRO: ABNORMAL /HPF
BASOPHILS ABSOLUTE: 0 THOU/MM3 (ref 0–0.1)
BASOPHILS NFR BLD AUTO: 0.1 %
BILIRUB SERPL-MCNC: 0.4 MG/DL (ref 0.3–1.2)
BILIRUB UR QL STRIP.AUTO: NEGATIVE
BUN SERPL-MCNC: 59 MG/DL (ref 7–22)
CALCIUM SERPL-MCNC: 7.9 MG/DL (ref 8.5–10.5)
CASTS #/AREA URNS LPF: ABNORMAL /LPF
CASTS 2: ABNORMAL /LPF
CHARACTER SNV: ABNORMAL
CHARACTER SNV: ABNORMAL
CHARACTER UR: ABNORMAL
CHLORIDE SERPL-SCNC: 102 MEQ/L (ref 98–111)
CO2 SERPL-SCNC: 20 MEQ/L (ref 23–33)
COLOR SNV: YELLOW
COLOR SNV: YELLOW
COLOR: YELLOW
CREAT SERPL-MCNC: 1.8 MG/DL (ref 0.4–1.2)
CRYSTALS FLD MICRO: ABNORMAL
CRYSTALS FLD MICRO: ABNORMAL
CRYSTALS URNS MICRO: ABNORMAL
DEPRECATED RDW RBC AUTO: 43.1 FL (ref 35–45)
EOSINOPHIL NFR BLD AUTO: 0.4 %
EOSINOPHILS ABSOLUTE: 0 THOU/MM3 (ref 0–0.4)
EPITHELIAL CELLS, UA: ABNORMAL /HPF
ERYTHROCYTE [DISTWIDTH] IN BLOOD BY AUTOMATED COUNT: 12.5 % (ref 11.5–14.5)
ERYTHROCYTE [SEDIMENTATION RATE] IN BLOOD BY WESTERGREN METHOD: 143 MM/HR (ref 0–10)
FERRITIN SERPL IA-MCNC: 498 NG/ML (ref 22–322)
GFR SERPL CREATININE-BSD FRML MDRD: 39 ML/MIN/1.73M2
GLUCOSE BLD STRIP.AUTO-MCNC: 305 MG/DL (ref 70–108)
GLUCOSE BLD STRIP.AUTO-MCNC: 333 MG/DL (ref 70–108)
GLUCOSE BLD STRIP.AUTO-MCNC: 359 MG/DL (ref 70–108)
GLUCOSE BLD STRIP.AUTO-MCNC: 388 MG/DL (ref 70–108)
GLUCOSE SERPL-MCNC: 363 MG/DL (ref 70–108)
GLUCOSE UR QL STRIP.AUTO: 100 MG/DL
GRANULOCYTES NFR FLD AUTO: 93.5 % (ref 0–24)
GRANULOCYTES NFR FLD AUTO: 95.5 % (ref 0–24)
HCT VFR BLD AUTO: 25 % (ref 42–52)
HGB BLD-MCNC: 8.1 GM/DL (ref 14–18)
HGB UR QL STRIP.AUTO: NEGATIVE
IMM GRANULOCYTES # BLD AUTO: 0.03 THOU/MM3 (ref 0–0.07)
IMM GRANULOCYTES NFR BLD AUTO: 0.4 %
IRON SERPL-MCNC: 20 UG/DL (ref 65–195)
KETONES UR QL STRIP.AUTO: NEGATIVE
LYMPHOCYTES ABSOLUTE: 0.9 THOU/MM3 (ref 1–4.8)
LYMPHOCYTES NFR BLD AUTO: 12.2 %
MCH RBC QN AUTO: 30.8 PG (ref 26–33)
MCHC RBC AUTO-ENTMCNC: 32.4 GM/DL (ref 32.2–35.5)
MCV RBC AUTO: 95.1 FL (ref 80–94)
MISCELLANEOUS 2: ABNORMAL
MONOCYTES ABSOLUTE: 0.7 THOU/MM3 (ref 0.4–1.3)
MONOCYTES NFR BLD AUTO: 9.3 %
MONONUC CELLS NFR FLD AUTO: 4.5 % (ref 0–74)
MONONUC CELLS NFR FLD AUTO: 6.5 % (ref 0–74)
NEUTROPHILS NFR BLD AUTO: 77.6 %
NITRITE UR QL STRIP: NEGATIVE
NRBC BLD AUTO-RTO: 0 /100 WBC
NUC CELL # FLD AUTO: 2852 /CUMM (ref 0–150)
NUC CELL # FLD AUTO: ABNORMAL /CUMM (ref 0–150)
PATHOLOGIST REVIEW: ABNORMAL
PATHOLOGIST REVIEW: ABNORMAL
PH UR STRIP.AUTO: 5 [PH] (ref 5–9)
PLATELET # BLD AUTO: 183 THOU/MM3 (ref 130–400)
PMV BLD AUTO: 9.2 FL (ref 9.4–12.4)
POTASSIUM SERPL-SCNC: 4.2 MEQ/L (ref 3.5–5.2)
PROT SERPL-MCNC: 5.4 G/DL (ref 6.1–8)
PROT UR STRIP.AUTO-MCNC: ABNORMAL MG/DL
RBC # BLD AUTO: 2.63 MILL/MM3 (ref 4.7–6.1)
RBC URINE: ABNORMAL /HPF
RENAL EPI CELLS #/AREA URNS HPF: ABNORMAL /[HPF]
SEGMENTED NEUTROPHILS ABSOLUTE COUNT: 5.7 THOU/MM3 (ref 1.8–7.7)
SODIUM SERPL-SCNC: 136 MEQ/L (ref 135–145)
SP GR UR REFRACT.AUTO: 1.01 (ref 1–1.03)
TOTAL VOLUME RECEIVED SYNOVIAL: ABNORMAL ML
TOTAL VOLUME RECEIVED SYNOVIAL: ABNORMAL ML
TROPONIN T: 0.02 NG/ML
TROPONIN T: 0.02 NG/ML
UROBILINOGEN, URINE: 1 EU/DL (ref 0–1)
WBC # BLD AUTO: 7.3 THOU/MM3 (ref 4.8–10.8)
WBC #/AREA URNS HPF: ABNORMAL /HPF
WBC #/AREA URNS HPF: NEGATIVE /[HPF]
YEAST LIKE FUNGI URNS QL MICRO: ABNORMAL

## 2023-02-22 PROCEDURE — 6370000000 HC RX 637 (ALT 250 FOR IP): Performed by: FAMILY MEDICINE

## 2023-02-22 PROCEDURE — 97163 PT EVAL HIGH COMPLEX 45 MIN: CPT

## 2023-02-22 PROCEDURE — 87205 SMEAR GRAM STAIN: CPT

## 2023-02-22 PROCEDURE — 36415 COLL VENOUS BLD VENIPUNCTURE: CPT

## 2023-02-22 PROCEDURE — 87075 CULTR BACTERIA EXCEPT BLOOD: CPT

## 2023-02-22 PROCEDURE — 89060 EXAM SYNOVIAL FLUID CRYSTALS: CPT

## 2023-02-22 PROCEDURE — 81001 URINALYSIS AUTO W/SCOPE: CPT

## 2023-02-22 PROCEDURE — 0S9C3ZX DRAINAGE OF RIGHT KNEE JOINT, PERCUTANEOUS APPROACH, DIAGNOSTIC: ICD-10-PCS | Performed by: FAMILY MEDICINE

## 2023-02-22 PROCEDURE — 2580000003 HC RX 258: Performed by: FAMILY MEDICINE

## 2023-02-22 PROCEDURE — 73700 CT LOWER EXTREMITY W/O DYE: CPT

## 2023-02-22 PROCEDURE — 85025 COMPLETE CBC W/AUTO DIFF WBC: CPT

## 2023-02-22 PROCEDURE — 99222 1ST HOSP IP/OBS MODERATE 55: CPT | Performed by: INTERNAL MEDICINE

## 2023-02-22 PROCEDURE — 82948 REAGENT STRIP/BLOOD GLUCOSE: CPT

## 2023-02-22 PROCEDURE — 1200000003 HC TELEMETRY R&B

## 2023-02-22 PROCEDURE — 97530 THERAPEUTIC ACTIVITIES: CPT

## 2023-02-22 PROCEDURE — 80053 COMPREHEN METABOLIC PANEL: CPT

## 2023-02-22 PROCEDURE — 6360000002 HC RX W HCPCS: Performed by: FAMILY MEDICINE

## 2023-02-22 PROCEDURE — 87070 CULTURE OTHR SPECIMN AEROBIC: CPT

## 2023-02-22 PROCEDURE — 94640 AIRWAY INHALATION TREATMENT: CPT

## 2023-02-22 PROCEDURE — 89050 BODY FLUID CELL COUNT: CPT

## 2023-02-22 PROCEDURE — 84484 ASSAY OF TROPONIN QUANT: CPT

## 2023-02-22 RX ORDER — INSULIN LISPRO 100 [IU]/ML
0-4 INJECTION, SOLUTION INTRAVENOUS; SUBCUTANEOUS NIGHTLY
Status: DISCONTINUED | OUTPATIENT
Start: 2023-02-22 | End: 2023-02-25 | Stop reason: HOSPADM

## 2023-02-22 RX ORDER — METHYLPREDNISOLONE SODIUM SUCCINATE 125 MG/2ML
40 INJECTION, POWDER, LYOPHILIZED, FOR SOLUTION INTRAMUSCULAR; INTRAVENOUS ONCE
Status: COMPLETED | OUTPATIENT
Start: 2023-02-22 | End: 2023-02-22

## 2023-02-22 RX ORDER — COLCHICINE 0.6 MG/1
0.3 TABLET ORAL DAILY
Status: DISCONTINUED | OUTPATIENT
Start: 2023-02-22 | End: 2023-02-22

## 2023-02-22 RX ORDER — INSULIN LISPRO 100 [IU]/ML
15 INJECTION, SOLUTION INTRAVENOUS; SUBCUTANEOUS
Status: DISCONTINUED | OUTPATIENT
Start: 2023-02-23 | End: 2023-02-25 | Stop reason: HOSPADM

## 2023-02-22 RX ORDER — COLCHICINE 0.6 MG/1
0.6 TABLET ORAL 2 TIMES DAILY
Status: DISCONTINUED | OUTPATIENT
Start: 2023-02-22 | End: 2023-02-25

## 2023-02-22 RX ORDER — INSULIN LISPRO 100 [IU]/ML
0-16 INJECTION, SOLUTION INTRAVENOUS; SUBCUTANEOUS
Status: DISCONTINUED | OUTPATIENT
Start: 2023-02-22 | End: 2023-02-25 | Stop reason: HOSPADM

## 2023-02-22 RX ORDER — INSULIN GLARGINE 100 [IU]/ML
40 INJECTION, SOLUTION SUBCUTANEOUS EVERY MORNING
Status: DISCONTINUED | OUTPATIENT
Start: 2023-02-23 | End: 2023-02-25

## 2023-02-22 RX ADMIN — METHYLPREDNISOLONE SODIUM SUCCINATE 40 MG: 125 INJECTION, POWDER, FOR SOLUTION INTRAMUSCULAR; INTRAVENOUS at 18:13

## 2023-02-22 RX ADMIN — SODIUM CHLORIDE: 9 INJECTION, SOLUTION INTRAVENOUS at 21:42

## 2023-02-22 RX ADMIN — CEFAZOLIN SODIUM 1000 MG: 1 INJECTION, SOLUTION INTRAVENOUS at 12:09

## 2023-02-22 RX ADMIN — ACETAMINOPHEN 650 MG: 325 TABLET ORAL at 08:03

## 2023-02-22 RX ADMIN — CEFAZOLIN SODIUM 1000 MG: 1 INJECTION, SOLUTION INTRAVENOUS at 03:37

## 2023-02-22 RX ADMIN — ATORVASTATIN CALCIUM 10 MG: 10 TABLET, FILM COATED ORAL at 08:30

## 2023-02-22 RX ADMIN — INSULIN GLARGINE 30 UNITS: 100 INJECTION, SOLUTION SUBCUTANEOUS at 08:30

## 2023-02-22 RX ADMIN — ASPIRIN 81 MG: 81 TABLET, COATED ORAL at 08:03

## 2023-02-22 RX ADMIN — CARVEDILOL 12.5 MG: 6.25 TABLET, FILM COATED ORAL at 21:44

## 2023-02-22 RX ADMIN — TICAGRELOR 90 MG: 90 TABLET ORAL at 08:03

## 2023-02-22 RX ADMIN — INSULIN LISPRO 12 UNITS: 100 INJECTION, SOLUTION INTRAVENOUS; SUBCUTANEOUS at 18:07

## 2023-02-22 RX ADMIN — ENOXAPARIN SODIUM 40 MG: 100 INJECTION SUBCUTANEOUS at 08:04

## 2023-02-22 RX ADMIN — INSULIN LISPRO 8 UNITS: 100 INJECTION, SOLUTION INTRAVENOUS; SUBCUTANEOUS at 13:10

## 2023-02-22 RX ADMIN — INSULIN LISPRO 10 UNITS: 100 INJECTION, SOLUTION INTRAVENOUS; SUBCUTANEOUS at 17:45

## 2023-02-22 RX ADMIN — CEFAZOLIN SODIUM 1000 MG: 1 INJECTION, SOLUTION INTRAVENOUS at 21:43

## 2023-02-22 RX ADMIN — ALBUTEROL SULFATE 2 PUFF: 90 AEROSOL, METERED RESPIRATORY (INHALATION) at 09:28

## 2023-02-22 RX ADMIN — ALBUTEROL SULFATE 2 PUFF: 90 AEROSOL, METERED RESPIRATORY (INHALATION) at 16:47

## 2023-02-22 RX ADMIN — CARVEDILOL 12.5 MG: 6.25 TABLET, FILM COATED ORAL at 08:07

## 2023-02-22 RX ADMIN — AMLODIPINE BESYLATE 5 MG: 5 TABLET ORAL at 08:07

## 2023-02-22 RX ADMIN — INSULIN LISPRO 4 UNITS: 100 INJECTION, SOLUTION INTRAVENOUS; SUBCUTANEOUS at 21:47

## 2023-02-22 RX ADMIN — ACETAMINOPHEN 650 MG: 325 TABLET ORAL at 00:10

## 2023-02-22 RX ADMIN — INSULIN LISPRO 8 UNITS: 100 INJECTION, SOLUTION INTRAVENOUS; SUBCUTANEOUS at 08:19

## 2023-02-22 RX ADMIN — INSULIN LISPRO 10 UNITS: 100 INJECTION, SOLUTION INTRAVENOUS; SUBCUTANEOUS at 08:30

## 2023-02-22 RX ADMIN — COLCHICINE 0.6 MG: 0.6 TABLET, FILM COATED ORAL at 21:44

## 2023-02-22 RX ADMIN — INSULIN LISPRO 10 UNITS: 100 INJECTION, SOLUTION INTRAVENOUS; SUBCUTANEOUS at 13:09

## 2023-02-22 RX ADMIN — SODIUM CHLORIDE: 9 INJECTION, SOLUTION INTRAVENOUS at 05:34

## 2023-02-22 ASSESSMENT — PAIN DESCRIPTION - LOCATION
LOCATION: KNEE
LOCATION: KNEE

## 2023-02-22 ASSESSMENT — PAIN - FUNCTIONAL ASSESSMENT: PAIN_FUNCTIONAL_ASSESSMENT: PREVENTS OR INTERFERES SOME ACTIVE ACTIVITIES AND ADLS

## 2023-02-22 ASSESSMENT — ENCOUNTER SYMPTOMS
BACK PAIN: 1
CONSTIPATION: 0
COUGH: 0
DIARRHEA: 0
ABDOMINAL DISTENTION: 0
SHORTNESS OF BREATH: 0

## 2023-02-22 ASSESSMENT — PAIN SCALES - GENERAL
PAINLEVEL_OUTOF10: 0
PAINLEVEL_OUTOF10: 7
PAINLEVEL_OUTOF10: 3
PAINLEVEL_OUTOF10: 0

## 2023-02-22 ASSESSMENT — PAIN DESCRIPTION - ORIENTATION
ORIENTATION: RIGHT;LEFT
ORIENTATION: RIGHT;LEFT

## 2023-02-22 ASSESSMENT — PAIN DESCRIPTION - PAIN TYPE: TYPE: ACUTE PAIN

## 2023-02-22 ASSESSMENT — PAIN DESCRIPTION - DESCRIPTORS: DESCRIPTORS: ACHING

## 2023-02-22 NOTE — PROGRESS NOTES
[address] [address]  INPATIENT PHYSICAL THERAPY  EVALUATION  STR MED SURG 8B - 8B-22/022-A    Time In: 8100  Time Out: 4949  Timed Code Treatment Minutes: 28 Minutes  Minutes: 36          Date: 2023  Patient Name: Misti Mckeon,  Gender:  male        MRN: [id number]  : 1950  (74 y.o.)      Referring Practitioner: Courtney Roy MD  Diagnosis: dehydration  Additional Pertinent Hx: Per EMR \"Tuan Crews is a 74 y.o. male who arrives by EMS from home presents for weakness. Patient reports at [phone] he tried to get up out of his recliner and his [place] gave out on me. \"  Patient fell back against the wall and slid down. Patient denies head injury or loss of consciousness. He was unable to get off the floor on his own. This morning he heard some people in the hallway and called for help resulting in EMS arrival.  Patient further explains that for the past 3 days he is really not been able to get out of his chair secondary to knee pain and weakness. He has not gotten up to make food or take his pills. He has been drinking fluids and getting up to urinate. Patient reports shivering which is not a new complaint. It occurs during the winter months. Also he has an intermittent productive cough, also not a new symptom. Patient lives alone in an apartment. Patient denies chest pain, dyspnea, abdominal pain, URI symptoms, UTI symptoms, vomiting, diarrhea, or other complaints. \" Pt had a B knee aspiration . Restrictions/Precautions:  Restrictions/Precautions: Fall Risk, General Precautions    Subjective:  Chart Reviewed: Yes  Patient assessed for rehabilitation services?: Yes  Family / Caregiver Present: No  Subjective: OK to see pt per nursing. Pt in bed when PT arrived, agreeable to PT session with encouragement. PT communicate with ortho prior, just had B knee aspiration this AM prior to PT in room.     General:  Overall Orientation Status: Within Normal Limits  Orientation Level: Oriented X4  Vision: Within Functional Limits  Hearing: Within functional limits       Pain: reports increased B LE pain 10/10    Vitals: Vitals not assessed per clinical judgement, see nursing flowsheet    Social/Functional History:    Lives With: Alone  Type of Home: Apartment (2nd floor)  Home Layout: One level  Home Access: Elevator, Level entry  Home Equipment: Rollator             ADL Assistance: Independent  Homemaking Assistance: Independent  Ambulation Assistance: Independent  Transfer Assistance: Independent    Active : Yes  Occupation: Retired  Additional Comments: reports being IND with all tasks with 4ww    OBJECTIVE:  Range of Motion:  Bilateral Lower Extremity: Impaired - grossly deconditioned, not able to complete fgull ROM due to increased pain, attempted PROM, increased pain reported    Strength:  Bilateral Lower Extremity: Impaired - grossly deconditioned    Balance:  Static Sitting Balance:  Contact Guard Assistance x1-2, with cues for safety, with verbal cues   Seated on EOB, slight lightheadedness that subsided. Nursing and tech in room to assist with pt. Bed Mobility:  Rolling to Left: Moderate Assistance-Maximum Assistance x2-3, with head of bed flat, with rail, with verbal cues    Rolling to Right: Moderate Assistance-Maximum Assistance, X 2-3, with head of bed flat, with rail, with verbal cues    Supine to Sit: Maximum Assistance, X 2, with head of bed raised, with rail, with verbal cues , with increased time for completion  Sit to Supine: Moderate Assistance-Maximum Assistance, with head of bed flat, with verbal cues    Scooting: max A x1 for scooting towards EOB to prepare for mobility. TD to scoot up in bed with hercules sheet  Pt with little initiation for movement due to increased pain and self limiting during session, requesting for help with all tasks. Pt completed rolling to adjust pad and bed sheet for support.  Pt positioned on R side following bed tasks for comfort and support to decrease pressure sores. Increased time for all tasks due to pain and hesitation from pt. Transfers:  Sit to Stand: Dependent, X 2  Stand to Sit:Dependent, X 2  Attempted to complete with walker, not able to lift buttock of bed. Attempted to complete with evens stedy, not able to lift buttock fully off bed, able to slightly lift 1-2 inches. It did not appear pt was helping much/initiating movement with transfers due to increased pain. Ambulation:  Not Tested  Not appropriate at this time    Functional Outcome Measures: Completed  -PAC Inpatient Mobility without Stair Climbing Raw Score : 5  AM-PAC Inpatient without Stair Climbing T-Scale Score : 23.59    ASSESSMENT:  Activity Tolerance:  Patient tolerance of  treatment: poor. Increased overall pain and weakness, seems to be self limiting self. Treatment Initiated: Treatment and education initiated within context of evaluation. Evaluation time included review of current medical information, gathering information related to past medical, social and functional history, completion of standardized testing, formal and informal observation of tasks, assessment of data and development of plan of care and goals. Treatment time included skilled education and facilitation of tasks to increase safety and independence with functional mobility for improved independence and quality of life. Assessment: Body Structures, Functions, Activity Limitations Requiring Skilled Therapeutic Intervention: Decreased functional mobility , Decreased safe awareness, Decreased ADL status, Decreased endurance, Increased pain, Decreased tolerance to work activity, Decreased strength, Decreased balance, Decreased posture  Assessment: June Fan is a 74 y.o. male who presents with the deficits stated previously. Pt requires 2-3 person assist for functional tasks.  Pt required increased time for all tasks this date, 2-3 assist for bed mobility with fair initiation of movements to assist. Pt cont to require skilled PT services to increase IND with functional tasks and progress towards PLOF to return to home environment safely. Pt is not safe to return home at this time, requires extensive assist.   Therapy Prognosis: Fair    Requires PT Follow-Up: Yes    Discharge Recommendations:  Discharge Recommendations: [address], not safe to return home, requires 2-3 person assist for tasks. Patient Education:      . Patient Education  Education Given To: Patient  Education Provided: Role of Therapy, Plan of Care, Equipment, Transfer Training  Education Method: Verbal  Education Outcome: Verbalized understanding, Demonstrated understanding, Continued education needed       Equipment Recommendations:  Equipment Needed: No    Plan:  Current Treatment Recommendations: Strengthening, Balance training, Transfer training, Endurance training, Equipment evaluation, education, & procurement, Patient/Caregiver education & training, Therapeutic activities, Home exercise program, Safety education & training, Neuromuscular re-education  General Plan:  (5x GM/O)    Goals:  Patient Goals : return home  Short Term Goals  Time Frame for Short Term Goals: by discharge  Short Term Goal 1: Pt will demo rolling L and R in bed with max A 1 to progress with mobility. Short Term Goal 2: Pt will demo supine to and from sit transfers with modifications as needed with max Ax1 to progress with mobility. Short Term Goal 3: Pt will demo tolerate 10 min seated on EOB to complete functional tasks with S to progress with strength and mobility. Short Term Goal 4: PT to assess transfers when able. Short Term Goal 5: Pt will tolerate 10-20 reps of ther ex to increase overall mobility. Long Term Goals  Time Frame for Long Term Goals : NA due to short ELOS    Following session, patient left in safe position with all fall risk precautions in place.  Pt in bed following session, all needs and call light in reach, alarm on.

## 2023-02-22 NOTE — CONSULTS
Orthopedic Consult    Requesting Physician: Dr. Herber Thomas     CHIEF COMPLAINT:  agustina knee pain     HISTORY OF PRESENT ILLNESS:      The patient is a 74 y.o. male  who presents with bilateral knee pain. He was noted to have a fall yesterday in his home, secondary from pain and weakness. He did lay on the floor for quite some time. He is noted to have bilateral knee replacements. He is also noted to have previous back surgeries. He has noticed increasing pain to his bilateral knees. He has had pain and swelling to bilateral knees. He notes pain with any range of motion. Had difficulty getting up and walking. Orthopedics was asked to evaluate for septic knee versus gout. He is currently down in CT scan for bilateral knees. He is currently on aspirin Lovenox and Brilinta. PT and OT were ordered to evaluate patient. Patient does have a history of acute kidney injury, diabetes, hypertension, obesity, status post CABG. Patient is noted to have an elevated temp of 101.9 this morning. His uric acid level was 8.1. Glucose was 359 this morning. X-rays were reviewed 3 views of the right and left knee does show a well-seated total knee replacements. No acute fractures or other abnormalities noted on x-ray.       Past Medical History:    Past Medical History:   Diagnosis Date    RAUL (acute kidney injury) ([address] 02/13/2020    ASHD (arteriosclerotic heart disease) 2005 2006    stent  baki    Closed fracture of first lumbar vertebra ([address] 05/06/2022    Closed T12 fracture ([address] 05/06/2022    Depression     Diabetic peripheral neuropathy ([address] 2014    Fracture 10/1984    left lower extremity     HTN (hypertension)     Hypercholesteremia     IDDM (insulin dependent diabetes mellitus)     Low back pain     Morbid obesity with BMI of 45.0-49.9, adult (HCC)     Osteoarthritis     S/P CABG (coronary artery bypass graft)        Past Surgical History:    Past Surgical History:   Procedure Laterality Date    AMPUTATION Left 9/10/14    partial versus complete left hallux amputation, left great toe amputation    APPENDECTOMY      BACK INJECTION Bilateral 1/18/2021    Bilateral Si MBB #1 performed by Dan Kehr, MD at [facility] Bilateral 3/1/2021    Bilateral SI MBB #2 performed by Dan Kehr, MD at [place] [address]    fusion of C5-C6    CHOLECYSTECTOMY      COLONOSCOPY  2007 and 2011    colonn polyps  lorenzo    CORONARY ANGIOPLASTY WITH STENT PLACEMENT  08/07/2017    Dr Marta Tejeda @ [id number] GRAFT  2015 august dox    EKG 12-LEAD  8/14/2015         FACET JOINT INJECTION Bilateral 5/22/2020    Lumbar Facet MBB @L3-4,4-5 bilateral #2 performed by Dan Kehr, MD at [address] [facility]      left leg    JOINT REPLACEMENT      bilateral knees gurvinder    PAIN MANAGEMENT PROCEDURE Bilateral 1/28/2020    Lumbar Facet MBB @ L3-4,4-5,5-S1 bilateral #1 LUMBAR FACET performed by Dan Kehr, MD at [facility] Right 7/14/2020    Lumbar RFA Bilateral L3-4,4-5  right side first performed by Dan Kehr, MD at [facility] Left 10/1/2020    Lumbar RFA Left side L3-4, 4-5 performed by Dan Kehr, MD at [facility] Bilateral 11/17/2020    TFLESI @L5 bilateral #1 performed by Dan Kehr, MD at [facility] Bilateral 12/10/2020    TFLESI @L5 bilateral #1 performed by Dan Kehr, MD at [address] [place] [address] N/A 12/28/2018    T7-T9 DECOMPRESSION, T7-T9 POSTERIOR FUSION performed by Katie Hutchinson MD at [address] [facility]      VASCULAR SURGERY      cabg harvests from left leg       Medications Prior to Admission:   Current Facility-Administered Medications   Medication Dose Route Frequency Provider Last Rate Last Admin    albuterol sulfate HFA (PROVENTIL;VENTOLIN;PROAIR) 108 (90 Base) MCG/ACT inhaler 2 puff  2 puff Inhalation BID Dione Bermudez MD   2 puff at 02/22/23 0928    amLODIPine (NORVASC) tablet 5 mg  5 mg Oral Daily Dione Bermudez MD   5 mg at 02/22/23 0554    aspirin EC tablet 81 mg  81 mg Oral Daily Dione Bermudez MD   81 mg at 02/22/23 0803    atorvastatin (LIPITOR) tablet 10 mg  10 mg Oral Daily Dione Bermudez MD   10 mg at 02/21/23 1757    carvedilol (COREG) tablet 12.5 mg  12.5 mg Oral BID Dione Bermudez MD   12.5 mg at 02/22/23 8381    insulin glargine (LANTUS) injection vial 30 Units  30 Units SubCUTAneous QAM Dione Bermudez MD        insulin lispro (HUMALOG) injection vial 0-8 Units  0-8 Units SubCUTAneous TID RODOLFO Bermudez MD   8 Units at 02/22/23 0819    insulin lispro (HUMALOG) injection vial 0-4 Units  0-4 Units SubCUTAneous Nightly Dione Bermudez MD        insulin lispro (HUMALOG) injection vial 10 Units  10 Units SubCUTAneous TID RODOLFO Bermudez MD   10 Units at 02/21/23 1754    glucose chewable tablet 16 g  4 tablet Oral PRN Dione Bermudez MD        dextrose bolus 10% 125 mL  125 mL IntraVENous PRN Dione Bermudez MD        Or    dextrose bolus 10% 250 mL  250 mL IntraVENous PRN Dione Bermudez MD        glucagon (rDNA) injection 1 mg  1 mg SubCUTAneous PRN Dione Bermudez MD        dextrose 10 % infusion   IntraVENous Continuous PRN Dione Bermudez MD        sodium chloride flush 0.9 % injection 5-40 mL  5-40 mL IntraVENous 2 times per day Dione Bermudez MD        sodium chloride flush 0.9 % injection 5-40 mL  5-40 mL IntraVENous PRN Dione Bermudez MD        0.9 % sodium chloride infusion   IntraVENous PRN Dione Bermudez MD        enoxaparin (LOVENOX) injection 40 mg  40 mg SubCUTAneous BID Tiffany Reed MD [place]   40 mg at 02/22/23 0804    ondansetron (ZOFRAN-ODT) disintegrating tablet 4 mg  4 mg Oral Q8H PRN Chapo Donaldson MD        Or    ondansetron [facility] injection 4 mg  4 mg IntraVENous Q6H PRN Chapo Donaldson MD        polyethylene glycol (GLYCOLAX) packet 17 g  17 g Oral Daily PRN Chapo Donaldson MD        acetaminophen (TYLENOL) tablet 650 mg  650 mg Oral Q6H PRN Chapo Donaldson MD   650 mg at 02/22/23 2036    Or    acetaminophen (TYLENOL) suppository 650 mg  650 mg Rectal Q6H PRN Chapo Donaldson MD        potassium chloride (KLOR-CON M) extended release tablet 40 mEq  40 mEq Oral PRN Chapo Donaldson MD        Or    potassium bicarb-citric acid (EFFER-K) effervescent tablet 40 mEq  40 mEq Oral PRN Chapo Donaldson MD        Or    potassium chloride 10 mEq/100 mL IVPB (Peripheral Line)  10 mEq IntraVENous PRN Chapo Donaldson MD        magnesium sulfate 2000 mg in 50 mL IVPB premix  2,000 mg IntraVENous PRN Chapo Donaldson MD        ticagrelor (BRILINTA) tablet 90 mg  90 mg Oral BID Chapo Donaldson MD   90 mg at 02/22/23 0803    0.9 % sodium chloride infusion   IntraVENous Continuous Chapo Donaldson MD 75 mL/hr at 02/22/23 0534 New Bag at 02/22/23 0534    ceFAZolin (ANCEF) 1000 mg in dextrose 5 % 50 mL IVPB (premix)  1,000 mg IntraVENous Q8H Chapo Donaldson MD   Stopped at 02/22/23 0414       Allergies:  Horse-derived products    Social History:   Social History     Tobacco Use   Smoking Status Never   Smokeless Tobacco Never     Social History     Substance and Sexual Activity   Alcohol Use Not Currently    Comment: rarely     Social History     Substance and Sexual Activity   Drug Use No       Family History:  Family History   Problem Relation Age of Onset    Cancer Mother         uterine       REVIEW OF SYSTEMS:  Constitutional: + fever, chills. Derm: Denies any rash or skin color change. Eyes: Denies blurred or decreased in vision.   Ent: Denies any tinnitus or vertigo. Resp: Denies any cough or shortness of breath. CV: Denies any syncope, palpitations or chest pain. GI:  Denies any abdominal pain, nausea, vomiting, constipation or diarrhea. : Denies any hematuria, hesitancy, or dysuria. Heme/Lymph: Denies any bleeding. Musculoskeletal: Positive for pain and swelling to bilateral knees  Neuro: + weakness. PHYSICAL EXAM:  Patient Vitals for the past 24 hrs:   BP Temp Temp src Pulse Resp SpO2 Height Weight   02/22/23 0745 131/60 (!) 101.9 °F (38.8 °C) Oral 74 20 94 % -- --   02/22/23 0335 (!) 133/36 100.2 °F (37.9 °C) Oral 69 20 95 % -- --   02/22/23 0006 (!) 123/56 (!) 101 °F (38.3 °C) Oral 82 20 96 % -- --   02/21/23 2056 (!) 129/47 98 °F (36.7 °C) Oral 83 16 97 % -- --   02/21/23 1509 136/61 98.2 °F (36.8 °C) Oral 73 20 94 % -- --   02/21/23 1401 -- -- -- -- -- -- 6' 2\" (1.88 m) (!) 361 lb (163.7 kg)   02/21/23 1333 (!) 147/68 98.4 °F (36.9 °C) Oral 78 20 97 % -- --   02/21/23 1145 (!) 140/65 -- -- 81 23 98 % -- --     General appearance:  Alert and oriented x 3. No apparent distress, appears stated age and cooperative. HEENT:  Normal cephalic, atraumatic without obvious deformity. Conjunctivae/corneas clear. Neck: Supple, with full range of motion. Respiratory:  Normal respiratory effort. No audible Wheezes or Rhonchi. Cardiovascular:  Regular rate and rhythm. Abdomen: Soft, non-tender, non-distended. Musculoskeletal: BL E:  Denies calf pain to palpation. Decreased range of motion without deformity bilateral knees. Pt can flex and extend bilateral toes. Swelling and slight redness noted to the knees. Significant pain with range of motion. Warmth to touch. neurovascularly intact bilateral toes. Swelling noted to lower extremities. Skin: Skin color, texture, turgor normal.  No rashes or lesions. Neurologic:  Neurovascularly intact without any focal sensory/motor deficits. Sensation intact.        DATA:  CBC:   Lab Results   Component Value Date/Time    WBC 7.3 02/22/2023 05:35 AM    HGB 8.1 02/22/2023 05:35 AM     02/22/2023 05:35 AM     BMP:    Lab Results   Component Value Date/Time     02/22/2023 05:35 AM    K 4.2 02/22/2023 05:35 AM     02/22/2023 05:35 AM    CO2 20 02/22/2023 05:35 AM    BUN 59 02/22/2023 05:35 AM    CREATININE 1.8 02/22/2023 05:35 AM    CALCIUM 7.9 02/22/2023 05:35 AM    GLUCOSE 363 02/22/2023 05:35 AM    GLUCOSE 125 10/17/2018 11:06 AM     PT/INR:    Lab Results   Component Value Date/Time    INR 1.00 02/08/2019 10:50 PM     Troponin:    Lab Results   Component Value Date/Time    TROPONINI <0.006 09/19/2011 06:22 AM     No results for input(s): LIPASE, AMYLASE in the last 72 hours. Recent Labs     02/21/23  0924 02/22/23  0535   AST 22 85*   ALT 17 61   BILIDIR <0.2  --    BILITOT 0.6 0.4   ALKPHOS 75 114       Radiology:   XR LUMBAR SPINE (2-3 VIEWS)    Result Date: 2/21/2023  PROCEDURE: XR LUMBAR SPINE (2-3 VIEWS) CLINICAL INFORMATION: pain after fall COMPARISON: Lumbar spine radiographs 5/8/2022, 10/26/2020. TECHNIQUE: 2 views of the lumbar spine FINDINGS: Degenerative changes of the SI joints. Marked multilevel facet arthrosis. The bones are demineralized. No acute compression fracture of the lumbar spine. Lumbar lordosis is maintained. . 6 lumbar vertebrae are seen. Vascular calcifications. 1. No acute compression fracture of the lumbar spine. 2. Moderate to severe degenerative changes of the lumbar spine. **This report has been created using voice recognition software. It may contain minor errors which are inherent in voice recognition technology. ** Final report electronically signed by Dr Nette Reyes on 2/21/2023 11:01 AM    XR KNEE LEFT (MIN 4 VIEWS)    Result Date: 2/21/2023  PROCEDURE: XR KNEE LEFT (MIN 4 VIEWS) CLINICAL INFORMATION: pain. COMPARISON: No prior study. TECHNIQUE: 4 views of the left knee include an AP view, a lateral view and bilateral oblique views.  FINDINGS: There is a left knee prosthesis in place. There is degenerative change. There is no fracture or dislocation. There is a joint effusion. There is surgical clips along the medial aspect of the leg which may be secondary to previous vascular surgery. There is vascular calcification. .  .     1. Left knee prosthesis. 2. Degenerative change. 3. Joint effusion. 4. Surgical clips along the medial aspect of the leg possibly secondary to previous vascular surgery. Please correlate clinically. **This report has been created using voice recognition software. It may contain minor errors which are inherent in voice recognition technology. ** Final report electronically signed by DR Vishnu Bowden on 2/21/2023 9:34 AM    XR KNEE RIGHT (MIN 4 VIEWS)    Result Date: 2/21/2023  PROCEDURE: XR KNEE RIGHT (MIN 4 VIEWS) CLINICAL INFORMATION: pain. COMPARISON: Plain radiographs dated 5/8/2022. TECHNIQUE: 4 views of the right knee include an AP view, a lateral view and bilateral oblique views. FINDINGS: The patient is status post right knee replacement. There is degenerative change. There is no fracture or dislocation. There is a moderate joint effusion. There are probable loose  bodies in the suprapatellar bursa . There is extensive vascular calcification. There are possible calcified varicose veins present. .     1. Stable right knee radiographs, no interval change since previous study dated 5/8/2022. **This report has been created using voice recognition software. It may contain minor errors which are inherent in voice recognition technology. ** Final report electronically signed by DR Vishnu Bowden on 2/21/2023 9:31 AM    XR CHEST PORTABLE    Result Date: 2/21/2023  PROCEDURE: XR CHEST PORTABLE CLINICAL INFORMATION: cough, fever . TECHNIQUE: Portable semiupright COMPARISON: 9/29/2022 FINDINGS: Patient is severely rotated. Heart size is normal. Mediastinum is not widened. No confluent airspace infiltrate or pleural effusion.  Midline atelectatic scarring in the right midlung. Vessels are not congested. Midline sternotomy. Post surgical changes thoracic spine with pedicle screws and support rods. EKG leads overlie the chest.     No acute cardiopulmonary disease. **This report has been created using voice recognition software. It may contain minor errors which are inherent in voice recognition technology. ** Final report electronically signed by Dr. Alvina Arredondo on 2/21/2023 8:24 AM      ASSESSMENT:Principal Problem:    Dehydration  Active Problems:    Acute kidney injury ([address]  Resolved Problems:    * No resolved hospital problems. *  Bilateral knee pain, swelling    PLAN as discussed with Dr. Liam Christianson:  patient is currently undergoing a CT scan of bilateral knees. Did discuss options with patient. He is agreeable to aspiration of bilateral knee joints. We will send off for cultures cell count and crystals. Patient can weight-bear as tolerated and may work with therapy as tolerates. Will monitor results. Procedure note: agustina knees prepped with alcohol . A 18 g needle was introduced to L and R superior lateral knees . 46 ml of yellow , white spec fluid aspirated. 40 ml of cloudy, milky fluid aspirated from right knee. Hemostasis achieved . Band-Aid applied. Pt tolerated well. Will make npo at midnight for possible knee arthotomies . Will sent fluid to lab and monitor. Discussed potential sx with pt and he is agreeable to proceed if needed.      Electronically signed by ABHISHEK Lama CNP on 2/22/2023 at 10:56 AM

## 2023-02-22 NOTE — PROGRESS NOTES
See  history and physical    Impression     Acute kidney  injury     Fall with inability to ambulate     Elevated troponin    Cellulitis oft he left  leg     Diffuse osteoarthritis of knees and ? gout    Niddm poor control with long term insulin     Htn    Ashd  post  cabg with stress test 4-2022 wnl    Chronic low  back pain     PLan     Admit with  slow  hydration.   Antibiotic for legs     Ask ortho to see and check inflammation markers     Renal to see    Needs pt and ot

## 2023-02-22 NOTE — PROGRESS NOTES
Pt was alone and in bed at the time of the visit. He was dealing with dehydration coupled with both knee issues, he said.  He was a bit sleepy so I anointed and encouraged him.    02/22/23 5015   Encounter Summary   Encounter Overview/Reason  Initial Encounter   Service Provided For: Patient   Referral/Consult From: [address] [place] [address] Family members   Last Encounter  02/22/23  (Anointed)   Complexity of Encounter Low   Spiritual/Emotional needs   Type Spiritual Support   Rituals, Rites and Sacraments   Type Anointing   Assessment/Intervention/Outcome   Assessment Calm   Intervention Empowerment

## 2023-02-22 NOTE — H&P
[address] [place] [address]                              HISTORY AND PHYSICAL    PATIENT NAME: Oanh ORELLANA                   :        1950  MED REC NO:   [id number]                           ROOM:       [id number]  ACCOUNT NO:   [de-identified]                           ADMIT DATE: 2023  PROVIDER:     Sam Glynn. Tj Lawrence M.D.      DICTATED ACCOUNT NUMBER: [id number]    CHIEF COMPLAINT:  Weakness. HISTORY OF PRESENT ILLNESS:  This is a 22-year-old male with underlying  history of chronic low back pain along with diffuse generalized  osteoarthritis and history of status post coronary artery bypass  grafting with history of non-insulin dependent diabetes, long-term use  of insulin with polyneuropathy, who now presents with increased amount  of weakness from the fall being present. The patient most recently has  been having increased problems with ambulation. He apparently had  ordered some food for takeout and was unable to ambulate much in his  house as well he has a walker, but does not have a wheelchair. He  apparently did make it to the door to get some food that was delivered  by [employer]. The food had been delivered around 11:30 on 2023 and  in the afternoon, he was finally able to go out and get the food about 1  a.m. and however, when he returned back in house, he then slipped and  fell. He was unable to stand up. He remained on the floor from  approximately 1 a.m. on 2023 until approximately 8 a.m. on  2023. He was brought in then via paramedics. Once in the  emergency room, workup noted to have standard x-rays of lumbar spine  noting no evidence of any fracture. X-rays of the knee notes the right  knee having no interval change with arthritis being present with the  left knee having a joint effusion; however, the right knee was noted to  have more generalized swelling and warmth.   He was having difficulty  with any type of ambulation at all. It was also noted that on blood  work that he did have acute kidney injury as his BUN has remained  elevated at 60 with a creatinine of 1.9. Prior baselines had noted his  BUN on 06/06/2022 being 40 with the creatinine at that time running  about 1.6. He has had some increased elevation and this has been  somewhat persistent with elevation and increase in BUN with his  intermittently correcting up. The patient's troponin was slightly  elevated as well as calcitonin slight elevation in addition, his lactic  acid was noted to be normal.  Recent stress testing in April 2022 noted  to be within normal limits and he has not had any type of chest  pressure, discomfort or heaviness being present. Because of the  underlying fall and weakness and acute kidney injury, it was felt that  the patient did need to be admitted for further evaluation. In  addition, it was felt that the legs had some increased warmth as well as  his left foot, which is the area of prior amputation, felt that there  was increased amount of warmth being present since hospitalization  secondary to cellulitis being present in addition to the lower leg area  on the left side. He has significant skin changes in the lower leg area  and questionable, there are some infections along this area. Consequently, it was felt the patient needed to be admitted for  underlying acute kidney injury, cellulitis of the lower leg areas and on  further evaluation, there were few falls and his inability to ambulate. He was now admitted for further evaluation and treatment at this time.     PAST MEDICAL HISTORY:  Notes,  MEDICATIONS:  At the time of admission include Lantus, which he has  taken 30 units once a day and he is taking oral iron daily, Brilinta 90  mg twice a day, Novolog 12 units with each meal and sliding scale,  metformin two tabs twice a day, multivitamin, Norvasc 5 mg a day,  Lipitor 10 mg a day, Vasotec 10 mg a day, Lasix 80 mg once a day,  albuterol inhalers as needed, Coreg 12.5 mg b.i.d., aspirin 81 mg a day,  Tylenol as needed. ALLERGIES:  HORSE-DERIVED PRODUCTS. PAST SURGICAL HISTORY:  Notes coronary artery bypass graft in , then  he had a stent done in 2017. He has had appropriate cholecystectomy in  the past back in  and fusion of C5-C6. He did have bilateral knee  replacements in the past.  He had a posterior fusion of the thoracic  spine in 2018. He has had lumbar disk surgery, rotator cuff surgery in  addition. He has had several procedures to the lower back via pain  management with ablations. HOSPITALIZATIONS:  As noted above. Most recent hospitalization last  summer because of fall and weakness. MEDICAL ILLNESS PROBLEMS:  Include chronic renal failure, compression  fracture of spine, atherosclerotic heart disease, diabetic peripheral  neuropathy, hypertension, hyperlipidemia, non-insulin dependent diabetes  with long-term use of insulin, morbid obesity, hypercholesterol. SOCIAL HISTORY:  He is , nonsmoker, does not use alcohol  products. Lives alone. Has assistance with assisted living. FAMILY HISTORY:  Notes mother had underlying uterine cancer. Father is   with underlying heart disease being present. Sister with  underlying multiple sclerosis. REVIEW OF SYSTEMS:  CONSTITUTIONAL:  He denies any fever or chills being present at this  time. Generalized weakness noted. EYES:  Without any blurred vision. EARS, NOSE, AND THROAT:  Without complaints. NECK:  Chronic neck pain and discomfort present. CARDIAC:  Denies chest pressure, discomfort or palpitation being  present. PULMONARY:  No cough. GASTROINTESTINAL:  Has had no significant change in stools. Some  heartburn, indigestion. RENAL:  Without much voiding problems. There is some hesitancy noted. MUSCULOSKELETAL:  Diffuse muscle aches.   He has got some cervical spine  area discomfort, mid thoracic and lumbar spine, that is chronic, both  knee significant pain and discomfort being present with swelling and  pain in the legs and also in the feet being present. He has got some  chronic changes of the lower leg areas being present in addition. There  is some generalized weakness noted as stated and he has neuropathy of  the lower legs noted, but denies any other numbness type sensation being  present. PHYSICAL EXAMINATION:  VITAL SIGNS:  Notes temperature 98.2, pulse 73, respiratory rate 20,  blood pressure 136/61, pulse ox 94% on room air. GENERAL:  Elderly male, generalized weakness noted. HEAD:  Atraumatic, normocephalic. EYES:  PERRL with normal sclera. EARS:  Normal TMs. NOSE:  Clear. THROAT:  Without exudate. NECK:  Supple without adenopathy or increased thyroid. LUNGS:  Clear, but decreased in the bases. CARDIAC:  Regular rate and rhythm. Normal S1, S2.  Grade 1/6 systolic  murmur. No carotid bruits. Peripheral pulse is essentially 1+. ABDOMEN:  Soft, positive bowel sounds. Nontender. No  hepatosplenomegaly. No other masses noted. EXTREMITIES:  Full range of motion. Diffuse weakness being present at  this time. Right knee has some increased swelling. It is warm. He has  lower leg swelling being present. Left knee also has some swelling and  pain with motion of both knees. Lower legs have some chronic swelling,  some chronic ichthyosis-type changes being present. Left foot has more  swelling than the right with increased warmth being present. Left lower  leg area with skin changes being present with significant redness and  some warmth being present involving this area. There is no warmth above  the knees being present at this time. NEUROLOGIC:  He is alert and oriented x3. Cranial nerves II through XII  intact.   Motor and sensory is intact except for loss of sensation from  the ankles to the feet with decreased sensation with neuropathy being  present at this time. LABORATORY DATA:  Notes sodium 134, potassium 3.9, chloride 98, CO2 is  24, BUN 60, creatinine of 1.9. Lactic acid was only 1.3. Glucose  elevated at 333. Procalcitonin is 0.49. Total CK was only 274. BNP  was 1284, normal.  Troponin level slight elevation at 0.024 and on  repeat was 0.024. Liver functions were noted to be normal.  White count  11604, hemoglobin was low at 8.2, hematocrit 25.3, platelet count  430,362. Blood cultures obtained. X-rays of the knees noted  generalized arthritis. Chest x-ray noted no acute changes. Lumbar  spine, some stenosis, generalized diffuse arthritis being present. EKG,  there is a conduction delay with no acute changes being present. IMPRESSION:  1. Acute kidney injury. 2.  Cellulitis of lower legs. 3.  Fall and inability to ambulate. 4.  Elevated troponin level. 5.  Diffuse arthritis of knee, questionable gout. 6.  Non-insulin dependent diabetes with long-term insulin, poor control. 7.  Hypertension. 8.  Atherosclerotic heart disease. 9.  Chronic low back pain. 10.  Inability to ambulate. PLAN:  At this time, we will admit. Start on IV antibiotics. Slow IV  hydration. We will need Ortho to see, PT and OT to see how he  ambulates. Probably will need nursing home versus rehab with the  significant weakness and deterioration. Ask Renal to see in addition  because of kidney function and hydration status, and questionable low  hemoglobin related to the kidney function. We will start the patient on  some IV antibiotics in addition at this time. The patient will require  admission and further evaluation at this point.         Ayla Londono M.D.    D: 02/22/2023 1:19:57       T: 02/22/2023 4:23:51     [email or web address]  Job#: [id number]     Doc#: [id number]    CC:

## 2023-02-22 NOTE — PROGRESS NOTES
Ken Armando 58  OCCUPATIONAL THERAPY MISSED TREATMENT NOTE  [facility] 8B  8B-22/022-A      Date: 2023  Patient Name: Rosalina Lynne        CSN: [id number]   : 1950  (74 y.o.)  Gender: male   Referring Practitioner: Dr. Frances Deluca MD  Diagnosis: Dehydration         REASON FOR MISSED TREATMENT:  Pt was having severe knee pain per PT note. He also needed assist of 2-3 for bed mobility at this time. Pt had aspiration of his B knees. Orthopedics is signed off secondary to pt likely having a gouty attack and not requiring any surgery. Will retry tomorrow if pt is able to participate.

## 2023-02-22 NOTE — PROGRESS NOTES
Progress Note  Date:2/22/2023       Room:[address] [place] [address]  Patient Name:Tuan Hassan     YOB: 1950     Age:72 y.o. Subjective    Subjective:  Symptoms:  Worsening. He reports malaise and weakness. No shortness of breath, cough, chest pain or diarrhea. (  Now  with fever). Diet:  Adequate intake. Activity level: Impaired due to pain. Pain:  He complains of pain that is moderate. (  Both knees ).     Current Facility-Administered Medications   Medication Dose Route Frequency Provider Last Rate Last Admin    colchicine (COLCRYS) tablet 0.6 mg  0.6 mg Oral BID Chapo Donaldson MD        [START ON 2/23/2023] insulin glargine (LANTUS) injection vial 40 Units  40 Units SubCUTAneous QAM Chapo Donaldson MD        [START ON 2/23/2023] insulin lispro (HUMALOG) injection vial 15 Units  15 Units SubCUTAneous TID  Chapo Donaldson MD        insulin lispro (HUMALOG) injection vial 0-16 Units  0-16 Units SubCUTAneous TID  Chapo Donaldson MD        insulin lispro (HUMALOG) injection vial 0-4 Units  0-4 Units SubCUTAneous Nightly Chapo Donaldson MD        methylPREDNISolone sodium (SOLU-MEDROL) injection 40 mg  40 mg IntraVENous Once Chapo Donaldson MD        albuterol sulfate HFA (PROVENTIL;VENTOLIN;PROAIR) 108 (90 Base) MCG/ACT inhaler 2 puff  2 puff Inhalation BID Chapo Donaldson MD   2 puff at 02/22/23 1647    amLODIPine (NORVASC) tablet 5 mg  5 mg Oral Daily Chapo Donaldson MD   5 mg at 02/22/23 6629    [Held by provider] aspirin EC tablet 81 mg  81 mg Oral Daily Chapo Donaldson MD   81 mg at 02/22/23 0803    atorvastatin (LIPITOR) tablet 10 mg  10 mg Oral Daily Chapo Donaldson MD   10 mg at 02/22/23 0830    carvedilol (COREG) tablet 12.5 mg  12.5 mg Oral BID Chapo Donaldson MD   12.5 mg at 02/22/23 0807    glucose chewable tablet 16 g  4 tablet Oral PRN Chapo Donaldson MD        dextrose bolus 10% 125 mL  125 mL IntraVENous PRN Chapo Donaldson MD        Or    dextrose bolus 10% 250 mL  250 mL IntraVENous PRN Alex Parisi MD        glucagon (rDNA) injection 1 mg  1 mg SubCUTAneous PRN Alex Parisi MD        dextrose 10 % infusion   IntraVENous Continuous PRN Alex Parisi MD        sodium chloride flush 0.9 % injection 5-40 mL  5-40 mL IntraVENous 2 times per day Alex Parisi MD        sodium chloride flush 0.9 % injection 5-40 mL  5-40 mL IntraVENous PRN Alex Parisi MD        0.9 % sodium chloride infusion   IntraVENous PRN Alex Parisi MD        [Held by provider] enoxaparin (LOVENOX) injection 40 mg  40 mg SubCUTAneous BID Alex Parisi MD   40 mg at 02/22/23 0804    ondansetron (ZOFRAN-ODT) disintegrating tablet 4 mg  4 mg Oral Q8H PRN Alex Parisi MD        Or    ondansetron [facility]) injection 4 mg  4 mg IntraVENous Q6H PRN Alex Parisi MD        polyethylene glycol (GLYCOLAX) packet 17 g  17 g Oral Daily PRN Alex Parisi MD        acetaminophen (TYLENOL) tablet 650 mg  650 mg Oral Q6H PRN Alex Parisi MD   650 mg at 02/22/23 9421    Or    acetaminophen (TYLENOL) suppository 650 mg  650 mg Rectal Q6H PRN Alex Parisi MD        potassium chloride (KLOR-CON M) extended release tablet 40 mEq  40 mEq Oral PRN Alex Parisi MD        Or    potassium bicarb-citric acid (EFFER-K) effervescent tablet 40 mEq  40 mEq Oral PRN Alex Parisi MD        Or    potassium chloride 10 mEq/100 mL IVPB (Peripheral Line)  10 mEq IntraVENous PRN Alex Parisi MD        magnesium sulfate 2000 mg in 50 mL IVPB premix  2,000 mg IntraVENous PRN Alex Parisi MD        [Held by provider] ticagrelor (BRILINTA) tablet 90 mg  90 mg Oral BID Alex Parisi MD   90 mg at 02/22/23 0803    0.9 % sodium chloride infusion   IntraVENous Continuous Alex Parisi MD 75 mL/hr at 02/22/23 0534 New Bag at 02/22/23 0534    ceFAZolin (ANCEF) 1000 mg in dextrose 5 % 50 mL IVPB (premix)  1,000 mg IntraVENous Q8H Juan J Khan MD   Stopped at 23 1311      Review of Systems   Constitutional:  Positive for activity change (weakness) and fever. HENT:  Negative for congestion. Respiratory:  Negative for cough and shortness of breath. Cardiovascular:  Positive for leg swelling (chronic). Negative for chest pain. Gastrointestinal:  Negative for abdominal distention, constipation and diarrhea. Genitourinary:  Negative for difficulty urinating. Musculoskeletal:  Positive for arthralgias and back pain. Bilateral knee pain   Skin:         Lower  leg  redness and skin changes chronic  with left foot warm and with redness and some skin change  mid  shaft tibula   Neurological:  Positive for weakness. Poor gait   Hematological:  Negative for adenopathy. Objective         Vitals Last 24 Hours:  TEMPERATURE:  Temp  Av.9 °F (37.7 °C)  Min: 98 °F (36.7 °C)  Max: 101.9 °F (38.8 °C)  RESPIRATIONS RANGE: Resp  Av.6  Min: 16  Max: 20  PULSE OXIMETRY RANGE: SpO2  Av.2 %  Min: 94 %  Max: 97 %  PULSE RANGE: Pulse  Av.8  Min: 69  Max: 83  BLOOD PRESSURE RANGE: Systolic (28CPB), JZO:158 , Min:123 , CDQ:740   ; Diastolic (50XAO), YKE:40, Min:36, Max:64    I/O (24Hr): Intake/Output Summary (Last 24 hours) at 2023 1802  Last data filed at 2023 1209  Gross per 24 hour   Intake 980 ml   Output 120 ml   Net 860 ml     Objective:  General Appearance:  Ill-appearing. Vital signs: (most recent): Blood pressure (!) 147/64, pulse 81, temperature 98.2 °F (36.8 °C), temperature source Oral, resp. rate 17, height 6' 2\" (1.88 m), weight (!) 361 lb (163.7 kg), SpO2 94 %. Fever. Output: Producing urine. HEENT: Normal HEENT exam.    Lungs:  Normal effort and normal respiratory rate. Breath sounds clear to auscultation. There are decreased breath sounds. Heart: Normal rate. Regular rhythm.   S1 normal and S2 normal.  Positive for murmur ( merced).    Abdomen: Abdomen is soft and non-distended. Bowel sounds are normal.   There is no abdominal tenderness. Extremities: (  Pain  with motion of the  knees right worse and with warmth and right worse than left      Back pain noted      3 to 4 + edema  of  legs with dorsum of left foot with redness and warmth)  Pulses: Distal pulses are intact. Neurological: Patient is alert and oriented to person, place and time. Patient has normal reflexes and normal muscle tone. Skin:  (Slight irritation of mid shaft of  left lower leg and proximal  shaft  left  leg )  Labs/Imaging/Diagnostics    Labs:  CBC:  Recent Labs     02/21/23 0924 02/22/23  0535   WBC 10.3 7.3   RBC 2.70* 2.63*   HGB 8.2* 8.1*   HCT 25.3* 25.0*   MCV 93.7 95.1*    183     CHEMISTRIES:  Recent Labs     02/21/23 0924 02/22/23  0535   * 136   K 3.9 4.2   CL 98 102   CO2 24 20*   BUN 60* 59*   CREATININE 1.9* 1.8*   GLUCOSE 333* 363*     PT/INR:No results for input(s): PROTIME, INR in the last 72 hours. APTT:No results for input(s): APTT in the last 72 hours. LIVER PROFILE:  Recent Labs     02/21/23 0924 02/22/23  0535   AST 22 85*   ALT 17 61   BILIDIR <0.2  --    BILITOT 0.6 0.4   ALKPHOS 75 114       Imaging Last 24 Hours:  XR LUMBAR SPINE (2-3 VIEWS)    Result Date: 2/21/2023  PROCEDURE: XR LUMBAR SPINE (2-3 VIEWS) CLINICAL INFORMATION: pain after fall COMPARISON: Lumbar spine radiographs 5/8/2022, 10/26/2020. TECHNIQUE: 2 views of the lumbar spine FINDINGS: Degenerative changes of the SI joints. Marked multilevel facet arthrosis. The bones are demineralized. No acute compression fracture of the lumbar spine. Lumbar lordosis is maintained. . 6 lumbar vertebrae are seen. Vascular calcifications. 1. No acute compression fracture of the lumbar spine. 2. Moderate to severe degenerative changes of the lumbar spine. **This report has been created using voice recognition software.   It may contain minor errors which are inherent in voice recognition technology. ** Final report electronically signed by Dr Zoran Jamil on 2/21/2023 11:01 AM    XR KNEE LEFT (MIN 4 VIEWS)    Result Date: 2/21/2023  PROCEDURE: XR KNEE LEFT (MIN 4 VIEWS) CLINICAL INFORMATION: pain. COMPARISON: No prior study. TECHNIQUE: 4 views of the left knee include an AP view, a lateral view and bilateral oblique views. FINDINGS: There is a left knee prosthesis in place. There is degenerative change. There is no fracture or dislocation. There is a joint effusion. There is surgical clips along the medial aspect of the leg which may be secondary to previous vascular surgery. There is vascular calcification. .  .     1. Left knee prosthesis. 2. Degenerative change. 3. Joint effusion. 4. Surgical clips along the medial aspect of the leg possibly secondary to previous vascular surgery. Please correlate clinically. **This report has been created using voice recognition software. It may contain minor errors which are inherent in voice recognition technology. ** Final report electronically signed by DR Nirav Man on 2/21/2023 9:34 AM    XR KNEE RIGHT (MIN 4 VIEWS)    Result Date: 2/21/2023  PROCEDURE: XR KNEE RIGHT (MIN 4 VIEWS) CLINICAL INFORMATION: pain. COMPARISON: Plain radiographs dated 5/8/2022. TECHNIQUE: 4 views of the right knee include an AP view, a lateral view and bilateral oblique views. FINDINGS: The patient is status post right knee replacement. There is degenerative change. There is no fracture or dislocation. There is a moderate joint effusion. There are probable loose  bodies in the suprapatellar bursa . There is extensive vascular calcification. There are possible calcified varicose veins present. .     1. Stable right knee radiographs, no interval change since previous study dated 5/8/2022. **This report has been created using voice recognition software. It may contain minor errors which are inherent in voice recognition technology. ** Final report electronically signed by DR Susana Chau on 2/21/2023 9:31 AM    CT KNEE LEFT WO CONTRAST    Result Date: 2/22/2023  PROCEDURE: CT KNEE LEFT WO CONTRAST CLINICAL INFORMATION: possible infected left knee prosthesis COMPARISON: Left knee radiograph 2/21/2023 TECHNIQUE: 3 mm axial imaging through the left knee without contrast. Coronal and sagittal reconstructions were performed. All CT scans at this facility use dose modulation, iterative reconstruction, and/or weight based dosing when appropriate to reduce the radiation dose to as low as reasonably achievable. FINDINGS: ALIGNMENT: Anatomic. BONE: No acute fracture or dislocation. The bones are demineralized. . . JOINT : 1. Total left knee arthroplasty has been performed. 2. No significant periprosthetic lucency is seen. 3. Small left knee effusion with synovial thickening. MUSCULATURE: 1. Diffuse atrophy. OTHER: 1. Vascular calcifications noted. 2. Diffuse soft tissue edema about the knee. . 3. Mild thickening of the patellar tendon. 4. Inflammatory changes seen in Hoffa's fat pad. 1. No acute bony abnormality. 2. Status post total left knee arthroplasty. No significant periprosthetic lucency. 3. Small left knee joint effusion with synovial thickening. **This report has been created using voice recognition software. It may contain minor errors which are inherent in voice recognition technology. ** Final report electronically signed by Dr Roberto Clarke on 2/22/2023 12:19 PM    CT KNEE RIGHT WO CONTRAST    Result Date: 2/22/2023  PROCEDURE: CT KNEE RIGHT WO CONTRAST CLINICAL INFORMATION: possible infected right knee prosthesis COMPARISON: Right knee CT 5/8/2022 TECHNIQUE: 3 mm axial imaging through the right knee without contrast. Coronal and sagittal reconstructions were performed. All CT scans at this facility use dose modulation, iterative reconstruction, and/or weight based dosing when appropriate to reduce the radiation dose to as low as reasonably achievable. FINDINGS: ALIGNMENT: Anatomic. BONE: No acute fracture or dislocation. The bones are demineralized. . . JOINT : 1. Total right knee arthroplasty has been performed. . 2. There is 5 mm periprosthetic lucency at the anterolateral aspect of the tibia prosthesis. 3. A small joint effusion with synovial thickening is seen. MUSCULATURE: 1. Diffuse atrophy OTHER: 1. Marked prepatellar and superficial infrapatellar edema/bursitis. 2. Vascular calcifications are noted. 3. Diffuse soft tissue edema about the knee. 4. Mild patellar thickening. 1. Status post total right knee arthroplasty. A 5 mm periprosthetic lucency at the anterolateral aspect of the tibial prosthesis is noted. This can relate to loosening or infection in the right clinical setting. 2. A small joint effusion with synovial thickening. 3. Marked prepatellar and superficial infrapatellar edema/bursitis. **This report has been created using voice recognition software. It may contain minor errors which are inherent in voice recognition technology. ** Final report electronically signed by Dr Ninfa Klein on 2/22/2023 12:30 PM    XR CHEST PORTABLE    Result Date: 2/21/2023  PROCEDURE: XR CHEST PORTABLE CLINICAL INFORMATION: cough, fever . TECHNIQUE: Portable semiupright COMPARISON: 9/29/2022 FINDINGS: Patient is severely rotated. Heart size is normal. Mediastinum is not widened. No confluent airspace infiltrate or pleural effusion. Midline atelectatic scarring in the right midlung. Vessels are not congested. Midline sternotomy. Post surgical changes thoracic spine with pedicle screws and support rods. EKG leads overlie the chest.     No acute cardiopulmonary disease. **This report has been created using voice recognition software. It may contain minor errors which are inherent in voice recognition technology. ** Final report electronically signed by Dr. Brandon Vega on 2/21/2023 8:24 AM    Assessment//Plan           Hospital Problems             Last Modified POA    * (Principal) Dehydration 2/21/2023 Yes    Acute kidney injury ([address] 2/21/2023 Yes     Assessment:    Condition: In stable condition. Worsening.   (  Febrile  illness and added  ancef         Knees with  ? Gout  versus  septic  joint   and cellulitis  of  foot and left  tibia  proximal shaft     Niddm poor  control and scale  to adjust     Lymphedema  noted but  concern of  cellulitis      Fall with weakness  of  both knees and back      Elevated troponin and if  any surgery ask cardio   to  see     Renal  to see  for crf stage 3b  but anemia  appears chronic     Htn  stable      Ashd  stable   but  troponin up ? Demand  ischemia as no pain     Hydration hope improves  kidney  function    ). Plan:   Start/continue incentive spirometry. Consults: renal and  ortho. Advance diet as tolerated. Administer medications as ordered. (   With fever and to  continue  antibiotic     Ask  ortho to see  for  knee as warm and ? Gout  versus  infectgion      Ask  renal  to  see  with  function     Feel if need surgery cardio need to see  with  elevated  troponin       Follow  labs     Adjust  insulin      Follow  labs        ).      Electronically signed by Isael Walker MD on 2/22/23 at 6:02 PM EST

## 2023-02-22 NOTE — PLAN OF CARE
Problem: Discharge Planning  Goal: Discharge to home or other facility with appropriate resources  2/22/2023 1450 by NATALYA Smith  Outcome: Progressing     Consult received. Please see SW note dated 2/22.

## 2023-02-22 NOTE — CARE COORDINATION
DISCHARGE PLANNING EVALUATION  2/22/23, 2:51 PM EST    Reason for Referral: Placement? Mental Status: Answered questions appropriately  Decision Making: Independent  Family/Social/Home Environment: Lives at home alone, has supportive family  Current Services including food security, transportation and housekeeping: No current services, open to [address]: [address] [place] [address] Drive: [employer]  Concerns or Barriers to Discharge: None  Post-acute [facility]) provider list was provided to patient. Patient was informed of their freedom to choose [facility] provider. Discussed and offered to show the patient the relevant [facility] Providers quality and resource use measures on Medicare Compare web site via computer based on patient's goals of care and treatment preferences. Questions regarding selection process were answered. Teach Back Method used with [facility] regarding care plan and discharge planning  Patient verbalize understanding of the plan of care and contribute to goal setting. Patient goals, treatment preferences and discharge plan: Plan pending progress at this time. Patient is considering an ECF, but would really prefer to go home with [facility]. SW will follow up tomorrow for decision.      Electronically signed by NATALYA Trejo on 2/22/2023 at 2:51 PM

## 2023-02-22 NOTE — PLAN OF CARE
Problem: Pain  Goal: Verbalizes/displays adequate comfort level or baseline comfort level  Outcome: Not Progressing  Note: Bilateral knee pain 7/10 at rest and 10/10 with activity     Problem: Discharge Planning  Goal: Discharge to home or other facility with appropriate resources  Outcome: Progressing     Problem: Safety - Adult  Goal: Free from fall injury  Outcome: Progressing     Problem: Skin/Tissue Integrity  Goal: Absence of new skin breakdown  Description: 1. Monitor for areas of redness and/or skin breakdown  2. Assess vascular access sites hourly  3. Every 4-6 hours minimum:  Change oxygen saturation probe site  4. Every 4-6 hours:  If on nasal continuous positive airway pressure, respiratory therapy assess nares and determine need for appliance change or resting period.   Outcome: Progressing     Problem: Chronic Conditions and Co-morbidities  Goal: Patient's chronic conditions and co-morbidity symptoms are monitored and maintained or improved  Outcome: Progressing     Problem: Respiratory - Adult  Goal: Clear lung sounds  2/22/2023 0932 by Silke Agarwal RCP  Outcome: Progressing  Note: Patient agrees to goals     Problem: Pain  Goal: Verbalizes/displays adequate comfort level or baseline comfort level  Outcome: Not Progressing  Note: Bilateral knee pain 7/10 at rest and 10/10 with activity

## 2023-02-22 NOTE — PROGRESS NOTES
messaged through perfect serve regarding PT blood glucose 388. PT received scheduled 10units of insulin as well as 8 units per order set.  Messaged was read no new orders placed at this time will continue to monitor

## 2023-02-22 NOTE — CARE COORDINATION
Case Management Assessment  Initial Evaluation    Date/Time of Evaluation: 2/22/2023 12:08 PM  Assessment Completed by: Milind Kumar RN    If patient is discharged prior to next notation, then this note serves as note for discharge by case management. Patient Name: Tomasa Conner                   YOB: 1950  Diagnosis: Dehydration [E86.0]  Ataxia [R27.0]  General weakness [R53.1]  Elevated troponin [R77.8]  Fall in home, initial encounter [W19. XXXA, Y92.009]  Uncontrolled type 2 diabetes mellitus with hyperglycemia (HCC) [E11.65]  Acute bilateral knee pain [M25.561, M25.562]  Acute bilateral low back pain without sciatica [M54.50]  Chronic kidney disease, unspecified CKD stage [N18.9]  Acute kidney injury ([facility] [address] [N17.9]                   Date / Time: 2/21/2023  7:49 AM  Location: [address] [place] [address]     Patient Admission Status: Inpatient   Readmission Risk Low 0-14, Mod 15-19), High > 20: Readmission Risk Score: 20.9    Current PCP: Maria Antonia Lopez MD  PCP verified by CM? Yes    Chart Reviewed: Yes      History Provided by: Patient  Patient Orientation: Alert and Oriented    Patient Cognition: Alert    Hospitalization in the last 30 days (Readmission):  No    If yes, Readmission Assessment in CM Navigator will be completed. Advance Directives:      Code Status: Full Code   Patient's Primary Decision Maker is: Named in Scanned ACP Document      Discharge Planning:    Patient lives with: Alone Type of Home: House  Primary Care Giver: Self  Patient Support Systems include: Children, Family Members   Current Financial resources: Medicare  Current community resources:    Current services prior to admission: Durable Medical Equipment            Current DME: Walker            Type of Home Care services:  None    ADLS  Prior functional level:  Independent in ADLs/IADLs  Current functional level: Assistance with the following:, Bathing, Toileting, Cooking, Housework, Shopping, Mobility    Family can provide assistance at DC: No  Would you like Case Management to discuss the discharge plan with any other family members/significant others, and if so, who? No  Plans to Return to Present Housing: Unknown at present  Other Identified Issues/Barriers to RETURNING to current housing: Unknown at this time. Potential Assistance needed at discharge: Other (Comment) (Unknown)            Potential DME:    Patient expects to discharge to: [address] [place] [address] for transportation at discharge: Self    Financial    Payor: Natacha Otero / Plan: Garrison Velazquez HMO / Product Type: *No Product type* /     Does insurance require precert for SNF: Yes    Potential assistance Purchasing Medications: No  Meds-to-Beds request: Yes      [facility]  [address]  [address]  Phone: [phone] Fax: [phone]      Notes:    Factors facilitating achievement of predicted outcomes: Cooperative    Barriers to discharge: Medical complications    Additional Case Management Notes: Admitted from ER with weakness and fall. Hx CKD and DM. Hx CAD and CABG. Noted to have elevated Troponin. Consults to Nephrology, Orthopedic Surgery, PT/OT. IVF and Ancef. Procedure: No    The Plan for Transition of Care is related to the following treatment goals of Dehydration [E86.0]  Ataxia [R27.0]  General weakness [R53.1]  Elevated troponin [R77.8]  Fall in home, initial encounter [W19. XXXA, Y92.009]  Uncontrolled type 2 diabetes mellitus with hyperglycemia (HCC) [E11.65]  Acute bilateral knee pain [M25.561, M25.562]  Acute bilateral low back pain without sciatica [M54.50]  Chronic kidney disease, unspecified CKD stage [N18.9]  Acute kidney injury ([facility] [address] [N17.9]    Patient Goals/Plan/Treatment Preferences: Met with pt this morning. From home alone with no current services. He does have a walker. He drives and has a PCP. He is insured.  SW consulted for potential discharge needs. Transportation/Food Security/Housekeeping Addressed: No issues identified.      Turner Crawley RN  Case Management Department

## 2023-02-22 NOTE — CONSULTS
[facility]    [address], [address]  [facility]  2/22/2023    Pt Name:    Augusto Santillan  MRN:     [id number]   [id number]  YOB: 1950  Admit Date:    2/21/2023  7:49 AM  Primary Care Physician:  James Mcdowell MD    CSN Number:   [id number]    Reason for Consult:  RAUL on CKD  Requesting provider:  Dr. Sandi Calderon    History:   The patient is a 74 y. o. with history of CKD, hypertension, diabetes mellitus, coronary artery disease, lymphedema with baseline creatinine of around 1.3-1.5, chronic lower back pain, arthritis who presented to the hospital with complaints of progressively worsening generalized weakness. Patient reports having a fall because his legs gave out. Patient says that it has been increasingly getting difficult for him to carry out his usual activities. He says he fell after his legs gave out. No alleviating or aggravating factors. He was unable to stand up. He remained on the floor for almost 6 to 7 hours. He was then brought in by paramedics. In the emergency room patient reported right knee pain and swelling. He was not able to ambulate. Noted to have BUN of 60 with creatinine of 1.9. Previously his creatinine was around 1.5-1.6. He was admitted. Nephrology has been consulted for further evaluation management.     Past Medical History:  Past Medical History:   Diagnosis Date    RAUL (acute kidney injury) ([facility] [address] 02/13/2020    ASHD (arteriosclerotic heart disease) 2005 2006    stent  baki    Closed fracture of first lumbar vertebra ([facility] [address] 05/06/2022    Closed T12 fracture ([facility] [address] 05/06/2022    Depression     Diabetic peripheral neuropathy ([facility] [address] 2014    Fracture 10/1984    left lower extremity     HTN (hypertension)     Hypercholesteremia     IDDM (insulin dependent diabetes mellitus)     Low back pain     Morbid obesity with BMI of 45.0-49.9, adult (HCC)     Osteoarthritis     S/P CABG (coronary artery bypass graft)        Past Surgical History:  Past Surgical History:   Procedure Laterality Date    AMPUTATION Left 9/10/14    partial versus complete left hallux amputation, left great toe amputation    APPENDECTOMY      BACK INJECTION Bilateral 1/18/2021    Bilateral Si MBB #1 performed by Roberto Yoder MD at [facility] Bilateral 3/1/2021    Bilateral SI MBB #2 performed by Roberto Yoder MD at [place] [address]    fusion of C5-C6    CHOLECYSTECTOMY      COLONOSCOPY  2007 and 2011    colonn polyps  lorenzo    CORONARY ANGIOPLASTY WITH STENT PLACEMENT  08/07/2017    Dr Ambrocio Varghese @ [address] GRAFT  2015 august dox    EKG 12-LEAD  8/14/2015         FACET JOINT INJECTION Bilateral 5/22/2020    Lumbar Facet MBB @L3-4,4-5 bilateral #2 performed by Roberto Yoder MD at [address] [facility]      left leg    JOINT REPLACEMENT      bilateral knees gurvinder    PAIN MANAGEMENT PROCEDURE Bilateral 1/28/2020    Lumbar Facet MBB @ L3-4,4-5,5-S1 bilateral #1 LUMBAR FACET performed by Roberto Yoder MD at [facility] Right 7/14/2020    Lumbar RFA Bilateral L3-4,4-5  right side first performed by Roberto Yoder MD at [facility] Left 10/1/2020    Lumbar RFA Left side L3-4, 4-5 performed by Roberto Yoder MD at [facility] Bilateral 11/17/2020    TFLESI @L5 bilateral #1 performed by Roberto Yoder MD at [facility] Bilateral 12/10/2020    TFLESI @L5 bilateral #1 performed by Roberto Yoder MD at [address] [place] [address] N/A 12/28/2018    T7-T9 DECOMPRESSION, T7-T9 POSTERIOR FUSION performed by Alisson Peguero MD at [address] [place] [address] f[address]    TONSILLECTOMY      VASCULAR SURGERY      cabg harvests from left leg       Family History:  Family History   Problem Relation Age of Onset    Cancer Mother         uterine       Social History:  Social History     Socioeconomic History    Marital status:      Spouse name: Not on file    Number of children: 2    Years of education: Not on file    Highest education level: Not on file   Occupational History    Not on file   Tobacco Use    Smoking status: Never    Smokeless tobacco: Never   Vaping Use    Vaping Use: Never used   Substance and Sexual Activity    Alcohol use: Not Currently     Comment: rarely    Drug use: No    Sexual activity: Never   Other Topics Concern    Not on file   Social History Narrative    Not on file     Social Determinants of Health     Financial Resource Strain: Low Risk     Difficulty of Paying Living Expenses: Not hard at all   Food Insecurity: No Food Insecurity    Worried About Running Out of Food in the Last Year: Never true    [address] in the Last Year: Never true   Transportation Needs: Unknown    Lack of Transportation (Medical): Not on file    Lack of Transportation (Non-Medical): No   Physical Activity: Inactive    Days of Exercise per Week: 0 days    Minutes of Exercise per Session: 10 min   Stress: Not on file   Social Connections: Not on file   Intimate Partner Violence: Not on file   Housing Stability: Unknown    Unable to Pay for Housing in the Last Year: Not on file    Number of Places Lived in the Last Year: Not on file    Unstable Housing in the Last Year: No       Home Meds:  Prior to Admission medications    Medication Sig Start Date End Date Taking?  Authorizing Provider   insulin glargine (LANTUS SOLOSTAR) 100 UNIT/ML injection pen INJECT 30 UNITS SUBCUTANEOUSLY ONCE DAILY 2/7/23   Bhakti Kim MD   ferrous sulfate (IRON 325) 325 (65 Fe) MG tablet Take 1 tablet by mouth 2 times daily 1/31/23   Bhakti Kim MD   ticagrelyokasta Kim) 90 MG TABS tablet Take 1 tablet by mouth twice daily 12/28/22   Dianne Estrada MD   insulin aspart (NOVOLOG FLEXPEN) 100 UNIT/ML injection pen Inject 12 Units into the skin 3 times daily (before meals) INJECT 12 UNITS SUBCUTANEOUSLY 3 TIMES DAILY BEFORE MEALS 12/12/22   Dianne Estrada MD   metFORMIN (GLUCOPHAGE) 500 MG tablet TAKE TWO TABLETS BY MOUTH TWICE DAILY WITH MEALS 12/12/22   Dianne Estrada MD   Multiple Vitamins-Minerals (THERAPEUTIC MULTIVITAMIN-MINERALS) tablet Take 1 tablet by mouth daily 12/12/22 12/12/23  Dianne Estrada MD   amLODIPine (NORVASC) 5 MG tablet Take 1 tablet by mouth daily 11/30/22   Dianne Estrada MD   atorvastatin (LIPITOR) 10 MG tablet TAKE 1 TABLET BY MOUTH ONCE DAILY 11/22/22   Dianne Estrada MD   enalapril (VASOTEC) 10 MG tablet 1/2 tab Once a day 11/22/22   Dianne Estrada MD   furosemide (LASIX) 80 MG tablet Take one in am (80) and 1/2 tab (40) in pm 5/7/22   Dianne Estrada MD   ACCU-CHEK SMARTVIEW strip Use to test Blood Sugar 3x daily, Dx: E11.9 4/19/22   Dianne Estrada MD   albuterol sulfate  (90 Base) MCG/ACT inhaler Inhale 2 puffs into the lungs 2 times daily 2/10/22   Dianne Estrada MD   carvedilol (COREG) 12.5 MG tablet Take 1 tablet by mouth 2 times daily 1/21/22   Dianne Estrada MD   aspirin 81 MG tablet Take 1 tablet by mouth daily 8/8/17   Isaac Patel MD   acetaminophen (TYLENOL) 500 MG tablet Take 1,000 mg by mouth as needed for Pain    Historical Provider, MD       Review of Systems:  Constitutional: For generalized weakness and fatigue  Head: Negative for headaches  Eyes: Negative for blurry vision or discharge  Ears: Negative for ear pain or hearing changes  Nose: Negative for runny nose or epistaxis  Respiratory:  Negative for cough or sputum production. Negative for hemoptysis  Cardiovascular: Negative for chest pain  GI: Negative for nausea, vomiting and diarrhea.   Negative for hematochezia and melena  : Negative for discharge, dysuria, or hematuria  Skin: Negative for rash  Musculoskeletal: Positive for knee swelling and pain  Neuro: Negative for numbness or tingling, negative for slurred speech  Psychiatric: Reports stable mood, negative for depression or insomnia    All other review of systems were reviewed and negative    Current Meds:  Infusion:    dextrose      sodium chloride      sodium chloride 75 mL/hr at 02/22/23 0534     Meds:    albuterol sulfate HFA  2 puff Inhalation BID    amLODIPine  5 mg Oral Daily    [Held by provider] aspirin  81 mg Oral Daily    atorvastatin  10 mg Oral Daily    carvedilol  12.5 mg Oral BID    insulin glargine  30 Units SubCUTAneous QAM    insulin lispro  0-8 Units SubCUTAneous TID WC    insulin lispro  0-4 Units SubCUTAneous Nightly    insulin lispro  10 Units SubCUTAneous TID WC    sodium chloride flush  5-40 mL IntraVENous 2 times per day    [Held by provider] enoxaparin  40 mg SubCUTAneous BID    [Held by provider] ticagrelor  90 mg Oral BID    ceFAZolin  1,000 mg IntraVENous Q8H     Meds prn: glucose, dextrose bolus **OR** dextrose bolus, glucagon (rDNA), dextrose, sodium chloride flush, sodium chloride, ondansetron **OR** ondansetron, polyethylene glycol, acetaminophen **OR** acetaminophen, potassium chloride **OR** potassium alternative oral replacement **OR** potassium chloride, magnesium sulfate     Allergies/Intolerances: ALLERGIES: Horse-derived products    24HR INTAKE/OUTPUT:    Intake/Output Summary (Last 24 hours) at 2/22/2023 1635  Last data filed at 2/22/2023 1209  Gross per 24 hour   Intake 980 ml   Output 520 ml   Net 460 ml     I/O last 3 completed shifts:  In: -   Out: 520 [Urine:520]  I/O this shift:   In: 980 [P.O.:980]  Out: -   Admission weight: (!) 361 lb (163.7 kg)  Wt Readings from Last 3 Encounters:   02/21/23 (!) 361 lb (163.7 kg)   02/07/23 (!) 361 lb (163.7 kg)   10/10/22 (!) 365 lb 3.2 oz (165.7 kg)     Body mass index is 46.35 kg/m². Physical Examination:  VITALS:   Vitals:    02/21/23 2056 02/22/23 0006 02/22/23 0335 02/22/23 0745   BP: (!) 129/47 (!) 123/56 (!) 133/36 131/60   Pulse: 83 82 69 74   Resp: 16 20 20 20   Temp: 98 °F (36.7 °C) (!) 101 °F (38.3 °C) 100.2 °F (37.9 °C) (!) 101.9 °F (38.8 °C)   TempSrc: Oral Oral Oral Oral   SpO2: 97% 96% 95% 94%   Weight:       Height:         Weight:   Wt Readings from Last 3 Encounters:   02/21/23 (!) 361 lb (163.7 kg)   02/07/23 (!) 361 lb (163.7 kg)   10/10/22 (!) 365 lb 3.2 oz (165.7 kg)     Constitutional and General Appearance: alert and cooperative with exam, appears comfortable, no distress, not diaphoretic  Eyes: no icteric sclera in left eye or right eye, mild pallor conjunctiva both eyes noted  Ears and Nose: normal external appearance of left and right ear. Both ear lobules are nontender to palpation. Normal external appearance of nose. No active drainage from nose.    Oral: moist oral mucus membranes  Neck: No jugular venous distention, appears symmetric, good ROM  Lungs: Air entry B/L, no crackles or rales, no use of accessory muscles or labored breathing  Chest: No chest wall tenderness  Heart: regular rate, S1, S2  Extremities:++ Lymphedema  GI: soft, non-tender, no guarding, no distention  Skin: no rash seen on exposed extremities, warm to touch  Musculo: Right knee swelling, warm to touch  Neuro: no slurred speech, no facial drooping  Psychiatric: Normal mood and affect, Not agitated    Lab Data  CBC:   Recent Labs     02/21/23  0924 02/22/23  0535   WBC 10.3 7.3   HGB 8.2* 8.1*   HCT 25.3* 25.0*    183     BMP:  Recent Labs     02/21/23  0924 02/22/23  0535   * 136   K 3.9 4.2   CL 98 102   CO2 24 20*   BUN 60* 59*   CREATININE 1.9* 1.8*   GLUCOSE 333* 363*   CALCIUM 8.7 7.9*     Hepatic:   Recent Labs     02/21/23  0924 02/22/23  0535   LABALBU 3.4* 2.9*   AST 22 85*   ALT 17 61   BILITOT 0.6 0.4   ALKPHOS 75 114         Additional Labs: UA no blood, trace protein      Old tests reviewed. Echo: EF 55% April 2022  Labs: Baseline serum creatinine 1.4-1.5, more recently 2.8      Impression and Plan:  Mild RAUL on underlying CKD 3B. Agree with gentle IV fluid hydration. However cannot rule out underlying progressive CKD. Creatinine was 1.5-1.6 as of July 2022. However in October creatinine went up to 2.8. Now down to 1.9 on this admission. Will trend. Check UPC  Right knee swelling. Patient febrile. ?  Septic knee ? Gouty arthritis. Await Ortho evaluation  Generalized weakness  Fall  Diabetes mellitus, poorly controlled  Chronic lymphedema  Hypertension  Anemia in CKD. Hyperuricemia    Thank you for the consult. Please feel free to call me if you have any questions. Addendum. Since my morning rounds, patient has been seen by Ortho and underwent aspiration of the knee. Synovial fluid shows birefringent needle shaped crystals characteristics of monosodium urate/gout  -Start low-dose colchicine for few days, for now avoiding steroids due to hyperglycemia but may need if no clinical or symptomatic improvement.  -We will add allopurinol once acute flareup has resolved    Melinda Nolan MD  [facility]    This report has been created using voice recognition software. It may contain minor errors which are inherent in voice recognition technology.

## 2023-02-22 NOTE — PROGRESS NOTES
This is a 68-year-old gentleman we were consulted for bilateral knee pain. He had aspirations done earlier today. There is an elevated white count but he has phosphate crystals in bilateral knees. He has a high uric acid in his blood as well. This all lends itself to him suffering a gouty attack. This can be treated medically. Orthopedics will sign off please call us back if necessary.

## 2023-02-23 LAB
ANION GAP SERPL CALC-SCNC: 15 MEQ/L (ref 8–16)
BUN SERPL-MCNC: 58 MG/DL (ref 7–22)
CALCIUM SERPL-MCNC: 8.6 MG/DL (ref 8.5–10.5)
CHLORIDE SERPL-SCNC: 102 MEQ/L (ref 98–111)
CO2 SERPL-SCNC: 19 MEQ/L (ref 23–33)
CREAT SERPL-MCNC: 1.8 MG/DL (ref 0.4–1.2)
DEPRECATED RDW RBC AUTO: 41.1 FL (ref 35–45)
ERYTHROCYTE [DISTWIDTH] IN BLOOD BY AUTOMATED COUNT: 12.2 % (ref 11.5–14.5)
GFR SERPL CREATININE-BSD FRML MDRD: 39 ML/MIN/1.73M2
GLUCOSE BLD STRIP.AUTO-MCNC: 264 MG/DL (ref 70–108)
GLUCOSE BLD STRIP.AUTO-MCNC: 326 MG/DL (ref 70–108)
GLUCOSE BLD STRIP.AUTO-MCNC: 388 MG/DL (ref 70–108)
GLUCOSE BLD STRIP.AUTO-MCNC: 396 MG/DL (ref 70–108)
GLUCOSE SERPL-MCNC: 350 MG/DL (ref 70–108)
HCT VFR BLD AUTO: 26 % (ref 42–52)
HGB BLD-MCNC: 8.6 GM/DL (ref 14–18)
LV EF: 55 %
LVEF MODALITY: NORMAL
MCH RBC QN AUTO: 30.6 PG (ref 26–33)
MCHC RBC AUTO-ENTMCNC: 33.1 GM/DL (ref 32.2–35.5)
MCV RBC AUTO: 92.5 FL (ref 80–94)
PLATELET # BLD AUTO: 200 THOU/MM3 (ref 130–400)
PMV BLD AUTO: 9.4 FL (ref 9.4–12.4)
POTASSIUM SERPL-SCNC: 4.3 MEQ/L (ref 3.5–5.2)
RBC # BLD AUTO: 2.81 MILL/MM3 (ref 4.7–6.1)
SODIUM SERPL-SCNC: 136 MEQ/L (ref 135–145)
WBC # BLD AUTO: 8.4 THOU/MM3 (ref 4.8–10.8)

## 2023-02-23 PROCEDURE — 80048 BASIC METABOLIC PNL TOTAL CA: CPT

## 2023-02-23 PROCEDURE — 99223 1ST HOSP IP/OBS HIGH 75: CPT | Performed by: INTERNAL MEDICINE

## 2023-02-23 PROCEDURE — 82948 REAGENT STRIP/BLOOD GLUCOSE: CPT

## 2023-02-23 PROCEDURE — 97530 THERAPEUTIC ACTIVITIES: CPT

## 2023-02-23 PROCEDURE — 97535 SELF CARE MNGMENT TRAINING: CPT

## 2023-02-23 PROCEDURE — 6370000000 HC RX 637 (ALT 250 FOR IP): Performed by: INTERNAL MEDICINE

## 2023-02-23 PROCEDURE — 97166 OT EVAL MOD COMPLEX 45 MIN: CPT

## 2023-02-23 PROCEDURE — 2580000003 HC RX 258: Performed by: FAMILY MEDICINE

## 2023-02-23 PROCEDURE — 36415 COLL VENOUS BLD VENIPUNCTURE: CPT

## 2023-02-23 PROCEDURE — 94640 AIRWAY INHALATION TREATMENT: CPT

## 2023-02-23 PROCEDURE — 1200000000 HC SEMI PRIVATE

## 2023-02-23 PROCEDURE — 6360000002 HC RX W HCPCS: Performed by: FAMILY MEDICINE

## 2023-02-23 PROCEDURE — 6370000000 HC RX 637 (ALT 250 FOR IP): Performed by: FAMILY MEDICINE

## 2023-02-23 PROCEDURE — 97110 THERAPEUTIC EXERCISES: CPT

## 2023-02-23 PROCEDURE — 93306 TTE W/DOPPLER COMPLETE: CPT

## 2023-02-23 PROCEDURE — 85027 COMPLETE CBC AUTOMATED: CPT

## 2023-02-23 PROCEDURE — 99232 SBSQ HOSP IP/OBS MODERATE 35: CPT | Performed by: INTERNAL MEDICINE

## 2023-02-23 PROCEDURE — 1200000003 HC TELEMETRY R&B

## 2023-02-23 RX ORDER — SODIUM BICARBONATE 650 MG/1
1300 TABLET ORAL 2 TIMES DAILY
Status: DISCONTINUED | OUTPATIENT
Start: 2023-02-23 | End: 2023-02-25 | Stop reason: HOSPADM

## 2023-02-23 RX ORDER — METHYLPREDNISOLONE SODIUM SUCCINATE 125 MG/2ML
40 INJECTION, POWDER, LYOPHILIZED, FOR SOLUTION INTRAMUSCULAR; INTRAVENOUS ONCE
Status: COMPLETED | OUTPATIENT
Start: 2023-02-23 | End: 2023-02-23

## 2023-02-23 RX ADMIN — AMLODIPINE BESYLATE 5 MG: 5 TABLET ORAL at 09:32

## 2023-02-23 RX ADMIN — METHYLPREDNISOLONE SODIUM SUCCINATE 40 MG: 125 INJECTION, POWDER, FOR SOLUTION INTRAMUSCULAR; INTRAVENOUS at 10:37

## 2023-02-23 RX ADMIN — ATORVASTATIN CALCIUM 10 MG: 10 TABLET, FILM COATED ORAL at 09:32

## 2023-02-23 RX ADMIN — SODIUM BICARBONATE 1300 MG: 650 TABLET ORAL at 20:32

## 2023-02-23 RX ADMIN — INSULIN LISPRO 15 UNITS: 100 INJECTION, SOLUTION INTRAVENOUS; SUBCUTANEOUS at 13:15

## 2023-02-23 RX ADMIN — ALBUTEROL SULFATE 2 PUFF: 90 AEROSOL, METERED RESPIRATORY (INHALATION) at 09:48

## 2023-02-23 RX ADMIN — ALBUTEROL SULFATE 2 PUFF: 90 AEROSOL, METERED RESPIRATORY (INHALATION) at 19:47

## 2023-02-23 RX ADMIN — INSULIN LISPRO 15 UNITS: 100 INJECTION, SOLUTION INTRAVENOUS; SUBCUTANEOUS at 18:13

## 2023-02-23 RX ADMIN — CARVEDILOL 12.5 MG: 6.25 TABLET, FILM COATED ORAL at 20:32

## 2023-02-23 RX ADMIN — SODIUM BICARBONATE 1300 MG: 650 TABLET ORAL at 14:29

## 2023-02-23 RX ADMIN — COLCHICINE 0.6 MG: 0.6 TABLET, FILM COATED ORAL at 09:32

## 2023-02-23 RX ADMIN — CEFAZOLIN SODIUM 1000 MG: 1 INJECTION, SOLUTION INTRAVENOUS at 20:35

## 2023-02-23 RX ADMIN — INSULIN LISPRO 15 UNITS: 100 INJECTION, SOLUTION INTRAVENOUS; SUBCUTANEOUS at 10:32

## 2023-02-23 RX ADMIN — CEFAZOLIN SODIUM 1000 MG: 1 INJECTION, SOLUTION INTRAVENOUS at 04:46

## 2023-02-23 RX ADMIN — INSULIN LISPRO 12 UNITS: 100 INJECTION, SOLUTION INTRAVENOUS; SUBCUTANEOUS at 18:13

## 2023-02-23 RX ADMIN — INSULIN LISPRO 16 UNITS: 100 INJECTION, SOLUTION INTRAVENOUS; SUBCUTANEOUS at 10:32

## 2023-02-23 RX ADMIN — INSULIN LISPRO 16 UNITS: 100 INJECTION, SOLUTION INTRAVENOUS; SUBCUTANEOUS at 13:14

## 2023-02-23 RX ADMIN — COLCHICINE 0.6 MG: 0.6 TABLET, FILM COATED ORAL at 20:32

## 2023-02-23 RX ADMIN — CEFAZOLIN SODIUM 1000 MG: 1 INJECTION, SOLUTION INTRAVENOUS at 13:11

## 2023-02-23 RX ADMIN — SODIUM CHLORIDE: 9 INJECTION, SOLUTION INTRAVENOUS at 13:11

## 2023-02-23 RX ADMIN — CARVEDILOL 12.5 MG: 6.25 TABLET, FILM COATED ORAL at 09:32

## 2023-02-23 RX ADMIN — INSULIN GLARGINE 40 UNITS: 100 INJECTION, SOLUTION SUBCUTANEOUS at 10:32

## 2023-02-23 ASSESSMENT — PAIN SCALES - GENERAL
PAINLEVEL_OUTOF10: 0
PAINLEVEL_OUTOF10: 0

## 2023-02-23 ASSESSMENT — ENCOUNTER SYMPTOMS
VOMITING: 0
ABDOMINAL PAIN: 0
ABDOMINAL DISTENTION: 0
BACK PAIN: 1
APNEA: 0
DIARRHEA: 0
SHORTNESS OF BREATH: 0

## 2023-02-23 NOTE — CARE COORDINATION
2/23/23, 7:48 AM EST    DISCHARGE ON GOING EVALUATION    [facility] day: 2  Location: 8B-22/022-A Reason for admit: Dehydration [E86.0]  Ataxia [R27.0]  General weakness [R53.1]  Elevated troponin [R77.8]  Fall in home, initial encounter [W19. XXXA, Y92.009]  Uncontrolled type 2 diabetes mellitus with hyperglycemia (HCC) [E11.65]  Acute bilateral knee pain [M25.561, M25.562]  Acute bilateral low back pain without sciatica [M54.50]  Chronic kidney disease, unspecified CKD stage [N18.9]  Acute kidney injury ([facility] [address] [N17.9]   Procedure: 2-22-23 Earl knee aspiration. Barriers to Discharge: Pt could not complete Occ therapy yesterday due pain in knees. Did require 2-3 assist for bed mobility however. Ortho signed off as this is felt to be gout pain. (Elevated uric acid  and crystals in knees). IVF at 75/hr. Ancef. Colchicine bid  PCP: Carlos Blair MD  Readmission Risk Score: 21.4%  Patient Goals/Plan/Treatment Preferences: From home alone. SW following for discharge needs.

## 2023-02-23 NOTE — CARE COORDINATION
2/23/23, 10:17 AM EST    DISCHARGE PLANNING EVALUATION    Spoke with patient regarding discharge plan. Discussed patients inability to ambulate on his own and is a 2-3 assist. He stated that when the gout improves he should be able to ambulate on his own. Discussed that he will not be able to stay in the hospital until he is ambulatory. He recluctantly agreed to SNF. Reviewed SNF list, star ratings and in network facilities. Patient will think about options, sw will follow up later today to discuss preference of facility. 2/23/23, 2:21 PM EST    DISCHARGE PLANNING EVALUATION    Spoke with patient regarding SNF, preference is [facility]. Spoke with Digna Cardoza at [facility], referral made. Advised if accepted pre-cert can be started.

## 2023-02-23 NOTE — PROGRESS NOTES
[facility]   Nephrology progress note  2/23/2023, 12:38 PM      Pt Name:    Willem Noyola  MRN:     [id number]     [place]:    1950  Admit Date:    2/21/2023  7:49 AM    Chief Complaint: Nephrology following for RAUL/CKD    Subjective:  Patient was seen and examined this morning  No chest pain or shortness of breath  Feels okay    Objective:  24HR INTAKE/OUTPUT:  No intake or output data in the 24 hours ending 02/23/23 1238      I/O last 3 completed shifts: In: 980 [P.O.:980]  Out: 120 [Urine:120]  No intake/output data recorded.    Admission weight: (!) 361 lb (163.7 kg)  Wt Readings from Last 3 Encounters:   02/23/23 (!) 355 lb 9.6 oz (161.3 kg)   02/07/23 (!) 361 lb (163.7 kg)   10/10/22 (!) 365 lb 3.2 oz (165.7 kg)        Vitals :   Vitals:    02/22/23 2130 02/23/23 0445 02/23/23 0915 02/23/23 0948   BP: (!) 153/60 (!) 140/66 (!) 144/64    Pulse: 81 60 63 65   Resp: 20 20 20 16   Temp: 98.6 °F (37 °C) 98.3 °F (36.8 °C) 97.5 °F (36.4 °C)    TempSrc: Oral Oral Oral    SpO2: 98% 93% 98% 98%   Weight:  (!) 355 lb 9.6 oz (161.3 kg)     Height:           Physical examination  General Appearance: alert and cooperative with exam, appears comfortable, no distress  Mouth/Throat: Oral mucosa moist  Neck: No JVD  Lungs: Air entry B/L, no rales, no use of accessory muscles  Heart:  S1, S2 heard  GI: soft, non-tender, no guarding    Medications:  Infusion:    dextrose      sodium chloride      sodium chloride 75 mL/hr at 02/22/23 2142     Meds:    colchicine  0.6 mg Oral BID    insulin glargine  40 Units SubCUTAneous QAM    insulin lispro  15 Units SubCUTAneous TID WC    insulin lispro  0-16 Units SubCUTAneous TID WC    insulin lispro  0-4 Units SubCUTAneous Nightly    albuterol sulfate HFA  2 puff Inhalation BID    amLODIPine  5 mg Oral Daily    [Held by provider] aspirin  81 mg Oral Daily    atorvastatin  10 mg Oral Daily    carvedilol  12.5 mg Oral BID    sodium chloride flush  5-40 mL IntraVENous 2 times per day    [Held by provider] enoxaparin  40 mg SubCUTAneous BID    [Held by provider] ticagrelor  90 mg Oral BID    ceFAZolin  1,000 mg IntraVENous Q8H     Meds prn: glucose, dextrose bolus **OR** dextrose bolus, glucagon (rDNA), dextrose, sodium chloride flush, sodium chloride, ondansetron **OR** ondansetron, polyethylene glycol, acetaminophen **OR** acetaminophen, potassium chloride **OR** potassium alternative oral replacement **OR** potassium chloride, magnesium sulfate     Lab Data :  CBC:   Recent Labs     02/21/23  0924 02/22/23  0535 02/23/23  0623   WBC 10.3 7.3 8.4   HGB 8.2* 8.1* 8.6*   HCT 25.3* 25.0* 26.0*    183 200     CMP:  Recent Labs     02/21/23  0924 02/22/23  0535 02/23/23  0623   * 136 136   K 3.9 4.2 4.3   CL 98 102 102   CO2 24 20* 19*   BUN 60* 59* 58*   CREATININE 1.9* 1.8* 1.8*   GLUCOSE 333* 363* 350*   CALCIUM 8.7 7.9* 8.6     Hepatic:   Recent Labs     02/21/23  0924 02/22/23  0535   LABALBU 3.4* 2.9*   AST 22 85*   ALT 17 61   BILITOT 0.6 0.4   ALKPHOS 75 114         Assessment and Plan:  RAUL on CKD 3B  Serum creatinine slightly above baseline level of 1.5-1.6.  Check UPC  Likely has underlying CKD  Metabolic acidosis.  Add oral sodium bicarb  Diabetes with hyperglycemia.  Blood sugars high  Acute gout.  On colchicine.  Add allopurinol once acute flareup is over.  Advised low red meat intake  Anemia in CKD  IDDM  Fall  Chronic lymphedema    D/W patient     Kiko Leon MD  [facility]    This report has been created using voice recognition software. It may contain minor errors which are inherent in voice recognition technology

## 2023-02-23 NOTE — PLAN OF CARE
Problem: Discharge Planning  Goal: Discharge to home or other facility with appropriate resources  2/23/2023 1223 by Tomas Patel RN  Outcome: Progressing  Flowsheets (Taken 2/23/2023 1223)  Discharge to home or other facility with appropriate resources:   Identify barriers to discharge with patient and caregiver   Arrange for needed discharge resources and transportation as appropriate   Identify discharge learning needs (meds, wound care, etc)  2/23/2023 0216 by Aníbal Hernandez RN  Outcome: Progressing     Problem: Pain  Goal: Verbalizes/displays adequate comfort level or baseline comfort level  2/23/2023 1223 by Tomas Patel RN  Outcome: Progressing  Flowsheets (Taken 2/23/2023 1223)  Verbalizes/displays adequate comfort level or baseline comfort level:   Encourage patient to monitor pain and request assistance   Assess pain using appropriate pain scale   Administer analgesics based on type and severity of pain and evaluate response   Implement non-pharmacological measures as appropriate and evaluate response   Consider cultural and social influences on pain and pain management  2/23/2023 0216 by Aníbal Hernandez RN  Outcome: Progressing     Problem: Safety - Adult  Goal: Free from fall injury  2/23/2023 1223 by Tomas Patel RN  Outcome: Progressing  Flowsheets (Taken 2/23/2023 1223)  Free From Fall Injury: Instruct family/caregiver on patient safety  2/23/2023 0216 by Aníbal Hernandez RN  Outcome: Progressing     Problem: Skin/Tissue Integrity  Goal: Absence of new skin breakdown  Description: 1. Monitor for areas of redness and/or skin breakdown  2. Assess vascular access sites hourly  3. Every 4-6 hours minimum:  Change oxygen saturation probe site  4. Every 4-6 hours:  If on nasal continuous positive airway pressure, respiratory therapy assess nares and determine need for appliance change or resting period.   2/23/2023 1223 by Tomas Patel RN  Outcome: Progressing  2/23/2023 0216 by Aníbal Hernandez RN  Outcome: Progressing     Problem: Chronic Conditions and Co-morbidities  Goal: Patient's chronic conditions and co-morbidity symptoms are monitored and maintained or improved  2/23/2023 1223 by Marquise Jenkins RN  Outcome: Progressing  Flowsheets (Taken 2/23/2023 1223)  Care Plan - Patient's Chronic Conditions and Co-Morbidity Symptoms are Monitored and Maintained or Improved:   Monitor and assess patient's chronic conditions and comorbid symptoms for stability, deterioration, or improvement   Collaborate with multidisciplinary team to address chronic and comorbid conditions and prevent exacerbation or deterioration  2/23/2023 0216 by Roberto Fortune RN  Outcome: Progressing     Problem: Respiratory - Adult  Goal: Clear lung sounds  2/23/2023 1045 by Candace Sanchez RCP  Outcome: Progressing     Problem: Cardiovascular - Adult  Goal: Maintains optimal cardiac output and hemodynamic stability  Outcome: Progressing  Flowsheets (Taken 2/23/2023 1223)  Maintains optimal cardiac output and hemodynamic stability:   Monitor blood pressure and heart rate   Monitor urine output and notify Licensed Independent Practitioner for values outside of normal range   Assess for signs of decreased cardiac output   Administer fluid and/or volume expanders as ordered   Administer vasoactive medications as ordered  Goal: Absence of cardiac dysrhythmias or at baseline  Outcome: Progressing  Flowsheets (Taken 2/23/2023 1223)  Absence of cardiac dysrhythmias or at baseline:   Monitor cardiac rate and rhythm   Assess for signs of decreased cardiac output   Administer antiarrhythmia medication and electrolyte replacement as ordered     Problem: Skin/Tissue Integrity - Adult  Goal: Skin integrity remains intact  Outcome: Progressing  Flowsheets (Taken 2/23/2023 1223)  Skin Integrity Remains Intact:   Monitor for areas of redness and/or skin breakdown   Assess vascular access sites hourly     Problem: Gastrointestinal - Adult  Goal: Minimal or absence of nausea and vomiting  Outcome: Progressing  Flowsheets (Taken 2/23/2023 1223)  Minimal or absence of nausea and vomiting:   Administer IV fluids as ordered to ensure adequate hydration   Nasogastric tube to low intermittent suction as ordered   Administer ordered antiemetic medications as needed   Provide nonpharmacologic comfort measures as appropriate   Advance diet as tolerated, if ordered   Nutrition consult to assist patient with adequate nutrition and appropriate food choices  Goal: Maintains or returns to baseline bowel function  Outcome: Progressing  Flowsheets (Taken 2/23/2023 1223)  Maintains or returns to baseline bowel function:   Assess bowel function   Encourage oral fluids to ensure adequate hydration   Administer IV fluids as ordered to ensure adequate hydration   Administer ordered medications as needed   Encourage mobilization and activity   Nutrition consult to assist patient with appropriate food choices  Goal: Maintains adequate nutritional intake  Outcome: Progressing  Flowsheets (Taken 2/23/2023 1223)  Maintains adequate nutritional intake:   Monitor percentage of each meal consumed   Identify factors contributing to decreased intake, treat as appropriate   Assist with meals as needed   Monitor intake and output, weight and lab values   Obtain nutritional consult as needed     Problem: Metabolic/Fluid and Electrolytes - Adult  Goal: Electrolytes maintained within normal limits  Outcome: Progressing  Flowsheets (Taken 2/23/2023 1223)  Electrolytes maintained within normal limits:   Monitor labs and assess patient for signs and symptoms of electrolyte imbalances   Administer electrolyte replacement as ordered   Monitor response to electrolyte replacements, including repeat lab results as appropriate   Fluid restriction as ordered   Instruct patient on fluid and nutrition restrictions as appropriate  Goal: Hemodynamic stability and optimal renal function maintained  Outcome: Progressing  Flowsheets (Taken 2/23/2023 1223)  Hemodynamic stability and optimal renal function maintained:   Monitor labs and assess for signs and symptoms of volume excess or deficit   Monitor intake, output and patient weight   Monitor urine specific gravity, serum osmolarity and serum sodium as indicated or ordered   Monitor response to interventions for patient's volume status, including labs, urine output, blood pressure (other measures as available)   Encourage oral intake as appropriate   Instruct patient on fluid and nutrition restrictions as appropriate  Goal: Glucose maintained within prescribed range  Outcome: Progressing  Flowsheets (Taken 2/23/2023 1223)  Glucose maintained within prescribed range:   Monitor blood glucose as ordered   Assess for signs and symptoms of hyperglycemia and hypoglycemia   Administer ordered medications to maintain glucose within target range   Assess barriers to adequate nutritional intake and initiate nutrition consult as needed   Instruct patient on self management of diabetes and initiate consult as needed     Problem: Hematologic - Adult  Goal: Maintains hematologic stability  Outcome: Progressing  Flowsheets (Taken 2/23/2023 1223)  Maintains hematologic stability:   Assess for signs and symptoms of bleeding or hemorrhage   Monitor labs for bleeding or clotting disorders   Administer blood products/factors as ordered   Care plan reviewed with patient. Patient verbalize understanding of the plan of care and contribute to goal setting.

## 2023-02-23 NOTE — PLAN OF CARE
Problem: Respiratory - Adult  Goal: Clear lung sounds  Outcome: Progressing   Continue mdi tx to clear lung sounds. Pt agrees to plan of care.

## 2023-02-23 NOTE — CONSULTS
[facility]'s  Consult    Patient's Name/Date of Birth: Vijay Harrell / 1950 (28 y.o.)    Date: February 23, 2023     Referring Provider: Zoran Roberson MD    CHIEF COMPLAINT: Elevated Troponin      HPI: This is a pleasant 74 y.o. male with a past medical history significant for coronary artery disease of bypass graft of native heart with stable angina pectoris, s/p percutaneous transluminal coronary angioplasty, chronic low back pain along with diffuse generalized  osteoarthritis and history of non-insulin dependent diabetes, long-term use of insulin with polyneuropathy, who presented to [facility] following a mechanical fall. The patient states he has recently been having increased problems with weakness and ambulation. He apparently had ordered some food yesterday for takeout and had a mechanical fall. Patient states he did not feel any chest pain, palpitations, shortness of breath, dizziness, or vertigo prior to, during, or after the time of his fall. Patient states he did not 'feel the fall coming on.' States he has had bad knees for awhile and has difficulty ambulating. Paitent denies any history of syncopal episodes due to cardiogenic causes. States he has had mechanical falls previously due to his 'bad knees'. Patient states he remained on the floor from approximately 1 a.m. on 02/22/2023 until approximately 8 a.m. on 02/22/2023. He was brought in then via paramedics. Patient denies any current chest pain, shortness of breath, diaphoresis, nausea, lightheadedness. Cardiology was consulted for elevated troponins at 0.024 on admission, however, have been down-trending since to 0.022 on 02/22/23.       All labs, EKG's, diagnostic testing and images as well as cardiac cath, stress testing were reviewed during this encounter    Past Medical History:   Diagnosis Date    RAUL (acute kidney injury) ([facility] [address] 02/13/2020    ASHD (arteriosclerotic heart disease) 2005 2006 stent  baki    Closed fracture of first lumbar vertebra ([facility] [address] 05/06/2022    Closed T12 fracture ([facility] [address] 05/06/2022    Depression     Diabetic peripheral neuropathy ([facility] [address] 2014    Fracture 10/1984    left lower extremity     HTN (hypertension)     Hypercholesteremia     IDDM (insulin dependent diabetes mellitus)     Low back pain     Morbid obesity with BMI of 45.0-49.9, adult ([facility] [address]     Osteoarthritis     S/P CABG (coronary artery bypass graft)      Past Surgical History:   Procedure Laterality Date    AMPUTATION Left 9/10/14    partial versus complete left hallux amputation, left great toe amputation    APPENDECTOMY      BACK INJECTION Bilateral 1/18/2021    Bilateral Si MBB #1 performed by Agata Stevenson MD at [facility] Bilateral 3/1/2021    Bilateral SI MBB #2 performed by Agata Stevenson MD at [place] [address]    fusion of C5-C6    CHOLECYSTECTOMY      COLONOSCOPY  2007 and 2011    colonn polyps  lorenzo    CORONARY ANGIOPLASTY WITH STENT PLACEMENT  08/07/2017    Dr Marylu Montgomery @ [address] [place] GRAFT  2015 august dox    EKG 12-LEAD  8/14/2015         FACET JOINT INJECTION Bilateral 5/22/2020    Lumbar Facet MBB @L3-4,4-5 bilateral #2 performed by Agata Stevenson MD at [address] [place] [address]      left leg    JOINT REPLACEMENT      bilateral knees gurvinder    PAIN MANAGEMENT PROCEDURE Bilateral 1/28/2020    Lumbar Facet MBB @ L3-4,4-5,5-S1 bilateral #1 LUMBAR FACET performed by Agata Stevenson MD at [facility] Right 7/14/2020    Lumbar RFA Bilateral L3-4,4-5  right side first performed by Agata Stevenson MD at [facility] Left 10/1/2020    Lumbar RFA Left side L3-4, 4-5 performed by Agata Stevenson MD at [facility] Bilateral 11/17/2020    TFLESI @L5 bilateral #1 performed by Maribeth Singh MD at [facility] Bilateral 12/10/2020    TFLESI @L5 bilateral #1 performed by Maribeth Singh MD at [address] [place] [address] N/A 12/28/2018    T7-T9 DECOMPRESSION, T7-T9 POSTERIOR FUSION performed by Ann Connor MD at [address] [facility]    TONSILLECTOMY      VASCULAR SURGERY      cabg harvests from left leg     Current Facility-Administered Medications   Medication Dose Route Frequency Provider Last Rate Last Admin    colchicine (COLCRYS) tablet 0.6 mg  0.6 mg Oral BID Theresa Justin MD   0.6 mg at 02/22/23 2144    insulin glargine (LANTUS) injection vial 40 Units  40 Units SubCUTAneous QAM Theresa Justin MD        insulin lispro (HUMALOG) injection vial 15 Units  15 Units SubCUTAneous TID WC Theresa Justin MD        insulin lispro (HUMALOG) injection vial 0-16 Units  0-16 Units SubCUTAneous TID WC Theresa Justin MD   12 Units at 02/22/23 1807    insulin lispro (HUMALOG) injection vial 0-4 Units  0-4 Units SubCUTAneous Nightly Theresa Justin MD   4 Units at 02/22/23 2147    albuterol sulfate HFA (PROVENTIL;VENTOLIN;PROAIR) 108 (90 Base) MCG/ACT inhaler 2 puff  2 puff Inhalation BID Theresa Justin MD   2 puff at 02/22/23 1647    amLODIPine (NORVASC) tablet 5 mg  5 mg Oral Daily Theresa Justin MD   5 mg at 02/22/23 0746    [Held by provider] aspirin EC tablet 81 mg  81 mg Oral Daily Theresa Justin MD   81 mg at 02/22/23 0803    atorvastatin (LIPITOR) tablet 10 mg  10 mg Oral Daily Theresa Justin MD   10 mg at 02/22/23 0830    carvedilol (COREG) tablet 12.5 mg  12.5 mg Oral BID Theresa Justin MD   12.5 mg at 02/22/23 2144    glucose chewable tablet 16 g  4 tablet Oral PRN Theresa Justin MD        dextrose bolus 10% 125 mL  125 mL IntraVENous PRN Theresa Justin MD        Or dextrose bolus 10% 250 mL  250 mL IntraVENous PRN Bryce Felix MD        glucagon (rDNA) injection 1 mg  1 mg SubCUTAneous PRN Bryce Felix MD        dextrose 10 % infusion   IntraVENous Continuous PRN Bryce Felix MD        sodium chloride flush 0.9 % injection 5-40 mL  5-40 mL IntraVENous 2 times per day Bryce Felix MD        sodium chloride flush 0.9 % injection 5-40 mL  5-40 mL IntraVENous PRN Bryce Felix MD        0.9 % sodium chloride infusion   IntraVENous PRN Bryce Felix MD        [Held by provider] enoxaparin (LOVENOX) injection 40 mg  40 mg SubCUTAneous BID Bryce Felix MD   40 mg at 02/22/23 0804    ondansetron (ZOFRAN-ODT) disintegrating tablet 4 mg  4 mg Oral Q8H PRN Bryce Felix MD        Or    [facility] injection 4 mg  4 mg IntraVENous Q6H PRN Bryce Felix MD        polyethylene glycol (GLYCOLAX) packet 17 g  17 g Oral Daily PRN Bryce Felix MD        acetaminophen (TYLENOL) tablet 650 mg  650 mg Oral Q6H PRN Bryce Felix MD   650 mg at 02/22/23 0253    Or    acetaminophen (TYLENOL) suppository 650 mg  650 mg Rectal Q6H PRN Bryce Felix MD        potassium chloride (KLOR-CON M) extended release tablet 40 mEq  40 mEq Oral PRN Bryce Felix MD        Or    potassium bicarb-citric acid (EFFER-K) effervescent tablet 40 mEq  40 mEq Oral PRN Bryce Felix MD        Or    potassium chloride 10 mEq/100 mL IVPB (Peripheral Line)  10 mEq IntraVENous PRN Bryce Felix MD        magnesium sulfate 2000 mg in 50 mL IVPB premix  2,000 mg IntraVENous PRN Bryce Felix MD        [Held by provider] ticagrelor (BRILINTA) tablet 90 mg  90 mg Oral BID Bryce Felix MD   90 mg at 02/22/23 0803    0.9 % sodium chloride infusion   IntraVENous Continuous Bryce Felix MD 75 mL/hr at 02/22/23 2142 New Bag at 02/22/23 2142    ceFAZolin (ANCEF) 1000 mg in dextrose 5 % 50 mL IVPB (premix)  1,000 mg IntraVENous Q8H Kim Durbin MD   Stopped at 02/23/23 0542     Prior to Admission medications    Medication Sig Start Date End Date Taking?  Authorizing Provider   insulin glargine (LANTUS SOLOSTAR) 100 UNIT/ML injection pen INJECT 30 UNITS SUBCUTANEOUSLY ONCE DAILY 2/7/23   Kim Durbin MD   ferrous sulfate (IRON 325) 325 (65 Fe) MG tablet Take 1 tablet by mouth 2 times daily 1/31/23   Kim Durbin MD   ticagrelor (BRILINTA) 90 MG TABS tablet Take 1 tablet by mouth twice daily 12/28/22   Kim Durbin MD   insulin aspart (NOVOLOG FLEXPEN) 100 UNIT/ML injection pen Inject 12 Units into the skin 3 times daily (before meals) INJECT 12 UNITS SUBCUTANEOUSLY 3 TIMES DAILY BEFORE MEALS 12/12/22   Kim Durbin MD   metFORMIN (GLUCOPHAGE) 500 MG tablet TAKE TWO TABLETS BY MOUTH TWICE DAILY WITH MEALS 12/12/22   Kim Durbin MD   Multiple Vitamins-Minerals (THERAPEUTIC MULTIVITAMIN-MINERALS) tablet Take 1 tablet by mouth daily 12/12/22 12/12/23  Kim Durbin MD   amLODIPine (NORVASC) 5 MG tablet Take 1 tablet by mouth daily 11/30/22   Kim Durbin MD   atorvastatin (LIPITOR) 10 MG tablet TAKE 1 TABLET BY MOUTH ONCE DAILY 11/22/22   Kim Durbin MD   enalapril (VASOTEC) 10 MG tablet 1/2 tab Once a day 11/22/22   Kim Durbin MD   furosemide (LASIX) 80 MG tablet Take one in am (80) and 1/2 tab (40) in pm 5/7/22   Kim Durbin MD   ACCU-CHEK SMARTVIEW strip Use to test Blood Sugar 3x daily, Dx: E11.9 4/19/22   Kim Durbin MD   albuterol sulfate  (90 Base) MCG/ACT inhaler Inhale 2 puffs into the lungs 2 times daily 2/10/22   Kim Durbin MD   carvedilol (COREG) 12.5 MG tablet Take 1 tablet by mouth 2 times daily 1/21/22   Kim Durbin MD   aspirin 81 MG tablet Take 1 tablet by mouth daily 8/8/17   Torrie Donald MD   acetaminophen (TYLENOL) 500 MG tablet Take 1,000 mg by mouth as needed for Pain    Historical Provider, MD Scheduled Meds:   colchicine  0.6 mg Oral BID    insulin glargine  40 Units SubCUTAneous QAM    insulin lispro  15 Units SubCUTAneous TID WC    insulin lispro  0-16 Units SubCUTAneous TID WC    insulin lispro  0-4 Units SubCUTAneous Nightly    albuterol sulfate HFA  2 puff Inhalation BID    amLODIPine  5 mg Oral Daily    [Held by provider] aspirin  81 mg Oral Daily    atorvastatin  10 mg Oral Daily    carvedilol  12.5 mg Oral BID    sodium chloride flush  5-40 mL IntraVENous 2 times per day    [Held by provider] enoxaparin  40 mg SubCUTAneous BID    [Held by provider] ticagrelor  90 mg Oral BID    ceFAZolin  1,000 mg IntraVENous Q8H     Continuous Infusions:   dextrose      sodium chloride      sodium chloride 75 mL/hr at 02/22/23 2142     PRN Meds:.glucose, dextrose bolus **OR** dextrose bolus, glucagon (rDNA), dextrose, sodium chloride flush, sodium chloride, ondansetron **OR** ondansetron, polyethylene glycol, acetaminophen **OR** acetaminophen, potassium chloride **OR** potassium alternative oral replacement **OR** potassium chloride, magnesium sulfate    Allergies   Allergen Reactions    Horse-Derived Products      Family History   Problem Relation Age of Onset    Cancer Mother         uterine     Social History     Socioeconomic History    Marital status:       Spouse name: Not on file    Number of children: 2    Years of education: Not on file    Highest education level: Not on file   Occupational History    Not on file   Tobacco Use    Smoking status: Never    Smokeless tobacco: Never   Vaping Use    Vaping Use: Never used   Substance and Sexual Activity    Alcohol use: Not Currently     Comment: rarely    Drug use: No    Sexual activity: Never   Other Topics Concern    Not on file   Social History Narrative    Not on file     Social Determinants of Health     Financial Resource Strain: Low Risk     Difficulty of Paying Living Expenses: Not hard at all   Food Insecurity: No Food Insecurity    Worried About Running Out of Food in the Last Year: Never true    Ran Out of Food in the Last Year: Never true   Transportation Needs: Unknown    Lack of Transportation (Medical): Not on file    Lack of Transportation (Non-Medical): No   Physical Activity: Inactive    Days of Exercise per Week: 0 days    Minutes of Exercise per Session: 10 min   Stress: Not on file   Social Connections: Not on file   Intimate Partner Violence: Not on file   Housing Stability: Unknown    Unable to Pay for Housing in the Last Year: Not on file    Number of Places Lived in the Last Year: Not on file    Unstable Housing in the Last Year: No     ROS:   Constitutional: Denies any recent wt change. Eyes:  Denies any blurring or double vision, no glaucoma  Ears/Nose/Mouth/Throat:  Denies any chronic sinus/rhinitis, bleeding gums  Cardiovascular:  As described above. Respiratory:  Denies any frequent cough, wheezing or coughing up blood  Genitourinary:  Denies difficulty with urination and kidney stones  Gastrointestinal:  Denies any chronic problems with abdominal pain, nausea, vomiting or diarrhea  Musculoskeletal:  Denies any joint pain, back pain, or difficulty walking  Integumentary:  Denies any rash  Neurological:  No numbness or tingling  Endocrine:  Denies any polydipsia. Hematologic/Lymphatic:  Denies any hemorrhage or lymphatic drainage problems.   Labs:  CBC:   Recent Labs     02/21/23  0924 02/22/23  0535 02/23/23  0623   WBC 10.3 7.3 8.4   HGB 8.2* 8.1* 8.6*   HCT 25.3* 25.0* 26.0*   MCV 93.7 95.1* 92.5    183 200     BMP:   Recent Labs     02/21/23  0924 02/22/23  0535 02/23/23  0623   * 136 136   K 3.9 4.2 4.3   CL 98 102 102   CO2 24 20* 19*   BUN 60* 59* 58*   CREATININE 1.9* 1.8* 1.8*     Accucheck Glucoses:   Recent Labs     02/21/23  2122 02/22/23  0805 02/22/23  1252 02/22/23  1651 02/22/23  2020   POCGLU 236* 359* 388* 333* 305*     Cardiac Enzymes:   Recent Labs     02/21/23  0924   CKTOTAL 274*     PT/INR: No results for input(s): PROTIME, INR in the last 72 hours. APTT: No results for input(s): APTT in the last 72 hours. Liver Profile:  Lab Results   Component Value Date/Time    AST 85 02/22/2023 05:35 AM    ALT 61 02/22/2023 05:35 AM    BILIDIR <0.2 02/21/2023 09:24 AM    BILITOT 0.4 02/22/2023 05:35 AM    ALKPHOS 114 02/22/2023 05:35 AM     Lab Results   Component Value Date/Time    CHOL 110 05/18/2021 03:18 PM    HDL 35.6 10/17/2018 11:06 AM    TRIG 135 10/17/2018 11:06 AM     TSH:   Lab Results   Component Value Date/Time    TSH 2.280 10/13/2022 02:23 PM     UA:   Lab Results   Component Value Date/Time    COLORU YELLOW 02/22/2023 06:30 AM    PHUR 5.0 02/22/2023 06:30 AM    LABCAST NONE SEEN 09/29/2022 02:30 PM    LABCAST NONE SEEN 09/29/2022 02:30 PM    WBCUA 0-2 02/22/2023 06:30 AM    RBCUA 5-10 02/22/2023 06:30 AM    YEAST NONE SEEN 02/22/2023 06:30 AM    BACTERIA NONE SEEN 02/22/2023 06:30 AM    SPECGRAV 1.012 09/29/2022 02:30 PM    LEUKOCYTESUR NEGATIVE 02/22/2023 06:30 AM    UROBILINOGEN 1.0 02/22/2023 06:30 AM    BILIRUBINUR NEGATIVE 02/22/2023 06:30 AM    BLOODU NEGATIVE 02/22/2023 06:30 AM    GLUCOSEU 100 02/22/2023 06:30 AM         Physical Exam:  Vitals:    02/23/23 0445   BP: (!) 140/66   Pulse: 60   Resp: 20   Temp: 98.3 °F (36.8 °C)   SpO2: 93%      Intake/Output Summary (Last 24 hours) at 2/23/2023 7633  Last data filed at 2/22/2023 1209  Gross per 24 hour   Intake 980 ml   Output --   Net 980 ml      General:  No acute distress  Neck: Supple, no JVD, no carotid bruits  Heart: Regular rhythm normal S1 and S2, no rubs, murmurs or gallops  Lungs: clear to ascultation no rales, wheezes, or rhonchi  Abdomen: positive bowel sounds, soft, non-tender, non-distended, no bruits, no masses  Extremities:no clubbing, cyanosis, significant bilateral +4 pitting edema with significant venous stasis dermatitis.   Neurologic: alert and oriented x 3, cranial nerves 2-12 grossly intact, motor and sensory intact, moving all extremities  Skin: No rashes    EKG 2/21/23: Left axis deviation  Intraventricular conduction delay    Stress test 4/13/22: This Nuclear Medicine study was negative for ischemia . normal EF  difficult scan   Recommendation   Medical management. Echo: 4/13/23:  Ejection fraction is visually estimated at 55%. Overall left ventricular function is normal.   Technically difficult study due to poor acoustic windows. Assessment:  Dehydration  Elevated Troponins due to stress of patient's fall and 'time down' following the fall. No chest pain or concerning symptoms for ACS at time of event or since. Heart score 5, [employer]orn[employer] Forester 89%. Plan:  Echo ordered to assess global cardiac function. Fall likely due to gouty attack of knees, no cardiac symtpoms  Due to hx of CABG will get LVEF assessed  Can continue to monitor/trend troponin to ensure continued decrease of levels. Supportive care measures. Thank you for allowing us to participate in the care of this patient. Please do not hesitate to call us with questions. Electronically signed by Daria Washington MD on 2/23/2023 at 8:32 AM    Supervising Physician's Attestation Statement  I performed a history and physical examination on the patient and discussed the management with the resident physician. I reviewed and agree with the findings and plan as documented in the resident's note except for as noted below.       Electronically signed by Pati Goodman MD on 2/23/23 at 4:43 PM EST      Interventional Cardiology - [facility]

## 2023-02-23 NOTE — PROGRESS NOTES
Progress Note  Date:2/23/2023       Room:[address] [place] [address]  Patient Name:Tuan Hassan     YOB: 1950     Age:72 y.o. Subjective    Subjective:  Symptoms:  Stable. He reports weakness. No shortness of breath, chest pain or diarrhea. (Pain is less). Diet:  No vomiting. Pain:  He complains of pain that is moderate. (Both knees).     Current Facility-Administered Medications   Medication Dose Route Frequency Provider Last Rate Last Admin    sodium bicarbonate tablet 1,300 mg  1,300 mg Oral BID Melinda Nolan MD   1,300 mg at 02/23/23 1429    colchicine (COLCRYS) tablet 0.6 mg  0.6 mg Oral BID Anyi Lambert MD   0.6 mg at 02/23/23 0932    insulin glargine (LANTUS) injection vial 40 Units  40 Units SubCUTAneous QAM Anyi Lambert MD   40 Units at 02/23/23 1032    insulin lispro (HUMALOG) injection vial 15 Units  15 Units SubCUTAneous TID RODOLFO Lambert MD   15 Units at 02/23/23 1813    insulin lispro (HUMALOG) injection vial 0-16 Units  0-16 Units SubCUTAneous TID RODOLFO Lambert MD   12 Units at 02/23/23 1813    insulin lispro (HUMALOG) injection vial 0-4 Units  0-4 Units SubCUTAneous Nightly Anyi Lambert MD   4 Units at 02/22/23 2147    albuterol sulfate HFA (PROVENTIL;VENTOLIN;PROAIR) 108 (90 Base) MCG/ACT inhaler 2 puff  2 puff Inhalation BID Anyi Lambert MD   2 puff at 02/23/23 0948    amLODIPine (NORVASC) tablet 5 mg  5 mg Oral Daily Anyi Lambert MD   5 mg at 02/23/23 0932    [Held by provider] aspirin EC tablet 81 mg  81 mg Oral Daily Anyi Lambert MD   81 mg at 02/22/23 0803    atorvastatin (LIPITOR) tablet 10 mg  10 mg Oral Daily Anyi Lambert MD   10 mg at 02/23/23 0932    carvedilol (COREG) tablet 12.5 mg  12.5 mg Oral BID Anyi Lambert MD   12.5 mg at 02/23/23 0932    glucose chewable tablet 16 g  4 tablet Oral PRN Anyi Lambert MD        dextrose bolus 10% 125 mL  125 mL IntraVENous PRN Anyi Lambert MD Or    dextrose bolus 10% 250 mL  250 mL IntraVENous PRN Jonathan Jay MD        glucagon (rDNA) injection 1 mg  1 mg SubCUTAneous PRN Jonathan Jay MD        dextrose 10 % infusion   IntraVENous Continuous PRN Jonathan Jay MD        sodium chloride flush 0.9 % injection 5-40 mL  5-40 mL IntraVENous 2 times per day Jonathan Jay MD        sodium chloride flush 0.9 % injection 5-40 mL  5-40 mL IntraVENous PRN Jonathan Jay MD        0.9 % sodium chloride infusion   IntraVENous PRN Jonathan Jay MD        [Held by provider] enoxaparin (LOVENOX) injection 40 mg  40 mg SubCUTAneous BID Jonathan Jay MD   40 mg at 02/22/23 0804    ondansetron (ZOFRAN-ODT) disintegrating tablet 4 mg  4 mg Oral Q8H PRN Jonathan Jay MD        Or    ondansetron [facility]) injection 4 mg  4 mg IntraVENous Q6H PRN Jonathan Jay MD        polyethylene glycol (GLYCOLAX) packet 17 g  17 g Oral Daily PRN Jonathan Jay MD        acetaminophen (TYLENOL) tablet 650 mg  650 mg Oral Q6H PRN Jonathan Jay MD   650 mg at 02/22/23 5110    Or    acetaminophen (TYLENOL) suppository 650 mg  650 mg Rectal Q6H PRN Jonathan Jay MD        potassium chloride (KLOR-CON M) extended release tablet 40 mEq  40 mEq Oral PRN Jonathan Jay MD        Or    potassium bicarb-citric acid (EFFER-K) effervescent tablet 40 mEq  40 mEq Oral PRN Jonathan Jay MD        Or    potassium chloride 10 mEq/100 mL IVPB (Peripheral Line)  10 mEq IntraVENous PRN Jonathan Jay MD        magnesium sulfate 2000 mg in 50 mL IVPB premix  2,000 mg IntraVENous PRN Jonathan Jay MD        [Held by provider] ticagrelor (BRILINTA) tablet 90 mg  90 mg Oral BID Jonathan Jay MD   90 mg at 02/22/23 0803    0.9 % sodium chloride infusion   IntraVENous Continuous Jonathan Jay MD 75 mL/hr at 02/23/23 1311 New Bag at 02/23/23 1311    ceFAZolin (ANCEF) 1000 mg in dextrose 5 % 50 mL IVPB (premix) 1,000 mg IntraVENous Q8H Tania Baron MD   Stopped at 23 1341      Review of Systems   Constitutional:  Positive for fever (improved). Negative for activity change. HENT:  Negative for congestion. Respiratory:  Negative for apnea and shortness of breath. Cardiovascular:  Negative for chest pain. Gastrointestinal:  Negative for abdominal distention, abdominal pain, diarrhea and vomiting. Genitourinary:  Negative for difficulty urinating and dysuria. Musculoskeletal:  Positive for arthralgias and back pain (mild  with fall). Knees  with  pain and right more  than  left    Skin:         Lower leg dermatitis   Neurological:  Positive for weakness. Negative for dizziness and headaches. Objective         Vitals Last 24 Hours:  TEMPERATURE:  Temp  Av.9 °F (36.6 °C)  Min: 97.5 °F (36.4 °C)  Max: 98.6 °F (37 °C)  RESPIRATIONS RANGE: Resp  Av.3  Min: 16  Max: 20  PULSE OXIMETRY RANGE: SpO2  Av.8 %  Min: 93 %  Max: 100 %  PULSE RANGE: Pulse  Av  Min: 57  Max: 81  BLOOD PRESSURE RANGE: Systolic (58VSM), DBR:214 , Min:128 , ZND:237   ; Diastolic (72XCD), WUL:71, Min:59, Max:66    I/O (24Hr): Intake/Output Summary (Last 24 hours) at 2023 1846  Last data filed at 2023 1609  Gross per 24 hour   Intake 840 ml   Output --   Net 840 ml     Objective:  General Appearance:  Comfortable. Vital signs: (most recent): Blood pressure (!) 128/59, pulse 58, temperature 97.5 °F (36.4 °C), temperature source Oral, resp. rate 18, height 6' 2\" (1.88 m), weight (!) 355 lb 9.6 oz (161.3 kg), SpO2 100 %. Vital signs are normal.  Fever (improved). (  Fever  now  gone). Output: Producing urine and producing stool. HEENT: Normal HEENT exam.    Lungs:  Normal effort and normal respiratory rate. Breath sounds clear to auscultation. Heart: Normal rate. Regular rhythm. S1 normal and S2 normal.  No murmur. Abdomen: Abdomen is soft and non-distended.   Bowel sounds are normal.   There is no abdominal tenderness. Extremities: There is effusion.  (Knees  better as less warmth and redness less but  pain  with movement and limited      Left foot less red an left tibial shaft less redness and swelling  of the leg )  Neurological: Patient is alert and oriented to person, place and time. Patient has normal reflexes. Skin:  Warm and dry. Labs/Imaging/Diagnostics    Labs:  CBC:  Recent Labs     02/21/23 0924 02/22/23  0535 02/23/23  0623   WBC 10.3 7.3 8.4   RBC 2.70* 2.63* 2.81*   HGB 8.2* 8.1* 8.6*   HCT 25.3* 25.0* 26.0*   MCV 93.7 95.1* 92.5    183 200     CHEMISTRIES:  Recent Labs     02/21/23 0924 02/22/23 0535 02/23/23  0623   * 136 136   K 3.9 4.2 4.3   CL 98 102 102   CO2 24 20* 19*   BUN 60* 59* 58*   CREATININE 1.9* 1.8* 1.8*   GLUCOSE 333* 363* 350*     PT/INR:No results for input(s): PROTIME, INR in the last 72 hours. APTT:No results for input(s): APTT in the last 72 hours. LIVER PROFILE:  Recent Labs     02/21/23 0924 02/22/23  0535   AST 22 85*   ALT 17 61   BILIDIR <0.2  --    BILITOT 0.6 0.4   ALKPHOS 75 114       Imaging Last 24 Hours:  CT KNEE LEFT WO CONTRAST    Result Date: 2/22/2023  PROCEDURE: CT KNEE LEFT WO CONTRAST CLINICAL INFORMATION: possible infected left knee prosthesis COMPARISON: Left knee radiograph 2/21/2023 TECHNIQUE: 3 mm axial imaging through the left knee without contrast. Coronal and sagittal reconstructions were performed. All CT scans at this facility use dose modulation, iterative reconstruction, and/or weight based dosing when appropriate to reduce the radiation dose to as low as reasonably achievable. FINDINGS: ALIGNMENT: Anatomic. BONE: No acute fracture or dislocation. The bones are demineralized. . . JOINT : 1. Total left knee arthroplasty has been performed. 2. No significant periprosthetic lucency is seen. 3. Small left knee effusion with synovial thickening. MUSCULATURE: 1. Diffuse atrophy.  OTHER: 1. Vascular calcifications noted. 2. Diffuse soft tissue edema about the knee. . 3. Mild thickening of the patellar tendon. 4. Inflammatory changes seen in Hoffa's fat pad. 1. No acute bony abnormality. 2. Status post total left knee arthroplasty. No significant periprosthetic lucency. 3. Small left knee joint effusion with synovial thickening. **This report has been created using voice recognition software. It may contain minor errors which are inherent in voice recognition technology. ** Final report electronically signed by Dr Porter Chaudhari on 2/22/2023 12:19 PM    CT KNEE RIGHT WO CONTRAST    Result Date: 2/22/2023  PROCEDURE: CT KNEE RIGHT WO CONTRAST CLINICAL INFORMATION: possible infected right knee prosthesis COMPARISON: Right knee CT 5/8/2022 TECHNIQUE: 3 mm axial imaging through the right knee without contrast. Coronal and sagittal reconstructions were performed. All CT scans at this facility use dose modulation, iterative reconstruction, and/or weight based dosing when appropriate to reduce the radiation dose to as low as reasonably achievable. FINDINGS: ALIGNMENT: Anatomic. BONE: No acute fracture or dislocation. The bones are demineralized. . . JOINT : 1. Total right knee arthroplasty has been performed. . 2. There is 5 mm periprosthetic lucency at the anterolateral aspect of the tibia prosthesis. 3. A small joint effusion with synovial thickening is seen. MUSCULATURE: 1. Diffuse atrophy OTHER: 1. Marked prepatellar and superficial infrapatellar edema/bursitis. 2. Vascular calcifications are noted. 3. Diffuse soft tissue edema about the knee. 4. Mild patellar thickening. 1. Status post total right knee arthroplasty. A 5 mm periprosthetic lucency at the anterolateral aspect of the tibial prosthesis is noted. This can relate to loosening or infection in the right clinical setting. 2. A small joint effusion with synovial thickening. 3. Marked prepatellar and superficial infrapatellar edema/bursitis.  **This report has been created using voice recognition software. It may contain minor errors which are inherent in voice recognition technology. ** Final report electronically signed by Dr Lyndsay Cardoso on 2/22/2023 12:30 PM    Assessment//Plan           Hospital Problems             Last Modified POA    * (Principal) Dehydration 2/21/2023 Yes    Acute kidney injury ([facility] [address] 2/21/2023 Yes     Assessment:    Condition: In stable condition. Improving. (  Acute  gouty  arthritis both knees and possible  of  ankles and still not  ambulatory and  confirmed with aspiration         Colchicine and prednisone  and better with the redness less and in time add in uloric     Niddm with chronic  insulin up as no meds and then prednisone      Fever some  gout  but  feel left lower leg cellulitis as looks better  with the ancef     Cellulitis of leg and will go to po  ancef and chronic dermatitis see dermatology as outpatient make sure chronic change and not mycoides or underlynig skin cancer     Ashd  and increase troponin and appreciate cardio as at one point ? To surgery for the knees        Echo all wnl    Crf noted and with anemia feel chronic      Htn stable      Diffuse arthritis and deconditioning and needs nursing home and ok  when bed is available    ). Plan:   Out of bed and up to chair. Consults: physical therapy, occupational therapy and cardiology (appreciate  ortho and renal seeing). Advance diet as tolerated. Administer medications as ordered. (  Improving      Another  dose  of iv steroids an try to low dose  po  with the colchicine     Need pt and ot  and  nursing home and feel stable to go in am if bed available).      Electronically signed by James Mcdowell MD on 2/23/23 at 6:46 PM EST

## 2023-02-23 NOTE — PROGRESS NOTES
[facility] 58  INPATIENT OCCUPATIONAL THERAPY  STR MED SURG 8B  EVALUATION    Time:   Time In: 6053  Time Out: 1135  Timed Code Treatment Minutes: 45 Minutes  Minutes: 55          Date: 2023  Patient Name: Anahi Cisneros,   Gender: male      MRN: [id number]  : 1950  (74 y.o.)  Referring Practitioner: Dr. Teddy Mari MD  Diagnosis: Dehydration  Additional Pertinent Hx: Pt with PMH of chronic low back pain along with diffuse generalized  osteoarthritis and history of status post coronary artery bypass  grafting with history of non-insulin dependent diabetes, long-term use  of insulin with polyneuropathy, who now presents with increased amount  of weakness from having fallen while at home. He remained on the floor for 7 hours and had EMS help him off of the floor. DX: dehydration, gout    Restrictions/Precautions:  Restrictions/Precautions: Fall Risk, General Precautions    Subjective  Chart Reviewed: Yes, Orders, Progress Notes, History and Physical  Patient assessed for rehabilitation services?: Yes    Subjective: Nurse approved OT evaluation. Pt in bed on OT arrival, A+Ox4. Pleasant and cooperative. Pain: 3/10: at rest (right knee), 10/10 with weight bearing    Vitals: Vitals not assessed per clinical judgement, see nursing flowsheet    Social/Functional History:  Lives With: Alone  Type of Home: Apartment (2nd floor)  Home Layout: One level  Home Access: Elevator, Level entry  Home Equipment: Rollator, Lift chair, Walker, rolling, Reacher   Bathroom Shower/Tub: Tub/Shower unit  Bathroom Toilet: Standard  Bathroom Equipment: Shower chair, Grab bars in shower       ADL Assistance: Independent  Homemaking Assistance: Independent  Ambulation Assistance: Independent  Transfer Assistance: Independent    Active : Yes  Occupation: Retired  Additional Comments: reports being [employer] 66 with all tasks with [employer]. Pt is independent with his lymphedem  management.  used motorized carts for grocery shopping    VISION:WFL    HEARING:  WFL    COGNITION: WFL    RANGE OF MOTION:  Bilateral Upper Extremity:  WFL    STRENGTH:  Bilateral Upper Extremity:  WFL    SENSATION:   WFL    ADL:   Lower Extremity Dressing: Dependent. Footwear Management: Dependent. For socks  Toileting: Dependent. BALANCE:  Sitting Balance:  Supervision. Standing Balance: Contact Guard Assistance. With RW    BED MOBILITY:  Supine to Sit: Minimal Assistance      TRANSFERS:  Sit to Stand:  Minimal Assistance, X 1, with increased time for completion, cues for hand placement. Stand to Sit: Minimal Assistance, X 1, with increased time for completion, cues for hand placement. For control descend    FUNCTIONAL MOBILITY:  Unable to assess due to severe pain in standing. Pt attempts taking single step forward with R LE but when attempting to shift weight onto right leg he insists on returning to sitting because he does not feel his leg can take his weight. Pt returns to seated position      Activity Tolerance:  Patient tolerance of  treatment: Fair treatment tolerance and Limited by pain      Assessment:    Assessment: Sagar Vazquez is a 74 y.o.male that presents with above new performance deficits secondary to dehydration and gout. Pt is requiring increased assistance for ADLs, functional mobility, ADL transfers compared to baseline level of function. Skilled OT services is warranted to improve above performance deficits and progress pt towards PLOF. Without OT pt is at risk for falls, further decline in functional abilities, increased caregiver burden, increased risk for medical complication as a result of reduce mobility and inability to return to prior level of living.       Performance deficits / Impairments: Decreased functional mobility , Decreased ADL status, Decreased safe awareness, Decreased strength, Decreased endurance, Decreased balance, Decreased high-level IADLs  Prognosis: Good  REQUIRES OT FOLLOW-UP: Yes  Decision Making: Medium Complexity    Treatment Initiated: Treatment and education initiated within context of evaluation. Evaluation time included review of current medical information, gathering information related to past medical, social and functional history, completion of standardized testing, formal and informal observation of tasks, assessment of data and development of plan of care and goals. Treatment time included skilled education and facilitation of tasks to increase safety and independence with ADL's for improved functional independence and quality of life. Discharge Recommendations:  Subacute/Skilled Nursing Facility    Patient Education:     Patient Education  Education Given To: Patient  Education Provided: Role of Therapy, Plan of Care, Transfer Training  Education Method: Demonstration, Verbal  Barriers to Learning: None    Equipment Recommendations:  Equipment Needed: No  Other: Pt has shower chair and RW,rollator    Plan:  Times Per Week: 5x  Times Per Day: Once a day  Current Treatment Recommendations: Strengthening, Balance training, Endurance training, Functional mobility training, Neuromuscular re-education, Safety education & training, Equipment evaluation, education, & procurement, Self-Care / ADL, Return to work related activity, Home management training, Patient/Caregiver education & training. See long-term goal time frame for expected duration of plan of care. If no long-term goals established, a short length of stay is anticipated. Goals:  Patient goals : To be able to care for self  Short Term Goals  Time Frame for Short Term Goals: until discharge  Short Term Goal 1: Pt will complete sit to stand transfer with CGA x1 to prepare for ADL transfers. Short Term Goal 2: Pt will tolerate OTR assessment for functional mobility when appropriate. Short Term Goal 3: Pt will complete UB/LB dressing routine with Min A.   Short Term Goal 4: Pt will tolerate standing and weight shifting with RW x2 minutes to prepare for functional mobility and sinkside ADLs. Following session, patient left in safe position with all fall risk precautions in place.

## 2023-02-23 NOTE — PLAN OF CARE
Problem: Discharge Planning  Goal: Discharge to home or other facility with appropriate resources  2/23/2023 0216 by Gertrudis Nguyen RN  Outcome: Progressing  2/22/2023 1450 by NATALYA Upton  Outcome: Progressing  2/22/2023 1406 by Jose Thompson RN  Outcome: Progressing     Problem: Pain  Goal: Verbalizes/displays adequate comfort level or baseline comfort level  2/23/2023 0216 by Gertrudis Nguyen RN  Outcome: Progressing  2/22/2023 1406 by Jose Thompson RN  Outcome: Not Progressing  Note: Bilateral knee pain 7/10 at rest and 10/10 with activity     Problem: Safety - Adult  Goal: Free from fall injury  2/23/2023 0216 by Gertrudis Nguyen RN  Outcome: Progressing  2/22/2023 1406 by Jose Thompson RN  Outcome: Progressing     Problem: Skin/Tissue Integrity  Goal: Absence of new skin breakdown  Description: 1. Monitor for areas of redness and/or skin breakdown  2. Assess vascular access sites hourly  3. Every 4-6 hours minimum:  Change oxygen saturation probe site  4. Every 4-6 hours:  If on nasal continuous positive airway pressure, respiratory therapy assess nares and determine need for appliance change or resting period.   2/23/2023 0216 by Gertrudis Nguyen RN  Outcome: Progressing  2/22/2023 1406 by Jose Thompson RN  Outcome: Progressing     Problem: Chronic Conditions and Co-morbidities  Goal: Patient's chronic conditions and co-morbidity symptoms are monitored and maintained or improved  2/23/2023 0216 by Gertrudis Nguyen RN  Outcome: Progressing  2/22/2023 1406 by Jose Thompson RN  Outcome: Progressing     Problem: Pain  Goal: Verbalizes/displays adequate comfort level or baseline comfort level  2/23/2023 0216 by Gertrudis Nguyen RN  Outcome: Progressing  2/22/2023 1406 by Jose Thompson RN  Outcome: Not Progressing  Note: Bilateral knee pain 7/10 at rest and 10/10 with activity

## 2023-02-23 NOTE — PROGRESS NOTES
[address] [address]  INPATIENT PHYSICAL THERAPY  Daily Note  [facility] SURG 8B - 8B-22/022-A    Time In: 0745  Time Out: 9244  Timed Code Treatment Minutes: 48 Minutes  Minutes: 50          Date: 2023  Patient Name: Jennifer Adame,  Gender:  male        MRN: [id number]  : 1950  (74 y.o.)     Referring Practitioner: Bhakti Kim MD  Diagnosis: dehydration  Additional Pertinent Hx: Per EMR \"Tuan Romero is a 74 y.o. male who arrives by EMS from home presents for weakness. Patient reports at [phone] he tried to get up out of his recliner and his [employer] gave out on me. \"  Patient fell back against the wall and slid down. Patient denies head injury or loss of consciousness. He was unable to get off the floor on his own. This morning he heard some people in the hallway and called for help resulting in EMS arrival.  Patient further explains that for the past 3 days he is really not been able to get out of his chair secondary to knee pain and weakness. He has not gotten up to make food or take his pills. He has been drinking fluids and getting up to urinate. Patient reports shivering which is not a new complaint. It occurs during the winter months. Also he has an intermittent productive cough, also not a new symptom. Patient lives alone in an apartment. Patient denies chest pain, dyspnea, abdominal pain, URI symptoms, UTI symptoms, vomiting, diarrhea, or other complaints. \" Pt had a B knee aspiration . Prior Level of Function:  Lives With: Alone  Type of Home: Apartment (2nd floor)  Home Layout: One level  Home Access: Elevator, Level entry  Home Equipment: Rollator        ADL Assistance: Independent  Homemaking Assistance: Independent  Ambulation Assistance: Independent  Transfer Assistance: Independent  Active :  Yes  Additional Comments: reports being IND with all tasks with 4ww    Restrictions/Precautions:  Restrictions/Precautions: Fall Risk, General Precautions SUBJECTIVE: Pt. Laying in his bed and pleasantly agrees to therapy session. RN approved therapy session. Pain: Yes, increased pain in low back and left hip     Vitals:   Patient Vitals for the past 8 hrs:   BP Patient Position Temp Temp src Pulse Resp SpO2 O2 Device Oxygen Therapy O2 Delivery Method   02/23/23 0948 -- -- -- -- 65 16 98 % -- -- --   02/23/23 0915 (!) 144/64 Semi fowlers 97.5 °F (36.4 °C) Oral 63 20 98 % None (Room air) None (Room air) Oral airway   02/23/23 0445 (!) 140/66 Supine 98.3 °F (36.8 °C) Oral 60 20 93 % None (Room air) -- --     *Vitals may reflect data entered into the flowsheet prior to or after PT session was completed. OBJECTIVE:  Bed Mobility:  Rolling to left: Moderate Assistance x1  Rolling to right: Moderate Assistance x1   Supine to Sit: Moderate Assistance, X 1, with head of bed raised, with rail, with verbal cues , with increased time for completion  Sit to Supine: Moderate Assistance, X 1, with head of bed flat, with verbal cues , with increased time for completion     Transfers:  Not Tested    Ambulation:  None    Balance:  Static Sitting Balance:  Stand By Assistance  Dynamic Sitting Balance: Stand By Assistance, Contact Guard Assistance  **Sitting EOB for ~25 minutes with SBA to work on sitting balance and tolerance, c/o back pain while sitting up but reports it decreasing as he sat up. While sitting on EOB, pt completed sitting exercises to challenge balance, close SBA-CGA while performing. Neuromuscular Re-education  None    Exercise:  Patient was guided in 1 set(s) 10 reps of exercises: Shoulder horizontal abduction/adduction, Seated marches, Seated heel/toe raises, Long arc quads, and Scapular retraction. Exercises were completed for increased independence with functional mobility.     Functional Outcome Measures:   [facility] (6 CLICK) BASIC MOBILITY     AM-PAC Inpatient Mobility without Stair Climbing Raw Score : 7  AM-PAC Inpatient without Stair Climbing T-Scale Score : 28.66  Mobility Inpatient CMS 0-100% Score: 86.29  Mobility Inpatient without Stair CMS G-Code Modifier : CM     ASSESSMENT:  Assessment: Patient progressing toward established goals. Activity Tolerance:  Patient tolerance of  treatment: good. Equipment Recommendations:Equipment Needed: No  Discharge Recommendations: Subacute/Skilled Nursing Facility Continue to assess pending progress, unsafe to go home at this time d/t requiring +2 assist for all mobility. PLAN: Current Treatment Recommendations: Strengthening, Balance training, Transfer training, Endurance training, Equipment evaluation, education, & procurement, Patient/Caregiver education & training, Therapeutic activities, Home exercise program, Safety education & training, Neuromuscular re-education  General Plan:  (5x GM/O)    Patient Education  Patient Education: Plan of Care, Functional Mobility, Reviewed Prior Education, Health Promotion and Wellness Education, Safety, Verbal Exercise Instruction, Precautions/Restrictions, Bed Mobility, - Patient Requires Continued Education    Goals:  Patient Goals : return home  Short Term Goals  Time Frame for Short Term Goals: by discharge  Short Term Goal 1: Pt will demo rolling L and R in bed with max A 1 to progress with mobility. Short Term Goal 2: Pt will demo supine to and from sit transfers with modifications as needed with max Ax1 to progress with mobility. Short Term Goal 3: Pt will demo tolerate 10 min seated on EOB to complete functional tasks with S to progress with strength and mobility. Short Term Goal 4: PT to assess transfers when able. Short Term Goal 5: Pt will tolerate 10-20 reps of ther ex to increase overall mobility. Long Term Goals  Time Frame for Long Term Goals : NA due to short ELOS    Following session, patient left in safe position with all fall risk precautions in place.

## 2023-02-24 LAB
ANION GAP SERPL CALC-SCNC: 13 MEQ/L (ref 8–16)
BACTERIA SPEC AEROBE CULT: NORMAL
BACTERIA SPEC AEROBE CULT: NORMAL
BACTERIA SPEC ANAEROBE CULT: NORMAL
BACTERIA SPEC ANAEROBE CULT: NORMAL
BASOPHILS ABSOLUTE: 0 THOU/MM3 (ref 0–0.1)
BASOPHILS NFR BLD AUTO: 0.1 %
BUN SERPL-MCNC: 64 MG/DL (ref 7–22)
CALCIUM SERPL-MCNC: 8.1 MG/DL (ref 8.5–10.5)
CHLORIDE SERPL-SCNC: 102 MEQ/L (ref 98–111)
CO2 SERPL-SCNC: 22 MEQ/L (ref 23–33)
CREAT SERPL-MCNC: 1.5 MG/DL (ref 0.4–1.2)
DEPRECATED RDW RBC AUTO: 41.2 FL (ref 35–45)
EOSINOPHIL NFR BLD AUTO: 0 %
EOSINOPHILS ABSOLUTE: 0 THOU/MM3 (ref 0–0.4)
ERYTHROCYTE [DISTWIDTH] IN BLOOD BY AUTOMATED COUNT: 12.3 % (ref 11.5–14.5)
GFR SERPL CREATININE-BSD FRML MDRD: 48 ML/MIN/1.73M2
GLUCOSE BLD STRIP.AUTO-MCNC: 376 MG/DL (ref 70–108)
GLUCOSE BLD STRIP.AUTO-MCNC: 377 MG/DL (ref 70–108)
GLUCOSE BLD STRIP.AUTO-MCNC: 394 MG/DL (ref 70–108)
GLUCOSE BLD STRIP.AUTO-MCNC: 429 MG/DL (ref 70–108)
GLUCOSE SERPL-MCNC: 424 MG/DL (ref 70–108)
GRAM STN SPEC: NORMAL
GRAM STN SPEC: NORMAL
HCT VFR BLD AUTO: 23.9 % (ref 42–52)
HGB BLD-MCNC: 8.1 GM/DL (ref 14–18)
IMM GRANULOCYTES # BLD AUTO: 0.03 THOU/MM3 (ref 0–0.07)
IMM GRANULOCYTES NFR BLD AUTO: 0.3 %
LYMPHOCYTES ABSOLUTE: 0.4 THOU/MM3 (ref 1–4.8)
LYMPHOCYTES NFR BLD AUTO: 5 %
MCH RBC QN AUTO: 30.9 PG (ref 26–33)
MCHC RBC AUTO-ENTMCNC: 33.9 GM/DL (ref 32.2–35.5)
MCV RBC AUTO: 91.2 FL (ref 80–94)
MONOCYTES ABSOLUTE: 0.5 THOU/MM3 (ref 0.4–1.3)
MONOCYTES NFR BLD AUTO: 5.5 %
NEUTROPHILS NFR BLD AUTO: 89.1 %
NRBC BLD AUTO-RTO: 0 /100 WBC
NUCLEAR IGG SER QL IA: NORMAL
PLATELET # BLD AUTO: 212 THOU/MM3 (ref 130–400)
PMV BLD AUTO: 9.6 FL (ref 9.4–12.4)
POTASSIUM SERPL-SCNC: 4 MEQ/L (ref 3.5–5.2)
RBC # BLD AUTO: 2.62 MILL/MM3 (ref 4.7–6.1)
SEGMENTED NEUTROPHILS ABSOLUTE COUNT: 7.7 THOU/MM3 (ref 1.8–7.7)
SODIUM SERPL-SCNC: 137 MEQ/L (ref 135–145)
STFR SERPL-MCNC: 2.3 MG/L (ref 2.2–5)
WBC # BLD AUTO: 8.6 THOU/MM3 (ref 4.8–10.8)

## 2023-02-24 PROCEDURE — 97530 THERAPEUTIC ACTIVITIES: CPT

## 2023-02-24 PROCEDURE — 97535 SELF CARE MNGMENT TRAINING: CPT

## 2023-02-24 PROCEDURE — 6360000002 HC RX W HCPCS: Performed by: FAMILY MEDICINE

## 2023-02-24 PROCEDURE — 82948 REAGENT STRIP/BLOOD GLUCOSE: CPT

## 2023-02-24 PROCEDURE — 94640 AIRWAY INHALATION TREATMENT: CPT

## 2023-02-24 PROCEDURE — 80048 BASIC METABOLIC PNL TOTAL CA: CPT

## 2023-02-24 PROCEDURE — 6370000000 HC RX 637 (ALT 250 FOR IP): Performed by: INTERNAL MEDICINE

## 2023-02-24 PROCEDURE — 6370000000 HC RX 637 (ALT 250 FOR IP): Performed by: FAMILY MEDICINE

## 2023-02-24 PROCEDURE — 99232 SBSQ HOSP IP/OBS MODERATE 35: CPT | Performed by: INTERNAL MEDICINE

## 2023-02-24 PROCEDURE — 2580000003 HC RX 258: Performed by: FAMILY MEDICINE

## 2023-02-24 PROCEDURE — 97110 THERAPEUTIC EXERCISES: CPT

## 2023-02-24 PROCEDURE — 94760 N-INVAS EAR/PLS OXIMETRY 1: CPT

## 2023-02-24 PROCEDURE — 1200000000 HC SEMI PRIVATE

## 2023-02-24 PROCEDURE — 36415 COLL VENOUS BLD VENIPUNCTURE: CPT

## 2023-02-24 PROCEDURE — 85025 COMPLETE CBC W/AUTO DIFF WBC: CPT

## 2023-02-24 RX ORDER — PREDNISONE 20 MG/1
20 TABLET ORAL DAILY
Status: DISCONTINUED | OUTPATIENT
Start: 2023-02-24 | End: 2023-02-25 | Stop reason: HOSPADM

## 2023-02-24 RX ORDER — PANTOPRAZOLE SODIUM 40 MG/1
40 TABLET, DELAYED RELEASE ORAL
Status: DISCONTINUED | OUTPATIENT
Start: 2023-02-24 | End: 2023-02-25 | Stop reason: HOSPADM

## 2023-02-24 RX ORDER — MAGNESIUM HYDROXIDE/ALUMINUM HYDROXICE/SIMETHICONE 120; 1200; 1200 MG/30ML; MG/30ML; MG/30ML
30 SUSPENSION ORAL EVERY 6 HOURS PRN
Status: DISCONTINUED | OUTPATIENT
Start: 2023-02-24 | End: 2023-02-25 | Stop reason: HOSPADM

## 2023-02-24 RX ADMIN — CEFAZOLIN SODIUM 1000 MG: 1 INJECTION, SOLUTION INTRAVENOUS at 11:54

## 2023-02-24 RX ADMIN — INSULIN LISPRO 15 UNITS: 100 INJECTION, SOLUTION INTRAVENOUS; SUBCUTANEOUS at 11:43

## 2023-02-24 RX ADMIN — INSULIN LISPRO 15 UNITS: 100 INJECTION, SOLUTION INTRAVENOUS; SUBCUTANEOUS at 08:03

## 2023-02-24 RX ADMIN — TICAGRELOR 90 MG: 90 TABLET ORAL at 20:32

## 2023-02-24 RX ADMIN — INSULIN LISPRO 15 UNITS: 100 INJECTION, SOLUTION INTRAVENOUS; SUBCUTANEOUS at 16:45

## 2023-02-24 RX ADMIN — ALBUTEROL SULFATE 2 PUFF: 90 AEROSOL, METERED RESPIRATORY (INHALATION) at 07:27

## 2023-02-24 RX ADMIN — CEFAZOLIN SODIUM 1000 MG: 1 INJECTION, SOLUTION INTRAVENOUS at 04:35

## 2023-02-24 RX ADMIN — INSULIN LISPRO 4 UNITS: 100 INJECTION, SOLUTION INTRAVENOUS; SUBCUTANEOUS at 20:35

## 2023-02-24 RX ADMIN — PANTOPRAZOLE SODIUM 40 MG: 40 TABLET, DELAYED RELEASE ORAL at 20:32

## 2023-02-24 RX ADMIN — INSULIN GLARGINE 40 UNITS: 100 INJECTION, SOLUTION SUBCUTANEOUS at 09:53

## 2023-02-24 RX ADMIN — ATORVASTATIN CALCIUM 10 MG: 10 TABLET, FILM COATED ORAL at 09:51

## 2023-02-24 RX ADMIN — ASPIRIN 81 MG: 81 TABLET, COATED ORAL at 09:50

## 2023-02-24 RX ADMIN — ENOXAPARIN SODIUM 40 MG: 100 INJECTION SUBCUTANEOUS at 11:45

## 2023-02-24 RX ADMIN — INSULIN LISPRO 16 UNITS: 100 INJECTION, SOLUTION INTRAVENOUS; SUBCUTANEOUS at 11:44

## 2023-02-24 RX ADMIN — SODIUM BICARBONATE 1300 MG: 650 TABLET ORAL at 20:31

## 2023-02-24 RX ADMIN — INSULIN LISPRO 16 UNITS: 100 INJECTION, SOLUTION INTRAVENOUS; SUBCUTANEOUS at 08:03

## 2023-02-24 RX ADMIN — ENOXAPARIN SODIUM 40 MG: 100 INJECTION SUBCUTANEOUS at 20:32

## 2023-02-24 RX ADMIN — INSULIN LISPRO 16 UNITS: 100 INJECTION, SOLUTION INTRAVENOUS; SUBCUTANEOUS at 16:45

## 2023-02-24 RX ADMIN — COLCHICINE 0.6 MG: 0.6 TABLET, FILM COATED ORAL at 09:51

## 2023-02-24 RX ADMIN — PREDNISONE 20 MG: 20 TABLET ORAL at 10:07

## 2023-02-24 RX ADMIN — AMLODIPINE BESYLATE 5 MG: 5 TABLET ORAL at 09:51

## 2023-02-24 RX ADMIN — SODIUM CHLORIDE: 9 INJECTION, SOLUTION INTRAVENOUS at 03:33

## 2023-02-24 RX ADMIN — SODIUM BICARBONATE 1300 MG: 650 TABLET ORAL at 09:51

## 2023-02-24 RX ADMIN — ALUMINUM HYDROXIDE, MAGNESIUM HYDROXIDE, AND SIMETHICONE 30 ML: 200; 200; 20 SUSPENSION ORAL at 17:36

## 2023-02-24 RX ADMIN — CEFAZOLIN SODIUM 1000 MG: 1 INJECTION, SOLUTION INTRAVENOUS at 20:35

## 2023-02-24 RX ADMIN — COLCHICINE 0.6 MG: 0.6 TABLET, FILM COATED ORAL at 20:32

## 2023-02-24 RX ADMIN — TICAGRELOR 90 MG: 90 TABLET ORAL at 09:51

## 2023-02-24 RX ADMIN — ALBUTEROL SULFATE 2 PUFF: 90 AEROSOL, METERED RESPIRATORY (INHALATION) at 15:27

## 2023-02-24 RX ADMIN — CARVEDILOL 12.5 MG: 6.25 TABLET, FILM COATED ORAL at 20:31

## 2023-02-24 RX ADMIN — SODIUM CHLORIDE: 9 INJECTION, SOLUTION INTRAVENOUS at 18:38

## 2023-02-24 ASSESSMENT — PAIN SCALES - GENERAL
PAINLEVEL_OUTOF10: 0
PAINLEVEL_OUTOF10: 0

## 2023-02-24 NOTE — CARE COORDINATION
2/24/23, 3:03 PM EST    DISCHARGE PLANNING EVALUATION    Precert approved and valid until 2/26. Transfer packet on chart for ecf when discharged. Transport available at 2:00 pm Saturday 2/25.       2/24/23, 4:04 PM EST    Patient goals/plan/ treatment preferences discussed by  and . Patient goals/plan/ treatment preferences reviewed with patient/ family. Patient/ family verbalize understanding of discharge plan and are in agreement with goal/plan/treatment preferences. Understanding was demonstrated using the teach back method. AVS provided by RN at time of discharge, which includes all necessary medical information pertaining to the patients current course of illness, treatment, post-discharge goals of care, and treatment preferences.      Services At/After Discharge: [facility] (SNF) and In ambulance       IMM Letter  IMM Letter given to Patient/Family/Significant other/Guardian/POA/by[de-identified] Rosaura CHUA CM  IMM Letter date given[de-identified] 02/24/23  IMM Letter time given[de-identified] [phone]

## 2023-02-24 NOTE — PLAN OF CARE
Problem: Respiratory - Adult  Goal: Clear lung sounds  2/23/2023 1950 by Dorothy Murcia RCP  Outcome: Progressing   Patient mutually agreed on goals.

## 2023-02-24 NOTE — DISCHARGE INSTR - COC
Continuity of Care Form    Patient Name: Gus Fagan   :  1950  MRN:  [id number]    Admit date:  2023  Discharge date:  23    Code Status Order: Full Code   Advance Directives:     Admitting Physician:  Yuli Martin MD  PCP: Yuli Martin MD    Discharging Nurse: Jodean Habermann Unit/Room#: 7K-12/012-A  Discharging Unit Phone Number: [phone]    Emergency Contact:   Extended Emergency Contact Information  Primary Emergency Contact: Cherylene Second [address] Phone: [phone]  Mobile Phone: [phone]  Relation: Brother/Sister  Secondary Emergency Contact: Bladimir Munoz [address] Phone: [phone]  Mobile Phone: [phone]  Relation: Child    Past Surgical History:  Past Surgical History:   Procedure Laterality Date    AMPUTATION Left 9/10/14    partial versus complete left hallux amputation, left great toe amputation    APPENDECTOMY      BACK INJECTION Bilateral 2021    Bilateral Si MBB #1 performed by Chantal Scheuermann, MD at [facility] Bilateral 3/1/2021    Bilateral SI MBB #2 performed by Chantal Scheuermann, MD at [address] [place] [address]    fusion of C5-C6    CHOLECYSTECTOMY      COLONOSCOPY   and     colonn polyps  lorenzo    CORONARY ANGIOPLASTY WITH STENT PLACEMENT  2017    Dr Curt Quinteros @ [address] [place] GRAFT  2015    EKG 12-LEAD  2015         FACET JOINT INJECTION Bilateral 2020    Lumbar Facet MBB @L3-4,4-5 bilateral #2 performed by Chantal Scheuermann, MD at [address] [place] [address]      left leg    JOINT REPLACEMENT      bilateral knees gurvinder    PAIN MANAGEMENT PROCEDURE Bilateral 2020    Lumbar Facet MBB @ L3-4,4-5,5-S1 bilateral #1 LUMBAR FACET performed by Chantal Scheuermann, MD at [facility] Right 7/14/2020    Lumbar RFA Bilateral L3-4,4-5  right side first performed by Chanda Huitron MD at [facility] Left 10/1/2020    Lumbar RFA Left side L3-4, 4-5 performed by Chanda Huitron MD at [facility] Bilateral 11/17/2020    TFLESI @L5 bilateral #1 performed by Chanda Huitron MD at [facility] Bilateral 12/10/2020    TFLESI @L5 bilateral #1 performed by Chanda Huitron MD at [address] [place] [address] N/A 12/28/2018    T7-T9 DECOMPRESSION, T7-T9 POSTERIOR FUSION performed by Theresa Sheikh MD at [address] [facility]    TONSILLECTOMY      VASCULAR SURGERY      cabg harvests from left leg       Immunization History:   Immunization History   Administered Date(s) Administered    COVID-19, PFIZER Bivalent BOOSTER, DO NOT Dilute, (age 12y+), IM, 30 mcg/0.3 mL 12/12/2022    COVID-19, PFIZER GRAY top, DO NOT Dilute, (age 15 y+), IM, 30 mcg/0.3 mL 04/26/2022    COVID-19, PFIZER PURPLE top, DILUTE for use, (age 15 y+), 30mcg/0.3mL 02/03/2021, 03/03/2021, 11/17/2021    Influenza Virus Vaccine 10/14/2015    Influenza, AFLURIA (age 1 yrs+), FLUZONE, (age 10 mo+), MDV, 0.5mL 10/04/2016, 09/29/2020, 10/19/2021    Influenza, FLUARIX, FLULAVAL, FLUZONE (age 10 mo+) AND AFLURIA, (age 1 y+), PF, 0.5mL 02/14/2020    PPD Test 08/23/2015, 08/30/2015    Pneumococcal Conjugate 13-valent ([id number]) 06/30/2016    Pneumococcal Polysaccharide ([id number]) 01/06/2010    Td, unspecified formulation 05/01/2009    Tdap (Boostrix, Adacel) 09/09/2014, 01/08/2018    Zoster Recombinant (Shingrix) 12/12/2022       Active Problems:  Patient Active Problem List   Diagnosis Code    HTN (hypertension) I10    Type 2 diabetes mellitus, with long-term current use of insulin ([facility] E11.9, Z79.4    Hypercholesteremia E78.00 Osteoarthritis M19.90    Diabetic peripheral neuropathy ([facility]) E11.42    Coronary artery disease of bypass graft of native heart with stable angina pectoris ([facility]) I25.708    S/P percutaneous transluminal coronary angioplasty Z98.61    Sprain/Injury of anterior ligaments of thoracic spine S23. 3XXA    Venous stasis dermatitis of both lower extremities I87.2    Lumbar spondylosis M47.816    Orthostatic hypotension after exercise I95.1    Lumbar foraminal stenosis M48.061    Lumbar radiculitis M54.16    Sacroiliac inflammation ([facility]) M46.1    Chronic diastolic (congestive) heart failure ([facility]) I50.32    Recurrent major depressive disorder, in partial remission ([facility]) F33.41    Class 3 severe obesity with serious comorbidity and body mass index (BMI) of 45.0 to 49.9 in adult ([facility]) E66.01, Z68.42    Degeneration of lumbosacral intervertebral disc M51.37    Spinal stenosis of lumbar region M48.061    Dehydration E86.0    Acute kidney injury ([facility] N17.9       Isolation/Infection:   Isolation            No Isolation          Patient Infection Status       Infection Onset Added Last Indicated Last Indicated By Review Planned Expiration Resolved Resolved By    None active    Resolved    COVID-19 (Rule Out) 02/21/23 02/21/23 02/21/23 COVID-19 & Influenza Combo (Ordered)   02/21/23 Rule-Out Test Resulted    C-diff Rule Out 02/07/22 02/07/22 02/07/22 Clostridium difficile EIA (Ordered)   05/09/22 Becca Leon RN    COVID-19 (Rule Out) 02/07/22 02/07/22 02/07/22 COVID-19 & Influenza Combo (Ordered)   02/07/22 Rule-Out Test Resulted    COVID-19 (Rule Out) 02/07/22 02/07/22 02/07/22 COVID-19, Rapid (Ordered)   02/07/22 Rule-Out Test Resulted    COVID-19 (Rule Out) 12/08/20 12/08/20 12/08/20 COVID-19 (Ordered)   12/09/20 Rule-Out Test Resulted    COVID-19 (Rule Out) 07/09/20 07/09/20 07/09/20 COVID-19 (Ordered)   07/12/20 Rule-Out Test Resulted    COVID-19 (Rule Out) 05/19/20 05/19/20 05/19/20 COVID-19 (Ordered)   05/20/20 Rule-Out Test Resulted            Nurse Assessment:  Last Vital Signs: BP (!) 141/63   Pulse 55   Temp 97.5 °F (36.4 °C) (Oral)   Resp 18   Ht 6' 2\" (1.88 m)   Wt (!) 355 lb 3.2 oz (161.1 kg)   SpO2 100%   BMI 45.60 kg/m²     Last documented pain score (0-10 scale): Pain Level: 0  Last Weight:   Wt Readings from Last 1 Encounters:   02/24/23 (!) 355 lb 3.2 oz (161.1 kg)     Mental Status:  oriented, alert, coherent, logical, thought processes intact, and able to concentrate and follow conversation    IV Access:  - None    Nursing Mobility/ADLs:  Walking   Assisted  Transfer  Assisted  Bathing  Assisted  Dressing  Assisted  Toileting  Assisted  Feeding  Independent  Med [address]  Med Delivery   whole    Wound Care Documentation and Therapy:  Wound 05/11/22 Coccyx Mid Stage 2 (Active)   Number of days: 289       Incision 12/28/18 Back (Active)   Number of days: 9977        Elimination:  Continence: Bowel: Yes  Bladder: No  Urinary Catheter: None   Colostomy/Ileostomy/Ileal Conduit: No       Date of Last BM:2/24/23    Intake/Output Summary (Last 24 hours) at 2/24/2023 1350  Last data filed at 2/23/2023 1609  Gross per 24 hour   Intake 840 ml   Output --   Net 840 ml     I/O last 3 completed shifts: In: 840 [P.O.:840]  Out: -     Safety Concerns:     History of Falls (last 30 days) and At Risk for Falls    Impairments/Disabilities:      None    Nutrition Therapy:  Current Nutrition Therapy:   - Oral Diet:  Carb Control 3 carbs/meal (1500kcals/day)    Routes of Feeding: Oral  Liquids: No Restrictions  Daily Fluid Restriction: no  Last Modified Barium Swallow with Video (Video Swallowing Test): not done    Treatments at the Time of Hospital Discharge:   Respiratory Treatments: ***  Oxygen Therapy:  is not on home oxygen therapy.   Ventilator:    - No ventilator support    Rehab Therapies: Physical Therapy and Occupational Therapy  Weight Bearing Status/Restrictions: No weight bearing restrictions  Other Medical Equipment (for information only, NOT a DME order):  walker  Other Treatments: ***    Patient's personal belongings (please select all that are sent with patient):  Glasses    RN SIGNATURE:  Electronically signed by Lisette Pena RN on 2/25/23 at 1:12 PM EST    CASE MANAGEMENT/SOCIAL WORK SECTION    Inpatient Status Date: 02/21/2023    Readmission Risk Assessment Score:  Readmission Risk              Risk of Unplanned Readmission:  26           Discharging to Facility/ Agency   Name:  [facility]  Address: [address] [place] [address]  Phone: [phone]  Fax: 4     Dialysis Facility (if applicable)   Name:  Address:  Dialysis Schedule:  Phone:  Fax:    / signature: Electronically signed by NATALYA Walters on 2/24/23 at 1:51 PM EST    PHYSICIAN SECTION    Prognosis: Good    Condition at Discharge: Stable    Rehab Potential (if transferring to Rehab): Good    Recommended Labs or Other Treatments After Discharge: CS AC and HS, BMP in 5 days    Physician Certification: I certify the above information and transfer of Tomasa Conner  is necessary for the continuing treatment of the diagnosis listed and that he requires [facility] for greater 30 days.      Update Admission H&P: Changes in H&P as follows - the knee is less red and sore    PHYSICIAN SIGNATURE:  Electronically signed by Francia Sullivan MD on 2/25/23 at 10:27 AM EST

## 2023-02-24 NOTE — PLAN OF CARE
Problem: Respiratory - Adult  Goal: Clear lung sounds  2/24/2023 0734 by Wolfgang Denver, RCP  Outcome: Progressing   Continue treatment to improve aeration. Improve breath sounds and decrease work of breathing.

## 2023-02-24 NOTE — PROGRESS NOTES
[address] [address]  INPATIENT PHYSICAL THERAPY  DAILY NOTE  [facility] ORTHOPEDICS 7K - 7K-12/012-A    Time In: 4899  Time Out: 1336  Timed Code Treatment Minutes: 27 Minutes  Minutes: 27          Date: 2023  Patient Name: Elizabeth Walker,  Gender:  male        MRN: [id number]  : 1950  (74 y.o.)     Referring Practitioner: Dione Bermudez MD  Diagnosis: dehydration  Additional Pertinent Hx: Per EMR \"Tuan Talley is a 74 y.o. male who arrives by EMS from home presents for weakness. Patient reports at [phone] he tried to get up out of his recliner and his [employer] gave out on me. \"  Patient fell back against the wall and slid down. Patient denies head injury or loss of consciousness. He was unable to get off the floor on his own. This morning he heard some people in the hallway and called for help resulting in EMS arrival.  Patient further explains that for the past 3 days he is really not been able to get out of his chair secondary to knee pain and weakness. He has not gotten up to make food or take his pills. He has been drinking fluids and getting up to urinate. Patient reports shivering which is not a new complaint. It occurs during the winter months. Also he has an intermittent productive cough, also not a new symptom. Patient lives alone in an apartment. Patient denies chest pain, dyspnea, abdominal pain, URI symptoms, UTI symptoms, vomiting, diarrhea, or other complaints. \" Pt had a B knee aspiration .      Prior Level of Function:  Lives With: Alone  Type of Home: Apartment (2nd floor)  Home Layout: One level  Home Access: Elevator, Level entry  Home Equipment: Rollator, Lift chair, Walker, rolling, Reacher   Bathroom Shower/Tub: Tub/Shower unit  Bathroom Toilet: Standard  Bathroom Equipment: Shower chair, Grab bars in shower    ADL Assistance: [address]: Independent  Transfer Assistance: Independent  Active : Yes  Additional Comments: reports being IND with all tasks with 4ww. Pt is independent with his lymphedem  management. used motorized carts for grocery shopping    Restrictions/Precautions:  Restrictions/Precautions: Fall Risk, General Precautions     SUBJECTIVE: RN approved session. Patient in recliner upon arrival and agreeable to therapy. Patient very talkative and requires redirect to task throughout. PAIN: 4/10: back    Vitals: Vitals not assessed per clinical judgement, see nursing flowsheet    OBJECTIVE:  Bed Mobility:  Sit to Supine: Contact Guard Assistance, Minimal Assistance, X 1, with head of bed flat, with rail, with verbal cues , with increased time for completion, assist at BLE onto bed     Transfers:  Sit to Stand: [employer], with increased time for completion, cues for hand placement, from chair  Stand to [facility], with increased time for completion, with verbal cues, to bed    Gareld Holter, PT present to assess transfers and gait during session. Ambulation:  Contact Guard Assistance  Distance: 3ft chair>bed  Surface: Level Tile  Device:Rolling Walker  Gait Deviations: Forward Flexed Posture, Slow Michaelle, Decreased Step Length Bilaterally, Decreased Gait Speed, and Decreased Heel Strike Edil, PT present to assess transfers and gait during session. Exercise:  Patient was guided in 1 set(s) 10 reps of exercise to both lower extremities. Ankle pumps, Quad sets, Heelslides, Hip abduction/adduction, Straight leg raises, Seated marches, and Long arc quads. Exercises were completed for increased independence with functional mobility. Functional Outcome Measures: Completed  AM-PAC Inpatient Mobility without Stair Climbing Raw Score : 12  AM-PAC Inpatient without Stair Climbing T-Scale Score : 37.26    ASSESSMENT:  Assessment: Patient progressing toward established goals. Activity Tolerance:  Patient tolerance of  treatment: good.       Equipment Recommendations:Equipment Needed: No  Discharge Recommendations: Subacte/Skilled Nursing Facility and Patient would benefit from continued PT at discharge  Plan: Current Treatment Recommendations: Strengthening, Balance training, Transfer training, Endurance training, Equipment evaluation, education, & procurement, Patient/Caregiver education & training, Therapeutic activities, Home exercise program, Safety education & training, Neuromuscular re-education  General Plan:  (5x GM/O)    Patient Education  Patient Education: Plan of Care, Bed Mobility, Transfers, Gait    Goals:  Patient Goals : return home  Short Term Goals  Time Frame for Short Term Goals: by discharge  Short Term Goal 1: Pt will demo rolling L and R in bed with max A 1 to progress with mobility. Short Term Goal 2: Pt will demo supine to and from sit transfers with modifications as needed with max Ax1 to progress with mobility. Short Term Goal 3: Pt will demo tolerate 10 min seated on EOB to complete functional tasks with S to progress with strength and mobility. Short Term Goal 4: PT to assess transfers when able. Short Term Goal 5: Pt will tolerate 10-20 reps of ther ex to increase overall mobility. Long Term Goals  Time Frame for Long Term Goals : NA due to short ELOS    Following session, patient left in safe position with all fall risk precautions in place.

## 2023-02-24 NOTE — PROGRESS NOTES
[facility]   Nephrology progress note  2/24/2023, 1:44 PM      Pt Name:    Nahed Medley  MRN:     [id number]     [place]:    1950  Admit Date:    2/21/2023  7:49 AM    Chief Complaint: Nephrology following for RAUL/CKD    Subjective:  Patient was seen and examined this morning  Late entry  No chest pain or shortness of breath  Feels okay    Objective:  24HR INTAKE/OUTPUT:    Intake/Output Summary (Last 24 hours) at 2/24/2023 1344  Last data filed at 2/23/2023 1609  Gross per 24 hour   Intake 840 ml   Output --   Net 840 ml         I/O last 3 completed shifts: In: 840 [P.O.:840]  Out: -   No intake/output data recorded.    Admission weight: (!) 361 lb (163.7 kg)    Wt Readings from Last 3 Encounters:   02/24/23 (!) 355 lb 3.2 oz (161.1 kg)   02/07/23 (!) 361 lb (163.7 kg)   10/10/22 (!) 365 lb 3.2 oz (165.7 kg)        Vitals :   Vitals:    02/24/23 0600 02/24/23 0730 02/24/23 0927 02/24/23 1148   BP:   (!) 141/63    Pulse:   58 55   Resp:   18    Temp:   97.5 °F (36.4 °C)    TempSrc:   Oral    SpO2:  97% 100%    Weight: (!) 355 lb 3.2 oz (161.1 kg)      Height:           Physical examination  General Appearance: alert and cooperative with exam, appears comfortable, no distress  Mouth/Throat: Oral mucosa moist  Neck: No JVD  Lungs: Air entry B/L, no rales, no use of accessory muscles  Heart:  S1, S2 heard  GI: soft, non-tender, no guarding  ++ Lymphedema    Medications:  Infusion:    dextrose      sodium chloride      sodium chloride 75 mL/hr at 02/24/23 0333     Meds:    predniSONE  20 mg Oral Daily    sodium bicarbonate  1,300 mg Oral BID    colchicine  0.6 mg Oral BID    insulin glargine  40 Units SubCUTAneous QAM    insulin lispro  15 Units SubCUTAneous TID WC    insulin lispro  0-16 Units SubCUTAneous TID WC    insulin lispro  0-4 Units SubCUTAneous Nightly    albuterol sulfate HFA  2 puff Inhalation BID    amLODIPine  5 mg Oral Daily    aspirin  81 mg Oral Daily    atorvastatin  10 mg Oral Daily    carvedilol  12.5 mg Oral BID    sodium chloride flush  5-40 mL IntraVENous 2 times per day    enoxaparin  40 mg SubCUTAneous BID    ticagrelor  90 mg Oral BID    ceFAZolin  1,000 mg IntraVENous Q8H     Meds prn: glucose, dextrose bolus **OR** dextrose bolus, glucagon (rDNA), dextrose, sodium chloride flush, sodium chloride, ondansetron **OR** ondansetron, polyethylene glycol, acetaminophen **OR** acetaminophen, potassium chloride **OR** potassium alternative oral replacement **OR** potassium chloride, magnesium sulfate     Lab Data :  CBC:   Recent Labs     02/22/23  0535 02/23/23  0623 02/24/23  0559   WBC 7.3 8.4 8.6   HGB 8.1* 8.6* 8.1*   HCT 25.0* 26.0* 23.9*    200 212       CMP:  Recent Labs     02/22/23  0535 02/23/23  0623 02/24/23  0559    136 137   K 4.2 4.3 4.0    102 102   CO2 20* 19* 22*   BUN 59* 58* 64*   CREATININE 1.8* 1.8* 1.5*   GLUCOSE 363* 350* 424*   CALCIUM 7.9* 8.6 8.1*       Hepatic:   Recent Labs     02/22/23  0535   LABALBU 2.9*   AST 85*   ALT 61   BILITOT 0.4   ALKPHOS 114           Assessment and Plan:  RAUL on CKD 3B  Serum creatinine at baseline this morning. Down to 1.5  Stable CKD at this time  Metabolic acidosis. Continue sodium bicarb  Diabetes with hyperglycemia. Acute gout. On colchicine. Add allopurinol once acute flareup is over. Advised low red meat intake  Anemia in CKD  IDDM  Fall  Chronic lymphedema    D/W patient     Tamra Ritter MD  [facility]    This report has been created using voice recognition software.  It may contain minor errors which are inherent in voice recognition technology

## 2023-02-24 NOTE — PLAN OF CARE
Problem: Discharge Planning  Goal: Discharge to home or other facility with appropriate resources  2/24/2023 1125 by Rodger Huitron RN  Outcome: Progressing  Flowsheets (Taken 2/24/2023 1125)  Discharge to home or other facility with appropriate resources: Identify barriers to discharge with patient and caregiver     Problem: Pain  Goal: Verbalizes/displays adequate comfort level or baseline comfort level  2/24/2023 1125 by Rodger Huitron RN  Outcome: Progressing  Flowsheets (Taken 2/24/2023 1125)  Verbalizes/displays adequate comfort level or baseline comfort level:   Encourage patient to monitor pain and request assistance   Assess pain using appropriate pain scale     Problem: Safety - Adult  Goal: Free from fall injury  2/24/2023 1125 by Rodger Huitron RN  Outcome: Progressing  Flowsheets  Taken 2/24/2023 1125  Free From Fall Injury: Instruct family/caregiver on patient safety  Taken 2/24/2023 1122  Free From Fall Injury: Instruct family/caregiver on patient safety     Problem: Skin/Tissue Integrity  Goal: Absence of new skin breakdown  Description: 1. Monitor for areas of redness and/or skin breakdown  2. Assess vascular access sites hourly  3. Every 4-6 hours minimum:  Change oxygen saturation probe site  4. Every 4-6 hours:  If on nasal continuous positive airway pressure, respiratory therapy assess nares and determine need for appliance change or resting period.   2/24/2023 1125 by Rodger Huitron RN  Outcome: Progressing  Note: Redness on buttocks, epc cream applied, waffle cushion in chair, patient encouraged to reposition      Problem: Chronic Conditions and Co-morbidities  Goal: Patient's chronic conditions and co-morbidity symptoms are monitored and maintained or improved  2/24/2023 1125 by Rodger Huitron RN  Outcome: Progressing  Flowsheets (Taken 2/24/2023 1125)  Care Plan - Patient's Chronic Conditions and Co-Morbidity Symptoms are Monitored and Maintained or Improved: Monitor and assess patient's chronic conditions and comorbid symptoms for stability, deterioration, or improvement     Problem: Hematologic - Adult  Goal: Maintains hematologic stability  2/24/2023 1125 by Marlan Ormond, RN  Outcome: Progressing  Flowsheets (Taken 2/24/2023 1125)  Maintains hematologic stability: Assess for signs and symptoms of bleeding or hemorrhage     Problem: ABCDS Injury Assessment  Goal: Absence of physical injury  Outcome: Progressing  Flowsheets (Taken 2/24/2023 1125)  Absence of Physical Injury: Implement safety measures based on patient assessment    Care plan reviewed with patient. Patient verbalize understanding of the plan of care and contribute to goal setting.

## 2023-02-24 NOTE — PLAN OF CARE
Problem: Discharge Planning  Goal: Discharge to home or other facility with appropriate resources  2/23/2023 2158 by Vicky Gonzalez RN  Outcome: Progressing  Flowsheets (Taken 2/23/2023 2158)  Discharge to home or other facility with appropriate resources:   Identify barriers to discharge with patient and caregiver   Arrange for needed discharge resources and transportation as appropriate   Identify discharge learning needs (meds, wound care, etc)  Note: Plans to be discharged to [facility]. Referral made by social work. Problem: Pain  Goal: Verbalizes/displays adequate comfort level or baseline comfort level  2/23/2023 2158 by Vicky Gonzalez RN  Outcome: Progressing  Flowsheets (Taken 2/23/2023 2158)  Verbalizes/displays adequate comfort level or baseline comfort level:   Encourage patient to monitor pain and request assistance   Assess pain using appropriate pain scale   Administer analgesics based on type and severity of pain and evaluate response   Implement non-pharmacological measures as appropriate and evaluate response  Note: Patient taking pain medication on MAR as needed to control pain. Use of non-pharmacologic pain management including repositioning. Patient pain goal is 2. Problem: Safety - Adult  Goal: Free from fall injury  2/23/2023 2158 by Vicky Gonzalez RN  Outcome: Progressing  Flowsheets (Taken 2/23/2023 2158)  Free From Fall Injury: Instruct family/caregiver on patient safety  Note: Able to use call light. Patient has remained free of falls during this shift. Appropriate fall prevention measures in place. Problem: Skin/Tissue Integrity  Goal: Absence of new skin breakdown  Description: 1. Monitor for areas of redness and/or skin breakdown  2. Assess vascular access sites hourly  3. Every 4-6 hours minimum:  Change oxygen saturation probe site  4.   Every 4-6 hours:  If on nasal continuous positive airway pressure, respiratory therapy assess nares and determine need for appliance change or resting period. 2/23/2023 2158 by José Luis Gaviria RN  Outcome: Progressing  Note: Assess skin every 4 hours.      Problem: Chronic Conditions and Co-morbidities  Goal: Patient's chronic conditions and co-morbidity symptoms are monitored and maintained or improved  2/23/2023 2158 by José Luis Gaviria RN  Outcome: Progressing  Flowsheets (Taken 2/23/2023 2158)  Care Plan - Patient's Chronic Conditions and Co-Morbidity Symptoms are Monitored and Maintained or Improved:   Monitor and assess patient's chronic conditions and comorbid symptoms for stability, deterioration, or improvement   Collaborate with multidisciplinary team to address chronic and comorbid conditions and prevent exacerbation or deterioration     Problem: Cardiovascular - Adult  Goal: Maintains optimal cardiac output and hemodynamic stability  2/23/2023 2158 by José Luis Gaviria RN  Outcome: Progressing  Flowsheets (Taken 2/23/2023 2158)  Maintains optimal cardiac output and hemodynamic stability:   Monitor blood pressure and heart rate   Assess for signs of decreased cardiac output     Problem: Cardiovascular - Adult  Goal: Absence of cardiac dysrhythmias or at baseline  2/23/2023 2158 by José Luis Gaviria RN  Outcome: Progressing  Flowsheets (Taken 2/23/2023 2158)  Absence of cardiac dysrhythmias or at baseline:   Monitor cardiac rate and rhythm   Assess for signs of decreased cardiac output     Problem: Skin/Tissue Integrity - Adult  Goal: Skin integrity remains intact  2/23/2023 2158 by José Luis Gaviria RN  Outcome: Progressing  Flowsheets (Taken 2/23/2023 2158)  Skin Integrity Remains Intact:   Monitor for areas of redness and/or skin breakdown   Assess vascular access sites hourly     Problem: Gastrointestinal - Adult  Goal: Minimal or absence of nausea and vomiting  2/23/2023 2158 by José Luis Gaviria RN  Outcome: Progressing  Flowsheets (Taken 2/23/2023 2158)  Minimal or absence of nausea and vomiting: Administer IV fluids as ordered to ensure adequate hydration     Problem: Metabolic/Fluid and Electrolytes - Adult  Goal: Electrolytes maintained within normal limits  2/23/2023 2158 by Tomasa Hatch RN  Outcome: Progressing  Flowsheets (Taken 2/23/2023 2158)  Electrolytes maintained within normal limits: Monitor labs and assess patient for signs and symptoms of electrolyte imbalances     Problem: Metabolic/Fluid and Electrolytes - Adult  Goal: Glucose maintained within prescribed range  2/23/2023 2158 by Tomasa Hatch RN  Outcome: Progressing  Flowsheets (Taken 2/23/2023 2158)  Glucose maintained within prescribed range:   Monitor blood glucose as ordered   Assess for signs and symptoms of hyperglycemia and hypoglycemia     Problem: Hematologic - Adult  Goal: Maintains hematologic stability  2/23/2023 2158 by Tomasa Hatch RN  Outcome: Progressing  Flowsheets (Taken 2/23/2023 2158)  Maintains hematologic stability: Assess for signs and symptoms of bleeding or hemorrhage     Care plan reviewed with patient. Patient verbalized understanding of the plan of care and contribute to goal setting.

## 2023-02-24 NOTE — PROGRESS NOTES
Occupational Therapy  [facility]  Occupational Therapy  Daily Note  Time:   Time In: 957  Time Out: 1021  Timed Code Treatment Minutes: 24 Minutes  Minutes: 24          Date: 2023  Patient Name: Nahed Medley,   Gender: male      Room: [facility]12/012-A  MRN: [id number]  : 1950  (74 y.o.)  Referring Practitioner: Dr. Flaquito Hanson MD  Diagnosis: Dehydration  Additional Pertinent Hx: Pt with PMH of chronic low back pain along with diffuse generalized  osteoarthritis and history of status post coronary artery bypass  grafting with history of non-insulin dependent diabetes, long-term use  of insulin with polyneuropathy, who now presents with increased amount  of weakness from having fallen while at home. He remained on the floor for 7 hours and had EMS help him off of the floor. DX: dehydration, gout    Restrictions/Precautions:  Restrictions/Precautions: Fall Risk, General Precautions     SUBJECTIVE: Nurse approved OT evaluation. Pt in bed upon OT arrival. Pleasant and cooperative. PAIN: 7/10: in standing, R knee    Vitals: Vitals not assessed per clinical judgement, see nursing flowsheet    COGNITION: WFL    ADL:   Lower Extremity Dressing: Dependent. Footwear Management: Dependent. Toileting: Dependent. Pt wearing pads and diaper brief . BALANCE:  Sitting Balance:  Modified Independent. Standing Balance: Contact Guard Assistance. BED MOBILITY:  Supine to Sit: Stand By Assistance with increased time    TRANSFERS:  Sit to Stand:  Contact Guard Assistance. From EOB and from recliner chair with increased time to complete  Stand to Sit: [address] FUNCTIONAL MOBILITY:  Assistive Device: Rolling Walker  Assist Level:  Contact Guard Assistance. Distance: To and from bathroom.  Significant amount of weight observed to be placed through B UEs     ASSESSMENT:     Activity Tolerance:  Patient tolerance of  treatment: Fair treatment tolerance and Limited by pain      Discharge Recommendations: Subacute/skilled nursing facility  Equipment Recommendations: Equipment Needed: No  Other: Pt has shower chair and RW,rollator  Plan: Times Per Week: 5x  Times Per Day: Once a day  Current Treatment Recommendations: Strengthening, Balance training, Endurance training, Functional mobility training, Neuromuscular re-education, Safety education & training, Equipment evaluation, education, & procurement, Self-Care / ADL, Return to work related activity, Home management training, Patient/Caregiver education & training    Patient Education  Patient Education: Role of OT, Plan of Care, ADL's, and Precautions    Goals  Short Term Goals  Time Frame for Short Term Goals: until discharge  Short Term Goal 1: Pt will complete sit to stand transfer with CGA x1 to prepare for ADL transfers. Short Term Goal 2: Pt will tolerate OTR assessment for functional mobility when appropriate. Short Term Goal 3: Pt will complete UB/LB dressing routine with Min A. Short Term Goal 4: Pt will tolerate standing and weight shifting with RW x2 minutes to prepare for functional mobility and sinkside ADLs. Following session, patient left in safe position with all fall risk precautions in place.

## 2023-02-24 NOTE — CARE COORDINATION
2/24/23, 10:31 AM EST    DISCHARGE PLANNING EVALUATION    Spoke with [facility] admissions. Facility will accept and has started precert.

## 2023-02-24 NOTE — CARE COORDINATION
2/24/23, 2:29 PM EST    DISCHARGE ON GOING EVALUATION    [facility] day: 3  Location: -12/012-A Reason for admit: Dehydration [E86.0]  Ataxia [R27.0]  General weakness [R53.1]  Elevated troponin [R77.8]  Fall in home, initial encounter [W19. XXXA, Y92.009]  Uncontrolled type 2 diabetes mellitus with hyperglycemia (HCC) [E11.65]  Acute bilateral knee pain [M25.561, M25.562]  Acute bilateral low back pain without sciatica [M54.50]  Chronic kidney disease, unspecified CKD stage [N18.9]  Acute kidney injury ([facility] [address] [N17.9]   Procedure:    2-22-23 Earl knee aspiration  Barriers to Discharge: Await precert. Hospitalist and nephro following. Continue working with therapy. PCP: Jeff Osei MD  Readmission Risk Score: 20%  Patient Goals/Plan/Treatment Preferences: [facility] once precert returns.

## 2023-02-24 NOTE — CARE COORDINATION
2/24/23, 8:13 AM EST    DISCHARGE PLANNING EVALUATION    Spoke with Meena from [facility], they are waling for medication cost out, when received will start pre-cert. 2/24/23, 8:14 AM EST    DISCHARGE BARRIERS        Patient transferred to [address] [place] [address]. Report given to unit Vijay Campos, regarding discharge plan for this patient.

## 2023-02-25 VITALS
WEIGHT: 315 LBS | RESPIRATION RATE: 18 BRPM | TEMPERATURE: 97.7 F | DIASTOLIC BLOOD PRESSURE: 80 MMHG | HEIGHT: 74 IN | BODY MASS INDEX: 40.43 KG/M2 | HEART RATE: 77 BPM | OXYGEN SATURATION: 99 % | SYSTOLIC BLOOD PRESSURE: 147 MMHG

## 2023-02-25 LAB
ANION GAP SERPL CALC-SCNC: 12 MEQ/L (ref 8–16)
BUN SERPL-MCNC: 52 MG/DL (ref 7–22)
CALCIUM SERPL-MCNC: 8.3 MG/DL (ref 8.5–10.5)
CHLORIDE SERPL-SCNC: 104 MEQ/L (ref 98–111)
CO2 SERPL-SCNC: 21 MEQ/L (ref 23–33)
CREAT SERPL-MCNC: 1.2 MG/DL (ref 0.4–1.2)
GFR SERPL CREATININE-BSD FRML MDRD: > 60 ML/MIN/1.73M2
GLUCOSE BLD STRIP.AUTO-MCNC: 317 MG/DL (ref 70–108)
GLUCOSE BLD STRIP.AUTO-MCNC: 358 MG/DL (ref 70–108)
GLUCOSE SERPL-MCNC: 310 MG/DL (ref 70–108)
POTASSIUM SERPL-SCNC: 3.9 MEQ/L (ref 3.5–5.2)
SODIUM SERPL-SCNC: 137 MEQ/L (ref 135–145)

## 2023-02-25 PROCEDURE — 94640 AIRWAY INHALATION TREATMENT: CPT

## 2023-02-25 PROCEDURE — 36415 COLL VENOUS BLD VENIPUNCTURE: CPT

## 2023-02-25 PROCEDURE — 6370000000 HC RX 637 (ALT 250 FOR IP): Performed by: INTERNAL MEDICINE

## 2023-02-25 PROCEDURE — 94760 N-INVAS EAR/PLS OXIMETRY 1: CPT

## 2023-02-25 PROCEDURE — 82948 REAGENT STRIP/BLOOD GLUCOSE: CPT

## 2023-02-25 PROCEDURE — 80048 BASIC METABOLIC PNL TOTAL CA: CPT

## 2023-02-25 PROCEDURE — 6370000000 HC RX 637 (ALT 250 FOR IP): Performed by: FAMILY MEDICINE

## 2023-02-25 PROCEDURE — 6360000002 HC RX W HCPCS: Performed by: FAMILY MEDICINE

## 2023-02-25 RX ORDER — CEPHALEXIN 500 MG/1
500 CAPSULE ORAL 3 TIMES DAILY
Qty: 21 CAPSULE | Refills: 0 | DISCHARGE
Start: 2023-02-25

## 2023-02-25 RX ORDER — ONDANSETRON 4 MG/1
4 TABLET, ORALLY DISINTEGRATING ORAL EVERY 8 HOURS PRN
DISCHARGE
Start: 2023-02-25

## 2023-02-25 RX ORDER — SODIUM BICARBONATE 650 MG/1
1300 TABLET ORAL 2 TIMES DAILY
DISCHARGE
Start: 2023-02-25

## 2023-02-25 RX ORDER — COLCHICINE 0.6 MG/1
0.6 TABLET ORAL DAILY
Status: DISCONTINUED | OUTPATIENT
Start: 2023-02-25 | End: 2023-02-25 | Stop reason: HOSPADM

## 2023-02-25 RX ORDER — COLCHICINE 0.6 MG/1
0.6 TABLET ORAL DAILY
Qty: 30 TABLET | Refills: 0 | DISCHARGE
Start: 2023-02-26

## 2023-02-25 RX ORDER — INSULIN GLARGINE 100 [IU]/ML
45 INJECTION, SOLUTION SUBCUTANEOUS EVERY MORNING
Status: DISCONTINUED | OUTPATIENT
Start: 2023-02-25 | End: 2023-02-25 | Stop reason: HOSPADM

## 2023-02-25 RX ORDER — PREDNISONE 20 MG/1
20 TABLET ORAL DAILY
Qty: 5 TABLET | Refills: 0 | DISCHARGE
Start: 2023-02-26 | End: 2023-03-03

## 2023-02-25 RX ADMIN — CEFAZOLIN SODIUM 1000 MG: 1 INJECTION, SOLUTION INTRAVENOUS at 05:06

## 2023-02-25 RX ADMIN — INSULIN LISPRO 16 UNITS: 100 INJECTION, SOLUTION INTRAVENOUS; SUBCUTANEOUS at 11:42

## 2023-02-25 RX ADMIN — TICAGRELOR 90 MG: 90 TABLET ORAL at 08:27

## 2023-02-25 RX ADMIN — INSULIN GLARGINE 45 UNITS: 100 INJECTION, SOLUTION SUBCUTANEOUS at 08:32

## 2023-02-25 RX ADMIN — PANTOPRAZOLE SODIUM 40 MG: 40 TABLET, DELAYED RELEASE ORAL at 05:06

## 2023-02-25 RX ADMIN — ATORVASTATIN CALCIUM 10 MG: 10 TABLET, FILM COATED ORAL at 08:27

## 2023-02-25 RX ADMIN — CEFAZOLIN SODIUM 1000 MG: 1 INJECTION, SOLUTION INTRAVENOUS at 11:47

## 2023-02-25 RX ADMIN — SODIUM BICARBONATE 1300 MG: 650 TABLET ORAL at 08:27

## 2023-02-25 RX ADMIN — INSULIN LISPRO 12 UNITS: 100 INJECTION, SOLUTION INTRAVENOUS; SUBCUTANEOUS at 08:32

## 2023-02-25 RX ADMIN — INSULIN LISPRO 15 UNITS: 100 INJECTION, SOLUTION INTRAVENOUS; SUBCUTANEOUS at 11:42

## 2023-02-25 RX ADMIN — INSULIN LISPRO 15 UNITS: 100 INJECTION, SOLUTION INTRAVENOUS; SUBCUTANEOUS at 08:32

## 2023-02-25 RX ADMIN — ALBUTEROL SULFATE 2 PUFF: 90 AEROSOL, METERED RESPIRATORY (INHALATION) at 08:12

## 2023-02-25 RX ADMIN — ASPIRIN 81 MG: 81 TABLET, COATED ORAL at 08:26

## 2023-02-25 RX ADMIN — AMLODIPINE BESYLATE 5 MG: 5 TABLET ORAL at 08:27

## 2023-02-25 RX ADMIN — PREDNISONE 20 MG: 20 TABLET ORAL at 08:27

## 2023-02-25 RX ADMIN — COLCHICINE 0.6 MG: 0.6 TABLET, FILM COATED ORAL at 08:26

## 2023-02-25 RX ADMIN — ENOXAPARIN SODIUM 40 MG: 100 INJECTION SUBCUTANEOUS at 08:25

## 2023-02-25 ASSESSMENT — ENCOUNTER SYMPTOMS
VOMITING: 0
DIARRHEA: 0
NAUSEA: 0
ABDOMINAL DISTENTION: 0
RHINORRHEA: 0
CONSTIPATION: 0
APNEA: 0
CHEST TIGHTNESS: 0

## 2023-02-25 NOTE — PLAN OF CARE
Problem: Respiratory - Adult  Goal: Clear lung sounds  2/25/2023 0817 by Phong Gold RCP  Outcome: Progressing     Problem: Discharge Planning  Goal: Discharge to home or other facility with appropriate resources  2/25/2023 1434 by Katie Mckeon RN  Outcome: Completed  2/25/2023 1433 by Katie Mckeon RN  Outcome: Progressing     Problem: Pain  Goal: Verbalizes/displays adequate comfort level or baseline comfort level  2/25/2023 1434 by Katie Mckeon RN  Outcome: Completed  2/25/2023 1433 by Katie Mckeon RN  Outcome: Progressing     Problem: Safety - Adult  Goal: Free from fall injury  2/25/2023 1434 by Katie Mckeon RN  Outcome: Completed  2/25/2023 1433 by Katie Mckeon RN  Outcome: Progressing     Problem: Skin/Tissue Integrity  Goal: Absence of new skin breakdown  Description: 1. Monitor for areas of redness and/or skin breakdown  2. Assess vascular access sites hourly  3. Every 4-6 hours minimum:  Change oxygen saturation probe site  4. Every 4-6 hours:  If on nasal continuous positive airway pressure, respiratory therapy assess nares and determine need for appliance change or resting period.   2/25/2023 1434 by Katie Mckeon RN  Outcome: Completed  2/25/2023 1433 by Katie Mckeon RN  Outcome: Progressing     Problem: Chronic Conditions and Co-morbidities  Goal: Patient's chronic conditions and co-morbidity symptoms are monitored and maintained or improved  2/25/2023 1434 by Katie Mckeon RN  Outcome: Completed  2/25/2023 1433 by Katie Mckeon RN  Outcome: Progressing     Problem: Hematologic - Adult  Goal: Maintains hematologic stability  2/25/2023 1434 by Katie Mckeon RN  Outcome: Completed  2/25/2023 1433 by Katie Mckeon RN  Outcome: Progressing     Problem: ABCDS Injury Assessment  Goal: Absence of physical injury  2/25/2023 1434 by Katie Mckeon RN  Outcome: Completed  2/25/2023 1433 by Katie Mckeon RN  Outcome: Progressing

## 2023-02-25 NOTE — PROGRESS NOTES
Patient: [facility]  Unit/Bed: 1G-86/920-L  YOB: 1950  MRN: [id number] Acct: [de-identified]   Admitting Diagnosis: Dehydration [E86.0]  Ataxia [R27.0]  General weakness [R53.1]  Elevated troponin [R77.8]  Fall in home, initial encounter [W19. XXXA, Y92.009]  Uncontrolled type 2 diabetes mellitus with hyperglycemia (HCC) [E11.65]  Acute bilateral knee pain [M25.561, M25.562]  Acute bilateral low back pain without sciatica [M54.50]  Chronic kidney disease, unspecified CKD stage [N18.9]  Acute kidney injury ([facility] [address] [N17.9]  Admit Date:  2/21/2023  Hospital Day: 4    Assessment:     Principal Problem:    Dehydration  Active Problems:    Acute kidney injury ([facility] [address]  Resolved Problems:    * No resolved hospital problems. *      Plan:     Medically stable for move to SNF today        Subjective:     Patient has no complaint of CP or SOB. .   Medication side effects: none    Scheduled Meds:   colchicine  0.6 mg Oral Daily    insulin glargine  45 Units SubCUTAneous QAM    predniSONE  20 mg Oral Daily    pantoprazole  40 mg Oral QAM AC    sodium bicarbonate  1,300 mg Oral BID    insulin lispro  15 Units SubCUTAneous TID WC    insulin lispro  0-16 Units SubCUTAneous TID WC    insulin lispro  0-4 Units SubCUTAneous Nightly    albuterol sulfate HFA  2 puff Inhalation BID    amLODIPine  5 mg Oral Daily    aspirin  81 mg Oral Daily    atorvastatin  10 mg Oral Daily    carvedilol  12.5 mg Oral BID    sodium chloride flush  5-40 mL IntraVENous 2 times per day    enoxaparin  40 mg SubCUTAneous BID    ticagrelor  90 mg Oral BID    ceFAZolin  1,000 mg IntraVENous Q8H     Continuous Infusions:   dextrose      sodium chloride      sodium chloride 75 mL/hr at 02/24/23 1838     PRN Meds:aluminum & magnesium hydroxide-simethicone, glucose, dextrose bolus **OR** dextrose bolus, glucagon (rDNA), dextrose, sodium chloride flush, sodium chloride, ondansetron **OR** ondansetron, polyethylene glycol, acetaminophen **OR** acetaminophen, potassium chloride **OR** potassium alternative oral replacement **OR** potassium chloride, magnesium sulfate    Review of Systems  Pertinent items are noted in HPI. Objective:     Patient Vitals for the past 8 hrs:   BP Temp Temp src Pulse Resp SpO2   02/25/23 0827 (!) 128/59 -- -- -- -- --   02/25/23 0744 (!) 128/59 97.6 °F (36.4 °C) Axillary 57 18 99 %   02/25/23 0500 134/83 97.6 °F (36.4 °C) Oral 76 18 99 %     I/O last 3 completed shifts: In: 1000 [P.O.:1000]  Out: -   I/O this shift:   In: 18 [P.O.:480]  Out: -     BP (!) 128/59   Pulse 57   Temp 97.6 °F (36.4 °C) (Axillary)   Resp 18   Ht 6' 2\" (1.88 m)   Wt (!) 355 lb 3.2 oz (161.1 kg)   SpO2 99%   BMI 45.60 kg/m²     General appearance: alert, appears stated age, and cooperative  Head: Normocephalic, without obvious abnormality, atraumatic  Lungs: clear to auscultation bilaterally  Chest wall: no tenderness  Heart: regular rate and rhythm, S1, S2 normal, no murmur, click, rub or gallop  Abdomen: soft, non-tender; bowel sounds normal; no masses,  no organomegaly  Extremities: edema 1-2+ and venous stasis dermatitis noted bilaterally, knees are not red or hot  Skin:  thick stasis dernatitis  Neurologic: Grossly normal    Data Review:    Latest Reference Range & Units 2/24/23 10:47 2/24/23 15:08 2/24/23 19:25 2/25/23 06:10   POC Glucose 70 - 108 mg/dl 394 (H) 377 (H) 376 (H) 317 (H)   (H): Data is abnormally high    Electronically signed by James Galicia MD on 2/25/2023 at 10:07 AM

## 2023-02-25 NOTE — PROGRESS NOTES
Progress Note  Date:2/24/2023      Room:[address] [place] [address]  Patient Name:Tuan Hassan     YOB: 1950     Age:72 y.o. Subjective    Subjective:  Symptoms:  Stable. No chest pain, weakness or diarrhea. Diet:  Adequate intake. No nausea or vomiting. Activity level: Impaired due to weakness. Pain:  He complains of pain that is mild. He reports pain is improving. (Overall better as was standing with therapy).     Current Facility-Administered Medications   Medication Dose Route Frequency Provider Last Rate Last Admin    colchicine (MITIGARE) capsule 0.6 mg  0.6 mg Oral Daily Anyi Lambert MD        insulin glargine (LANTUS) injection vial 45 Units  45 Units SubCUTAneous QAM Anyi Lambert MD        predniSONE (DELTASONE) tablet 20 mg  20 mg Oral Daily Anyi Lambert MD   20 mg at 02/24/23 1007    aluminum & magnesium hydroxide-simethicone (MAALOX) 200-200-20 MG/5ML suspension 30 mL  30 mL Oral Q6H PRN Anyi Lambert MD   30 mL at 02/24/23 1736    pantoprazole (PROTONIX) tablet 40 mg  40 mg Oral QAM AC Anyi Lambert MD   40 mg at 02/25/23 0506    sodium bicarbonate tablet 1,300 mg  1,300 mg Oral BID Melinda Nolan MD   1,300 mg at 02/24/23 2031    insulin lispro (HUMALOG) injection vial 15 Units  15 Units SubCUTAneous TID RODOLFO Lambert MD   15 Units at 02/24/23 1645    insulin lispro (HUMALOG) injection vial 0-16 Units  0-16 Units SubCUTAneous TID RODOLFO Lambert MD   16 Units at 02/24/23 1645    insulin lispro (HUMALOG) injection vial 0-4 Units  0-4 Units SubCUTAneous Nightly Anyi Lambert MD   4 Units at 02/24/23 2035    albuterol sulfate HFA (PROVENTIL;VENTOLIN;PROAIR) 108 (90 Base) MCG/ACT inhaler 2 puff  2 puff Inhalation BID Anyi Lambert MD   2 puff at 02/24/23 1527    amLODIPine (NORVASC) tablet 5 mg  5 mg Oral Daily Anyi Lambert MD   5 mg at 02/24/23 0951    aspirin EC tablet 81 mg  81 mg Oral Daily Anyi Lambert MD 81 mg at 02/24/23 0950    atorvastatin (LIPITOR) tablet 10 mg  10 mg Oral Daily Corwin Campos MD   10 mg at 02/24/23 7105    carvedilol (COREG) tablet 12.5 mg  12.5 mg Oral BID Corwin Campos MD   12.5 mg at 02/24/23 2031    glucose chewable tablet 16 g  4 tablet Oral PRN Corwin Campos MD        dextrose bolus 10% 125 mL  125 mL IntraVENous PRN Corwin Campos MD        Or    dextrose bolus 10% 250 mL  250 mL IntraVENous PRN Corwin Campos MD        glucagon (rDNA) injection 1 mg  1 mg SubCUTAneous PRN Corwin Campos MD        dextrose 10 % infusion   IntraVENous Continuous PRN Corwin Campos MD        sodium chloride flush 0.9 % injection 5-40 mL  5-40 mL IntraVENous 2 times per day Corwin Campos MD        sodium chloride flush 0.9 % injection 5-40 mL  5-40 mL IntraVENous PRN Corwin Campos MD        0.9 % sodium chloride infusion   IntraVENous PRN Corwin Campos MD        enoxaparin (LOVENOX) injection 40 mg  40 mg SubCUTAneous BID Corwin Campos MD   40 mg at 02/24/23 2032    ondansetron (ZOFRAN-ODT) disintegrating tablet 4 mg  4 mg Oral Q8H PRN Corwin Campos MD        Or    ondansetron [facility]) injection 4 mg  4 mg IntraVENous Q6H PRN Corwin Campos MD        polyethylene glycol (GLYCOLAX) packet 17 g  17 g Oral Daily PRN Corwin Campos MD        acetaminophen (TYLENOL) tablet 650 mg  650 mg Oral Q6H PRN Corwin Campos MD   650 mg at 02/22/23 8668    Or    acetaminophen (TYLENOL) suppository 650 mg  650 mg Rectal Q6H PRN Corwin Campos MD        potassium chloride (KLOR-CON M) extended release tablet 40 mEq  40 mEq Oral PRN Corwin Campos MD        Or    potassium bicarb-citric acid (EFFER-K) effervescent tablet 40 mEq  40 mEq Oral PRN Corwin Campos MD        Or    potassium chloride 10 mEq/100 mL IVPB (Peripheral Line)  10 mEq IntraVENous PRN Corwin Campos MD        magnesium sulfate 2000 mg in 50 mL IVPB premix 2,000 mg IntraVENous PRN Rodney Pablo MD        ticagror [facility]) tablet 90 mg  90 mg Oral BID Rodney Pablo MD   90 mg at 23    0.9 % sodium chloride infusion   IntraVENous Continuous Rodney Pablo MD 75 mL/hr at 23 New Bag at 23    ceFAZolin (ANCEF) 1000 mg in dextrose 5 % 50 mL IVPB (premix)  1,000 mg IntraVENous Q8H Rodney Pablo MD   Stopped at 23 0536      Review of Systems   Constitutional:  Negative for activity change and appetite change. HENT:  Negative for congestion and rhinorrhea. Respiratory:  Negative for apnea and chest tightness. Cardiovascular:  Negative for chest pain and leg swelling. Gastrointestinal:  Negative for abdominal distention, constipation, diarrhea, nausea and vomiting. Genitourinary:  Negative for difficulty urinating. Musculoskeletal:  Positive for joint swelling (knee pain bilateral  much better). Negative for arthralgias and myalgias. Skin:  Positive for wound. Negative for rash. Left lower leg less redness   Neurological:  Negative for dizziness, weakness, light-headedness and headaches. Objective         Vitals Last 24 Hours:  TEMPERATURE:  Temp  Av.6 °F (36.4 °C)  Min: 97.5 °F (36.4 °C)  Max: 97.6 °F (36.4 °C)  RESPIRATIONS RANGE: Resp  Av.4  Min: 16  Max: 18  PULSE OXIMETRY RANGE: SpO2  Av.3 %  Min: 97 %  Max: 100 %  PULSE RANGE: Pulse  Av.5  Min: 55  Max: 76  BLOOD PRESSURE RANGE: Systolic (83WCO), GQV:471 , Min:130 , LXP:429   ; Diastolic (28DOE), GSX:85, Min:63, Max:83    I/O (24Hr): Intake/Output Summary (Last 24 hours) at 2023 0626  Last data filed at 2023 0420  Gross per 24 hour   Intake 1000 ml   Output --   Net 1000 ml     Objective:  General Appearance:  Comfortable. Vital signs: (most recent): Blood pressure 134/83, pulse 76, temperature 97.6 °F (36.4 °C), temperature source Oral, resp.  rate 18, height 6' 2\" (1.88 m), weight (!) 355 lb 3.2 oz (161.1 kg), SpO2 99 %. Vital signs are normal.    Output: Producing urine and producing stool. HEENT: Normal HEENT exam.    Lungs:  Normal effort and normal respiratory rate. Breath sounds clear to auscultation. Heart: Normal rate. Regular rhythm. S1 normal and S2 normal.  No murmur. Abdomen: Abdomen is soft and non-distended. Bowel sounds are normal.   There is no abdominal tenderness. Extremities: Normal range of motion.  (Knee with  decrease warmth redness and swelling and movement better)  Skin:  No rash. (Left leg redness less and swelling less)  Labs/Imaging/Diagnostics    Labs:  CBC:  Recent Labs     02/23/23  0623 02/24/23  0559   WBC 8.4 8.6   RBC 2.81* 2.62*   HGB 8.6* 8.1*   HCT 26.0* 23.9*   MCV 92.5 91.2    212     CHEMISTRIES:  Recent Labs     02/23/23  0623 02/24/23  0559    137   K 4.3 4.0    102   CO2 19* 22*   BUN 58* 64*   CREATININE 1.8* 1.5*   GLUCOSE 350* 424*     PT/INR:No results for input(s): PROTIME, INR in the last 72 hours. APTT:No results for input(s): APTT in the last 72 hours. LIVER PROFILE:No results for input(s): AST, ALT, BILIDIR, BILITOT, ALKPHOS in the last 72 hours. Imaging Last 24 Hours:  No results found. Assessment//Plan           Hospital Problems             Last Modified POA    * (Principal) Dehydration 2/21/2023 Yes    Acute kidney injury ([facility] [address] 2/21/2023 Yes     Assessment:    Condition: In stable condition. Improving. (Severe gout  now  better and less pain of the knees     Lower leg cellulitis as less redness and chronic skin changes     Crf and with hydration better and off ace and diuretic     Anemia feel due to kidney function     Ashd and troponin demand ischemia  with echo ok and cardio did see and stable     Htn  better     Niddm  better and up with steroids   recent hgba1c ok but at times did not eat         ). Plan:   Transfer Plan: to  nursing home. Encourage ambulation. Regular diet.   Administer medications as ordered. ( Lasix   will hold    Increase scale  on insulin as steroids      Gout and stay on colchicine for about 3 weeks daily and in 10 day will add uloric     Need pt and ot      To po keflex when to nursing home    Schedule now for nursing home for 2-25-23    ).      Electronically signed by Jorge Pina MD on 2/25/23 at 6:26 AM EST

## 2023-02-25 NOTE — PLAN OF CARE
Problem: Discharge Planning  Goal: Discharge to home or other facility with appropriate resources  2/24/2023 2333 by Flip Dodson RN  Outcome: Progressing  Flowsheets (Taken 2/24/2023 2333)  Discharge to home or other facility with appropriate resources:   Identify barriers to discharge with patient and caregiver   Arrange for needed discharge resources and transportation as appropriate   Identify discharge learning needs (meds, wound care, etc)  Note: Plans to be discharged to [facility]. Problem: Pain  Goal: Verbalizes/displays adequate comfort level or baseline comfort level  2/24/2023 2333 by Flip Dodson RN  Outcome: Progressing  Flowsheets (Taken 2/24/2023 2333)  Verbalizes/displays adequate comfort level or baseline comfort level:   Encourage patient to monitor pain and request assistance   Assess pain using appropriate pain scale   Administer analgesics based on type and severity of pain and evaluate response   Implement non-pharmacological measures as appropriate and evaluate response  Note: Patient taking pain medication on MAR as needed to control pain. Use of non-pharmacologic pain management including repositioning. Patient pain goal is 2. Problem: Safety - Adult  Goal: Free from fall injury  2/24/2023 2333 by Flip Dodson RN  Outcome: Progressing  Flowsheets  Taken 2/24/2023 2333 by Flip Dodson RN  Free From Fall Injury: Instruct family/caregiver on patient safety  Taken 2/24/2023 1319 by Sheyla Willis RN  Free From Fall Injury: Instruct family/caregiver on patient safety  Note: Patient up with staff assistance. Able to use call light. Patient has remained free of falls during this shift. Appropriate fall prevention measures in place. Problem: Skin/Tissue Integrity  Goal: Absence of new skin breakdown  Description: 1. Monitor for areas of redness and/or skin breakdown  2. Assess vascular access sites hourly  3.   Every 4-6 hours minimum:  Change oxygen saturation probe site  4. Every 4-6 hours:  If on nasal continuous positive airway pressure, respiratory therapy assess nares and determine need for appliance change or resting period. 2/24/2023 2333 by Johana Tam RN  Outcome: Progressing  Note: Assess skin every 4 hours     Problem: Chronic Conditions and Co-morbidities  Goal: Patient's chronic conditions and co-morbidity symptoms are monitored and maintained or improved  2/24/2023 2333 by Johana Tam RN  Outcome: Progressing  Flowsheets (Taken 2/24/2023 2333)  Care Plan - Patient's Chronic Conditions and Co-Morbidity Symptoms are Monitored and Maintained or Improved:   Monitor and assess patient's chronic conditions and comorbid symptoms for stability, deterioration, or improvement   Collaborate with multidisciplinary team to address chronic and comorbid conditions and prevent exacerbation or deterioration     Problem: Hematologic - Adult  Goal: Maintains hematologic stability  2/24/2023 2333 by Johana Tam RN  Outcome: Adequate for Discharge      Problem: ABCDS Injury Assessment  Goal: Absence of physical injury  2/24/2023 2333 by Johana Tam RN  Outcome: Progressing  Flowsheets (Taken 2/24/2023 2333)  Absence of Physical Injury: Implement safety measures based on patient assessment  Note: Patient up with staff assistance. Able to use call light. Patient has remained free of falls during this shift. Appropriate fall prevention measures in place. Care plan reviewed with patient. Patient verbalized understanding of the plan of care and contribute to goal setting.

## 2023-02-26 LAB
BACTERIA BLD AEROBE CULT: NORMAL
BACTERIA BLD AEROBE CULT: NORMAL

## 2023-02-27 LAB
BACTERIA SPEC AEROBE CULT: NORMAL
BACTERIA SPEC AEROBE CULT: NORMAL
BACTERIA SPEC ANAEROBE CULT: NORMAL
BACTERIA SPEC ANAEROBE CULT: NORMAL
GRAM STN SPEC: NORMAL
GRAM STN SPEC: NORMAL

## 2023-03-01 LAB
BASOPHILS ABSOLUTE: ABNORMAL
BASOPHILS RELATIVE PERCENT: 0.7 %
BUN BLDV-MCNC: 24 MG/DL
CALCIUM SERPL-MCNC: 8 MG/DL
CHLORIDE BLD-SCNC: 107 MMOL/L
CO2: 25 MMOL/L
CREAT SERPL-MCNC: 1 MG/DL
EGFR: 88
EOSINOPHILS ABSOLUTE: 0.3 /ΜL
EOSINOPHILS RELATIVE PERCENT: 4.8 %
GLUCOSE BLD-MCNC: 186 MG/DL
HCT VFR BLD CALC: 24.4 % (ref 41–53)
HEMOGLOBIN: 8.4 G/DL (ref 13.5–17.5)
LYMPHOCYTES ABSOLUTE: 1.7 /ΜL
LYMPHOCYTES RELATIVE PERCENT: 25.9 %
MCH RBC QN AUTO: 31.3 PG
MCHC RBC AUTO-ENTMCNC: 34.3 G/DL
MCV RBC AUTO: 91.2 FL
MONOCYTES ABSOLUTE: 0.6 /ΜL
MONOCYTES RELATIVE PERCENT: 8.7 %
NEUTROPHILS ABSOLUTE: 3.9 /ΜL
NEUTROPHILS RELATIVE PERCENT: 59.9 %
PLATELET # BLD: 248 K/ΜL
PMV BLD AUTO: 7.4 FL
POTASSIUM SERPL-SCNC: 4.8 MMOL/L
RBC # BLD: 2.67 10^6/ΜL
SEDIMENTATION RATE, ERYTHROCYTE: 48
SODIUM BLD-SCNC: 140 MMOL/L
URIC ACID: 6.3
WBC # BLD: 6.5 10^3/ML

## 2023-03-06 ENCOUNTER — TELEPHONE (OUTPATIENT)
Dept: FAMILY MEDICINE CLINIC | Age: 73
End: 2023-03-06

## 2023-03-06 NOTE — TELEPHONE ENCOUNTER
Called and spoke with Cabrera Martin- his current Insulin dosing is as follows:    1- Lantus 30 units Daily    2- Humalog 12 units with each meal along with Sling Scale. Nightly Blood Sugar under 300, no extra coverage and if 301 or above then 4 additional units.      Sliding Scale: 200-249- 4 units    250-300- 8 units    301-350- 12 units     351-400- 16 units

## 2023-03-06 NOTE — TELEPHONE ENCOUNTER
Eli Aaron from [facility] called stating Marsha Guardado has finished Prednisone today and they are suppose to call for orders on Insulin.     Please call Rosie [phone]

## 2023-03-07 DIAGNOSIS — I10 ESSENTIAL HYPERTENSION: Primary | ICD-10-CM

## 2023-03-07 NOTE — TELEPHONE ENCOUNTER
Will be eating more at [facility] as was eating at home 2 meals a day.      Need to see if starts running low and will adjust accordingly

## 2023-03-08 RX ORDER — TRAMADOL HYDROCHLORIDE 50 MG/1
50 TABLET ORAL EVERY 6 HOURS PRN
COMMUNITY

## 2023-03-08 RX ORDER — FEBUXOSTAT 40 MG/1
40 TABLET, FILM COATED ORAL DAILY
COMMUNITY

## 2023-03-08 RX ORDER — AMMONIUM LACTATE 12 G/100G
CREAM TOPICAL PRN
COMMUNITY

## 2023-03-09 ENCOUNTER — OFFICE VISIT (OUTPATIENT)
Dept: NEPHROLOGY | Age: 73
End: 2023-03-09
Payer: MEDICARE

## 2023-03-09 VITALS
WEIGHT: 315 LBS | OXYGEN SATURATION: 100 % | DIASTOLIC BLOOD PRESSURE: 73 MMHG | HEART RATE: 75 BPM | SYSTOLIC BLOOD PRESSURE: 153 MMHG | BODY MASS INDEX: 46.48 KG/M2

## 2023-03-09 DIAGNOSIS — N18.32 CHRONIC KIDNEY DISEASE, STAGE 3B (HCC): ICD-10-CM

## 2023-03-09 DIAGNOSIS — E87.20 METABOLIC ACIDOSIS: ICD-10-CM

## 2023-03-09 DIAGNOSIS — N17.9 AKI (ACUTE KIDNEY INJURY) (HCC): Primary | ICD-10-CM

## 2023-03-09 PROCEDURE — G8484 FLU IMMUNIZE NO ADMIN: HCPCS | Performed by: INTERNAL MEDICINE

## 2023-03-09 PROCEDURE — 1123F ACP DISCUSS/DSCN MKR DOCD: CPT | Performed by: INTERNAL MEDICINE

## 2023-03-09 PROCEDURE — 3078F DIAST BP <80 MM HG: CPT | Performed by: INTERNAL MEDICINE

## 2023-03-09 PROCEDURE — 1111F DSCHRG MED/CURRENT MED MERGE: CPT | Performed by: INTERNAL MEDICINE

## 2023-03-09 PROCEDURE — 3017F COLORECTAL CA SCREEN DOC REV: CPT | Performed by: INTERNAL MEDICINE

## 2023-03-09 PROCEDURE — 1036F TOBACCO NON-USER: CPT | Performed by: INTERNAL MEDICINE

## 2023-03-09 PROCEDURE — G8427 DOCREV CUR MEDS BY ELIG CLIN: HCPCS | Performed by: INTERNAL MEDICINE

## 2023-03-09 PROCEDURE — G8417 CALC BMI ABV UP PARAM F/U: HCPCS | Performed by: INTERNAL MEDICINE

## 2023-03-09 PROCEDURE — 99213 OFFICE O/P EST LOW 20 MIN: CPT | Performed by: INTERNAL MEDICINE

## 2023-03-09 PROCEDURE — 3077F SYST BP >= 140 MM HG: CPT | Performed by: INTERNAL MEDICINE

## 2023-03-09 NOTE — PROGRESS NOTES
[address] [facility]  [address]  [facility] [address]  Dept: [phone]  Loc: [phone]  Office Progress Note  3/9/2023 11:11 AM      Pt Name:    Kyra Joyce:    1950  Primary Care Physician:  Harika Mcgee MD     Chief Complaint:   Chief Complaint   Patient presents with    Follow-Up from Hospital     2 weeks [facility] for RAUL and CKD        Background Information/Interval History:   73 yo with hx CKd, HTN, DM, CAD, lymphedema, CKD with creat 1.3 to 1.5, chronic back pain who was recently in hospital with gen weakness. He was noted to have mildly elevated creat, managed supportively, doing better now. He had a fall at home. He had knee swelling and was seen by Ortho. Knee joint was aspirated and dxed with gouty arthritis. Was treated with IV steroids, colchicine. He reports sig improvement in symptoms at this time. Has seen PCP at the [facility]. He is a NH resident- nursing home did not send any papers.       Past History:  Past Medical History:   Diagnosis Date    RAUL (acute kidney injury) ([facility] [address] 02/13/2020    ASHD (arteriosclerotic heart disease) 2005 2006    stent  baki    Closed fracture of first lumbar vertebra ([facility] [address] 05/06/2022    Closed T12 fracture ([facility] [address] 05/06/2022    Depression     Diabetic peripheral neuropathy ([facility] [address] 2014    Fracture 10/1984    left lower extremity     HTN (hypertension)     Hypercholesteremia     IDDM (insulin dependent diabetes mellitus)     Low back pain     Morbid obesity with BMI of 45.0-49.9, adult ([facility] [address]     Osteoarthritis     S/P CABG (coronary artery bypass graft)      Past Surgical History:   Procedure Laterality Date    AMPUTATION Left 9/10/14    partial versus complete left hallux amputation, left great toe amputation    APPENDECTOMY      BACK INJECTION Bilateral 1/18/2021    Bilateral Si MBB #1 performed by Tom Porras MD at [facility] Bilateral 3/1/2021    Bilateral SI MBB #2 performed by Mehdi Hartman MD at [place] [address]    fusion of C5-C6    CHOLECYSTECTOMY      COLONOSCOPY  2007 and 2011    colonn polyps  lorenzo    CORONARY ANGIOPLASTY WITH STENT PLACEMENT  08/07/2017    Dr Keiko Beauchamp @ [id number] [place] GRAFT  2015 august dox    EKG 12-LEAD  8/14/2015         FACET JOINT INJECTION Bilateral 5/22/2020    Lumbar Facet MBB @L3-4,4-5 bilateral #2 performed by Mehdi Hartman MD at [facility]      left leg    JOINT REPLACEMENT      bilateral knees gurvinder    PAIN MANAGEMENT PROCEDURE Bilateral 1/28/2020    Lumbar Facet MBB @ L3-4,4-5,5-S1 bilateral #1 LUMBAR FACET performed by Mehdi Hartman MD at [facility] Right 7/14/2020    Lumbar RFA Bilateral L3-4,4-5  right side first performed by Mehdi Hartman MD at [facility] Left 10/1/2020    Lumbar RFA Left side L3-4, 4-5 performed by Mehdi Hartman MD at [facility] Bilateral 11/17/2020    TFLESI @L5 bilateral #1 performed by Mehdi Hartman MD at [facility] Bilateral 12/10/2020    TFLESI @L5 bilateral #1 performed by Mehdi Hartman MD at [address] [place] [address] N/A 12/28/2018    T7-T9 DECOMPRESSION, T7-T9 POSTERIOR FUSION performed by Alfred Henriquez MD at [address] [place] [address]    TONSILLECTOMY      VASCULAR SURGERY      cabg harvests from left leg        VITALS:  BP (!) 153/73 (Site: Right Upper Arm, Position: Sitting, Cuff Size: Large Adult)   Pulse 75   Wt (!) 362 lb (164.2 kg)   SpO2 100%   BMI 46.48 kg/m²   Wt Readings from Last 3 Encounters:   03/09/23 (!) 362 lb (164.2 kg)   02/24/23 (!) 355 lb 3.2 oz (161.1 kg)   02/07/23 (!) 361 lb (163.7 kg)     Body mass index is 46.48 kg/m².     General Appearance: alert and cooperative with exam, appears comfortable, no distress  Oral: moist oral mucus membranes  Neck: No jugular venous distention  Lungs: Air entry B/L, no crackles or rales, no use of accessory muscles  Heart: S1, S2 heard  GI: soft, non-tender, no guarding, ++obese  Extremities: ++ B/L LE edema     Medications:  Current Outpatient Medications   Medication Sig Dispense Refill    insulin lispro (HUMALOG) 100 UNIT/ML injection vial Inject into the skin 3 times daily (before meals) Sliding scale      ammonium lactate (AMLACTIN) 12 % cream Apply topically as needed for Dry Skin Apply topically as needed.      traMADol (ULTRAM) 50 MG tablet Take 50 mg by mouth every 6 hours as needed for Pain.      febuxostat (ULORIC) 40 MG TABS tablet Take 40 mg by mouth daily      sodium bicarbonate 650 MG tablet Take 2 tablets by mouth 2 times daily      ondansetron (ZOFRAN-ODT) 4 MG disintegrating tablet Take 1 tablet by mouth every 8 hours as needed for Nausea or Vomiting      cephALEXin (KEFLEX) 500 MG capsule Take 1 capsule by mouth 3 times daily 21 capsule 0    insulin glargine (LANTUS SOLOSTAR) 100 UNIT/ML injection pen INJECT 30 UNITS SUBCUTANEOUSLY ONCE DAILY 45 mL 2    ferrous sulfate (IRON 325) 325 (65 Fe) MG tablet Take 1 tablet by mouth 2 times daily 60 tablet 5    ticagrelor (BRILINTA) 90 MG TABS tablet Take 1 tablet by mouth twice daily 180 tablet 1    Multiple Vitamins-Minerals (THERAPEUTIC MULTIVITAMIN-MINERALS) tablet Take 1 tablet by mouth daily 30 tablet 5    amLODIPine (NORVASC) 5 MG tablet Take 1 tablet by mouth daily 30 tablet 5    atorvastatin (LIPITOR) 10 MG tablet TAKE 1 TABLET BY MOUTH ONCE DAILY 90 tablet 1    ACCU-CHEK SMARTVIEW strip Use to test Blood Sugar 3x daily, Dx: E11.9 100 each 11    albuterol sulfate  (90 Base) MCG/ACT inhaler Inhale 2 puffs into the lungs 2 times daily 18 g 3     carvedilol (COREG) 12.5 MG tablet Take 1 tablet by mouth 2 times daily 180 tablet 2    aspirin 81 MG tablet Take 1 tablet by mouth daily      acetaminophen (TYLENOL) 500 MG tablet Take 1,000 mg by mouth as needed for Pain       No current facility-administered medications for this visit. Laboratory & Diagnostics:  Old labs  US Feb 2020: U/S ok  UA: Feb 2023; UA: no blood, trace protein  Echo; EF 55%  Uric acid 6.3    March 2023: K 4.1, Creat 1.0     Impression/Plan:   1. RAUL on CKD IIIb: Creat down to baseline now. Lytes are stable. Pt says he is on lasix BId. He says he will have NH call us when he gets back. 2. DM: on insulin. 3. Recent Gouty arthritis: s/p steroid treatment in hospital. Currently on uloric  4. Recent Fall  5. Chronic lymphedema  6. Anemia in CKD  7. Met acidosis: on sodium bicarbonate. Levels improved/stable. Repeat BMP in 2 weeks. See Dr Walker Hernandez on return visit    Orders Placed This Encounter   Procedures    Basic Metabolic Panel    Basic Metabolic Panel     Return in about 2 months (around 5/9/2023).       Yesenia Quintana MD  [facility]

## 2023-03-14 ENCOUNTER — TELEPHONE (OUTPATIENT)
Dept: FAMILY MEDICINE CLINIC | Age: 73
End: 2023-03-14

## 2023-03-14 RX ORDER — CEFUROXIME AXETIL 250 MG/1
250 TABLET ORAL 2 TIMES DAILY
Qty: 20 TABLET | Refills: 0 | Status: SHIPPED | OUTPATIENT
Start: 2023-03-14 | End: 2023-03-24

## 2023-03-14 NOTE — TELEPHONE ENCOUNTER
Date of last visit:  2/7/2023  Date of next visit:  3/20/2023    Requested Prescriptions     Signed Prescriptions Disp Refills    cefUROXime (CEFTIN) 250 MG tablet 20 tablet 0     Sig: Take 1 tablet by mouth 2 times daily for 10 days     Authorizing Provider: Winter Willoughby     Ordering User: ANTON FELDMAN       Received Fax from [facility] with Urine Culture Results- started on ATB for UTI. [address] [facility] informed by Phone.

## 2023-03-17 ENCOUNTER — TELEPHONE (OUTPATIENT)
Dept: FAMILY MEDICINE CLINIC | Age: 73
End: 2023-03-17

## 2023-03-17 RX ORDER — GRANULES FOR ORAL 3 G/1
POWDER ORAL
Qty: 2 EACH | Refills: 0 | Status: SHIPPED | OUTPATIENT
Start: 2023-03-17

## 2023-03-17 NOTE — TELEPHONE ENCOUNTER
Date of last visit:  2/7/2023  Date of next visit:  3/20/2023    Requested Prescriptions     Signed Prescriptions Disp Refills    fosfomycin tromethamine (MONUROL) 3 g PACK 2 each 0     Sig: Take 1 packet by mouth now and repeat in 3 days. Authorizing Provider: Gopal Campbell     Ordering User: ANTON FELDMAN       Received the Final Urine Culture Results- patient needs to stop the Ceftin and start on Monurol. Franky Plunkett informed by Phone.

## 2023-03-21 ENCOUNTER — TELEPHONE (OUTPATIENT)
Dept: FAMILY MEDICINE CLINIC | Age: 73
End: 2023-03-21

## 2023-03-21 ENCOUNTER — OFFICE VISIT (OUTPATIENT)
Dept: FAMILY MEDICINE CLINIC | Age: 73
End: 2023-03-21

## 2023-03-21 VITALS
WEIGHT: 315 LBS | BODY MASS INDEX: 40.43 KG/M2 | RESPIRATION RATE: 12 BRPM | HEIGHT: 74 IN | SYSTOLIC BLOOD PRESSURE: 102 MMHG | DIASTOLIC BLOOD PRESSURE: 60 MMHG | HEART RATE: 80 BPM

## 2023-03-21 DIAGNOSIS — E78.00 HYPERCHOLESTEREMIA: ICD-10-CM

## 2023-03-21 DIAGNOSIS — I87.2 VENOUS STASIS DERMATITIS OF BOTH LOWER EXTREMITIES: ICD-10-CM

## 2023-03-21 DIAGNOSIS — I10 PRIMARY HYPERTENSION: Primary | ICD-10-CM

## 2023-03-21 DIAGNOSIS — E11.42 DIABETIC PERIPHERAL NEUROPATHY (HCC): ICD-10-CM

## 2023-03-21 DIAGNOSIS — I25.708 CORONARY ARTERY DISEASE OF BYPASS GRAFT OF NATIVE HEART WITH STABLE ANGINA PECTORIS (HCC): ICD-10-CM

## 2023-03-21 DIAGNOSIS — M48.061 SPINAL STENOSIS OF LUMBAR REGION WITHOUT NEUROGENIC CLAUDICATION: ICD-10-CM

## 2023-03-21 DIAGNOSIS — I50.32 CHRONIC DIASTOLIC (CONGESTIVE) HEART FAILURE (HCC): ICD-10-CM

## 2023-03-21 DIAGNOSIS — M10.9 GOUTY ARTHRITIS OF BOTH KNEES: ICD-10-CM

## 2023-03-21 DIAGNOSIS — F33.41 RECURRENT MAJOR DEPRESSIVE DISORDER, IN PARTIAL REMISSION (HCC): ICD-10-CM

## 2023-03-21 DIAGNOSIS — E66.01 CLASS 3 SEVERE OBESITY WITH SERIOUS COMORBIDITY AND BODY MASS INDEX (BMI) OF 45.0 TO 49.9 IN ADULT, UNSPECIFIED OBESITY TYPE (HCC): ICD-10-CM

## 2023-03-21 DIAGNOSIS — I87.329 STASIS DERMATITIS OF LOWER EXTREMITY DUE TO CHRONIC PERIPHERAL VASCULAR HYPERTENSION: ICD-10-CM

## 2023-03-21 PROCEDURE — 1123F ACP DISCUSS/DSCN MKR DOCD: CPT | Performed by: FAMILY MEDICINE

## 2023-03-21 PROCEDURE — 1036F TOBACCO NON-USER: CPT | Performed by: FAMILY MEDICINE

## 2023-03-21 PROCEDURE — 3017F COLORECTAL CA SCREEN DOC REV: CPT | Performed by: FAMILY MEDICINE

## 2023-03-21 PROCEDURE — 99214 OFFICE O/P EST MOD 30 MIN: CPT | Performed by: FAMILY MEDICINE

## 2023-03-21 PROCEDURE — G8427 DOCREV CUR MEDS BY ELIG CLIN: HCPCS | Performed by: FAMILY MEDICINE

## 2023-03-21 PROCEDURE — G8417 CALC BMI ABV UP PARAM F/U: HCPCS | Performed by: FAMILY MEDICINE

## 2023-03-21 ASSESSMENT — ENCOUNTER SYMPTOMS
CHEST TIGHTNESS: 0
SORE THROAT: 0
EYE PAIN: 0
COUGH: 0
BACK PAIN: 0
NAUSEA: 0
TROUBLE SWALLOWING: 0
CONSTIPATION: 0
ORTHOPNEA: 0
BLOOD IN STOOL: 0
ABDOMINAL PAIN: 0
SHORTNESS OF BREATH: 0

## 2023-03-21 NOTE — PROGRESS NOTES
Subjective:      Patient ID: Vijay Shah is a 74 y.o. male. Ashd  stable       Htn  stable       In   hospital and  then  to  nursing home as   gouty  arthritis  both knees and  cellulitis    of lower leg on left           Hypertension  This is a chronic problem. The current episode started more than 1 year ago. The problem has been resolved since onset. The problem is controlled. Pertinent negatives include no chest pain, headaches, malaise/fatigue, orthopnea, palpitations, peripheral edema or shortness of breath. The current treatment provides significant improvement. There are no compliance problems. Diabetes  He presents for his follow-up diabetic visit. He has type 2 diabetes mellitus. His disease course has been stable. Pertinent negatives for hypoglycemia include no dizziness or headaches. There are no diabetic associated symptoms. Pertinent negatives for diabetes include no chest pain, no fatigue and no weakness. There are no hypoglycemic complications. Symptoms are stable. There are no diabetic complications. There are no known risk factors for coronary artery disease. He is compliant with treatment most of the time. His weight is stable. He is following a diabetic diet. His breakfast blood glucose is taken between 7-8 am. His breakfast blood glucose range is generally  mg/dl. An ACE inhibitor/angiotensin II receptor blocker is being taken.            Gouty  arthritis   Past Medical History:   Diagnosis Date    RAUL (acute kidney injury) ([facility] [address] 02/13/2020    ASHD (arteriosclerotic heart disease) 2005 2006    stent  baki    Closed fracture of first lumbar vertebra ([address] 05/06/2022    Closed T12 fracture ([address] 05/06/2022    Depression     Diabetic peripheral neuropathy ([address] 2014    Fracture 10/1984    left lower extremity     HTN (hypertension)     Hypercholesteremia     IDDM (insulin dependent diabetes mellitus)     Low back pain     Morbid obesity with BMI of 45.0-49.9, adult ([address]

## 2023-03-21 NOTE — TELEPHONE ENCOUNTER
John Wagoner  Mackenzie: Miriam Lindsey   PA Case ID: [id number]   Rx #: [id number]  Need help? Call us at [phone]  Outcome: Approved today  PA Case: [id number], Status: Approved, Coverage Starts on: 1/1/2023 12:00:00 AM, Coverage Ends on: 12/31/2023 12:00:00 AM. Questions? Contact [phone].   Drug: Fosfomycin Tromethamine 3GM packets  Form: [address]ate [address] [employer]  Blaire Nguyen [phone]  CEPHALEXIN [phone]

## 2023-03-21 NOTE — PROGRESS NOTES
No components found for: CHLPL  Lab Results   Component Value Date    TRIG 135 10/17/2018     Lab Results   Component Value Date    HDL 35.6 (L) 10/17/2018     Lab Results   Component Value Date    LDLCALC 56 10/17/2018     Lab Results   Component Value Date    LABVLDL 27 10/17/2018       Lab Results   Component Value Date    ALT 61 02/22/2023    AST 85 (H) 02/22/2023    ALKPHOS 114 02/22/2023    BILITOT 0.4 02/22/2023           Is patient currently taking any cholesterol medications? Yes   If yes, see med list as above    Is the patient reporting any side effects of cholesterol medications? No    Is the patient taking any over the counter medications? Yes   If yes, see med list as above    Is the patient taking a daily aspirin? Yes      Patient Self-Management Goal for Chronic Condition  Goal: I will take all medications as prescribed by my doctor, and I will call the office if I am having any medication problems. Barriers to success: none  Plan for overcoming my barriers: N/A     Confidence: 10/10  Date goal set: 3/21/23  Date goal attained:     Have you seen any other physician or provider since your last visit no    Have you had any other diagnostic tests since your last visit? no    Have you changed or stopped any medications since your last visit including any over-the-counter medicines, vitamins, or herbal medicines? no     Are you taking all your prescribed medications?  Yes    If NO, why?

## 2023-03-22 ENCOUNTER — CARE COORDINATION (OUTPATIENT)
Dept: FAMILY MEDICINE CLINIC | Age: 73
End: 2023-03-22

## 2023-03-22 NOTE — CARE COORDINATION
I saw [facility] when he came in for his appointment. He is doing well except he said one day when he got up his blood sugar was 60. I instructed him to eat a bedtime snack to prevent that from happening. He agreed and that did help. I also gave him information on diet including carb counting and portion sizes. He calls at times asking about foods. That will be a handy reference for him. He had no additional questions. I will follow up with him monthly.

## 2023-03-23 PROBLEM — E86.0 DEHYDRATION: Status: RESOLVED | Noted: 2023-02-21 | Resolved: 2023-03-23

## 2023-03-29 ENCOUNTER — TELEPHONE (OUTPATIENT)
Dept: FAMILY MEDICINE CLINIC | Age: 73
End: 2023-03-29

## 2023-03-29 NOTE — TELEPHONE ENCOUNTER
Harvey Harmon called this am . He said he woke up with leg and knee pain and pain in his rt shoulder. He said he has taken tylenol 1000mg and that did not help. In the conversation he mentioned that he had therapy yesterday. I suggested that may be the cause. He mentioned that he has some biofreeze ointment. He said his daughter threw the gloves away that he used to apply it. I told him he could apply it without gloves and wash his hands after. He agreed he would try that. If that does not work he may call for an appointment. I offered him reassurance that the biofreeze should help.

## 2023-04-02 ENCOUNTER — HOSPITAL ENCOUNTER (INPATIENT)
Age: 73
LOS: 5 days | Discharge: NURSING, REHAB OR LONG-TERM CARE | End: 2023-04-07
Attending: EMERGENCY MEDICINE | Admitting: FAMILY MEDICINE
Payer: MEDICARE

## 2023-04-02 ENCOUNTER — APPOINTMENT (OUTPATIENT)
Dept: GENERAL RADIOLOGY | Age: 73
End: 2023-04-02
Payer: MEDICARE

## 2023-04-02 ENCOUNTER — APPOINTMENT (OUTPATIENT)
Dept: CT IMAGING | Age: 73
End: 2023-04-02
Payer: MEDICARE
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
## 2023-04-02 DIAGNOSIS — M10.9 GOUTY ARTHRITIS OF BOTH KNEES: ICD-10-CM
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
## 2023-04-02 PROBLEM — D63.8 ANEMIA WITH CHRONIC ILLNESS: Status: ACTIVE | Noted: 2023-04-02

## 2023-04-02 PROBLEM — S23.3XXA SPRAIN OF LIGAMENTS OF THORACIC SPINE: Status: RESOLVED | Noted: 2018-12-22 | Resolved: 2023-04-02

## 2023-04-02 PROBLEM — M25.369 KNEE GIVES OUT: Status: ACTIVE | Noted: 2023-04-02

## 2023-04-02 PROBLEM — N18.9 CHRONIC KIDNEY DISEASE, UNSPECIFIED: Status: ACTIVE | Noted: 2023-02-24

## 2023-04-02 PROBLEM — R26.2 UNABLE TO AMBULATE: Status: ACTIVE | Noted: 2023-04-02

## 2023-04-02 PROBLEM — Z96.653 HISTORY OF BILATERAL KNEE REPLACEMENT: Status: ACTIVE | Noted: 2023-04-02

## 2023-04-02 LAB
ANION GAP SERPL CALC-SCNC: 11 MEQ/L (ref 8–16)
BASOPHILS ABSOLUTE: 0 THOU/MM3 (ref 0–0.1)
BASOPHILS NFR BLD AUTO: 0.3 %
BUN SERPL-MCNC: 54 MG/DL (ref 7–22)
CALCIUM SERPL-MCNC: 8.5 MG/DL (ref 8.5–10.5)
CHLORIDE SERPL-SCNC: 101 MEQ/L (ref 98–111)
CO2 SERPL-SCNC: 24 MEQ/L (ref 23–33)
CREAT SERPL-MCNC: 1.4 MG/DL (ref 0.4–1.2)
DEPRECATED RDW RBC AUTO: 47.3 FL (ref 35–45)
EOSINOPHIL NFR BLD AUTO: 0 %
EOSINOPHILS ABSOLUTE: 0 THOU/MM3 (ref 0–0.4)
ERYTHROCYTE [DISTWIDTH] IN BLOOD BY AUTOMATED COUNT: 14.1 % (ref 11.5–14.5)
GFR SERPL CREATININE-BSD FRML MDRD: 52 ML/MIN/1.73M2
GLUCOSE BLD STRIP.AUTO-MCNC: 355 MG/DL (ref 70–108)
GLUCOSE SERPL-MCNC: 380 MG/DL (ref 70–108)
HCT VFR BLD AUTO: 23.6 % (ref 42–52)
HGB BLD-MCNC: 7.7 GM/DL (ref 14–18)
IMM GRANULOCYTES # BLD AUTO: 0.02 THOU/MM3 (ref 0–0.07)
IMM GRANULOCYTES NFR BLD AUTO: 0.3 %
LYMPHOCYTES ABSOLUTE: 0.8 THOU/MM3 (ref 1–4.8)
LYMPHOCYTES NFR BLD AUTO: 11.6 %
MCH RBC QN AUTO: 30.1 PG (ref 26–33)
MCHC RBC AUTO-ENTMCNC: 32.6 GM/DL (ref 32.2–35.5)
MCV RBC AUTO: 92.2 FL (ref 80–94)
MONOCYTES ABSOLUTE: 0.8 THOU/MM3 (ref 0.4–1.3)
MONOCYTES NFR BLD AUTO: 11.6 %
NEUTROPHILS NFR BLD AUTO: 76.2 %
NRBC BLD AUTO-RTO: 0 /100 WBC
OSMOLALITY SERPL CALC.SUM OF ELEC: 302.4 MOSMOL/KG (ref 275–300)
PLATELET # BLD AUTO: 196 THOU/MM3 (ref 130–400)
PMV BLD AUTO: 9.7 FL (ref 9.4–12.4)
POTASSIUM SERPL-SCNC: 4.8 MEQ/L (ref 3.5–5.2)
RBC # BLD AUTO: 2.56 MILL/MM3 (ref 4.7–6.1)
SEGMENTED NEUTROPHILS ABSOLUTE COUNT: 5.6 THOU/MM3 (ref 1.8–7.7)
SODIUM SERPL-SCNC: 136 MEQ/L (ref 135–145)
URATE SERPL-MCNC: 10 MG/DL (ref 3.7–7)
WBC # BLD AUTO: 7.3 THOU/MM3 (ref 4.8–10.8)

## 2023-04-02 PROCEDURE — 94760 N-INVAS EAR/PLS OXIMETRY 1: CPT

## 2023-04-02 PROCEDURE — 1200000000 HC SEMI PRIVATE

## 2023-04-02 PROCEDURE — 93010 ELECTROCARDIOGRAM REPORT: CPT | Performed by: INTERNAL MEDICINE

## 2023-04-02 PROCEDURE — 80048 BASIC METABOLIC PNL TOTAL CA: CPT

## 2023-04-02 PROCEDURE — 84550 ASSAY OF BLOOD/URIC ACID: CPT

## 2023-04-02 PROCEDURE — 94640 AIRWAY INHALATION TREATMENT: CPT

## 2023-04-02 PROCEDURE — 1200000003 HC TELEMETRY R&B

## 2023-04-02 PROCEDURE — 36415 COLL VENOUS BLD VENIPUNCTURE: CPT

## 2023-04-02 PROCEDURE — 6370000000 HC RX 637 (ALT 250 FOR IP)

## 2023-04-02 PROCEDURE — 85025 COMPLETE CBC W/AUTO DIFF WBC: CPT

## 2023-04-02 PROCEDURE — 70450 CT HEAD/BRAIN W/O DYE: CPT

## 2023-04-02 PROCEDURE — 93005 ELECTROCARDIOGRAM TRACING: CPT | Performed by: EMERGENCY MEDICINE

## 2023-04-02 PROCEDURE — 2580000003 HC RX 258

## 2023-04-02 PROCEDURE — 6370000000 HC RX 637 (ALT 250 FOR IP): Performed by: EMERGENCY MEDICINE

## 2023-04-02 PROCEDURE — 6370000000 HC RX 637 (ALT 250 FOR IP): Performed by: STUDENT IN AN ORGANIZED HEALTH CARE EDUCATION/TRAINING PROGRAM

## 2023-04-02 PROCEDURE — 73560 X-RAY EXAM OF KNEE 1 OR 2: CPT

## 2023-04-02 PROCEDURE — 82948 REAGENT STRIP/BLOOD GLUCOSE: CPT

## 2023-04-02 PROCEDURE — 99285 EMERGENCY DEPT VISIT HI MDM: CPT

## 2023-04-02 PROCEDURE — 72131 CT LUMBAR SPINE W/O DYE: CPT

## 2023-04-02 PROCEDURE — 73110 X-RAY EXAM OF WRIST: CPT

## 2023-04-02 PROCEDURE — 6360000002 HC RX W HCPCS

## 2023-04-02 RX ORDER — ONDANSETRON 2 MG/ML
4 INJECTION INTRAMUSCULAR; INTRAVENOUS EVERY 6 HOURS PRN
Status: DISCONTINUED | OUTPATIENT
Start: 2023-04-02 | End: 2023-04-08 | Stop reason: HOSPADM

## 2023-04-02 RX ORDER — FEBUXOSTAT 40 MG/1
40 TABLET, FILM COATED ORAL DAILY
Status: DISCONTINUED | OUTPATIENT
Start: 2023-04-03 | End: 2023-04-03 | Stop reason: ALTCHOICE

## 2023-04-02 RX ORDER — ACETAMINOPHEN 500 MG
1000 TABLET ORAL PRN
Status: DISCONTINUED | OUTPATIENT
Start: 2023-04-02 | End: 2023-04-08 | Stop reason: HOSPADM

## 2023-04-02 RX ORDER — ACETAMINOPHEN 325 MG/1
650 TABLET ORAL EVERY 4 HOURS PRN
Status: DISCONTINUED | OUTPATIENT
Start: 2023-04-02 | End: 2023-04-08 | Stop reason: HOSPADM

## 2023-04-02 RX ORDER — SODIUM CHLORIDE 0.9 % (FLUSH) 0.9 %
5-40 SYRINGE (ML) INJECTION EVERY 12 HOURS SCHEDULED
Status: DISCONTINUED | OUTPATIENT
Start: 2023-04-02 | End: 2023-04-08 | Stop reason: HOSPADM

## 2023-04-02 RX ORDER — AMMONIUM LACTATE 12 G/100G
LOTION TOPICAL PRN
Status: DISCONTINUED | OUTPATIENT
Start: 2023-04-02 | End: 2023-04-02

## 2023-04-02 RX ORDER — DEXTROSE MONOHYDRATE 100 MG/ML
INJECTION, SOLUTION INTRAVENOUS CONTINUOUS PRN
Status: DISCONTINUED | OUTPATIENT
Start: 2023-04-02 | End: 2023-04-08 | Stop reason: HOSPADM

## 2023-04-02 RX ORDER — SODIUM CHLORIDE 9 MG/ML
INJECTION, SOLUTION INTRAVENOUS PRN
Status: DISCONTINUED | OUTPATIENT
Start: 2023-04-02 | End: 2023-04-08 | Stop reason: HOSPADM

## 2023-04-02 RX ORDER — INSULIN LISPRO 100 [IU]/ML
0-8 INJECTION, SOLUTION INTRAVENOUS; SUBCUTANEOUS
Status: DISCONTINUED | OUTPATIENT
Start: 2023-04-03 | End: 2023-04-04

## 2023-04-02 RX ORDER — SODIUM CHLORIDE 0.9 % (FLUSH) 0.9 %
5-40 SYRINGE (ML) INJECTION PRN
Status: DISCONTINUED | OUTPATIENT
Start: 2023-04-02 | End: 2023-04-08 | Stop reason: HOSPADM

## 2023-04-02 RX ORDER — FUROSEMIDE 40 MG/1
40 TABLET ORAL NIGHTLY
Status: ON HOLD | COMMUNITY
End: 2023-04-07 | Stop reason: HOSPADM

## 2023-04-02 RX ORDER — SODIUM BICARBONATE 650 MG/1
1300 TABLET ORAL 2 TIMES DAILY
Status: DISCONTINUED | OUTPATIENT
Start: 2023-04-02 | End: 2023-04-02

## 2023-04-02 RX ORDER — INSULIN LISPRO 100 [IU]/ML
0-4 INJECTION, SOLUTION INTRAVENOUS; SUBCUTANEOUS NIGHTLY
Status: DISCONTINUED | OUTPATIENT
Start: 2023-04-02 | End: 2023-04-04 | Stop reason: SDUPTHER

## 2023-04-02 RX ORDER — MULTIVITAMIN WITH IRON
1 TABLET ORAL DAILY
Status: DISCONTINUED | OUTPATIENT
Start: 2023-04-02 | End: 2023-04-08 | Stop reason: HOSPADM

## 2023-04-02 RX ORDER — FUROSEMIDE 80 MG
80 TABLET ORAL EVERY MORNING
Status: ON HOLD | COMMUNITY
End: 2023-04-07 | Stop reason: HOSPADM

## 2023-04-02 RX ORDER — ALBUTEROL SULFATE 90 UG/1
2 AEROSOL, METERED RESPIRATORY (INHALATION) 2 TIMES DAILY
Status: DISCONTINUED | OUTPATIENT
Start: 2023-04-02 | End: 2023-04-08 | Stop reason: HOSPADM

## 2023-04-02 RX ORDER — AMLODIPINE BESYLATE 5 MG/1
5 TABLET ORAL DAILY
Status: DISCONTINUED | OUTPATIENT
Start: 2023-04-02 | End: 2023-04-08 | Stop reason: HOSPADM

## 2023-04-02 RX ORDER — FEBUXOSTAT 40 MG/1
40 TABLET, FILM COATED ORAL DAILY
Status: DISCONTINUED | OUTPATIENT
Start: 2023-04-02 | End: 2023-04-02 | Stop reason: RX

## 2023-04-02 RX ORDER — ATORVASTATIN CALCIUM 10 MG/1
1 TABLET, FILM COATED ORAL DAILY
Status: CANCELLED | OUTPATIENT
Start: 2023-04-02

## 2023-04-02 RX ORDER — ONDANSETRON 4 MG/1
4 TABLET, ORALLY DISINTEGRATING ORAL EVERY 8 HOURS PRN
Status: DISCONTINUED | OUTPATIENT
Start: 2023-04-02 | End: 2023-04-08 | Stop reason: HOSPADM

## 2023-04-02 RX ORDER — ENOXAPARIN SODIUM 100 MG/ML
40 INJECTION SUBCUTANEOUS 2 TIMES DAILY
Status: DISCONTINUED | OUTPATIENT
Start: 2023-04-02 | End: 2023-04-08 | Stop reason: HOSPADM

## 2023-04-02 RX ORDER — ASPIRIN 81 MG/1
81 TABLET ORAL DAILY
Status: DISCONTINUED | OUTPATIENT
Start: 2023-04-03 | End: 2023-04-08 | Stop reason: HOSPADM

## 2023-04-02 RX ORDER — INSULIN GLARGINE 100 [IU]/ML
30 INJECTION, SOLUTION SUBCUTANEOUS NIGHTLY
Status: DISCONTINUED | OUTPATIENT
Start: 2023-04-02 | End: 2023-04-03

## 2023-04-02 RX ORDER — CARVEDILOL 6.25 MG/1
12.5 TABLET ORAL 2 TIMES DAILY
Status: DISCONTINUED | OUTPATIENT
Start: 2023-04-02 | End: 2023-04-08 | Stop reason: HOSPADM

## 2023-04-02 RX ORDER — TRAMADOL HYDROCHLORIDE 50 MG/1
50 TABLET ORAL EVERY 6 HOURS PRN
Status: DISCONTINUED | OUTPATIENT
Start: 2023-04-02 | End: 2023-04-03 | Stop reason: ALTCHOICE

## 2023-04-02 RX ORDER — INSULIN LISPRO 100 [IU]/ML
12 INJECTION, SOLUTION INTRAVENOUS; SUBCUTANEOUS
Status: DISCONTINUED | OUTPATIENT
Start: 2023-04-03 | End: 2023-04-03

## 2023-04-02 RX ORDER — FERROUS SULFATE 325(65) MG
325 TABLET ORAL 2 TIMES DAILY
Status: DISCONTINUED | OUTPATIENT
Start: 2023-04-02 | End: 2023-04-08 | Stop reason: HOSPADM

## 2023-04-02 RX ORDER — ATORVASTATIN CALCIUM 10 MG/1
10 TABLET, FILM COATED ORAL DAILY
Status: DISCONTINUED | OUTPATIENT
Start: 2023-04-02 | End: 2023-04-08 | Stop reason: HOSPADM

## 2023-04-02 RX ADMIN — INSULIN LISPRO 4 UNITS: 100 INJECTION, SOLUTION INTRAVENOUS; SUBCUTANEOUS at 22:35

## 2023-04-02 RX ADMIN — AMLODIPINE BESYLATE 5 MG: 5 TABLET ORAL at 22:33

## 2023-04-02 RX ADMIN — CARVEDILOL 12.5 MG: 6.25 TABLET, FILM COATED ORAL at 22:33

## 2023-04-02 RX ADMIN — ATORVASTATIN CALCIUM 10 MG: 10 TABLET, FILM COATED ORAL at 22:34

## 2023-04-02 RX ADMIN — INSULIN GLARGINE 30 UNITS: 100 INJECTION, SOLUTION SUBCUTANEOUS at 23:41

## 2023-04-02 RX ADMIN — Medication 1 TABLET: at 22:33

## 2023-04-02 RX ADMIN — ENOXAPARIN SODIUM 40 MG: 100 INJECTION SUBCUTANEOUS at 22:34

## 2023-04-02 RX ADMIN — FERROUS SULFATE TAB 325 MG (65 MG ELEMENTAL FE) 325 MG: 325 (65 FE) TAB at 22:34

## 2023-04-02 RX ADMIN — ACETAMINOPHEN 1000 MG: 500 TABLET ORAL at 13:16

## 2023-04-02 RX ADMIN — TICAGRELOR 90 MG: 90 TABLET ORAL at 22:34

## 2023-04-02 RX ADMIN — ALBUTEROL SULFATE 2 PUFF: 90 AEROSOL, METERED RESPIRATORY (INHALATION) at 17:47

## 2023-04-02 RX ADMIN — SODIUM CHLORIDE, PRESERVATIVE FREE 10 ML: 5 INJECTION INTRAVENOUS at 22:35

## 2023-04-02 ASSESSMENT — PAIN - FUNCTIONAL ASSESSMENT: PAIN_FUNCTIONAL_ASSESSMENT: NONE - DENIES PAIN

## 2023-04-02 NOTE — ED PROVIDER NOTES

## 2023-04-02 NOTE — FLOWSHEET NOTE
04/02/23 1746   Safe Environment   Safety Measures Other (comment)  (Virtual nurse round complete)     Virtual nurse introduced via audio. Patient permits camera. Patient in bed, awake, visitor at bedside. Admission completed with patient. Patient alert, oriented and cooperative. Patient educated on use of call light and fall prevention. Also referred to page 13 of bedside booklet for further fall prevention education. Patient voiced understanding. Patient states knees are very weak at this time and usually uses a walker at home when ambulates. Virtual nurse services explained to patient. Patient agrees to services. Patient denies any needs at this time. Call light within reach.

## 2023-04-02 NOTE — FLOWSHEET NOTE
This RN messaged attending provider via perfect serve about blood sugar of 355 and new orders. Patient also requested that attending provider see patient and update on plan and hospital course. This RN verified with Dr. Jose Umana that Dr. Armando Tena was attending provider and to reach out to her for updated plan/concerns. No response at time of publishing note.

## 2023-04-02 NOTE — ED NOTES
ED to inpatient nurses report    Chief Complaint   Patient presents with    Fall      Present to ED from home  LOC: alert and orientated to name, place, date  Vital signs   Vitals:    04/02/23 1134 04/02/23 1316 04/02/23 1445 04/02/23 1545   BP: 135/60 (!) 161/56 (!) 137/55 (!) 123/103   Pulse: 83 84 82 77   Resp: 22 20 22 20   Temp: 98.2 °F (36.8 °C)      TempSrc: Oral      SpO2: 97% 97% 95% 96%   Weight: (!) 362 lb (164.2 kg)      Height: 6' 2\" (1.88 m)         Oxygen Baseline none    Current needs required none Bipap/Cpap No  LDAs:    Mobility: Requires assistance * 2  Pending ED orders: none  Present condition: stable      C-SSRS Risk of Suicide: No Risk  Swallow Screening    Preferred Language: [place]     Electronically signed by Rancho Hernández, RN on 4/2/2023 at 4:56 PM       Melanie Dey RN  04/02/23 8521
Pt resting on cot at this time. No needs voiced.  VSS     Axeda, RN  04/02/23 2260
Pt to ER due to fall. He states he was getting out of his recliner and his knees buckled and he fell. He states he didn't hit his head or lose consciousness.       Flaquito Palmer RN  04/02/23 1283
Pt transported to [facility] on cart in stable condition. Floor contacted before transport and spoke with Meron Edmonds.      [facility]  04/02/23 7194
This nurse attempted to walk pt at this time. Pt unable to move legs out of bed. This nurse notified provider.       Edwar Sexton RN  04/02/23 7237
injury    My Independent interpretations: (See Formal Diagnostic Results above for the lab & radiology testing)  Imaging: No acute abnormalities    External Documentation:   Previous patient encounter documents & history available on EMR was reviewed     Case discussed with Dr. Ollie Mcmahon' team.     Pt was reassessed and the results of pertinent diagnostic studies and exam findings were discussed. The patients provisional diagnosis and plan of care were discussed with the patient and present family utilizing shared decision-making. Any medications were reviewed and indications and risks of medications were discussed with the patient /family present. Strict verbal and written return precautions, instructions and appropriate follow-up provided to  the patient . ED Medications administered this visit:  (None if blank)  Medications   acetaminophen (TYLENOL) tablet 1,000 mg (1,000 mg Oral Given 4/2/23 1316)         PROCEDURES: (None if blank)  Procedures: None      DISCHARGE PRESCRIPTIONS: (None if blank)  New Prescriptions    No medications on file       FINAL IMPRESSION      1. Fall, initial encounter          DISPOSITION/PLAN   DISPOSITION Decision To Admit 04/02/2023 03:30:21 PM      OUTPATIENT FOLLOW UP THE PATIENT:  No follow-up provider specified.     MD Deb Marin MD  Resident  04/02/23 1549
Universal Safety Interventions

## 2023-04-03 LAB
ANION GAP SERPL CALC-SCNC: 10 MEQ/L (ref 8–16)
ANION GAP SERPL CALC-SCNC: 8 MEQ/L (ref 8–16)
BASOPHILS ABSOLUTE: 0 THOU/MM3 (ref 0–0.1)
BASOPHILS NFR BLD AUTO: 0.3 %
BUN SERPL-MCNC: 47 MG/DL (ref 7–22)
BUN SERPL-MCNC: 49 MG/DL (ref 7–22)
CALCIUM SERPL-MCNC: 7.9 MG/DL (ref 8.5–10.5)
CALCIUM SERPL-MCNC: 8.1 MG/DL (ref 8.5–10.5)
CHLORIDE SERPL-SCNC: 104 MEQ/L (ref 98–111)
CHLORIDE SERPL-SCNC: 104 MEQ/L (ref 98–111)
CO2 SERPL-SCNC: 24 MEQ/L (ref 23–33)
CO2 SERPL-SCNC: 26 MEQ/L (ref 23–33)
CREAT SERPL-MCNC: 1.4 MG/DL (ref 0.4–1.2)
CREAT SERPL-MCNC: 1.5 MG/DL (ref 0.4–1.2)
DEPRECATED RDW RBC AUTO: 47.8 FL (ref 35–45)
EOSINOPHIL NFR BLD AUTO: 1 %
EOSINOPHILS ABSOLUTE: 0.1 THOU/MM3 (ref 0–0.4)
ERYTHROCYTE [DISTWIDTH] IN BLOOD BY AUTOMATED COUNT: 13.8 % (ref 11.5–14.5)
FERRITIN SERPL IA-MCNC: 798 NG/ML (ref 22–322)
GFR SERPL CREATININE-BSD FRML MDRD: 48 ML/MIN/1.73M2
GFR SERPL CREATININE-BSD FRML MDRD: 52 ML/MIN/1.73M2
GLUCOSE BLD STRIP.AUTO-MCNC: 222 MG/DL (ref 70–108)
GLUCOSE BLD STRIP.AUTO-MCNC: 240 MG/DL (ref 70–108)
GLUCOSE BLD STRIP.AUTO-MCNC: 271 MG/DL (ref 70–108)
GLUCOSE BLD STRIP.AUTO-MCNC: 337 MG/DL (ref 70–108)
GLUCOSE BLD STRIP.AUTO-MCNC: 361 MG/DL (ref 70–108)
GLUCOSE SERPL-MCNC: 322 MG/DL (ref 70–108)
GLUCOSE SERPL-MCNC: 341 MG/DL (ref 70–108)
HCT VFR BLD AUTO: 22.6 % (ref 42–52)
HGB BLD-MCNC: 7.4 GM/DL (ref 14–18)
IMM GRANULOCYTES # BLD AUTO: 0.02 THOU/MM3 (ref 0–0.07)
IMM GRANULOCYTES NFR BLD AUTO: 0.3 %
IRON SATN MFR SERPL: 13 % (ref 20–50)
IRON SERPL-MCNC: 22 UG/DL (ref 65–195)
LYMPHOCYTES ABSOLUTE: 1 THOU/MM3 (ref 1–4.8)
LYMPHOCYTES NFR BLD AUTO: 17.4 %
MCH RBC QN AUTO: 30.8 PG (ref 26–33)
MCHC RBC AUTO-ENTMCNC: 32.7 GM/DL (ref 32.2–35.5)
MCV RBC AUTO: 94.2 FL (ref 80–94)
MONOCYTES ABSOLUTE: 0.6 THOU/MM3 (ref 0.4–1.3)
MONOCYTES NFR BLD AUTO: 10.3 %
NEUTROPHILS NFR BLD AUTO: 70.7 %
NRBC BLD AUTO-RTO: 0 /100 WBC
PLATELET # BLD AUTO: 175 THOU/MM3 (ref 130–400)
PMV BLD AUTO: 9.5 FL (ref 9.4–12.4)
POTASSIUM SERPL-SCNC: 4.8 MEQ/L (ref 3.5–5.2)
POTASSIUM SERPL-SCNC: 4.8 MEQ/L (ref 3.5–5.2)
RBC # BLD AUTO: 2.4 MILL/MM3 (ref 4.7–6.1)
SEGMENTED NEUTROPHILS ABSOLUTE COUNT: 4.2 THOU/MM3 (ref 1.8–7.7)
SODIUM SERPL-SCNC: 138 MEQ/L (ref 135–145)
SODIUM SERPL-SCNC: 138 MEQ/L (ref 135–145)
TIBC SERPL-MCNC: 168 UG/DL (ref 171–450)
WBC # BLD AUTO: 5.9 THOU/MM3 (ref 4.8–10.8)

## 2023-04-03 PROCEDURE — 82728 ASSAY OF FERRITIN: CPT

## 2023-04-03 PROCEDURE — 1200000003 HC TELEMETRY R&B

## 2023-04-03 PROCEDURE — 97530 THERAPEUTIC ACTIVITIES: CPT

## 2023-04-03 PROCEDURE — 85025 COMPLETE CBC W/AUTO DIFF WBC: CPT

## 2023-04-03 PROCEDURE — 6370000000 HC RX 637 (ALT 250 FOR IP): Performed by: STUDENT IN AN ORGANIZED HEALTH CARE EDUCATION/TRAINING PROGRAM

## 2023-04-03 PROCEDURE — 6370000000 HC RX 637 (ALT 250 FOR IP): Performed by: FAMILY MEDICINE

## 2023-04-03 PROCEDURE — 82948 REAGENT STRIP/BLOOD GLUCOSE: CPT

## 2023-04-03 PROCEDURE — 36415 COLL VENOUS BLD VENIPUNCTURE: CPT

## 2023-04-03 PROCEDURE — 97166 OT EVAL MOD COMPLEX 45 MIN: CPT

## 2023-04-03 PROCEDURE — 94760 N-INVAS EAR/PLS OXIMETRY 1: CPT

## 2023-04-03 PROCEDURE — 94640 AIRWAY INHALATION TREATMENT: CPT

## 2023-04-03 PROCEDURE — 1200000000 HC SEMI PRIVATE

## 2023-04-03 PROCEDURE — 80048 BASIC METABOLIC PNL TOTAL CA: CPT

## 2023-04-03 PROCEDURE — 83540 ASSAY OF IRON: CPT

## 2023-04-03 PROCEDURE — 83550 IRON BINDING TEST: CPT

## 2023-04-03 PROCEDURE — 2580000003 HC RX 258

## 2023-04-03 PROCEDURE — 6370000000 HC RX 637 (ALT 250 FOR IP): Performed by: EMERGENCY MEDICINE

## 2023-04-03 PROCEDURE — 6360000002 HC RX W HCPCS

## 2023-04-03 RX ORDER — INSULIN LISPRO 100 [IU]/ML
15 INJECTION, SOLUTION INTRAVENOUS; SUBCUTANEOUS
Status: DISCONTINUED | OUTPATIENT
Start: 2023-04-03 | End: 2023-04-04 | Stop reason: SDUPTHER

## 2023-04-03 RX ORDER — INSULIN GLARGINE 100 [IU]/ML
40 INJECTION, SOLUTION SUBCUTANEOUS NIGHTLY
Status: DISCONTINUED | OUTPATIENT
Start: 2023-04-03 | End: 2023-04-05

## 2023-04-03 RX ORDER — HYDROCODONE BITARTRATE AND ACETAMINOPHEN 5; 325 MG/1; MG/1
1 TABLET ORAL EVERY 4 HOURS PRN
Status: DISCONTINUED | OUTPATIENT
Start: 2023-04-03 | End: 2023-04-08 | Stop reason: HOSPADM

## 2023-04-03 RX ORDER — COLCHICINE 0.6 MG/1
0.6 TABLET ORAL 2 TIMES DAILY
Status: DISCONTINUED | OUTPATIENT
Start: 2023-04-03 | End: 2023-04-08 | Stop reason: HOSPADM

## 2023-04-03 RX ADMIN — ALBUTEROL SULFATE 2 PUFF: 90 AEROSOL, METERED RESPIRATORY (INHALATION) at 07:35

## 2023-04-03 RX ADMIN — INSULIN GLARGINE 40 UNITS: 100 INJECTION, SOLUTION SUBCUTANEOUS at 22:09

## 2023-04-03 RX ADMIN — FERROUS SULFATE TAB 325 MG (65 MG ELEMENTAL FE) 325 MG: 325 (65 FE) TAB at 22:01

## 2023-04-03 RX ADMIN — Medication 1 TABLET: at 09:32

## 2023-04-03 RX ADMIN — ALBUTEROL SULFATE 2 PUFF: 90 AEROSOL, METERED RESPIRATORY (INHALATION) at 16:26

## 2023-04-03 RX ADMIN — ENOXAPARIN SODIUM 40 MG: 100 INJECTION SUBCUTANEOUS at 22:08

## 2023-04-03 RX ADMIN — ATORVASTATIN CALCIUM 10 MG: 10 TABLET, FILM COATED ORAL at 09:32

## 2023-04-03 RX ADMIN — INSULIN LISPRO 8 UNITS: 100 INJECTION, SOLUTION INTRAVENOUS; SUBCUTANEOUS at 12:01

## 2023-04-03 RX ADMIN — CARVEDILOL 12.5 MG: 6.25 TABLET, FILM COATED ORAL at 09:32

## 2023-04-03 RX ADMIN — ENOXAPARIN SODIUM 40 MG: 100 INJECTION SUBCUTANEOUS at 09:33

## 2023-04-03 RX ADMIN — TICAGRELOR 90 MG: 90 TABLET ORAL at 22:09

## 2023-04-03 RX ADMIN — INSULIN LISPRO 6 UNITS: 100 INJECTION, SOLUTION INTRAVENOUS; SUBCUTANEOUS at 08:35

## 2023-04-03 RX ADMIN — ASPIRIN 81 MG: 81 TABLET, COATED ORAL at 09:32

## 2023-04-03 RX ADMIN — INSULIN LISPRO 12 UNITS: 100 INJECTION, SOLUTION INTRAVENOUS; SUBCUTANEOUS at 08:35

## 2023-04-03 RX ADMIN — INSULIN LISPRO 15 UNITS: 100 INJECTION, SOLUTION INTRAVENOUS; SUBCUTANEOUS at 16:50

## 2023-04-03 RX ADMIN — CARVEDILOL 12.5 MG: 6.25 TABLET, FILM COATED ORAL at 22:00

## 2023-04-03 RX ADMIN — SODIUM CHLORIDE, PRESERVATIVE FREE 10 ML: 5 INJECTION INTRAVENOUS at 22:02

## 2023-04-03 RX ADMIN — FERROUS SULFATE TAB 325 MG (65 MG ELEMENTAL FE) 325 MG: 325 (65 FE) TAB at 09:32

## 2023-04-03 RX ADMIN — AMLODIPINE BESYLATE 5 MG: 5 TABLET ORAL at 09:32

## 2023-04-03 RX ADMIN — TICAGRELOR 90 MG: 90 TABLET ORAL at 09:32

## 2023-04-03 RX ADMIN — INSULIN LISPRO 4 UNITS: 100 INJECTION, SOLUTION INTRAVENOUS; SUBCUTANEOUS at 16:51

## 2023-04-03 RX ADMIN — COLCHICINE 0.6 MG: 0.6 TABLET, FILM COATED ORAL at 22:03

## 2023-04-03 RX ADMIN — COLCHICINE 0.6 MG: 0.6 TABLET, FILM COATED ORAL at 11:58

## 2023-04-03 RX ADMIN — INSULIN LISPRO 12 UNITS: 100 INJECTION, SOLUTION INTRAVENOUS; SUBCUTANEOUS at 12:01

## 2023-04-03 RX ADMIN — HYDROCODONE BITARTRATE AND ACETAMINOPHEN 1 TABLET: 5; 325 TABLET ORAL at 11:58

## 2023-04-03 ASSESSMENT — ENCOUNTER SYMPTOMS
ABDOMINAL DISTENTION: 0
APNEA: 0
SINUS PRESSURE: 0
ABDOMINAL PAIN: 0
CHEST TIGHTNESS: 0
DIARRHEA: 0
BACK PAIN: 1

## 2023-04-03 ASSESSMENT — PAIN SCALES - GENERAL
PAINLEVEL_OUTOF10: 0
PAINLEVEL_OUTOF10: 0
PAINLEVEL_OUTOF10: 6
PAINLEVEL_OUTOF10: 5

## 2023-04-03 ASSESSMENT — PAIN DESCRIPTION - LOCATION
LOCATION: BACK
LOCATION: LEG

## 2023-04-03 ASSESSMENT — PAIN DESCRIPTION - DESCRIPTORS: DESCRIPTORS: STABBING

## 2023-04-03 ASSESSMENT — PAIN DESCRIPTION - ORIENTATION
ORIENTATION: RIGHT;LEFT
ORIENTATION: LOWER;LEFT

## 2023-04-03 ASSESSMENT — PAIN DESCRIPTION - FREQUENCY: FREQUENCY: INTERMITTENT

## 2023-04-03 NOTE — CONSULTS
Spouse name: Not on file    Number of children: 2    Years of education: Not on file    Highest education level: Not on file   Occupational History    Not on file   Tobacco Use    Smoking status: Never    Smokeless tobacco: Never   Vaping Use    Vaping Use: Never used   Substance and Sexual Activity    Alcohol use: Not Currently     Comment: rarely    Drug use: No    Sexual activity: Never   Other Topics Concern    Not on file   Social History Narrative    Not on file     Social Determinants of Health     Financial Resource Strain: Low Risk     Difficulty of Paying Living Expenses: Not hard at all   Food Insecurity: No Food Insecurity    Worried About Running Out of Food in the Last Year: Never true    [address] in the Last Year: Never true   Transportation Needs: Unknown    Lack of Transportation (Medical): Not on file    Lack of Transportation (Non-Medical): No   Physical Activity: Inactive    Days of Exercise per Week: 0 days    Minutes of Exercise per Session: 10 min   Stress: Not on file   Social Connections: Not on file   Intimate Partner Violence: Not on file   Housing Stability: Unknown    Unable to Pay for Housing in the Last Year: Not on file    Number of Places Lived in the Last Year: Not on file    Unstable Housing in the Last Year: No       Home Meds:  Prior to Admission medications    Medication Sig Start Date End Date Taking? Authorizing Provider   metFORMIN (GLUCOPHAGE) 500 MG tablet Take 2 tablets by mouth 2 times daily (with meals)   Yes Historical Provider, MD   furosemide (LASIX) 80 MG tablet Take 1 tablet by mouth every morning   Yes Historical Provider, MD   furosemide (LASIX) 40 MG tablet Take 1 tablet by mouth at bedtime   Yes Historical Provider, MD   fosfomycin tromethamine (MONUROL) 3 g PACK Take 1 packet by mouth now and repeat in 3 days.   Patient not taking: Reported on 4/2/2023 3/17/23   Vanessa Breen MD   insulin lispro (HUMALOG) 100 UNIT/ML injection vial

## 2023-04-03 NOTE — H&P
Family Medicine Admit Notes/Coverage    Today's Date: 4/2/23  [id number]  Medical Record # [id number]  CSN/Account # [de-identified]      Mr. Hassan admitted on 4/2/2023    Patient is admitted due to   Chief Complaint   Patient presents with    Fall     Knee gave out and was I  the floor for 4 hour before he got helped. Live in a senior citizen apartment alone. Hx of knees giving out x 6 months ,had left TKR 2004 and right TRK 2006 by Dr Yancy James  IDDM sugars were high lately  Severe gout last February 2023  Morbidly obese    Physical Exam:  Patient Vitals for the past 24 hrs:   BP Temp Temp src Pulse Resp SpO2 Height Weight   04/02/23 2000 (!) 140/70 97.9 °F (36.6 °C) Oral 78 18 100 % -- --   04/02/23 1748 -- -- -- -- -- 99 % -- --   04/02/23 1715 131/63 97.3 °F (36.3 °C) Oral 73 16 100 % -- --   04/02/23 1545 (!) 123/103 -- -- 77 20 96 % -- --   04/02/23 1445 (!) 137/55 -- -- 82 22 95 % -- --   04/02/23 1316 (!) 161/56 -- -- 84 20 97 % -- --   04/02/23 1134 135/60 98.2 °F (36.8 °C) Oral 83 22 97 % 6' 2\" (1.88 m) (!) 362 lb (164.2 kg)       General Appearance:  Alert, cooperative, no distress, appears stated age   HEENT:  Neck: WNL  supple   Chest/Lungs:  Heart: CTA  RRR   Abdomen:  Back: Soft globular non tender  + SI tenderness bilateral   Extremities:  Neurological Exam: Both knees warm very tender + verrucous like dermatitis  WNL       Assessment:     Active Hospital Problems    Diagnosis Date Noted    Fall at home, initial encounter [W19. Ruthie Re, E76.410] 04/02/2023     Priority: High    Knee gives out bilateral [M25.369] 04/02/2023     Priority: High    Unable to ambulate as knees give out [R26.2] 04/02/2023     Priority: High    Gouty arthritis of both knees [M10.9] 03/21/2023     Priority: High    History of bilateral knee replacement (left 2004: right 2006) [Z96.653] 04/02/2023     Priority: Medium    S/P percutaneous

## 2023-04-03 NOTE — H&P
[address] [place] [address]                              HISTORY AND PHYSICAL    PATIENT NAME: Ese ORELLANA                   :        1950  MED REC NO:   [id number]                           ROOM:       0025  ACCOUNT NO:   [de-identified]                           ADMIT DATE: 2023  PROVIDER:     Aron Stallings. Samy Galarza M.D.    Loretta Desi:  The patient is a 38-year-old male brought in here by  the EMS because of fall injury this morning at the apartment where he  has been living. HISTORY OF PRESENT ILLNESS:  The patient is a 38-year-old male who had a  known history of diabetes, coronary artery disease/bypass, hypertension,  hypercholesterolemia, chronic kidney disease and lately, he was admitted  in February for severe gout and went to the [facility] for two weeks. The patient went back to his senior citizen  apartment following the [facility] admissions for two weeks. The patient  states that he was going out to the living room from his bedroom and  turning to the left when the knee gave out at about 10:30 this morning  and the patient was in the floor for several hours. The patient's  daughter called the EMS, who came in and picked him up and brought the  patient here. The patient states that for the past six months, he has  been having knee giving out and sometimes he fell down and has had an  injury. The patient is also having the lower legs having discoloration. I think he had cellulitis. He had no fever or chills. No pain. The  patient admits that he had a history of total knee replacement  bilateral, left in  and the right in  by Christa Kidd. The patient  from the fall has been having pain on the SI joint, left more than the  right. However, denies any sciatica pain at this time.   The patient was  evaluated in the emergency room and the patient is unable to stand up  and

## 2023-04-04 ENCOUNTER — APPOINTMENT (OUTPATIENT)
Dept: GENERAL RADIOLOGY | Age: 73
End: 2023-04-04
Payer: MEDICARE

## 2023-04-04 LAB
ANION GAP SERPL CALC-SCNC: 9 MEQ/L (ref 8–16)
BASOPHILS ABSOLUTE: 0 THOU/MM3 (ref 0–0.1)
BASOPHILS NFR BLD AUTO: 0.3 %
BUN SERPL-MCNC: 47 MG/DL (ref 7–22)
CALCIUM SERPL-MCNC: 7.9 MG/DL (ref 8.5–10.5)
CHLORIDE SERPL-SCNC: 102 MEQ/L (ref 98–111)
CO2 SERPL-SCNC: 23 MEQ/L (ref 23–33)
CREAT SERPL-MCNC: 1.6 MG/DL (ref 0.4–1.2)
DEPRECATED RDW RBC AUTO: 46.5 FL (ref 35–45)
EOSINOPHIL NFR BLD AUTO: 0.8 %
EOSINOPHILS ABSOLUTE: 0.1 THOU/MM3 (ref 0–0.4)
ERYTHROCYTE [DISTWIDTH] IN BLOOD BY AUTOMATED COUNT: 13.8 % (ref 11.5–14.5)
GFR SERPL CREATININE-BSD FRML MDRD: 45 ML/MIN/1.73M2
GLUCOSE BLD STRIP.AUTO-MCNC: 246 MG/DL (ref 70–108)
GLUCOSE BLD STRIP.AUTO-MCNC: 264 MG/DL (ref 70–108)
GLUCOSE BLD STRIP.AUTO-MCNC: 271 MG/DL (ref 70–108)
GLUCOSE BLD STRIP.AUTO-MCNC: 296 MG/DL (ref 70–108)
GLUCOSE SERPL-MCNC: 266 MG/DL (ref 70–108)
HCT VFR BLD AUTO: 21.3 % (ref 42–52)
HCT VFR BLD AUTO: 23.5 % (ref 42–52)
HEMOCCULT STL QL: NEGATIVE
HGB BLD-MCNC: 7.1 GM/DL (ref 14–18)
HGB BLD-MCNC: 7.5 GM/DL (ref 14–18)
IMM GRANULOCYTES # BLD AUTO: 0.02 THOU/MM3 (ref 0–0.07)
IMM GRANULOCYTES NFR BLD AUTO: 0.3 %
LYMPHOCYTES ABSOLUTE: 1.6 THOU/MM3 (ref 1–4.8)
LYMPHOCYTES NFR BLD AUTO: 22 %
MCH RBC QN AUTO: 30.9 PG (ref 26–33)
MCHC RBC AUTO-ENTMCNC: 33.3 GM/DL (ref 32.2–35.5)
MCV RBC AUTO: 92.6 FL (ref 80–94)
MONOCYTES ABSOLUTE: 0.8 THOU/MM3 (ref 0.4–1.3)
MONOCYTES NFR BLD AUTO: 11.5 %
NEUTROPHILS NFR BLD AUTO: 65.1 %
NRBC BLD AUTO-RTO: 0 /100 WBC
PLATELET # BLD AUTO: 221 THOU/MM3 (ref 130–400)
PMV BLD AUTO: 10 FL (ref 9.4–12.4)
POTASSIUM SERPL-SCNC: 4.5 MEQ/L (ref 3.5–5.2)
RBC # BLD AUTO: 2.3 MILL/MM3 (ref 4.7–6.1)
SEGMENTED NEUTROPHILS ABSOLUTE COUNT: 4.7 THOU/MM3 (ref 1.8–7.7)
SODIUM SERPL-SCNC: 134 MEQ/L (ref 135–145)
WBC # BLD AUTO: 7.2 THOU/MM3 (ref 4.8–10.8)

## 2023-04-04 PROCEDURE — 82272 OCCULT BLD FECES 1-3 TESTS: CPT

## 2023-04-04 PROCEDURE — 6370000000 HC RX 637 (ALT 250 FOR IP): Performed by: STUDENT IN AN ORGANIZED HEALTH CARE EDUCATION/TRAINING PROGRAM

## 2023-04-04 PROCEDURE — 6360000002 HC RX W HCPCS: Performed by: INTERNAL MEDICINE

## 2023-04-04 PROCEDURE — 1200000003 HC TELEMETRY R&B

## 2023-04-04 PROCEDURE — 71046 X-RAY EXAM CHEST 2 VIEWS: CPT

## 2023-04-04 PROCEDURE — 85025 COMPLETE CBC W/AUTO DIFF WBC: CPT

## 2023-04-04 PROCEDURE — 6370000000 HC RX 637 (ALT 250 FOR IP): Performed by: FAMILY MEDICINE

## 2023-04-04 PROCEDURE — 82948 REAGENT STRIP/BLOOD GLUCOSE: CPT

## 2023-04-04 PROCEDURE — 85014 HEMATOCRIT: CPT

## 2023-04-04 PROCEDURE — 89060 EXAM SYNOVIAL FLUID CRYSTALS: CPT

## 2023-04-04 PROCEDURE — 36415 COLL VENOUS BLD VENIPUNCTURE: CPT

## 2023-04-04 PROCEDURE — 6360000002 HC RX W HCPCS: Performed by: FAMILY MEDICINE

## 2023-04-04 PROCEDURE — 87205 SMEAR GRAM STAIN: CPT

## 2023-04-04 PROCEDURE — 97110 THERAPEUTIC EXERCISES: CPT

## 2023-04-04 PROCEDURE — 97530 THERAPEUTIC ACTIVITIES: CPT

## 2023-04-04 PROCEDURE — 2580000003 HC RX 258

## 2023-04-04 PROCEDURE — 6360000002 HC RX W HCPCS

## 2023-04-04 PROCEDURE — 97535 SELF CARE MNGMENT TRAINING: CPT

## 2023-04-04 PROCEDURE — 1200000000 HC SEMI PRIVATE

## 2023-04-04 PROCEDURE — 6370000000 HC RX 637 (ALT 250 FOR IP): Performed by: EMERGENCY MEDICINE

## 2023-04-04 PROCEDURE — 87070 CULTURE OTHR SPECIMN AEROBIC: CPT

## 2023-04-04 PROCEDURE — 6370000000 HC RX 637 (ALT 250 FOR IP)

## 2023-04-04 PROCEDURE — 89050 BODY FLUID CELL COUNT: CPT

## 2023-04-04 PROCEDURE — 94640 AIRWAY INHALATION TREATMENT: CPT

## 2023-04-04 PROCEDURE — 2580000003 HC RX 258: Performed by: INTERNAL MEDICINE

## 2023-04-04 PROCEDURE — 87075 CULTR BACTERIA EXCEPT BLOOD: CPT

## 2023-04-04 PROCEDURE — 80048 BASIC METABOLIC PNL TOTAL CA: CPT

## 2023-04-04 PROCEDURE — 99232 SBSQ HOSP IP/OBS MODERATE 35: CPT | Performed by: INTERNAL MEDICINE

## 2023-04-04 PROCEDURE — 85018 HEMOGLOBIN: CPT

## 2023-04-04 RX ORDER — INSULIN LISPRO 100 [IU]/ML
0-4 INJECTION, SOLUTION INTRAVENOUS; SUBCUTANEOUS NIGHTLY
Status: DISCONTINUED | OUTPATIENT
Start: 2023-04-04 | End: 2023-04-08 | Stop reason: HOSPADM

## 2023-04-04 RX ORDER — METHYLPREDNISOLONE SODIUM SUCCINATE 40 MG/ML
40 INJECTION, POWDER, LYOPHILIZED, FOR SOLUTION INTRAMUSCULAR; INTRAVENOUS ONCE
Status: COMPLETED | OUTPATIENT
Start: 2023-04-04 | End: 2023-04-04

## 2023-04-04 RX ORDER — INSULIN LISPRO 100 [IU]/ML
0-16 INJECTION, SOLUTION INTRAVENOUS; SUBCUTANEOUS
Status: DISCONTINUED | OUTPATIENT
Start: 2023-04-04 | End: 2023-04-08 | Stop reason: HOSPADM

## 2023-04-04 RX ADMIN — ENOXAPARIN SODIUM 40 MG: 100 INJECTION SUBCUTANEOUS at 10:57

## 2023-04-04 RX ADMIN — IRON SUCROSE 200 MG: 20 INJECTION, SOLUTION INTRAVENOUS at 11:55

## 2023-04-04 RX ADMIN — TICAGRELOR 90 MG: 90 TABLET ORAL at 10:57

## 2023-04-04 RX ADMIN — INSULIN LISPRO 4 UNITS: 100 INJECTION, SOLUTION INTRAVENOUS; SUBCUTANEOUS at 11:49

## 2023-04-04 RX ADMIN — TICAGRELOR 90 MG: 90 TABLET ORAL at 20:59

## 2023-04-04 RX ADMIN — COLCHICINE 0.6 MG: 0.6 TABLET, FILM COATED ORAL at 20:59

## 2023-04-04 RX ADMIN — SODIUM CHLORIDE, PRESERVATIVE FREE 10 ML: 5 INJECTION INTRAVENOUS at 10:57

## 2023-04-04 RX ADMIN — FERROUS SULFATE TAB 325 MG (65 MG ELEMENTAL FE) 325 MG: 325 (65 FE) TAB at 20:59

## 2023-04-04 RX ADMIN — SODIUM CHLORIDE, PRESERVATIVE FREE 10 ML: 5 INJECTION INTRAVENOUS at 20:59

## 2023-04-04 RX ADMIN — INSULIN LISPRO 15 UNITS: 100 INJECTION, SOLUTION INTRAVENOUS; SUBCUTANEOUS at 16:38

## 2023-04-04 RX ADMIN — AMLODIPINE BESYLATE 5 MG: 5 TABLET ORAL at 10:59

## 2023-04-04 RX ADMIN — ALBUTEROL SULFATE 2 PUFF: 90 AEROSOL, METERED RESPIRATORY (INHALATION) at 16:22

## 2023-04-04 RX ADMIN — CARVEDILOL 12.5 MG: 6.25 TABLET, FILM COATED ORAL at 20:59

## 2023-04-04 RX ADMIN — Medication 1 TABLET: at 10:57

## 2023-04-04 RX ADMIN — INSULIN LISPRO 15 UNITS: 100 INJECTION, SOLUTION INTRAVENOUS; SUBCUTANEOUS at 08:11

## 2023-04-04 RX ADMIN — METHYLPREDNISOLONE SODIUM SUCCINATE 40 MG: 40 INJECTION, POWDER, FOR SOLUTION INTRAMUSCULAR; INTRAVENOUS at 21:00

## 2023-04-04 RX ADMIN — ENOXAPARIN SODIUM 40 MG: 100 INJECTION SUBCUTANEOUS at 21:00

## 2023-04-04 RX ADMIN — COLCHICINE 0.6 MG: 0.6 TABLET, FILM COATED ORAL at 10:59

## 2023-04-04 RX ADMIN — FERROUS SULFATE TAB 325 MG (65 MG ELEMENTAL FE) 325 MG: 325 (65 FE) TAB at 10:57

## 2023-04-04 RX ADMIN — CARVEDILOL 12.5 MG: 6.25 TABLET, FILM COATED ORAL at 10:57

## 2023-04-04 RX ADMIN — INSULIN LISPRO 4 UNITS: 100 INJECTION, SOLUTION INTRAVENOUS; SUBCUTANEOUS at 08:11

## 2023-04-04 RX ADMIN — ATORVASTATIN CALCIUM 10 MG: 10 TABLET, FILM COATED ORAL at 10:59

## 2023-04-04 RX ADMIN — INSULIN LISPRO 15 UNITS: 100 INJECTION, SOLUTION INTRAVENOUS; SUBCUTANEOUS at 11:49

## 2023-04-04 RX ADMIN — INSULIN LISPRO 2 UNITS: 100 INJECTION, SOLUTION INTRAVENOUS; SUBCUTANEOUS at 16:37

## 2023-04-04 RX ADMIN — ACETAMINOPHEN 650 MG: 325 TABLET ORAL at 04:58

## 2023-04-04 RX ADMIN — ASPIRIN 81 MG: 81 TABLET, COATED ORAL at 10:57

## 2023-04-04 RX ADMIN — INSULIN GLARGINE 40 UNITS: 100 INJECTION, SOLUTION SUBCUTANEOUS at 21:00

## 2023-04-04 ASSESSMENT — ENCOUNTER SYMPTOMS
ABDOMINAL PAIN: 0
BACK PAIN: 1
SORE THROAT: 0
NAUSEA: 0
RHINORRHEA: 0
SHORTNESS OF BREATH: 0
VOMITING: 0
ABDOMINAL DISTENTION: 0
DIARRHEA: 0
STRIDOR: 0

## 2023-04-04 ASSESSMENT — PAIN SCALES - GENERAL
PAINLEVEL_OUTOF10: 0
PAINLEVEL_OUTOF10: 0

## 2023-04-04 NOTE — CONSULTS
ml    TONSILLECTOMY      VASCULAR SURGERY      cabg harvests from left leg       Home Medications:   Prior to Admission medications    Medication Sig Start Date End Date Taking? Authorizing Provider   metFORMIN (GLUCOPHAGE) 500 MG tablet Take 2 tablets by mouth 2 times daily (with meals)   Yes Historical Provider, MD   furosemide (LASIX) 80 MG tablet Take 1 tablet by mouth every morning   Yes Historical Provider, MD   furosemide (LASIX) 40 MG tablet Take 1 tablet by mouth at bedtime   Yes Historical Provider, MD   fosfomycin tromethamine (MONUROL) 3 g PACK Take 1 packet by mouth now and repeat in 3 days. Patient not taking: Reported on 4/2/2023 3/17/23   Maria Antonia Lopez MD   insulin lispro (HUMALOG) 100 UNIT/ML injection vial Inject 12 Units into the skin 3 times daily (before meals) Sliding scale    Historical Provider, MD   traMADol (ULTRAM) 50 MG tablet Take 1 tablet by mouth every 6 hours as needed for Pain.     Historical Provider, MD   febuxostat (ULORIC) 40 MG TABS tablet Take 1 tablet by mouth daily  Patient not taking: Reported on 4/2/2023    Historical Provider, MD   ondansetron (ZOFRAN-ODT) 4 MG disintegrating tablet Take 1 tablet by mouth every 8 hours as needed for Nausea or Vomiting  Patient not taking: Reported on 4/2/2023 2/25/23   Francia Sullivan MD   cephALEXin (KEFLEX) 500 MG capsule Take 1 capsule by mouth 3 times daily  Patient not taking: Reported on 4/2/2023 2/25/23   Francia Sullivan MD   insulin glargine (LANTUS SOLOSTAR) 100 UNIT/ML injection pen INJECT 30 UNITS SUBCUTANEOUSLY ONCE DAILY 2/7/23   Maria Antonia Lopez MD   ferrous sulfate (IRON 325) 325 (65 Fe) MG tablet Take 1 tablet by mouth 2 times daily 1/31/23   Maria Antonia Lopez MD   ticagrelor (BRILINTA) 90 MG TABS tablet Take 1 tablet by mouth twice daily 12/28/22   Maria Antonia Lopez MD   Multiple Vitamins-Minerals (THERAPEUTIC MULTIVITAMIN-MINERALS) tablet Take 1 tablet by mouth daily 12/12/22 12/12/23  Kathleen Colorado

## 2023-04-04 NOTE — PROCEDURES
Arthrocentesis Procedure Note     Indication: Joint pain, joint swelling, and to obtain joint fluid for diagnostic testing     Consent: The patient was counseled regarding the procedure, it's indications, risks, potential complications and alternatives and any questions were answered. Verbal consent was obtained. Procedure: The left was positioned appropriately and the landmarks were identified. Local anesthesia was not utilized. The area was then prepped and draped in the usual sterile fashion. An 18 gauge needle was then introduced into the superolateral joint space at which point 10 cc of clear/slightly straw colored fluid was aspirated, this was sent to lab. With the same index injection 50 additional ccs were removed for patient relief. A joint injection was not performed. The aspirated fluid was sent to the lab for cell count, differential, gram stain, culture and sensitivity, and crystals. A sterile dressing was then applied to the site. The right knee was aspirated in an identical fashion, slightly more clouded aspiration appreciable with the first 10ccs taken as compared to the left, approx 45 additional ccs were removed. The patient tolerated the procedure well.      Complications: None       Ludwig Shirley PA-C

## 2023-04-05 ENCOUNTER — APPOINTMENT (OUTPATIENT)
Dept: GENERAL RADIOLOGY | Age: 73
End: 2023-04-05
Payer: MEDICARE

## 2023-04-05 LAB
ANION GAP SERPL CALC-SCNC: 11 MEQ/L (ref 8–16)
BACTERIA URNS QL MICRO: ABNORMAL /HPF
BASOPHILS ABSOLUTE: 0 THOU/MM3 (ref 0–0.1)
BASOPHILS NFR BLD AUTO: 0 %
BILIRUB UR QL STRIP.AUTO: NEGATIVE
BUN SERPL-MCNC: 42 MG/DL (ref 7–22)
CALCIUM SERPL-MCNC: 7.8 MG/DL (ref 8.5–10.5)
CASTS #/AREA URNS LPF: ABNORMAL /LPF
CASTS 2: ABNORMAL /LPF
CHARACTER SNV: ABNORMAL
CHARACTER SNV: ABNORMAL
CHARACTER UR: CLEAR
CHLORIDE SERPL-SCNC: 101 MEQ/L (ref 98–111)
CO2 SERPL-SCNC: 23 MEQ/L (ref 23–33)
COLOR SNV: ABNORMAL
COLOR SNV: YELLOW
COLOR: YELLOW
CREAT SERPL-MCNC: 1.5 MG/DL (ref 0.4–1.2)
CRP SERPL-MCNC: 17.8 MG/DL (ref 0–1)
CRYSTALS FLD MICRO: ABNORMAL
CRYSTALS FLD MICRO: ABNORMAL
CRYSTALS URNS MICRO: ABNORMAL
DEPRECATED RDW RBC AUTO: 43.9 FL (ref 35–45)
EOSINOPHIL NFR BLD AUTO: 0 %
EOSINOPHILS ABSOLUTE: 0 THOU/MM3 (ref 0–0.4)
EPITHELIAL CELLS, UA: ABNORMAL /HPF
ERYTHROCYTE [DISTWIDTH] IN BLOOD BY AUTOMATED COUNT: 13.2 % (ref 11.5–14.5)
ERYTHROCYTE [SEDIMENTATION RATE] IN BLOOD BY WESTERGREN METHOD: > 140 MM/HR (ref 0–10)
GFR SERPL CREATININE-BSD FRML MDRD: 48 ML/MIN/1.73M2
GLUCOSE BLD STRIP.AUTO-MCNC: 311 MG/DL (ref 70–108)
GLUCOSE BLD STRIP.AUTO-MCNC: 389 MG/DL (ref 70–108)
GLUCOSE BLD STRIP.AUTO-MCNC: 432 MG/DL (ref 70–108)
GLUCOSE BLD STRIP.AUTO-MCNC: 461 MG/DL (ref 70–108)
GLUCOSE SERPL-MCNC: 306 MG/DL (ref 70–108)
GLUCOSE UR QL STRIP.AUTO: >= 1000 MG/DL
GRANULOCYTES NFR FLD AUTO: 84.1 % (ref 0–24)
GRANULOCYTES NFR FLD AUTO: 97.2 % (ref 0–24)
HCT VFR BLD AUTO: 25.2 % (ref 42–52)
HGB BLD-MCNC: 8.1 GM/DL (ref 14–18)
HGB UR QL STRIP.AUTO: ABNORMAL
IMM GRANULOCYTES # BLD AUTO: 0.03 THOU/MM3 (ref 0–0.07)
IMM GRANULOCYTES NFR BLD AUTO: 0.4 %
KETONES UR QL STRIP.AUTO: NEGATIVE
LYMPHOCYTES ABSOLUTE: 0.5 THOU/MM3 (ref 1–4.8)
LYMPHOCYTES NFR BLD AUTO: 7 %
MCH RBC QN AUTO: 29.5 PG (ref 26–33)
MCHC RBC AUTO-ENTMCNC: 32.1 GM/DL (ref 32.2–35.5)
MCV RBC AUTO: 91.6 FL (ref 80–94)
MISCELLANEOUS 2: ABNORMAL
MONOCYTES ABSOLUTE: 0.2 THOU/MM3 (ref 0.4–1.3)
MONOCYTES NFR BLD AUTO: 2.6 %
MONONUC CELLS NFR FLD AUTO: 15.9 % (ref 0–74)
MONONUC CELLS NFR FLD AUTO: 2.8 % (ref 0–74)
NEUTROPHILS NFR BLD AUTO: 90 %
NITRITE UR QL STRIP: NEGATIVE
NRBC BLD AUTO-RTO: 0 /100 WBC
NUC CELL # FLD AUTO: 1976 /CUMM (ref 0–150)
NUC CELL # FLD AUTO: ABNORMAL /CUMM (ref 0–150)
PATHOLOGIST REVIEW: ABNORMAL
PATHOLOGIST REVIEW: ABNORMAL
PH UR STRIP.AUTO: 5.5 [PH] (ref 5–9)
PLATELET # BLD AUTO: 186 THOU/MM3 (ref 130–400)
PMV BLD AUTO: 9.6 FL (ref 9.4–12.4)
POTASSIUM SERPL-SCNC: 4.7 MEQ/L (ref 3.5–5.2)
PROT UR STRIP.AUTO-MCNC: ABNORMAL MG/DL
RBC # BLD AUTO: 2.75 MILL/MM3 (ref 4.7–6.1)
RBC URINE: ABNORMAL /HPF
RENAL EPI CELLS #/AREA URNS HPF: ABNORMAL /[HPF]
SEGMENTED NEUTROPHILS ABSOLUTE COUNT: 6.3 THOU/MM3 (ref 1.8–7.7)
SODIUM SERPL-SCNC: 135 MEQ/L (ref 135–145)
SP GR UR REFRACT.AUTO: 1.02 (ref 1–1.03)
TOTAL VOLUME RECEIVED SYNOVIAL: 10 ML
TOTAL VOLUME RECEIVED SYNOVIAL: 10 ML
URATE SERPL-MCNC: 9.4 MG/DL (ref 3.7–7)
UROBILINOGEN, URINE: 0.2 EU/DL (ref 0–1)
WBC # BLD AUTO: 7 THOU/MM3 (ref 4.8–10.8)
WBC #/AREA URNS HPF: ABNORMAL /HPF
WBC #/AREA URNS HPF: NEGATIVE /[HPF]
YEAST LIKE FUNGI URNS QL MICRO: ABNORMAL

## 2023-04-05 PROCEDURE — 81001 URINALYSIS AUTO W/SCOPE: CPT

## 2023-04-05 PROCEDURE — 80048 BASIC METABOLIC PNL TOTAL CA: CPT

## 2023-04-05 PROCEDURE — 97530 THERAPEUTIC ACTIVITIES: CPT

## 2023-04-05 PROCEDURE — 85025 COMPLETE CBC W/AUTO DIFF WBC: CPT

## 2023-04-05 PROCEDURE — 85651 RBC SED RATE NONAUTOMATED: CPT

## 2023-04-05 PROCEDURE — 6370000000 HC RX 637 (ALT 250 FOR IP): Performed by: FAMILY MEDICINE

## 2023-04-05 PROCEDURE — 97162 PT EVAL MOD COMPLEX 30 MIN: CPT

## 2023-04-05 PROCEDURE — 1200000003 HC TELEMETRY R&B

## 2023-04-05 PROCEDURE — 6360000002 HC RX W HCPCS

## 2023-04-05 PROCEDURE — 1200000000 HC SEMI PRIVATE

## 2023-04-05 PROCEDURE — 82948 REAGENT STRIP/BLOOD GLUCOSE: CPT

## 2023-04-05 PROCEDURE — 86140 C-REACTIVE PROTEIN: CPT

## 2023-04-05 PROCEDURE — 73080 X-RAY EXAM OF ELBOW: CPT

## 2023-04-05 PROCEDURE — 99232 SBSQ HOSP IP/OBS MODERATE 35: CPT | Performed by: INTERNAL MEDICINE

## 2023-04-05 PROCEDURE — 6370000000 HC RX 637 (ALT 250 FOR IP): Performed by: INTERNAL MEDICINE

## 2023-04-05 PROCEDURE — 94640 AIRWAY INHALATION TREATMENT: CPT

## 2023-04-05 PROCEDURE — 6370000000 HC RX 637 (ALT 250 FOR IP): Performed by: STUDENT IN AN ORGANIZED HEALTH CARE EDUCATION/TRAINING PROGRAM

## 2023-04-05 PROCEDURE — 97535 SELF CARE MNGMENT TRAINING: CPT

## 2023-04-05 PROCEDURE — 36415 COLL VENOUS BLD VENIPUNCTURE: CPT

## 2023-04-05 PROCEDURE — 2580000003 HC RX 258

## 2023-04-05 PROCEDURE — 2580000003 HC RX 258: Performed by: INTERNAL MEDICINE

## 2023-04-05 PROCEDURE — 84550 ASSAY OF BLOOD/URIC ACID: CPT

## 2023-04-05 PROCEDURE — 6360000002 HC RX W HCPCS: Performed by: INTERNAL MEDICINE

## 2023-04-05 RX ORDER — FUROSEMIDE 40 MG/1
40 TABLET ORAL DAILY
Status: DISCONTINUED | OUTPATIENT
Start: 2023-04-05 | End: 2023-04-08 | Stop reason: HOSPADM

## 2023-04-05 RX ORDER — INSULIN LISPRO 100 [IU]/ML
12 INJECTION, SOLUTION INTRAVENOUS; SUBCUTANEOUS
Status: DISCONTINUED | OUTPATIENT
Start: 2023-04-05 | End: 2023-04-08 | Stop reason: HOSPADM

## 2023-04-05 RX ORDER — INSULIN GLARGINE 100 [IU]/ML
50 INJECTION, SOLUTION SUBCUTANEOUS NIGHTLY
Status: DISCONTINUED | OUTPATIENT
Start: 2023-04-05 | End: 2023-04-08 | Stop reason: HOSPADM

## 2023-04-05 RX ADMIN — COLCHICINE 0.6 MG: 0.6 TABLET, FILM COATED ORAL at 08:30

## 2023-04-05 RX ADMIN — CARVEDILOL 12.5 MG: 6.25 TABLET, FILM COATED ORAL at 08:30

## 2023-04-05 RX ADMIN — FERROUS SULFATE TAB 325 MG (65 MG ELEMENTAL FE) 325 MG: 325 (65 FE) TAB at 08:31

## 2023-04-05 RX ADMIN — TICAGRELOR 90 MG: 90 TABLET ORAL at 20:54

## 2023-04-05 RX ADMIN — FERROUS SULFATE TAB 325 MG (65 MG ELEMENTAL FE) 325 MG: 325 (65 FE) TAB at 20:54

## 2023-04-05 RX ADMIN — ENOXAPARIN SODIUM 40 MG: 100 INJECTION SUBCUTANEOUS at 08:29

## 2023-04-05 RX ADMIN — INSULIN LISPRO 16 UNITS: 100 INJECTION, SOLUTION INTRAVENOUS; SUBCUTANEOUS at 16:58

## 2023-04-05 RX ADMIN — ASPIRIN 81 MG: 81 TABLET, COATED ORAL at 08:30

## 2023-04-05 RX ADMIN — INSULIN LISPRO 16 UNITS: 100 INJECTION, SOLUTION INTRAVENOUS; SUBCUTANEOUS at 12:04

## 2023-04-05 RX ADMIN — INSULIN LISPRO 12 UNITS: 100 INJECTION, SOLUTION INTRAVENOUS; SUBCUTANEOUS at 08:26

## 2023-04-05 RX ADMIN — SODIUM CHLORIDE, PRESERVATIVE FREE 10 ML: 5 INJECTION INTRAVENOUS at 08:33

## 2023-04-05 RX ADMIN — SODIUM CHLORIDE, PRESERVATIVE FREE 10 ML: 5 INJECTION INTRAVENOUS at 20:54

## 2023-04-05 RX ADMIN — INSULIN GLARGINE 50 UNITS: 100 INJECTION, SOLUTION SUBCUTANEOUS at 20:54

## 2023-04-05 RX ADMIN — AMLODIPINE BESYLATE 5 MG: 5 TABLET ORAL at 08:31

## 2023-04-05 RX ADMIN — COLCHICINE 0.6 MG: 0.6 TABLET, FILM COATED ORAL at 20:54

## 2023-04-05 RX ADMIN — ATORVASTATIN CALCIUM 10 MG: 10 TABLET, FILM COATED ORAL at 08:31

## 2023-04-05 RX ADMIN — IRON SUCROSE 200 MG: 20 INJECTION, SOLUTION INTRAVENOUS at 08:37

## 2023-04-05 RX ADMIN — HYDROCODONE BITARTRATE AND ACETAMINOPHEN 1 TABLET: 5; 325 TABLET ORAL at 12:27

## 2023-04-05 RX ADMIN — INSULIN LISPRO 12 UNITS: 100 INJECTION, SOLUTION INTRAVENOUS; SUBCUTANEOUS at 16:59

## 2023-04-05 RX ADMIN — CARVEDILOL 12.5 MG: 6.25 TABLET, FILM COATED ORAL at 20:54

## 2023-04-05 RX ADMIN — ENOXAPARIN SODIUM 40 MG: 100 INJECTION SUBCUTANEOUS at 21:03

## 2023-04-05 RX ADMIN — ALBUTEROL SULFATE 2 PUFF: 90 AEROSOL, METERED RESPIRATORY (INHALATION) at 18:05

## 2023-04-05 RX ADMIN — INSULIN LISPRO 4 UNITS: 100 INJECTION, SOLUTION INTRAVENOUS; SUBCUTANEOUS at 20:54

## 2023-04-05 RX ADMIN — FUROSEMIDE 40 MG: 40 TABLET ORAL at 12:26

## 2023-04-05 RX ADMIN — TICAGRELOR 90 MG: 90 TABLET ORAL at 08:30

## 2023-04-05 RX ADMIN — Medication 1 TABLET: at 08:31

## 2023-04-05 ASSESSMENT — ENCOUNTER SYMPTOMS
NAUSEA: 0
APNEA: 0
DIARRHEA: 0
COUGH: 0
ABDOMINAL DISTENTION: 0
VOMITING: 0
SHORTNESS OF BREATH: 0

## 2023-04-05 ASSESSMENT — PAIN DESCRIPTION - DESCRIPTORS: DESCRIPTORS: ACHING

## 2023-04-05 ASSESSMENT — PAIN DESCRIPTION - FREQUENCY: FREQUENCY: INTERMITTENT

## 2023-04-05 ASSESSMENT — PAIN DESCRIPTION - ORIENTATION: ORIENTATION: LEFT;LOWER

## 2023-04-05 ASSESSMENT — PAIN SCALES - GENERAL: PAINLEVEL_OUTOF10: 7

## 2023-04-05 ASSESSMENT — PAIN DESCRIPTION - LOCATION: LOCATION: BACK

## 2023-04-05 NOTE — DISCHARGE INSTR - COC
at Discharge: Stable    Rehab Potential (if transferring to Rehab): Good    Recommended Labs or Other Treatments After Discharge: on 4-12-23 do cbc with diff and bmp and uric acid sed rate and crp    Physician Certification: I certify the above information and transfer of Rosalina Lynne  is necessary for the continuing treatment of the diagnosis listed and that he requires [facility] for greater 30 days.      Update Admission H&P: No change in H&P    PHYSICIAN SIGNATURE:  Electronically signed by Yue Arthur MD on 4/7/23 at 2:45 PM EDT

## 2023-04-05 NOTE — FLOWSHEET NOTE
04/05/23 1009   Safe Environment   Safety Measures Other (comment)  (VN safety round)     VN called into patients room and introduced myself and role. Patient did not answer. Video activated for safety check. . Patient resting comfortably in bed. . Patient no obvious signs of distress noted at this time. Call light within reach.

## 2023-04-06 LAB
ANION GAP SERPL CALC-SCNC: 9 MEQ/L (ref 8–16)
BUN SERPL-MCNC: 36 MG/DL (ref 7–22)
CALCIUM SERPL-MCNC: 7.9 MG/DL (ref 8.5–10.5)
CHLORIDE SERPL-SCNC: 101 MEQ/L (ref 98–111)
CO2 SERPL-SCNC: 23 MEQ/L (ref 23–33)
CREAT SERPL-MCNC: 1.2 MG/DL (ref 0.4–1.2)
DEPRECATED RDW RBC AUTO: 43.5 FL (ref 35–45)
EKG ATRIAL RATE: 78 BPM
EKG P AXIS: 65 DEGREES
EKG P-R INTERVAL: 204 MS
EKG Q-T INTERVAL: 404 MS
EKG QRS DURATION: 138 MS
EKG QTC CALCULATION (BAZETT): 460 MS
EKG R AXIS: -42 DEGREES
EKG T AXIS: 69 DEGREES
EKG VENTRICULAR RATE: 78 BPM
ERYTHROCYTE [DISTWIDTH] IN BLOOD BY AUTOMATED COUNT: 13.2 % (ref 11.5–14.5)
GFR SERPL CREATININE-BSD FRML MDRD: > 60 ML/MIN/1.73M2
GLUCOSE BLD STRIP.AUTO-MCNC: 150 MG/DL (ref 70–108)
GLUCOSE BLD STRIP.AUTO-MCNC: 153 MG/DL (ref 70–108)
GLUCOSE BLD STRIP.AUTO-MCNC: 268 MG/DL (ref 70–108)
GLUCOSE BLD STRIP.AUTO-MCNC: 323 MG/DL (ref 70–108)
GLUCOSE SERPL-MCNC: 316 MG/DL (ref 70–108)
HCT VFR BLD AUTO: 23.8 % (ref 42–52)
HGB BLD-MCNC: 7.8 GM/DL (ref 14–18)
MCH RBC QN AUTO: 29.4 PG (ref 26–33)
MCHC RBC AUTO-ENTMCNC: 32.8 GM/DL (ref 32.2–35.5)
MCV RBC AUTO: 89.8 FL (ref 80–94)
PLATELET # BLD AUTO: 192 THOU/MM3 (ref 130–400)
PMV BLD AUTO: 10 FL (ref 9.4–12.4)
POTASSIUM SERPL-SCNC: 4.1 MEQ/L (ref 3.5–5.2)
RBC # BLD AUTO: 2.65 MILL/MM3 (ref 4.7–6.1)
SODIUM SERPL-SCNC: 133 MEQ/L (ref 135–145)
WBC # BLD AUTO: 6.9 THOU/MM3 (ref 4.8–10.8)

## 2023-04-06 PROCEDURE — 82948 REAGENT STRIP/BLOOD GLUCOSE: CPT

## 2023-04-06 PROCEDURE — 2580000003 HC RX 258

## 2023-04-06 PROCEDURE — 6360000002 HC RX W HCPCS

## 2023-04-06 PROCEDURE — 97110 THERAPEUTIC EXERCISES: CPT

## 2023-04-06 PROCEDURE — 80048 BASIC METABOLIC PNL TOTAL CA: CPT

## 2023-04-06 PROCEDURE — 85027 COMPLETE CBC AUTOMATED: CPT

## 2023-04-06 PROCEDURE — 36415 COLL VENOUS BLD VENIPUNCTURE: CPT

## 2023-04-06 PROCEDURE — 1200000003 HC TELEMETRY R&B

## 2023-04-06 PROCEDURE — 6370000000 HC RX 637 (ALT 250 FOR IP): Performed by: STUDENT IN AN ORGANIZED HEALTH CARE EDUCATION/TRAINING PROGRAM

## 2023-04-06 PROCEDURE — 6360000002 HC RX W HCPCS: Performed by: INTERNAL MEDICINE

## 2023-04-06 PROCEDURE — 6370000000 HC RX 637 (ALT 250 FOR IP): Performed by: FAMILY MEDICINE

## 2023-04-06 PROCEDURE — 2580000003 HC RX 258: Performed by: INTERNAL MEDICINE

## 2023-04-06 PROCEDURE — 6370000000 HC RX 637 (ALT 250 FOR IP): Performed by: INTERNAL MEDICINE

## 2023-04-06 PROCEDURE — 97530 THERAPEUTIC ACTIVITIES: CPT

## 2023-04-06 PROCEDURE — 99232 SBSQ HOSP IP/OBS MODERATE 35: CPT | Performed by: INTERNAL MEDICINE

## 2023-04-06 PROCEDURE — 1200000000 HC SEMI PRIVATE

## 2023-04-06 PROCEDURE — 94640 AIRWAY INHALATION TREATMENT: CPT

## 2023-04-06 RX ORDER — AMMONIUM LACTATE 12 G/100G
LOTION TOPICAL DAILY
Status: DISCONTINUED | OUTPATIENT
Start: 2023-04-06 | End: 2023-04-08 | Stop reason: HOSPADM

## 2023-04-06 RX ADMIN — INSULIN LISPRO 8 UNITS: 100 INJECTION, SOLUTION INTRAVENOUS; SUBCUTANEOUS at 12:52

## 2023-04-06 RX ADMIN — ATORVASTATIN CALCIUM 10 MG: 10 TABLET, FILM COATED ORAL at 09:58

## 2023-04-06 RX ADMIN — TICAGRELOR 90 MG: 90 TABLET ORAL at 09:58

## 2023-04-06 RX ADMIN — COLCHICINE 0.6 MG: 0.6 TABLET, FILM COATED ORAL at 22:49

## 2023-04-06 RX ADMIN — CARVEDILOL 12.5 MG: 6.25 TABLET, FILM COATED ORAL at 09:58

## 2023-04-06 RX ADMIN — FERROUS SULFATE TAB 325 MG (65 MG ELEMENTAL FE) 325 MG: 325 (65 FE) TAB at 22:49

## 2023-04-06 RX ADMIN — INSULIN LISPRO 12 UNITS: 100 INJECTION, SOLUTION INTRAVENOUS; SUBCUTANEOUS at 09:57

## 2023-04-06 RX ADMIN — FUROSEMIDE 40 MG: 40 TABLET ORAL at 09:58

## 2023-04-06 RX ADMIN — Medication: at 19:05

## 2023-04-06 RX ADMIN — IRON SUCROSE 200 MG: 20 INJECTION, SOLUTION INTRAVENOUS at 10:27

## 2023-04-06 RX ADMIN — FERROUS SULFATE TAB 325 MG (65 MG ELEMENTAL FE) 325 MG: 325 (65 FE) TAB at 09:58

## 2023-04-06 RX ADMIN — Medication 1 TABLET: at 09:59

## 2023-04-06 RX ADMIN — TICAGRELOR 90 MG: 90 TABLET ORAL at 22:49

## 2023-04-06 RX ADMIN — INSULIN LISPRO 12 UNITS: 100 INJECTION, SOLUTION INTRAVENOUS; SUBCUTANEOUS at 19:06

## 2023-04-06 RX ADMIN — CARVEDILOL 12.5 MG: 6.25 TABLET, FILM COATED ORAL at 22:49

## 2023-04-06 RX ADMIN — ASPIRIN 81 MG: 81 TABLET, COATED ORAL at 09:58

## 2023-04-06 RX ADMIN — ALBUTEROL SULFATE 2 PUFF: 90 AEROSOL, METERED RESPIRATORY (INHALATION) at 17:45

## 2023-04-06 RX ADMIN — COLCHICINE 0.6 MG: 0.6 TABLET, FILM COATED ORAL at 09:58

## 2023-04-06 RX ADMIN — INSULIN LISPRO 12 UNITS: 100 INJECTION, SOLUTION INTRAVENOUS; SUBCUTANEOUS at 12:51

## 2023-04-06 RX ADMIN — ENOXAPARIN SODIUM 40 MG: 100 INJECTION SUBCUTANEOUS at 22:49

## 2023-04-06 RX ADMIN — ENOXAPARIN SODIUM 40 MG: 100 INJECTION SUBCUTANEOUS at 10:01

## 2023-04-06 RX ADMIN — INSULIN GLARGINE 50 UNITS: 100 INJECTION, SOLUTION SUBCUTANEOUS at 22:45

## 2023-04-06 RX ADMIN — ALBUTEROL SULFATE 2 PUFF: 90 AEROSOL, METERED RESPIRATORY (INHALATION) at 08:05

## 2023-04-06 RX ADMIN — AMLODIPINE BESYLATE 5 MG: 5 TABLET ORAL at 09:58

## 2023-04-06 RX ADMIN — SODIUM CHLORIDE, PRESERVATIVE FREE 10 ML: 5 INJECTION INTRAVENOUS at 09:59

## 2023-04-06 ASSESSMENT — PAIN SCALES - GENERAL
PAINLEVEL_OUTOF10: 0

## 2023-04-06 ASSESSMENT — ENCOUNTER SYMPTOMS
ABDOMINAL PAIN: 0
NAUSEA: 0
CHEST TIGHTNESS: 0
VOMITING: 0
DIARRHEA: 0
ABDOMINAL DISTENTION: 0
SHORTNESS OF BREATH: 0

## 2023-04-07 VITALS
RESPIRATION RATE: 12 BRPM | HEIGHT: 74 IN | TEMPERATURE: 98.2 F | SYSTOLIC BLOOD PRESSURE: 140 MMHG | WEIGHT: 315 LBS | OXYGEN SATURATION: 99 % | DIASTOLIC BLOOD PRESSURE: 76 MMHG | HEART RATE: 79 BPM | BODY MASS INDEX: 40.43 KG/M2

## 2023-04-07 LAB
ANION GAP SERPL CALC-SCNC: 9 MEQ/L (ref 8–16)
BUN SERPL-MCNC: 38 MG/DL (ref 7–22)
CALCIUM SERPL-MCNC: 8.2 MG/DL (ref 8.5–10.5)
CHLORIDE SERPL-SCNC: 106 MEQ/L (ref 98–111)
CO2 SERPL-SCNC: 25 MEQ/L (ref 23–33)
CREAT SERPL-MCNC: 1.1 MG/DL (ref 0.4–1.2)
GFR SERPL CREATININE-BSD FRML MDRD: > 60 ML/MIN/1.73M2
GLUCOSE BLD STRIP.AUTO-MCNC: 118 MG/DL (ref 70–108)
GLUCOSE BLD STRIP.AUTO-MCNC: 166 MG/DL (ref 70–108)
GLUCOSE BLD STRIP.AUTO-MCNC: 88 MG/DL (ref 70–108)
GLUCOSE SERPL-MCNC: 104 MG/DL (ref 70–108)
HCT VFR BLD AUTO: 24.9 % (ref 42–52)
HGB BLD-MCNC: 8.1 GM/DL (ref 14–18)
POTASSIUM SERPL-SCNC: 4.5 MEQ/L (ref 3.5–5.2)
SODIUM SERPL-SCNC: 140 MEQ/L (ref 135–145)

## 2023-04-07 PROCEDURE — 6370000000 HC RX 637 (ALT 250 FOR IP): Performed by: FAMILY MEDICINE

## 2023-04-07 PROCEDURE — 1200000000 HC SEMI PRIVATE

## 2023-04-07 PROCEDURE — 94640 AIRWAY INHALATION TREATMENT: CPT

## 2023-04-07 PROCEDURE — 82948 REAGENT STRIP/BLOOD GLUCOSE: CPT

## 2023-04-07 PROCEDURE — 6370000000 HC RX 637 (ALT 250 FOR IP): Performed by: STUDENT IN AN ORGANIZED HEALTH CARE EDUCATION/TRAINING PROGRAM

## 2023-04-07 PROCEDURE — 80048 BASIC METABOLIC PNL TOTAL CA: CPT

## 2023-04-07 PROCEDURE — 85018 HEMOGLOBIN: CPT

## 2023-04-07 PROCEDURE — 6370000000 HC RX 637 (ALT 250 FOR IP): Performed by: INTERNAL MEDICINE

## 2023-04-07 PROCEDURE — 97110 THERAPEUTIC EXERCISES: CPT

## 2023-04-07 PROCEDURE — 97530 THERAPEUTIC ACTIVITIES: CPT

## 2023-04-07 PROCEDURE — 2580000003 HC RX 258

## 2023-04-07 PROCEDURE — 85014 HEMATOCRIT: CPT

## 2023-04-07 PROCEDURE — 99232 SBSQ HOSP IP/OBS MODERATE 35: CPT | Performed by: INTERNAL MEDICINE

## 2023-04-07 PROCEDURE — 6360000002 HC RX W HCPCS

## 2023-04-07 PROCEDURE — 36415 COLL VENOUS BLD VENIPUNCTURE: CPT

## 2023-04-07 RX ORDER — PREDNISONE 20 MG/1
20 TABLET ORAL 2 TIMES DAILY
Status: DISCONTINUED | OUTPATIENT
Start: 2023-04-07 | End: 2023-04-08 | Stop reason: HOSPADM

## 2023-04-07 RX ORDER — FUROSEMIDE 40 MG/1
40 TABLET ORAL DAILY
Qty: 60 TABLET | Refills: 3 | Status: SHIPPED | OUTPATIENT
Start: 2023-04-08

## 2023-04-07 RX ORDER — PREDNISONE 20 MG/1
TABLET ORAL
Qty: 20 TABLET | Refills: 0
Start: 2023-04-07

## 2023-04-07 RX ORDER — INSULIN GLARGINE 100 [IU]/ML
50 INJECTION, SOLUTION SUBCUTANEOUS NIGHTLY
Qty: 10 ML | Refills: 3 | Status: SHIPPED | OUTPATIENT
Start: 2023-04-07

## 2023-04-07 RX ORDER — COLCHICINE 0.6 MG/1
0.6 TABLET ORAL 2 TIMES DAILY
Qty: 30 TABLET | Refills: 3 | Status: SHIPPED | OUTPATIENT
Start: 2023-04-07

## 2023-04-07 RX ORDER — AMMONIUM LACTATE 12 G/100G
LOTION TOPICAL
Qty: 57 G | Refills: 0
Start: 2023-04-08

## 2023-04-07 RX ORDER — INSULIN LISPRO 100 [IU]/ML
0-16 INJECTION, SOLUTION INTRAVENOUS; SUBCUTANEOUS
Qty: 1 EACH | Refills: 0 | Status: SHIPPED | OUTPATIENT
Start: 2023-04-07

## 2023-04-07 RX ORDER — INSULIN LISPRO 100 [IU]/ML
0-4 INJECTION, SOLUTION INTRAVENOUS; SUBCUTANEOUS NIGHTLY
Qty: 1 EACH | Refills: 0
Start: 2023-04-07

## 2023-04-07 RX ORDER — INSULIN LISPRO 100 [IU]/ML
12 INJECTION, SOLUTION INTRAVENOUS; SUBCUTANEOUS
Qty: 1 EACH | Refills: 0
Start: 2023-04-07

## 2023-04-07 RX ORDER — HYDROCODONE BITARTRATE AND ACETAMINOPHEN 5; 325 MG/1; MG/1
1 TABLET ORAL EVERY 6 HOURS PRN
Qty: 28 TABLET | Refills: 0 | Status: SHIPPED | OUTPATIENT
Start: 2023-04-07 | End: 2023-04-14

## 2023-04-07 RX ADMIN — Medication: at 09:18

## 2023-04-07 RX ADMIN — SODIUM CHLORIDE, PRESERVATIVE FREE 10 ML: 5 INJECTION INTRAVENOUS at 09:18

## 2023-04-07 RX ADMIN — PREDNISONE 20 MG: 20 TABLET ORAL at 09:27

## 2023-04-07 RX ADMIN — AMLODIPINE BESYLATE 5 MG: 5 TABLET ORAL at 09:18

## 2023-04-07 RX ADMIN — INSULIN LISPRO 12 UNITS: 100 INJECTION, SOLUTION INTRAVENOUS; SUBCUTANEOUS at 16:57

## 2023-04-07 RX ADMIN — INSULIN LISPRO 12 UNITS: 100 INJECTION, SOLUTION INTRAVENOUS; SUBCUTANEOUS at 12:51

## 2023-04-07 RX ADMIN — ASPIRIN 81 MG: 81 TABLET, COATED ORAL at 09:34

## 2023-04-07 RX ADMIN — INSULIN LISPRO 12 UNITS: 100 INJECTION, SOLUTION INTRAVENOUS; SUBCUTANEOUS at 09:33

## 2023-04-07 RX ADMIN — TICAGRELOR 90 MG: 90 TABLET ORAL at 09:34

## 2023-04-07 RX ADMIN — PREDNISONE 20 MG: 20 TABLET ORAL at 17:03

## 2023-04-07 RX ADMIN — CARVEDILOL 12.5 MG: 6.25 TABLET, FILM COATED ORAL at 09:18

## 2023-04-07 RX ADMIN — FERROUS SULFATE TAB 325 MG (65 MG ELEMENTAL FE) 325 MG: 325 (65 FE) TAB at 09:27

## 2023-04-07 RX ADMIN — ENOXAPARIN SODIUM 40 MG: 100 INJECTION SUBCUTANEOUS at 09:34

## 2023-04-07 RX ADMIN — Medication 1 TABLET: at 09:26

## 2023-04-07 RX ADMIN — COLCHICINE 0.6 MG: 0.6 TABLET, FILM COATED ORAL at 09:26

## 2023-04-07 RX ADMIN — ALBUTEROL SULFATE 2 PUFF: 90 AEROSOL, METERED RESPIRATORY (INHALATION) at 09:31

## 2023-04-07 RX ADMIN — ATORVASTATIN CALCIUM 10 MG: 10 TABLET, FILM COATED ORAL at 09:27

## 2023-04-07 RX ADMIN — FUROSEMIDE 40 MG: 40 TABLET ORAL at 09:27

## 2023-04-07 ASSESSMENT — PAIN SCALES - GENERAL
PAINLEVEL_OUTOF10: 0
PAINLEVEL_OUTOF10: 0
PAINLEVEL_OUTOF10: 4

## 2023-04-07 ASSESSMENT — ENCOUNTER SYMPTOMS
DIARRHEA: 0
SHORTNESS OF BREATH: 0
CONSTIPATION: 0
APNEA: 0
VOMITING: 0
COUGH: 0
ABDOMINAL DISTENTION: 0

## 2023-04-07 ASSESSMENT — PAIN DESCRIPTION - DESCRIPTORS: DESCRIPTORS: ACHING;DISCOMFORT;SHOOTING;SORE

## 2023-04-07 ASSESSMENT — PAIN DESCRIPTION - LOCATION: LOCATION: LEG

## 2023-04-07 ASSESSMENT — PAIN DESCRIPTION - PAIN TYPE: TYPE: CHRONIC PAIN

## 2023-04-07 ASSESSMENT — PAIN DESCRIPTION - ORIENTATION: ORIENTATION: RIGHT;LEFT

## 2023-04-07 ASSESSMENT — PAIN - FUNCTIONAL ASSESSMENT: PAIN_FUNCTIONAL_ASSESSMENT: ACTIVITIES ARE NOT PREVENTED

## 2023-04-07 ASSESSMENT — PAIN DESCRIPTION - FREQUENCY: FREQUENCY: CONTINUOUS

## 2023-04-07 ASSESSMENT — PAIN DESCRIPTION - ONSET: ONSET: ON-GOING

## 2023-04-07 NOTE — PROGRESS NOTES
72M with bilateral knee pain and R thumb pain and swelling. History of gout, orthopaedics asked to evaluate for bilateral knee pain and feelings of instability. Physical exam and imaging negative for acute process or failure of bilateral TKAs   Aspirations performed 4/4/23 without difficulty, clinical picture and aspiration results reflecting gouty attack, recommend medical treatment. Will continue to follow aspiration peripherally for final growth.    WBAT BLE with walker, recommend against immobilization of BLE due to body habitus and risk of wound  Ice as tolerates  Ortho to sign off, available as needed while in house    Pam Alfaro PA-C
[facility] RENAL TELEMETRY 6K  Occupational Therapy  Daily Note  Time:   Time In: 1401  Time Out: 8914  Timed Code Treatment Minutes: 24 Minutes  Minutes: 24          Date: 2023  Patient Name: June Fan,   Gender: male      Room: [facility]25/025-A  MRN: [id number]  : 1950  (74 y.o.)  Referring Practitioner: Urmila Angel MD  Diagnosis: fall at home  Additional Pertinent Hx: per chart review; June Fan is a pleasant 74 y.o. male who presents to the emergency department for evaluation of Fall. Per the patient, he states both his knees gave out earlier today. And he fell. He states he called his daughter who called EMS who brought him in. Patient states about 5 to 6 weeks ago he had a similar fall and was found to have gout in his knees bilaterally. Patient denies hitting his head, denies any loss of consciousness. Patient complains of mild lumbar back pain. As well as right wrist and thumb pain. Patient states his knees hurt him baseline. He states he has had issues with his back for some time. He also states his wrist and thumb pain has been chronic for him. However would like to be checked out at this visit. The patient denies any headache, chest pain, shortness of breath, abdominal pain. The patient has no other acute complaints at this time. Restrictions/Precautions:  Restrictions/Precautions: Fall Risk, General Precautions, Weight Bearing  Right Lower Extremity Weight Bearing: Weight Bearing As Tolerated  Left Lower Extremity Weight Bearing: Weight Bearing As Tolerated     SUBJECTIVE: RN okayed OT session Pt. In room seated in chair pleasant and cooperative throughout. PAIN: sore bottom due to wound not rated    Vitals: Vitals not assessed per clinical judgement, see nursing flowsheet    COGNITION: Decreased Safety Awareness and Impulsive    ADL:   No ADL's completed this session. Sneha March BALANCE:  Standing Balance: Contact Guard Assistance.       BED
Advance Care Planning     Advance Care Planning Inpatient Note  [facility] Department    Today's Date: 4/4/2023  Unit: [facility] RENAL TELEMETRY 6K    Received request from admission screening. Upon review of chart and communication with care team, patient's decision making abilities are not in question. . Patient and Healthcare Decision Maker was/were present in the room during visit. Goals of ACP Conversation:  Discuss advance care planning documents    Health Care Decision Makers:       Primary Decision Maker: Julia Hassan - Brother/Sister - [phone]    Secondary Decision Maker: Lee Wade - Child - [phone]    Secondary Decision Maker: Delonte Lewis - Brother/Sister - [phone]  Summary:  Completed New Documents    Advance Care Planning Documents (Patient Wishes):  Healthcare Power of /Advance Directive Appointment of [address]     Assessment:  Bud Hoffman" is being cared for on 6K after a fall at home. He may have gout in his knees. He had his sisters in the room with him today. He told me that it was not easy talking about the ACP questions and life support because he has grandchildren and the youngest is just getting to know him. He also mentioned that he does not get along with his brother and does not want his brother involved in any decision making for him. Interventions:  Provided education on documents for clarity and greater understanding  Discussed and provided education on state decision maker hierarchy  Assisted in the completion of documents according to patient's wishes at this time  Encouraged ongoing ACP conversation with future decision makers and loved ones    Care Preferences Communicated:     Hospitalization:  If the patient's health worsens and it becomes clear that the chance of recovery is unlikely,     the patient wants hospitalization.     Ventilation:   If the patient, in their present state of health, suddenly became very ill and unable to
Comprehensive Nutrition Assessment    Type and Reason for Visit:  Initial, Positive Nutrition Screen, Wound    Nutrition Recommendations/Plan:   Wound healing ONS: Adam BID  Continue current diet   Weigh patient as able   Reviewed carbohydrate control diet guidelines with patient - monitor need for further diet review      Malnutrition Assessment:  Malnutrition Status: At risk for malnutrition (Comment) (04/05/23 1142)    Context:  Acute Illness     Findings of the 6 clinical characteristics of malnutrition:  Energy Intake:  Mild decrease in energy intake (Comment)  Weight Loss:  No significant weight loss     Body Fat Loss:  No significant body fat loss     Muscle Mass Loss:  No significant muscle mass loss    Fluid Accumulation:  Unable to assess     Strength:  Not Performed    Nutrition Assessment:     Pt. nutritionally compromised AEB wounds. At risk for further nutrition compromise r/t admit with fall, increased nutrient needs for wound healing, underlying medical condition (DM, HTN, obesity, gout). Nutrition Related Findings:    Pt. Report/Treatments/Miscellaneous: Patient reports no po intake x 3 days PTA due to unable to stand/ weak knees so unable to cook, reports very good intake of meals since admit, admits weakness for large portions but has been trying to keep protein intake to moderate amount due to gout, receives [employer] home delivered meals PTA, agreeable to trial Adam to assist in skin integrity    GI Status: stools today   Pertinent Labs: Sodium 135, Potassium 4.7, BUN 42, Creatinine 1.5, Glucose 306  Pertinent Meds: lantus, humalog, MVI     Wound Type:  (right leg wound, left pretibial, left foot anterior, stage II left thigh)       Current Nutrition Intake & Therapies:    Average Meal Intake: %     ADULT DIET; Regular; 3 carb choices (45 gm/meal); Low Sodium (2 gm)  ADULT ORAL NUTRITION SUPPLEMENT; Breakfast, Dinner;  Wound Healing Oral Supplement    Anthropometric
[facility]  INPATIENT OCCUPATIONAL THERAPY  [facility] RENAL TELEMETRY 6K  EVALUATION    Time:   Time In: 4831  Time Out: 1356  Timed Code Treatment Minutes: 30 Minutes  Minutes: 41          Date: 4/3/2023  Patient Name: Florentin Figueroa,   Gender: male      MRN: [id number]  : 1950  (74 y.o.)  Referring Practitioner: Romel Grover MD  Diagnosis: fall at home  Additional Pertinent Hx: per chart review; Florentin Figueroa is a pleasant 74 y.o. male who presents to the emergency department for evaluation of Fall. Per the patient, he states both his knees gave out earlier today. And he fell. He states he called his daughter who called EMS who brought him in. Patient states about 5 to 6 weeks ago he had a similar fall and was found to have gout in his knees bilaterally. Patient denies hitting his head, denies any loss of consciousness. Patient complains of mild lumbar back pain. As well as right wrist and thumb pain. Patient states his knees hurt him baseline. He states he has had issues with his back for some time. He also states his wrist and thumb pain has been chronic for him. However would like to be checked out at this visit. The patient denies any headache, chest pain, shortness of breath, abdominal pain. The patient has no other acute complaints at this time. Restrictions/Precautions:  Restrictions/Precautions: Fall Risk, General Precautions    Subjective  Chart Reviewed: Yes, Orders, Progress Notes, History and Physical, Imaging  Patient assessed for rehabilitation services?: Yes  Family / Caregiver Present: No    Subjective: RN approved session, patient supine in bed upon OT arrival. first attempt to eval patient with patient declining due to having too much pain. second attempt with RN stating patient has had pain meds approx 1 hour ago. patient supine in bed upon OT arrival and agreeable to eval with MOD encouragement for OOB actiivty.  reassured patient of his safety due to
[facility] [address]  PHYSICAL THERAPY MISSED TREATMENT NOTE  [facility] RENAL TELEMETRY 6K    Date: 4/3/2023  Patient Name: Nissa Bhardwaj        MRN: [id number]   : 1950  (74 y.o.)  Gender: male                REASON FOR MISSED TREATMENT:  Hold treatment per nursing request.  Await ortho consult prior to PT evaluation. PT to check back next available date as pt medically appropriate.      Christie Cornejo PT, DPT
[facility]   Nephrology progress note  4/4/2023, 4:07 PM      Pt Name:    Elisa Stephens  MRN:     [id number]     YOB: 1950  Admit Date:    4/2/2023 11:33 AM    Chief Complaint: Nephrology following for CKD/anemia. Subjective:  Patient was seen and examined this morning. C/o leg weakness/knee pain. Objective:  24HR INTAKE/OUTPUT:    Intake/Output Summary (Last 24 hours) at 4/4/2023 1607  Last data filed at 4/3/2023 2202  Gross per 24 hour   Intake 130 ml   Output 350 ml   Net -220 ml         I/O last 3 completed shifts: In: 130 [P.O.:120; I.V.:10]  Out: 1050 [Urine:1050]  No intake/output data recorded.    Admission weight: (!) 362 lb (164.2 kg)  Wt Readings from Last 3 Encounters:   04/02/23 (!) 362 lb (164.2 kg)   03/21/23 (!) 361 lb (163.7 kg)   03/09/23 (!) 362 lb (164.2 kg)        Vitals :   Vitals:    04/04/23 0010 04/04/23 0451 04/04/23 0800 04/04/23 1045   BP: (!) 144/65 (!) 142/58 (!) 115/58 (!) 163/72   Pulse: 81 80 76 80   Resp: 18 16 18 16   Temp: 99 °F (37.2 °C) 100.1 °F (37.8 °C) 98.8 °F (37.1 °C)    TempSrc: Oral Oral Oral    SpO2: 98% 96% 97% 95%   Weight:       Height:           Physical examination  General Appearance: alert and cooperative with exam, appears comfortable, no distress  Mouth/Throat: Oral mucosa moist  Neck: No JVD  Lungs: Air entry B/L, no rales, no use of accessory muscles  Heart:  S1, S2 heard  GI: soft, non-tender, no guarding  Extremities:chronic lymphedema    Medications:  Infusion:    sodium chloride      dextrose       Meds:    iron sucrose  200 mg IntraVENous Q24H    colchicine  0.6 mg Oral BID    insulin glargine  40 Units SubCUTAneous Nightly    insulin lispro  15 Units SubCUTAneous TID WC    sodium chloride flush  5-40 mL IntraVENous 2 times per day    enoxaparin  40 mg SubCUTAneous BID    albuterol sulfate HFA  2 puff Inhalation BID    amLODIPine  5 mg Oral Daily    aspirin  81 mg Oral Daily    carvedilol  12.5 mg Oral BID
[facility]   Nephrology progress note  4/6/2023, 7:22 AM      Pt Name:    Negrito Brody  MRN:     [id number]     YOB: 1950  Admit Date:    4/2/2023 11:33 AM    Chief Complaint: Nephrology following for CKD/anemia. Subjective:  Patient was seen and examined this morning. No new events. Awaiting precert for [facility]. Objective:  24HR INTAKE/OUTPUT:    Intake/Output Summary (Last 24 hours) at 4/6/2023 0722  Last data filed at 4/6/2023 0531  Gross per 24 hour   Intake 1900 ml   Output 2500 ml   Net -600 ml         I/O last 3 completed shifts: In: 1900 [P.O.:1900]  Out: 2503 [Urine:2501; Stool:2]  No intake/output data recorded.    Admission weight: (!) 362 lb (164.2 kg)    Wt Readings from Last 3 Encounters:   04/06/23 (!) 374 lb 4.8 oz (169.8 kg)   03/21/23 (!) 361 lb (163.7 kg)   03/09/23 (!) 362 lb (164.2 kg)        Vitals :   Vitals:    04/05/23 1927 04/05/23 2332 04/06/23 0448 04/06/23 0531   BP: 136/65 (!) 131/56 (!) 111/40    Pulse: 56 71 53    Resp: 18      Temp: 97.5 °F (36.4 °C) 97.7 °F (36.5 °C) 97.3 °F (36.3 °C)    TempSrc: Oral Axillary Axillary    SpO2: 98% 98% 99%    Weight:    (!) 374 lb 4.8 oz (169.8 kg)   Height:           Physical examination  General Appearance: alert and cooperative with exam, appears comfortable, no distress  Mouth/Throat: Oral mucosa moist  Neck: No JVD  Lungs: Air entry B/L, no rales, no use of accessory muscles  Heart:  S1, S2 heard  GI: soft, non-tender, no guarding  Extremities:chronic lymphedema, right arm edema noted    Medications:  Infusion:    sodium chloride      dextrose       Meds:    furosemide  40 mg Oral Daily    insulin lispro  12 Units SubCUTAneous TID WC    insulin glargine  50 Units SubCUTAneous Nightly    iron sucrose  200 mg IntraVENous Q24H    insulin lispro  0-16 Units SubCUTAneous TID WC    insulin lispro  0-4 Units SubCUTAneous Nightly    colchicine  0.6 mg Oral BID    sodium chloride flush  5-40 mL
Progress Note  Date:4/3/2023       LVRW:9Q-[id number]-O  Patient Name:Tuan Hassan     YOB: 1950     Age:72 y.o. Subjective    Subjective:  Symptoms:  Stable. He reports weakness. No chest pain or diarrhea. Diet:  Adequate intake. Activity level: Impaired due to pain. Pain:  Pain is partially controlled. (  Both  knees and right  thumb).     Current Facility-Administered Medications   Medication Dose Route Frequency Provider Last Rate Last Admin    colchicine (MITIGARE) capsule 0.6 mg  0.6 mg Oral BID Cecilia Sanchez MD        HYDROcodone-acetaminophen (NORCO) 5-325 MG per tablet 1 tablet  1 tablet Oral Q4H PRN Cecilia Sanchez MD        insulin glargine (LANTUS) injection vial 40 Units  40 Units SubCUTAneous Nightly Cecilia Sanchez MD        acetaminophen (TYLENOL) tablet 1,000 mg  1,000 mg Oral PRN Deb Domínguez MD   1,000 mg at 04/02/23 1316    sodium chloride flush 0.9 % injection 5-40 mL  5-40 mL IntraVENous 2 times per day Deb Domínguez MD   10 mL at 04/02/23 2235    sodium chloride flush 0.9 % injection 5-40 mL  5-40 mL IntraVENous PRN Deb Du MD        0.9 % sodium chloride infusion   IntraVENous PRN Deb Du MD        enoxaparin (LOVENOX) injection 40 mg  40 mg SubCUTAneous BID Deb Domínguez MD   40 mg at 04/02/23 2234    acetaminophen (TYLENOL) tablet 650 mg  650 mg Oral Q4H PRN Deb Du MD        ondansetron (ZOFRAN-ODT) disintegrating tablet 4 mg  4 mg Oral Q8H PRN Deb Du MD        Or    ondansetron (ZOFRAN) injection 4 mg  4 mg IntraVENous Q6H PRN Deb Du MD        albuterol sulfate HFA (PROVENTIL;VENTOLIN;PROAIR) 108 (90 Base) MCG/ACT inhaler 2 puff  2 puff Inhalation BID Antionette Pantoja, DO   2 puff at 04/03/23 0735    amLODIPine (NORVASC) tablet 5 mg  5 mg Oral Daily Antionette Pantoja DO   5 mg at 04/02/23 2233    aspirin EC tablet 81 mg  81 mg Oral Daily Sidonie Navy
Progress Note  Date:4/4/2023       CAQM:5Q-33/389-F  Patient Name:Tuan Hassan     YOB: 1950     Age:72 y.o. Subjective    Subjective:  Symptoms:  Stable. He reports weakness. No shortness of breath, chest pain or diarrhea. Diet:  Adequate intake. No nausea or vomiting. Activity level: Impaired due to weakness. Pain:  He complains of pain that is moderate. (Both  knees  with pain  noted ).     Current Facility-Administered Medications   Medication Dose Route Frequency Provider Last Rate Last Admin    iron sucrose (VENOFER) 200 mg in sodium chloride 0.9 % 100 mL IVPB  200 mg IntraVENous Q24H June Albert DO   Stopped at 04/04/23 1404    methylPREDNISolone sodium (SOLU-MEDROL) injection 40 mg  40 mg IntraVENous Once Theresa Justin MD        insulin lispro (HUMALOG) injection vial 0-16 Units  0-16 Units SubCUTAneous TID  Theresa Justin MD        insulin lispro (HUMALOG) injection vial 0-4 Units  0-4 Units SubCUTAneous Nightly Theresa Justin MD        colchicine (COLCRYS) tablet 0.6 mg  0.6 mg Oral BID Theresa Justin MD   0.6 mg at 04/04/23 1059    HYDROcodone-acetaminophen (NORCO) 5-325 MG per tablet 1 tablet  1 tablet Oral Q4H PRN Theresa Justin MD   1 tablet at 04/03/23 1158    insulin glargine (LANTUS) injection vial 40 Units  40 Units SubCUTAneous Nightly Theresa Justin MD   40 Units at 04/03/23 2209    insulin lispro (HUMALOG) injection vial 15 Units  15 Units SubCUTAneous TID  Theresa Justin MD   15 Units at 04/04/23 1638    acetaminophen (TYLENOL) tablet 1,000 mg  1,000 mg Oral PRN Deb Witt MD   1,000 mg at 04/02/23 1316    sodium chloride flush 0.9 % injection 5-40 mL  5-40 mL IntraVENous 2 times per day Deb Witt MD   10 mL at 04/04/23 1057    sodium chloride flush 0.9 % injection 5-40 mL  5-40 mL IntraVENous PRN Deb Du MD        0.9 % sodium chloride infusion   IntraVENous PRN Deb DILLARD
Progress Note  Date:4/6/2023       TURM:6D-[id number]-S  Patient Name:uTan Hassan     YOB: 1950     Age:72 y.o. Subjective    Subjective:  Symptoms:  Stable. No shortness of breath, chest pain, weakness or diarrhea. Diet:  Adequate intake. No nausea or vomiting. Activity level: Impaired due to pain (knee  better). Pain:  He complains of pain that is moderate. He reports pain is improving. Pain is requiring pain medication.      Current Facility-Administered Medications   Medication Dose Route Frequency Provider Last Rate Last Admin    furosemide (LASIX) tablet 40 mg  40 mg Oral Daily June Albert DO   40 mg at 04/05/23 1226    insulin lispro (HUMALOG) injection vial 12 Units  12 Units SubCUTAneous TID  Trent Montague MD   12 Units at 04/05/23 1659    insulin glargine (LANTUS) injection vial 50 Units  50 Units SubCUTAneous Nightly Trent Montague MD   50 Units at 04/05/23 2054    iron sucrose (VENOFER) 200 mg in sodium chloride 0.9 % 100 mL IVPB  200 mg IntraVENous Q24H June Albert DO   Stopped at 04/05/23 1010    insulin lispro (HUMALOG) injection vial 0-16 Units  0-16 Units SubCUTAneous TID  Trent Montague MD   16 Units at 04/05/23 1658    insulin lispro (HUMALOG) injection vial 0-4 Units  0-4 Units SubCUTAneous Nightly Trent Montague MD   4 Units at 04/05/23 2054    colchicine (COLCRYS) tablet 0.6 mg  0.6 mg Oral BID Trent Montague MD   0.6 mg at 04/05/23 2054    HYDROcodone-acetaminophen (NORCO) 5-325 MG per tablet 1 tablet  1 tablet Oral Q4H PRN Trent Montague MD   1 tablet at 04/05/23 1227    acetaminophen (TYLENOL) tablet 1,000 mg  1,000 mg Oral PRN Deb Davila MD   1,000 mg at 04/02/23 1316    sodium chloride flush 0.9 % injection 5-40 mL  5-40 mL IntraVENous 2 times per day Deb Davila MD   10 mL at 04/05/23 2054    sodium chloride flush 0.9 % injection 5-40 mL  5-40 mL IntraVENous PRN Dhrichidi Davila MD
2 times per day    enoxaparin  40 mg SubCUTAneous BID    albuterol sulfate HFA  2 puff Inhalation BID    amLODIPine  5 mg Oral Daily    aspirin  81 mg Oral Daily    carvedilol  12.5 mg Oral BID    ferrous sulfate  325 mg Oral BID    multivitamin  1 tablet Oral Daily    ticagrelor  90 mg Oral BID    atorvastatin  10 mg Oral Daily     Meds prn: HYDROcodone 5 mg - acetaminophen, acetaminophen, sodium chloride flush, sodium chloride, acetaminophen, ondansetron **OR** ondansetron, glucose, dextrose bolus **OR** dextrose bolus, glucagon (rDNA), dextrose     Lab Data :  CBC:   Recent Labs     04/05/23  0524 04/06/23  0536 04/07/23  0649   WBC 7.0 6.9  --    HGB 8.1* 7.8* 8.1*   HCT 25.2* 23.8* 24.9*    192  --      CMP:  Recent Labs     04/05/23 0524 04/06/23  0536 04/07/23  0649    133* 140   K 4.7 4.1 4.5    101 106   CO2 23 23 25   BUN 42* 36* 38*   CREATININE 1.5* 1.2 1.1   GLUCOSE 306* 316* 104   CALCIUM 7.8* 7.9* 8.2*     Hepatic: No results for input(s): LABALBU, AST, ALT, ALB, BILITOT, ALKPHOS in the last 72 hours. Assessment and Plan:  CKD III: in setting of diabetes and HTN. Stable  -back on lasix  2. Anemia  :out of proportion for his CKD . Iron sat is low at 13% ,s/p IV Iron x 3 doses, occult stool is negative. May need hematology eval in outpatient setting of hemoglobin isn't improving  3. S/p recurrent falls  4. Gout  5. DM  6. CAD  7. HTN ;better  8. Chronic lymphedema    Patient stable from renal standpoint will see as needed. D/W patient and RN    Laurie West, DO  [facility]    This report has been created using voice recognition software.  It may contain minor errors which are inherent in voice recognition technology
Balance:  Modified Independent. Bedside chair  Standing Balance: Contact Guard Assistance. X1 minute in prep for mobility    BED MOBILITY:  Not Tested    TRANSFERS:  Sit to Stand:  Stand By Assistance. Stand to Sit: Stand By Assistance. FUNCTIONAL MOBILITY:  Assistive Device: Rolling Walker   Assist Level:  Contact Guard Assistance. Distance:   Completed functional mobility x1 lap within pt room at hurried pace, no LOB noted. Pt requires mod cues for walker safety to keep within BLANE and aligning to destination- lengthy seated rest break after trial of mobility, mod fatigue noted. ADDITIONAL ACTIVITIES:  Patient identified a personal goal to increase UB strength and improve overall endurance so they can complete their toilet & shower transfers; skilled edu on UE strengthening. Completed BUE exercises x10 reps x1 sets using mod resistance band in all joints/planes to increase strength and endurance required for ADLs. Pt required min rest break between each exercise and min v/c for proper technique. ASSESSMENT:     Activity Tolerance:  Patient tolerance of  treatment: Good treatment tolerance      Discharge Recommendations: Subacute/skilled nursing facility  Equipment Recommendations: Equipment Needed: Yes  Mobility Devices: ADL Assistive Devices  Plan: Times Per Week: 5x  Times Per Day: Once a day  Current Treatment Recommendations: Strengthening, ROM, Balance training, Functional mobility training, Endurance training, Safety education & training, Patient/Caregiver education & training, Neuromuscular re-education, Equipment evaluation, education, & procurement, Self-Care / ADL    Patient Education  Patient Education: ADL's, Energy Conservation, Home Exercise Program, Importance of Increasing Activity, Assistive Device Safety, and Safety with transfers and mobility.     Goals  Short Term Goals  Time Frame for Short Term Goals: by discharge  Short Term Goal 1: patient will tolerate 15 min unsupported dyn
IntraVENous 2 times per day    enoxaparin  40 mg SubCUTAneous BID    albuterol sulfate HFA  2 puff Inhalation BID    amLODIPine  5 mg Oral Daily    aspirin  81 mg Oral Daily    carvedilol  12.5 mg Oral BID    ferrous sulfate  325 mg Oral BID    multivitamin  1 tablet Oral Daily    ticagrelor  90 mg Oral BID    atorvastatin  10 mg Oral Daily     Meds prn: HYDROcodone 5 mg - acetaminophen, acetaminophen, sodium chloride flush, sodium chloride, acetaminophen, ondansetron **OR** ondansetron, glucose, dextrose bolus **OR** dextrose bolus, glucagon (rDNA), dextrose     Lab Data :  CBC:   Recent Labs     04/03/23  0459 04/04/23  0526 04/04/23  1206 04/05/23  0524   WBC 5.9 7.2  --  7.0   HGB 7.4* 7.1* 7.5* 8.1*   HCT 22.6* 21.3* 23.5* 25.2*    221  --  186     CMP:  Recent Labs     04/03/23  0746 04/04/23  0526 04/05/23  0524    134* 135   K 4.8 4.5 4.7    102 101   CO2 24 23 23   BUN 47* 47* 42*   CREATININE 1.4* 1.6* 1.5*   GLUCOSE 322* 266* 306*   CALCIUM 7.9* 7.9* 7.8*     Hepatic: No results for input(s): LABALBU, AST, ALT, ALB, BILITOT, ALKPHOS in the last 72 hours. Assessment and Plan:  CKD III: in setting of diabetes and HTN. Stable  -bp rising will resume his home diuretic. Check bmp in am  2. Anemia  :out of proportion for his CKD . Iron sat is low at 13% , IV iron ordered, occult stool pending  3. S/p recurrent falls  4. Gout  5. DM  6. CAD  7. HTN ;resume home lasix  8. Chronic lymphedema      D/W patient     [place],   [facility]    This report has been created using voice recognition software.  It may contain minor errors which are inherent in voice recognition technology
IntraVENous PRN Deb Du MD        0.9 % sodium chloride infusion   IntraVENous PRN Deb Du MD        enoxaparin (LOVENOX) injection 40 mg  40 mg SubCUTAneous BID Deb Swain MD   40 mg at 04/07/23 0934    acetaminophen (TYLENOL) tablet 650 mg  650 mg Oral Q4H PRN Deb Swain MD   650 mg at 04/04/23 0458    ondansetron (ZOFRAN-ODT) disintegrating tablet 4 mg  4 mg Oral Q8H PRN Deb Swain MD        Or    ondansetron (ZOFRAN) injection 4 mg  4 mg IntraVENous Q6H PRN Deb Du MD        albuterol sulfate HFA (PROVENTIL;VENTOLIN;PROAIR) 108 (90 Base) MCG/ACT inhaler 2 puff  2 puff Inhalation BID Monie Eaves, DO   2 puff at 04/07/23 0931    amLODIPine (NORVASC) tablet 5 mg  5 mg Oral Daily Monie Eaves, DO   5 mg at 04/07/23 2083    aspirin EC tablet 81 mg  81 mg Oral Daily Monie Eaves, DO   81 mg at 04/07/23 0934    carvedilol (COREG) tablet 12.5 mg  12.5 mg Oral BID Monie Eaves, DO   12.5 mg at 04/07/23 1337    ferrous sulfate (IRON 325) tablet 325 mg  325 mg Oral BID Monie Eaves, DO   325 mg at 04/07/23 6843    multivitamin 1 tablet  1 tablet Oral Daily Monie Eaves, DO   1 tablet at 04/07/23 4429    ticagrelor (BRILINTA) tablet 90 mg  90 mg Oral BID Monie Eaves, DO   90 mg at 04/07/23 0934    atorvastatin (LIPITOR) tablet 10 mg  10 mg Oral Daily Monei Eaves, DO   10 mg at 04/07/23 7302    glucose chewable tablet 16 g  4 tablet Oral PRN Devere Hodgkin, MD        dextrose bolus 10% 125 mL  125 mL IntraVENous PRN Devere Hodgkin, MD        Or    dextrose bolus 10% 250 mL  250 mL IntraVENous PRN Devere Hodgkin, MD        glucagon (rDNA) injection 1 mg  1 mg SubCUTAneous PRN Devere Hodgkin, MD        dextrose 10 % infusion   IntraVENous Continuous PRN Devere Hodgkin, MD          Review of Systems   Constitutional:  Positive for activity change. Negative for fatigue and fever. HENT:  Negative for congestion.
Supervision. At EOB greater than 15 minutes    BED MOBILITY:  Supine to Sit: Minimal Assistance A for L LE, HOB elevated, used bedrail and INCREASED time  Sit to Supine: Moderate Assistance A for B LE    ADDITIONAL ACTIVITIES:  Guided patient through BUE AROM this date x10 reps x1 set at EOB in order to increase BUE strength and improve activity tolerance for ADLs and homemaking tasks. ASSESSMENT:     Activity Tolerance:  Patient tolerance of  treatment: Good treatment tolerance      Discharge Recommendations: ECF with OT  Equipment Recommendations: Equipment Needed: Yes  Mobility Devices: ADL Assistive Devices  Plan: Times Per Week: 5x  Times Per Day: Once a day  Current Treatment Recommendations: Strengthening, ROM, Balance training, Functional mobility training, Endurance training, Safety education & training, Patient/Caregiver education & training, Neuromuscular re-education, Equipment evaluation, education, & procurement, Self-Care / ADL    Patient Education  Patient Education: ADL's and Home Exercise Program    Goals  Short Term Goals  Time Frame for Short Term Goals: by discharge  Short Term Goal 1: patient will tolerate 15 min unsupported dyn sitting with (G) balance to increase activity tolerance to prep for sit to stand transfers. Short Term Goal 2: patient will complete UB ADLs with SBA and LB ADLs with MIN A and use of AE PRN. Short Term Goal 3: patient will participate in moderate resistive UB exer to increase UB strength for functional transfers. Short Term Goal 4: OTR to assess sit to stand, stand pivot transfers and functional mobility and create goal as appropriate. Following session, patient left in safe position with all fall risk precautions in place.
Toileting: Maximum Assistance. For BM toilet hygiene in standing with use of 2 w/w for support  Toilet Transfer: [address] To sit and stand from [facility] and cues for hand placement . BALANCE:  Sitting Balance:  Supervision. Standing Balance: Contact Guard Assistance. With use of 2 w/w for support. Wide BLANE. BED MOBILITY:  Supine to Sit: Stand By Assistance with use of bed rails    TRANSFERS:  Sit to Stand:  Minimal Assistance. From EOB and cues for tech, wide BLANE at legs to start sit to stand  Stand Pivot: Contact Guard Assistance. From EOB to [facility]    FUNCTIONAL MOBILITY:  Assistive Device: Rolling Walker  Assist Level:  Contact Guard Assistance. Distance:  from [facility] to recliner with cues for sequencing with 2 w/w      ASSESSMENT:  Activity Tolerance:  Patient tolerance of  treatment: Good treatment tolerance      Discharge Recommendations: Continue to assess pending progress, Subacute/skilled nursing facility, ECF with OT, and Patient would benefit from continued OT at discharge  Equipment Recommendations: Equipment Needed: Yes  Mobility Devices: ADL Assistive Devices  Plan: Times Per Week: 5x  Times Per Day: Once a day  Current Treatment Recommendations: Strengthening, ROM, Balance training, Functional mobility training, Endurance training, Safety education & training, Patient/Caregiver education & training, Neuromuscular re-education, Equipment evaluation, education, & procurement, Self-Care / ADL    Patient Education  Patient Education: Role of OT, Plan of Care, ADL's, Precautions, Reviewed Prior Education, Importance of Increasing Activity, and Assistive Device Safety    Goals  Short Term Goals  Time Frame for Short Term Goals: by discharge  Short Term Goal 1: patient will tolerate 15 min unsupported dyn sitting with (G) balance to increase activity tolerance to prep for sit to stand transfers.   Short Term Goal 2: patient will complete UB ADLs with SBA and LB ADLs with MIN A and use of
GM)    Patient Education  Patient Education: Plan of Care, Precautions/Restrictions, Bed Mobility, Transfers, Up in Chair for All Meals, Verbal Exercise Instruction    Goals:  Patient Goals : none stated  Short Term Goals  Time Frame for Short Term Goals: by hospital d/c  Short Term Goal 1: Pt to complete supine <->Sit with CGA for ease getting in bed  Short Term Goal 2: Pt to complete sit <->Stand with RW and Min A for safe transfers  Short Term Goal 3: Pt to complete x10 exercises with B LE and CGA for improved strength  Short Term Goal 4: PT to assess ambulation as able  Long Term Goals  Time Frame for Long Term Goals : NA due to short ELOS    Following session, patient left in safe position with all fall risk precautions in place.
to rest in the chair from increased fatigue and muscular weakness. Pain increased in bilateral knees during heel slides with patient rating pain at 7/10. Patient completed all exercises actively in chair and declined any need for assistance. Post session patient denied any pain when resting in chair. No need from therapy at this time. Activity Tolerance:  Patient tolerance of  treatment: fair. Equipment Recommendations:Equipment Needed: No  Discharge Recommendations: Continue to assess pending progress and Subacte/Skilled Nursing Facility  Plan: Current Treatment Recommendations: Strengthening, Balance training, Functional mobility training, Transfer training, Endurance training, Home exercise program, Safety education & training, Patient/Caregiver education & training, Equipment evaluation, education, & procurement, Therapeutic activities  General Plan:  (5x GM)    Patient Education  Patient Education: Plan of Care, Verbal Exercise Instruction given during seated and semi recumbent exercises for exercises to be performed through full available ROM and for proper technique, Activity Pacing, Energy Conservation,  - Patient Verbalized Understanding    Goals:  Patient Goals : none stated  Short Term Goals  Time Frame for Short Term Goals: by hospital d/c  Short Term Goal 1: Pt to complete supine <->Sit with CGA for ease getting in bed  Short Term Goal 2: Pt to complete sit <->Stand with RW and Min A for safe transfers  Short Term Goal 3: Pt to complete x10 exercises with B LE and CGA for improved strength  Short Term Goal 4: PT to assess ambulation as able  Long Term Goals  Time Frame for Long Term Goals : NA due to short ELOS    Following session, patient left in safe position with all fall risk precautions in place.
Equipment Recommendations:  Equipment Needed: No    Plan:  Current Treatment Recommendations: Strengthening, Balance training, Functional mobility training, Transfer training, Endurance training, Home exercise program, Safety education & training, Patient/Caregiver education & training, Equipment evaluation, education, & procurement, Therapeutic activities  General Plan:  (5x GM)    Goals:  Patient Goals : none stated  Short Term Goals  Time Frame for Short Term Goals: by hospital d/c  Short Term Goal 1: Pt to complete supine <->Sit with CGA for ease getting in bed  Short Term Goal 2: Pt to complete sit <->Stand with RW and Min A for safe transfers  Short Term Goal 3: Pt to complete x10 exercises with B LE and CGA for improved strength  Short Term Goal 4: PT to assess ambulation as able  Long Term Goals  Time Frame for Long Term Goals : NA due to short ELOS    Following session, patient left in safe position with all fall risk precautions in place.
time.  Patient has normal reflexes and normal muscle tone. Skin:  Warm and dry. (Left  foot  abrasion plantar aspect  metatarsal 4th with walking)  Labs/Imaging/Diagnostics    Labs:  CBC:  Recent Labs     04/03/23  0459 04/04/23  0526 04/04/23  1206 04/05/23  0524   WBC 5.9 7.2  --  7.0   RBC 2.40* 2.30*  --  2.75*   HGB 7.4* 7.1* 7.5* 8.1*   HCT 22.6* 21.3* 23.5* 25.2*   MCV 94.2* 92.6  --  91.6    221  --  186     CHEMISTRIES:  Recent Labs     04/03/23  0746 04/04/23  0526 04/05/23  0524    134* 135   K 4.8 4.5 4.7    102 101   CO2 24 23 23   BUN 47* 47* 42*   CREATININE 1.4* 1.6* 1.5*   GLUCOSE 322* 266* 306*     PT/INR:No results for input(s): PROTIME, INR in the last 72 hours. APTT:No results for input(s): APTT in the last 72 hours. LIVER PROFILE:No results for input(s): AST, ALT, BILIDIR, BILITOT, ALKPHOS in the last 72 hours. Imaging Last 24 Hours:  XR CHEST (2 VW)    Result Date: 4/4/2023  PROCEDURE: XR CHEST (2 VW) CLINICAL INFORMATION: fever COMPARISON: Chest x-ray 2/21/2023 TECHNIQUE: PA and lateral views of the chest performed. FINDINGS: No focal pulmonary consolidation. Cardiac silhouette is mildly enlarged. Small bilateral pleural effusions. No pneumothorax. No acute bony abnormality. Median sternotomy has been performed. Surgical hardware is seen in the thoracic spine. DISH of the thoracic spine. 1. Small bilateral pleural effusions are seen. **This report has been created using voice recognition software. It may contain minor errors which are inherent in voice recognition technology. ** Final report electronically signed by Dr Syd Paiz on 4/4/2023 10:23 AM    XR ELBOW RIGHT (MIN 3 VIEWS)    Result Date: 4/5/2023  PROCEDURE: XR ELBOW RIGHT (MIN 3 VIEWS) CLINICAL INFORMATION: elbow swelling  with fall adn  with  gout COMPARISON: No prior study.  TECHNIQUE: AP, lateral, and both oblique views of the right  elbow were obtained FINDINGS: No fracture or dislocation is

## 2023-04-07 NOTE — PLAN OF CARE
Problem: Discharge Planning  Goal: Discharge to home or other facility with appropriate resources  4/4/2023 2245 by Muriel Stren RN  Outcome: Progressing  4/4/2023 1442 by NATALYA David  Outcome: Progressing     Problem: ABCDS Injury Assessment  Goal: Absence of physical injury  Outcome: Progressing     Problem: Safety - Adult  Goal: Free from fall injury  Outcome: Progressing     Problem: Skin/Tissue Integrity  Goal: Absence of new skin breakdown  Description: 1. Monitor for areas of redness and/or skin breakdown  2. Assess vascular access sites hourly  3. Every 4-6 hours minimum:  Change oxygen saturation probe site  4. Every 4-6 hours:  If on nasal continuous positive airway pressure, respiratory therapy assess nares and determine need for appliance change or resting period. Outcome: Progressing     Problem: Pain  Goal: Verbalizes/displays adequate comfort level or baseline comfort level  Outcome: Progressing   Care plan reviewed with patient. Patient verbalizes understanding of the plan of care and contributes to goal setting.
Problem: Discharge Planning  Goal: Discharge to home or other facility with appropriate resources  Outcome: Adequate for Discharge     Problem: ABCDS Injury Assessment  Goal: Absence of physical injury  Outcome: Adequate for Discharge     Problem: Safety - Adult  Goal: Free from fall injury  Outcome: Adequate for Discharge     Problem: Skin/Tissue Integrity  Goal: Absence of new skin breakdown  Description: 1. Monitor for areas of redness and/or skin breakdown  2. Assess vascular access sites hourly  3. Every 4-6 hours minimum:  Change oxygen saturation probe site  4. Every 4-6 hours:  If on nasal continuous positive airway pressure, respiratory therapy assess nares and determine need for appliance change or resting period.   Outcome: Adequate for Discharge     Problem: Pain  Goal: Verbalizes/displays adequate comfort level or baseline comfort level  Outcome: Adequate for Discharge     Problem: Skin/Tissue Integrity - Adult  Goal: Skin integrity remains intact  Outcome: Adequate for Discharge  Goal: Incisions, wounds, or drain sites healing without S/S of infection  Outcome: Adequate for Discharge  Goal: Oral mucous membranes remain intact  Outcome: Adequate for Discharge     Problem: Musculoskeletal - Adult  Goal: Return mobility to safest level of function  Outcome: Adequate for Discharge  Goal: Maintain proper alignment of affected body part  Outcome: Adequate for Discharge  Goal: Return ADL status to a safe level of function  Outcome: Adequate for Discharge     Problem: Chronic Conditions and Co-morbidities  Goal: Patient's chronic conditions and co-morbidity symptoms are monitored and maintained or improved  Outcome: Adequate for Discharge     Problem: Nutrition Deficit:  Goal: Optimize nutritional status  Outcome: Adequate for Discharge     Problem: Respiratory - Adult  Goal: Achieves optimal ventilation and oxygenation  4/7/2023 1559 by Gopal Bautista RN  Outcome: Adequate for Discharge  4/7/2023 2891
Problem: Discharge Planning  Goal: Discharge to home or other facility with appropriate resources  Outcome: Progressing  Flowsheets (Taken 4/5/2023 0815)  Discharge to home or other facility with appropriate resources: Identify barriers to discharge with patient and caregiver     Problem: ABCDS Injury Assessment  Goal: Absence of physical injury  Outcome: Progressing  Flowsheets (Taken 4/5/2023 1446)  Absence of Physical Injury: Implement safety measures based on patient assessment     Problem: Safety - Adult  Goal: Free from fall injury  Outcome: Progressing  Flowsheets (Taken 4/5/2023 1446)  Free From Fall Injury: Based on caregiver fall risk screen, instruct family/caregiver to ask for assistance with transferring infant if caregiver noted to have fall risk factors     Problem: Pain  Goal: Verbalizes/displays adequate comfort level or baseline comfort level  Outcome: Progressing  Flowsheets (Taken 4/5/2023 1446)  Verbalizes/displays adequate comfort level or baseline comfort level:   Encourage patient to monitor pain and request assistance   Assess pain using appropriate pain scale   Administer analgesics based on type and severity of pain and evaluate response   Implement non-pharmacological measures as appropriate and evaluate response     Problem: Skin/Tissue Integrity - Adult  Goal: Incisions, wounds, or drain sites healing without S/S of infection  Outcome: Progressing    Problem: Musculoskeletal - Adult  Goal: Return mobility to safest level of function  Outcome: Progressing  Flowsheets (Taken 4/5/2023 0815)  Return Mobility to Safest Level of Function: Assess patient stability and activity tolerance for standing, transferring and ambulating with or without assistive devices     Problem: Musculoskeletal - Adult  Goal: Return ADL status to a safe level of function  Outcome: Progressing  Flowsheets (Taken 4/5/2023 1446)  Return ADL Status to a Safe Level of Function:   Administer medication as ordered
Problem: Respiratory - Adult  Goal: Achieves optimal ventilation and oxygenation  4/7/2023 0937 by Monica Berry RCP  Outcome: Progressing   Continue mdi tx to achieve optimal oxygenation. Pt agrees to plan of care.
Problem: Respiratory - Adult  Goal: Achieves optimal ventilation and oxygenation  Outcome: Progressing
Problem: Safety - Adult  Goal: Free from fall injury  Outcome: Progressing     Problem: ABCDS Injury Assessment  Goal: Absence of physical injury  Outcome: Progressing     Problem: Pain  Goal: Verbalizes/displays adequate comfort level or baseline comfort level  Outcome: Progressing     Problem: Skin/Tissue Integrity - Adult  Goal: Skin integrity remains intact  Outcome: Progressing  Flowsheets (Taken 4/3/2023 0716 by Miryam Guerrier RN)  Skin Integrity Remains Intact: Monitor for areas of redness and/or skin breakdown     Problem: Discharge Planning  Goal: Discharge to home or other facility with appropriate resources  Outcome: Progressing
SW consult received, assessment completed.   See SW note
RN)  Skin Integrity Remains Intact: Monitor for areas of redness and/or skin breakdown  Goal: Incisions, wounds, or drain sites healing without S/S of infection  Outcome: Progressing  Flowsheets (Taken 4/5/2023 2054 by Lorena Ovalle RN)  Incisions, Wounds, or Drain Sites Healing Without Sign and Symptoms of Infection: ADMISSION and DAILY: Assess and document risk factors for pressure ulcer development  Goal: Oral mucous membranes remain intact  Outcome: Progressing  Flowsheets (Taken 4/2/2023 2127 by Dora Cardona RN)  Oral Mucous Membranes Remain Intact:   Implement oral medicated treatments as ordered   Assess oral mucosa and hygiene practices   Implement preventative oral hygiene regimen     Problem: Musculoskeletal - Adult  Goal: Return mobility to safest level of function  Outcome: Progressing  Flowsheets (Taken 4/5/2023 2054 by Lorena Ovalle RN)  Return Mobility to Safest Level of Function:   Assess patient stability and activity tolerance for standing, transferring and ambulating with or without assistive devices   Assist with transfers and ambulation using safe patient handling equipment as needed  Goal: Maintain proper alignment of affected body part  Outcome: Progressing  Flowsheets (Taken 4/5/2023 2054 by Lorena Ovalle RN)  Maintain proper alignment of affected body part:  Instruct and reinforce with patient and family use of appropriate assistive device and precautions (e.g. spinal or hip dislocation precautions)  Goal: Return ADL status to a safe level of function  Outcome: Progressing  Flowsheets (Taken 4/5/2023 2054 by Lorena Ovalle RN)  Return ADL Status to a Safe Level of Function:   Administer medication as ordered   Assess activities of daily living deficits and provide assistive devices as needed     Problem: Chronic Conditions and Co-morbidities  Goal: Patient's chronic conditions and co-morbidity symptoms are monitored and maintained or improved  Outcome: Progressing  Flowsheets
pain and pain management     Problem: Skin/Tissue Integrity - Adult  Goal: Skin integrity remains intact  Outcome: Progressing  Flowsheets (Taken 4/2/2023 2127)  Skin Integrity Remains Intact:   Every 4-6 hours minimum: Change oxygen saturation probe site   Assess vascular access sites hourly   Monitor for areas of redness and/or skin breakdown  Goal: Incisions, wounds, or drain sites healing without S/S of infection  Outcome: Progressing  Flowsheets (Taken 4/2/2023 2127)  Incisions, Wounds, or Drain Sites Healing Without Sign and Symptoms of Infection:   Implement wound care per orders   Initiate pressure ulcer prevention bundle as indicated   TWICE DAILY: Assess and document skin integrity  Goal: Oral mucous membranes remain intact  Outcome: Progressing  Flowsheets (Taken 4/2/2023 2127)  Oral Mucous Membranes Remain Intact:   Implement oral medicated treatments as ordered   Assess oral mucosa and hygiene practices   Implement preventative oral hygiene regimen     Problem: Musculoskeletal - Adult  Goal: Return mobility to safest level of function  Outcome: Progressing  Flowsheets (Taken 4/2/2023 2127)  Return Mobility to Safest Level of Function:   Assess patient stability and activity tolerance for standing, transferring and ambulating with or without assistive devices   Assist with transfers and ambulation using safe patient handling equipment as needed  Note: Patient unable to move currently.  Possible knee replacement    Goal: Maintain proper alignment of affected body part  Outcome: Progressing  Flowsheets (Taken 4/2/2023 2127)  Maintain proper alignment of affected body part: Support and protect limb and body alignment per provider's orders  Goal: Return ADL status to a safe level of function  Outcome: Progressing  Flowsheets (Taken 4/2/2023 2127)  Return ADL Status to a Safe Level of Function:   Assess activities of daily living deficits and provide assistive devices as needed   Administer medication as
Plan - Patient's Chronic Conditions and Co-Morbidity Symptoms are Monitored and Maintained or Improved:   Monitor and assess patient's chronic conditions and comorbid symptoms for stability, deterioration, or improvement   Collaborate with multidisciplinary team to address chronic and comorbid conditions and prevent exacerbation or deterioration     Problem: Nutrition Deficit:  Goal: Optimize nutritional status  4/5/2023 2218 by Fely Ponce RN  Outcome: Progressing  0/1/1635 5213 by Piter Chapa RN  Outcome: Progressing   Care plan reviewed with pt.
RN)  Maintain proper alignment of affected body part: Instruct and reinforce with patient and family use of appropriate assistive device and precautions (e.g. spinal or hip dislocation precautions)  4/6/2023 1721 by Manjit Wood RN  Outcome: Progressing  Flowsheets (Taken 4/5/2023 2054 by Cyril Lauren RN)  Maintain proper alignment of affected body part:  Instruct and reinforce with patient and family use of appropriate assistive device and precautions (e.g. spinal or hip dislocation precautions)  Goal: Return ADL status to a safe level of function  4/7/2023 0127 by Don Clifton RN  Outcome: Abner Kirkland (Taken 4/5/2023 2054 by Cyril Lauren RN)  Return ADL Status to a Safe Level of Function:   Administer medication as ordered   Assess activities of daily living deficits and provide assistive devices as needed  4/6/2023 1721 by Manjit Wood RN  Outcome: Abner Kirkland (Taken 4/5/2023 2054 by Cyril Lauren RN)  Return ADL Status to a Safe Level of Function:   Administer medication as ordered   Assess activities of daily living deficits and provide assistive devices as needed     Problem: Chronic Conditions and Co-morbidities  Goal: Patient's chronic conditions and co-morbidity symptoms are monitored and maintained or improved  4/7/2023 0127 by Don Clifton RN  Outcome: Progressing  Flowsheets (Taken 4/6/2023 1930)  Care Plan - Patient's Chronic Conditions and Co-Morbidity Symptoms are Monitored and Maintained or Improved: Monitor and assess patient's chronic conditions and comorbid symptoms for stability, deterioration, or improvement  4/6/2023 1721 by Manjit Wood RN  Outcome: Progressing  [address] (Taken 4/5/2023 2054 by Cyril Lauren RN)  Care Plan - Patient's Chronic Conditions and Co-Morbidity Symptoms are Monitored and Maintained or Improved: Monitor and assess patient's chronic conditions and comorbid symptoms for stability, deterioration, or

## 2023-04-07 NOTE — RT PROTOCOL NOTE
RT Inhaler-Nebulizer Bronchodilator Protocol Note    There is a bronchodilator order in the chart from a provider indicating to follow the RT Bronchodilator Protocol and there is an Initiate RT Inhaler-Nebulizer Bronchodilator Protocol order as well (see protocol at bottom of note). CXR Findings:  No results found. The findings from the last RT Protocol Assessment were as follows:   History Pulmonary Disease: None or smoker <15 pack years  Respiratory Pattern: Regular pattern and RR 12-20 bpm  Breath Sounds: Slightly diminished and/or crackles  Cough: Strong, spontaneous, non-productive  Indication for Bronchodilator Therapy: On home bronchodilators  Bronchodilator Assessment Score: 2    Aerosolized bronchodilator medication orders have been revised according to the RT Inhaler-Nebulizer Bronchodilator Protocol below. Respiratory Therapist to perform RT Therapy Protocol Assessment initially then follow the protocol. Repeat RT Therapy Protocol Assessment PRN for score 0-3 or on second treatment, BID, and PRN for scores above 3. No Indications - adjust the frequency to every 6 hours PRN wheezing or bronchospasm, if no treatments needed after 48 hours then discontinue using Per Protocol order mode. If indication present, adjust the RT bronchodilator orders based on the Bronchodilator Assessment Score as indicated below. Use Inhaler orders unless patient has one or more of the following: on home nebulizer, not able to hold breath for 10 seconds, is not alert and oriented, cannot activate and use MDI correctly, or respiratory rate 25 breaths per minute or more, then use the equivalent nebulizer order(s) with same Frequency and PRN reasons based on the score. If a patient is on this medication at home then do not decrease Frequency below that used at home.     0-3 - enter or revise RT bronchodilator order(s) to equivalent RT Bronchodilator order with Frequency of every 4 hours PRN for wheezing or
RT Inhaler-Nebulizer Bronchodilator Protocol Note    There is a bronchodilator order in the chart from a provider indicating to follow the RT Bronchodilator Protocol and there is an Initiate RT Inhaler-Nebulizer Bronchodilator Protocol order as well (see protocol at bottom of note). CXR Findings:  No results found. The findings from the last RT Protocol Assessment were as follows:   History Pulmonary Disease: None or smoker <15 pack years  Respiratory Pattern: Regular pattern and RR 12-20 bpm  Breath Sounds: Slightly diminished and/or crackles  Cough: Strong, spontaneous, non-productive  Indication for Bronchodilator Therapy: On home bronchodilators  Bronchodilator Assessment Score: 2    Aerosolized bronchodilator medication orders have been revised according to the RT Inhaler-Nebulizer Bronchodilator Protocol below. Respiratory Therapist to perform RT Therapy Protocol Assessment initially then follow the protocol. Repeat RT Therapy Protocol Assessment PRN for score 0-3 or on second treatment, BID, and PRN for scores above 3. No Indications - adjust the frequency to every 6 hours PRN wheezing or bronchospasm, if no treatments needed after 48 hours then discontinue using Per Protocol order mode. If indication present, adjust the RT bronchodilator orders based on the Bronchodilator Assessment Score as indicated below. Use Inhaler orders unless patient has one or more of the following: on home nebulizer, not able to hold breath for 10 seconds, is not alert and oriented, cannot activate and use MDI correctly, or respiratory rate 25 breaths per minute or more, then use the equivalent nebulizer order(s) with same Frequency and PRN reasons based on the score. If a patient is on this medication at home then do not decrease Frequency below that used at home.     0-3 - enter or revise RT bronchodilator order(s) to equivalent RT Bronchodilator order with Frequency of every 4 hours PRN for wheezing or
RT Inhaler-Nebulizer Bronchodilator Protocol Note    There is a bronchodilator order in the chart from a provider indicating to follow the RT Bronchodilator Protocol and there is an Initiate RT Inhaler-Nebulizer Bronchodilator Protocol order as well (see protocol at bottom of note). CXR Findings:  No results found. The findings from the last RT Protocol Assessment were as follows:   History Pulmonary Disease: None or smoker <15 pack years  Respiratory Pattern: Regular pattern and RR 12-20 bpm  Breath Sounds: Slightly diminished and/or crackles  Cough: Strong, spontaneous, non-productive  Indication for Bronchodilator Therapy: On home bronchodilators-ventolin mdi BID x 2 puffs  Bronchodilator Assessment Score: 2    Aerosolized bronchodilator medication orders have been revised according to the RT Inhaler-Nebulizer Bronchodilator Protocol below. Respiratory Therapist to perform RT Therapy Protocol Assessment initially then follow the protocol. Repeat RT Therapy Protocol Assessment PRN for score 0-3 or on second treatment, BID, and PRN for scores above 3. No Indications - adjust the frequency to every 6 hours PRN wheezing or bronchospasm, if no treatments needed after 48 hours then discontinue using Per Protocol order mode. If indication present, adjust the RT bronchodilator orders based on the Bronchodilator Assessment Score as indicated below. Use Inhaler orders unless patient has one or more of the following: on home nebulizer, not able to hold breath for 10 seconds, is not alert and oriented, cannot activate and use MDI correctly, or respiratory rate 25 breaths per minute or more, then use the equivalent nebulizer order(s) with same Frequency and PRN reasons based on the score. If a patient is on this medication at home then do not decrease Frequency below that used at home.     0-3 - enter or revise RT bronchodilator order(s) to equivalent RT Bronchodilator order with Frequency of every 4 hours
increased work of breathing using Per Protocol order mode. 4-6 - enter or revise RT Bronchodilator order(s) to two equivalent RT bronchodilator orders with one order with BID Frequency and one order with Frequency of every 4 hours PRN wheezing or increased work of breathing using Per Protocol order mode. 7-10 - enter or revise RT Bronchodilator order(s) to two equivalent RT bronchodilator orders with one order with TID Frequency and one order with Frequency of every 4 hours PRN wheezing or increased work of breathing using Per Protocol order mode. 11-13 - enter or revise RT Bronchodilator order(s) to one equivalent RT bronchodilator order with QID Frequency and an Albuterol order with Frequency of every 4 hours PRN wheezing or increased work of breathing using Per Protocol order mode. Greater than 13 - enter or revise RT Bronchodilator order(s) to one equivalent RT bronchodilator order with every 4 hours Frequency and an Albuterol order with Frequency of every 2 hours PRN wheezing or increased work of breathing using Per Protocol order mode. RT to enter RT Home Evaluation for COPD & MDI Assessment order using Per Protocol order mode.     Electronically signed by Fiona Carlos RCP on 4/6/2023 at 8:14 AM

## 2023-04-07 NOTE — CARE COORDINATION
4/4/23, 12:39 PM EDT    [place] [address] day: 2  Location: [facility]25/025-A Reason for admit: Fall, initial encounter [W19. XXXA]  Fall at home, initial encounter [I47. XXXA, R76.373]   Procedure: 4/2 XR R wrist: No fracture or dislocation. 4/2 XR R & L Knee: No fracture or dislocation. 4/2 CT Head: No mass effect or acute hemorrhage. Chronic periventricular small vessel ischemic changes and cerebral atrophy. 4/2 CT L Spine: No acute fracture or spondylolisthesis of the lumbar spine. Multilevel degenerative disc disease and posterior facet arthropathy as detailed above. 4/4 CXR: Small bilateral pleural effusions are seen. Barriers to Discharge: Family med, Nephrology, and therapy following. Ortho consulted today. Completed bilateral knee aspiration. IV Venofer. Temp up to 100.6 last pm.   PCP: Alex Parisi MD  Readmission Risk Score: 21.9%  Patient Goals/Plan/Treatment Preferences: From apartment alone. Awaiting therapy input on how much assistance patient will need at discharge. SW have been consulted.
4/5/23, 11:02 AM EDT    DISCHARGE PLANNING EVALUATION    EITAN spoke with Slater Runner with [facility], they can accept pt. EITAN requested pre-cert be started today.
4/6/23, 9:57 AM EDT    [place] [address] day: 4  Location: [facility]25/025-A Reason for admit: Fall, initial encounter [W19. XXXA]  Fall at home, initial encounter [P03. XXXA, O11.634]   Procedure:  4/2 XR R wrist: No fracture or dislocation. 4/2 XR R & L Knee: No fracture or dislocation. 4/2 CT Head: No mass effect or acute hemorrhage. Chronic periventricular small vessel ischemic changes and cerebral atrophy. 4/2 CT L Spine: No acute fracture or spondylolisthesis of the lumbar spine. Multilevel degenerative disc disease and posterior facet arthropathy as detailed above. 4/4 CXR: Small bilateral pleural effusions are seen. 4/4 Aspiration of bilateral knees  4/5 XR R Elbow: Soft tissue swelling. Multifocal hypertrophic spurring. No fracture  Barriers to Discharge: Family medicine, nephrology, orthopedics (signed off), and therapy following. WBAT BLE. IV Venofer for anemia. Nephrology noted that patient may need outpatient follow up with Hematology. Cultures in process from knee aspiration. PCP: Carlos Blair MD  Readmission Risk Score: 21.8%  Patient Goals/Plan/Treatment Preferences: From home alone. EITAN has been working on placement for [facility], pre-cert started 4/5. Follow for EITAN notes for further discharge planning progress.
4/7/23, 12:16 PM EDT    DISCHARGE PLANNING EVALUATION    Received a call from Francisca at [facility], patient's precert is going to a peer to peer. Perfect served Dr. Tj Lawrence and gave her information for the peer to peer. Phone number to call is [phone], option 5 to be completed by 3:00PM today. 2:05 PM- Received a call from Dr. Tj Lawrence, he reports that patient's peer to peer was approved and he can discharge today. EITAN called Meena at [facility], they have to hear from the insurance agency but are okay with SW setting up transport plans.
4/7/23, 1:28 PM EDT    [place] [address] day: 5  Location: [facility]25/025-A Reason for admit: Fall, initial encounter [W19. XXXA]  Fall at home, initial encounter [T14. XXXA, N78.163]   Procedure: 4/2 XR R wrist: No fracture or dislocation. 4/2 XR R & L Knee: No fracture or dislocation. 4/2 CT Head: No mass effect or acute hemorrhage. Chronic periventricular small vessel ischemic changes and cerebral atrophy. 4/2 CT L Spine: No acute fracture or spondylolisthesis of the lumbar spine. Multilevel degenerative disc disease and posterior facet arthropathy as detailed above. 4/4 CXR: Small bilateral pleural effusions are seen. 4/4 Aspiration of bilateral knees  4/5 XR R Elbow: Soft tissue swelling. Multifocal hypertrophic spurring. No fracture  Barriers to Discharge: Continue with therapy. Fluid cultures from knees showing no growth in preliminary state. PCP: Denise Gomez MD  Readmission Risk Score: 21.3%  Patient Goals/Plan/Treatment Preferences: Planning new placement at [facility].    See SW notes
4/7/23, 3:54 PM EDT    Patient goals/plan/ treatment preferences discussed by  and . Patient goals/plan/ treatment preferences reviewed with patient/ family. Patient/ family verbalize understanding of discharge plan and are in agreement with goal/plan/treatment preferences. Understanding was demonstrated using the teach back method. AVS provided by RN at time of discharge, which includes all necessary medical information pertaining to the patients current course of illness, treatment, post-discharge goals of care, and treatment preferences. Services At/After Discharge: [facility] ([facility]), Transport, In [facility], Nursing service, OT, and PT- [facility]        IMM Letter  IMM Letter given to Patient/Family/Significant other/Guardian/POA/by[de-identified] Vi Warren RN   IMM Letter date given[de-identified] 04/07/23  IMM Letter time given[de-identified] 1717     Ced Allen will be discharged today to [facility], where he will be skilled under his [facility] Medicare benefit. Transport is set up for 7:30PM today with [facility]. RN is to fax AVS and last dose MAR to facility.      Electronically signed by NATALYA Meza on 4/7/2023 at 3:56 PM
DISCHARGE PLANNING EVALUATION  4/4/23, 2:38 PM EDT    Reason for Referral: discharge needs  Mental Status: pt is alert and oriented   Decision Making: pt is capable of making own decisions   Family/Social/Home Environment: pt resides alone in a 2nd floor apartment with elevator. Pt states he is independent with personal care, cooking, cleaning. Pt is an active . Current Services including food security, transportation and housekeeping: see above  Current Equipment:rollator, lift chair, shower chair  Payment [address] Medicare  Concerns or Barriers to Discharge: pt lives alone, limited support  Post-acute [facility]) provider list was provided to patient. Patient was informed of their freedom to choose [facility] provider. Discussed and offered to show the patient the relevant [facility] Providers quality and resource use measures on Medicare Compare web site via computer based on patient's goals of care and treatment preferences. Questions regarding selection process were answered. Teach Back Method used with  pt regarding care plan and need for rehab  Patient verbalized understanding of the plan of care and contribute to goal setting. Patient goals, treatment preferences and discharge plan: SW met with pt, discussed discharge POC. SW noted that therapy is recommending ECF. Pt is agreeable and is requesting [facility], has been there in the past.  Pre-cert required. Referral completed with [facility] Runner with [facility].     Electronically signed by NATALYA Cintron on 4/4/2023 at 2:38 PM
Transportation/Food Security/Housekeeping Addressed: No issues identified.      Shanique Little RN  Case Management Department

## 2023-04-09 LAB
BACTERIA SPEC ANAEROBE CULT: NORMAL
BACTERIA SPEC ANAEROBE CULT: NORMAL
BACTERIA SPEC BFLD CULT: NORMAL
BACTERIA SPEC BFLD CULT: NORMAL
GRAM STN SPEC: NORMAL
GRAM STN SPEC: NORMAL

## 2023-04-13 ENCOUNTER — CARE COORDINATION (OUTPATIENT)
Dept: FAMILY MEDICINE CLINIC | Age: 73
End: 2023-04-13

## 2023-04-20 ENCOUNTER — CARE COORDINATION (OUTPATIENT)
Dept: FAMILY MEDICINE CLINIC | Age: 73
End: 2023-04-20

## 2023-04-20 ENCOUNTER — TELEPHONE (OUTPATIENT)
Dept: FAMILY MEDICINE CLINIC | Age: 73
End: 2023-04-20

## 2023-04-20 ENCOUNTER — OFFICE VISIT (OUTPATIENT)
Dept: FAMILY MEDICINE CLINIC | Age: 73
End: 2023-04-20

## 2023-04-20 VITALS
BODY MASS INDEX: 40.43 KG/M2 | HEIGHT: 74 IN | RESPIRATION RATE: 20 BRPM | HEART RATE: 88 BPM | DIASTOLIC BLOOD PRESSURE: 68 MMHG | SYSTOLIC BLOOD PRESSURE: 116 MMHG | WEIGHT: 315 LBS

## 2023-04-20 DIAGNOSIS — E78.00 HYPERCHOLESTEREMIA: ICD-10-CM

## 2023-04-20 DIAGNOSIS — M10.9 GOUTY ARTHRITIS: Primary | ICD-10-CM

## 2023-04-20 DIAGNOSIS — E11.42 DIABETIC PERIPHERAL NEUROPATHY (HCC): ICD-10-CM

## 2023-04-20 DIAGNOSIS — D63.8 ANEMIA WITH CHRONIC ILLNESS: ICD-10-CM

## 2023-04-20 DIAGNOSIS — E66.01 CLASS 3 SEVERE OBESITY WITH SERIOUS COMORBIDITY AND BODY MASS INDEX (BMI) OF 45.0 TO 49.9 IN ADULT, UNSPECIFIED OBESITY TYPE (HCC): ICD-10-CM

## 2023-04-20 DIAGNOSIS — I10 PRIMARY HYPERTENSION: ICD-10-CM

## 2023-04-20 DIAGNOSIS — I25.708 CORONARY ARTERY DISEASE OF BYPASS GRAFT OF NATIVE HEART WITH STABLE ANGINA PECTORIS (HCC): ICD-10-CM

## 2023-04-20 RX ORDER — FEBUXOSTAT 80 MG/1
80 TABLET, FILM COATED ORAL DAILY
Qty: 30 TABLET | Refills: 3 | Status: SHIPPED | OUTPATIENT
Start: 2023-04-20

## 2023-04-20 ASSESSMENT — ENCOUNTER SYMPTOMS
SORE THROAT: 0
TROUBLE SWALLOWING: 0
EYE PAIN: 0
BLOOD IN STOOL: 0
ABDOMINAL PAIN: 0
CHEST TIGHTNESS: 0
NAUSEA: 0
VISUAL CHANGE: 0
BACK PAIN: 0
ORTHOPNEA: 0
COUGH: 0
CONSTIPATION: 0
SHORTNESS OF BREATH: 0
EYE DISCHARGE: 0

## 2023-04-20 NOTE — PROGRESS NOTES
Subjective:      Patient ID: Eleanore Phoenix is a 74 y.o. male. Gout  noted  stable  as  was in hospital and  with  cellulitis and now  better     Ashd  stable     Anemia  stable       Niddm  with long  term  insulin      In   hospital for    gout  and  cellulits      Hypertension  This is a chronic problem. The current episode started more than 1 year ago. The problem has been resolved since onset. The problem is controlled. Pertinent negatives include no anxiety, chest pain, headaches, orthopnea, palpitations, peripheral edema or shortness of breath. There are no compliance problems. Diabetes  He presents for his follow-up diabetic visit. He has type 2 diabetes mellitus. His disease course has been stable. Pertinent negatives for hypoglycemia include no dizziness or headaches. There are no diabetic associated symptoms. Pertinent negatives for diabetes include no chest pain, no fatigue, no visual change and no weakness. There are no hypoglycemic complications. Symptoms are improving. There are no diabetic complications. Past Medical History:   Diagnosis Date    RAUL (acute kidney injury) ([facility] [address] 02/13/2020    ASHD (arteriosclerotic heart disease) 2005 2006    stent  baki    Closed fracture of first lumbar vertebra ([facility] [address] 05/06/2022    Closed T12 fracture ([facility] [address] 05/06/2022    Depression     Diabetic peripheral neuropathy ([facility] [address] 2014    Fracture 10/1984    left lower extremity     HTN (hypertension)     Hypercholesteremia     IDDM (insulin dependent diabetes mellitus)     Low back pain     Morbid obesity with BMI of 45.0-49.9, adult (HCC)     Osteoarthritis     S/P CABG (coronary artery bypass graft)       Review of Systems   Constitutional:  Negative for fatigue and fever. HENT:  Negative for congestion, ear pain, postnasal drip, sore throat and trouble swallowing. Eyes:  Negative for pain and discharge. Respiratory:  Negative for cough, chest tightness and shortness of breath.     Cardiovascular:

## 2023-04-20 NOTE — TELEPHONE ENCOUNTER
Rohini You   Mackenzie: [id number]   PA Case ID: [id number]   Rx #: [id number]  Need help?  Call us at [phone]  Status: Sent to Plan today  Drug: Febuxostat 80MG tablets  Form: [address] Preferred Products  PROBENECID [id number]  PROBENECID-COLCHICINE [id number]  ALLOPURINOL [id number]

## 2023-04-20 NOTE — TELEPHONE ENCOUNTER
Outcome: Approved today  PA Case: [id number], Status: Approved, Coverage Starts on: 1/1/2023 12:00:00 AM, Coverage Ends on: 12/31/2023 12:00:00 AM. Questions? Contact [phone].

## 2023-04-20 NOTE — CARE COORDINATION
I saw [facility] when he came in for his appointment. He stated that his sugar was 86 this am and that is low for him. He was getting ready for therapy to come so he did not eat breakfast. OT came and PT came and he was worried about being late for his appointment so he did not stop at [facility] for any food. He did consume a sample size of maple syrup he had found before his appointment. I gave him a snack size Kind bar. He was alert oriented and the only symptom he had prior to eating the syrup was that his vision was off some. I told him that he should not drive when his vision is off and he agreed. I provided him with carb count information that also includes portion sizes and instructed him that he needs to pay attention to the portion sizes. I also gave him a sheet to log his sugars. I will follow up with him next week.

## 2023-04-23 LAB
EKG ATRIAL RATE: 78 BPM
EKG P AXIS: 65 DEGREES
EKG P-R INTERVAL: 204 MS
EKG Q-T INTERVAL: 404 MS
EKG QRS DURATION: 138 MS
EKG QTC CALCULATION (BAZETT): 460 MS
EKG R AXIS: -42 DEGREES
EKG T AXIS: 69 DEGREES
EKG VENTRICULAR RATE: 78 BPM

## 2023-04-25 ENCOUNTER — TELEPHONE (OUTPATIENT)
Dept: FAMILY MEDICINE CLINIC | Age: 73
End: 2023-04-25

## 2023-04-25 DIAGNOSIS — I50.32 CHRONIC DIASTOLIC (CONGESTIVE) HEART FAILURE (HCC): Primary | ICD-10-CM

## 2023-04-25 NOTE — TELEPHONE ENCOUNTER
Pt called said that his hands are swollen, can push in on them and leave an indentation of about a quarter of an inch, both hands. Not swelling in legs and feet. Pt is wondering if you can put him back on Furosemide 80 mg daily along with the Furosemide 40 mg daily that he is currently on.     Maverick Cadet

## 2023-04-26 ENCOUNTER — TELEPHONE (OUTPATIENT)
Dept: FAMILY MEDICINE CLINIC | Age: 73
End: 2023-04-26

## 2023-04-26 NOTE — TELEPHONE ENCOUNTER
Jhonatan Martin from [address] [place] [address] called in and she working with pt  for OT and she would like to know if Dr Lucy Pearce can order  a Bariatric bedside comode for the pt.       Order needs sent to [facility]

## 2023-04-26 NOTE — TELEPHONE ENCOUNTER
Kidney function went up in hospital so do 40mg one day and 80 mg next as alter and recheck kidney function in 10 days    Lab on printer  for  10 days    Please  inform

## 2023-04-27 ENCOUNTER — TELEPHONE (OUTPATIENT)
Dept: FAMILY MEDICINE CLINIC | Age: 73
End: 2023-04-27

## 2023-04-27 RX ORDER — COLCHICINE 0.6 MG/1
0.6 TABLET ORAL DAILY
Qty: 90 TABLET | Refills: 0 | Status: SHIPPED | OUTPATIENT
Start: 2023-04-27

## 2023-04-27 NOTE — TELEPHONE ENCOUNTER
So bedside commode options  are fixed arms and heavy duty over 300 pounds  and drop down arms so what is bariatric ?  As not listed

## 2023-05-12 ENCOUNTER — NURSE ONLY (OUTPATIENT)
Dept: LAB | Age: 73
End: 2023-05-12

## 2023-05-12 DIAGNOSIS — I50.32 CHRONIC DIASTOLIC (CONGESTIVE) HEART FAILURE (HCC): ICD-10-CM

## 2023-05-12 LAB
ANION GAP SERPL CALC-SCNC: 15 MEQ/L (ref 8–16)
BUN SERPL-MCNC: 25 MG/DL (ref 7–22)
CHLORIDE SERPL-SCNC: 102 MEQ/L (ref 98–111)
CO2 SERPL-SCNC: 23 MEQ/L (ref 23–33)
CREAT SERPL-MCNC: 1.1 MG/DL (ref 0.4–1.2)
GFR SERPL CREATININE-BSD FRML MDRD: > 60 ML/MIN/1.73M2
POTASSIUM SERPL-SCNC: 4.6 MEQ/L (ref 3.5–5.2)
SODIUM SERPL-SCNC: 140 MEQ/L (ref 135–145)

## 2023-05-15 ENCOUNTER — TELEPHONE (OUTPATIENT)
Dept: FAMILY MEDICINE CLINIC | Age: 73
End: 2023-05-15

## 2023-05-15 NOTE — TELEPHONE ENCOUNTER
----- Message from Jeff Osei MD sent at 5/15/2023  5:52 AM EDT -----  Labs all stable  and sodium ok and  potassium and so no change     Please call

## 2023-05-17 NOTE — TELEPHONE ENCOUNTER
Date of last visit:  4/20/2023  Date of next visit:  6/6/2023    Requested Prescriptions     Pending Prescriptions Disp Refills    amLODIPine (NORVASC) 5 MG tablet [Pharmacy Med Name: amLODIPine Besylate 5 MG Oral Tablet] 30 tablet 5     Sig: Take 1 tablet by mouth once daily

## 2023-05-18 RX ORDER — AMLODIPINE BESYLATE 5 MG/1
TABLET ORAL
Qty: 30 TABLET | Refills: 5 | Status: SHIPPED | OUTPATIENT
Start: 2023-05-18

## 2023-05-26 ENCOUNTER — TELEPHONE (OUTPATIENT)
Dept: FAMILY MEDICINE CLINIC | Age: 73
End: 2023-05-26

## 2023-05-26 NOTE — TELEPHONE ENCOUNTER
Radha Muniz called requesting a refill of their:    enalapril (VASOTEC) 10 MG tablet QD    Send 90 day supply to [facility] on [employer]

## 2023-05-30 ENCOUNTER — OFFICE VISIT (OUTPATIENT)
Dept: NEPHROLOGY | Age: 73
End: 2023-05-30
Payer: MEDICARE

## 2023-05-30 VITALS
DIASTOLIC BLOOD PRESSURE: 64 MMHG | SYSTOLIC BLOOD PRESSURE: 148 MMHG | HEART RATE: 79 BPM | BODY MASS INDEX: 49.12 KG/M2 | WEIGHT: 315 LBS | OXYGEN SATURATION: 96 %

## 2023-05-30 DIAGNOSIS — D50.8 OTHER IRON DEFICIENCY ANEMIA: ICD-10-CM

## 2023-05-30 DIAGNOSIS — N18.2 CKD (CHRONIC KIDNEY DISEASE), STAGE II: ICD-10-CM

## 2023-05-30 DIAGNOSIS — I10 ESSENTIAL HYPERTENSION: Primary | ICD-10-CM

## 2023-05-30 PROCEDURE — G8417 CALC BMI ABV UP PARAM F/U: HCPCS | Performed by: INTERNAL MEDICINE

## 2023-05-30 PROCEDURE — 3017F COLORECTAL CA SCREEN DOC REV: CPT | Performed by: INTERNAL MEDICINE

## 2023-05-30 PROCEDURE — 1123F ACP DISCUSS/DSCN MKR DOCD: CPT | Performed by: INTERNAL MEDICINE

## 2023-05-30 PROCEDURE — 99214 OFFICE O/P EST MOD 30 MIN: CPT | Performed by: INTERNAL MEDICINE

## 2023-05-30 PROCEDURE — 1036F TOBACCO NON-USER: CPT | Performed by: INTERNAL MEDICINE

## 2023-05-30 PROCEDURE — 3077F SYST BP >= 140 MM HG: CPT | Performed by: INTERNAL MEDICINE

## 2023-05-30 PROCEDURE — G8427 DOCREV CUR MEDS BY ELIG CLIN: HCPCS | Performed by: INTERNAL MEDICINE

## 2023-05-30 PROCEDURE — 3078F DIAST BP <80 MM HG: CPT | Performed by: INTERNAL MEDICINE

## 2023-05-30 RX ORDER — FUROSEMIDE 40 MG/1
40 TABLET ORAL 2 TIMES DAILY
Qty: 180 TABLET | Refills: 1 | Status: SHIPPED | OUTPATIENT
Start: 2023-05-30 | End: 2023-08-28

## 2023-05-30 NOTE — PROGRESS NOTES
[address] KIDNEY AND HYPERTENSION  [address]  [facility] [address]  Dept: [phone]  Loc: [phone]  Progress Note  5/30/2023 12:52 PM      Pt Name:    Ambrocio Gains:    1950  Primary Care Physician:  Hossein Garnett MD     Chief Complaint:   Chief Complaint   Patient presents with    Follow-up     CKD III        History of Present Illness: This is a follow-up visit for CKD III . Comorbidities include DM, HTN, CAD s/p CABG. Lymphedema. He has had 2 recent hospitalizations for gout. Was at [facility] and back home now. He states his gout is under good control but he is having worsening swelling in arms and legs and weight is up about 20 pounds. He is on lasix 40 mg bid, was previously on 80/40 mg bid. Pertinent items are noted in HPI.          Past History:  Past Medical History:   Diagnosis Date    RAUL (acute kidney injury) ([facility] [address] 02/13/2020    ASHD (arteriosclerotic heart disease) 2005 2006    stent  baki    Closed fracture of first lumbar vertebra ([facility] [address] 05/06/2022    Closed T12 fracture ([address] 05/06/2022    Depression     Diabetic peripheral neuropathy ([address] 2014    Fracture 10/1984    left lower extremity     HTN (hypertension)     Hypercholesteremia     IDDM (insulin dependent diabetes mellitus)     Low back pain     Morbid obesity with BMI of 45.0-49.9, adult (HCC)     Osteoarthritis     S/P CABG (coronary artery bypass graft)      Past Surgical History:   Procedure Laterality Date    AMPUTATION Left 9/10/14    partial versus complete left hallux amputation, left great toe amputation    APPENDECTOMY      BACK INJECTION Bilateral 1/18/2021    Bilateral Si MBB #1 performed by Eve Javier MD at [facility] Bilateral 3/1/2021    Bilateral SI MBB #2 performed by Eve Javier MD at [address] [place] [address]

## 2023-06-02 ENCOUNTER — TELEPHONE (OUTPATIENT)
Dept: FAMILY MEDICINE CLINIC | Age: 73
End: 2023-06-02

## 2023-06-02 NOTE — TELEPHONE ENCOUNTER
Patient called stating that he has a quarter size open are on his leg that he can't seem to keep a band aid on. Advised him to put some antibiotic ointment with a bandaid and wrap it with gauze around his leg to keep it in place. Advised him to watch for infection and call or go to Urgent care for evaluation if gets worse.

## 2023-06-06 ENCOUNTER — OFFICE VISIT (OUTPATIENT)
Dept: FAMILY MEDICINE CLINIC | Age: 73
End: 2023-06-06

## 2023-06-06 VITALS
WEIGHT: 315 LBS | BODY MASS INDEX: 40.43 KG/M2 | DIASTOLIC BLOOD PRESSURE: 82 MMHG | SYSTOLIC BLOOD PRESSURE: 130 MMHG | HEART RATE: 72 BPM | RESPIRATION RATE: 16 BRPM | HEIGHT: 74 IN

## 2023-06-06 DIAGNOSIS — D63.8 ANEMIA WITH CHRONIC ILLNESS: ICD-10-CM

## 2023-06-06 DIAGNOSIS — I87.2 VENOUS STASIS DERMATITIS OF BOTH LOWER EXTREMITIES: ICD-10-CM

## 2023-06-06 DIAGNOSIS — Z79.4 TYPE 2 DIABETES MELLITUS WITH DIABETIC POLYNEUROPATHY, WITH LONG-TERM CURRENT USE OF INSULIN (HCC): ICD-10-CM

## 2023-06-06 DIAGNOSIS — I25.708 CORONARY ARTERY DISEASE OF BYPASS GRAFT OF NATIVE HEART WITH STABLE ANGINA PECTORIS (HCC): ICD-10-CM

## 2023-06-06 DIAGNOSIS — M10.9 GOUTY ARTHRITIS OF BOTH KNEES: ICD-10-CM

## 2023-06-06 DIAGNOSIS — E78.00 HYPERCHOLESTEREMIA: ICD-10-CM

## 2023-06-06 DIAGNOSIS — E11.42 TYPE 2 DIABETES MELLITUS WITH DIABETIC POLYNEUROPATHY, WITH LONG-TERM CURRENT USE OF INSULIN (HCC): ICD-10-CM

## 2023-06-06 DIAGNOSIS — N17.9 ACUTE KIDNEY INJURY (HCC): ICD-10-CM

## 2023-06-06 DIAGNOSIS — Z00.00 MEDICARE ANNUAL WELLNESS VISIT, SUBSEQUENT: Primary | ICD-10-CM

## 2023-06-06 ASSESSMENT — ENCOUNTER SYMPTOMS
SHORTNESS OF BREATH: 0
EYE PAIN: 0
BLOOD IN STOOL: 0
SORE THROAT: 0
CONSTIPATION: 0
TROUBLE SWALLOWING: 0
ABDOMINAL PAIN: 0
BACK PAIN: 0
COUGH: 0
CHEST TIGHTNESS: 0
NAUSEA: 0

## 2023-06-06 ASSESSMENT — PATIENT HEALTH QUESTIONNAIRE - PHQ9
4. FEELING TIRED OR HAVING LITTLE ENERGY: 0
8. MOVING OR SPEAKING SO SLOWLY THAT OTHER PEOPLE COULD HAVE NOTICED. OR THE OPPOSITE, BEING SO FIGETY OR RESTLESS THAT YOU HAVE BEEN MOVING AROUND A LOT MORE THAN USUAL: 0
2. FEELING DOWN, DEPRESSED OR HOPELESS: 0
10. IF YOU CHECKED OFF ANY PROBLEMS, HOW DIFFICULT HAVE THESE PROBLEMS MADE IT FOR YOU TO DO YOUR WORK, TAKE CARE OF THINGS AT HOME, OR GET ALONG WITH OTHER PEOPLE: 0
6. FEELING BAD ABOUT YOURSELF - OR THAT YOU ARE A FAILURE OR HAVE LET YOURSELF OR YOUR FAMILY DOWN: 0
3. TROUBLE FALLING OR STAYING ASLEEP: 0
SUM OF ALL RESPONSES TO PHQ QUESTIONS 1-9: 0
7. TROUBLE CONCENTRATING ON THINGS, SUCH AS READING THE NEWSPAPER OR WATCHING TELEVISION: 0
SUM OF ALL RESPONSES TO PHQ9 QUESTIONS 1 & 2: 0
5. POOR APPETITE OR OVEREATING: 0
SUM OF ALL RESPONSES TO PHQ QUESTIONS 1-9: 0
9. THOUGHTS THAT YOU WOULD BE BETTER OFF DEAD, OR OF HURTING YOURSELF: 0
1. LITTLE INTEREST OR PLEASURE IN DOING THINGS: 0
SUM OF ALL RESPONSES TO PHQ QUESTIONS 1-9: 0
SUM OF ALL RESPONSES TO PHQ QUESTIONS 1-9: 0

## 2023-06-06 ASSESSMENT — LIFESTYLE VARIABLES
HOW OFTEN DO YOU HAVE A DRINK CONTAINING ALCOHOL: NEVER
HOW MANY STANDARD DRINKS CONTAINING ALCOHOL DO YOU HAVE ON A TYPICAL DAY: PATIENT DOES NOT DRINK

## 2023-06-06 NOTE — PROGRESS NOTES
Medicare Annual Wellness Visit    Willem Noyola is here for Medicare AWV    Assessment & Plan       ICD-10-CM    1. Medicare annual wellness visit, subsequent  Z00.00       2. Coronary artery disease of bypass graft of native heart with stable angina pectoris ([facility]  I25.708 [place] OFFICE OUTPATIENT VISIT 15 MINUTES [13350]      3. Gouty arthritis of both knees  M10.9 [place] OFFICE OUTPATIENT VISIT 15 MINUTES [84547]      4. Venous stasis dermatitis of both lower extremities  I87.2 [place] OFFICE OUTPATIENT VISIT 15 MINUTES [69476]      5. Acute kidney injury ([facility]  N17.9       6. Hypercholesteremia  E78.00       7. Type 2 diabetes mellitus with diabetic polyneuropathy, with long-term current use of insulin (HCC)  E11.42 Hemoglobin A1C    Z79.4 [place] OFFICE OUTPATIENT VISIT 15 MINUTES [31541]      8.  Anemia with chronic illness  D63.8            Orders Placed This Encounter   Procedures    Hemoglobin A1C     Standing Status:   Future     Standing Expiration Date:   6/6/2024    [place] OFFICE OUTPATIENT VISIT 15 MINUTES [97414]      Current Outpatient Medications   Medication Sig Dispense Refill    furosemide (LASIX) 40 MG tablet Take 1 tablet by mouth 2 times daily 180 tablet 1    amLODIPine (NORVASC) 5 MG tablet Take 1 tablet by mouth once daily 30 tablet 5    colchicine (COLCRYS) 0.6 MG tablet Take 1 tablet by mouth daily 90 tablet 0    metFORMIN (GLUCOPHAGE) 500 MG tablet Take 2 tablets by mouth 2 times daily (with meals) 180 tablet 1    febuxostat (ULORIC) 80 MG TABS tablet Take 1 tablet by mouth daily 30 tablet 3    insulin glargine (LANTUS) 100 UNIT/ML injection vial Inject 50 Units into the skin nightly 10 mL 3    insulin lispro (HUMALOG) 100 UNIT/ML SOLN injection vial Inject 0-16 Units into the skin 3 times daily (with meals) 1 each 0    insulin lispro (HUMALOG) 100 UNIT/ML SOLN injection vial Inject 0-4 Units into the skin nightly 1 each 0    predniSONE (DELTASONE) 20 MG tablet One  tab po bid for 5 days then one a day  for

## 2023-06-06 NOTE — PATIENT INSTRUCTIONS
oxygen-rich blood to your heart muscle. This can narrow the blood vessels and reduce blood flow. A heart attack happens when blood flow is completely blocked. A high-fat diet, smoking, and other factors increase the risk of heart disease. Your doctor has found that you have a chance of having heart disease. You can do lots of things to keep your heart healthy. It may not be easy, but you can change your diet, exercise more, and quit smoking. These steps really work to lower your chance of heart disease. Follow-up care is a key part of your treatment and safety. Be sure to make and go to all appointments, and call your doctor if you are having problems. It's also a good idea to know your test results and keep a list of the medicines you take. How can you care for yourself at home? Diet    Use less salt when you cook and eat. This helps lower your blood pressure. Taste food before salting. Add only a little salt when you think you need it. With time, your taste buds will adjust to less salt.     Eat fewer snack items, fast foods, canned soups, and other high-salt, high-fat, processed foods.     Read food labels and try to avoid saturated and trans fats. They increase your risk of heart disease by raising cholesterol levels.     Limit the amount of solid fat-butter, margarine, and shortening-you eat. Use olive, peanut, or canola oil when you cook. Bake, broil, and steam foods instead of frying them.     Eat a variety of fruit and vegetables every day. Dark green, deep orange, red, or yellow fruits and vegetables are especially good for you. Examples include spinach, carrots, peaches, and berries.     Foods high in fiber can reduce your cholesterol and provide important vitamins and minerals. High-fiber foods include whole-grain cereals and breads, oatmeal, beans, brown rice, citrus fruits, and apples.     Eat lean proteins.  Heart-healthy proteins include seafood, lean meats and poultry, eggs, beans, peas, nuts,

## 2023-06-24 DIAGNOSIS — D64.9 ANEMIA, UNSPECIFIED TYPE: ICD-10-CM

## 2023-06-26 DIAGNOSIS — D64.9 ANEMIA, UNSPECIFIED TYPE: ICD-10-CM

## 2023-06-27 RX ORDER — MULTIVIT,CALC,MINS/IRON/FOLIC 9MG-400MCG
TABLET ORAL
Qty: 30 TABLET | Refills: 5 | Status: SHIPPED | OUTPATIENT
Start: 2023-06-27

## 2023-06-27 RX ORDER — FERROUS SULFATE 325(65) MG
TABLET ORAL
Qty: 60 TABLET | Refills: 5 | Status: SHIPPED | OUTPATIENT
Start: 2023-06-27

## 2023-06-30 DIAGNOSIS — E78.00 HYPERCHOLESTEREMIA: ICD-10-CM

## 2023-07-01 RX ORDER — ATORVASTATIN CALCIUM 10 MG/1
TABLET, FILM COATED ORAL
Qty: 90 TABLET | Refills: 1 | Status: SHIPPED | OUTPATIENT
Start: 2023-07-01

## 2023-07-09 DIAGNOSIS — E11.42 DIABETIC PERIPHERAL NEUROPATHY (HCC): ICD-10-CM

## 2023-07-10 NOTE — TELEPHONE ENCOUNTER
The pharmacy is  requesting a refill of the below medication which has been pended for you:     Requested Prescriptions     Pending Prescriptions Disp Refills    metFORMIN (GLUCOPHAGE) 500 MG tablet [Pharmacy Med Name: metFORMIN HCl 500 MG Oral Tablet] 180 tablet 0     Sig: TAKE 2 TABLETS BY MOUTH TWICE DAILY WITH MEALS       Last Appointment Date: 6/6/2023  Next Appointment Date: 9/7/2023    Allergies   Allergen Reactions    Horse-Derived Products

## 2023-07-31 NOTE — TELEPHONE ENCOUNTER
Pt called to req an refill on the following    insulin lispro (HUMALOG) 100 UNIT/ML SOLN injection vial    Please send to  [facility]

## 2023-07-31 NOTE — TELEPHONE ENCOUNTER
Jocelyn Gillis is requesting a refill on the following medications:  Requested Prescriptions     Pending Prescriptions Disp Refills    insulin lispro (HUMALOG) 100 UNIT/ML SOLN injection vial 1 each 0     Sig: Inject 0-16 Units into the skin 3 times daily (with meals)       Date of last visit: 6/6/2023  Date of next visit (if applicable):9/7/2023

## 2023-08-01 ENCOUNTER — TELEPHONE (OUTPATIENT)
Dept: FAMILY MEDICINE CLINIC | Age: 73
End: 2023-08-01

## 2023-08-01 DIAGNOSIS — E11.42 TYPE 2 DIABETES MELLITUS WITH DIABETIC POLYNEUROPATHY, WITH LONG-TERM CURRENT USE OF INSULIN (HCC): Primary | ICD-10-CM

## 2023-08-01 DIAGNOSIS — Z79.4 TYPE 2 DIABETES MELLITUS WITH DIABETIC POLYNEUROPATHY, WITH LONG-TERM CURRENT USE OF INSULIN (HCC): Primary | ICD-10-CM

## 2023-08-01 RX ORDER — INSULIN LISPRO 100 [IU]/ML
0-16 INJECTION, SOLUTION INTRAVENOUS; SUBCUTANEOUS
Qty: 2 EACH | Refills: 4 | Status: SHIPPED | OUTPATIENT
Start: 2023-08-01

## 2023-08-01 NOTE — TELEPHONE ENCOUNTER
Pharmacist called stating Humalog is not covered by pt's insurance. Insurance will cover [employer].     Please send script for [facility]

## 2023-08-02 RX ORDER — INSULIN ASPART 100 [IU]/ML
12 INJECTION, SOLUTION INTRAVENOUS; SUBCUTANEOUS
Qty: 5 ADJUSTABLE DOSE PRE-FILLED PEN SYRINGE | Refills: 3 | Status: SHIPPED | OUTPATIENT
Start: 2023-08-02

## 2023-08-02 RX ORDER — INSULIN GLARGINE 100 [IU]/ML
INJECTION, SOLUTION SUBCUTANEOUS
Qty: 15 ML | Refills: 0 | Status: SHIPPED | OUTPATIENT
Start: 2023-08-02

## 2023-08-02 NOTE — TELEPHONE ENCOUNTER
Date of last visit:  6/6/2023  Date of next visit:  9/7/2023    Requested Prescriptions     Pending Prescriptions Disp Refills    LANTUS SOLOSTAR 100 UNIT/ML injection pen [Pharmacy Med Name: Lantus SoloStar 100 UNIT/ML Subcutaneous Solution Pen-injector] 15 mL 0     Sig: INJECT 50 UNITS SUBCUTANEOUSLY NIGHTLY

## 2023-08-02 NOTE — TELEPHONE ENCOUNTER
Inderjit Liao informed by Phone that Dr. Kalpesh Salcedo sent over the script for Novolog Pens and Inderjit Liao is to follow the same directions.

## 2023-08-08 DIAGNOSIS — M10.9 GOUTY ARTHRITIS: ICD-10-CM

## 2023-08-08 NOTE — TELEPHONE ENCOUNTER
Date of last visit:  6/6/2023  Date of next visit:  9/7/2023    Requested Prescriptions     Pending Prescriptions Disp Refills    febuxostat (ULORIC) 80 MG TABS tablet [Pharmacy Med Name: Febuxostat 80 MG Oral Tablet] 30 tablet 0     Sig: Take 1 tablet by mouth once daily

## 2023-08-09 RX ORDER — FEBUXOSTAT 80 MG/1
TABLET, FILM COATED ORAL
Qty: 30 TABLET | Refills: 0 | Status: SHIPPED | OUTPATIENT
Start: 2023-08-09

## 2023-08-14 ENCOUNTER — TELEPHONE (OUTPATIENT)
Dept: FAMILY MEDICINE CLINIC | Age: 73
End: 2023-08-14

## 2023-08-14 RX ORDER — PREDNISONE 20 MG/1
TABLET ORAL
Qty: 12 TABLET | Refills: 0 | Status: SHIPPED | OUTPATIENT
Start: 2023-08-14

## 2023-08-14 NOTE — TELEPHONE ENCOUNTER
Date of last visit:  6/6/2023  Date of next visit:  9/7/2023    Requested Prescriptions     Signed Prescriptions Disp Refills    predniSONE (DELTASONE) 20 MG tablet 12 tablet 0     Sig: Take 1 tablet, PO BID x 4 days then daily x 4 days     Authorizing Provider: Stefano Spurling     Ordering User: ANTON FELDMAN    ticagrelor (BRILINTA) 90 MG TABS tablet 180 tablet 1     Sig: Take 1 tablet by mouth twice daily     Authorizing Provider: Stefano Spurling     Ordering User: ANTON FELDMAN       Per verbal order from Dr Hemalatha Carrillo: Ice the Wrist,  a Wirst Split over the counter. Timothy Suarez informed by Phone. Sent over Brilinta because he is OUT of it for 2 days.

## 2023-08-14 NOTE — TELEPHONE ENCOUNTER
Pt is calling in and he noticed yesterday morning his left wrist was red, swollen and slightly painful. Then through the day it got slightly better. This morning when tried to push himself up he noticed now he can't put any wait on it. Pt would like advice on how to reduce the pain and swelling. What type of ointment to try to help with the swelling. Pt is currently using bio-freeze. Pt would like a call back.

## 2023-08-18 ENCOUNTER — CARE COORDINATION (OUTPATIENT)
Dept: FAMILY MEDICINE CLINIC | Age: 73
End: 2023-08-18

## 2023-08-18 DIAGNOSIS — E11.42 DIABETIC PERIPHERAL NEUROPATHY (HCC): ICD-10-CM

## 2023-08-18 NOTE — TELEPHONE ENCOUNTER
Pt called to req an refill on the following    metFORMIN (GLUCOPHAGE) 500 MG tablet  TAKE 2 TABLETS BY MOUTH TWICE DAILY WITH MEALS    Please send 60 days to  [facility]

## 2023-08-18 NOTE — CARE COORDINATION
Received a call from [facility] regarding a new temporary medication he is on. Was wondering if prednisone could up his sugar, today it was in 200's and he gets concerned when sugar goes up. Informed him that Prednisone will increase his sugar. He asked about foods he could eat, or what he could do regarding keeping \"gout\" away. He has episodes of swelling and this time its his hand. We discussed foods to eat, foods to avoid. I told him I would look into some information for him and mail it out to him next week. Appreciative of conversation. He is also on gout medication-febuxostat.

## 2023-08-18 NOTE — TELEPHONE ENCOUNTER
Date of last visit:  6/6/2023  Date of next visit:  9/7/2023    Requested Prescriptions     Pending Prescriptions Disp Refills    metFORMIN (GLUCOPHAGE) 500 MG tablet 120 tablet 5     Sig: Take 2 tablets by mouth with breakfast and with evening meal

## 2023-08-28 ENCOUNTER — TELEPHONE (OUTPATIENT)
Dept: FAMILY MEDICINE CLINIC | Age: 73
End: 2023-08-28

## 2023-08-28 RX ORDER — PREDNISONE 20 MG/1
TABLET ORAL
Qty: 10 TABLET | Refills: 0 | Status: SHIPPED | OUTPATIENT
Start: 2023-08-28

## 2023-08-28 NOTE — TELEPHONE ENCOUNTER
Date of last visit:  6/6/2023  Date of next visit:  9/7/2023    Requested Prescriptions     Signed Prescriptions Disp Refills    predniSONE (DELTASONE) 20 MG tablet 10 tablet 0     Sig: Take 1 tablet, PO BID x 5 days     Authorizing Provider: Vicenta Lemus     Ordering User: Sue Villanueva

## 2023-08-28 NOTE — TELEPHONE ENCOUNTER
Pt called req RX for gout in his thumb again. Pt is having pain,inflammation,swelling,warm around the joint.     [address]

## 2023-08-30 ENCOUNTER — CARE COORDINATION (OUTPATIENT)
Dept: FAMILY MEDICINE CLINIC | Age: 73
End: 2023-08-30

## 2023-08-30 NOTE — CARE COORDINATION
[address] Natalee Montalvo phoned regarding what he could do for the heartburn he has recently been experiencing. Noted he is still taking prednisone, which he does take with food. Suggested he try OTC Pepcid-he can ask at his pharmacy for generic. To take daily while on prednisone and for a few days after. Verbalized understanding and we will see him in follow up with Dr Huy Fitzpatrick 9/5.

## 2023-09-07 ENCOUNTER — NURSE ONLY (OUTPATIENT)
Dept: LAB | Age: 73
End: 2023-09-07

## 2023-09-07 ENCOUNTER — OFFICE VISIT (OUTPATIENT)
Dept: FAMILY MEDICINE CLINIC | Age: 73
End: 2023-09-07

## 2023-09-07 VITALS
WEIGHT: 315 LBS | SYSTOLIC BLOOD PRESSURE: 130 MMHG | DIASTOLIC BLOOD PRESSURE: 84 MMHG | BODY MASS INDEX: 40.43 KG/M2 | HEART RATE: 88 BPM | RESPIRATION RATE: 18 BRPM | HEIGHT: 74 IN

## 2023-09-07 DIAGNOSIS — Z96.653 HISTORY OF BILATERAL KNEE REPLACEMENT: ICD-10-CM

## 2023-09-07 DIAGNOSIS — I50.32 CHRONIC DIASTOLIC (CONGESTIVE) HEART FAILURE (HCC): ICD-10-CM

## 2023-09-07 DIAGNOSIS — M48.061 LUMBAR FORAMINAL STENOSIS: ICD-10-CM

## 2023-09-07 DIAGNOSIS — M10.9 GOUTY ARTHRITIS: ICD-10-CM

## 2023-09-07 DIAGNOSIS — E78.00 HYPERCHOLESTEREMIA: ICD-10-CM

## 2023-09-07 DIAGNOSIS — M10.9 GOUTY ARTHRITIS OF BOTH KNEES: ICD-10-CM

## 2023-09-07 DIAGNOSIS — E11.42 DIABETIC PERIPHERAL NEUROPATHY (HCC): ICD-10-CM

## 2023-09-07 DIAGNOSIS — I10 PRIMARY HYPERTENSION: ICD-10-CM

## 2023-09-07 DIAGNOSIS — N18.31 STAGE 3A CHRONIC KIDNEY DISEASE (HCC): ICD-10-CM

## 2023-09-07 DIAGNOSIS — D63.8 ANEMIA WITH CHRONIC ILLNESS: ICD-10-CM

## 2023-09-07 DIAGNOSIS — M10.9 GOUTY ARTHRITIS: Primary | ICD-10-CM

## 2023-09-07 DIAGNOSIS — M15.9 PRIMARY OSTEOARTHRITIS INVOLVING MULTIPLE JOINTS: ICD-10-CM

## 2023-09-07 LAB
ANION GAP SERPL CALC-SCNC: 16 MEQ/L (ref 8–16)
BASOPHILS ABSOLUTE: 0 THOU/MM3 (ref 0–0.1)
BASOPHILS NFR BLD AUTO: 0.3 %
BUN SERPL-MCNC: 28 MG/DL (ref 7–22)
CALCIUM SERPL-MCNC: 9.3 MG/DL (ref 8.5–10.5)
CHLORIDE SERPL-SCNC: 101 MEQ/L (ref 98–111)
CO2 SERPL-SCNC: 27 MEQ/L (ref 23–33)
CREAT SERPL-MCNC: 1.6 MG/DL (ref 0.4–1.2)
DEPRECATED MEAN GLUCOSE BLD GHB EST-ACNC: 186 MG/DL (ref 70–126)
DEPRECATED RDW RBC AUTO: 46.6 FL (ref 35–45)
EOSINOPHIL NFR BLD AUTO: 1.9 %
EOSINOPHILS ABSOLUTE: 0.2 THOU/MM3 (ref 0–0.4)
ERYTHROCYTE [DISTWIDTH] IN BLOOD BY AUTOMATED COUNT: 13.5 % (ref 11.5–14.5)
GFR SERPL CREATININE-BSD FRML MDRD: 45 ML/MIN/1.73M2
GLUCOSE SERPL-MCNC: 98 MG/DL (ref 70–108)
HBA1C MFR BLD HPLC: 8.2 % (ref 4.4–6.4)
HCT VFR BLD AUTO: 32.8 % (ref 42–52)
HGB BLD-MCNC: 10.7 GM/DL (ref 14–18)
IMM GRANULOCYTES # BLD AUTO: 0.03 THOU/MM3 (ref 0–0.07)
IMM GRANULOCYTES NFR BLD AUTO: 0.3 %
LYMPHOCYTES ABSOLUTE: 2.5 THOU/MM3 (ref 1–4.8)
LYMPHOCYTES NFR BLD AUTO: 26 %
MCH RBC QN AUTO: 31.1 PG (ref 26–33)
MCHC RBC AUTO-ENTMCNC: 32.6 GM/DL (ref 32.2–35.5)
MCV RBC AUTO: 95.3 FL (ref 80–94)
MONOCYTES ABSOLUTE: 0.9 THOU/MM3 (ref 0.4–1.3)
MONOCYTES NFR BLD AUTO: 9.3 %
NEUTROPHILS NFR BLD AUTO: 62.2 %
NRBC BLD AUTO-RTO: 0 /100 WBC
PLATELET # BLD AUTO: 179 THOU/MM3 (ref 130–400)
PMV BLD AUTO: 9.7 FL (ref 9.4–12.4)
POTASSIUM SERPL-SCNC: 4 MEQ/L (ref 3.5–5.2)
RBC # BLD AUTO: 3.44 MILL/MM3 (ref 4.7–6.1)
SEGMENTED NEUTROPHILS ABSOLUTE COUNT: 6 THOU/MM3 (ref 1.8–7.7)
SODIUM SERPL-SCNC: 144 MEQ/L (ref 135–145)
URATE SERPL-MCNC: 4 MG/DL (ref 3.7–7)
WBC # BLD AUTO: 9.7 THOU/MM3 (ref 4.8–10.8)

## 2023-09-07 RX ORDER — FEBUXOSTAT 80 MG/1
80 TABLET, FILM COATED ORAL DAILY
Qty: 30 TABLET | Refills: 3 | Status: SHIPPED | OUTPATIENT
Start: 2023-09-07

## 2023-09-07 RX ORDER — PREDNISONE 20 MG/1
TABLET ORAL
Qty: 12 TABLET | Refills: 0 | Status: SHIPPED | OUTPATIENT
Start: 2023-09-07

## 2023-09-07 RX ORDER — COLCHICINE 0.6 MG/1
0.6 TABLET ORAL DAILY
Qty: 30 TABLET | Refills: 4 | Status: SHIPPED | OUTPATIENT
Start: 2023-09-07

## 2023-09-07 ASSESSMENT — ENCOUNTER SYMPTOMS
CHEST TIGHTNESS: 0
ABDOMINAL PAIN: 0
CONSTIPATION: 0
BLOOD IN STOOL: 0
EYE PAIN: 0
BACK PAIN: 0
COUGH: 0
TROUBLE SWALLOWING: 0
SORE THROAT: 0
SHORTNESS OF BREATH: 0
NAUSEA: 0

## 2023-09-07 NOTE — PROGRESS NOTES
counseling on the following healthy behaviors: nutrition and exercise    Patient given educational materials on Diabetes and Hyperlipidemia    I have instructed Juliet Land to complete a self tracking handout on Blood Sugars  and Blood Pressures  and instructed them to bring it with them to his next appointment. Discussed use, benefit, and side effects of prescribed medications. Barriers to medication compliance addressed. All patient questions answered. Pt voiced understanding.           See in   2  mths   Luis Toussaint MD

## 2023-09-08 ENCOUNTER — TELEPHONE (OUTPATIENT)
Dept: FAMILY MEDICINE CLINIC | Age: 73
End: 2023-09-08

## 2023-09-08 NOTE — TELEPHONE ENCOUNTER
----- Message from Evin Salguero MD sent at 9/8/2023 12:43 AM EDT -----  Hgba1c up from 6.6 to 8.2 so watch diet     Kidney function ok and hgb is better    Please call

## 2023-09-14 ENCOUNTER — TELEPHONE (OUTPATIENT)
Dept: FAMILY MEDICINE CLINIC | Age: 73
End: 2023-09-14

## 2023-09-14 NOTE — TELEPHONE ENCOUNTER
Pt wants to know if eating red meat or buffalo meat once a month maybe twice would it effect his gout ?    DM

## 2023-09-19 ENCOUNTER — TELEPHONE (OUTPATIENT)
Dept: FAMILY MEDICINE CLINIC | Age: 73
End: 2023-09-19

## 2023-09-19 DIAGNOSIS — M10.9 GOUTY ARTHRITIS: Primary | ICD-10-CM

## 2023-09-19 NOTE — TELEPHONE ENCOUNTER
Aydin Curry with [employer] contracted with [employer] called stating that Febuxostat is not on patient's formulary. She stated that probenecid comes up as an alterative. She asked for something else to be called in as a replacement.     [facility] on [address] [facility] may be reached at [phone]

## 2023-09-20 RX ORDER — PROBENECID 500 MG/1
TABLET, FILM COATED ORAL
Qty: 60 TABLET | Refills: 3 | Status: SHIPPED | OUTPATIENT
Start: 2023-09-20 | End: 2023-09-22

## 2023-09-20 NOTE — TELEPHONE ENCOUNTER
Received Fax from [employer] on [employer]- it stated \"Probenecid is on long-term back order\"    Please advise?

## 2023-09-20 NOTE — TELEPHONE ENCOUNTER
Has failed allupurinol and the gout has been sever enough to cause 2 hospitalizations and to nursing home for 10 days      If did not get the uloric or febuxostat     Then probenemid 500 mg  1/2 tab po bid for  7 days then 500 mg  one tab bid    Please start and assume never got the febuxostat and if did after runs out then switch to probenemid    Please call

## 2023-09-21 ENCOUNTER — TELEPHONE (OUTPATIENT)
Dept: FAMILY MEDICINE CLINIC | Age: 73
End: 2023-09-21

## 2023-09-21 NOTE — TELEPHONE ENCOUNTER
Received fax from [facility] regarding a UV phototherapy wand for pain, pt states he would like to give it a try.     Order signed and faxed to [phone]

## 2023-09-22 NOTE — TELEPHONE ENCOUNTER
See Telephone Message from 4-- PA was Approved. Linda Man informed by Phone. He does have Uloric at home and he will continue to take it.

## 2023-09-25 DIAGNOSIS — D64.9 ANEMIA, UNSPECIFIED TYPE: ICD-10-CM

## 2023-09-26 NOTE — TELEPHONE ENCOUNTER
The pharmacy is requesting a refill of the below medication which has been pended for you:     Requested Prescriptions     Pending Prescriptions Disp Refills    SV IRON 325 MG tablet [Pharmacy Med Name: SV Iron 325 MG Oral Tablet] 60 tablet 0     Sig: Take 1 tablet by mouth twice daily       Last Appointment Date: 9/7/2023  Next Appointment Date: 11/8/2023    Allergies   Allergen Reactions    Horse-Derived Products

## 2023-09-27 RX ORDER — FERROUS SULFATE 325(65) MG
1 TABLET ORAL 2 TIMES DAILY
Qty: 60 TABLET | Refills: 1 | Status: SHIPPED | OUTPATIENT
Start: 2023-09-27

## 2023-10-17 RX ORDER — AMLODIPINE BESYLATE 5 MG/1
TABLET ORAL
Qty: 30 TABLET | Refills: 5 | Status: SHIPPED | OUTPATIENT
Start: 2023-10-17

## 2023-10-17 NOTE — TELEPHONE ENCOUNTER
Date of last visit:  9/7/2023  Date of next visit:  11/8/2023    Requested Prescriptions     Pending Prescriptions Disp Refills    amLODIPine (NORVASC) 5 MG tablet [Pharmacy Med Name: amLODIPine Besylate 5 MG Oral Tablet] 30 tablet 5     Sig: Take 1 tablet by mouth once daily

## 2023-10-23 ENCOUNTER — TELEPHONE (OUTPATIENT)
Dept: FAMILY MEDICINE CLINIC | Age: 73
End: 2023-10-23

## 2023-10-23 DIAGNOSIS — Z01.84 IMMUNITY STATUS TESTING: Primary | ICD-10-CM

## 2023-10-23 NOTE — TELEPHONE ENCOUNTER
Pt says he has never had chicken pox. He would like to know if he is still at risk for chicken pox or any of its symptoms?

## 2023-10-25 ENCOUNTER — NURSE ONLY (OUTPATIENT)
Dept: LAB | Age: 73
End: 2023-10-25

## 2023-10-25 DIAGNOSIS — D64.9 ANEMIA, UNSPECIFIED TYPE: ICD-10-CM

## 2023-10-25 DIAGNOSIS — Z01.84 IMMUNITY STATUS TESTING: ICD-10-CM

## 2023-10-26 RX ORDER — FERROUS SULFATE 325(65) MG
1 TABLET ORAL 2 TIMES DAILY
Qty: 60 TABLET | Refills: 5 | Status: SHIPPED | OUTPATIENT
Start: 2023-10-26

## 2023-10-26 NOTE — TELEPHONE ENCOUNTER
Date of last visit:  9/7/2023  Date of next visit:  11/8/2023    Requested Prescriptions     Pending Prescriptions Disp Refills    FEROSUL 325 (65 Fe) MG tablet [Pharmacy Med Name: FeroSul 325 (65 Fe) MG Oral Tablet] 60 tablet 5     Sig: Take 1 tablet by mouth twice daily

## 2023-10-27 LAB — VZV IGG SER QL IA: 3.51

## 2023-10-30 ENCOUNTER — TELEPHONE (OUTPATIENT)
Dept: FAMILY MEDICINE CLINIC | Age: 73
End: 2023-10-30

## 2023-10-30 RX ORDER — DOXYCYCLINE HYCLATE 100 MG
100 TABLET ORAL 2 TIMES DAILY
Qty: 14 TABLET | Refills: 0 | Status: SHIPPED | OUTPATIENT
Start: 2023-10-30 | End: 2023-11-06

## 2023-10-30 NOTE — TELEPHONE ENCOUNTER
----- Message from Jessica Landon MD sent at 10/28/2023  8:24 AM EDT -----  Call as titre is positive  as has immunity and has had chicken pox so may have shingrix vaccine

## 2023-10-30 NOTE — TELEPHONE ENCOUNTER
Pt called stating when he woke up Saturday his eye was swollen shut since then its still very painful and slightly swollen wants to know if  could send something in for him    [facility]

## 2023-10-30 NOTE — TELEPHONE ENCOUNTER
Date of last visit:  9/7/2023  Date of next visit:  11/8/2023    Requested Prescriptions     Signed Prescriptions Disp Refills    doxycycline hyclate (VIBRA-TABS) 100 MG tablet 14 tablet 0     Sig: Take 1 tablet by mouth 2 times daily for 7 days     Authorizing Provider: Eliceo Lott     Ordering User: FRANCIA, [address] with Patient- he stated his eye is not Red, there is not Drainage- just slight pain in the corner of his eye by his nose and upper eyelid is swollen.

## 2023-11-08 ENCOUNTER — OFFICE VISIT (OUTPATIENT)
Dept: FAMILY MEDICINE CLINIC | Age: 73
End: 2023-11-08

## 2023-11-08 VITALS
RESPIRATION RATE: 16 BRPM | WEIGHT: 315 LBS | SYSTOLIC BLOOD PRESSURE: 118 MMHG | HEART RATE: 72 BPM | BODY MASS INDEX: 40.43 KG/M2 | HEIGHT: 74 IN | DIASTOLIC BLOOD PRESSURE: 68 MMHG

## 2023-11-08 DIAGNOSIS — E11.42 DIABETIC PERIPHERAL NEUROPATHY (HCC): ICD-10-CM

## 2023-11-08 DIAGNOSIS — I50.32 CHRONIC DIASTOLIC (CONGESTIVE) HEART FAILURE (HCC): ICD-10-CM

## 2023-11-08 DIAGNOSIS — M51.37 DEGENERATION OF LUMBOSACRAL INTERVERTEBRAL DISC: ICD-10-CM

## 2023-11-08 DIAGNOSIS — N18.31 STAGE 3A CHRONIC KIDNEY DISEASE (HCC): ICD-10-CM

## 2023-11-08 DIAGNOSIS — Z79.4 TYPE 2 DIABETES MELLITUS WITH DIABETIC POLYNEUROPATHY, WITH LONG-TERM CURRENT USE OF INSULIN (HCC): ICD-10-CM

## 2023-11-08 DIAGNOSIS — E78.00 HYPERCHOLESTEREMIA: ICD-10-CM

## 2023-11-08 DIAGNOSIS — M10.9 GOUTY ARTHRITIS OF BOTH KNEES: ICD-10-CM

## 2023-11-08 DIAGNOSIS — E11.42 TYPE 2 DIABETES MELLITUS WITH DIABETIC POLYNEUROPATHY, WITH LONG-TERM CURRENT USE OF INSULIN (HCC): ICD-10-CM

## 2023-11-08 DIAGNOSIS — I25.708 CORONARY ARTERY DISEASE OF BYPASS GRAFT OF NATIVE HEART WITH STABLE ANGINA PECTORIS (HCC): ICD-10-CM

## 2023-11-08 DIAGNOSIS — D63.8 ANEMIA WITH CHRONIC ILLNESS: ICD-10-CM

## 2023-11-08 DIAGNOSIS — M48.061 LUMBAR FORAMINAL STENOSIS: ICD-10-CM

## 2023-11-08 DIAGNOSIS — I87.2 VENOUS STASIS DERMATITIS OF BOTH LOWER EXTREMITIES: ICD-10-CM

## 2023-11-08 DIAGNOSIS — I10 PRIMARY HYPERTENSION: Primary | ICD-10-CM

## 2023-11-08 PROCEDURE — 99213 OFFICE O/P EST LOW 20 MIN: CPT | Performed by: FAMILY MEDICINE

## 2023-11-08 PROCEDURE — G8427 DOCREV CUR MEDS BY ELIG CLIN: HCPCS | Performed by: FAMILY MEDICINE

## 2023-11-08 PROCEDURE — 1036F TOBACCO NON-USER: CPT | Performed by: FAMILY MEDICINE

## 2023-11-08 PROCEDURE — G8417 CALC BMI ABV UP PARAM F/U: HCPCS | Performed by: FAMILY MEDICINE

## 2023-11-08 PROCEDURE — 1123F ACP DISCUSS/DSCN MKR DOCD: CPT | Performed by: FAMILY MEDICINE

## 2023-11-08 PROCEDURE — 3017F COLORECTAL CA SCREEN DOC REV: CPT | Performed by: FAMILY MEDICINE

## 2023-11-08 RX ORDER — PROBENECID 500 MG/1
500 TABLET, FILM COATED ORAL 2 TIMES DAILY
COMMUNITY
Start: 2023-10-17

## 2023-11-08 ASSESSMENT — ENCOUNTER SYMPTOMS
NAUSEA: 0
BACK PAIN: 0
ABDOMINAL PAIN: 0
BLOOD IN STOOL: 0
CHEST TIGHTNESS: 0
SHORTNESS OF BREATH: 0
EYE PAIN: 0
COUGH: 0
SORE THROAT: 0
CONSTIPATION: 0
TROUBLE SWALLOWING: 0

## 2023-11-08 NOTE — PROGRESS NOTES
Subjective:      Patient ID: Dilma Colunga is a 74 y.o. male. Muscle  ache  and   arthritis  noted       Gouty  arhtritis  noted       Copd  stable    Ashd  stable     Diabetes  He presents for his follow-up diabetic visit. He has type 2 diabetes mellitus. His disease course has been stable. There are no hypoglycemic associated symptoms. Pertinent negatives for hypoglycemia include no confusion, dizziness or headaches. Pertinent negatives for diabetes include no chest pain, no fatigue and no weakness. There are no hypoglycemic complications. Pertinent negatives for hypoglycemia complications include no blackouts. Symptoms are stable. Current diabetic treatment includes insulin injections. He is compliant with treatment all of the time. His weight is stable. His breakfast blood glucose is taken between 8-9 am. His breakfast blood glucose range is generally 130-140 mg/dl. An ACE inhibitor/angiotensin II receptor blocker is contraindicated. Eye exam is current. Hypertension  This is a chronic problem. The current episode started more than 1 year ago. The problem has been resolved since onset. The problem is controlled. Pertinent negatives include no chest pain, headaches, palpitations, peripheral edema or shortness of breath. The current treatment provides significant improvement. There are no compliance problems.       Past Medical History:   Diagnosis Date    RAUL (acute kidney injury) ([address]) 02/13/2020    ASHD (arteriosclerotic heart disease) 2005 2006    stent  baki    Closed fracture of first lumbar vertebra ([address]) 05/06/2022    Closed T12 fracture ([address]) 05/06/2022    Depression     Diabetic peripheral neuropathy ([address]) 2014    Fracture 10/1984    left lower extremity     HTN (hypertension)     Hypercholesteremia     IDDM (insulin dependent diabetes mellitus)     Low back pain     Morbid obesity with BMI of 45.0-49.9, adult (HCC)     Osteoarthritis     S/P CABG (coronary artery bypass graft)         Review of

## 2023-11-09 NOTE — ED NOTES
----- Message from Marline Lambert MD sent at 11/9/2023  9:05 AM CST -----  Needs breast MRI for elevated T-C score in February. Please put order in so patient can schedule.   Pt taken to the restroom at this time with this RN. Pt became dizzy and labored breathing. Pt was able to use the restroom safely and get back on the ED cot.      Augusto Paula RN  02/07/22 0705

## 2023-11-16 DIAGNOSIS — D50.8 OTHER IRON DEFICIENCY ANEMIA: ICD-10-CM

## 2023-11-16 DIAGNOSIS — N18.2 CKD (CHRONIC KIDNEY DISEASE), STAGE II: ICD-10-CM

## 2023-11-16 DIAGNOSIS — I10 ESSENTIAL HYPERTENSION: ICD-10-CM

## 2023-11-16 RX ORDER — FUROSEMIDE 40 MG/1
40 TABLET ORAL 2 TIMES DAILY
Qty: 180 TABLET | Refills: 0 | Status: SHIPPED | OUTPATIENT
Start: 2023-11-16

## 2023-11-29 RX ORDER — INSULIN GLARGINE 100 [IU]/ML
INJECTION, SOLUTION SUBCUTANEOUS
Qty: 15 ML | Refills: 2 | Status: SHIPPED | OUTPATIENT
Start: 2023-11-29

## 2023-11-29 NOTE — TELEPHONE ENCOUNTER
Date of last visit:  11/8/2023  Date of next visit:  1/15/2024    Requested Prescriptions     Pending Prescriptions Disp Refills    LANTUS SOLOSTAR 100 UNIT/ML injection pen 15 mL 1     Sig: INJECT 50 UNITS SUBCUTANEOUSLY NIGHTLY

## 2023-12-01 DIAGNOSIS — D64.9 ANEMIA, UNSPECIFIED TYPE: ICD-10-CM

## 2023-12-01 RX ORDER — MULTIVIT,CALC,MINS/IRON/FOLIC 9MG-400MCG
TABLET ORAL
Qty: 30 TABLET | Refills: 5 | Status: SHIPPED | OUTPATIENT
Start: 2023-12-01

## 2023-12-01 NOTE — TELEPHONE ENCOUNTER
Date of last visit:  11/8/2023  Date of next visit:  1/15/2024    Requested Prescriptions     Pending Prescriptions Disp Refills    Multiple Vitamins-Minerals (THEREMS-M) TABS [Pharmacy Med Name: Therems-M Oral Tablet] 30 tablet 5     Sig: Take 1 tablet by mouth once daily

## 2023-12-06 ENCOUNTER — CARE COORDINATION (OUTPATIENT)
Dept: FAMILY MEDICINE CLINIC | Age: 73
End: 2023-12-06

## 2023-12-06 NOTE — CARE COORDINATION
I called Skylar Pino today just to check in and also to wish him Happy Holidays. He said at this time he has vomited this am. I suggested that it could be something he ate or the flu. He denied fever or any other symptoms. I encouraged him to stick to a bland diet and if he is feeling any worse to go the the Urgent Care. I assured him that I would call and check on him tomorrow. I called and left him a message to go to the Urgent Care if he is not feeling any better.

## 2023-12-26 DIAGNOSIS — E78.00 HYPERCHOLESTEREMIA: ICD-10-CM

## 2023-12-26 NOTE — TELEPHONE ENCOUNTER
Date of last visit:  11/8/2023  Date of next visit:  1/15/2024    Requested Prescriptions     Pending Prescriptions Disp Refills    atorvastatin (LIPITOR) 10 MG tablet [Pharmacy Med Name: Atorvastatin Calcium 10 MG Oral Tablet] 90 tablet 1     Sig: Take 1 tablet by mouth once daily

## 2023-12-27 DIAGNOSIS — Z79.4 TYPE 2 DIABETES MELLITUS WITH DIABETIC POLYNEUROPATHY, WITH LONG-TERM CURRENT USE OF INSULIN (HCC): ICD-10-CM

## 2023-12-27 DIAGNOSIS — E11.42 TYPE 2 DIABETES MELLITUS WITH DIABETIC POLYNEUROPATHY, WITH LONG-TERM CURRENT USE OF INSULIN (HCC): ICD-10-CM

## 2023-12-27 RX ORDER — ATORVASTATIN CALCIUM 10 MG/1
TABLET, FILM COATED ORAL
Qty: 90 TABLET | Refills: 1 | Status: SHIPPED | OUTPATIENT
Start: 2023-12-27

## 2023-12-27 NOTE — TELEPHONE ENCOUNTER
Pt called to req an refill on the following    insulin aspart (NOVOLOG FLEXPEN) 100 UNIT/ML injection pen    Please send to  [facility]

## 2023-12-28 RX ORDER — INSULIN ASPART 100 [IU]/ML
12 INJECTION, SOLUTION INTRAVENOUS; SUBCUTANEOUS
Qty: 5 ADJUSTABLE DOSE PRE-FILLED PEN SYRINGE | Refills: 3 | Status: SHIPPED | OUTPATIENT
Start: 2023-12-28

## 2024-01-09 NOTE — TELEPHONE ENCOUNTER
The pharmacy is requesting a refill of the below medication which has been pended for you:     Requested Prescriptions     Pending Prescriptions Disp Refills    probenecid (BENEMID) 500 MG tablet [Pharmacy Med Name: Probenecid 500 MG Oral Tablet] 60 tablet 0     Sig: TAKE 1/2 (ONE-HALF) TABLET BY MOUTH TWICE DAILY FOR 7 DAYS THEN 1 TWICE DAILY       Last Appointment Date: 11/8/2023  Next Appointment Date: 1/15/2024    Allergies   Allergen Reactions    Horse-Derived Products

## 2024-01-10 RX ORDER — PROBENECID 500 MG/1
500 TABLET, FILM COATED ORAL 2 TIMES DAILY
Qty: 60 TABLET | Refills: 4 | Status: SHIPPED | OUTPATIENT
Start: 2024-01-10

## 2024-02-05 DIAGNOSIS — M10.9 GOUTY ARTHRITIS: ICD-10-CM

## 2024-02-05 RX ORDER — COLCHICINE 0.6 MG/1
0.6 TABLET ORAL DAILY
Qty: 30 TABLET | Refills: 0 | Status: SHIPPED | OUTPATIENT
Start: 2024-02-05

## 2024-02-05 NOTE — TELEPHONE ENCOUNTER
Date of last visit:  11/8/2023  Date of next visit:  Visit date not found    Requested Prescriptions     Pending Prescriptions Disp Refills    colchicine (COLCRYS) 0.6 MG tablet [Pharmacy Med Name: Colchicine 0.6 MG Oral Tablet] 30 tablet 0     Sig: Take 1 tablet by mouth once daily

## 2024-02-06 ENCOUNTER — CARE COORDINATION (OUTPATIENT)
Dept: FAMILY MEDICINE CLINIC | Age: 74
End: 2024-02-06

## 2024-02-06 NOTE — CARE COORDINATION
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  Wanted to know what I thought, and explained without seeing his toe,  it would be hard to diagnose. Suggested he come see Dr. Yap. He was reluctant because he has no means for transportation at present. He said two sisters someitmes can help. Suggested [facility]-gave him number to call. Appointment made for 2/8 @ 11:30. This am Sean verified with me he is scheduled for [facility] [place] [facility] for transportation for appointment.

## 2024-02-07 DIAGNOSIS — E11.42 DIABETIC PERIPHERAL NEUROPATHY (HCC): ICD-10-CM

## 2024-02-07 NOTE — TELEPHONE ENCOUNTER
Date of last visit:  11/8/2023  Date of next visit:  2/8/2024    Requested Prescriptions     Pending Prescriptions Disp Refills    metFORMIN (GLUCOPHAGE) 500 MG tablet [Pharmacy Med Name: metFORMIN HCl 500 MG Oral Tablet] 120 tablet 0     Sig: TAKE 2 TABLETS BY MOUTH WITH BREAKFAST AND WITH EVENING MEAL

## 2024-02-08 ENCOUNTER — OFFICE VISIT (OUTPATIENT)
Dept: FAMILY MEDICINE CLINIC | Age: 74
End: 2024-02-08

## 2024-02-08 VITALS
HEIGHT: 74 IN | WEIGHT: 315 LBS | HEART RATE: 68 BPM | DIASTOLIC BLOOD PRESSURE: 74 MMHG | SYSTOLIC BLOOD PRESSURE: 118 MMHG | BODY MASS INDEX: 40.43 KG/M2 | RESPIRATION RATE: 16 BRPM

## 2024-02-08 DIAGNOSIS — E66.01 CLASS 3 SEVERE OBESITY WITH SERIOUS COMORBIDITY AND BODY MASS INDEX (BMI) OF 45.0 TO 49.9 IN ADULT, UNSPECIFIED OBESITY TYPE (HCC): ICD-10-CM

## 2024-02-08 DIAGNOSIS — N17.9 ACUTE KIDNEY INJURY (HCC): ICD-10-CM

## 2024-02-08 DIAGNOSIS — N18.31 STAGE 3A CHRONIC KIDNEY DISEASE (HCC): ICD-10-CM

## 2024-02-08 DIAGNOSIS — M10.9 GOUTY ARTHRITIS OF BOTH KNEES: ICD-10-CM

## 2024-02-08 DIAGNOSIS — E11.42 TYPE 2 DIABETES MELLITUS WITH DIABETIC POLYNEUROPATHY, WITH LONG-TERM CURRENT USE OF INSULIN (HCC): Primary | ICD-10-CM

## 2024-02-08 DIAGNOSIS — I25.708 CORONARY ARTERY DISEASE OF BYPASS GRAFT OF NATIVE HEART WITH STABLE ANGINA PECTORIS (HCC): ICD-10-CM

## 2024-02-08 DIAGNOSIS — E78.00 HYPERCHOLESTEREMIA: ICD-10-CM

## 2024-02-08 DIAGNOSIS — I10 PRIMARY HYPERTENSION: ICD-10-CM

## 2024-02-08 DIAGNOSIS — I87.2 VENOUS STASIS DERMATITIS OF BOTH LOWER EXTREMITIES: ICD-10-CM

## 2024-02-08 DIAGNOSIS — F33.41 RECURRENT MAJOR DEPRESSIVE DISORDER, IN PARTIAL REMISSION (HCC): ICD-10-CM

## 2024-02-08 DIAGNOSIS — E11.42 DIABETIC PERIPHERAL NEUROPATHY (HCC): ICD-10-CM

## 2024-02-08 DIAGNOSIS — M51.37 DEGENERATION OF LUMBOSACRAL INTERVERTEBRAL DISC: ICD-10-CM

## 2024-02-08 DIAGNOSIS — I50.32 CHRONIC DIASTOLIC (CONGESTIVE) HEART FAILURE (HCC): ICD-10-CM

## 2024-02-08 DIAGNOSIS — N40.0 BENIGN PROSTATIC HYPERPLASIA WITHOUT LOWER URINARY TRACT SYMPTOMS: ICD-10-CM

## 2024-02-08 DIAGNOSIS — Z79.4 TYPE 2 DIABETES MELLITUS WITH DIABETIC POLYNEUROPATHY, WITH LONG-TERM CURRENT USE OF INSULIN (HCC): Primary | ICD-10-CM

## 2024-02-08 DIAGNOSIS — L30.9 DERMATITIS: ICD-10-CM

## 2024-02-08 RX ORDER — UREA 8.5 G/85G
CREAM TOPICAL
Qty: 85 G | Refills: 1 | Status: SHIPPED | OUTPATIENT
Start: 2024-02-08

## 2024-02-08 SDOH — ECONOMIC STABILITY: FOOD INSECURITY: WITHIN THE PAST 12 MONTHS, THE FOOD YOU BOUGHT JUST DIDN'T LAST AND YOU DIDN'T HAVE MONEY TO GET MORE.: SOMETIMES TRUE

## 2024-02-08 SDOH — ECONOMIC STABILITY: HOUSING INSECURITY
IN THE LAST 12 MONTHS, WAS THERE A TIME WHEN YOU DID NOT HAVE A STEADY PLACE TO SLEEP OR SLEPT IN A SHELTER (INCLUDING NOW)?: YES

## 2024-02-08 SDOH — ECONOMIC STABILITY: INCOME INSECURITY: HOW HARD IS IT FOR YOU TO PAY FOR THE VERY BASICS LIKE FOOD, HOUSING, MEDICAL CARE, AND HEATING?: NOT VERY HARD

## 2024-02-08 SDOH — ECONOMIC STABILITY: FOOD INSECURITY: WITHIN THE PAST 12 MONTHS, YOU WORRIED THAT YOUR FOOD WOULD RUN OUT BEFORE YOU GOT MONEY TO BUY MORE.: SOMETIMES TRUE

## 2024-02-08 ASSESSMENT — ENCOUNTER SYMPTOMS
CONSTIPATION: 0
TROUBLE SWALLOWING: 0
ABDOMINAL PAIN: 0
SHORTNESS OF BREATH: 0
BLOOD IN STOOL: 0
CHEST TIGHTNESS: 0
NAUSEA: 0
COUGH: 0
SORE THROAT: 0
EYE PAIN: 0
BACK PAIN: 1
ORTHOPNEA: 0

## 2024-02-08 ASSESSMENT — PATIENT HEALTH QUESTIONNAIRE - PHQ9
3. TROUBLE FALLING OR STAYING ASLEEP: 0
SUM OF ALL RESPONSES TO PHQ QUESTIONS 1-9: 1
SUM OF ALL RESPONSES TO PHQ9 QUESTIONS 1 & 2: 1
10. IF YOU CHECKED OFF ANY PROBLEMS, HOW DIFFICULT HAVE THESE PROBLEMS MADE IT FOR YOU TO DO YOUR WORK, TAKE CARE OF THINGS AT HOME, OR GET ALONG WITH OTHER PEOPLE: 0
5. POOR APPETITE OR OVEREATING: 0
4. FEELING TIRED OR HAVING LITTLE ENERGY: 0
2. FEELING DOWN, DEPRESSED OR HOPELESS: 1
SUM OF ALL RESPONSES TO PHQ QUESTIONS 1-9: 1
8. MOVING OR SPEAKING SO SLOWLY THAT OTHER PEOPLE COULD HAVE NOTICED. OR THE OPPOSITE, BEING SO FIGETY OR RESTLESS THAT YOU HAVE BEEN MOVING AROUND A LOT MORE THAN USUAL: 0
SUM OF ALL RESPONSES TO PHQ QUESTIONS 1-9: 1
1. LITTLE INTEREST OR PLEASURE IN DOING THINGS: 0
7. TROUBLE CONCENTRATING ON THINGS, SUCH AS READING THE NEWSPAPER OR WATCHING TELEVISION: 0
9. THOUGHTS THAT YOU WOULD BE BETTER OFF DEAD, OR OF HURTING YOURSELF: 0
SUM OF ALL RESPONSES TO PHQ QUESTIONS 1-9: 1
6. FEELING BAD ABOUT YOURSELF - OR THAT YOU ARE A FAILURE OR HAVE LET YOURSELF OR YOUR FAMILY DOWN: 0

## 2024-02-08 NOTE — PROGRESS NOTES
breath. Current antihyperlipidemic treatment includes statins. The current treatment provides significant improvement of lipids. There are no compliance problems.      Past Medical History:   Diagnosis Date    RAUL (acute kidney injury) ([facility]) 02/13/2020    ASHD (arteriosclerotic heart disease) 2005 2006    stent  baki    Closed fracture of first lumbar vertebra ([facility]) 05/06/2022    Closed T12 fracture ([facility]) 05/06/2022    Depression     Diabetic peripheral neuropathy ([facility]) 2014    Fracture 10/1984    left lower extremity     HTN (hypertension)     Hypercholesteremia     IDDM (insulin dependent diabetes mellitus)     Low back pain     Morbid obesity with BMI of 45.0-49.9, adult ([facility])     Osteoarthritis     S/P CABG (coronary artery bypass graft)         Review of Systems   Constitutional:  Negative for fatigue and fever.   HENT:  Negative for congestion, ear pain, postnasal drip, sore throat and trouble swallowing.    Eyes:  Negative for pain.   Respiratory:  Negative for cough, chest tightness and shortness of breath.    Cardiovascular:  Negative for chest pain, palpitations, orthopnea and leg swelling.   Gastrointestinal:  Negative for abdominal pain, blood in stool, constipation and nausea.   Genitourinary:  Negative for difficulty urinating, frequency and urgency.   Musculoskeletal:  Positive for back pain. Negative for arthralgias, joint swelling and neck stiffness.   Skin:  Negative for rash.   Neurological:  Negative for dizziness, weakness and headaches.   Hematological:  Negative for adenopathy. Does not bruise/bleed easily.   Psychiatric/Behavioral:  Negative for behavioral problems, dysphoric mood and sleep disturbance.      /74 (Site: Right Upper Arm, Position: Sitting, Cuff Size: Large Adult)   Pulse 68   Resp 16   Ht 1.88 m (6' 2\")   Wt (!) 166.2 kg (366 lb 8 oz)   BMI 47.06 kg/m²    Objective:   Physical Exam  Vitals and nursing note reviewed.   Constitutional:       Appearance: He is

## 2024-02-10 DIAGNOSIS — N18.2 CKD (CHRONIC KIDNEY DISEASE), STAGE II: ICD-10-CM

## 2024-02-10 DIAGNOSIS — D50.8 OTHER IRON DEFICIENCY ANEMIA: ICD-10-CM

## 2024-02-10 DIAGNOSIS — I10 ESSENTIAL HYPERTENSION: ICD-10-CM

## 2024-02-12 RX ORDER — FUROSEMIDE 40 MG/1
40 TABLET ORAL 2 TIMES DAILY
Qty: 180 TABLET | Refills: 0 | OUTPATIENT
Start: 2024-02-12

## 2024-02-19 NOTE — TELEPHONE ENCOUNTER
Pt called req refill for Brilinta 90mg 1 tab po daily   [facility]

## 2024-02-19 NOTE — TELEPHONE ENCOUNTER
Date of last visit:  2/8/2024  Date of next visit:  5/14/2024    Requested Prescriptions     Pending Prescriptions Disp Refills    ticagrelor (BRILINTA) 90 MG TABS tablet 180 tablet 1     Sig: Take 1 tablet by mouth twice daily

## 2024-02-20 NOTE — TELEPHONE ENCOUNTER
The pharmacy is requesting a refill of the below medication which has been pended for you:     Requested Prescriptions     Pending Prescriptions Disp Refills    LANTUS SOLOSTAR 100 UNIT/ML injection pen [Pharmacy Med Name: Lantus SoloStar 100 UNIT/ML Subcutaneous Solution Pen-injector] 15 mL 0     Sig: INJECT 50 UNITS SUBCUTANEOUSLY NIGHTLY       Last Appointment Date: 2/8/2024  Next Appointment Date: 5/14/2024    Allergies   Allergen Reactions    Horse-Derived Products

## 2024-02-21 RX ORDER — INSULIN GLARGINE 100 [IU]/ML
INJECTION, SOLUTION SUBCUTANEOUS
Qty: 15 ML | Refills: 0 | Status: SHIPPED | OUTPATIENT
Start: 2024-02-21

## 2024-02-26 DIAGNOSIS — N18.2 CKD (CHRONIC KIDNEY DISEASE), STAGE II: ICD-10-CM

## 2024-02-26 DIAGNOSIS — D50.8 OTHER IRON DEFICIENCY ANEMIA: ICD-10-CM

## 2024-02-26 DIAGNOSIS — I10 ESSENTIAL HYPERTENSION: ICD-10-CM

## 2024-02-27 RX ORDER — FUROSEMIDE 40 MG/1
40 TABLET ORAL 2 TIMES DAILY
Qty: 180 TABLET | Refills: 0 | OUTPATIENT
Start: 2024-02-27

## 2024-02-28 ENCOUNTER — NURSE ONLY (OUTPATIENT)
Dept: LAB | Age: 74
End: 2024-02-28

## 2024-02-28 DIAGNOSIS — E11.42 TYPE 2 DIABETES MELLITUS WITH DIABETIC POLYNEUROPATHY, WITH LONG-TERM CURRENT USE OF INSULIN (HCC): ICD-10-CM

## 2024-02-28 DIAGNOSIS — E78.00 HYPERCHOLESTEREMIA: ICD-10-CM

## 2024-02-28 DIAGNOSIS — N40.0 BENIGN PROSTATIC HYPERPLASIA WITHOUT LOWER URINARY TRACT SYMPTOMS: ICD-10-CM

## 2024-02-28 DIAGNOSIS — Z79.4 TYPE 2 DIABETES MELLITUS WITH DIABETIC POLYNEUROPATHY, WITH LONG-TERM CURRENT USE OF INSULIN (HCC): ICD-10-CM

## 2024-02-28 DIAGNOSIS — I10 PRIMARY HYPERTENSION: ICD-10-CM

## 2024-02-28 LAB
ALBUMIN SERPL BCG-MCNC: 4 G/DL (ref 3.5–5.1)
ALP SERPL-CCNC: 80 U/L (ref 38–126)
ALT SERPL W/O P-5'-P-CCNC: 16 U/L (ref 11–66)
ANION GAP SERPL CALC-SCNC: 15 MEQ/L (ref 8–16)
AST SERPL-CCNC: 19 U/L (ref 5–40)
BASOPHILS ABSOLUTE: 0 THOU/MM3 (ref 0–0.1)
BASOPHILS NFR BLD AUTO: 0.9 %
BILIRUB CONJ SERPL-MCNC: < 0.2 MG/DL (ref 0–0.3)
BILIRUB SERPL-MCNC: 0.4 MG/DL (ref 0.3–1.2)
BUN SERPL-MCNC: 18 MG/DL (ref 7–22)
CALCIUM SERPL-MCNC: 9.4 MG/DL (ref 8.5–10.5)
CHLORIDE SERPL-SCNC: 100 MEQ/L (ref 98–111)
CHOLEST SERPL-MCNC: 130 MG/DL (ref 100–199)
CO2 SERPL-SCNC: 24 MEQ/L (ref 23–33)
CREAT SERPL-MCNC: 1.4 MG/DL (ref 0.4–1.2)
DEPRECATED MEAN GLUCOSE BLD GHB EST-ACNC: 156 MG/DL (ref 70–126)
DEPRECATED RDW RBC AUTO: 44.3 FL (ref 35–45)
EOSINOPHIL NFR BLD AUTO: 5.6 %
EOSINOPHILS ABSOLUTE: 0.3 THOU/MM3 (ref 0–0.4)
ERYTHROCYTE [DISTWIDTH] IN BLOOD BY AUTOMATED COUNT: 12.4 % (ref 11.5–14.5)
GFR SERPL CREATININE-BSD FRML MDRD: 52 ML/MIN/1.73M2
GLUCOSE SERPL-MCNC: 146 MG/DL (ref 70–108)
HBA1C MFR BLD HPLC: 7.2 % (ref 4.4–6.4)
HCT VFR BLD AUTO: 34.7 % (ref 42–52)
HDLC SERPL-MCNC: 26 MG/DL
HGB BLD-MCNC: 11.6 GM/DL (ref 14–18)
IMM GRANULOCYTES # BLD AUTO: 0.01 THOU/MM3 (ref 0–0.07)
IMM GRANULOCYTES NFR BLD AUTO: 0.2 %
LDLC SERPL CALC-MCNC: 44 MG/DL
LYMPHOCYTES ABSOLUTE: 2.3 THOU/MM3 (ref 1–4.8)
LYMPHOCYTES NFR BLD AUTO: 42.7 %
MCH RBC QN AUTO: 32.1 PG (ref 26–33)
MCHC RBC AUTO-ENTMCNC: 33.4 GM/DL (ref 32.2–35.5)
MCV RBC AUTO: 96.1 FL (ref 80–94)
MONOCYTES ABSOLUTE: 0.7 THOU/MM3 (ref 0.4–1.3)
MONOCYTES NFR BLD AUTO: 12.5 %
NEUTROPHILS NFR BLD AUTO: 38.1 %
NRBC BLD AUTO-RTO: 0 /100 WBC
PLATELET # BLD AUTO: 161 THOU/MM3 (ref 130–400)
PMV BLD AUTO: 10.1 FL (ref 9.4–12.4)
POTASSIUM SERPL-SCNC: 4.1 MEQ/L (ref 3.5–5.2)
PROT SERPL-MCNC: 6.6 G/DL (ref 6.1–8)
PSA SERPL-MCNC: 0.26 NG/ML (ref 0–1)
RBC # BLD AUTO: 3.61 MILL/MM3 (ref 4.7–6.1)
SEGMENTED NEUTROPHILS ABSOLUTE COUNT: 2.1 THOU/MM3 (ref 1.8–7.7)
SODIUM SERPL-SCNC: 139 MEQ/L (ref 135–145)
TRIGL SERPL-MCNC: 302 MG/DL (ref 0–199)
TSH SERPL DL<=0.005 MIU/L-ACNC: 3.48 UIU/ML (ref 0.4–4.2)
WBC # BLD AUTO: 5.5 THOU/MM3 (ref 4.8–10.8)

## 2024-02-29 ENCOUNTER — TELEPHONE (OUTPATIENT)
Dept: FAMILY MEDICINE CLINIC | Age: 74
End: 2024-02-29

## 2024-02-29 NOTE — TELEPHONE ENCOUNTER
----- Message from Humberto Yap MD sent at 2/28/2024 11:53 PM EST -----  Call as  hgba1c good   at 7.2

## 2024-03-06 DIAGNOSIS — E11.42 DIABETIC PERIPHERAL NEUROPATHY (HCC): ICD-10-CM

## 2024-03-06 NOTE — TELEPHONE ENCOUNTER
Date of last visit:  2/8/2024  Date of next visit:  5/14/2024    Requested Prescriptions     Pending Prescriptions Disp Refills    metFORMIN (GLUCOPHAGE) 500 MG tablet [Pharmacy Med Name: metFORMIN HCl 500 MG Oral Tablet] 120 tablet 5     Sig: TAKE 2 TABLETS BY MOUTH WITH BREAKFAST AND WITH EVENING MEAL

## 2024-03-08 ENCOUNTER — TELEPHONE (OUTPATIENT)
Dept: FAMILY MEDICINE CLINIC | Age: 74
End: 2024-03-08

## 2024-03-08 ENCOUNTER — CARE COORDINATION (OUTPATIENT)
Dept: FAMILY MEDICINE CLINIC | Age: 74
End: 2024-03-08

## 2024-03-08 NOTE — TELEPHONE ENCOUNTER
Patient called and spoke with Anne, Care Coordinator.  He is C/O tingling sensation rt ear down to shoulder with no rash.    Verbal order Dr Fracisco Mitchelltin 100mg 1 tid #40 NR

## 2024-03-08 NOTE — CARE COORDINATION
Spoke with Sean this am. Having a \"prickly, tingly pin prick sensation on right side of face, starting under right ear and going down to shoulder. Denies rash, and has had shingles vaccine.   Spoke with Dr. Yap:  Can still have a mild case-no usual symptoms-no rash. Will send prescription to pharmacy.  Notified Katherine who put in telephone encounter for Dr. Yap.  Notified Sean:  Discussed above and told him to check with his pharmacy later in day for prescription. To call this RN Monday if any issures or concerns.

## 2024-03-09 RX ORDER — GABAPENTIN 100 MG/1
100 CAPSULE ORAL 3 TIMES DAILY
Qty: 40 CAPSULE | Refills: 0 | OUTPATIENT
Start: 2024-03-09 | End: 2024-03-23

## 2024-03-11 DIAGNOSIS — M10.9 GOUTY ARTHRITIS: ICD-10-CM

## 2024-03-11 RX ORDER — COLCHICINE 0.6 MG/1
0.6 TABLET ORAL DAILY
Qty: 30 TABLET | Refills: 0 | Status: SHIPPED | OUTPATIENT
Start: 2024-03-11

## 2024-03-11 NOTE — TELEPHONE ENCOUNTER
Date of last visit:  2/8/2024  Date of next visit:  5/14/2024    Requested Prescriptions     Pending Prescriptions Disp Refills    colchicine (COLCRYS) 0.6 MG tablet 30 tablet 0     Sig: Take 1 tablet by mouth daily

## 2024-03-11 NOTE — TELEPHONE ENCOUNTER
Pt called to req an refill on the following    colchicine (COLCRYS) 0.6 MG tablet   Take 1 tablet by mouth once daily     Please send 30 days [facility]

## 2024-03-25 DIAGNOSIS — D50.8 OTHER IRON DEFICIENCY ANEMIA: ICD-10-CM

## 2024-03-25 DIAGNOSIS — N18.2 CKD (CHRONIC KIDNEY DISEASE), STAGE II: ICD-10-CM

## 2024-03-25 DIAGNOSIS — I10 ESSENTIAL HYPERTENSION: ICD-10-CM

## 2024-03-25 RX ORDER — INSULIN GLARGINE 100 [IU]/ML
INJECTION, SOLUTION SUBCUTANEOUS
Qty: 15 ML | Refills: 2 | Status: SHIPPED | OUTPATIENT
Start: 2024-03-25

## 2024-03-25 RX ORDER — FUROSEMIDE 40 MG/1
40 TABLET ORAL 2 TIMES DAILY
Qty: 180 TABLET | Refills: 1 | Status: SHIPPED | OUTPATIENT
Start: 2024-03-25

## 2024-03-25 NOTE — TELEPHONE ENCOUNTER
Date of last visit:  2/8/2024  Date of next visit:  5/14/2024    Requested Prescriptions     Pending Prescriptions Disp Refills    LANTUS SOLOSTAR 100 UNIT/ML injection pen [Pharmacy Med Name: Lantus SoloStar 100 UNIT/ML Subcutaneous Solution Pen-injector] 15 mL 1     Sig: INJECT 50 UNITS SUBCUTANEOUSLY NIGHTLY

## 2024-03-25 NOTE — TELEPHONE ENCOUNTER
Date of last visit:  2/8/2024  Date of next visit:  5/14/2024    Requested Prescriptions     Pending Prescriptions Disp Refills    furosemide (LASIX) 40 MG tablet 180 tablet 0     Sig: Take 1 tablet by mouth 2 times daily

## 2024-04-03 NOTE — TELEPHONE ENCOUNTER
Date of last visit:  2/8/2024  Date of next visit:  5/14/2024    Requested Prescriptions     Pending Prescriptions Disp Refills    amLODIPine (NORVASC) 5 MG tablet [Pharmacy Med Name: amLODIPine Besylate 5 MG Oral Tablet] 30 tablet 5     Sig: Take 1 tablet by mouth once daily

## 2024-04-04 RX ORDER — AMLODIPINE BESYLATE 5 MG/1
TABLET ORAL
Qty: 30 TABLET | Refills: 5 | Status: SHIPPED | OUTPATIENT
Start: 2024-04-04

## 2024-04-12 ENCOUNTER — CARE COORDINATION (OUTPATIENT)
Dept: FAMILY MEDICINE CLINIC | Age: 74
End: 2024-04-12

## 2024-04-12 NOTE — CARE COORDINATION
Arragned an appointment for Sean next Tuesday, 4/16 @ 12:20 to address growth on face and numbness, tingling he experiencing right shoulder, neck. Also gave him phone number for [place] [facility] for transportation. Explained if this appointment time does not work out, to call office to change. Will follow next week. He verbalized understanding of above.

## 2024-04-15 DIAGNOSIS — M10.9 GOUTY ARTHRITIS: ICD-10-CM

## 2024-04-15 NOTE — TELEPHONE ENCOUNTER
Date of last visit:  2/8/2024  Date of next visit:  4/16/2024    Requested Prescriptions     Pending Prescriptions Disp Refills    colchicine (COLCRYS) 0.6 MG tablet [Pharmacy Med Name: Colchicine 0.6 MG Oral Tablet] 30 tablet 0     Sig: Take 1 tablet by mouth once daily

## 2024-04-16 ENCOUNTER — OFFICE VISIT (OUTPATIENT)
Dept: FAMILY MEDICINE CLINIC | Age: 74
End: 2024-04-16

## 2024-04-16 VITALS
BODY MASS INDEX: 40.43 KG/M2 | DIASTOLIC BLOOD PRESSURE: 74 MMHG | WEIGHT: 315 LBS | RESPIRATION RATE: 16 BRPM | SYSTOLIC BLOOD PRESSURE: 130 MMHG | HEIGHT: 74 IN | HEART RATE: 76 BPM

## 2024-04-16 DIAGNOSIS — M79.7 FIBROSITIS OF NECK: ICD-10-CM

## 2024-04-16 DIAGNOSIS — L57.0 ACTINIC KERATOSIS OF LEFT CHEEK: ICD-10-CM

## 2024-04-16 PROCEDURE — G8427 DOCREV CUR MEDS BY ELIG CLIN: HCPCS | Performed by: FAMILY MEDICINE

## 2024-04-16 PROCEDURE — 1123F ACP DISCUSS/DSCN MKR DOCD: CPT | Performed by: FAMILY MEDICINE

## 2024-04-16 PROCEDURE — 3017F COLORECTAL CA SCREEN DOC REV: CPT | Performed by: FAMILY MEDICINE

## 2024-04-16 PROCEDURE — 1036F TOBACCO NON-USER: CPT | Performed by: FAMILY MEDICINE

## 2024-04-16 PROCEDURE — 17000 DESTRUCT PREMALG LESION: CPT | Performed by: FAMILY MEDICINE

## 2024-04-16 PROCEDURE — 99213 OFFICE O/P EST LOW 20 MIN: CPT | Performed by: FAMILY MEDICINE

## 2024-04-16 RX ORDER — TIZANIDINE 4 MG/1
4 TABLET ORAL EVERY 8 HOURS PRN
Qty: 30 TABLET | Refills: 0 | Status: SHIPPED | OUTPATIENT
Start: 2024-04-16

## 2024-04-16 RX ORDER — COLCHICINE 0.6 MG/1
0.6 TABLET ORAL DAILY
Qty: 30 TABLET | Refills: 1 | Status: SHIPPED | OUTPATIENT
Start: 2024-04-16

## 2024-04-16 ASSESSMENT — ENCOUNTER SYMPTOMS
NAUSEA: 0
CONSTIPATION: 0
BACK PAIN: 0
EYE PAIN: 0
SHORTNESS OF BREATH: 0
CHEST TIGHTNESS: 0
SORE THROAT: 0
BLOOD IN STOOL: 0
ABDOMINAL PAIN: 0
TROUBLE SWALLOWING: 0
COUGH: 0

## 2024-04-16 NOTE — PROGRESS NOTES
SUBCUTANEOUSLY NIGHTLY 15 mL 2    furosemide (LASIX) 40 MG tablet Take 1 tablet by mouth 2 times daily 180 tablet 1    gabapentin (NEURONTIN) 100 MG capsule Take 1 capsule by mouth 3 times daily for 40 doses. Intended supply: 30 days 40 capsule 0    metFORMIN (GLUCOPHAGE) 500 MG tablet TAKE 2 TABLETS BY MOUTH WITH BREAKFAST AND WITH EVENING MEAL 120 tablet 5    ticagrelor (BRILINTA) 90 MG TABS tablet Take 1 tablet by mouth twice daily 180 tablet 1    urea (CARMOL) 10 % cream Apply topically as needed. 85 g 1    probenecid (BENEMID) 500 MG tablet Take 1 tablet by mouth 2 times daily 60 tablet 4    insulin aspart (NOVOLOG FLEXPEN) 100 UNIT/ML injection pen Inject 12 Units into the skin 3 times daily (before meals) Plus scale if high 5 Adjustable Dose Pre-filled Pen Syringe 3    atorvastatin (LIPITOR) 10 MG tablet Take 1 tablet by mouth once daily 90 tablet 1    febuxostat (ULORIC) 80 MG TABS tablet Take 1 tablet by mouth once daily 30 tablet 5    Multiple Vitamins-Minerals (THEREMS-M) TABS Take 1 tablet by mouth once daily 30 tablet 5    FEROSUL 325 (65 Fe) MG tablet Take 1 tablet by mouth twice daily 60 tablet 5    insulin lispro (HUMALOG) 100 UNIT/ML SOLN injection vial Inject 0-4 Units into the skin nightly 1 each 0    ammonium lactate (LAC-HYDRIN) 12 % lotion Apply topically as needed. 57 g 0    insulin lispro (HUMALOG) 100 UNIT/ML injection vial Inject 12 Units into the skin 3 times daily (before meals) Sliding scale      ondansetron (ZOFRAN-ODT) 4 MG disintegrating tablet Take 1 tablet by mouth every 8 hours as needed for Nausea or Vomiting      albuterol sulfate  (90 Base) MCG/ACT inhaler Inhale 2 puffs into the lungs 2 times daily 18 g 3    carvedilol (COREG) 12.5 MG tablet Take 1 tablet by mouth 2 times daily 180 tablet 2    aspirin 81 MG tablet Take 1 tablet by mouth daily      acetaminophen (TYLENOL) 500 MG tablet Take 2 tablets by mouth every 6 hours as needed for Pain       No current 
   Not on file     Social Determinants of Health     Financial Resource Strain: Low Risk  (2/8/2024)    Overall Financial Resource Strain (CARDIA)     Difficulty of Paying Living Expenses: Not very hard   Food Insecurity: Food Insecurity Present (2/8/2024)    Hunger Vital Sign     Worried About Running Out of Food in the Last Year: Sometimes true     Ran Out of Food in the Last Year: Sometimes true   Transportation Needs: Unmet Transportation Needs (2/8/2024)    PRAPARE - Transportation     Lack of Transportation (Medical): Not on file     Lack of Transportation (Non-Medical): Yes   Physical Activity: Insufficiently Active (6/6/2023)    Exercise Vital Sign     Days of Exercise per Week: 1 day     Minutes of Exercise per Session: 10 min   Stress: Not on file   Social Connections: Not on file   Intimate Partner Violence: Not on file   Housing Stability: High Risk (2/8/2024)    Housing Stability Vital Sign     Unable to Pay for Housing in the Last Year: Not on file     Number of Places Lived in the Last Year: Not on file     Unstable Housing in the Last Year: Yes       Wt Readings from Last 3 Encounters:   04/16/24 (!) 159.4 kg (351 lb 8 oz)   02/08/24 (!) 166.2 kg (366 lb 8 oz)   11/08/23 (!) 167.4 kg (369 lb)     BP Readings from Last 3 Encounters:   04/16/24 130/74   02/08/24 118/74   11/08/23 118/68        Vitals:    04/16/24 1243   BP: 130/74   Site: Right Upper Arm   Position: Sitting   Cuff Size: Large Adult   Pulse: 76   Resp: 16   Weight: (!) 159.4 kg (351 lb 8 oz)   Height: 1.88 m (6' 2\")     Body mass index is 45.13 kg/m².    Physical Exam    Lipid panel:   Lab Results   Component Value Date    CHOL 130 02/28/2024    TRIG 302 (H) 02/28/2024    HDL 26 02/28/2024    LDLCALC 44 02/28/2024     Glucose:   Glucose (mg/dL)   Date Value   02/28/2024 146 (H)   10/17/2018 125 (H)       Patient Care Team:  Humberto Yap MD as PCP - General (Family Medicine)  Humberto Yap MD as PCP - [facility]

## 2024-05-14 ENCOUNTER — OFFICE VISIT (OUTPATIENT)
Dept: FAMILY MEDICINE CLINIC | Age: 74
End: 2024-05-14

## 2024-05-14 VITALS
RESPIRATION RATE: 16 BRPM | WEIGHT: 315 LBS | DIASTOLIC BLOOD PRESSURE: 84 MMHG | BODY MASS INDEX: 40.43 KG/M2 | HEART RATE: 84 BPM | HEIGHT: 74 IN | SYSTOLIC BLOOD PRESSURE: 128 MMHG

## 2024-05-14 DIAGNOSIS — M10.9 GOUTY ARTHRITIS OF BOTH KNEES: ICD-10-CM

## 2024-05-14 DIAGNOSIS — N18.2 CKD (CHRONIC KIDNEY DISEASE), STAGE II: ICD-10-CM

## 2024-05-14 DIAGNOSIS — I50.32 CHRONIC DIASTOLIC (CONGESTIVE) HEART FAILURE (HCC): ICD-10-CM

## 2024-05-14 DIAGNOSIS — N18.31 STAGE 3A CHRONIC KIDNEY DISEASE (HCC): ICD-10-CM

## 2024-05-14 DIAGNOSIS — I87.2 VENOUS STASIS DERMATITIS OF BOTH LOWER EXTREMITIES: ICD-10-CM

## 2024-05-14 DIAGNOSIS — I10 PRIMARY HYPERTENSION: ICD-10-CM

## 2024-05-14 DIAGNOSIS — E78.00 HYPERCHOLESTEREMIA: ICD-10-CM

## 2024-05-14 DIAGNOSIS — I10 ESSENTIAL HYPERTENSION: ICD-10-CM

## 2024-05-14 DIAGNOSIS — E11.42 DIABETIC PERIPHERAL NEUROPATHY (HCC): Primary | ICD-10-CM

## 2024-05-14 DIAGNOSIS — I25.708 CORONARY ARTERY DISEASE OF BYPASS GRAFT OF NATIVE HEART WITH STABLE ANGINA PECTORIS (HCC): ICD-10-CM

## 2024-05-14 DIAGNOSIS — M10.9 GOUTY ARTHRITIS: ICD-10-CM

## 2024-05-14 PROCEDURE — G8427 DOCREV CUR MEDS BY ELIG CLIN: HCPCS | Performed by: FAMILY MEDICINE

## 2024-05-14 PROCEDURE — G8417 CALC BMI ABV UP PARAM F/U: HCPCS | Performed by: FAMILY MEDICINE

## 2024-05-14 PROCEDURE — 1123F ACP DISCUSS/DSCN MKR DOCD: CPT | Performed by: FAMILY MEDICINE

## 2024-05-14 PROCEDURE — 99213 OFFICE O/P EST LOW 20 MIN: CPT | Performed by: FAMILY MEDICINE

## 2024-05-14 PROCEDURE — 3017F COLORECTAL CA SCREEN DOC REV: CPT | Performed by: FAMILY MEDICINE

## 2024-05-14 PROCEDURE — 1036F TOBACCO NON-USER: CPT | Performed by: FAMILY MEDICINE

## 2024-05-14 ASSESSMENT — ENCOUNTER SYMPTOMS
EYE PAIN: 0
SHORTNESS OF BREATH: 0
BACK PAIN: 0
COUGH: 0
BLOOD IN STOOL: 0
CONSTIPATION: 0
CHEST TIGHTNESS: 0
SORE THROAT: 0
ABDOMINAL PAIN: 0
NAUSEA: 0

## 2024-05-14 NOTE — PROGRESS NOTES
No components found for: \"CHLPL\"  Lab Results   Component Value Date    TRIG 302 (H) 02/28/2024     Lab Results   Component Value Date    HDL 26 02/28/2024     No components found for: \"LDLCALC\"  No components found for: \"LABVLDL\"    Lab Results   Component Value Date    ALT 16 02/28/2024    AST 19 02/28/2024    ALKPHOS 80 02/28/2024    BILITOT 0.4 02/28/2024           Is patient currently taking any cholesterol medications?     Yes   If yes, see med list as above    Is the patient reporting any side effects of cholesterol medications?   No    Is the patient taking any over the counter medications?    Yes   If yes, see med list as above    Is the patient taking a daily aspirin?    Yes      Patient Self-Management Goal for Chronic Condition  Goal: I will take all medications as prescribed by my doctor, and I will call the office if I am having any medication problems.  Barriers to success: none  Plan for overcoming my barriers: N/A     Confidence: 10/10  Date goal set: 5/14/24  Date goal attained:     Have you seen any other physician or provider since your last visit no    Have you had any other diagnostic tests since your last visit? no    Have you changed or stopped any medications since your last visit including any over-the-counter medicines, vitamins, or herbal medicines? no     Are you taking all your prescribed medications? Yes    If NO, why?

## 2024-05-14 NOTE — PROGRESS NOTES
Subjective   Patient ID: Tuan Hassan is a 73 y.o. male.      Gouty  arthritis       2  times   admitted and  to  the    nursing  home      one day   colcyrs and     probenimid and     uloric     stable     Venous stasis ulcer stable    Hx  of  larger  toe  amputation  stable  left  foot      Chf  diastolic  stable     Obesity  as  weight  slight gain      Crf  noted  stage  3A to 3 B  stable and  recheck labs       Ashd   stable            Neyuropathy  stable       Diastolic  chf  stable     Diabetes  He presents for his follow-up diabetic visit. He has type 2 diabetes mellitus. There are no hypoglycemic associated symptoms. Pertinent negatives for hypoglycemia include no dizziness or headaches. There are no diabetic associated symptoms. Pertinent negatives for diabetes include no chest pain, no fatigue and no weakness. There are no hypoglycemic complications. Symptoms are stable. Current diabetic treatment includes insulin injections. He is compliant with treatment all of the time. His weight is stable. Meal planning includes avoidance of concentrated sweets. There is no change in his home blood glucose trend. His breakfast blood glucose is taken between 8-9 am. His breakfast blood glucose range is generally 130-140 mg/dl.   Hypertension  This is a chronic problem. The current episode started more than 1 year ago. The problem has been resolved since onset. The problem is controlled. Pertinent negatives include no chest pain, headaches, palpitations or shortness of breath. The current treatment provides significant improvement. There are no compliance problems.      Past Medical History:   Diagnosis Date    RAUL (acute kidney injury) ([facility]) 02/13/2020    ASHD (arteriosclerotic heart disease) 2005 2006    stent  baki    Closed fracture of first lumbar vertebra ([facility]) 05/06/2022    Closed T12 fracture ([facility]) 05/06/2022    Depression     Diabetic peripheral neuropathy ([facility]) 2014    Fracture 10/1984    left lower

## 2024-05-31 DIAGNOSIS — Z79.4 TYPE 2 DIABETES MELLITUS WITH DIABETIC POLYNEUROPATHY, WITH LONG-TERM CURRENT USE OF INSULIN (HCC): ICD-10-CM

## 2024-05-31 DIAGNOSIS — D64.9 ANEMIA, UNSPECIFIED TYPE: ICD-10-CM

## 2024-05-31 DIAGNOSIS — E11.42 TYPE 2 DIABETES MELLITUS WITH DIABETIC POLYNEUROPATHY, WITH LONG-TERM CURRENT USE OF INSULIN (HCC): ICD-10-CM

## 2024-05-31 NOTE — TELEPHONE ENCOUNTER
Date of last visit:  5/14/2024  Date of next visit:  8/19/2024    Requested Prescriptions     Pending Prescriptions Disp Refills    probenecid (BENEMID) 500 MG tablet [Pharmacy Med Name: Probenecid 500 MG Oral Tablet] 60 tablet 5     Sig: Take 1 tablet by mouth twice daily    FEROSUL 325 (65 Fe) MG tablet [Pharmacy Med Name: FeroSul 325 (65 Fe) MG Oral Tablet] 60 tablet 5     Sig: Take 1 tablet by mouth twice daily

## 2024-05-31 NOTE — TELEPHONE ENCOUNTER
Date of last visit:  5/14/2024  Date of next visit:  8/19/2024    Requested Prescriptions     Pending Prescriptions Disp Refills    NOVOLOG FLEXPEN 100 UNIT/ML injection pen [Pharmacy Med Name: NovoLOG FlexPen 100 UNIT/ML Subcutaneous Solution Pen-injector] 15 mL 1     Sig: INJECT 12 UNITS INTO THE SKIN THREE TIMES DAILY BEFORE MEALS PLUS USE SCALE IF HIGH

## 2024-06-01 RX ORDER — PROBENECID 500 MG/1
500 TABLET, FILM COATED ORAL 2 TIMES DAILY
Qty: 60 TABLET | Refills: 5 | Status: SHIPPED | OUTPATIENT
Start: 2024-06-01

## 2024-06-01 RX ORDER — FERROUS SULFATE 325(65) MG
1 TABLET ORAL 2 TIMES DAILY
Qty: 60 TABLET | Refills: 5 | Status: SHIPPED | OUTPATIENT
Start: 2024-06-01

## 2024-06-01 RX ORDER — INSULIN ASPART 100 [IU]/ML
INJECTION, SOLUTION INTRAVENOUS; SUBCUTANEOUS
Qty: 15 ML | Refills: 1 | Status: SHIPPED | OUTPATIENT
Start: 2024-06-01

## 2024-06-04 DIAGNOSIS — M10.9 GOUTY ARTHRITIS: ICD-10-CM

## 2024-06-04 RX ORDER — FEBUXOSTAT 80 MG/1
80 TABLET, FILM COATED ORAL DAILY
Qty: 30 TABLET | Refills: 5 | Status: SHIPPED | OUTPATIENT
Start: 2024-06-04

## 2024-06-04 NOTE — TELEPHONE ENCOUNTER
Date of last visit:  5/14/2024  Date of next visit:  8/19/2024    Requested Prescriptions     Pending Prescriptions Disp Refills    febuxostat (ULORIC) 80 MG TABS tablet [Pharmacy Med Name: Febuxostat 80 MG Oral Tablet] 30 tablet 5     Sig: Take 1 tablet by mouth once daily

## 2024-06-06 ENCOUNTER — NURSE ONLY (OUTPATIENT)
Dept: LAB | Age: 74
End: 2024-06-06

## 2024-06-06 DIAGNOSIS — D64.9 ANEMIA, UNSPECIFIED TYPE: ICD-10-CM

## 2024-06-06 DIAGNOSIS — D50.8 OTHER IRON DEFICIENCY ANEMIA: ICD-10-CM

## 2024-06-06 DIAGNOSIS — I10 ESSENTIAL HYPERTENSION: ICD-10-CM

## 2024-06-06 DIAGNOSIS — E11.42 DIABETIC PERIPHERAL NEUROPATHY (HCC): ICD-10-CM

## 2024-06-06 DIAGNOSIS — N18.2 CKD (CHRONIC KIDNEY DISEASE), STAGE II: ICD-10-CM

## 2024-06-06 DIAGNOSIS — I10 PRIMARY HYPERTENSION: ICD-10-CM

## 2024-06-06 DIAGNOSIS — M10.9 GOUTY ARTHRITIS OF BOTH KNEES: ICD-10-CM

## 2024-06-06 LAB
BASOPHILS ABSOLUTE: 0.1 THOU/MM3 (ref 0–0.1)
BUN SERPL-MCNC: 21 MG/DL (ref 7–22)
CALCIUM SERPL-MCNC: 9.8 MG/DL (ref 8.5–10.5)
CHLORIDE SERPL-SCNC: 104 MEQ/L (ref 98–111)
CO2 SERPL-SCNC: 27 MEQ/L (ref 23–33)
CREAT SERPL-MCNC: 1.8 MG/DL (ref 0.4–1.2)
CRP SERPL-MCNC: < 0.3 MG/DL (ref 0–1)
DEPRECATED RDW RBC AUTO: 42.5 FL (ref 35–45)
EOSINOPHIL NFR BLD AUTO: 3.9 %
EOSINOPHILS ABSOLUTE: 0.3 THOU/MM3 (ref 0–0.4)
ERYTHROCYTE [DISTWIDTH] IN BLOOD BY AUTOMATED COUNT: 12.3 % (ref 11.5–14.5)
ERYTHROCYTE [SEDIMENTATION RATE] IN BLOOD BY WESTERGREN METHOD: 33 MM/HR (ref 0–10)
GFR SERPL CREATININE-BSD FRML MDRD: 39 ML/MIN/1.73M2
GLUCOSE SERPL-MCNC: 84 MG/DL (ref 70–108)
HCT VFR BLD AUTO: 36.2 % (ref 42–52)
HGB BLD-MCNC: 12.2 GM/DL (ref 14–18)
IMM GRANULOCYTES # BLD AUTO: 0.01 THOU/MM3 (ref 0–0.07)
LYMPHOCYTES ABSOLUTE: 3.1 THOU/MM3 (ref 1–4.8)
LYMPHOCYTES NFR BLD AUTO: 43.4 %
MCH RBC QN AUTO: 31.9 PG (ref 26–33)
MCHC RBC AUTO-ENTMCNC: 33.7 GM/DL (ref 32.2–35.5)
MONOCYTES ABSOLUTE: 0.7 THOU/MM3 (ref 0.4–1.3)
MONOCYTES NFR BLD AUTO: 10.1 %
NEUTROPHILS ABSOLUTE: 3 THOU/MM3 (ref 1.8–7.7)
NEUTROPHILS NFR BLD AUTO: 41.7 %
NRBC BLD AUTO-RTO: 0 /100 WBC
PLATELET # BLD AUTO: 191 THOU/MM3 (ref 130–400)
PMV BLD AUTO: 9.6 FL (ref 9.4–12.4)
POTASSIUM SERPL-SCNC: 4.1 MEQ/L (ref 3.5–5.2)
RBC # BLD AUTO: 3.82 MILL/MM3 (ref 4.7–6.1)
SODIUM SERPL-SCNC: 147 MEQ/L (ref 135–145)
URATE SERPL-MCNC: 2.9 MG/DL (ref 3.7–7)
WBC # BLD AUTO: 7.1 THOU/MM3 (ref 4.8–10.8)

## 2024-06-06 RX ORDER — MULTIVIT,CALC,MINS/IRON/FOLIC 9MG-400MCG
TABLET ORAL
Qty: 30 TABLET | Refills: 5 | Status: SHIPPED | OUTPATIENT
Start: 2024-06-06

## 2024-06-06 RX ORDER — FUROSEMIDE 40 MG/1
60 TABLET ORAL DAILY
Qty: 180 TABLET | Refills: 1
Start: 2024-06-06

## 2024-06-06 NOTE — TELEPHONE ENCOUNTER
Date of last visit:  5/14/2024  Date of next visit:  8/19/2024    Requested Prescriptions     Pending Prescriptions Disp Refills    Multiple Vitamins-Minerals (THEREMS-M) TABS [Pharmacy Med Name: Therems-M Oral Tablet] 30 tablet 5     Sig: Take 1 tablet by mouth once daily

## 2024-06-07 ENCOUNTER — TELEPHONE (OUTPATIENT)
Dept: FAMILY MEDICINE CLINIC | Age: 74
End: 2024-06-07

## 2024-06-07 NOTE — TELEPHONE ENCOUNTER
----- Message from Humberto Yap MD sent at 6/6/2024 11:31 PM EDT -----  Uric acid  level is low which  is  good    Hgb is  stable       Creat  is up       Change  lasix to 60 mg a day  or lasix 40 mg 1 and  1/2 tabs every am    Glucophage  to 2 tabs once a day     Bmp  on 6-17-24 days    Please call

## 2024-06-09 DIAGNOSIS — M10.9 GOUTY ARTHRITIS: ICD-10-CM

## 2024-06-10 RX ORDER — COLCHICINE 0.6 MG/1
0.6 TABLET ORAL DAILY
Qty: 30 TABLET | Refills: 0 | Status: SHIPPED | OUTPATIENT
Start: 2024-06-10

## 2024-06-17 DIAGNOSIS — M10.9 GOUTY ARTHRITIS: ICD-10-CM

## 2024-06-17 NOTE — TELEPHONE ENCOUNTER
Date of last visit:  5/14/2024  Date of next visit:  8/19/2024    Requested Prescriptions     Pending Prescriptions Disp Refills    colchicine (COLCRYS) 0.6 MG tablet 30 tablet 0     Sig: Take 1 tablet by mouth daily

## 2024-06-18 RX ORDER — COLCHICINE 0.6 MG/1
0.6 TABLET ORAL DAILY
Qty: 30 TABLET | Refills: 1 | Status: SHIPPED | OUTPATIENT
Start: 2024-06-18

## 2024-06-21 ENCOUNTER — CARE COORDINATION (OUTPATIENT)
Dept: FAMILY MEDICINE CLINIC | Age: 74
End: 2024-06-21

## 2024-06-21 NOTE — CARE COORDINATION
Received call from Sean. He was wondering if those \"protein\" bars would be ok to eat. Explained to him to read labels to check carb and sugar content-if high on one, check other brands. Then asked about Attention Deficit-his  had told his parents they would have trouble with him because of his short attention span. Asked if he had ever been diagnosed with this and he said not exactly. Asked why, at 73, he is concerned with this, he said has seen a lot about it on TV commercials, and just \"wondered\". Explained probably not a candidate for meds, etc. He functions pretty good, at present, don't feel it is an issue-can bring it up at next appointment. Appreciative of time, and Sean knows he can call with any questions or concerns.

## 2024-06-27 ENCOUNTER — APPOINTMENT (OUTPATIENT)
Dept: GENERAL RADIOLOGY | Age: 74
End: 2024-06-27
Payer: MEDICARE

## 2024-06-27 ENCOUNTER — APPOINTMENT (OUTPATIENT)
Dept: CT IMAGING | Age: 74
End: 2024-06-27
Payer: MEDICARE

## 2024-06-27 ENCOUNTER — HOSPITAL ENCOUNTER (OUTPATIENT)
Age: 74
Setting detail: OBSERVATION
Discharge: HOME | End: 2024-06-29
Attending: EMERGENCY MEDICINE
Payer: MEDICARE

## 2024-06-27 DIAGNOSIS — M79.89 LEG SWELLING: ICD-10-CM

## 2024-06-27 DIAGNOSIS — I87.2 VENOUS STASIS DERMATITIS OF BOTH LOWER EXTREMITIES: ICD-10-CM
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
## 2024-06-27 DIAGNOSIS — R79.89 ELEVATED TROPONIN: ICD-10-CM

## 2024-06-27 DIAGNOSIS — R55 SYNCOPE AND COLLAPSE: Primary | ICD-10-CM

## 2024-06-27 DIAGNOSIS — D64.9 ANEMIA, UNSPECIFIED TYPE: ICD-10-CM

## 2024-06-27 DIAGNOSIS — R55 SYNCOPE, UNSPECIFIED SYNCOPE TYPE: ICD-10-CM

## 2024-06-27 DIAGNOSIS — N17.9 ACUTE KIDNEY INJURY (HCC): ICD-10-CM

## 2024-06-27 LAB
ALBUMIN SERPL BCG-MCNC: 4.2 G/DL (ref 3.5–5.1)
ALP SERPL-CCNC: 82 U/L (ref 38–126)
ALT SERPL W/O P-5'-P-CCNC: 13 U/L (ref 11–66)
ANION GAP SERPL CALC-SCNC: 12 MEQ/L (ref 8–16)
AST SERPL-CCNC: 15 U/L (ref 5–40)
BACTERIA URNS QL MICRO: ABNORMAL /HPF
BASOPHILS ABSOLUTE: 0.1 THOU/MM3 (ref 0–0.1)
BASOPHILS NFR BLD AUTO: 0.8 %
BILIRUB SERPL-MCNC: 0.4 MG/DL (ref 0.3–1.2)
BILIRUB UR QL STRIP.AUTO: NEGATIVE
BUN SERPL-MCNC: 28 MG/DL (ref 7–22)
CALCIUM SERPL-MCNC: 10.1 MG/DL (ref 8.5–10.5)
CASTS #/AREA URNS LPF: ABNORMAL /LPF
CASTS 2: ABNORMAL /LPF
CHARACTER UR: CLEAR
CHLORIDE SERPL-SCNC: 100 MEQ/L (ref 98–111)
CO2 SERPL-SCNC: 28 MEQ/L (ref 23–33)
COLOR, UA: YELLOW
CREAT SERPL-MCNC: 1.6 MG/DL (ref 0.4–1.2)
CRYSTALS URNS MICRO: ABNORMAL
DEPRECATED RDW RBC AUTO: 42.6 FL (ref 35–45)
EKG ATRIAL RATE: 84 BPM
EKG P AXIS: 86 DEGREES
EKG P-R INTERVAL: 216 MS
EKG Q-T INTERVAL: 396 MS
EKG QRS DURATION: 148 MS
EKG QTC CALCULATION (BAZETT): 467 MS
EKG R AXIS: -42 DEGREES
EKG T AXIS: 90 DEGREES
EKG VENTRICULAR RATE: 84 BPM
EOSINOPHIL NFR BLD AUTO: 3.1 %
EOSINOPHILS ABSOLUTE: 0.2 THOU/MM3 (ref 0–0.4)
EPITHELIAL CELLS, UA: ABNORMAL /HPF
ERYTHROCYTE [DISTWIDTH] IN BLOOD BY AUTOMATED COUNT: 12.4 % (ref 11.5–14.5)
ETHANOL SERPL-MCNC: < 0.01 % (ref 0–0.08)
GFR SERPL CREATININE-BSD FRML MDRD: 44 ML/MIN/1.73M2
GLUCOSE SERPL-MCNC: 122 MG/DL (ref 70–108)
GLUCOSE UR QL STRIP.AUTO: NEGATIVE MG/DL
HCT VFR BLD AUTO: 34.8 % (ref 42–52)
HGB BLD-MCNC: 12 GM/DL (ref 14–18)
HGB UR QL STRIP.AUTO: NEGATIVE
IMM GRANULOCYTES # BLD AUTO: 0.02 THOU/MM3 (ref 0–0.07)
IMM GRANULOCYTES NFR BLD AUTO: 0.3 %
KETONES UR QL STRIP.AUTO: NEGATIVE
LYMPHOCYTES ABSOLUTE: 2.6 THOU/MM3 (ref 1–4.8)
LYMPHOCYTES NFR BLD AUTO: 37 %
MAGNESIUM SERPL-MCNC: 1.9 MG/DL (ref 1.6–2.4)
MCH RBC QN AUTO: 32.4 PG (ref 26–33)
MCHC RBC AUTO-ENTMCNC: 34.5 GM/DL (ref 32.2–35.5)
MCV RBC AUTO: 94.1 FL (ref 80–94)
MISCELLANEOUS 2: ABNORMAL
MONOCYTES ABSOLUTE: 0.6 THOU/MM3 (ref 0.4–1.3)
MONOCYTES NFR BLD AUTO: 9.1 %
NEUTROPHILS ABSOLUTE: 3.5 THOU/MM3 (ref 1.8–7.7)
NEUTROPHILS NFR BLD AUTO: 49.7 %
NITRITE UR QL STRIP: NEGATIVE
NRBC BLD AUTO-RTO: 0 /100 WBC
OSMOLALITY SERPL CALC.SUM OF ELEC: 286.2 MOSMOL/KG (ref 275–300)
PH UR STRIP.AUTO: 6.5 [PH] (ref 5–9)
PLATELET # BLD AUTO: 203 THOU/MM3 (ref 130–400)
PMV BLD AUTO: 9.9 FL (ref 9.4–12.4)
POTASSIUM SERPL-SCNC: 4.3 MEQ/L (ref 3.5–5.2)
PROT SERPL-MCNC: 6.9 G/DL (ref 6.1–8)
PROT UR STRIP.AUTO-MCNC: 30 MG/DL
RBC # BLD AUTO: 3.7 MILL/MM3 (ref 4.7–6.1)
RBC URINE: ABNORMAL /HPF
RENAL EPI CELLS #/AREA URNS HPF: ABNORMAL /[HPF]
SODIUM SERPL-SCNC: 140 MEQ/L (ref 135–145)
SP GR UR REFRACT.AUTO: 1.01 (ref 1–1.03)
T4 FREE SERPL-MCNC: 0.9 NG/DL (ref 0.93–1.68)
TROPONIN, HIGH SENSITIVITY: 28 NG/L (ref 0–12)
TROPONIN, HIGH SENSITIVITY: 29 NG/L (ref 0–12)
TROPONIN, HIGH SENSITIVITY: 34 NG/L (ref 0–12)
TSH SERPL DL<=0.005 MIU/L-ACNC: 3.24 UIU/ML (ref 0.4–4.2)
UROBILINOGEN, URINE: 1 EU/DL (ref 0–1)
WBC # BLD AUTO: 7.1 THOU/MM3 (ref 4.8–10.8)
WBC #/AREA URNS HPF: ABNORMAL /HPF
WBC #/AREA URNS HPF: NEGATIVE /[HPF]
YEAST LIKE FUNGI URNS QL MICRO: ABNORMAL

## 2024-06-27 PROCEDURE — 82077 ASSAY SPEC XCP UR&BREATH IA: CPT

## 2024-06-27 PROCEDURE — 6370000000 HC RX 637 (ALT 250 FOR IP)

## 2024-06-27 PROCEDURE — 84484 ASSAY OF TROPONIN QUANT: CPT

## 2024-06-27 PROCEDURE — 87040 BLOOD CULTURE FOR BACTERIA: CPT

## 2024-06-27 PROCEDURE — 6360000002 HC RX W HCPCS

## 2024-06-27 PROCEDURE — 81001 URINALYSIS AUTO W/SCOPE: CPT

## 2024-06-27 PROCEDURE — 84439 ASSAY OF FREE THYROXINE: CPT

## 2024-06-27 PROCEDURE — 2580000003 HC RX 258

## 2024-06-27 PROCEDURE — 72128 CT CHEST SPINE W/O DYE: CPT

## 2024-06-27 PROCEDURE — 99285 EMERGENCY DEPT VISIT HI MDM: CPT

## 2024-06-27 PROCEDURE — 93010 ELECTROCARDIOGRAM REPORT: CPT | Performed by: NUCLEAR MEDICINE

## 2024-06-27 PROCEDURE — 93005 ELECTROCARDIOGRAM TRACING: CPT

## 2024-06-27 PROCEDURE — 83735 ASSAY OF MAGNESIUM: CPT

## 2024-06-27 PROCEDURE — 70450 CT HEAD/BRAIN W/O DYE: CPT

## 2024-06-27 PROCEDURE — 73080 X-RAY EXAM OF ELBOW: CPT

## 2024-06-27 PROCEDURE — 73564 X-RAY EXAM KNEE 4 OR MORE: CPT

## 2024-06-27 PROCEDURE — 72125 CT NECK SPINE W/O DYE: CPT

## 2024-06-27 PROCEDURE — G0378 HOSPITAL OBSERVATION PER HR: HCPCS

## 2024-06-27 PROCEDURE — 84443 ASSAY THYROID STIM HORMONE: CPT

## 2024-06-27 PROCEDURE — 96372 THER/PROPH/DIAG INJ SC/IM: CPT

## 2024-06-27 PROCEDURE — 96374 THER/PROPH/DIAG INJ IV PUSH: CPT

## 2024-06-27 PROCEDURE — 80053 COMPREHEN METABOLIC PANEL: CPT

## 2024-06-27 PROCEDURE — 36415 COLL VENOUS BLD VENIPUNCTURE: CPT

## 2024-06-27 PROCEDURE — 85025 COMPLETE CBC W/AUTO DIFF WBC: CPT

## 2024-06-27 RX ORDER — POTASSIUM CHLORIDE 20 MEQ/1
40 TABLET, EXTENDED RELEASE ORAL PRN
Status: DISCONTINUED | OUTPATIENT
Start: 2024-06-27 | End: 2024-06-29 | Stop reason: HOSPADM

## 2024-06-27 RX ORDER — ENOXAPARIN SODIUM 100 MG/ML
40 INJECTION SUBCUTANEOUS EVERY 12 HOURS
Status: DISCONTINUED | OUTPATIENT
Start: 2024-06-27 | End: 2024-06-29 | Stop reason: HOSPADM

## 2024-06-27 RX ORDER — SODIUM CHLORIDE 9 MG/ML
INJECTION, SOLUTION INTRAVENOUS PRN
Status: DISCONTINUED | OUTPATIENT
Start: 2024-06-27 | End: 2024-06-29 | Stop reason: HOSPADM

## 2024-06-27 RX ORDER — POTASSIUM CHLORIDE 7.45 MG/ML
10 INJECTION INTRAVENOUS PRN
Status: DISCONTINUED | OUTPATIENT
Start: 2024-06-27 | End: 2024-06-29 | Stop reason: HOSPADM

## 2024-06-27 RX ORDER — ACETAMINOPHEN 650 MG/1
650 SUPPOSITORY RECTAL EVERY 6 HOURS PRN
Status: DISCONTINUED | OUTPATIENT
Start: 2024-06-27 | End: 2024-06-29 | Stop reason: HOSPADM

## 2024-06-27 RX ORDER — ONDANSETRON 4 MG/1
4 TABLET, ORALLY DISINTEGRATING ORAL EVERY 8 HOURS PRN
Status: DISCONTINUED | OUTPATIENT
Start: 2024-06-27 | End: 2024-06-29 | Stop reason: HOSPADM

## 2024-06-27 RX ORDER — ONDANSETRON 2 MG/ML
4 INJECTION INTRAMUSCULAR; INTRAVENOUS ONCE
Status: COMPLETED | OUTPATIENT
Start: 2024-06-27 | End: 2024-06-27

## 2024-06-27 RX ORDER — MAGNESIUM SULFATE IN WATER 40 MG/ML
2000 INJECTION, SOLUTION INTRAVENOUS PRN
Status: DISCONTINUED | OUTPATIENT
Start: 2024-06-27 | End: 2024-06-29 | Stop reason: HOSPADM

## 2024-06-27 RX ORDER — POLYETHYLENE GLYCOL 3350 17 G/17G
17 POWDER, FOR SOLUTION ORAL DAILY PRN
Status: DISCONTINUED | OUTPATIENT
Start: 2024-06-27 | End: 2024-06-29 | Stop reason: HOSPADM

## 2024-06-27 RX ORDER — SODIUM CHLORIDE 0.9 % (FLUSH) 0.9 %
5-40 SYRINGE (ML) INJECTION PRN
Status: DISCONTINUED | OUTPATIENT
Start: 2024-06-27 | End: 2024-06-29 | Stop reason: HOSPADM

## 2024-06-27 RX ORDER — ACETAMINOPHEN 325 MG/1
650 TABLET ORAL EVERY 6 HOURS PRN
Status: DISCONTINUED | OUTPATIENT
Start: 2024-06-27 | End: 2024-06-29 | Stop reason: HOSPADM

## 2024-06-27 RX ORDER — ONDANSETRON 2 MG/ML
4 INJECTION INTRAMUSCULAR; INTRAVENOUS EVERY 6 HOURS PRN
Status: DISCONTINUED | OUTPATIENT
Start: 2024-06-27 | End: 2024-06-29 | Stop reason: HOSPADM

## 2024-06-27 RX ORDER — HYDROCODONE BITARTRATE AND ACETAMINOPHEN 5; 325 MG/1; MG/1
1 TABLET ORAL ONCE
Status: COMPLETED | OUTPATIENT
Start: 2024-06-27 | End: 2024-06-27

## 2024-06-27 RX ORDER — SODIUM CHLORIDE 0.9 % (FLUSH) 0.9 %
5-40 SYRINGE (ML) INJECTION EVERY 12 HOURS SCHEDULED
Status: DISCONTINUED | OUTPATIENT
Start: 2024-06-27 | End: 2024-06-29 | Stop reason: HOSPADM

## 2024-06-27 RX ADMIN — SODIUM CHLORIDE, PRESERVATIVE FREE 10 ML: 5 INJECTION INTRAVENOUS at 22:00

## 2024-06-27 RX ADMIN — ENOXAPARIN SODIUM 40 MG: 100 INJECTION SUBCUTANEOUS at 23:00

## 2024-06-27 RX ADMIN — HYDROCODONE BITARTRATE AND ACETAMINOPHEN 1 TABLET: 5; 325 TABLET ORAL at 18:48

## 2024-06-27 RX ADMIN — ONDANSETRON 4 MG: 2 INJECTION INTRAMUSCULAR; INTRAVENOUS at 16:08

## 2024-06-27 ASSESSMENT — PAIN SCALES - GENERAL
PAINLEVEL_OUTOF10: 6
PAINLEVEL_OUTOF10: 8
PAINLEVEL_OUTOF10: 3
PAINLEVEL_OUTOF10: 6
PAINLEVEL_OUTOF10: 3

## 2024-06-27 ASSESSMENT — PAIN DESCRIPTION - LOCATION
LOCATION: BACK;ARM
LOCATION: BACK;HEAD
LOCATION: ARM;BACK

## 2024-06-27 ASSESSMENT — PAIN - FUNCTIONAL ASSESSMENT
PAIN_FUNCTIONAL_ASSESSMENT: 0-10
PAIN_FUNCTIONAL_ASSESSMENT: NONE - DENIES PAIN
PAIN_FUNCTIONAL_ASSESSMENT: NONE - DENIES PAIN

## 2024-06-27 ASSESSMENT — PAIN DESCRIPTION - ORIENTATION: ORIENTATION: RIGHT

## 2024-06-27 NOTE — ED NOTES
ED to inpatient nurses report      Chief Complaint:  Chief Complaint   Patient presents with    Fall     Present to ED from: Home    MOA:     LOC: alert and orientated to name, place, date  Mobility: Requires assistance * 1  Oxygen Baseline: 0    Current needs required: 0     Code Status:   Prior    What abnormal results were found and what did you give/do to treat them? Syncope  Any procedures or intervention occur? No    Mental Status:  Level of Consciousness: Alert (0)    Psych Assessment:        Vitals:  Patient Vitals for the past 24 hrs:   BP Temp Temp src Pulse Resp SpO2 Height Weight   06/27/24 1913 121/63 -- -- 71 16 97 % -- --   06/27/24 1759 128/68 -- -- 72 16 95 % -- --   06/27/24 1657 124/81 -- -- 72 16 96 % -- --   06/27/24 1613 137/67 -- -- 91 -- 97 % -- --   06/27/24 1505 (!) 163/73 97.6 °F (36.4 °C) Oral 84 18 95 % 1.88 m (6' 2\") (!) 162.8 kg (359 lb)        LDAs:   Peripheral IV 06/27/24 Left;Anterior Forearm (Active)       Ambulatory Status:  Presents to emergency department  because of falls (Syncope, seizure, or loss of consciousness): Yes, Age > 70: Yes, Altered Mental Status, Intoxication with alcohol or substance confusion (Disorientation, impaired judgment, poor safety awaremess, or inability to follow instructions): No, Impaired Mobility: Ambulates or transfers with assistive devices or assistance; Unable to ambulate or transer.: Yes, Nursing Judgement: Yes    Diagnosis:  DISPOSITION Admitted 06/27/2024 07:36:22 PM   Final diagnoses:   Fall, initial encounter   Syncope and collapse   Elevated troponin        Consults:  None     Pain Score:  Pain Assessment  Pain Assessment: 0-10  Pain Level: 3  Patient's Stated Pain Goal: 3  Pain Location: Back, Arm  Pain Orientation: Right    C-SSRS:   Risk of Suicide: No Risk    Sepsis Screening:       Mayda Fall Risk:  [place] 1 Fall Risk  Presents to emergency department  because of falls (Syncope, seizure, or loss of consciousness): Yes  Age > 70:  Yes  Altered Mental Status, Intoxication with alcohol or substance confusion (Disorientation, impaired judgment, poor safety awaremess, or inability to follow instructions): No  Impaired Mobility: Ambulates or transfers with assistive devices or assistance; Unable to ambulate or transer.: Yes  Nursing Judgement: Yes  Standard Fall Risk Interventions : [place] the patient to the environment, especially the location of the bathroom, Call light and frequently used items within patient reach, Intentional Rounding, Provide a safe environment by clearing a pathway free of plugs, IV poles, and other obstructing items, Bed in lowest position and wheels locked, as applicable to the type of bed, when a patient is resting in bed, raise bed to a comfortable height when the patient is transferring out of bed, as appropriate    Swallow Screening        Preferred Language:   English      ALLERGIES     Horse-derived products    SURGICAL HISTORY       Past Surgical History:   Procedure Laterality Date    AMPUTATION Left 9/10/14    partial versus complete left hallux amputation, left great toe amputation    APPENDECTOMY      BACK INJECTION Bilateral 1/18/2021    Bilateral Si MBB #1 performed by Raul Parikh MD at [facility] OR    BACK INJECTION Bilateral 3/1/2021    Bilateral SI MBB #2 performed by Raul Parikh MD at [facility] OR    CARDIAC SURGERY      CERVICAL DISC SURGERY  2000    fusion of C5-C6    CHOLECYSTECTOMY      COLONOSCOPY  2007 and 2011    colonn polyps  lorenzo    CORONARY ANGIOPLASTY WITH STENT PLACEMENT  08/07/2017    Dr Rueda @ [facility]   lad    CORONARY ARTERY BYPASS GRAFT  2015 august dox    EKG 12-LEAD  8/14/2015         FACET JOINT INJECTION Bilateral 5/22/2020    Lumbar Facet MBB @L3-4,4-5 bilateral #2 performed by Raul Parikh MD at [facility] OR    FRACTURE SURGERY      left leg    JOINT REPLACEMENT      bilateral knees gurvinder    PAIN MANAGEMENT PROCEDURE Bilateral

## 2024-06-27 NOTE — ED NOTES
Pt. Had an episode of nausea and vomiting. Pt given Zofran. Patient resting in bed. Respirations easy and unlabored. Family at bedside. No distress noted. Call light within reach.

## 2024-06-27 NOTE — H&P
Internal Medicine Resident History and Physical          Patient: Tuan Hassan  : 1950  MRN: [id number]     Acct: [id number]    PCP: Humbreto Yap MD  Date of Admission: 2024  Date of Service: Pt seen/examined on 24  and Admitted to Observation with expected LOS less than two midnights due to medical therapy.     Hospital Problems             Last Modified POA    * (Principal) Syncope and collapse 2024 Yes       Assessment and Plan:  Transient loss of consciousness.  Mechanical fall versus cardiac etiologies.  No prodrome, no recent illnesses, no postictal period.  Thyroid studies consistent with sick euthyroid.  Echo pending, 30 day event monitor at discharge  Blood cultures pending  Continuous telemetry  Orthostats every shift  PT/OT eval  Monitor electrolytes, recommend keeping K>4, Mg>2.  Bilateral lower extremity cellulitis. Per patient, does not follow with wound clinic as they have not helped him in the past. Ulcerations to distal lower extremities.  Consider abx pending clinical course  Wound team eval/treat, wound dressings per routine until then  Continue to monitor for S/S infection.  Troponin elevation, mild, trending down. Troponin 34>28. No s/s of ACS on presentation.   Trend troponin x 3 total  Continuous telemetry, continue to monitor for s/s ACS.  CAD, s/p CABG,  and DOMINIQUE to mid-LAD and PTCA to previous stents in proximal and distal LAD . No chest pain, dyspnea, or other evidence of ACS on admit. Troponins mildly elevated. Lexiscan  negative for ischemia, but with patchy uptake pattern.    Continue ASA, Brilinta, and statin.   Continue telemetry, monitor for signs/symptoms of ACS  Chronic HFpEF, diastolic dysfunction.  Echo performed 2023 shows LVEF 55%, overall normal LV function, moderate calcification of the posterior leaflet of the mitral valve with trace mitral regurgitation; no crackles on exam, +3 pitting bilateral lower extremity  nerves:  grossly non-focal 2-12.     Psychiatric: Alert and oriented, normal insight and thought content.   Capillary Refill: Brisk,< 3 seconds.  Peripheral Pulses: Weakly palpable, equal bilaterally.     Labs:     Recent Labs     06/27/24  1500   WBC 7.1   HGB 12.0*   HCT 34.8*        Recent Labs     06/27/24  1500      K 4.3      CO2 28   BUN 28*   CREATININE 1.6*   CALCIUM 10.1     Recent Labs     06/27/24  1500   AST 15   ALT 13   BILITOT 0.4   ALKPHOS 82     No results for input(s): \"INR\" in the last 72 hours.  No results for input(s): \"CKTOTAL\", \"TROPONINI\" in the last 72 hours.  Lab Results   Component Value Date/Time    NITRU NEGATIVE 06/27/2024 05:40 PM    WBCUA NONE SEEN 06/27/2024 05:40 PM    BACTERIA NONE SEEN 06/27/2024 05:40 PM    RBCUA 0-2 06/27/2024 05:40 PM    BLOODU NEGATIVE 06/27/2024 05:40 PM    GLUCOSEU NEGATIVE 06/27/2024 05:40 PM         Radiology:     XR KNEE LEFT (MIN 4 VIEWS)   Final Result      No fracture or dislocation.            Electronically signed by Dr. Christopher Grider      XR ELBOW RIGHT (MIN 3 VIEWS)   Final Result      No fracture or dislocation.            Electronically signed by Dr. Christopher Grider      CT THORACIC SPINE WO CONTRAST   Final Result   1. Postoperative changes with pedicular screws at approximately T6, T7, T8 and   T9.   2. Degenerative changes in the thoracic spine.   3. No acute fracture.   4. Old granulomatous disease in the right hilum.      **This report has been created using voice recognition software. It may contain   minor errors which are inherent in voice recognition technology.**         Electronically signed by Dr. Urszula Garcia      CT Head W/O Contrast   Final Result      1. Hematoma in the scalp overlying the right parietal calvarium.   2. Mild atrophy and probable ischemic changes in the white matter..               **This report has been created using voice recognition software. It may contain   minor errors which are

## 2024-06-27 NOTE — ED PROVIDER NOTES
[facility] EMERGENCY DEPARTMENT  EMERGENCY DEPARTMENT ENCOUNTER          Pt Name: Tuan Hassan  MRN: [id number]  Birthdate 1950  Date of evaluation: 6/27/2024  Resident Physician: Gibson Escobar MD EM Resident PGY-2  Attending Physician: Sky Mckinney DO      CHIEF COMPLAINT       Chief Complaint   Patient presents with    Fall         HISTORY OF PRESENT ILLNESS    HPI  Tuan Hassan is a 73 y.o. male who presents to the emergency department from home, brought in by EMS for evaluation of fall.  Patient was in his driveway unloading groceries when he was found unresponsive by his car by a neighbor.  Per EMS upon their arrival patient was alert.  He denies recollection of the event.  Patient noted to have abrasion right side of head, right forearm.  Patient is on Brilinta.  Patient endorsing pain between his shoulder blades and on the right side of his head.      The patient has no other acute complaints at this time.    ROS negative except as stated above.    PAST MEDICAL AND SURGICAL HISTORY     Past Medical History:   Diagnosis Date    RAUL (acute kidney injury) ([facility]) 02/13/2020    ASHD (arteriosclerotic heart disease) 2005 2006    stent  baki    Closed fracture of first lumbar vertebra ([facility]) 05/06/2022    Closed T12 fracture ([facility]) 05/06/2022    Depression     Diabetic peripheral neuropathy ([facility]) 2014    Fracture 10/1984    left lower extremity     HTN (hypertension)     Hypercholesteremia     IDDM (insulin dependent diabetes mellitus)     Low back pain     Morbid obesity with BMI of 45.0-49.9, adult ([facility])     Osteoarthritis     S/P CABG (coronary artery bypass graft)      Past Surgical History:   Procedure Laterality Date    AMPUTATION Left 9/10/14    partial versus complete left hallux amputation, left great toe amputation    APPENDECTOMY      BACK INJECTION Bilateral 1/18/2021    Bilateral Si MBB #1 performed by Raul Parikh MD at [facility] [place] OR    BACK INJECTION Bilateral

## 2024-06-27 NOTE — ED TRIAGE NOTES
Pt presents to ED via EMS after a fall at home while unloading groceries. Pt was found unresponsive by his car. When EMS arrived, pt was alert. Pt denies recollection of event. Pt has abrasion on right side of head and right forearm. Pt on Brilinta. Pt reports pain between shoulder blades and in head. EMS blood sugar 190. Pt. A&O x4.

## 2024-06-28 ENCOUNTER — APPOINTMENT (OUTPATIENT)
Dept: INTERVENTIONAL RADIOLOGY/VASCULAR | Age: 74
End: 2024-06-28
Payer: MEDICARE

## 2024-06-28 ENCOUNTER — APPOINTMENT (OUTPATIENT)
Age: 74
End: 2024-06-28
Payer: MEDICARE

## 2024-06-28 LAB
ANION GAP SERPL CALC-SCNC: 10 MEQ/L (ref 8–16)
BUN SERPL-MCNC: 26 MG/DL (ref 7–22)
CALCIUM SERPL-MCNC: 9.1 MG/DL (ref 8.5–10.5)
CHLORIDE SERPL-SCNC: 102 MEQ/L (ref 98–111)
CHOLESTEROL, FASTING: 122 MG/DL (ref 100–199)
CO2 SERPL-SCNC: 29 MEQ/L (ref 23–33)
CREAT SERPL-MCNC: 1.5 MG/DL (ref 0.4–1.2)
DEPRECATED MEAN GLUCOSE BLD GHB EST-ACNC: 138 MG/DL (ref 70–126)
DEPRECATED RDW RBC AUTO: 43.1 FL (ref 35–45)
ECHO AO ASC DIAM: 4 CM
ECHO AO ASCENDING AORTA INDEX: 1.45 CM/M2
ECHO AV CUSP MM: 2.4 CM
ECHO AV PEAK GRADIENT: 8 MMHG
ECHO AV PEAK VELOCITY: 1.4 M/S
ECHO AV VELOCITY RATIO: 0.71
ECHO BSA: 2.92 M2
ECHO BSA: 2.92 M2
ECHO LA AREA 2C: 28.8 CM2
ECHO LA AREA 4C: 31.4 CM2
ECHO LA DIAMETER INDEX: 1.56 CM/M2
ECHO LA DIAMETER: 4.3 CM
ECHO LA MAJOR AXIS: 6.6 CM
ECHO LA MINOR AXIS: 6.9 CM
ECHO LA VOL BP: 109 ML (ref 18–58)
ECHO LA VOL MOD A2C: 97 ML (ref 18–58)
ECHO LA VOL MOD A4C: 118 ML (ref 18–58)
ECHO LA VOL/BSA BIPLANE: 40 ML/M2 (ref 16–34)
ECHO LA VOLUME INDEX MOD A2C: 35 ML/M2 (ref 16–34)
ECHO LA VOLUME INDEX MOD A4C: 43 ML/M2 (ref 16–34)
ECHO LV E' LATERAL VELOCITY: 9 CM/S
ECHO LV E' SEPTAL VELOCITY: 6 CM/S
ECHO LV FRACTIONAL SHORTENING: 32 % (ref 28–44)
ECHO LV INTERNAL DIMENSION DIASTOLE INDEX: 2.18 CM/M2
ECHO LV INTERNAL DIMENSION DIASTOLIC: 6 CM (ref 4.2–5.9)
ECHO LV INTERNAL DIMENSION SYSTOLIC INDEX: 1.49 CM/M2
ECHO LV INTERNAL DIMENSION SYSTOLIC: 4.1 CM
ECHO LV IVSD: 1.3 CM (ref 0.6–1)
ECHO LV MASS 2D: 368.8 G (ref 88–224)
ECHO LV MASS INDEX 2D: 134.1 G/M2 (ref 49–115)
ECHO LV POSTERIOR WALL DIASTOLIC: 1.4 CM (ref 0.6–1)
ECHO LV RELATIVE WALL THICKNESS RATIO: 0.47
ECHO LVOT PEAK GRADIENT: 4 MMHG
ECHO LVOT PEAK VELOCITY: 1 M/S
ECHO MV A VELOCITY: 1.15 M/S
ECHO MV E DECELERATION TIME (DT): 345 MS
ECHO MV E VELOCITY: 0.95 M/S
ECHO MV E/A RATIO: 0.83
ECHO MV E/E' LATERAL: 10.56
ECHO MV E/E' RATIO (AVERAGED): 13.19
ECHO MV E/E' SEPTAL: 15.83
ECHO PV MAX VELOCITY: 0.7 M/S
ECHO PV PEAK GRADIENT: 2 MMHG
ECHO RV INTERNAL DIMENSION: 3.1 CM
ECHO TV E WAVE: 1 M/S
EKG ATRIAL RATE: 81 BPM
EKG P AXIS: 62 DEGREES
EKG P-R INTERVAL: 220 MS
EKG Q-T INTERVAL: 398 MS
EKG QRS DURATION: 134 MS
EKG QTC CALCULATION (BAZETT): 462 MS
EKG R AXIS: -45 DEGREES
EKG T AXIS: 21 DEGREES
EKG VENTRICULAR RATE: 81 BPM
ERYTHROCYTE [DISTWIDTH] IN BLOOD BY AUTOMATED COUNT: 12.4 % (ref 11.5–14.5)
GFR SERPL CREATININE-BSD FRML MDRD: 48 ML/MIN/1.73M2
GLUCOSE BLD STRIP.AUTO-MCNC: 180 MG/DL (ref 70–108)
GLUCOSE BLD STRIP.AUTO-MCNC: 278 MG/DL (ref 70–108)
GLUCOSE BLD STRIP.AUTO-MCNC: 293 MG/DL (ref 70–108)
GLUCOSE BLD STRIP.AUTO-MCNC: 294 MG/DL (ref 70–108)
GLUCOSE SERPL-MCNC: 182 MG/DL (ref 70–108)
HBA1C MFR BLD HPLC: 6.6 % (ref 4.4–6.4)
HCT VFR BLD AUTO: 31.3 % (ref 42–52)
HDLC SERPL-MCNC: 28 MG/DL
HGB BLD-MCNC: 10.7 GM/DL (ref 14–18)
LDLC SERPL CALC-MCNC: 55 MG/DL
MAGNESIUM SERPL-MCNC: 2 MG/DL (ref 1.6–2.4)
MCH RBC QN AUTO: 32.1 PG (ref 26–33)
MCHC RBC AUTO-ENTMCNC: 34.2 GM/DL (ref 32.2–35.5)
MCV RBC AUTO: 94 FL (ref 80–94)
OSMOLALITY SERPL CALC.SUM OF ELEC: 290.7 MOSMOL/KG (ref 275–300)
PLATELET # BLD AUTO: 162 THOU/MM3 (ref 130–400)
PMV BLD AUTO: 9.8 FL (ref 9.4–12.4)
POTASSIUM SERPL-SCNC: 4.4 MEQ/L (ref 3.5–5.2)
RBC # BLD AUTO: 3.33 MILL/MM3 (ref 4.7–6.1)
SODIUM SERPL-SCNC: 141 MEQ/L (ref 135–145)
TRIGLYCERIDE, FASTING: 194 MG/DL (ref 0–199)
WBC # BLD AUTO: 5.8 THOU/MM3 (ref 4.8–10.8)

## 2024-06-28 PROCEDURE — 80048 BASIC METABOLIC PNL TOTAL CA: CPT

## 2024-06-28 PROCEDURE — 93005 ELECTROCARDIOGRAM TRACING: CPT

## 2024-06-28 PROCEDURE — G0378 HOSPITAL OBSERVATION PER HR: HCPCS

## 2024-06-28 PROCEDURE — 36415 COLL VENOUS BLD VENIPUNCTURE: CPT

## 2024-06-28 PROCEDURE — 6360000002 HC RX W HCPCS

## 2024-06-28 PROCEDURE — 6370000000 HC RX 637 (ALT 250 FOR IP)

## 2024-06-28 PROCEDURE — 93306 TTE W/DOPPLER COMPLETE: CPT | Performed by: NUCLEAR MEDICINE

## 2024-06-28 PROCEDURE — 82948 REAGENT STRIP/BLOOD GLUCOSE: CPT

## 2024-06-28 PROCEDURE — 80061 LIPID PANEL: CPT

## 2024-06-28 PROCEDURE — 96375 TX/PRO/DX INJ NEW DRUG ADDON: CPT

## 2024-06-28 PROCEDURE — 93306 TTE W/DOPPLER COMPLETE: CPT

## 2024-06-28 PROCEDURE — 93010 ELECTROCARDIOGRAM REPORT: CPT | Performed by: NUCLEAR MEDICINE

## 2024-06-28 PROCEDURE — 96372 THER/PROPH/DIAG INJ SC/IM: CPT

## 2024-06-28 PROCEDURE — 83735 ASSAY OF MAGNESIUM: CPT

## 2024-06-28 PROCEDURE — 93970 EXTREMITY STUDY: CPT

## 2024-06-28 PROCEDURE — 83036 HEMOGLOBIN GLYCOSYLATED A1C: CPT

## 2024-06-28 PROCEDURE — 85027 COMPLETE CBC AUTOMATED: CPT

## 2024-06-28 PROCEDURE — 99222 1ST HOSP IP/OBS MODERATE 55: CPT | Performed by: STUDENT IN AN ORGANIZED HEALTH CARE EDUCATION/TRAINING PROGRAM

## 2024-06-28 PROCEDURE — 2580000003 HC RX 258

## 2024-06-28 RX ORDER — DEXTROSE MONOHYDRATE 100 MG/ML
INJECTION, SOLUTION INTRAVENOUS CONTINUOUS PRN
Status: DISCONTINUED | OUTPATIENT
Start: 2024-06-28 | End: 2024-06-29 | Stop reason: HOSPADM

## 2024-06-28 RX ORDER — FEBUXOSTAT 80 MG/1
80 TABLET, FILM COATED ORAL DAILY
Status: DISCONTINUED | OUTPATIENT
Start: 2024-06-28 | End: 2024-06-29 | Stop reason: HOSPADM

## 2024-06-28 RX ORDER — INSULIN LISPRO 100 [IU]/ML
0-4 INJECTION, SOLUTION INTRAVENOUS; SUBCUTANEOUS NIGHTLY
Status: DISCONTINUED | OUTPATIENT
Start: 2024-06-28 | End: 2024-06-29 | Stop reason: HOSPADM

## 2024-06-28 RX ORDER — AMLODIPINE BESYLATE 5 MG/1
5 TABLET ORAL DAILY
Status: DISCONTINUED | OUTPATIENT
Start: 2024-06-28 | End: 2024-06-29 | Stop reason: HOSPADM

## 2024-06-28 RX ORDER — INSULIN GLARGINE 100 [IU]/ML
50 INJECTION, SOLUTION SUBCUTANEOUS NIGHTLY
Status: DISCONTINUED | OUTPATIENT
Start: 2024-06-28 | End: 2024-06-29 | Stop reason: HOSPADM

## 2024-06-28 RX ORDER — GLUCAGON 1 MG/ML
1 KIT INJECTION PRN
Status: DISCONTINUED | OUTPATIENT
Start: 2024-06-28 | End: 2024-06-29 | Stop reason: HOSPADM

## 2024-06-28 RX ORDER — ASPIRIN 81 MG/1
81 TABLET ORAL DAILY
Status: DISCONTINUED | OUTPATIENT
Start: 2024-06-28 | End: 2024-06-29 | Stop reason: HOSPADM

## 2024-06-28 RX ORDER — ATORVASTATIN CALCIUM 10 MG/1
10 TABLET, FILM COATED ORAL DAILY
Status: DISCONTINUED | OUTPATIENT
Start: 2024-06-28 | End: 2024-06-29 | Stop reason: HOSPADM

## 2024-06-28 RX ORDER — BUMETANIDE 0.25 MG/ML
1 INJECTION INTRAMUSCULAR; INTRAVENOUS DAILY
Status: DISCONTINUED | OUTPATIENT
Start: 2024-06-28 | End: 2024-06-29 | Stop reason: HOSPADM

## 2024-06-28 RX ORDER — INSULIN LISPRO 100 [IU]/ML
0-8 INJECTION, SOLUTION INTRAVENOUS; SUBCUTANEOUS
Status: DISCONTINUED | OUTPATIENT
Start: 2024-06-28 | End: 2024-06-29 | Stop reason: HOSPADM

## 2024-06-28 RX ORDER — HYDRALAZINE HYDROCHLORIDE 20 MG/ML
10 INJECTION INTRAMUSCULAR; INTRAVENOUS EVERY 6 HOURS PRN
Status: DISCONTINUED | OUTPATIENT
Start: 2024-06-28 | End: 2024-06-29 | Stop reason: HOSPADM

## 2024-06-28 RX ORDER — ALBUTEROL SULFATE 90 UG/1
2 AEROSOL, METERED RESPIRATORY (INHALATION) EVERY 4 HOURS PRN
Status: DISCONTINUED | OUTPATIENT
Start: 2024-06-28 | End: 2024-06-29 | Stop reason: HOSPADM

## 2024-06-28 RX ORDER — INSULIN LISPRO 100 [IU]/ML
12 INJECTION, SOLUTION INTRAVENOUS; SUBCUTANEOUS
Status: DISCONTINUED | OUTPATIENT
Start: 2024-06-28 | End: 2024-06-29 | Stop reason: HOSPADM

## 2024-06-28 RX ORDER — COLCHICINE 0.6 MG/1
0.6 TABLET ORAL DAILY
Status: DISCONTINUED | OUTPATIENT
Start: 2024-06-28 | End: 2024-06-29 | Stop reason: HOSPADM

## 2024-06-28 RX ORDER — PROBENECID 500 MG/1
500 TABLET, FILM COATED ORAL 2 TIMES DAILY
Status: DISCONTINUED | OUTPATIENT
Start: 2024-06-28 | End: 2024-06-29 | Stop reason: HOSPADM

## 2024-06-28 RX ADMIN — ASPIRIN 81 MG: 81 TABLET, COATED ORAL at 15:02

## 2024-06-28 RX ADMIN — COLCHICINE 0.6 MG: 0.6 TABLET, FILM COATED ORAL at 15:02

## 2024-06-28 RX ADMIN — ENOXAPARIN SODIUM 40 MG: 100 INJECTION SUBCUTANEOUS at 10:26

## 2024-06-28 RX ADMIN — TICAGRELOR 90 MG: 90 TABLET ORAL at 15:02

## 2024-06-28 RX ADMIN — ENOXAPARIN SODIUM 40 MG: 100 INJECTION SUBCUTANEOUS at 23:01

## 2024-06-28 RX ADMIN — PROBENECID 500 MG: 500 TABLET, FILM COATED ORAL at 20:38

## 2024-06-28 RX ADMIN — SODIUM CHLORIDE, PRESERVATIVE FREE 10 ML: 5 INJECTION INTRAVENOUS at 20:38

## 2024-06-28 RX ADMIN — INSULIN GLARGINE 50 UNITS: 100 INJECTION, SOLUTION SUBCUTANEOUS at 20:40

## 2024-06-28 RX ADMIN — AMLODIPINE BESYLATE 5 MG: 5 TABLET ORAL at 15:02

## 2024-06-28 RX ADMIN — INSULIN LISPRO 12 UNITS: 100 INJECTION, SOLUTION INTRAVENOUS; SUBCUTANEOUS at 19:41

## 2024-06-28 RX ADMIN — TICAGRELOR 90 MG: 90 TABLET ORAL at 20:38

## 2024-06-28 RX ADMIN — BUMETANIDE 1 MG: 0.25 INJECTION INTRAMUSCULAR; INTRAVENOUS at 15:03

## 2024-06-28 RX ADMIN — SODIUM CHLORIDE, PRESERVATIVE FREE 10 ML: 5 INJECTION INTRAVENOUS at 10:26

## 2024-06-28 RX ADMIN — ATORVASTATIN CALCIUM 10 MG: 10 TABLET, FILM COATED ORAL at 15:02

## 2024-06-28 ASSESSMENT — PAIN SCALES - GENERAL
PAINLEVEL_OUTOF10: 0
PAINLEVEL_OUTOF10: 0

## 2024-06-28 NOTE — CARE COORDINATION
Case Management Assessment Initial Evaluation    Date/Time of Evaluation: 2024 8:26 AM  Assessment Completed by: Sara Grider RN    If patient is discharged prior to next notation, then this note serves as note for discharge by case management.    Patient Name: Tuan Hassan                   YOB: 1950  Diagnosis: Syncope and collapse [R55]  Elevated troponin [R79.89]  Fall, initial encounter [W19.XXXA]  Syncope, unspecified syncope type [R55]                   Date / Time: 2024  3:01 PM  Location: [address] [place] [address]     Patient Admission Status: Observation   Readmission Risk Low 0-14, Mod 15-19), High > 20: No data recorded  Current PCP: Humberto Yap MD    Additional Case Management Notes: To ED after a fall at home while unloading groceries. Patient found unresponsive by his car. When EMS arrived, patient was alert. Hospitalist following. PT/OT. Telemetry. Up with assist. Creat 1.5 (1.6) Blood cultures pending. IV bumex daily. DM management.     Orthostatic Vitals:      2024    11:40 AM   Orthostatic Vitals   Orthostatic B/P and Pulse? Yes   Blood Pressure Lying 159/68   Pulse Lying 71 PER MINUTE   Blood Pressure Sitting 171/95   Pulse Sitting 77 PER MINUTE   Blood Pressure Standing 149/73   Pulse Standing 80 PER MINUTE   Procedures:    ECHO: pending    Imagin/27 CT Thoracic: Postoperative changes with pedicular screws at approximately T6, T7, T8 and T9. 2. Degenerative changes in the thoracic spine. 3. No acute fracture. 4. Old granulomatous disease in the right hilum.   CT Head: Hematoma in the scalp overlying the right parietal calvarium. 2. Mild atrophy and probable ischemic changes in the white matter.   CT Cervical: Negative    XR Right Elbow: Negative    XR Left Knee: Negative     Patient Goals/Plan/Treatment Preferences: Met w/ [facility]. Verified insurance and PCP. He lives in his senior living apartment independently. He drives. Has all needed DME.  Plans to return home alone. Denies needs.     06/28/24 1150   Service Assessment   Patient Orientation Alert and Oriented   Cognition Alert   History Provided By Patient   Primary Caregiver Self   Accompanied By/Relationship unaccompanied   Support Systems Children;Family Members   Patient's Healthcare Decision Maker is: Named in Scanned ACP Document   PCP Verified by CM Yes   Last Visit to PCP Within last 3 months   Prior Functional Level Independent in ADLs/IADLs   Current Functional Level Independent in ADLs/IADLs   Can patient return to prior living arrangement Yes   Ability to make needs known: Good   Family able to assist with home care needs: Yes   Would you like for me to discuss the discharge plan with any other family members/significant others, and if so, who? No   Financial Resources Medicare   Community Resources None   CM/SW Referral ADLs/IADLs   Social/Functional History   Lives With Alone   Type of Home Senior housing apartment   Active  Yes   Discharge Planning   Type of Residence Apartment   Living Arrangements Alone   Current Services Prior To Admission Durable Medical Equipment   Current DME Prior to Arrival Walker;Cane;Glucometer;Shower Chair  (RW and rollator)   Potential Assistance Needed N/A   DME Ordered? No   Potential Assistance Purchasing Medications No   Type of Home Care Services None   Patient expects to be discharged to: Apartment   History of falls? 1   Services At/After Discharge   Confirm Follow Up Transport Family   Condition of Participation: Discharge Planning   The Plan for Transition of Care is related to the following treatment goals: \"Just get me back home\"

## 2024-06-28 NOTE — PROGRESS NOTES
Hospitalist Progress Note  Internal Medicine Resident      Patient: Tuan Hassan 73 y.o. male      Unit/Bed: 8B-33/033-A    Admit Date: 6/27/2024      ASSESSMENT AND PLAN  Transient loss of consciousness: Mechanical fall versus cardiac etiology.  No prodrome no postictal period.  Patient reports unloading his groceries onto his walker, next thing he remembers was waking up in the ambulance.  Unclear if patient tripped and fell hit his head resulting in loss of consciousness versus losing consciousness falling and hitting his head.  Echo pending  30-day cardiac event monitor  Orthostats negative  PT OT    Bilateral lower extremity edema with chronic venous insufficiency and possible cellulitis: Does not follow with wound clinic.  Ulcerations to distal lower extremities with chronic venous stasis changes.  No leukocytosis, no inflammatory changes, no signs of active infection.  Wound team eval and treat  Monitor for signs and symptoms of infection  DVT scan showing no evidence of DVT in either leg bilaterally    Elevated troponin: Downtrending 34-28.  No chest pain EKG unremarkable    History of CAD s/p CABG 2015, DOMINIQUE mid LAD 2 and PTCA to previous stents in proximal and distal LAD 2017.  Continue aspirin, Brilinta, statin    HFpEF, with diastolic dysfunction: No signs of acute exacerbation.  Echo February 2023 EF 55%  Hold Lasix 60 mg daily  Started on 1 mg Bumex daily    CAD stage III: Creatinine 1.6 on admission baseline 1.4-1.8    Type 2 diabetes: Last A1c 7.2% 2/20/2024  Lantus 50 nightly  Medium dose sliding scale  Mealtime insulin lispro 12 units  Hypoglycemia protocol  POCT glucose checks    Hyperlipidemia: Atorvastatin 10 mg daily    Hypertension: Amlodipine 5 mg    Chronic anemia: Hemoglobin 12.0 on presentation, at baseline.  Iron 325 twice daily will continue at discharge.    History of T12, L1 fractures: Noted    History of gout: Colchicine 0.6 mg daily, febuxostat that 80 mg daily, probenecid 500  mg daily    Depression/anxiety: Noted    ===================================================================    Chief Complaint: Fall    Hospital Course:  73-year-old male PMH CAD s/p CABG, HFpEF, type 2 diabetes, CKD, hypertension, hyperlipidemia, gout, chronic back pain presented to [facility] 6/27/2024 secondary to transient loss of consciousness after fall outside of his home, currently on Brilinta.    Patient until if he lost consciousness then fell or fell and lost consciousness.  Denies any prodrome lightheadedness or dizziness, no recent illness, denies postictal state.    Endorses chronic swelling in his lower extremities bilaterally with new scaly skin changes over the past 3 weeks.  CT head in the ED shows hematoma on the scalp over the right parietal area.  CT C-spine no acute fractures.  CT thoracic spine screws at T6, 7, 8, 9 stable.  X-ray right elbow unremarkable.  X-ray left knee unremarkable.    Subjective (past 24 hours):   6/28/2024 patient seen and evaluated at bedside no acute complaints today patient with left lower extremity wound will consult wound care.  Patient chronically on 60 mg Lasix daily p.o. comments that he has had decreased urine output lately, will start Bumex 1 mg daily diuresis.  Awaiting cardiac echo, patient will need 30-day event monitor at discharge.      Medications:    Infusion Medications    dextrose      sodium chloride      Scheduled Medications    aspirin  81 mg Oral Daily    insulin lispro  12 Units SubCUTAneous TID     insulin glargine  50 Units SubCUTAneous Nightly    insulin lispro  0-8 Units SubCUTAneous TID     insulin lispro  0-4 Units SubCUTAneous Nightly    atorvastatin  10 mg Oral Daily    amLODIPine  5 mg Oral Daily    [Held by provider] furosemide  60 mg Oral Daily    colchicine  0.6 mg Oral Daily    febuxostat  80 mg Oral Daily    probenecid  500 mg Oral BID    ticagrelor  90 mg Oral BID    bumetanide  1 mg IntraVENous Daily    sodium chloride flush  5-40

## 2024-06-28 NOTE — PROGRESS NOTES
Assessment:  Tuan Encinas) is a 73 year old male admitted in 8B 33 at [facility]. Patient is alert , awake and able to verbally communicate with me during this encounter. This visit is in response to a Spiritual Consult requesting a  to see patient for spiritual and emotional support.   During this encounter, patient gave a life review regarding on his family background, Patient also states that he fell at home, injured himself and is now experiencing back pain and some pain in his right hand. Patient said his gokul is helping him go through his medical issue with hope. Patient said he fell and hit his head and went unconscious and could not remember for awhile what went on, but is doing fine and think he might be discharged to go home with heart monitor. Patient is lively and fund to be around. Patient is hopeful and grateful for life. Calm and encouraged.    Intervention:  I provided lengthy active listening presence allowing patient to shared what he wanted to talk about, including his gokul, family dynamic and social connections. I offered encouragement and nurtured hope.    Outcome:  Patient is receptive to chaplains visit and engaged in conversation well stating what excite him in life. Patient showed gratitude for the support he is receiving.     Plan:  Spiritual Care staff chaplains will continue to visit with patient and provide emotional and spiritual support as needed.

## 2024-06-28 NOTE — RT PROTOCOL NOTE
RT Inhaler-Nebulizer Bronchodilator Protocol Note    There is a bronchodilator order in the chart from a provider indicating to follow the RT Bronchodilator Protocol and there is an “Initiate RT Inhaler-Nebulizer Bronchodilator Protocol” order as well (see protocol at bottom of note).    CXR Findings:  No results found.    The findings from the last RT Protocol Assessment were as follows:   History Pulmonary Disease: None or smoker <15 pack years  Respiratory Pattern: Regular pattern and RR 12-20 bpm  Breath Sounds: Slightly diminished and/or crackles  Cough: Strong, spontaneous, non-productive  Indication for Bronchodilator Therapy: On home bronchodilators  Bronchodilator Assessment Score: 2    Aerosolized bronchodilator medication orders have been revised according to the RT Inhaler-Nebulizer Bronchodilator Protocol below.    Respiratory Therapist to perform RT Therapy Protocol Assessment initially then follow the protocol.  Repeat RT Therapy Protocol Assessment PRN for score 0-3 or on second treatment, BID, and PRN for scores above 3.    No Indications - adjust the frequency to every 6 hours PRN wheezing or bronchospasm, if no treatments needed after 48 hours then discontinue using Per Protocol order mode.     If indication present, adjust the RT bronchodilator orders based on the Bronchodilator Assessment Score as indicated below.  Use Inhaler orders unless patient has one or more of the following: on home nebulizer, not able to hold breath for 10 seconds, is not alert and oriented, cannot activate and use MDI correctly, or respiratory rate 25 breaths per minute or more, then use the equivalent nebulizer order(s) with same Frequency and PRN reasons based on the score.  If a patient is on this medication at home then do not decrease Frequency below that used at home.    0-3 - enter or revise RT bronchodilator order(s) to equivalent RT Bronchodilator order with Frequency of every 4 hours PRN for wheezing or  increased work of breathing using Per Protocol order mode.        4-6 - enter or revise RT Bronchodilator order(s) to two equivalent RT bronchodilator orders with one order with BID Frequency and one order with Frequency of every 4 hours PRN wheezing or increased work of breathing using Per Protocol order mode.        7-10 - enter or revise RT Bronchodilator order(s) to two equivalent RT bronchodilator orders with one order with TID Frequency and one order with Frequency of every 4 hours PRN wheezing or increased work of breathing using Per Protocol order mode.       11-13 - enter or revise RT Bronchodilator order(s) to one equivalent RT bronchodilator order with QID Frequency and an Albuterol order with Frequency of every 4 hours PRN wheezing or increased work of breathing using Per Protocol order mode.      Greater than 13 - enter or revise RT Bronchodilator order(s) to one equivalent RT bronchodilator order with every 4 hours Frequency and an Albuterol order with Frequency of every 2 hours PRN wheezing or increased work of breathing using Per Protocol order mode.     RT to enter RT Home Evaluation for COPD & MDI Assessment order using Per Protocol order mode.    Electronically signed by Mary Ann Tang RCP on 6/28/2024 at 1:42 PM

## 2024-06-28 NOTE — PROCEDURES
PROCEDURE NOTE  Date: 6/28/2024   Name: Tuan Hassan  YOB: 1950    Procedures  12 lead EKG completed. Results handed to Vidhi CHUA. Estrellita

## 2024-06-28 NOTE — PROGRESS NOTES
06/28/24 1341   RT Protocol   History Pulmonary Disease 0   Respiratory pattern 0   Breath sounds 2   Cough 0   Indications for Bronchodilator Therapy On home bronchodilators   Bronchodilator Assessment Score 2

## 2024-06-28 NOTE — PROGRESS NOTES
Pt was in bed and alone at the time of the visit. He was dealing with syncope and collapse. He was encouraged and anointed.    06/28/24 1611   Encounter Summary   Service Provided For Patient   Referral/Consult From Rounding   Support System Family members   Last Encounter  06/28/24  (Anointed.)   Complexity of Encounter Moderate   Begin Time 1300   End Time  1315   Total Time Calculated 15 min   Spiritual/Emotional needs   Type Spiritual Support   Rituals, Rites and Sacraments   Type Anointing   Assessment/Intervention/Outcome   Assessment Hopeful   Intervention Empowerment   Outcome Encouraged;Engaged in conversation

## 2024-06-28 NOTE — ED NOTES
Spoke to [facility] staff who approved patient transport to [facility] in stable conditon

## 2024-06-29 ENCOUNTER — APPOINTMENT (OUTPATIENT)
Age: 74
End: 2024-06-29
Payer: MEDICARE

## 2024-06-29 VITALS
SYSTOLIC BLOOD PRESSURE: 151 MMHG | TEMPERATURE: 98 F | OXYGEN SATURATION: 97 % | RESPIRATION RATE: 17 BRPM | DIASTOLIC BLOOD PRESSURE: 78 MMHG | BODY MASS INDEX: 40.43 KG/M2 | HEIGHT: 74 IN | HEART RATE: 70 BPM | WEIGHT: 315 LBS

## 2024-06-29 LAB
ANION GAP SERPL CALC-SCNC: 10 MEQ/L (ref 8–16)
BUN SERPL-MCNC: 23 MG/DL (ref 7–22)
CALCIUM SERPL-MCNC: 8.8 MG/DL (ref 8.5–10.5)
CHLORIDE SERPL-SCNC: 101 MEQ/L (ref 98–111)
CO2 SERPL-SCNC: 25 MEQ/L (ref 23–33)
CREAT SERPL-MCNC: 1.3 MG/DL (ref 0.4–1.2)
DEPRECATED RDW RBC AUTO: 44.8 FL (ref 35–45)
ECHO BSA: 2.92 M2
ERYTHROCYTE [DISTWIDTH] IN BLOOD BY AUTOMATED COUNT: 12.2 % (ref 11.5–14.5)
GFR SERPL CREATININE-BSD FRML MDRD: 57 ML/MIN/1.73M2
GLUCOSE BLD STRIP.AUTO-MCNC: 169 MG/DL (ref 70–108)
GLUCOSE BLD STRIP.AUTO-MCNC: 212 MG/DL (ref 70–108)
GLUCOSE SERPL-MCNC: 150 MG/DL (ref 70–108)
HCT VFR BLD AUTO: 33 % (ref 42–52)
HGB BLD-MCNC: 10.6 GM/DL (ref 14–18)
MAGNESIUM SERPL-MCNC: 2.3 MG/DL (ref 1.6–2.4)
MCH RBC QN AUTO: 32.2 PG (ref 26–33)
MCHC RBC AUTO-ENTMCNC: 32.1 GM/DL (ref 32.2–35.5)
MCV RBC AUTO: 100.3 FL (ref 80–94)
PLATELET # BLD AUTO: 164 THOU/MM3 (ref 130–400)
PMV BLD AUTO: 9.5 FL (ref 9.4–12.4)
POTASSIUM SERPL-SCNC: 4 MEQ/L (ref 3.5–5.2)
RBC # BLD AUTO: 3.29 MILL/MM3 (ref 4.7–6.1)
SODIUM SERPL-SCNC: 136 MEQ/L (ref 135–145)
WBC # BLD AUTO: 5.7 THOU/MM3 (ref 4.8–10.8)

## 2024-06-29 PROCEDURE — 6370000000 HC RX 637 (ALT 250 FOR IP)

## 2024-06-29 PROCEDURE — G0378 HOSPITAL OBSERVATION PER HR: HCPCS

## 2024-06-29 PROCEDURE — 36415 COLL VENOUS BLD VENIPUNCTURE: CPT

## 2024-06-29 PROCEDURE — 6360000002 HC RX W HCPCS

## 2024-06-29 PROCEDURE — 85027 COMPLETE CBC AUTOMATED: CPT

## 2024-06-29 PROCEDURE — 83735 ASSAY OF MAGNESIUM: CPT

## 2024-06-29 PROCEDURE — 93270 REMOTE 30 DAY ECG REV/REPORT: CPT

## 2024-06-29 PROCEDURE — 99239 HOSP IP/OBS DSCHRG MGMT >30: CPT | Performed by: STUDENT IN AN ORGANIZED HEALTH CARE EDUCATION/TRAINING PROGRAM

## 2024-06-29 PROCEDURE — 80048 BASIC METABOLIC PNL TOTAL CA: CPT

## 2024-06-29 PROCEDURE — 82948 REAGENT STRIP/BLOOD GLUCOSE: CPT

## 2024-06-29 PROCEDURE — 96376 TX/PRO/DX INJ SAME DRUG ADON: CPT

## 2024-06-29 RX ORDER — FERROUS SULFATE 325(65) MG
1 TABLET ORAL EVERY OTHER DAY
Qty: 60 TABLET | Refills: 5 | Status: SHIPPED | OUTPATIENT
Start: 2024-06-29

## 2024-06-29 RX ORDER — INSULIN GLARGINE 100 [IU]/ML
INJECTION, SOLUTION SUBCUTANEOUS
Qty: 15 ML | Refills: 0 | Status: SHIPPED | OUTPATIENT
Start: 2024-06-29

## 2024-06-29 RX ORDER — DOXYCYCLINE HYCLATE 100 MG
100 TABLET ORAL 2 TIMES DAILY
Qty: 14 TABLET | Refills: 0 | Status: SHIPPED | OUTPATIENT
Start: 2024-06-29 | End: 2024-07-06

## 2024-06-29 RX ORDER — BUMETANIDE 2 MG/1
1 TABLET ORAL 2 TIMES DAILY
Qty: 30 TABLET | Refills: 0 | Status: SHIPPED | OUTPATIENT
Start: 2024-06-29

## 2024-06-29 RX ADMIN — INSULIN LISPRO 12 UNITS: 100 INJECTION, SOLUTION INTRAVENOUS; SUBCUTANEOUS at 13:06

## 2024-06-29 RX ADMIN — ASPIRIN 81 MG: 81 TABLET, COATED ORAL at 08:37

## 2024-06-29 RX ADMIN — INSULIN LISPRO 12 UNITS: 100 INJECTION, SOLUTION INTRAVENOUS; SUBCUTANEOUS at 08:37

## 2024-06-29 RX ADMIN — BUMETANIDE 1 MG: 0.25 INJECTION INTRAMUSCULAR; INTRAVENOUS at 08:37

## 2024-06-29 RX ADMIN — COLCHICINE 0.6 MG: 0.6 TABLET, FILM COATED ORAL at 08:39

## 2024-06-29 RX ADMIN — PROBENECID 500 MG: 500 TABLET, FILM COATED ORAL at 08:39

## 2024-06-29 RX ADMIN — INSULIN LISPRO 2 UNITS: 100 INJECTION, SOLUTION INTRAVENOUS; SUBCUTANEOUS at 13:07

## 2024-06-29 RX ADMIN — AMLODIPINE BESYLATE 5 MG: 5 TABLET ORAL at 08:39

## 2024-06-29 RX ADMIN — TICAGRELOR 90 MG: 90 TABLET ORAL at 08:37

## 2024-06-29 RX ADMIN — ATORVASTATIN CALCIUM 10 MG: 10 TABLET, FILM COATED ORAL at 08:39

## 2024-06-29 ASSESSMENT — PAIN SCALES - GENERAL: PAINLEVEL_OUTOF10: 0

## 2024-06-29 NOTE — PLAN OF CARE
Problem: Pain  Goal: Verbalizes/displays adequate comfort level or baseline comfort level  Outcome: Progressing  Note: Patient can responsibly call out when in pain

## 2024-06-29 NOTE — DISCHARGE SUMMARY
by mouth twice daily     Therems-M Tabs  Take 1 tablet by mouth once daily     ticagrelor 90 MG Tabs tablet  Commonly known as: Brilinta  Take 1 tablet by mouth twice daily     tiZANidine 4 MG tablet  Commonly known as: ZANAFLEX  Take 1 tablet by mouth every 8 hours as needed (right  neck pain)     urea 10 % cream  Commonly known as: CARMOL  Apply topically as needed.           * This list has 1 medication(s) that are the same as other medications prescribed for you. Read the directions carefully, and ask your doctor or other care provider to review them with you.                STOP taking these medications      furosemide 40 MG tablet  Commonly known as: LASIX            ASK your doctor about these medications      ammonium lactate 12 % lotion  Commonly known as: LAC-HYDRIN  Apply topically as needed.     * insulin lispro 100 UNIT/ML Soln injection vial  Commonly known as: HUMALOG,ADMELOG  Inject 0-4 Units into the skin nightly           * This list has 1 medication(s) that are the same as other medications prescribed for you. Read the directions carefully, and ask your doctor or other care provider to review them with you.                   Where to Get Your Medications        These medications were sent to [facility] [address] [place] [address] -  [phone] - F [phone]  [address] [place] [id number]      Phone: [phone]   bumetanide 2 MG tablet  doxycycline hyclate 100 MG tablet  ferrous sulfate 325 (65 Fe) MG tablet  Lantus SoloStar 100 UNIT/ML injection pen          Time Spent on discharge is 45 minutes in the examination, evaluation, counseling and review of medications and discharge plan.    Thank you Humberto Yap MD for the opportunity to be involved in this patient's care.      Signed:    Electronically signed by Jeyson Marcelo MD on 6/29/24 at 1:25 PM EDT     Case was discussed with Attending, Cali Bueno MD

## 2024-06-29 NOTE — DISCHARGE INSTRUCTIONS
Follow up with cardiology   Follow up with infectious disease  Wear cardiac event monitor for 30 days  Start taking bumex 1mg every 12 hours at discharge  Start taking doxycycline for 7 days at discharge

## 2024-06-30 LAB
BACTERIA BLD AEROBE CULT: NORMAL
BACTERIA BLD AEROBE CULT: NORMAL

## 2024-07-01 ENCOUNTER — CARE COORDINATION (OUTPATIENT)
Dept: FAMILY MEDICINE CLINIC | Age: 74
End: 2024-07-01

## 2024-07-01 NOTE — CARE COORDINATION
Care Transitions Initial Follow Up Call    Call within 2 business days of discharge: Yes     Patient: Tuan Hassan Patient : 1950 MRN: [id number]    Last Discharge Facility       Date Complaint Diagnosis Description Type Department Provider    24 Fall Syncope and collapse ... ED to Hosp-Admission (Discharged) (ADMITTED) [facility] Cali Del Toro MD; Sky Mckinney D...            RARS: No data recorded     Spoke with: Sean (tuan)    Discharge department/facility: [facility]    Non-face-to-face services provided:  Scheduled appointment with PCP-yes  Scheduled appointment with Specialist-yes  Obtained and reviewed discharge summary and/or continuity of care documents  Reviewed and followed up on pending diagnostic tests and treatments-yes  Communication with home health agencies or other community services the patient is currently using-na  Communication with specialists who will assume or re-assume care of the patient's system-specific problems-na  Education of patient/family/caregiver/guardian to support self-management-yes  Assessment and support for treatment adherence and medication management-yes  Establishment or re-establishment of referrals-yes  Assistance in accessing community resources-na    Follow Up  Future Appointments   Date Time Provider Department Center   7/10/2024 12:10 PM Humberto Yap MD [facility] [employer] [facility]   2024 12:20 PM Humberto Yap MD [facility]     Spoke to Sean for follow up post hospital discharge for syncope and fall.Stated feeling fine since home. No more syncopal episodes. Has Heart Monitor but it won't stay on  Says he is seeing some one about this at [facility] tomorrow. Follow up with Dr. Yap 7/10. To follow with Dr. Rueda-cardiology and Dr. Cox-for cellulitis lower extremities. Receptive to follow up. Sean knows to call with any questions or concerns.  Anne Loomis RN

## 2024-07-03 LAB
BACTERIA BLD AEROBE CULT: NORMAL
BACTERIA BLD AEROBE CULT: NORMAL

## 2024-07-06 DIAGNOSIS — E78.00 HYPERCHOLESTEREMIA: ICD-10-CM

## 2024-07-08 ENCOUNTER — TELEPHONE (OUTPATIENT)
Dept: WOUND CARE | Age: 74
End: 2024-07-08

## 2024-07-08 NOTE — TELEPHONE ENCOUNTER
Date of last visit:  5/14/2024  Date of next visit:  7/10/2024    Requested Prescriptions     Pending Prescriptions Disp Refills    atorvastatin (LIPITOR) 10 MG tablet [Pharmacy Med Name: Atorvastatin Calcium 10 MG Oral Tablet] 90 tablet 1     Sig: Take 1 tablet by mouth once daily

## 2024-07-09 RX ORDER — ATORVASTATIN CALCIUM 10 MG/1
TABLET, FILM COATED ORAL
Qty: 90 TABLET | Refills: 1 | Status: SHIPPED | OUTPATIENT
Start: 2024-07-09

## 2024-07-11 ENCOUNTER — CARE COORDINATION (OUTPATIENT)
Dept: FAMILY MEDICINE CLINIC | Age: 74
End: 2024-07-11

## 2024-07-11 NOTE — CARE COORDINATION
Week #2 for follow up post hospital discharge for syncopal episode.  Sean cancelled his hospital follow up with Dr. Yap due to a break down of elevator in his apartment-he can't make it down with walker. He called today to say it will not be fixed until 8/2! His heart monitor isn't staying on and he can't get to hospital for them to hook it back up. He also did not go to Wound Clinic as scheduled Appointment with Dr. Yap not until 8/19. He state he is doing fine-no dizziness or feeling like he might pass out. He then asked if nurtri bars would be good for diabetic. We checked label on line and it is high in sugar and carbs. Gave him names of ones that would be good-however sometimes they cost more. He grocery shops at [employer]-told him to check the varieties there and labels-same with nutritional drinks. He mentioned Ensure and I suggested Glucerna.  Told him to check [employer]. Also I was wondering how he was going to go to grocery if he can't get out. He has a \"plan\".   Will follow with him next week.

## 2024-07-17 ENCOUNTER — CARE COORDINATION (OUTPATIENT)
Dept: FAMILY MEDICINE CLINIC | Age: 74
End: 2024-07-17

## 2024-07-17 NOTE — CARE COORDINATION
Spoke to Sean Week #2 follow up post hospital discharge for syncopal episode. Elevator still down at apartment building. Rescheduled Wound Clinic appointment for 8/2. To See Dr. Rueda 8/26-has not been to cardiology at hospital to be reattached to heart monitor. I explained Dr. Rueda might want that completed prior to follow up. He is to take that up with clinic for monitor. He verbalized understanding. Otherwise Molamin is feeling ok, however has experienced some severe lower back pain that radiated down both legs. He stated better today and he has been taking it easy-like not attempting to go down the stairs in his building. Sean knows to call with any questions or concerns, will follow next week.

## 2024-07-24 ENCOUNTER — CARE COORDINATION (OUTPATIENT)
Dept: FAMILY MEDICINE CLINIC | Age: 74
End: 2024-07-24

## 2024-07-24 NOTE — CARE COORDINATION
Spoke to Sean Week #3 follow up post hospital discharge for syncopal episode. Denies any more blackouts or feelings of dizziness. Elevator now working in building. Plans on going to have heart monitor placed back on tomorrow. Appointments verified regarding wound clinic 8/2 and Dr. Yap 8/19. Encouraged Sean to call with any questions or concerns.

## 2024-07-29 RX ORDER — INSULIN GLARGINE 100 [IU]/ML
INJECTION, SOLUTION SUBCUTANEOUS
Qty: 15 ML | Refills: 2 | Status: SHIPPED | OUTPATIENT
Start: 2024-07-29

## 2024-07-29 NOTE — TELEPHONE ENCOUNTER
Date of last visit:  5/14/2024  Date of next visit:  8/19/2024    Requested Prescriptions     Pending Prescriptions Disp Refills    LANTUS SOLOSTAR 100 UNIT/ML injection pen [Pharmacy Med Name: Lantus SoloStar 100 UNIT/ML Subcutaneous Solution Pen-injector] 15 mL 0     Sig: INJECT 50 UNITS SUBCUTANEOUSLY NIGHTLY

## 2024-07-30 LAB — ECHO BSA: 2.92 M2

## 2024-07-31 ENCOUNTER — CARE COORDINATION (OUTPATIENT)
Dept: FAMILY MEDICINE CLINIC | Age: 74
End: 2024-07-31

## 2024-07-31 NOTE — CARE COORDINATION
Final Follow up:  Received call from Sean. Had questions:  What can he take for memory? Talked about Prevagen-encouraged to talk with Dr. Ypa at appointment 8/19  Still concerned could not remember his fall and/or talking to medics and people at that time? Discussed having a temporary amnesia to event. CT of head negative. Again, encouraged to discuss at appointment.   At this time Sean has denied any periods of dizziness or feeling like going to pass out.   Explained this is last follow up, but he is encouraged to call with any questions or concerns.

## 2024-08-19 ENCOUNTER — OFFICE VISIT (OUTPATIENT)
Dept: FAMILY MEDICINE CLINIC | Age: 74
End: 2024-08-19

## 2024-08-19 VITALS
BODY MASS INDEX: 40.43 KG/M2 | HEART RATE: 76 BPM | SYSTOLIC BLOOD PRESSURE: 130 MMHG | DIASTOLIC BLOOD PRESSURE: 74 MMHG | WEIGHT: 315 LBS | RESPIRATION RATE: 18 BRPM | HEIGHT: 74 IN

## 2024-08-19 DIAGNOSIS — E66.01 CLASS 3 SEVERE OBESITY WITH SERIOUS COMORBIDITY AND BODY MASS INDEX (BMI) OF 45.0 TO 49.9 IN ADULT, UNSPECIFIED OBESITY TYPE (HCC): ICD-10-CM

## 2024-08-19 DIAGNOSIS — I87.2 VENOUS STASIS DERMATITIS OF BOTH LOWER EXTREMITIES: ICD-10-CM

## 2024-08-19 DIAGNOSIS — M10.9 GOUTY ARTHRITIS OF BOTH KNEES: ICD-10-CM

## 2024-08-19 DIAGNOSIS — I10 PRIMARY HYPERTENSION: ICD-10-CM

## 2024-08-19 DIAGNOSIS — E78.00 HYPERCHOLESTEREMIA: ICD-10-CM

## 2024-08-19 DIAGNOSIS — R55 SYNCOPE AND COLLAPSE: Primary | ICD-10-CM

## 2024-08-19 DIAGNOSIS — Z98.61 S/P PERCUTANEOUS TRANSLUMINAL CORONARY ANGIOPLASTY: ICD-10-CM

## 2024-08-19 DIAGNOSIS — Z79.4 TYPE 2 DIABETES MELLITUS WITH DIABETIC POLYNEUROPATHY, WITH LONG-TERM CURRENT USE OF INSULIN (HCC): ICD-10-CM

## 2024-08-19 DIAGNOSIS — I50.32 CHRONIC DIASTOLIC (CONGESTIVE) HEART FAILURE (HCC): ICD-10-CM

## 2024-08-19 DIAGNOSIS — Z91.81 AT HIGH RISK FOR FALLS: ICD-10-CM

## 2024-08-19 DIAGNOSIS — I25.708 CORONARY ARTERY DISEASE OF BYPASS GRAFT OF NATIVE HEART WITH STABLE ANGINA PECTORIS (HCC): ICD-10-CM

## 2024-08-19 DIAGNOSIS — E11.42 TYPE 2 DIABETES MELLITUS WITH DIABETIC POLYNEUROPATHY, WITH LONG-TERM CURRENT USE OF INSULIN (HCC): ICD-10-CM

## 2024-08-19 DIAGNOSIS — E11.42 DIABETIC PERIPHERAL NEUROPATHY (HCC): ICD-10-CM

## 2024-08-19 DIAGNOSIS — M48.061 SPINAL STENOSIS OF LUMBAR REGION WITHOUT NEUROGENIC CLAUDICATION: ICD-10-CM

## 2024-08-19 PROCEDURE — G8427 DOCREV CUR MEDS BY ELIG CLIN: HCPCS | Performed by: FAMILY MEDICINE

## 2024-08-19 PROCEDURE — 99213 OFFICE O/P EST LOW 20 MIN: CPT | Performed by: FAMILY MEDICINE

## 2024-08-19 PROCEDURE — G8417 CALC BMI ABV UP PARAM F/U: HCPCS | Performed by: FAMILY MEDICINE

## 2024-08-19 PROCEDURE — 1123F ACP DISCUSS/DSCN MKR DOCD: CPT | Performed by: FAMILY MEDICINE

## 2024-08-19 PROCEDURE — 1036F TOBACCO NON-USER: CPT | Performed by: FAMILY MEDICINE

## 2024-08-19 RX ORDER — FUROSEMIDE 40 MG/1
40 TABLET ORAL 2 TIMES DAILY
COMMUNITY
Start: 2024-07-08

## 2024-08-19 ASSESSMENT — ENCOUNTER SYMPTOMS
BACK PAIN: 1
COUGH: 0
NAUSEA: 0
SORE THROAT: 0
CONSTIPATION: 0
EYE PAIN: 0
ABDOMINAL PAIN: 0
SHORTNESS OF BREATH: 0
CHEST TIGHTNESS: 0
TROUBLE SWALLOWING: 0
BLOOD IN STOOL: 0

## 2024-08-19 NOTE — PROGRESS NOTES
Subjective   Patient ID: Tuan Hassan is a 74 y.o. male.     Syncope  in  June and  event  monitor   would  not  stay  on        Ashd  stable       Chronic  diastolic  stable       Back pain  noted      fair       Venous  insuf  stable     Diabetes  He presents for his follow-up diabetic visit. He has type 2 diabetes mellitus. His disease course has been stable. There are no hypoglycemic associated symptoms. Pertinent negatives for hypoglycemia include no dizziness or headaches. Pertinent negatives for diabetes include no chest pain, no fatigue and no weakness. Symptoms are stable. Current diabetic treatment includes insulin injections. Meal planning includes avoidance of concentrated sweets. There is no change in his home blood glucose trend. His breakfast blood glucose is taken between 8-9 am. His breakfast blood glucose range is generally 140-180 mg/dl. An ACE inhibitor/angiotensin II receptor blocker is being taken.   Hypertension  This is a chronic problem. The current episode started yesterday. The problem has been resolved since onset. The problem is controlled. Pertinent negatives include no chest pain, headaches, palpitations or shortness of breath. The current treatment provides significant improvement. There are no compliance problems.      Past Medical History:   Diagnosis Date    RAUL (acute kidney injury) ([facility]) 02/13/2020    ASHD (arteriosclerotic heart disease) 2005 2006    stent  baki    Closed fracture of first lumbar vertebra ([facility]) 05/06/2022    Closed T12 fracture ([facility]) 05/06/2022    Depression     Diabetic peripheral neuropathy ([facility]) 2014    Fracture 10/1984    left lower extremity     HTN (hypertension)     Hypercholesteremia     IDDM (insulin dependent diabetes mellitus)     Low back pain     Morbid obesity with BMI of 45.0-49.9, adult ([facility])     Osteoarthritis     S/P CABG (coronary artery bypass graft)         Review of Systems   Constitutional:  Negative for fatigue and fever.   HENT:

## 2024-08-20 DIAGNOSIS — Z79.4 TYPE 2 DIABETES MELLITUS WITH DIABETIC POLYNEUROPATHY, WITH LONG-TERM CURRENT USE OF INSULIN (HCC): ICD-10-CM

## 2024-08-20 DIAGNOSIS — E11.42 TYPE 2 DIABETES MELLITUS WITH DIABETIC POLYNEUROPATHY, WITH LONG-TERM CURRENT USE OF INSULIN (HCC): ICD-10-CM

## 2024-08-20 RX ORDER — INSULIN ASPART 100 [IU]/ML
INJECTION, SOLUTION INTRAVENOUS; SUBCUTANEOUS
Qty: 15 ML | Refills: 1 | Status: SHIPPED | OUTPATIENT
Start: 2024-08-20

## 2024-08-20 NOTE — TELEPHONE ENCOUNTER
Pt called to req an refill on the following    NOVOLOG FLEXPEN 100 UNIT/ML injection pen   INJECT 12 UNITS INTO THE SKIN THREE TIMES DAILY BEFORE MEALS PLUS USE SCALE IF HIGH     Please send to [facility] [address]    Pt stated he has half of a pen  left

## 2024-08-20 NOTE — TELEPHONE ENCOUNTER
Date of last visit:  8/19/2024  Date of next visit:  10/7/2024    Requested Prescriptions     Pending Prescriptions Disp Refills    NOVOLOG FLEXPEN 100 UNIT/ML injection pen 15 mL 1     Sig: INJECT 12 UNITS INTO THE SKIN THREE TIMES DAILY BEFORE MEALS PLUS USE SCALE IF HIGH. Max dose of 85 units daily.

## 2024-08-26 ENCOUNTER — OFFICE VISIT (OUTPATIENT)
Dept: CARDIOLOGY CLINIC | Age: 74
End: 2024-08-26
Payer: MEDICARE

## 2024-08-26 VITALS
WEIGHT: 315 LBS | DIASTOLIC BLOOD PRESSURE: 74 MMHG | HEART RATE: 96 BPM | SYSTOLIC BLOOD PRESSURE: 122 MMHG | BODY MASS INDEX: 41.75 KG/M2 | HEIGHT: 73 IN

## 2024-08-26 DIAGNOSIS — I10 PRIMARY HYPERTENSION: Primary | ICD-10-CM

## 2024-08-26 DIAGNOSIS — E78.01 FAMILIAL HYPERCHOLESTEROLEMIA: ICD-10-CM

## 2024-08-26 DIAGNOSIS — I25.810 CORONARY ARTERY DISEASE INVOLVING CORONARY BYPASS GRAFT OF NATIVE HEART WITHOUT ANGINA PECTORIS: ICD-10-CM

## 2024-08-26 PROCEDURE — G8427 DOCREV CUR MEDS BY ELIG CLIN: HCPCS | Performed by: NUCLEAR MEDICINE

## 2024-08-26 PROCEDURE — 99213 OFFICE O/P EST LOW 20 MIN: CPT | Performed by: NUCLEAR MEDICINE

## 2024-08-26 PROCEDURE — 1123F ACP DISCUSS/DSCN MKR DOCD: CPT | Performed by: NUCLEAR MEDICINE

## 2024-08-26 PROCEDURE — 3074F SYST BP LT 130 MM HG: CPT | Performed by: NUCLEAR MEDICINE

## 2024-08-26 PROCEDURE — 1036F TOBACCO NON-USER: CPT | Performed by: NUCLEAR MEDICINE

## 2024-08-26 PROCEDURE — G8417 CALC BMI ABV UP PARAM F/U: HCPCS | Performed by: NUCLEAR MEDICINE

## 2024-08-26 PROCEDURE — 3078F DIAST BP <80 MM HG: CPT | Performed by: NUCLEAR MEDICINE

## 2024-08-26 NOTE — PROGRESS NOTES
[facility] [place] [facility]  [facility] CARDIOLOGY  [address]  [address]  [place] [address]  Dept: [phone]  Dept Fax: [phone]  Loc: [phone]    Visit Date: 8/26/2024    Tuan Hassan is a 74 y.o. male who presents todayfor:  Chief Complaint   Patient presents with    Check-Up    Hypertension    Coronary Artery Disease    Hyperlipidemia   Know CABG and stents  Had admission for a fall   No fractures  No changes in breathing  Very limited   No chest pain   Baseline dyspnea  BP is stable  On medical RX       HPI:  HPI  Past Medical History:   Diagnosis Date    RAUL (acute kidney injury) ([facility]) 02/13/2020    ASHD (arteriosclerotic heart disease) 2005 2006    stent  baki    Closed fracture of first lumbar vertebra ([facility]) 05/06/2022    Closed T12 fracture ([facility]) 05/06/2022    Depression     Diabetic peripheral neuropathy ([facility]) 2014    Fracture 10/1984    left lower extremity     HTN (hypertension)     Hypercholesteremia     IDDM (insulin dependent diabetes mellitus)     Low back pain     Morbid obesity with BMI of 45.0-49.9, adult ([facility])     Osteoarthritis     S/P CABG (coronary artery bypass graft)       Past Surgical History:   Procedure Laterality Date    AMPUTATION Left 9/10/14    partial versus complete left hallux amputation, left great toe amputation    APPENDECTOMY      BACK INJECTION Bilateral 1/18/2021    Bilateral Si MBB #1 performed by Raul Parikh MD at [facility] [place] OR    BACK INJECTION Bilateral 3/1/2021    Bilateral SI MBB #2 performed by aRul Parikh MD at [facility] OR    CARDIAC SURGERY      CERVICAL DISC SURGERY  2000    fusion of C5-C6    CHOLECYSTECTOMY      COLONOSCOPY  2007 and 2011    colonn polyps  lorenzo    CORONARY ANGIOPLASTY WITH STENT PLACEMENT  08/07/2017    Dr Rueda @ [facility]   lad    CORONARY ARTERY BYPASS GRAFT  2015 august     dox    EKG 12-LEAD  8/14/2015         FACET JOINT INJECTION Bilateral 5/22/2020    Lumbar      Health Maintenance   Topic Date Due    Respiratory Syncytial Virus (RSV) Pregnant or age 60 yrs+ (1 - 1-dose 60+ series) Never done    Pneumococcal 65+ years Vaccine (3 of 3 - PPSV23 or PCV20) 06/30/2017    Shingles vaccine (2 of 2) 02/06/2023    Annual Wellness Visit (Medicare Advantage)  01/01/2024    COVID-19 Vaccine (7 - 2023-24 season) 02/10/2024    Flu vaccine (1) 08/01/2024    Depression Monitoring  02/08/2025    Prostate Specific Antigen (PSA) Screening or Monitoring  02/28/2025    Lipids  06/28/2025    DTaP/Tdap/Td vaccine (3 - Td or Tdap) 01/08/2028    Hepatitis C screen  Completed    Hepatitis A vaccine  Aged Out    Hepatitis B vaccine  Aged Out    Hib vaccine  Aged Out    Polio vaccine  Aged Out    Meningococcal (ACWY) vaccine  Aged Out    Diabetic foot exam  Discontinued    A1C test (Diabetic or Prediabetic)  Discontinued    Diabetic retinal exam  Discontinued    GFR test (Diabetes, CKD 3-4, OR last GFR 15-59)  Discontinued    Colorectal Cancer Screen  Discontinued       Subjective:  General:   No fever, no chills, some fatigue or weight loss  Pulmonary:    some dyspnea, no wheezing  Cardiac:    Denies recent chest pain,   GI:     No nausea or vomiting, no abdominal pain  Neuro:    No dizziness or light headedness,   Musculoskeletal:  No recent active issues  Extremities:   No edema, no obvious claudication       Objective:  General:   Well developed, well nourished  Lungs:   Clear to auscultation  Heart:    Normal S1 S2, Slight murmur. no rubs, no gallops  Abdomen:   Soft, non tender, no organomegalies, positive bowel sounds  Extremities:   No edema, no cyanosis, good peripheral pulses  Neurological:   Awake, alert, oriented. No obvious focal deficits  Musculoskelatal:  No obvious deformities   /74   Pulse 96   Ht 1.854 m (6' 1\")   Wt (!) 161 kg (355 lb)   BMI 46.84 kg/m²     Assessment:  Assessment & Plan    Diagnosis Orders   1. Primary hypertension        2. Familial

## 2024-08-27 DIAGNOSIS — E11.42 DIABETIC PERIPHERAL NEUROPATHY (HCC): ICD-10-CM

## 2024-08-27 NOTE — TELEPHONE ENCOUNTER
Date of last visit:  8/19/2024  Date of next visit:  10/7/2024    Requested Prescriptions     Pending Prescriptions Disp Refills    metFORMIN (GLUCOPHAGE) 500 MG tablet [Pharmacy Med Name: metFORMIN HCl 500 MG Oral Tablet] 120 tablet 0     Sig: TAKE 2 TABLETS BY MOUTH WITH BREAKFAST AND WITH EVENING MEAL

## 2024-09-03 DIAGNOSIS — Z98.61 S/P PERCUTANEOUS TRANSLUMINAL CORONARY ANGIOPLASTY: Primary | ICD-10-CM

## 2024-09-03 NOTE — TELEPHONE ENCOUNTER
Date of last visit:  8/19/2024  Date of next visit:  10/7/2024    Requested Prescriptions     Pending Prescriptions Disp Refills    ticagrelor (BRILINTA) 90 MG TABS tablet 180 tablet 1     Sig: Take 1 tablet by mouth twice daily

## 2024-09-08 DIAGNOSIS — M10.9 GOUTY ARTHRITIS: ICD-10-CM

## 2024-09-10 RX ORDER — COLCHICINE 0.6 MG/1
0.6 TABLET ORAL DAILY
Qty: 30 TABLET | Refills: 0 | Status: SHIPPED | OUTPATIENT
Start: 2024-09-10

## 2024-09-26 RX ORDER — AMLODIPINE BESYLATE 5 MG/1
TABLET ORAL
Qty: 90 TABLET | Refills: 1 | Status: SHIPPED | OUTPATIENT
Start: 2024-09-26

## 2024-10-03 ENCOUNTER — TELEPHONE (OUTPATIENT)
Dept: FAMILY MEDICINE CLINIC | Age: 74
End: 2024-10-03

## 2024-10-03 NOTE — TELEPHONE ENCOUNTER
PT CALLED WANT TO KNOW WHAT'S GOING ON WITH HIS METER, ITS SHOWING E9? KAREEM CAN YOU CALL AND HELP HIM OUT.

## 2024-10-04 ENCOUNTER — CARE COORDINATION (OUTPATIENT)
Dept: FAMILY MEDICINE CLINIC | Age: 74
End: 2024-10-04

## 2024-10-04 NOTE — CARE COORDINATION
Sean had called regarding his glucometer malfunctioning-stated it was showing E9 when testing glucose level. Told him to replace battery. Will go to [facility] today to get new batteries. Reminded him on Monday appointment also, which he remembered.   Render Note In Bullet Format When Appropriate: No Detail Level: Detailed Post-Care Instructions: I reviewed with the patient in detail post-care instructions. Patient is to wear sunprotection, and avoid picking at any of the treated lesions. Pt may apply Vaseline to crusted or scabbing areas. Duration Of Freeze Thaw-Cycle (Seconds): 3 Number Of Freeze-Thaw Cycles: 3 freeze-thaw cycles Consent: The patient's consent was obtained including but not limited to risks of crusting, scabbing, blistering, scarring, darker or lighter pigmentary change, recurrence, incomplete removal and infection. Render Post-Care Instructions In Note?: yes

## 2024-10-07 ENCOUNTER — OFFICE VISIT (OUTPATIENT)
Dept: FAMILY MEDICINE CLINIC | Age: 74
End: 2024-10-07

## 2024-10-07 VITALS
HEIGHT: 74 IN | BODY MASS INDEX: 40.43 KG/M2 | DIASTOLIC BLOOD PRESSURE: 76 MMHG | HEART RATE: 78 BPM | WEIGHT: 315 LBS | SYSTOLIC BLOOD PRESSURE: 128 MMHG | RESPIRATION RATE: 14 BRPM

## 2024-10-07 DIAGNOSIS — Z00.00 MEDICARE ANNUAL WELLNESS VISIT, SUBSEQUENT: Primary | ICD-10-CM

## 2024-10-07 DIAGNOSIS — E11.42 TYPE 2 DIABETES MELLITUS WITH DIABETIC POLYNEUROPATHY, WITH LONG-TERM CURRENT USE OF INSULIN (HCC): ICD-10-CM

## 2024-10-07 DIAGNOSIS — I10 PRIMARY HYPERTENSION: ICD-10-CM

## 2024-10-07 DIAGNOSIS — Z79.4 TYPE 2 DIABETES MELLITUS WITH DIABETIC POLYNEUROPATHY, WITH LONG-TERM CURRENT USE OF INSULIN (HCC): ICD-10-CM

## 2024-10-07 DIAGNOSIS — E11.42 DIABETIC PERIPHERAL NEUROPATHY (HCC): ICD-10-CM

## 2024-10-07 DIAGNOSIS — D63.8 ANEMIA WITH CHRONIC ILLNESS: ICD-10-CM

## 2024-10-07 DIAGNOSIS — Z23 FLU VACCINE NEED: ICD-10-CM

## 2024-10-07 DIAGNOSIS — E78.00 HYPERCHOLESTEREMIA: ICD-10-CM

## 2024-10-07 DIAGNOSIS — I50.32 CHRONIC DIASTOLIC (CONGESTIVE) HEART FAILURE (HCC): ICD-10-CM

## 2024-10-07 DIAGNOSIS — M10.9 GOUTY ARTHRITIS: ICD-10-CM

## 2024-10-07 DIAGNOSIS — M10.9 GOUTY ARTHRITIS OF BOTH KNEES: ICD-10-CM

## 2024-10-07 DIAGNOSIS — I25.708 CORONARY ARTERY DISEASE OF BYPASS GRAFT OF NATIVE HEART WITH STABLE ANGINA PECTORIS (HCC): ICD-10-CM

## 2024-10-07 PROCEDURE — G0439 PPPS, SUBSEQ VISIT: HCPCS | Performed by: FAMILY MEDICINE

## 2024-10-07 PROCEDURE — 1036F TOBACCO NON-USER: CPT | Performed by: FAMILY MEDICINE

## 2024-10-07 PROCEDURE — G0008 ADMIN INFLUENZA VIRUS VAC: HCPCS | Performed by: FAMILY MEDICINE

## 2024-10-07 PROCEDURE — 99213 OFFICE O/P EST LOW 20 MIN: CPT | Performed by: FAMILY MEDICINE

## 2024-10-07 PROCEDURE — 1123F ACP DISCUSS/DSCN MKR DOCD: CPT | Performed by: FAMILY MEDICINE

## 2024-10-07 PROCEDURE — G8427 DOCREV CUR MEDS BY ELIG CLIN: HCPCS | Performed by: FAMILY MEDICINE

## 2024-10-07 PROCEDURE — G8417 CALC BMI ABV UP PARAM F/U: HCPCS | Performed by: FAMILY MEDICINE

## 2024-10-07 ASSESSMENT — ENCOUNTER SYMPTOMS
COUGH: 0
CONSTIPATION: 0
EYE PAIN: 0
TROUBLE SWALLOWING: 0
BACK PAIN: 0
CHEST TIGHTNESS: 0
SORE THROAT: 0
SHORTNESS OF BREATH: 0
BLOOD IN STOOL: 0
NAUSEA: 0
ABDOMINAL PAIN: 0

## 2024-10-07 ASSESSMENT — PATIENT HEALTH QUESTIONNAIRE - PHQ9
5. POOR APPETITE OR OVEREATING: NOT AT ALL
SUM OF ALL RESPONSES TO PHQ QUESTIONS 1-9: 0
6. FEELING BAD ABOUT YOURSELF - OR THAT YOU ARE A FAILURE OR HAVE LET YOURSELF OR YOUR FAMILY DOWN: NOT AT ALL
10. IF YOU CHECKED OFF ANY PROBLEMS, HOW DIFFICULT HAVE THESE PROBLEMS MADE IT FOR YOU TO DO YOUR WORK, TAKE CARE OF THINGS AT HOME, OR GET ALONG WITH OTHER PEOPLE: NOT DIFFICULT AT ALL
SUM OF ALL RESPONSES TO PHQ QUESTIONS 1-9: 0
3. TROUBLE FALLING OR STAYING ASLEEP: NOT AT ALL
SUM OF ALL RESPONSES TO PHQ9 QUESTIONS 1 & 2: 0
9. THOUGHTS THAT YOU WOULD BE BETTER OFF DEAD, OR OF HURTING YOURSELF: NOT AT ALL
7. TROUBLE CONCENTRATING ON THINGS, SUCH AS READING THE NEWSPAPER OR WATCHING TELEVISION: NOT AT ALL
2. FEELING DOWN, DEPRESSED OR HOPELESS: NOT AT ALL
8. MOVING OR SPEAKING SO SLOWLY THAT OTHER PEOPLE COULD HAVE NOTICED. OR THE OPPOSITE, BEING SO FIGETY OR RESTLESS THAT YOU HAVE BEEN MOVING AROUND A LOT MORE THAN USUAL: NOT AT ALL
1. LITTLE INTEREST OR PLEASURE IN DOING THINGS: NOT AT ALL
SUM OF ALL RESPONSES TO PHQ QUESTIONS 1-9: 0
SUM OF ALL RESPONSES TO PHQ QUESTIONS 1-9: 0
4. FEELING TIRED OR HAVING LITTLE ENERGY: NOT AT ALL

## 2024-10-07 ASSESSMENT — LIFESTYLE VARIABLES
HOW MANY STANDARD DRINKS CONTAINING ALCOHOL DO YOU HAVE ON A TYPICAL DAY: PATIENT DOES NOT DRINK
HOW OFTEN DO YOU HAVE A DRINK CONTAINING ALCOHOL: NEVER

## 2024-10-07 NOTE — PROGRESS NOTES
topically as needed. 57 g 0    ondansetron (ZOFRAN-ODT) 4 MG disintegrating tablet Take 1 tablet by mouth every 8 hours as needed for Nausea or Vomiting      albuterol sulfate  (90 Base) MCG/ACT inhaler Inhale 2 puffs into the lungs 2 times daily 18 g 3    carvedilol (COREG) 12.5 MG tablet Take 1 tablet by mouth 2 times daily 180 tablet 2    acetaminophen (TYLENOL) 500 MG tablet Take 2 tablets by mouth every 6 hours as needed for Pain      gabapentin (NEURONTIN) 100 MG capsule Take 1 capsule by mouth 3 times daily for 40 doses. Intended supply: 30 days 40 capsule 0     No current facility-administered medications for this visit.     Orders Placed This Encounter   Procedures    Influenza, FLUZONE Trivalent, (age 3 y+), IM, Multi-Dose Vial, 0.5mL    CBC with Auto Differential     Standing Status:   Future     Standing Expiration Date:   10/7/2025    Basic Metabolic Panel     Standing Status:   Future     Standing Expiration Date:   10/7/2025    Hemoglobin A1C     Standing Status:   Future     Standing Expiration Date:   10/7/2025     No results found for this visit on 10/07/24.  Tuan received counseling on the following healthy behaviors: nutrition and exercise    Patient given educational materials on Diabetes and Hyperlipidemia    I have instructed uTan to complete a self tracking handout on Blood Sugars  and Blood Pressures  and instructed them to bring it with them to his next appointment.     Discussed use, benefit, and side effects of prescribed medications.  Barriers to medication compliance addressed.  All patient questions answered.  Pt voiced understanding.        Orders Placed This Encounter   Procedures    Influenza, FLUZONE Trivalent, (age 3 y+), IM, Multi-Dose Vial, 0.5mL    CBC with Auto Differential     Standing Status:   Future     Standing Expiration Date:   10/7/2025    Basic Metabolic Panel     Standing Status:   Future     Standing Expiration Date:   10/7/2025    Hemoglobin A1C

## 2024-10-08 RX ORDER — FUROSEMIDE 40 MG
40 TABLET ORAL 2 TIMES DAILY
Qty: 180 TABLET | Refills: 1 | Status: SHIPPED | OUTPATIENT
Start: 2024-10-08

## 2024-10-08 NOTE — TELEPHONE ENCOUNTER
The pharmacy is requesting a refill of the below medication which has been pended for you:     Requested Prescriptions     Pending Prescriptions Disp Refills    furosemide (LASIX) 40 MG tablet [Pharmacy Med Name: Furosemide 40 MG Oral Tablet] 180 tablet 1     Sig: Take 1 tablet by mouth twice daily       Last Appointment Date: 10/7/2024  Next Appointment Date: 1/14/2025    Allergies   Allergen Reactions    Horse-Derived Products

## 2024-10-10 DIAGNOSIS — M10.9 GOUTY ARTHRITIS: ICD-10-CM

## 2024-10-10 RX ORDER — COLCHICINE 0.6 MG/1
0.6 TABLET ORAL DAILY PRN
Qty: 30 TABLET | Refills: 0 | Status: SHIPPED | OUTPATIENT
Start: 2024-10-10

## 2024-10-10 NOTE — TELEPHONE ENCOUNTER
Date of last visit:  10/7/2024  Date of next visit:  1/14/2025    Requested Prescriptions     Pending Prescriptions Disp Refills    colchicine (COLCRYS) 0.6 MG tablet [Pharmacy Med Name: Colchicine 0.6 MG Oral Tablet] 30 tablet 0     Sig: Take 1 tablet by mouth once daily

## 2024-10-24 RX ORDER — INSULIN GLARGINE 100 [IU]/ML
INJECTION, SOLUTION SUBCUTANEOUS
Qty: 15 ML | Refills: 5 | Status: SHIPPED | OUTPATIENT
Start: 2024-10-24

## 2024-10-24 NOTE — TELEPHONE ENCOUNTER
The pharmacy is requesting a refill of the below medication which has been pended for you:     Requested Prescriptions     Pending Prescriptions Disp Refills    LANTUS SOLOSTAR 100 UNIT/ML injection pen [Pharmacy Med Name: Lantus SoloStar 100 UNIT/ML Subcutaneous Solution Pen-injector] 15 mL 0     Sig: INJECT 50 UNITS SUBCUTANEOUSLY NIGHTLY       Last Appointment Date: 10/7/2024  Next Appointment Date: 1/14/2025    Allergies   Allergen Reactions    Horse-Derived Products

## 2024-10-30 ENCOUNTER — TELEPHONE (OUTPATIENT)
Dept: FAMILY MEDICINE CLINIC | Age: 74
End: 2024-10-30

## 2024-10-30 NOTE — TELEPHONE ENCOUNTER
Per discussion:  Sean phoned to say he did not feel right.  Sleeping all the time, days he feels like wanting to eat all day, feeling anxious. Stated feels better when out among people.  Asked if he felt depressed and said he think he does.  He is not currently on anti depressant.

## 2024-10-31 RX ORDER — DULOXETIN HYDROCHLORIDE 30 MG/1
CAPSULE, DELAYED RELEASE ORAL
Qty: 60 CAPSULE | Refills: 0 | Status: SHIPPED | OUTPATIENT
Start: 2024-10-31

## 2024-10-31 NOTE — TELEPHONE ENCOUNTER
Start cymbalt 30 mg one a day for one week and then go to 60 mg and see in one month   Please call

## 2024-11-12 DIAGNOSIS — Z79.4 TYPE 2 DIABETES MELLITUS WITH DIABETIC POLYNEUROPATHY, WITH LONG-TERM CURRENT USE OF INSULIN (HCC): ICD-10-CM

## 2024-11-12 DIAGNOSIS — E11.42 TYPE 2 DIABETES MELLITUS WITH DIABETIC POLYNEUROPATHY, WITH LONG-TERM CURRENT USE OF INSULIN (HCC): ICD-10-CM

## 2024-11-12 NOTE — TELEPHONE ENCOUNTER
Date of last visit:  10/7/2024  Date of next visit:  12/2/2024    Requested Prescriptions     Pending Prescriptions Disp Refills    NOVOLOG FLEXPEN 100 UNIT/ML injection pen 15 mL 1     Sig: INJECT 12 UNITS INTO THE SKIN THREE TIMES DAILY BEFORE MEALS PLUS USE SCALE IF HIGH. Max dose of 85 units daily.

## 2024-11-13 RX ORDER — INSULIN ASPART 100 [IU]/ML
INJECTION, SOLUTION INTRAVENOUS; SUBCUTANEOUS
Qty: 15 ML | Refills: 1 | Status: SHIPPED | OUTPATIENT
Start: 2024-11-13

## 2024-11-20 DIAGNOSIS — D64.9 ANEMIA, UNSPECIFIED TYPE: ICD-10-CM

## 2024-11-20 NOTE — TELEPHONE ENCOUNTER
The pharmacy is  requesting a refill of the below medication which has been pended for you:     Requested Prescriptions     Pending Prescriptions Disp Refills    probenecid (BENEMID) 500 MG tablet [Pharmacy Med Name: Probenecid 500 MG Oral Tablet] 60 tablet 0     Sig: Take 1 tablet by mouth twice daily    FEROSUL 325 (65 Fe) MG tablet [Pharmacy Med Name: FeroSul 325 (65 Fe) MG Oral Tablet] 60 tablet 0     Sig: Take 1 tablet by mouth twice daily       Last Appointment Date: 10/7/2024  Next Appointment Date: 12/2/2024    Allergies   Allergen Reactions    Horse-Derived Products

## 2024-11-21 RX ORDER — FERROUS SULFATE 325(65) MG
1 TABLET ORAL 2 TIMES DAILY
Qty: 60 TABLET | Refills: 2 | Status: SHIPPED | OUTPATIENT
Start: 2024-11-21

## 2024-11-21 RX ORDER — PROBENECID 500 MG/1
500 TABLET, FILM COATED ORAL 2 TIMES DAILY
Qty: 60 TABLET | Refills: 3 | Status: SHIPPED | OUTPATIENT
Start: 2024-11-21

## 2024-11-25 ENCOUNTER — CARE COORDINATION (OUTPATIENT)
Dept: FAMILY MEDICINE CLINIC | Age: 74
End: 2024-11-25

## 2024-11-25 NOTE — CARE COORDINATION
Received a call from Sean regarding new back pain.  Pain at right side of hip, above belt line. Does not hurt when sitting only when he gets up and moves around. Does not appear to be flank pain, but asked about urination and he denied anuria, oliguria or dysuria. He did not try tylenol as he is out. Did not try anything to alleviate pain. Asked when this started, and he stated when he got up this morning. Calling us at 3pm.    Spoke to Dr. Yap:  He can take xtra strength tylenol every 4 hours and use heat. Call tomorrow with update.  Sean given above instructions and verbalized understanding.

## 2024-11-27 ENCOUNTER — CARE COORDINATION (OUTPATIENT)
Dept: FAMILY MEDICINE CLINIC | Age: 74
End: 2024-11-27

## 2024-11-27 NOTE — CARE COORDINATION
Spoke with Sean for update regarding back pain. Was a little better yesterday and today even a little better. We both think it acts like a pulled muscle. Will continue with heat and tylenol. Advised if pain should become severe, he would need to go to ED. He verbalized understanding.

## 2024-11-29 DIAGNOSIS — M10.9 GOUTY ARTHRITIS: ICD-10-CM

## 2024-11-29 DIAGNOSIS — D64.9 ANEMIA, UNSPECIFIED TYPE: ICD-10-CM

## 2024-11-29 RX ORDER — MULTIVIT-MIN/IRON FUM/FOLIC AC 19 MG-400
1 TABLET ORAL DAILY
Qty: 30 TABLET | Refills: 0 | Status: SHIPPED | OUTPATIENT
Start: 2024-11-29

## 2024-11-29 RX ORDER — FEBUXOSTAT 80 MG/1
80 TABLET, FILM COATED ORAL DAILY
Qty: 30 TABLET | Refills: 0 | Status: SHIPPED | OUTPATIENT
Start: 2024-11-29

## 2024-11-29 NOTE — TELEPHONE ENCOUNTER
The pharmacy is  requesting a refill of the below medication which has been pended for you:     Requested Prescriptions     Pending Prescriptions Disp Refills    febuxostat (ULORIC) 80 MG TABS tablet [Pharmacy Med Name: Febuxostat 80 MG Oral Tablet] 30 tablet 0     Sig: Take 1 tablet by mouth once daily    Multiple Vitamins-Minerals (MULTIVITAMIN) TABS [Pharmacy Med Name: Thera-Tabs M Oral Tablet] 30 tablet 0     Sig: Take 1 tablet by mouth once daily       Last Appointment Date: 10/7/2024  Next Appointment Date: 12/2/2024    Allergies   Allergen Reactions    Horse-Derived Products        Per verbal order Dr Obrien sent Rx to pharmacy.

## 2024-12-16 ENCOUNTER — TELEPHONE (OUTPATIENT)
Dept: FAMILY MEDICINE CLINIC | Age: 74
End: 2024-12-16

## 2024-12-16 RX ORDER — CEFDINIR 300 MG/1
300 CAPSULE ORAL 2 TIMES DAILY
Qty: 20 CAPSULE | Refills: 0 | Status: SHIPPED | OUTPATIENT
Start: 2024-12-16 | End: 2024-12-26

## 2024-12-16 NOTE — TELEPHONE ENCOUNTER
Patient called and spoke with Anne. Complaints of the Following Symptoms: Cough and Sore Throat. Requesting an ATB to [facility] [address]    Date of last visit:  10/7/2024  Date of next visit:  1/14/2025    Requested Prescriptions     Signed Prescriptions Disp Refills    cefdinir (OMNICEF) 300 MG capsule 20 capsule 0     Sig: Take 1 capsule by mouth 2 times daily for 10 days     Authorizing Provider: HAMLET DESHPANDE     Ordering User: ANTON FELDMAN

## 2024-12-17 DIAGNOSIS — E11.42 DIABETIC PERIPHERAL NEUROPATHY (HCC): ICD-10-CM

## 2024-12-17 NOTE — TELEPHONE ENCOUNTER
Date of last visit:  10/7/2024  Date of next visit:  1/14/2025    Requested Prescriptions     Pending Prescriptions Disp Refills    metFORMIN (GLUCOPHAGE) 500 MG tablet [Pharmacy Med Name: metFORMIN HCl 500 MG Oral Tablet] 120 tablet 3     Sig: TAKE 2 TABLETS BY MOUTH WITH BREAKFAST AND WITH EVENING MEAL

## 2024-12-18 ENCOUNTER — CARE COORDINATION (OUTPATIENT)
Dept: FAMILY MEDICINE CLINIC | Age: 74
End: 2024-12-18

## 2024-12-18 NOTE — CARE COORDINATION
Contacted Sean for update after starting ATB for sinuitis. States he feels much better. Only coughing maybe twice a day. Drinking warm chicken broth and fluids. Voice better. Appreciative of call. Encouraged Sean to call with any questions or concerns.

## 2024-12-28 DIAGNOSIS — M10.9 GOUTY ARTHRITIS: ICD-10-CM

## 2024-12-28 DIAGNOSIS — D64.9 ANEMIA, UNSPECIFIED TYPE: ICD-10-CM

## 2024-12-30 RX ORDER — MULTIVIT-MIN/IRON FUM/FOLIC AC 19 MG-400
1 TABLET ORAL DAILY
Qty: 90 TABLET | Refills: 1 | Status: SHIPPED | OUTPATIENT
Start: 2024-12-30

## 2024-12-30 RX ORDER — FEBUXOSTAT 80 MG/1
80 TABLET, FILM COATED ORAL DAILY
Qty: 90 TABLET | Refills: 1 | Status: SHIPPED | OUTPATIENT
Start: 2024-12-30

## 2024-12-30 NOTE — TELEPHONE ENCOUNTER
The pharmacy is requesting a refill of the below medication which has been pended for you:     Requested Prescriptions     Pending Prescriptions Disp Refills    febuxostat (ULORIC) 80 MG TABS tablet [Pharmacy Med Name: Febuxostat 80 MG Oral Tablet] 90 tablet 1     Sig: Take 1 tablet by mouth once daily    Multiple Vitamins-Minerals (MULTIVITAMIN) TABS [Pharmacy Med Name: Thera-Tabs M Oral Tablet] 90 tablet 1     Sig: Take 1 tablet by mouth once daily       Last Appointment Date: 10/7/2024  Next Appointment Date: 1/14/2025    Allergies   Allergen Reactions    Horse-Derived Products

## 2025-01-04 DIAGNOSIS — E78.00 HYPERCHOLESTEREMIA: ICD-10-CM

## 2025-01-06 NOTE — TELEPHONE ENCOUNTER
Date of last visit:  10/7/2024  Date of next visit:  1/14/2025    Requested Prescriptions     Pending Prescriptions Disp Refills    atorvastatin (LIPITOR) 10 MG tablet [Pharmacy Med Name: Atorvastatin Calcium 10 MG Oral Tablet] 90 tablet 1     Sig: Take 1 tablet by mouth once daily

## 2025-01-07 RX ORDER — ATORVASTATIN CALCIUM 10 MG/1
TABLET, FILM COATED ORAL
Qty: 90 TABLET | Refills: 1 | Status: SHIPPED | OUTPATIENT
Start: 2025-01-07

## 2025-01-14 ENCOUNTER — OFFICE VISIT (OUTPATIENT)
Dept: FAMILY MEDICINE CLINIC | Age: 75
End: 2025-01-14

## 2025-01-14 VITALS
RESPIRATION RATE: 16 BRPM | HEART RATE: 80 BPM | SYSTOLIC BLOOD PRESSURE: 134 MMHG | WEIGHT: 315 LBS | DIASTOLIC BLOOD PRESSURE: 84 MMHG | BODY MASS INDEX: 40.43 KG/M2 | HEIGHT: 74 IN

## 2025-01-14 DIAGNOSIS — L98.9 FACIAL LESION: ICD-10-CM

## 2025-01-14 DIAGNOSIS — M10.9 GOUTY ARTHRITIS OF BOTH KNEES: ICD-10-CM

## 2025-01-14 DIAGNOSIS — E11.42 DIABETIC PERIPHERAL NEUROPATHY (HCC): ICD-10-CM

## 2025-01-14 DIAGNOSIS — I25.708 CORONARY ARTERY DISEASE OF BYPASS GRAFT OF NATIVE HEART WITH STABLE ANGINA PECTORIS (HCC): ICD-10-CM

## 2025-01-14 DIAGNOSIS — I50.32 CHRONIC DIASTOLIC (CONGESTIVE) HEART FAILURE (HCC): ICD-10-CM

## 2025-01-14 DIAGNOSIS — Z79.4 TYPE 2 DIABETES MELLITUS WITH DIABETIC POLYNEUROPATHY, WITH LONG-TERM CURRENT USE OF INSULIN (HCC): Primary | ICD-10-CM

## 2025-01-14 DIAGNOSIS — I87.2 VENOUS STASIS DERMATITIS OF BOTH LOWER EXTREMITIES: ICD-10-CM

## 2025-01-14 DIAGNOSIS — M10.9 GOUTY ARTHRITIS: ICD-10-CM

## 2025-01-14 DIAGNOSIS — E11.42 TYPE 2 DIABETES MELLITUS WITH DIABETIC POLYNEUROPATHY, WITH LONG-TERM CURRENT USE OF INSULIN (HCC): Primary | ICD-10-CM

## 2025-01-14 DIAGNOSIS — E78.00 HYPERCHOLESTEREMIA: ICD-10-CM

## 2025-01-14 DIAGNOSIS — I10 PRIMARY HYPERTENSION: ICD-10-CM

## 2025-01-14 LAB
CHP ED QC CHECK: ABNORMAL
GLUCOSE BLD-MCNC: 120 MG/DL
HBA1C MFR BLD: 7.5 %

## 2025-01-14 SDOH — ECONOMIC STABILITY: FOOD INSECURITY: WITHIN THE PAST 12 MONTHS, THE FOOD YOU BOUGHT JUST DIDN'T LAST AND YOU DIDN'T HAVE MONEY TO GET MORE.: NEVER TRUE

## 2025-01-14 SDOH — ECONOMIC STABILITY: FOOD INSECURITY: WITHIN THE PAST 12 MONTHS, YOU WORRIED THAT YOUR FOOD WOULD RUN OUT BEFORE YOU GOT MONEY TO BUY MORE.: NEVER TRUE

## 2025-01-14 ASSESSMENT — PATIENT HEALTH QUESTIONNAIRE - PHQ9
2. FEELING DOWN, DEPRESSED OR HOPELESS: NOT AT ALL
3. TROUBLE FALLING OR STAYING ASLEEP: NOT AT ALL
SUM OF ALL RESPONSES TO PHQ QUESTIONS 1-9: 0
5. POOR APPETITE OR OVEREATING: NOT AT ALL
10. IF YOU CHECKED OFF ANY PROBLEMS, HOW DIFFICULT HAVE THESE PROBLEMS MADE IT FOR YOU TO DO YOUR WORK, TAKE CARE OF THINGS AT HOME, OR GET ALONG WITH OTHER PEOPLE: NOT DIFFICULT AT ALL
SUM OF ALL RESPONSES TO PHQ QUESTIONS 1-9: 0
8. MOVING OR SPEAKING SO SLOWLY THAT OTHER PEOPLE COULD HAVE NOTICED. OR THE OPPOSITE, BEING SO FIGETY OR RESTLESS THAT YOU HAVE BEEN MOVING AROUND A LOT MORE THAN USUAL: NOT AT ALL
9. THOUGHTS THAT YOU WOULD BE BETTER OFF DEAD, OR OF HURTING YOURSELF: NOT AT ALL
1. LITTLE INTEREST OR PLEASURE IN DOING THINGS: NOT AT ALL
6. FEELING BAD ABOUT YOURSELF - OR THAT YOU ARE A FAILURE OR HAVE LET YOURSELF OR YOUR FAMILY DOWN: NOT AT ALL
7. TROUBLE CONCENTRATING ON THINGS, SUCH AS READING THE NEWSPAPER OR WATCHING TELEVISION: NOT AT ALL
SUM OF ALL RESPONSES TO PHQ QUESTIONS 1-9: 0
4. FEELING TIRED OR HAVING LITTLE ENERGY: NOT AT ALL
SUM OF ALL RESPONSES TO PHQ9 QUESTIONS 1 & 2: 0
SUM OF ALL RESPONSES TO PHQ QUESTIONS 1-9: 0

## 2025-01-14 ASSESSMENT — ENCOUNTER SYMPTOMS
BACK PAIN: 1
CHEST TIGHTNESS: 0
EYE PAIN: 0
SORE THROAT: 0
BLOOD IN STOOL: 0
NAUSEA: 0
CONSTIPATION: 0
ABDOMINAL PAIN: 0
SHORTNESS OF BREATH: 0
BLURRED VISION: 0
TROUBLE SWALLOWING: 0
COUGH: 0

## 2025-01-14 NOTE — PROGRESS NOTES
Subjective   Patient ID: Tuan Hassan is a 74 y.o. male.    Ashd   stable       Chronic    back pain   persist pain       Gout  stable      Chronic  dermatitis  of  the  legs     Diabetes  He presents for his follow-up diabetic visit. He has type 2 diabetes mellitus. MedicAlert identification noted. His disease course has been stable. There are no hypoglycemic associated symptoms. Pertinent negatives for hypoglycemia include no dizziness or headaches. There are no diabetic associated symptoms. Pertinent negatives for diabetes include no blurred vision, no chest pain, no fatigue, no foot paresthesias and no weakness. There are no hypoglycemic complications. Symptoms are stable. There are no diabetic complications. Current diabetic treatment includes insulin injections. He is compliant with treatment all of the time. He monitors blood glucose at home 3-4 x per day (checks  3  to 4  times a day with strips). Blood glucose monitoring compliance is excellent. There is no change in his home blood glucose trend. His breakfast blood glucose is taken between 7-8 am. An ACE inhibitor/angiotensin II receptor blocker is contraindicated.   Hypertension  This is a chronic problem. The current episode started more than 1 year ago. The problem has been resolved since onset. The problem is controlled. Pertinent negatives include no blurred vision, chest pain, headaches, palpitations or shortness of breath. The current treatment provides significant improvement. There are no compliance problems.    Hyperlipidemia  This is a chronic problem. The current episode started more than 1 year ago. The problem is controlled. He has no history of hypothyroidism, liver disease or obesity. Pertinent negatives include no chest pain or shortness of breath. Current antihyperlipidemic treatment includes statins. The current treatment provides significant improvement of lipids. There are no compliance problems.      Past Medical History:

## 2025-01-16 ENCOUNTER — CARE COORDINATION (OUTPATIENT)
Dept: FAMILY MEDICINE CLINIC | Age: 75
End: 2025-01-16

## 2025-01-16 NOTE — CARE COORDINATION
Per Dr. Yap's request, DexCom 7 order placed with [facility] ([place] with [facility]) Information given, they will verify coverage with [employer], then will call office for appropriate documents

## 2025-01-24 DIAGNOSIS — D64.9 ANEMIA, UNSPECIFIED TYPE: ICD-10-CM

## 2025-01-24 RX ORDER — CYCLOBENZAPRINE HCL 10 MG
10 TABLET ORAL EVERY 8 HOURS PRN
Qty: 30 TABLET | Refills: 0 | Status: SHIPPED | OUTPATIENT
Start: 2025-01-24 | End: 2025-02-03

## 2025-01-24 RX ORDER — MULTIVIT-MIN/IRON FUM/FOLIC AC 19 MG-400
1 TABLET ORAL DAILY
Qty: 90 TABLET | Refills: 1 | Status: SHIPPED | OUTPATIENT
Start: 2025-01-24

## 2025-01-24 NOTE — TELEPHONE ENCOUNTER
Pt called req ref Multiple Vitamins-Minerals (THEREMS-M) TABS Take 1 tablet by mouth once daily 30 tabs supply refs 5    [facility] [address]

## 2025-01-24 NOTE — TELEPHONE ENCOUNTER
Date of last visit:  1/14/2025  Date of next visit:  4/16/2025    Requested Prescriptions     Pending Prescriptions Disp Refills    Multiple Vitamins-Minerals (MULTIVITAMIN) TABS 90 tablet 1     Sig: Take 1 tablet by mouth daily

## 2025-01-24 NOTE — TELEPHONE ENCOUNTER
Patient called req ref of Flexeril to [facility].    Date of last visit:  1/14/2025  Date of next visit:  4/16/2025    Requested Prescriptions     Pending Prescriptions Disp Refills    cyclobenzaprine (FLEXERIL) 10 MG tablet 30 tablet 0     Sig: Take 1 tablet by mouth every 8 hours as needed for Muscle spasms

## 2025-01-27 DIAGNOSIS — E11.42 TYPE 2 DIABETES MELLITUS WITH DIABETIC POLYNEUROPATHY, WITH LONG-TERM CURRENT USE OF INSULIN (HCC): ICD-10-CM

## 2025-01-27 DIAGNOSIS — Z79.4 TYPE 2 DIABETES MELLITUS WITH DIABETIC POLYNEUROPATHY, WITH LONG-TERM CURRENT USE OF INSULIN (HCC): ICD-10-CM

## 2025-01-27 RX ORDER — INSULIN ASPART 100 [IU]/ML
INJECTION, SOLUTION INTRAVENOUS; SUBCUTANEOUS
Qty: 15 ML | Refills: 1 | Status: SHIPPED | OUTPATIENT
Start: 2025-01-27

## 2025-01-27 NOTE — ADDENDUM NOTE
Addended by: GEGE MCLEAN on: 1/27/2025 04:05 PM     Modules accepted: Orders     Was going to just upload his blood glucose readings, but patient would like to talk with me today.  Scheduled an appointment instead

## 2025-01-27 NOTE — TELEPHONE ENCOUNTER
Date of last visit:  1/14/2025  Date of next visit:  4/16/2025    Requested Prescriptions     Pending Prescriptions Disp Refills    NOVOLOG FLEXPEN 100 UNIT/ML injection pen 15 mL 1     Sig: INJECT 12 UNITS INTO THE SKIN THREE TIMES DAILY BEFORE MEALS PLUS USE SCALE IF HIGH. Max dose of 85 units daily.

## 2025-02-04 ENCOUNTER — CARE COORDINATION (OUTPATIENT)
Dept: FAMILY MEDICINE CLINIC | Age: 75
End: 2025-02-04

## 2025-02-04 NOTE — CARE COORDINATION
Sean phoned yesterday to say his back was still hurting, maybe a little more as pain has traveled from right flank to left flank, then under left breast. He was started on Flexeril, but it was not helping. He has not fallen or done anything strenuous that he can think of.    Spoke to Dr. Yap:  Try ice and alternating tylenol and IBP  Will check with him 2/4.    Phoned 2/4 for update.  Still hurts, but he feels better.  He is using ice and alternation tylenol and IBP.  He is also using a small round pillow to small of his back which he says helps. Asked if he would like an appointment, he said no..    Will check with him again tomorrow.

## 2025-02-06 ENCOUNTER — TELEPHONE (OUTPATIENT)
Dept: FAMILY MEDICINE CLINIC | Age: 75
End: 2025-02-06

## 2025-02-06 NOTE — TELEPHONE ENCOUNTER
Pt called and wanted to know if was better for him to have coffee with sugar free creamer or green tea unsweetened. He said that he is also drinking a total of 64 oz of milk a day. I told him he needed to drink more water but he really needed to cut back on the milk with the amount of sugar that it has. 32 oz of 1% milk is 102 grams of sugar. He said that he did not realize that. Pt is going to drink more water and less milk.

## 2025-02-20 DIAGNOSIS — D64.9 ANEMIA, UNSPECIFIED TYPE: ICD-10-CM

## 2025-02-20 RX ORDER — FERROUS SULFATE 325(65) MG
1 TABLET ORAL 2 TIMES DAILY
Qty: 60 TABLET | Refills: 0 | Status: SHIPPED | OUTPATIENT
Start: 2025-02-20

## 2025-02-20 NOTE — TELEPHONE ENCOUNTER
The pharmacy is  requesting a refill of the below medication which has been pended for you:     Requested Prescriptions     Pending Prescriptions Disp Refills    FEROSUL 325 (65 Fe) MG tablet [Pharmacy Med Name: FeroSul 325 (65 Fe) MG Oral Tablet] 60 tablet 0     Sig: Take 1 tablet by mouth twice daily       Last Appointment Date: 1/14/2025  Next Appointment Date: 4/16/2025    Allergies   Allergen Reactions    Horse-Derived Products

## 2025-02-24 ENCOUNTER — APPOINTMENT (OUTPATIENT)
Dept: GENERAL RADIOLOGY | Age: 75
End: 2025-02-24
Payer: MEDICARE

## 2025-02-24 ENCOUNTER — TELEPHONE (OUTPATIENT)
Dept: FAMILY MEDICINE CLINIC | Age: 75
End: 2025-02-24

## 2025-02-24 ENCOUNTER — HOSPITAL ENCOUNTER (OUTPATIENT)
Age: 75
Setting detail: OBSERVATION
Discharge: HOME | End: 2025-02-26
Attending: EMERGENCY MEDICINE
Payer: MEDICARE

## 2025-02-24 DIAGNOSIS — I20.9 ANGINA PECTORIS: ICD-10-CM

## 2025-02-24 DIAGNOSIS — R07.9 CHEST PAIN, UNSPECIFIED TYPE: Primary | ICD-10-CM

## 2025-02-24 DIAGNOSIS — M10.9 GOUTY ARTHRITIS: ICD-10-CM

## 2025-02-24 LAB
ALBUMIN SERPL BCG-MCNC: 3.9 G/DL (ref 3.5–5.1)
ALP SERPL-CCNC: 79 U/L (ref 38–126)
ALT SERPL W/O P-5'-P-CCNC: 15 U/L (ref 11–66)
ANION GAP SERPL CALC-SCNC: 22 MEQ/L (ref 8–16)
AST SERPL-CCNC: 15 U/L (ref 5–40)
BASOPHILS ABSOLUTE: 0 THOU/MM3 (ref 0–0.1)
BASOPHILS NFR BLD AUTO: 0.7 %
BILIRUB SERPL-MCNC: 0.4 MG/DL (ref 0.3–1.2)
BUN SERPL-MCNC: 22 MG/DL (ref 7–22)
CALCIUM SERPL-MCNC: 9.3 MG/DL (ref 8.2–9.6)
CHLORIDE SERPL-SCNC: 94 MEQ/L (ref 98–111)
CO2 SERPL-SCNC: 23 MEQ/L (ref 23–33)
CREAT SERPL-MCNC: 1.2 MG/DL (ref 0.4–1.2)
DEPRECATED RDW RBC AUTO: 44 FL (ref 35–45)
EKG ATRIAL RATE: 82 BPM
EKG P AXIS: 88 DEGREES
EKG P-R INTERVAL: 220 MS
EKG Q-T INTERVAL: 412 MS
EKG QRS DURATION: 144 MS
EKG QTC CALCULATION (BAZETT): 481 MS
EKG R AXIS: -41 DEGREES
EKG T AXIS: 85 DEGREES
EKG VENTRICULAR RATE: 82 BPM
EOSINOPHIL NFR BLD AUTO: 3.6 %
EOSINOPHILS ABSOLUTE: 0.2 THOU/MM3 (ref 0–0.4)
ERYTHROCYTE [DISTWIDTH] IN BLOOD BY AUTOMATED COUNT: 12.8 % (ref 11.5–14.5)
FLUAV RNA RESP QL NAA+PROBE: NOT DETECTED
FLUBV RNA RESP QL NAA+PROBE: NOT DETECTED
GFR SERPL CREATININE-BSD FRML MDRD: 62 ML/MIN/1.73M2
GLUCOSE SERPL-MCNC: 178 MG/DL (ref 70–108)
HCT VFR BLD AUTO: 33.8 % (ref 42–52)
HGB BLD-MCNC: 11.5 GM/DL (ref 14–18)
IMM GRANULOCYTES # BLD AUTO: 0.01 THOU/MM3 (ref 0–0.07)
IMM GRANULOCYTES NFR BLD AUTO: 0.2 %
LIPASE SERPL-CCNC: 20.8 U/L (ref 5.6–51.3)
LYMPHOCYTES ABSOLUTE: 2.3 THOU/MM3 (ref 1–4.8)
LYMPHOCYTES NFR BLD AUTO: 39.3 %
MAGNESIUM SERPL-MCNC: 2 MG/DL (ref 1.6–2.4)
MCH RBC QN AUTO: 32.4 PG (ref 26–33)
MCHC RBC AUTO-ENTMCNC: 34 GM/DL (ref 32.2–35.5)
MCV RBC AUTO: 95.2 FL (ref 80–94)
MONOCYTES ABSOLUTE: 0.6 THOU/MM3 (ref 0.4–1.3)
MONOCYTES NFR BLD AUTO: 9.5 %
NEUTROPHILS ABSOLUTE: 2.8 THOU/MM3 (ref 1.8–7.7)
NEUTROPHILS NFR BLD AUTO: 46.7 %
NRBC BLD AUTO-RTO: 0 /100 WBC
OSMOLALITY SERPL CALC.SUM OF ELEC: 285.3 MOSMOL/KG (ref 275–300)
PLATELET # BLD AUTO: 175 THOU/MM3 (ref 130–400)
PMV BLD AUTO: 9.1 FL (ref 9.4–12.4)
POTASSIUM SERPL-SCNC: 3.8 MEQ/L (ref 3.5–5.2)
PROT SERPL-MCNC: 6.4 G/DL (ref 6.1–8)
RBC # BLD AUTO: 3.55 MILL/MM3 (ref 4.7–6.1)
SARS-COV-2 RNA RESP QL NAA+PROBE: NOT DETECTED
SODIUM SERPL-SCNC: 139 MEQ/L (ref 135–145)
TROPONIN, HIGH SENSITIVITY: 25 NG/L (ref 0–12)
TROPONIN, HIGH SENSITIVITY: 28 NG/L (ref 0–12)
WBC # BLD AUTO: 5.9 THOU/MM3 (ref 4.8–10.8)

## 2025-02-24 PROCEDURE — 6370000000 HC RX 637 (ALT 250 FOR IP)

## 2025-02-24 PROCEDURE — 71045 X-RAY EXAM CHEST 1 VIEW: CPT

## 2025-02-24 PROCEDURE — 85025 COMPLETE CBC W/AUTO DIFF WBC: CPT

## 2025-02-24 PROCEDURE — 83690 ASSAY OF LIPASE: CPT

## 2025-02-24 PROCEDURE — 93005 ELECTROCARDIOGRAM TRACING: CPT

## 2025-02-24 PROCEDURE — 6370000000 HC RX 637 (ALT 250 FOR IP): Performed by: EMERGENCY MEDICINE

## 2025-02-24 PROCEDURE — 99285 EMERGENCY DEPT VISIT HI MDM: CPT

## 2025-02-24 PROCEDURE — 87636 SARSCOV2 & INF A&B AMP PRB: CPT

## 2025-02-24 PROCEDURE — 80053 COMPREHEN METABOLIC PANEL: CPT

## 2025-02-24 PROCEDURE — 36415 COLL VENOUS BLD VENIPUNCTURE: CPT

## 2025-02-24 PROCEDURE — G0378 HOSPITAL OBSERVATION PER HR: HCPCS

## 2025-02-24 PROCEDURE — 84484 ASSAY OF TROPONIN QUANT: CPT

## 2025-02-24 PROCEDURE — 93010 ELECTROCARDIOGRAM REPORT: CPT | Performed by: NUCLEAR MEDICINE

## 2025-02-24 PROCEDURE — 83735 ASSAY OF MAGNESIUM: CPT

## 2025-02-24 PROCEDURE — 99223 1ST HOSP IP/OBS HIGH 75: CPT

## 2025-02-24 RX ORDER — NITROGLYCERIN 0.4 MG/1
0.4 TABLET SUBLINGUAL EVERY 5 MIN PRN
Status: DISCONTINUED | OUTPATIENT
Start: 2025-02-24 | End: 2025-02-26 | Stop reason: HOSPADM

## 2025-02-24 RX ORDER — ASPIRIN 81 MG/1
324 TABLET, CHEWABLE ORAL ONCE
Status: COMPLETED | OUTPATIENT
Start: 2025-02-24 | End: 2025-02-24

## 2025-02-24 RX ORDER — CYCLOBENZAPRINE HCL 10 MG
10 TABLET ORAL 3 TIMES DAILY PRN
COMMUNITY

## 2025-02-24 RX ADMIN — NITROGLYCERIN 0.4 MG: 0.4 TABLET, ORALLY DISINTEGRATING SUBLINGUAL at 19:00

## 2025-02-24 RX ADMIN — LIDOCAINE HYDROCHLORIDE: 20 SOLUTION ORAL at 20:34

## 2025-02-24 RX ADMIN — ASPIRIN 324 MG: 81 TABLET, CHEWABLE ORAL at 16:57

## 2025-02-24 ASSESSMENT — PAIN - FUNCTIONAL ASSESSMENT
PAIN_FUNCTIONAL_ASSESSMENT: NONE - DENIES PAIN
PAIN_FUNCTIONAL_ASSESSMENT: 0-10

## 2025-02-24 ASSESSMENT — PAIN DESCRIPTION - LOCATION
LOCATION: CHEST

## 2025-02-24 ASSESSMENT — PAIN SCALES - GENERAL
PAINLEVEL_OUTOF10: 2
PAINLEVEL_OUTOF10: 3
PAINLEVEL_OUTOF10: 3
PAINLEVEL_OUTOF10: 2
PAINLEVEL_OUTOF10: 3

## 2025-02-24 ASSESSMENT — PAIN DESCRIPTION - PAIN TYPE: TYPE: ACUTE PAIN

## 2025-02-24 NOTE — TELEPHONE ENCOUNTER
Pt called stating that he is having dull pain in sternum to upper chest area.     Pt was informed to go to the emergency room

## 2025-02-24 NOTE — ED TRIAGE NOTES
Pt to the ED through intake with c/o chest pain and shortness of breath. Pt reports the chest pain has been present for 5 days. Pt reports the pain has not worsened. Pt reports he took his last Nitro yesterday. Pt has extensive cardiac history and follows with Dr. Latif. EKG obtained upon arrival to room.

## 2025-02-24 NOTE — ED PROVIDER NOTES
components:    Anion Gap 22.0 (*)     All other components within normal limits   COVID-19 & INFLUENZA COMBO   LIPASE   MAGNESIUM   GLOMERULAR FILTRATION RATE, ESTIMATED   OSMOLALITY   TROPONIN     All laboratory results are individually reviewed and interpreted by me in the clinical context of this patient.  See ED course below for results interpretation if applicable.  (A negative COVID-19 test should be interpreted as COVID no longer suspected unless otherwise noted in this encounter documentation note)  (Any cultures that may have been sent were not resulted at the time of this patient ED visit)      Radiologic studies results available at the moment of this note (None if blank):  XR CHEST PORTABLE   Final Result   Impression:   Cardiomegaly, no CHF.      This document has been electronically signed by: Nacho Hernandez MD on    02/24/2025 05:35 PM        See ED course below for my interpretation if applicable.  All radiology images independently reviewed by me in the clinical context of this patient, in addition to interpretation provided by the radiologist.      EKG interpretation (none if blank):  normal sinus rhythm, rate 82 bpm, unchanged from previous tracings on external record review  All EKG results are individually reviewed and interpreted by me in the clinical context of this patient.  All EKGs are also interpreted by our Cardiology department, final interpretation may not be available as of the writing of this note.      PREVIOUS RECORDS  AND EXTERNAL INFORMATION REVIEWED   History obtained from: the patient.    Pertinent previous and/or external records reviewed: Noncontributory.    Case discussed with specialties other than Emergency Medicine: Not Applicable      MEDICAL DECISION MAKING   Initial Assessment Summary:   74 female presents ED today with chest pain ACS versus infectious process versus pneumonia  Please see ED course and MDM sections below for continuation and resolution of this

## 2025-02-25 ENCOUNTER — APPOINTMENT (OUTPATIENT)
Dept: NUCLEAR MEDICINE | Age: 75
End: 2025-02-25
Attending: PHYSICIAN ASSISTANT
Payer: MEDICARE

## 2025-02-25 ENCOUNTER — APPOINTMENT (OUTPATIENT)
Age: 75
End: 2025-02-25
Attending: PHYSICIAN ASSISTANT
Payer: MEDICARE

## 2025-02-25 LAB
ANION GAP SERPL CALC-SCNC: 14 MEQ/L (ref 8–16)
BUN SERPL-MCNC: 19 MG/DL (ref 7–22)
CALCIUM SERPL-MCNC: 9.1 MG/DL (ref 8.2–9.6)
CHLORIDE SERPL-SCNC: 102 MEQ/L (ref 98–111)
CO2 SERPL-SCNC: 27 MEQ/L (ref 23–33)
CREAT SERPL-MCNC: 1 MG/DL (ref 0.4–1.2)
ECHO BSA: 2.87 M2
GFR SERPL CREATININE-BSD FRML MDRD: 78 ML/MIN/1.73M2
GLUCOSE BLD STRIP.AUTO-MCNC: 117 MG/DL (ref 70–108)
GLUCOSE BLD STRIP.AUTO-MCNC: 252 MG/DL (ref 70–108)
GLUCOSE BLD STRIP.AUTO-MCNC: 269 MG/DL (ref 70–108)
GLUCOSE SERPL-MCNC: 110 MG/DL (ref 70–108)
MAGNESIUM SERPL-MCNC: 2.2 MG/DL (ref 1.6–2.4)
NUC STRESS EJECTION FRACTION: 45 %
POTASSIUM SERPL-SCNC: 3.9 MEQ/L (ref 3.5–5.2)
SODIUM SERPL-SCNC: 143 MEQ/L (ref 135–145)
STRESS BASELINE DIAS BP: 69 MMHG
STRESS BASELINE HR: 71 BPM
STRESS BASELINE SYS BP: 140 MMHG
STRESS ESTIMATED WORKLOAD: 1 METS
STRESS PEAK DIAS BP: 67 MMHG
STRESS PEAK SYS BP: 162 MMHG
STRESS PERCENT HR ACHIEVED: 59 %
STRESS POST PEAK HR: 85 BPM
STRESS RATE PRESSURE PRODUCT: NORMAL BPM*MMHG
STRESS STAGE 1 BP: NORMAL MMHG
STRESS STAGE 1 DURATION: 1 MIN:SEC
STRESS STAGE 1 HR: 77 BPM
STRESS STAGE 2 DURATION: 1 MIN:SEC
STRESS STAGE 2 HR: 77 BPM
STRESS STAGE 3 BP: NORMAL MMHG
STRESS STAGE 3 DURATION: 1 MIN:SEC
STRESS STAGE 3 HR: 78 BPM
STRESS STAGE RECOVERY 1 BP: NORMAL MMHG
STRESS STAGE RECOVERY 1 DURATION: 1 MIN:SEC
STRESS STAGE RECOVERY 1 HR: 75 BPM
STRESS STAGE RECOVERY 2 BP: NORMAL MMHG
STRESS STAGE RECOVERY 2 DURATION: 1 MIN:SEC
STRESS STAGE RECOVERY 2 HR: 73 BPM
STRESS STAGE RECOVERY 3 BP: NORMAL MMHG
STRESS STAGE RECOVERY 3 DURATION: 1 MIN:SEC
STRESS STAGE RECOVERY 3 HR: 85 BPM
STRESS STAGE RECOVERY 4 BP: NORMAL MMHG
STRESS STAGE RECOVERY 4 DURATION: 2 MIN:SEC
STRESS STAGE RECOVERY 4 HR: 79 BPM
STRESS TARGET HR: 144 BPM
TID: 1.22

## 2025-02-25 PROCEDURE — A9500 TC99M SESTAMIBI: HCPCS | Performed by: INTERNAL MEDICINE

## 2025-02-25 PROCEDURE — G0378 HOSPITAL OBSERVATION PER HR: HCPCS

## 2025-02-25 PROCEDURE — 6370000000 HC RX 637 (ALT 250 FOR IP)

## 2025-02-25 PROCEDURE — 99232 SBSQ HOSP IP/OBS MODERATE 35: CPT | Performed by: PHYSICIAN ASSISTANT

## 2025-02-25 PROCEDURE — 94640 AIRWAY INHALATION TREATMENT: CPT

## 2025-02-25 PROCEDURE — 2500000003 HC RX 250 WO HCPCS

## 2025-02-25 PROCEDURE — 96372 THER/PROPH/DIAG INJ SC/IM: CPT

## 2025-02-25 PROCEDURE — 93017 CV STRESS TEST TRACING ONLY: CPT

## 2025-02-25 PROCEDURE — 36415 COLL VENOUS BLD VENIPUNCTURE: CPT

## 2025-02-25 PROCEDURE — 80048 BASIC METABOLIC PNL TOTAL CA: CPT

## 2025-02-25 PROCEDURE — 6360000002 HC RX W HCPCS

## 2025-02-25 PROCEDURE — 83735 ASSAY OF MAGNESIUM: CPT

## 2025-02-25 PROCEDURE — 78452 HT MUSCLE IMAGE SPECT MULT: CPT

## 2025-02-25 PROCEDURE — 6360000002 HC RX W HCPCS: Performed by: PHYSICIAN ASSISTANT

## 2025-02-25 PROCEDURE — 3430000000 HC RX DIAGNOSTIC RADIOPHARMACEUTICAL: Performed by: INTERNAL MEDICINE

## 2025-02-25 PROCEDURE — 82948 REAGENT STRIP/BLOOD GLUCOSE: CPT

## 2025-02-25 RX ORDER — REGADENOSON 0.08 MG/ML
0.4 INJECTION, SOLUTION INTRAVENOUS
Status: CANCELLED | OUTPATIENT
Start: 2025-02-25

## 2025-02-25 RX ORDER — UREA 8.5 G/85G
CREAM TOPICAL PRN
Status: DISCONTINUED | OUTPATIENT
Start: 2025-02-25 | End: 2025-02-25 | Stop reason: RX

## 2025-02-25 RX ORDER — INSULIN LISPRO 100 [IU]/ML
12 INJECTION, SOLUTION INTRAVENOUS; SUBCUTANEOUS
Status: DISCONTINUED | OUTPATIENT
Start: 2025-02-25 | End: 2025-02-26 | Stop reason: HOSPADM

## 2025-02-25 RX ORDER — SODIUM CHLORIDE 9 MG/ML
INJECTION, SOLUTION INTRAVENOUS PRN
Status: DISCONTINUED | OUTPATIENT
Start: 2025-02-25 | End: 2025-02-26 | Stop reason: HOSPADM

## 2025-02-25 RX ORDER — ATORVASTATIN CALCIUM 10 MG/1
10 TABLET, FILM COATED ORAL NIGHTLY
Status: DISCONTINUED | OUTPATIENT
Start: 2025-02-25 | End: 2025-02-26 | Stop reason: HOSPADM

## 2025-02-25 RX ORDER — ALBUTEROL SULFATE 90 UG/1
2 INHALANT RESPIRATORY (INHALATION) PRN
Status: CANCELLED | OUTPATIENT
Start: 2025-02-25 | End: 2025-02-25

## 2025-02-25 RX ORDER — POTASSIUM CHLORIDE 1500 MG/1
40 TABLET, EXTENDED RELEASE ORAL PRN
Status: DISCONTINUED | OUTPATIENT
Start: 2025-02-25 | End: 2025-02-26 | Stop reason: HOSPADM

## 2025-02-25 RX ORDER — SODIUM CHLORIDE 0.9 % (FLUSH) 0.9 %
5-40 SYRINGE (ML) INJECTION PRN
Status: CANCELLED | OUTPATIENT
Start: 2025-02-25 | End: 2025-02-25

## 2025-02-25 RX ORDER — MAGNESIUM SULFATE IN WATER 40 MG/ML
2000 INJECTION, SOLUTION INTRAVENOUS PRN
Status: DISCONTINUED | OUTPATIENT
Start: 2025-02-25 | End: 2025-02-26 | Stop reason: HOSPADM

## 2025-02-25 RX ORDER — ONDANSETRON 2 MG/ML
4 INJECTION INTRAMUSCULAR; INTRAVENOUS EVERY 6 HOURS PRN
Status: DISCONTINUED | OUTPATIENT
Start: 2025-02-25 | End: 2025-02-26 | Stop reason: HOSPADM

## 2025-02-25 RX ORDER — SODIUM CHLORIDE 9 MG/ML
500 INJECTION, SOLUTION INTRAVENOUS CONTINUOUS PRN
Status: CANCELLED | OUTPATIENT
Start: 2025-02-25 | End: 2025-02-25

## 2025-02-25 RX ORDER — FUROSEMIDE 40 MG/1
40 TABLET ORAL 2 TIMES DAILY
Status: DISCONTINUED | OUTPATIENT
Start: 2025-02-25 | End: 2025-02-26 | Stop reason: HOSPADM

## 2025-02-25 RX ORDER — ATROPINE SULFATE 0.1 MG/ML
0.5 INJECTION INTRAVENOUS EVERY 5 MIN PRN
Status: CANCELLED | OUTPATIENT
Start: 2025-02-25 | End: 2025-02-25

## 2025-02-25 RX ORDER — AMLODIPINE BESYLATE 10 MG/1
10 TABLET ORAL DAILY
Status: DISCONTINUED | OUTPATIENT
Start: 2025-02-26 | End: 2025-02-26 | Stop reason: HOSPADM

## 2025-02-25 RX ORDER — GLUCAGON 1 MG/ML
1 KIT INJECTION PRN
Status: DISCONTINUED | OUTPATIENT
Start: 2025-02-25 | End: 2025-02-26 | Stop reason: HOSPADM

## 2025-02-25 RX ORDER — ALBUTEROL SULFATE 90 UG/1
2 INHALANT RESPIRATORY (INHALATION) EVERY 4 HOURS PRN
Status: DISCONTINUED | OUTPATIENT
Start: 2025-02-25 | End: 2025-02-26 | Stop reason: HOSPADM

## 2025-02-25 RX ORDER — ACETAMINOPHEN 650 MG/1
650 SUPPOSITORY RECTAL EVERY 6 HOURS PRN
Status: DISCONTINUED | OUTPATIENT
Start: 2025-02-25 | End: 2025-02-26 | Stop reason: HOSPADM

## 2025-02-25 RX ORDER — REGADENOSON 0.08 MG/ML
0.4 INJECTION, SOLUTION INTRAVENOUS
Status: COMPLETED | OUTPATIENT
Start: 2025-02-25 | End: 2025-02-25

## 2025-02-25 RX ORDER — TETRAKIS(2-METHOXYISOBUTYLISOCYANIDE)COPPER(I) TETRAFLUOROBORATE 1 MG/ML
30 INJECTION, POWDER, LYOPHILIZED, FOR SOLUTION INTRAVENOUS
Status: COMPLETED | OUTPATIENT
Start: 2025-02-25 | End: 2025-02-25

## 2025-02-25 RX ORDER — ONDANSETRON 4 MG/1
4 TABLET, ORALLY DISINTEGRATING ORAL EVERY 8 HOURS PRN
Status: DISCONTINUED | OUTPATIENT
Start: 2025-02-25 | End: 2025-02-26 | Stop reason: HOSPADM

## 2025-02-25 RX ORDER — AMLODIPINE BESYLATE 5 MG/1
5 TABLET ORAL DAILY
Status: DISCONTINUED | OUTPATIENT
Start: 2025-02-25 | End: 2025-02-25

## 2025-02-25 RX ORDER — NITROGLYCERIN 0.4 MG/1
0.4 TABLET SUBLINGUAL EVERY 5 MIN PRN
Status: CANCELLED | OUTPATIENT
Start: 2025-02-25 | End: 2025-02-25

## 2025-02-25 RX ORDER — TETRAKIS(2-METHOXYISOBUTYLISOCYANIDE)COPPER(I) TETRAFLUOROBORATE 1 MG/ML
8.6 INJECTION, POWDER, LYOPHILIZED, FOR SOLUTION INTRAVENOUS
Status: COMPLETED | OUTPATIENT
Start: 2025-02-25 | End: 2025-02-25

## 2025-02-25 RX ORDER — ENOXAPARIN SODIUM 100 MG/ML
40 INJECTION SUBCUTANEOUS 2 TIMES DAILY
Status: DISCONTINUED | OUTPATIENT
Start: 2025-02-25 | End: 2025-02-26 | Stop reason: HOSPADM

## 2025-02-25 RX ORDER — AMINOPHYLLINE 25 MG/ML
50 INJECTION, SOLUTION INTRAVENOUS PRN
Status: CANCELLED | OUTPATIENT
Start: 2025-02-25 | End: 2025-02-25

## 2025-02-25 RX ORDER — ALBUTEROL SULFATE 90 UG/1
2 INHALANT RESPIRATORY (INHALATION)
Status: DISCONTINUED | OUTPATIENT
Start: 2025-02-25 | End: 2025-02-25

## 2025-02-25 RX ORDER — INSULIN GLARGINE 100 [IU]/ML
50 INJECTION, SOLUTION SUBCUTANEOUS NIGHTLY
Status: DISCONTINUED | OUTPATIENT
Start: 2025-02-25 | End: 2025-02-26 | Stop reason: HOSPADM

## 2025-02-25 RX ORDER — POLYETHYLENE GLYCOL 3350 17 G/17G
17 POWDER, FOR SOLUTION ORAL DAILY PRN
Status: DISCONTINUED | OUTPATIENT
Start: 2025-02-25 | End: 2025-02-26 | Stop reason: HOSPADM

## 2025-02-25 RX ORDER — POTASSIUM CHLORIDE 7.45 MG/ML
10 INJECTION INTRAVENOUS PRN
Status: DISCONTINUED | OUTPATIENT
Start: 2025-02-25 | End: 2025-02-26 | Stop reason: HOSPADM

## 2025-02-25 RX ORDER — SODIUM CHLORIDE 0.9 % (FLUSH) 0.9 %
5-40 SYRINGE (ML) INJECTION EVERY 12 HOURS SCHEDULED
Status: DISCONTINUED | OUTPATIENT
Start: 2025-02-25 | End: 2025-02-26 | Stop reason: HOSPADM

## 2025-02-25 RX ORDER — SODIUM CHLORIDE 0.9 % (FLUSH) 0.9 %
5-40 SYRINGE (ML) INJECTION PRN
Status: DISCONTINUED | OUTPATIENT
Start: 2025-02-25 | End: 2025-02-26 | Stop reason: HOSPADM

## 2025-02-25 RX ORDER — METOPROLOL TARTRATE 1 MG/ML
5 INJECTION, SOLUTION INTRAVENOUS EVERY 5 MIN PRN
Status: CANCELLED | OUTPATIENT
Start: 2025-02-25 | End: 2025-02-25

## 2025-02-25 RX ORDER — ACETAMINOPHEN 325 MG/1
650 TABLET ORAL EVERY 6 HOURS PRN
Status: DISCONTINUED | OUTPATIENT
Start: 2025-02-25 | End: 2025-02-26 | Stop reason: HOSPADM

## 2025-02-25 RX ORDER — DEXTROSE MONOHYDRATE 100 MG/ML
INJECTION, SOLUTION INTRAVENOUS CONTINUOUS PRN
Status: DISCONTINUED | OUTPATIENT
Start: 2025-02-25 | End: 2025-02-26 | Stop reason: HOSPADM

## 2025-02-25 RX ORDER — CYCLOBENZAPRINE HCL 10 MG
10 TABLET ORAL 3 TIMES DAILY PRN
Status: DISCONTINUED | OUTPATIENT
Start: 2025-02-25 | End: 2025-02-26 | Stop reason: HOSPADM

## 2025-02-25 RX ADMIN — FUROSEMIDE 40 MG: 40 TABLET ORAL at 22:04

## 2025-02-25 RX ADMIN — Medication 8.6 MILLICURIE: at 10:54

## 2025-02-25 RX ADMIN — TICAGRELOR 90 MG: 90 TABLET ORAL at 09:18

## 2025-02-25 RX ADMIN — ENOXAPARIN SODIUM 40 MG: 100 INJECTION SUBCUTANEOUS at 22:03

## 2025-02-25 RX ADMIN — REGADENOSON 0.4 MG: 0.08 INJECTION, SOLUTION INTRAVENOUS at 12:02

## 2025-02-25 RX ADMIN — INSULIN GLARGINE 50 UNITS: 100 INJECTION, SOLUTION SUBCUTANEOUS at 22:03

## 2025-02-25 RX ADMIN — FUROSEMIDE 40 MG: 40 TABLET ORAL at 09:17

## 2025-02-25 RX ADMIN — ENOXAPARIN SODIUM 40 MG: 100 INJECTION SUBCUTANEOUS at 09:18

## 2025-02-25 RX ADMIN — ATORVASTATIN CALCIUM 10 MG: 10 TABLET, FILM COATED ORAL at 22:04

## 2025-02-25 RX ADMIN — AMLODIPINE BESYLATE 5 MG: 5 TABLET ORAL at 09:17

## 2025-02-25 RX ADMIN — ALBUTEROL SULFATE 2 PUFF: 90 AEROSOL, METERED RESPIRATORY (INHALATION) at 09:00

## 2025-02-25 RX ADMIN — TICAGRELOR 90 MG: 90 TABLET ORAL at 22:04

## 2025-02-25 RX ADMIN — INSULIN LISPRO 12 UNITS: 100 INJECTION, SOLUTION INTRAVENOUS; SUBCUTANEOUS at 17:53

## 2025-02-25 RX ADMIN — Medication 30 MILLICURIE: at 12:04

## 2025-02-25 RX ADMIN — SODIUM CHLORIDE, PRESERVATIVE FREE 10 ML: 5 INJECTION INTRAVENOUS at 22:04

## 2025-02-25 ASSESSMENT — PAIN DESCRIPTION - DESCRIPTORS: DESCRIPTORS: DISCOMFORT

## 2025-02-25 ASSESSMENT — PAIN SCALES - GENERAL: PAINLEVEL_OUTOF10: 4

## 2025-02-25 NOTE — PROGRESS NOTES
Pharmacy Medication History Note    List of current medications patient is taking is complete.    Source of information: patient and surescripts    Changes made to medication list:  Medications removed:  Carvedilol   Duloxetine     Medications added/doses adjusted:  Changed probenecid 500 mg BID to BID prn for gout  Changed febuxostat 80 mg daily to daily prn for gout    Other notes (ex. Recent course of antibiotics, Coumadin dosing):  The patient stated he's using 2 additional moisturizing creams, however he was unable to identify them.  Denies use of other OTC or herbal medications.    Allergies reviewed    Electronically signed by Sherry Edward RPH on 2/24/2025 at 8:06 PM

## 2025-02-25 NOTE — H&P
Hospitalist History & Physical         Patient: Tuan Hassan 74 y.o. male      : 1950  Date of Admission: 2025  Date of Service: Pt seen/examined on 25 and Admitted to Observation with expected LOS less than two midnights due to medical therapy.         ASSESSMENT AND PLAN    Chest pain: Delta negative  Consider Cardiology consult pending clinical course  Telemetry   Trend troponin as needed and PRN EKG    Chronic Conditions (reviewed and stable unless otherwise stated)   Primary hypertension: resume home medications with hold parameters  HLD: resume home statin  IDDMII, well controlled: Resume home medications with ac/hs accuchecks, hypoglycemia protocol      Data reviewed (unless otherwise discussed in assessment/plan)  EKG:  I have reviewed the EKG with the following interpretation: NSR with 1st degree HB  Imaging: I have reviewed CXR with the following interpretation: Cardiomegaly with no CHF  Labs: Reviewed, see chart and plan above.       =======================================================================    SUBJECTIVE    Chief Complaint:  chest pain    History Of Present Illness:  Tuan Hassan is a 74 y.o. male with PMHx of HLD, DMII, HTN who presents to [place] with chest pain.  Patient admits to chest pressure this afternoon that has been persistent over the last week or so taking all of his home nitro tablets without relief. He states that he has not had pain like this before, but does complain of pain after eating as well.    Denies fevers, chills, nausea, vomiting, diarrhea.    ED course: labs drawn, imaging obtained    Review of Systems:   Pertinent positives and negatives as noted in the HPI. Otherwise complete ROS negative.    OBJECTIVE  Physical Exam:  BP (!) 146/77   Pulse 77   Temp 98.3 °F (36.8 °C) (Oral)   Resp 20   Ht 1.88 m (6' 2\")   Wt (!) 158.3 kg (349 lb)   SpO2 97%   BMI 44.81 kg/m²     General appearance: No apparent distress, 
No

## 2025-02-25 NOTE — PROGRESS NOTES
Patient received sacramental anointing by a . If you are in need of  support, please call [phone]. If you are in need of a  after 6:30pm, please call the house supervisor for the on-call .      Micaela Alcantara  [facility] Health Coordinator  [phone]

## 2025-02-25 NOTE — ED NOTES
Called 8a charge ok to transport to  3 in stable condition.  
Pt given second nitro at this time. Pt reporting chest pain \"is a little bit better\" but still there.   
Pt medicated per MAR at this time.  
Pt medicated per MAR at this time.  
Pt resting on cot at this time. Vitals stable with telemetry in place. Call light in reach.  
Pt updated on bed assignment.   
by Raul Parikh MD at [facility] [place] OR    PAIN MANAGEMENT PROCEDURE Left 10/1/2020    Lumbar RFA Left side L3-4, 4-5 performed by Raul Parikh MD at [facility] OR    PAIN MANAGEMENT PROCEDURE Bilateral 11/17/2020    TFLESI @L5 bilateral #1 performed by Raul Parikh MD at [facility] [place] OR    PAIN MANAGEMENT PROCEDURE Bilateral 12/10/2020    TFLESI @L5 bilateral #1 performed by Raul Parikh MD at [facility] OR    POSTERIOR FUSION THORACIC SPINE N/A 12/28/2018    T7-T9 DECOMPRESSION, T7-T9 POSTERIOR FUSION performed by Daljit Bernstein MD at [facility] OR    ROTATOR CUFF REPAIR      SPINE SURGERY  2010    fumich    TONSILLECTOMY      VASCULAR SURGERY      cabg harvests from left leg       PAST MEDICAL HISTORY       Past Medical History:   Diagnosis Date    RAUL (acute kidney injury) 02/13/2020    ASHD (arteriosclerotic heart disease) 2005 2006    stent  baki    Closed fracture of first lumbar vertebra (HCC) 05/06/2022    Closed T12 fracture (HCC) 05/06/2022    Depression     Diabetic peripheral neuropathy (HCC) 2014    Fracture 10/1984    left lower extremity     HTN (hypertension)     Hypercholesteremia     IDDM (insulin dependent diabetes mellitus)     Low back pain     Morbid obesity with BMI of 45.0-49.9, adult     Osteoarthritis     S/P CABG (coronary artery bypass graft)            Electronically signed by Magdalena Murray RN on 2/24/2025 at 8:04 PM

## 2025-02-25 NOTE — RT PROTOCOL NOTE
RT Inhaler-Nebulizer Bronchodilator Protocol Note    There is a bronchodilator order in the chart from a provider indicating to follow the RT Bronchodilator Protocol and there is an “Initiate RT Inhaler-Nebulizer Bronchodilator Protocol” order as well (see protocol at bottom of note).    CXR Findings:  XR CHEST PORTABLE    Result Date: 2/24/2025  Impression: Cardiomegaly, no CHF. This document has been electronically signed by: Nacho Hernandez MD on 02/24/2025 05:35 PM      The findings from the last RT Protocol Assessment were as follows:   History Pulmonary Disease: None or smoker <15 pack years  Respiratory Pattern: Regular pattern and RR 12-20 bpm  Breath Sounds: Slightly diminished and/or crackles  Cough: Strong, spontaneous, non-productive  Indication for Bronchodilator Therapy:    Bronchodilator Assessment Score: 2    Aerosolized bronchodilator medication orders have been revised according to the RT Inhaler-Nebulizer Bronchodilator Protocol below.    Respiratory Therapist to perform RT Therapy Protocol Assessment initially then follow the protocol.  Repeat RT Therapy Protocol Assessment PRN for score 0-3 or on second treatment, BID, and PRN for scores above 3.    No Indications - adjust the frequency to every 6 hours PRN wheezing or bronchospasm, if no treatments needed after 48 hours then discontinue using Per Protocol order mode.     If indication present, adjust the RT bronchodilator orders based on the Bronchodilator Assessment Score as indicated below.  Use Inhaler orders unless patient has one or more of the following: on home nebulizer, not able to hold breath for 10 seconds, is not alert and oriented, cannot activate and use MDI correctly, or respiratory rate 25 breaths per minute or more, then use the equivalent nebulizer order(s) with same Frequency and PRN reasons based on the score.  If a patient is on this medication at home then do not decrease Frequency below that used at home.    0-3 - enter

## 2025-02-25 NOTE — PROGRESS NOTES
Hospitalist Progress Note      Patient:  Tuan Hassan 74 y.o. male     Unit/Bed:8A-03/003-A    Date of Admission: 2/24/2025      ASSESSMENT AND PLAN    Active Problems  Chest Pain   Trop flat. Stress test & echo pending.   Pt states that Nitro SL helped.   This could be from accelerated HTN.   Accelerated HTN   BP 180s at points through admission.   Continue Norvasc (increased to 10mg) & diuretics     Chronic Conditions (reviewed and stable unless otherwise stated)  Primary hypertension: resume home medications with hold parameters  HLD: resume home statin  IDDMII, well controlled: Resume home medications with ac/hs accuchecks, hypoglycemia protocol      LDA: []CVC / []PICC / []Midline / []Liu / []Drains / []Mediport / [x]None  Antibiotics: None   Steroids: None   Labs (still needed?): [x]Yes / []No  IVF (still needed?): []Yes / [x]No    Level of care: []Step Down / [x]Med-Surg  Bed Status: []Inpatient / [x]Observation  Telemetry: [x]Yes / []No  PT/OT: []Yes / [x]No    DVT Prophylaxis: [x] Lovenox / [] Heparin / [] SCDs / [] Already on Systemic Anticoagulation / [] None   Code status: Full Code     Expected discharge date:  Tomorrow   Disposition: SNF      ===================================================================    Chief Complaint: Chest Pain   Subjective (past 24 hours): No acute events overnight. Pt was evaluated after stress test. Pt states that chest pain is 1/10 for now. Pt states that he takes his medications as prescribed.      Medications:    Infusion Medications    sodium chloride      dextrose      Scheduled Medications    sodium chloride flush  5-40 mL IntraVENous 2 times per day    enoxaparin  40 mg SubCUTAneous BID    amLODIPine  5 mg Oral Daily    atorvastatin  10 mg Oral Nightly    furosemide  40 mg Oral BID    insulin glargine  50 Units SubCUTAneous Nightly    insulin lispro  12 Units SubCUTAneous TID WC    ticagrelor  90 mg Oral BID    PRN Meds: sodium chloride flush, sodium

## 2025-02-25 NOTE — PLAN OF CARE
Problem: Chronic Conditions and Co-morbidities  Goal: Patient's chronic conditions and co-morbidity symptoms are monitored and maintained or improved  Outcome: Progressing     Problem: Discharge Planning  Goal: Discharge to home or other facility with appropriate resources  Outcome: Progressing  Flowsheets (Taken 2/24/2025 2300 by Darlene Jameson, RN, BSN)  Discharge to home or other facility with appropriate resources: Identify discharge learning needs (meds, wound care, etc)     Problem: Pain  Goal: Verbalizes/displays adequate comfort level or baseline comfort level  Outcome: Progressing     Problem: Safety - Adult  Goal: Free from fall injury  Outcome: Progressing  Flowsheets (Taken 2/25/2025 8371)  Free From Fall Injury:   Instruct family/caregiver on patient safety   Based on caregiver fall risk screen, instruct family/caregiver to ask for assistance with transferring infant if caregiver noted to have fall risk factors

## 2025-02-26 ENCOUNTER — APPOINTMENT (OUTPATIENT)
Age: 75
End: 2025-02-26
Attending: PHYSICIAN ASSISTANT
Payer: MEDICARE

## 2025-02-26 VITALS
HEIGHT: 74 IN | HEART RATE: 70 BPM | DIASTOLIC BLOOD PRESSURE: 62 MMHG | RESPIRATION RATE: 18 BRPM | TEMPERATURE: 97.6 F | WEIGHT: 315 LBS | BODY MASS INDEX: 40.43 KG/M2 | SYSTOLIC BLOOD PRESSURE: 146 MMHG | OXYGEN SATURATION: 97 %

## 2025-02-26 LAB
ANION GAP SERPL CALC-SCNC: 10 MEQ/L (ref 8–16)
BASOPHILS ABSOLUTE: 0 THOU/MM3 (ref 0–0.1)
BASOPHILS NFR BLD AUTO: 0.5 %
BUN SERPL-MCNC: 17 MG/DL (ref 8–23)
CALCIUM SERPL-MCNC: 8.6 MG/DL (ref 8.2–9.6)
CHLORIDE SERPL-SCNC: 103 MEQ/L (ref 98–111)
CO2 SERPL-SCNC: 26 MEQ/L (ref 22–29)
CREAT SERPL-MCNC: 1.2 MG/DL (ref 0.7–1.2)
DEPRECATED RDW RBC AUTO: 43.6 FL (ref 35–45)
ECHO AV CUSP MM: 2.3 CM
ECHO BSA: 2.87 M2
ECHO LA AREA 2C: 22.2 CM2
ECHO LA AREA 4C: 23.7 CM2
ECHO LA DIAMETER INDEX: 1.56 CM/M2
ECHO LA DIAMETER: 4.3 CM
ECHO LA MAJOR AXIS: 6.1 CM
ECHO LA MINOR AXIS: 5.7 CM
ECHO LA VOL BP: 72 ML (ref 18–58)
ECHO LA VOL MOD A2C: 68 ML (ref 18–58)
ECHO LA VOL MOD A4C: 71 ML (ref 18–58)
ECHO LA VOL/BSA BIPLANE: 26 ML/M2 (ref 16–34)
ECHO LA VOLUME INDEX MOD A2C: 25 ML/M2 (ref 16–34)
ECHO LA VOLUME INDEX MOD A4C: 26 ML/M2 (ref 16–34)
ECHO LV EJECTION FRACTION 3D: 50 %
ECHO LV FRACTIONAL SHORTENING: 30 % (ref 28–44)
ECHO LV INTERNAL DIMENSION DIASTOLE INDEX: 2.04 CM/M2
ECHO LV INTERNAL DIMENSION DIASTOLIC: 5.6 CM (ref 4.2–5.9)
ECHO LV INTERNAL DIMENSION SYSTOLIC INDEX: 1.42 CM/M2
ECHO LV INTERNAL DIMENSION SYSTOLIC: 3.9 CM
ECHO LV IVSD: 1.4 CM (ref 0.6–1)
ECHO LV MASS 2D: 365.4 G (ref 88–224)
ECHO LV MASS INDEX 2D: 132.9 G/M2 (ref 49–115)
ECHO LV POSTERIOR WALL DIASTOLIC: 1.5 CM (ref 0.6–1)
ECHO LV RELATIVE WALL THICKNESS RATIO: 0.54
ECHO RV INTERNAL DIMENSION: 3 CM
EOSINOPHIL NFR BLD AUTO: 3.7 %
EOSINOPHILS ABSOLUTE: 0.2 THOU/MM3 (ref 0–0.4)
ERYTHROCYTE [DISTWIDTH] IN BLOOD BY AUTOMATED COUNT: 12.6 % (ref 11.5–14.5)
GFR SERPL CREATININE-BSD FRML MDRD: 62 ML/MIN/1.73M2
GLUCOSE BLD STRIP.AUTO-MCNC: 201 MG/DL (ref 70–108)
GLUCOSE BLD STRIP.AUTO-MCNC: 294 MG/DL (ref 70–108)
GLUCOSE BLD STRIP.AUTO-MCNC: 314 MG/DL (ref 70–108)
GLUCOSE SERPL-MCNC: 167 MG/DL (ref 74–109)
HCT VFR BLD AUTO: 30.2 % (ref 42–52)
HGB BLD-MCNC: 10.3 GM/DL (ref 14–18)
IMM GRANULOCYTES # BLD AUTO: 0.02 THOU/MM3 (ref 0–0.07)
IMM GRANULOCYTES NFR BLD AUTO: 0.3 %
LYMPHOCYTES ABSOLUTE: 2 THOU/MM3 (ref 1–4.8)
LYMPHOCYTES NFR BLD AUTO: 33.3 %
MAGNESIUM SERPL-MCNC: 2.1 MG/DL (ref 1.6–2.6)
MCH RBC QN AUTO: 32.3 PG (ref 26–33)
MCHC RBC AUTO-ENTMCNC: 34.1 GM/DL (ref 32.2–35.5)
MCV RBC AUTO: 94.7 FL (ref 80–94)
MONOCYTES ABSOLUTE: 0.7 THOU/MM3 (ref 0.4–1.3)
MONOCYTES NFR BLD AUTO: 10.9 %
NEUTROPHILS ABSOLUTE: 3.1 THOU/MM3 (ref 1.8–7.7)
NEUTROPHILS NFR BLD AUTO: 51.3 %
NRBC BLD AUTO-RTO: 0 /100 WBC
PLATELET # BLD AUTO: 169 THOU/MM3 (ref 130–400)
PMV BLD AUTO: 9.4 FL (ref 9.4–12.4)
POTASSIUM SERPL-SCNC: 3.6 MEQ/L (ref 3.5–5.2)
RBC # BLD AUTO: 3.19 MILL/MM3 (ref 4.7–6.1)
SODIUM SERPL-SCNC: 139 MEQ/L (ref 135–145)
WBC # BLD AUTO: 6 THOU/MM3 (ref 4.8–10.8)

## 2025-02-26 PROCEDURE — 85025 COMPLETE CBC W/AUTO DIFF WBC: CPT

## 2025-02-26 PROCEDURE — 96372 THER/PROPH/DIAG INJ SC/IM: CPT

## 2025-02-26 PROCEDURE — 6370000000 HC RX 637 (ALT 250 FOR IP)

## 2025-02-26 PROCEDURE — 93307 TTE W/O DOPPLER COMPLETE: CPT

## 2025-02-26 PROCEDURE — 6370000000 HC RX 637 (ALT 250 FOR IP): Performed by: PHYSICIAN ASSISTANT

## 2025-02-26 PROCEDURE — G0378 HOSPITAL OBSERVATION PER HR: HCPCS

## 2025-02-26 PROCEDURE — 80048 BASIC METABOLIC PNL TOTAL CA: CPT

## 2025-02-26 PROCEDURE — 82948 REAGENT STRIP/BLOOD GLUCOSE: CPT

## 2025-02-26 PROCEDURE — 93307 TTE W/O DOPPLER COMPLETE: CPT | Performed by: NUCLEAR MEDICINE

## 2025-02-26 PROCEDURE — 83735 ASSAY OF MAGNESIUM: CPT

## 2025-02-26 PROCEDURE — 36415 COLL VENOUS BLD VENIPUNCTURE: CPT

## 2025-02-26 PROCEDURE — 2500000003 HC RX 250 WO HCPCS

## 2025-02-26 PROCEDURE — 6360000002 HC RX W HCPCS

## 2025-02-26 RX ADMIN — INSULIN LISPRO 12 UNITS: 100 INJECTION, SOLUTION INTRAVENOUS; SUBCUTANEOUS at 08:20

## 2025-02-26 RX ADMIN — INSULIN LISPRO 12 UNITS: 100 INJECTION, SOLUTION INTRAVENOUS; SUBCUTANEOUS at 17:56

## 2025-02-26 RX ADMIN — SODIUM CHLORIDE, PRESERVATIVE FREE 10 ML: 5 INJECTION INTRAVENOUS at 08:20

## 2025-02-26 RX ADMIN — INSULIN LISPRO 12 UNITS: 100 INJECTION, SOLUTION INTRAVENOUS; SUBCUTANEOUS at 12:16

## 2025-02-26 RX ADMIN — AMLODIPINE BESYLATE 10 MG: 10 TABLET ORAL at 08:21

## 2025-02-26 RX ADMIN — ENOXAPARIN SODIUM 40 MG: 100 INJECTION SUBCUTANEOUS at 08:21

## 2025-02-26 RX ADMIN — TICAGRELOR 90 MG: 90 TABLET ORAL at 08:21

## 2025-02-26 RX ADMIN — FUROSEMIDE 40 MG: 40 TABLET ORAL at 08:21

## 2025-02-26 NOTE — PROGRESS NOTES
Completed discharge education and order for wheelchair placed. Patient states he is driving himself.

## 2025-02-26 NOTE — PROGRESS NOTES
1000   On assessment the patient is 73 y/o male, sitting in a semi fowlers position. Speech is clear and appropriate and person is oriented to person, place, time, and situation. Pupils are round and reactive to light went from a 5mm to a 3mm both pupils moved consensually. Upper extremities are tan, warm and dry and movement is purposeful and non-tender. Capillary refill and skin turgor less than 3 seconds, arm drift is negative. Heart sounds are strong and heard throughout, lung sounds are clear throughout. Abdomen sounds are active throughout all 4 quadrants. No tenderness or masses. Lower extremities are rough, warm, scabbed, and tan movement is purposeful with no pain expressed. Bilateral pedal push and pull were moderate and equal, dorsalis pedis and posterior tibialis pulses were moderate and equal bilaterally, no edema present. Call light was left in front of the patient   [facility] Nursing Student Charito MONACO     1200    Patient is sitting in a semi- fowlers position. Everything is the same as the 1000 assessment. Patient reports of no pain. Call light was left with the patient.   [facility] Nursing Student Charito MONACO

## 2025-02-27 ENCOUNTER — CARE COORDINATION (OUTPATIENT)
Dept: CARE COORDINATION | Age: 75
End: 2025-02-27

## 2025-02-27 ENCOUNTER — TELEPHONE (OUTPATIENT)
Dept: FAMILY MEDICINE CLINIC | Age: 75
End: 2025-02-27

## 2025-02-27 NOTE — CARE COORDINATION
Care Transitions Note    Initial Call - Call within 2 business days of discharge: Yes    Attempted to reach patient for transitions of care follow up. Unable to reach patient.    Outreach Attempts:   Unable to leave message.     1st attempt to contact pt for initial care transition follow up.  Unable to reach pt.  Unable to leave a message d/t voice mailbox being full.  Will try again at a later time.      Patient: Tuan Hassan    Patient : 1950   MRN: [id number]    Reason for Admission: Chest pain, SOB  Discharge Date: 25  RURS: No data recorded  Last Discharge Facility       Date Complaint Diagnosis Description Type Department Provider    25 Chest Pain; Shortness of Breath Chest pain, unspecified type ... ED to Hosp-Admission (Discharged) (ADMITTED) [facility] Loli Goodman PA; Ervin Perdue,...            Was this an external facility discharge? No    Follow Up Appointment:   Patient has hospital follow up appointment scheduled within 7 days of discharge.    Future Appointments         Provider Specialty Dept Phone    3/5/2025 12:20 PM Humberto Ypa MD Family Medicine [phone]    2025 2:10 PM Humberto Yap MD Family Medicine [phone]    2025 1:45 PM Otoniel Mendes MD Cardiology [phone]            Plan for follow-up on next business day.      Mi Duarte RN

## 2025-02-28 ENCOUNTER — CARE COORDINATION (OUTPATIENT)
Dept: CARE COORDINATION | Age: 75
End: 2025-02-28

## 2025-02-28 NOTE — CARE COORDINATION
Care Transitions Note    Initial Call - Call within 2 business days of discharge: Yes    Attempted to reach patient for transitions of care follow up. Unable to reach patient.    Outreach Attempts:   Unable to leave message.     2nd attempt to contact pt for initial care transition follow up.  Unable to reach pt.  Unable to leave a message d/t mailbox being full.       Program will be closed and CTN to sign off if return call is not received from the patient.      Sent message to Teagan Salamanca,  to mail UTR letter to pt.      Patient: Tuan Hassan    Patient : 1950   MRN: [id number]    Reason for Admission: Chest pain, SOB  Discharge Date: 25  RURS: No data recorded  Last Discharge Facility       Date Complaint Diagnosis Description Type Department Provider    25 Chest Pain; Shortness of Breath Chest pain, unspecified type ... ED to Hosp-Admission (Discharged) (ADMITTED) [facility] Loli Goodman PA; Ervin Perdue,...            Was this an external facility discharge? No    Follow Up Appointment:   Patient has hospital follow up appointment scheduled within 7 days of discharge.    Future Appointments         Provider Specialty Dept Phone    3/5/2025 12:20 PM Humberto Yap MD Family Medicine [phone]    2025 2:10 PM Humberto Yap MD Family Medicine [phone]    2025 1:45 PM Otoniel Mendes MD Cardiology [phone]            No further follow-up call indicated     Mi Duarte RN

## 2025-03-03 ENCOUNTER — CARE COORDINATION (OUTPATIENT)
Dept: CARE COORDINATION | Age: 75
End: 2025-03-03

## 2025-03-11 ENCOUNTER — OFFICE VISIT (OUTPATIENT)
Dept: FAMILY MEDICINE CLINIC | Age: 75
End: 2025-03-11

## 2025-03-11 VITALS
WEIGHT: 315 LBS | HEIGHT: 74 IN | SYSTOLIC BLOOD PRESSURE: 130 MMHG | BODY MASS INDEX: 40.43 KG/M2 | DIASTOLIC BLOOD PRESSURE: 60 MMHG | HEART RATE: 64 BPM | RESPIRATION RATE: 20 BRPM

## 2025-03-11 DIAGNOSIS — I87.2 VENOUS STASIS DERMATITIS OF BOTH LOWER EXTREMITIES: ICD-10-CM

## 2025-03-11 DIAGNOSIS — I50.32 CHRONIC DIASTOLIC (CONGESTIVE) HEART FAILURE (HCC): ICD-10-CM

## 2025-03-11 DIAGNOSIS — Z98.61 S/P PERCUTANEOUS TRANSLUMINAL CORONARY ANGIOPLASTY: ICD-10-CM

## 2025-03-11 DIAGNOSIS — R07.2 PRECORDIAL PAIN: Primary | ICD-10-CM

## 2025-03-11 DIAGNOSIS — I10 PRIMARY HYPERTENSION: ICD-10-CM

## 2025-03-11 DIAGNOSIS — S98.112A AMPUTATED GREAT TOE OF LEFT FOOT: ICD-10-CM

## 2025-03-11 DIAGNOSIS — N18.32 CHRONIC KIDNEY DISEASE, STAGE 3B (HCC): ICD-10-CM

## 2025-03-11 DIAGNOSIS — Z09 HOSPITAL DISCHARGE FOLLOW-UP: ICD-10-CM

## 2025-03-11 DIAGNOSIS — M10.9 GOUTY ARTHRITIS OF BOTH KNEES: ICD-10-CM

## 2025-03-11 PROCEDURE — 99495 TRANSJ CARE MGMT MOD F2F 14D: CPT | Performed by: FAMILY MEDICINE

## 2025-03-11 ASSESSMENT — ENCOUNTER SYMPTOMS
ABDOMINAL PAIN: 0
COUGH: 0
BLOOD IN STOOL: 0
SORE THROAT: 0
EYE PAIN: 0
CHEST TIGHTNESS: 0
NAUSEA: 0
BACK PAIN: 0
TROUBLE SWALLOWING: 0
SHORTNESS OF BREATH: 0
CONSTIPATION: 0

## 2025-03-11 NOTE — PROGRESS NOTES
Subjective   Patient ID: Tuan Hassan is a 74 y.o. male.    HPI    Review of Systems       Objective   Physical Exam       Assessment   ***      Plan   ***        Humberto Yap MD  
neuropathy (HCC)    Coronary artery disease of bypass graft of native heart with stable angina pectoris    S/P percutaneous transluminal coronary angioplasty    Venous stasis dermatitis of both lower extremities    Lumbar spondylosis    Orthostatic hypotension after exercise    Lumbar foraminal stenosis    Lumbar radiculitis    Sacroiliac inflammation    Chronic diastolic (congestive) heart failure (HCC)    Recurrent major depressive disorder, in partial remission    Class 3 severe obesity with serious comorbidity and body mass index (BMI) of 45.0 to 49.9 in adult    Degeneration of lumbosacral intervertebral disc    Spinal stenosis of lumbar region    Acute kidney injury    Gouty arthritis of both knees    Fall at home, initial encounter    Knee gives out bilateral    Chronic kidney disease, unspecified    Unable to ambulate as knees give out    History of bilateral knee replacement (left 2004: right 2006)    Anemia with chronic illness    Syncope and collapse    Chest pain       Medications listed as ordered at the time of discharge from hospital     Medication List            Accurate as of March 11, 2025  2:24 PM. If you have any questions, ask your nurse or doctor.                CONTINUE taking these medications      acetaminophen 500 MG tablet  Commonly known as: TYLENOL     albuterol sulfate  (90 Base) MCG/ACT inhaler  Commonly known as: PROVENTIL;VENTOLIN;PROAIR  Inhale 2 puffs into the lungs 2 times daily     amLODIPine 5 MG tablet  Commonly known as: NORVASC  Take 1 tablet by mouth once daily     ammonium lactate 12 % lotion  Commonly known as: LAC-HYDRIN  Apply topically as needed.     atorvastatin 10 MG tablet  Commonly known as: LIPITOR  Take 1 tablet by mouth once daily     colchicine 0.6 MG tablet  Commonly known as: COLCRYS  Take 1 tablet by mouth daily as needed for Pain (if gout symptoms)     cyclobenzaprine 10 MG tablet  Commonly known as: FLEXERIL     febuxostat 80 MG Tabs

## 2025-03-20 DIAGNOSIS — D64.9 ANEMIA, UNSPECIFIED TYPE: ICD-10-CM

## 2025-03-20 RX ORDER — FERROUS SULFATE 325(65) MG
1 TABLET ORAL 2 TIMES DAILY
Qty: 180 TABLET | Refills: 1 | Status: SHIPPED | OUTPATIENT
Start: 2025-03-20

## 2025-03-20 RX ORDER — PROBENECID 500 MG/1
500 TABLET, FILM COATED ORAL 2 TIMES DAILY
Qty: 60 TABLET | Refills: 3 | Status: SHIPPED | OUTPATIENT
Start: 2025-03-20

## 2025-03-20 NOTE — TELEPHONE ENCOUNTER
The pharmacy is requesting a refill of the below medication which has been pended for you:     Requested Prescriptions     Pending Prescriptions Disp Refills    ferrous sulfate (FEROSUL) 325 (65 Fe) MG tablet [Pharmacy Med Name: FeroSul 325 (65 Fe) MG Oral Tablet] 180 tablet 1     Sig: Take 1 tablet by mouth twice daily       Last Appointment Date: Visit date not found  Next Appointment Date: Visit date not found    Allergies   Allergen Reactions    Horse-Derived Products

## 2025-03-20 NOTE — TELEPHONE ENCOUNTER
Date of last visit:  3/11/2025  Date of next visit:  3/20/2025    Requested Prescriptions     Pending Prescriptions Disp Refills    probenecid (BENEMID) 500 MG tablet [Pharmacy Med Name: Probenecid 500 MG Oral Tablet] 60 tablet 3     Sig: Take 1 tablet by mouth twice daily

## 2025-03-21 RX ORDER — AMLODIPINE BESYLATE 5 MG/1
5 TABLET ORAL DAILY
Qty: 90 TABLET | Refills: 1 | Status: SHIPPED | OUTPATIENT
Start: 2025-03-21

## 2025-03-21 NOTE — TELEPHONE ENCOUNTER
Date of last visit:  3/11/2025  Date of next visit:  4/16/2025    Requested Prescriptions     Pending Prescriptions Disp Refills    amLODIPine (NORVASC) 5 MG tablet [Pharmacy Med Name: amLODIPine Besylate 5 MG Oral Tablet] 90 tablet 1     Sig: Take 1 tablet by mouth once daily

## 2025-03-28 DIAGNOSIS — E11.9 TYPE 2 DIABETES MELLITUS WITHOUT COMPLICATION, WITHOUT LONG-TERM CURRENT USE OF INSULIN: ICD-10-CM

## 2025-03-28 RX ORDER — LANCETS
EACH MISCELLANEOUS
Qty: 100 EACH | Refills: 3 | Status: SHIPPED | OUTPATIENT
Start: 2025-03-28

## 2025-03-28 RX ORDER — BLOOD SUGAR DIAGNOSTIC
STRIP MISCELLANEOUS
Qty: 100 EACH | Refills: 11 | Status: SHIPPED | OUTPATIENT
Start: 2025-03-28

## 2025-03-28 NOTE — TELEPHONE ENCOUNTER
Date of last visit:  3/11/2025  Date of next visit:  4/16/2025    Requested Prescriptions     Pending Prescriptions Disp Refills    ACCU-CHEK SMARTVIEW strip 100 each 11     Sig: Use to test Blood Sugar 3x daily, Dx: E11.9    Accu-Chek Softclix Lancets MISC 100 each 3     Sig: E11.9 Use to test blood sugar 3 X daily

## 2025-04-07 RX ORDER — FUROSEMIDE 40 MG/1
40 TABLET ORAL 2 TIMES DAILY
Qty: 180 TABLET | Refills: 1 | Status: SHIPPED | OUTPATIENT
Start: 2025-04-07

## 2025-04-07 NOTE — TELEPHONE ENCOUNTER
Date of last visit:  3/11/2025  Date of next visit:  4/16/2025    Requested Prescriptions     Pending Prescriptions Disp Refills    furosemide (LASIX) 40 MG tablet [Pharmacy Med Name: Furosemide 40 MG Oral Tablet] 180 tablet 1     Sig: Take 1 tablet by mouth twice daily

## 2025-04-11 DIAGNOSIS — Z79.4 TYPE 2 DIABETES MELLITUS WITH DIABETIC POLYNEUROPATHY, WITH LONG-TERM CURRENT USE OF INSULIN (HCC): ICD-10-CM

## 2025-04-11 DIAGNOSIS — E11.42 TYPE 2 DIABETES MELLITUS WITH DIABETIC POLYNEUROPATHY, WITH LONG-TERM CURRENT USE OF INSULIN (HCC): ICD-10-CM

## 2025-04-11 RX ORDER — INSULIN ASPART 100 [IU]/ML
INJECTION, SOLUTION INTRAVENOUS; SUBCUTANEOUS
Qty: 15 ML | Refills: 1 | Status: SHIPPED | OUTPATIENT
Start: 2025-04-11

## 2025-04-11 NOTE — TELEPHONE ENCOUNTER
Date of last visit:  3/11/2025  Date of next visit:  4/16/2025    Requested Prescriptions     Pending Prescriptions Disp Refills    NOVOLOG FLEXPEN 100 UNIT/ML injection pen 15 mL 1     Sig: INJECT 12 UNITS INTO THE SKIN THREE TIMES DAILY BEFORE MEALS PLUS USE SCALE IF HIGH. Max dose of 85 units daily.

## 2025-04-16 ENCOUNTER — APPOINTMENT (OUTPATIENT)
Dept: GENERAL RADIOLOGY | Age: 75
End: 2025-04-16
Payer: MEDICARE

## 2025-04-16 ENCOUNTER — HOSPITAL ENCOUNTER (EMERGENCY)
Age: 75
Discharge: HOME | End: 2025-04-16
Attending: EMERGENCY MEDICINE
Payer: MEDICARE

## 2025-04-16 VITALS
BODY MASS INDEX: 40.43 KG/M2 | DIASTOLIC BLOOD PRESSURE: 79 MMHG | SYSTOLIC BLOOD PRESSURE: 144 MMHG | RESPIRATION RATE: 18 BRPM | WEIGHT: 315 LBS | OXYGEN SATURATION: 95 % | HEART RATE: 76 BPM | TEMPERATURE: 98.1 F | HEIGHT: 74 IN

## 2025-04-16 DIAGNOSIS — S99.922A INJURY OF TOENAIL OF LEFT FOOT, INITIAL ENCOUNTER: Primary | ICD-10-CM

## 2025-04-16 PROCEDURE — 99283 EMERGENCY DEPT VISIT LOW MDM: CPT

## 2025-04-16 PROCEDURE — 73630 X-RAY EXAM OF FOOT: CPT

## 2025-04-16 RX ORDER — ACETAMINOPHEN 160 MG
TABLET,DISINTEGRATING ORAL ONCE
Status: COMPLETED | OUTPATIENT
Start: 2025-04-16 | End: 2025-04-16

## 2025-04-16 RX ADMIN — Medication: at 20:31

## 2025-04-16 ASSESSMENT — PAIN - FUNCTIONAL ASSESSMENT: PAIN_FUNCTIONAL_ASSESSMENT: NONE - DENIES PAIN

## 2025-04-16 NOTE — ED PROVIDER NOTES
[facility]  EMERGENCY DEPARTMENT ENCOUNTER          Pt Name: Tuan Hassan  MRN: [id number]  Birthdate 1950  Date of evaluation: 4/16/2025  Treating Resident Physician: Devyn Jacobson MD  Supervising Physician: Heladio Corral DO    History obtained from the patient.     CHIEF COMPLAINT     No chief complaint on file.      HISTORY OF PRESENT ILLNESS    Tuan Hassan is a 74 y.o. male with a PMH of diabetes mellitus, amputation of the left great toe on 9/10/2014, CAD status post stenting on DAPT who presents to the emergency department with left middle toe injury after accidentally dropping object onto foot. Toe nail completely fell off. Bleeding ongoing since 1030 AM this morning, reason patient presented to ED. Reports he was able to apply dressing to wound. No bleeding noted after unwrapping dressing. Patient denies felling pain to toe. Pink appearing and good capillary refill noted. ROS unremarkable except for aforementioned.      Triage notes and Nursing notes were reviewed by myself.  Any discrepancies are addressed above.    PAST MEDICAL HISTORY     Past Medical History:   Diagnosis Date    RAUL (acute kidney injury) 02/13/2020    ASHD (arteriosclerotic heart disease) 2005 2006    stent  baki    Closed fracture of first lumbar vertebra (HCC) 05/06/2022    Closed T12 fracture (HCC) 05/06/2022    Depression     Diabetic peripheral neuropathy (HCC) 2014    Fracture 10/1984    left lower extremity     HTN (hypertension)     Hypercholesteremia     IDDM (insulin dependent diabetes mellitus)     Low back pain     Morbid obesity with BMI of 45.0-49.9, adult     Osteoarthritis     S/P CABG (coronary artery bypass graft)        SURGICAL HISTORY       Past Surgical History:   Procedure Laterality Date    AMPUTATION Left 9/10/14    partial versus complete left hallux amputation, left great toe amputation    APPENDECTOMY      BACK INJECTION Bilateral 1/18/2021

## 2025-04-17 NOTE — DISCHARGE INSTRUCTIONS
You were seen in the ED for toenail injury of the left foot.  No obvious fracture per x-ray.  You are discharged with instructions to follow-up outpatient with your primary care provider and podiatry.  Contact information for the podiatrist attached.  Continue location of clean dressing over the wound.  If bleeding is uncontrollable, or you notice some purulent discharge from the wound, do not hesitate to follow-up with your podiatrist and/or return to the ED.

## 2025-04-21 NOTE — TELEPHONE ENCOUNTER
Date of last visit:  3/11/2025  Date of next visit:  4/22/2025    Requested Prescriptions     Pending Prescriptions Disp Refills    LANTUS SOLOSTAR 100 UNIT/ML injection pen [Pharmacy Med Name: Lantus SoloStar 100 UNIT/ML Subcutaneous Solution Pen-injector] 15 mL 1     Sig: INJECT 50 UNITS SUBCUTANEOUSLY NIGHTLY

## 2025-04-22 ENCOUNTER — OFFICE VISIT (OUTPATIENT)
Dept: FAMILY MEDICINE CLINIC | Age: 75
End: 2025-04-22

## 2025-04-22 VITALS
WEIGHT: 315 LBS | RESPIRATION RATE: 16 BRPM | DIASTOLIC BLOOD PRESSURE: 68 MMHG | SYSTOLIC BLOOD PRESSURE: 144 MMHG | HEART RATE: 84 BPM | BODY MASS INDEX: 40.43 KG/M2 | HEIGHT: 74 IN

## 2025-04-22 DIAGNOSIS — E11.42 DIABETIC PERIPHERAL NEUROPATHY (HCC): ICD-10-CM

## 2025-04-22 DIAGNOSIS — I50.32 CHRONIC DIASTOLIC (CONGESTIVE) HEART FAILURE (HCC): ICD-10-CM

## 2025-04-22 DIAGNOSIS — E78.00 HYPERCHOLESTEREMIA: ICD-10-CM

## 2025-04-22 DIAGNOSIS — I87.2 VENOUS STASIS DERMATITIS OF BOTH LOWER EXTREMITIES: ICD-10-CM

## 2025-04-22 DIAGNOSIS — Z79.4 TYPE 2 DIABETES MELLITUS WITH DIABETIC POLYNEUROPATHY, WITH LONG-TERM CURRENT USE OF INSULIN (HCC): Primary | ICD-10-CM

## 2025-04-22 DIAGNOSIS — I25.708 CORONARY ARTERY DISEASE OF BYPASS GRAFT OF NATIVE HEART WITH STABLE ANGINA PECTORIS: ICD-10-CM

## 2025-04-22 DIAGNOSIS — E11.42 TYPE 2 DIABETES MELLITUS WITH DIABETIC POLYNEUROPATHY, WITH LONG-TERM CURRENT USE OF INSULIN (HCC): Primary | ICD-10-CM

## 2025-04-22 DIAGNOSIS — M10.9 GOUTY ARTHRITIS OF BOTH KNEES: ICD-10-CM

## 2025-04-22 DIAGNOSIS — M15.0 PRIMARY OSTEOARTHRITIS INVOLVING MULTIPLE JOINTS: ICD-10-CM

## 2025-04-22 DIAGNOSIS — M48.061 SPINAL STENOSIS OF LUMBAR REGION WITHOUT NEUROGENIC CLAUDICATION: ICD-10-CM

## 2025-04-22 DIAGNOSIS — I10 PRIMARY HYPERTENSION: ICD-10-CM

## 2025-04-22 LAB
CHP ED QC CHECK: ABNORMAL
GLUCOSE BLD-MCNC: 163 MG/DL
HBA1C MFR BLD: 7.4 %

## 2025-04-22 PROCEDURE — G8427 DOCREV CUR MEDS BY ELIG CLIN: HCPCS | Performed by: FAMILY MEDICINE

## 2025-04-22 PROCEDURE — 82962 GLUCOSE BLOOD TEST: CPT | Performed by: FAMILY MEDICINE

## 2025-04-22 PROCEDURE — 83036 HEMOGLOBIN GLYCOSYLATED A1C: CPT | Performed by: FAMILY MEDICINE

## 2025-04-22 PROCEDURE — 1036F TOBACCO NON-USER: CPT | Performed by: FAMILY MEDICINE

## 2025-04-22 PROCEDURE — G8417 CALC BMI ABV UP PARAM F/U: HCPCS | Performed by: FAMILY MEDICINE

## 2025-04-22 PROCEDURE — 99214 OFFICE O/P EST MOD 30 MIN: CPT | Performed by: FAMILY MEDICINE

## 2025-04-22 PROCEDURE — 1123F ACP DISCUSS/DSCN MKR DOCD: CPT | Performed by: FAMILY MEDICINE

## 2025-04-22 RX ORDER — VALSARTAN 80 MG/1
80 TABLET ORAL DAILY
Qty: 30 TABLET | Refills: 3 | Status: SHIPPED | OUTPATIENT
Start: 2025-04-22

## 2025-04-22 RX ORDER — INSULIN GLARGINE 100 [IU]/ML
INJECTION, SOLUTION SUBCUTANEOUS
Qty: 15 ML | Refills: 1 | Status: SHIPPED | OUTPATIENT
Start: 2025-04-22

## 2025-04-22 ASSESSMENT — ENCOUNTER SYMPTOMS
NAUSEA: 0
ABDOMINAL PAIN: 0
SHORTNESS OF BREATH: 0
CHEST TIGHTNESS: 0
BLURRED VISION: 0
VISUAL CHANGE: 0
EYE PAIN: 0
TROUBLE SWALLOWING: 0
SORE THROAT: 0
BLOOD IN STOOL: 0
CONSTIPATION: 0
COUGH: 0
ORTHOPNEA: 0
BACK PAIN: 1

## 2025-04-22 NOTE — PROGRESS NOTES
No components found for: \"CHLPL\"  Lab Results   Component Value Date    TRIG 302 (H) 02/28/2024     Lab Results   Component Value Date    HDL 28 06/28/2024     No components found for: \"LDLCALC\"  No components found for: \"LABVLDL\"    Lab Results   Component Value Date    ALT 15 02/24/2025    AST 15 02/24/2025    ALKPHOS 79 02/24/2025    BILITOT 0.4 02/24/2025           Is patient currently taking any cholesterol medications?     Yes   If yes, see med list as above    Is the patient reporting any side effects of cholesterol medications?   No    Is the patient taking any over the counter medications?    Yes   If yes, see med list as above    Is the patient taking a daily aspirin?    Yes      Patient Self-Management Goal for Chronic Condition  Goal: I will take all medications as prescribed by my doctor, and I will call the office if I am having any medication problems.  Barriers to success: none  Plan for overcoming my barriers: N/A     Confidence: 10/10  Date goal set: 4/22/25  Date goal attained:     Have you seen any other physician or provider since your last visit no    Have you had any other diagnostic tests since your last visit? no    Have you changed or stopped any medications since your last visit including any over-the-counter medicines, vitamins, or herbal medicines? no     Are you taking all your prescribed medications? Yes    If NO, why?

## 2025-04-22 NOTE — PROGRESS NOTES
Subjective   Patient ID: Tuan Hassan is a 74 y.o. male.      Leg  neuropathy   peripheral        Ashd    stable         Loss     nail  on  left  middle  toe         Kidney  now  3A       Chf  diasolic       Chronic      peripheral  edema and leg  changes  of  the   lower  legs       4 +           Diabetes  He presents for his follow-up diabetic visit. He has gestational diabetes mellitus. His disease course has been stable. Pertinent negatives for hypoglycemia include no dizziness or headaches. Associated symptoms include foot paresthesias. Pertinent negatives for diabetes include no blurred vision, no chest pain, no fatigue, no foot ulcerations, no visual change and no weakness. There are no hypoglycemic complications. Symptoms are stable. There are no diabetic complications. There are no known risk factors for coronary artery disease. His weight is stable. He is following a diabetic diet. Meal planning includes avoidance of concentrated sweets. He monitors blood glucose at home 3-4 x per day. Blood glucose monitoring compliance is good. His breakfast blood glucose is taken between 7-8 am. His breakfast blood glucose range is generally 130-140 mg/dl. An ACE inhibitor/angiotensin II receptor blocker is being taken.   Hypertension  This is a chronic problem. The current episode started more than 1 year ago. The problem has been resolved since onset. The problem is controlled. Pertinent negatives include no blurred vision, chest pain, headaches, orthopnea, palpitations, peripheral edema or shortness of breath. The current treatment provides significant improvement. There are no compliance problems.      Past Medical History:   Diagnosis Date    RAUL (acute kidney injury) 02/13/2020    ASHD (arteriosclerotic heart disease) 2005 2006    stent  baki    Closed fracture of first lumbar vertebra (HCC) 05/06/2022    Closed T12 fracture ([facility]) 05/06/2022    Depression     Diabetic peripheral neuropathy ([facility]) 2014

## 2025-05-13 ENCOUNTER — TELEPHONE (OUTPATIENT)
Dept: FAMILY MEDICINE CLINIC | Age: 75
End: 2025-05-13

## 2025-05-13 NOTE — TELEPHONE ENCOUNTER
Pt called wants to know if [employer] is a good choice to lower blood pressure? He has seen it advertise on TV and he's curious    [phone]

## 2025-05-18 NOTE — TELEPHONE ENCOUNTER
LOV with Jennifer Osullivan MD , 11/21/2024    Please see patient message in regard to Semaglutide    MOM to return call to office

## 2025-05-23 ENCOUNTER — TELEPHONE (OUTPATIENT)
Dept: FAMILY MEDICINE CLINIC | Age: 75
End: 2025-05-23

## 2025-05-23 DIAGNOSIS — K04.7 DENTAL INFECTION: Primary | ICD-10-CM

## 2025-05-23 NOTE — TELEPHONE ENCOUNTER
Pt called stating that he is having tooth pain. He called his dentist and they are making him an appointment but has not scheduled yet. They told him that he is going have to stop his blood thinner prior to it being pulled. He asked if this is what he needs to do. I told him that normally it is but the dentist office should contact our office. He wanted me to put a message back asking to clarify. He said that it is a front tooth and is extremely painful when he bites down. He asked if there was anything he could do while waiting to get into the dentist.     Sean may be reached at [phone]

## 2025-05-28 RX ORDER — PENICILLIN V POTASSIUM 500 MG/1
500 TABLET, FILM COATED ORAL 4 TIMES DAILY
Qty: 40 TABLET | Refills: 0 | Status: SHIPPED | OUTPATIENT
Start: 2025-05-28 | End: 2025-06-07

## 2025-05-28 NOTE — TELEPHONE ENCOUNTER
Tuan informed by Phone. He stated right now it is not hurting, only if he bites down on that tooth.

## 2025-05-28 NOTE — TELEPHONE ENCOUNTER
Will send in penicillin for the tooth pain in case infection.  Once scheduled appointment and if the tooth will be pulled then we will hold the blood thinner.  Initial appointment probably will just be examination not pulling the tooth    Please  inform

## 2025-06-03 ENCOUNTER — CARE COORDINATION (OUTPATIENT)
Dept: FAMILY MEDICINE CLINIC | Age: 75
End: 2025-06-03

## 2025-06-03 NOTE — CARE COORDINATION
Received call from Sean. He was inquiring about Super Beet gummies he sees adversised on TV to lower BP, he says his has been running a little high. However, he does not take it at home. His BP when coming to office runs about in the 140's.   Super Beets can be a scam sometimes when ordered on line. I looked up [employer] and many brands-one was bout $20 and may last 90 days. I did tell him cons are not sure of ingredients and it does have added sugar. He kind of asked what else would help lower BP:  Weight loss  Exercise  Low salt  Taking meds.  Doesn't complain of headaches or other symptoms.  He will be seen  7/28 and can address at that time.

## 2025-06-10 DIAGNOSIS — E11.42 DIABETIC PERIPHERAL NEUROPATHY (HCC): ICD-10-CM

## 2025-06-10 NOTE — TELEPHONE ENCOUNTER
Date of last visit:  4/22/2025  Date of next visit:  7/28/2025    Requested Prescriptions     Signed Prescriptions Disp Refills    metFORMIN (GLUCOPHAGE) 500 MG tablet 120 tablet 1     Sig: TAKE 2 TABLETS BY MOUTH WITH BREAKFAST AND 2 TABLETS WITH EVENING MEAL     Authorizing Provider: HAMLET DESHPANDE     Ordering User: ANTON FELDMAN       
Pt called about this medication he is completely out and at the pharmacy.  
normal...

## 2025-06-16 DIAGNOSIS — E11.42 TYPE 2 DIABETES MELLITUS WITH DIABETIC POLYNEUROPATHY, WITH LONG-TERM CURRENT USE OF INSULIN (HCC): ICD-10-CM

## 2025-06-16 DIAGNOSIS — Z79.4 TYPE 2 DIABETES MELLITUS WITH DIABETIC POLYNEUROPATHY, WITH LONG-TERM CURRENT USE OF INSULIN (HCC): ICD-10-CM

## 2025-06-16 RX ORDER — INSULIN ASPART 100 [IU]/ML
INJECTION, SOLUTION INTRAVENOUS; SUBCUTANEOUS
Qty: 15 ML | Refills: 1 | Status: SHIPPED | OUTPATIENT
Start: 2025-06-16

## 2025-06-16 RX ORDER — INSULIN GLARGINE 100 [IU]/ML
INJECTION, SOLUTION SUBCUTANEOUS
Qty: 15 ML | Refills: 1 | Status: SHIPPED | OUTPATIENT
Start: 2025-06-16

## 2025-06-16 NOTE — TELEPHONE ENCOUNTER
Date of last visit:  4/22/2025  Date of next visit:  7/28/2025    Requested Prescriptions     Pending Prescriptions Disp Refills    LANTUS SOLOSTAR 100 UNIT/ML injection pen [Pharmacy Med Name: Lantus SoloStar 100 UNIT/ML Subcutaneous Solution Pen-injector] 15 mL 1     Sig: INJECT 50 UNITS SUBCUTANEOUSLY NIGHTLY

## 2025-06-16 NOTE — TELEPHONE ENCOUNTER
Date of last visit:  4/22/2025  Date of next visit:  7/28/2025    Requested Prescriptions     Pending Prescriptions Disp Refills    NOVOLOG FLEXPEN 100 UNIT/ML injection pen 15 mL 1     Sig: INJECT 12 UNITS INTO THE SKIN THREE TIMES DAILY BEFORE MEALS PLUS USE SCALE IF HIGH. Max dose of 85 units daily.

## 2025-06-16 NOTE — TELEPHONE ENCOUNTER
Patient called to req an refill on the following    NOVOLOG FLEXPEN 100 UNIT/ML injection pen     [facility] [facility]

## 2025-06-26 ENCOUNTER — CARE COORDINATION (OUTPATIENT)
Dept: FAMILY MEDICINE CLINIC | Age: 75
End: 2025-06-26

## 2025-06-26 NOTE — CARE COORDINATION
Received a call from Sean.  Two problems:  Right arm hurts from elbow down to hand. DENIES redness, swelling, or warmth. States both arms look the same. Took Tylenol at 11:00, a little relief. Does not remember sleeping on it or heavy lifting or chores that may have aggravated his arm.  Stated stomach has a bulge on right side. No pain or discomfort. Stated only has a bowel movement every 2-3 weeks. Usually loose to watery, and dark brown-does take Iron pills. Has had colonoscopies in past but he stated a long time ago.. Doesn't really feel this is an issue.   Will discuss with Dr. Yap-I plan on checking with Sean in am to see how arm feels.  Did encourage him to go to ED if his arm swells, feels warm to touch or more painful. He verbalized understanding.

## 2025-06-27 ENCOUNTER — CARE COORDINATION (OUTPATIENT)
Dept: FAMILY MEDICINE CLINIC | Age: 75
End: 2025-06-27

## 2025-06-27 NOTE — CARE COORDINATION
Suggest miralax use one capful a day for the stools  and if no pain  buldge observe     Arm pain tylenol and agree on above of tylenol aw sounds muscular     Please call

## 2025-06-27 NOTE — CARE COORDINATION
Spoke with Sean this am for update on arm.  He states it is back to normal, probably was muscle related.  Gave him intstructions on Mirilax

## 2025-07-04 DIAGNOSIS — E78.00 HYPERCHOLESTEREMIA: ICD-10-CM

## 2025-07-04 DIAGNOSIS — M10.9 GOUTY ARTHRITIS: ICD-10-CM

## 2025-07-07 NOTE — TELEPHONE ENCOUNTER
Date of last visit:  4/22/2025  Date of next visit:  7/28/2025    Requested Prescriptions     Pending Prescriptions Disp Refills    febuxostat (ULORIC) 80 MG TABS tablet [Pharmacy Med Name: Febuxostat 80 MG Oral Tablet] 90 tablet 1     Sig: Take 1 tablet by mouth once daily    atorvastatin (LIPITOR) 10 MG tablet [Pharmacy Med Name: Atorvastatin Calcium 10 MG Oral Tablet] 90 tablet 1     Sig: Take 1 tablet by mouth once daily

## 2025-07-08 RX ORDER — ATORVASTATIN CALCIUM 10 MG/1
10 TABLET, FILM COATED ORAL DAILY
Qty: 90 TABLET | Refills: 1 | Status: SHIPPED | OUTPATIENT
Start: 2025-07-08

## 2025-07-08 RX ORDER — FEBUXOSTAT 80 MG/1
80 TABLET, FILM COATED ORAL DAILY
Qty: 90 TABLET | Refills: 1 | Status: SHIPPED | OUTPATIENT
Start: 2025-07-08

## 2025-07-14 NOTE — TELEPHONE ENCOUNTER
Date of last visit:  4/22/2025  Date of next visit:  7/28/2025    Requested Prescriptions     Pending Prescriptions Disp Refills    probenecid (BENEMID) 500 MG tablet [Pharmacy Med Name: Probenecid 500 MG Oral Tablet] 60 tablet 0     Sig: Take 1 tablet by mouth twice daily

## 2025-07-15 RX ORDER — PROBENECID 500 MG/1
500 TABLET, FILM COATED ORAL 2 TIMES DAILY
Qty: 60 TABLET | Refills: 3 | Status: SHIPPED | OUTPATIENT
Start: 2025-07-15

## 2025-07-28 ENCOUNTER — OFFICE VISIT (OUTPATIENT)
Dept: FAMILY MEDICINE CLINIC | Age: 75
End: 2025-07-28

## 2025-07-28 ENCOUNTER — LAB (OUTPATIENT)
Dept: LAB | Age: 75
End: 2025-07-28

## 2025-07-28 VITALS
RESPIRATION RATE: 18 BRPM | BODY MASS INDEX: 40.43 KG/M2 | WEIGHT: 315 LBS | SYSTOLIC BLOOD PRESSURE: 138 MMHG | HEART RATE: 82 BPM | DIASTOLIC BLOOD PRESSURE: 66 MMHG | HEIGHT: 74 IN

## 2025-07-28 DIAGNOSIS — I25.708 CORONARY ARTERY DISEASE OF BYPASS GRAFT OF NATIVE HEART WITH STABLE ANGINA PECTORIS: ICD-10-CM

## 2025-07-28 DIAGNOSIS — E78.00 HYPERCHOLESTEREMIA: ICD-10-CM

## 2025-07-28 DIAGNOSIS — I10 PRIMARY HYPERTENSION: ICD-10-CM

## 2025-07-28 DIAGNOSIS — Z79.4 TYPE 2 DIABETES MELLITUS WITH DIABETIC POLYNEUROPATHY, WITH LONG-TERM CURRENT USE OF INSULIN (HCC): Primary | ICD-10-CM

## 2025-07-28 DIAGNOSIS — I87.2 VENOUS STASIS DERMATITIS OF BOTH LOWER EXTREMITIES: ICD-10-CM

## 2025-07-28 DIAGNOSIS — E11.42 DIABETIC PERIPHERAL NEUROPATHY (HCC): ICD-10-CM

## 2025-07-28 DIAGNOSIS — E11.42 TYPE 2 DIABETES MELLITUS WITH DIABETIC POLYNEUROPATHY, WITH LONG-TERM CURRENT USE OF INSULIN (HCC): Primary | ICD-10-CM

## 2025-07-28 DIAGNOSIS — M10.9 GOUTY ARTHRITIS OF BOTH KNEES: ICD-10-CM

## 2025-07-28 DIAGNOSIS — E11.42 TYPE 2 DIABETES MELLITUS WITH DIABETIC POLYNEUROPATHY, WITH LONG-TERM CURRENT USE OF INSULIN (HCC): ICD-10-CM

## 2025-07-28 DIAGNOSIS — Z79.4 TYPE 2 DIABETES MELLITUS WITH DIABETIC POLYNEUROPATHY, WITH LONG-TERM CURRENT USE OF INSULIN (HCC): ICD-10-CM

## 2025-07-28 DIAGNOSIS — N17.9 ACUTE KIDNEY INJURY: ICD-10-CM

## 2025-07-28 DIAGNOSIS — E66.813 CLASS 3 SEVERE OBESITY WITH SERIOUS COMORBIDITY AND BODY MASS INDEX (BMI) OF 45.0 TO 49.9 IN ADULT (HCC): ICD-10-CM

## 2025-07-28 LAB
ALBUMIN SERPL BCG-MCNC: 3.9 G/DL (ref 3.4–4.9)
ALP SERPL-CCNC: 75 U/L (ref 40–129)
ALT SERPL W/O P-5'-P-CCNC: 24 U/L (ref 10–50)
ANION GAP SERPL CALC-SCNC: 14 MEQ/L (ref 8–16)
AST SERPL-CCNC: 20 U/L (ref 10–50)
BASOPHILS ABSOLUTE: 0 THOU/MM3 (ref 0–0.1)
BASOPHILS NFR BLD AUTO: 0.6 %
BILIRUB SERPL-MCNC: 0.3 MG/DL (ref 0.3–1.2)
BUN SERPL-MCNC: 35 MG/DL (ref 8–23)
CALCIUM SERPL-MCNC: 9.8 MG/DL (ref 8.8–10.2)
CHLORIDE SERPL-SCNC: 100 MEQ/L (ref 98–111)
CHOLEST SERPL-MCNC: 138 MG/DL (ref 100–199)
CHP ED QC CHECK: ABNORMAL
CO2 SERPL-SCNC: 23 MEQ/L (ref 22–29)
CREAT SERPL-MCNC: 1.6 MG/DL (ref 0.7–1.2)
DEPRECATED RDW RBC AUTO: 42.5 FL (ref 35–45)
EOSINOPHIL NFR BLD AUTO: 2.7 %
EOSINOPHILS ABSOLUTE: 0.2 THOU/MM3 (ref 0–0.4)
ERYTHROCYTE [DISTWIDTH] IN BLOOD BY AUTOMATED COUNT: 12.1 % (ref 11.5–14.5)
GFR SERPL CREATININE-BSD FRML MDRD: 44 ML/MIN/1.73M2
GLUCOSE BLD-MCNC: 161 MG/DL
GLUCOSE SERPL-MCNC: 168 MG/DL (ref 74–109)
HBA1C MFR BLD: 7.5 %
HCT VFR BLD AUTO: 32.8 % (ref 42–52)
HDLC SERPL-MCNC: 31 MG/DL
HGB BLD-MCNC: 11 GM/DL (ref 14–18)
IMM GRANULOCYTES # BLD AUTO: 0.01 THOU/MM3 (ref 0–0.07)
IMM GRANULOCYTES NFR BLD AUTO: 0.1 %
LDLC SERPL CALC-MCNC: 57 MG/DL
LYMPHOCYTES ABSOLUTE: 2 THOU/MM3 (ref 1–4.8)
LYMPHOCYTES NFR BLD AUTO: 25.9 %
MCH RBC QN AUTO: 32.4 PG (ref 26–33)
MCHC RBC AUTO-ENTMCNC: 33.5 GM/DL (ref 32.2–35.5)
MCV RBC AUTO: 96.5 FL (ref 80–94)
MONOCYTES ABSOLUTE: 0.7 THOU/MM3 (ref 0.4–1.3)
MONOCYTES NFR BLD AUTO: 8.8 %
NEUTROPHILS ABSOLUTE: 4.8 THOU/MM3 (ref 1.8–7.7)
NEUTROPHILS NFR BLD AUTO: 61.9 %
NRBC BLD AUTO-RTO: 0 /100 WBC
PLATELET # BLD AUTO: 217 THOU/MM3 (ref 130–400)
PMV BLD AUTO: 9.5 FL (ref 9.4–12.4)
POTASSIUM SERPL-SCNC: 4.8 MEQ/L (ref 3.5–5.2)
PROT SERPL-MCNC: 6.9 G/DL (ref 6.4–8.3)
RBC # BLD AUTO: 3.4 MILL/MM3 (ref 4.7–6.1)
SODIUM SERPL-SCNC: 137 MEQ/L (ref 135–145)
TRIGL SERPL-MCNC: 248 MG/DL (ref 0–199)
TSH SERPL DL<=0.05 MIU/L-ACNC: 2.68 UIU/ML (ref 0.27–4.2)
URATE SERPL-MCNC: 3.3 MG/DL (ref 3.7–7)
WBC # BLD AUTO: 7.8 THOU/MM3 (ref 4.8–10.8)

## 2025-07-28 PROCEDURE — 1123F ACP DISCUSS/DSCN MKR DOCD: CPT | Performed by: FAMILY MEDICINE

## 2025-07-28 PROCEDURE — 82962 GLUCOSE BLOOD TEST: CPT | Performed by: FAMILY MEDICINE

## 2025-07-28 PROCEDURE — 1036F TOBACCO NON-USER: CPT | Performed by: FAMILY MEDICINE

## 2025-07-28 PROCEDURE — 99214 OFFICE O/P EST MOD 30 MIN: CPT | Performed by: FAMILY MEDICINE

## 2025-07-28 PROCEDURE — 83036 HEMOGLOBIN GLYCOSYLATED A1C: CPT | Performed by: FAMILY MEDICINE

## 2025-07-28 PROCEDURE — G8417 CALC BMI ABV UP PARAM F/U: HCPCS | Performed by: FAMILY MEDICINE

## 2025-07-28 PROCEDURE — G8427 DOCREV CUR MEDS BY ELIG CLIN: HCPCS | Performed by: FAMILY MEDICINE

## 2025-07-28 ASSESSMENT — ENCOUNTER SYMPTOMS
CONSTIPATION: 0
NAUSEA: 0
SORE THROAT: 0
COUGH: 0
BACK PAIN: 0
SHORTNESS OF BREATH: 0
ABDOMINAL PAIN: 0
BLOOD IN STOOL: 0
EYE PAIN: 0
CHEST TIGHTNESS: 0
TROUBLE SWALLOWING: 0

## 2025-07-28 NOTE — PROGRESS NOTES
Subjective   Patient ID: Tuan Hassan is a 75 y.o. male.    Ashd   stable      Lumbar  stenosis  with  back pain and   no  radiclopathy         Diabetes  He presents for his follow-up diabetic visit. He has type 2 diabetes mellitus. His disease course has been stable. Pertinent negatives for hypoglycemia include no dizziness or headaches. Pertinent negatives for diabetes include no chest pain, no fatigue and no weakness. There are no hypoglycemic complications. Symptoms are stable.   Hypertension  This is a chronic problem. The current episode started more than 1 year ago. The problem has been resolved since onset. The problem is controlled. Pertinent negatives include no anxiety, chest pain, headaches, malaise/fatigue, palpitations, peripheral edema or shortness of breath. The current treatment provides significant improvement. There are no compliance problems.    Hyperlipidemia  This is a chronic problem. The current episode started more than 1 year ago. The problem is controlled. Pertinent negatives include no chest pain, focal weakness, leg pain, myalgias or shortness of breath. Current antihyperlipidemic treatment includes statins. The current treatment provides significant improvement of lipids. There are no compliance problems.      Past Medical History:   Diagnosis Date    RAUL (acute kidney injury) 02/13/2020    ASHD (arteriosclerotic heart disease) 2005 2006    stent  baki    Closed fracture of first lumbar vertebra ([facility]) 05/06/2022    Closed T12 fracture ([facility]) 05/06/2022    Depression     Diabetic peripheral neuropathy ([facility]) 2014    Fracture 10/1984    left lower extremity     HTN (hypertension)     Hypercholesteremia     IDDM (insulin dependent diabetes mellitus)     Low back pain     Morbid obesity with BMI of 45.0-49.9, adult ([facility])     Osteoarthritis     S/P CABG (coronary artery bypass graft)      Past Surgical History:   Procedure Laterality Date    AMPUTATION Left 9/10/14    partial versus

## 2025-07-30 ENCOUNTER — RESULTS FOLLOW-UP (OUTPATIENT)
Dept: FAMILY MEDICINE CLINIC | Age: 75
End: 2025-07-30

## 2025-07-30 NOTE — TELEPHONE ENCOUNTER
----- Message from Dr. Humberto Yap MD sent at 7/30/2025  6:17 AM EDT -----    Chol  still  good  at 138 and  uric  acid  is  low     Creat slight  up so drink water     Liver  functions  are  normal     Anemia  is  stable and  thyroid  is ok     Please call

## 2025-08-08 ENCOUNTER — CARE COORDINATION (OUTPATIENT)
Dept: FAMILY MEDICINE CLINIC | Age: 75
End: 2025-08-08

## 2025-08-11 DIAGNOSIS — E11.42 DIABETIC PERIPHERAL NEUROPATHY (HCC): ICD-10-CM

## 2025-08-11 RX ORDER — INSULIN GLARGINE 100 [IU]/ML
INJECTION, SOLUTION SUBCUTANEOUS
Qty: 15 ML | Refills: 1 | Status: SHIPPED | OUTPATIENT
Start: 2025-08-11

## 2025-08-21 DIAGNOSIS — E11.42 TYPE 2 DIABETES MELLITUS WITH DIABETIC POLYNEUROPATHY, WITH LONG-TERM CURRENT USE OF INSULIN (HCC): ICD-10-CM

## 2025-08-21 DIAGNOSIS — Z79.4 TYPE 2 DIABETES MELLITUS WITH DIABETIC POLYNEUROPATHY, WITH LONG-TERM CURRENT USE OF INSULIN (HCC): ICD-10-CM

## 2025-08-21 RX ORDER — INSULIN ASPART 100 [IU]/ML
INJECTION, SOLUTION INTRAVENOUS; SUBCUTANEOUS
Qty: 15 ML | Refills: 1 | Status: SHIPPED | OUTPATIENT
Start: 2025-08-21

## 2025-08-22 DIAGNOSIS — I10 PRIMARY HYPERTENSION: ICD-10-CM

## 2025-08-22 RX ORDER — VALSARTAN 80 MG/1
80 TABLET ORAL DAILY
Qty: 30 TABLET | Refills: 3 | Status: SHIPPED | OUTPATIENT
Start: 2025-08-22

## (undated) DEVICE — SPONGE GZ W4XL4IN COT 12 PLY TYP VII WVN C FLD DSGN

## (undated) DEVICE — NEEDLE SYR 18GA L1.5IN RED PLAS HUB S STL BLNT FILL W/O

## (undated) DEVICE — NEEDLE SPNL 22 GAX5 IN HUB QNCKE FUNNEL SHP STYL BLK SPINCAN

## (undated) DEVICE — SYRINGE MED 3ML CLR PLAS STD N CTRL LUERLOCK TIP DISP

## (undated) DEVICE — GLOVE ORANGE PI 7   MSG9070

## (undated) DEVICE — SHEET,DRAPE,3/4,53X77,STERILE: Brand: MEDLINE

## (undated) DEVICE — HYPODERMIC SAFETY NEEDLE: Brand: MAGELLAN

## (undated) DEVICE — LABEL MED DRUG CUST

## (undated) DEVICE — Z DISCONTINUED USE 2272117 DRAPE SURG 3 QTR N INVASIVE 2 LAYR DISP

## (undated) DEVICE — SET ADMIN 25ML L117IN PMP MOD CK VLV RLER CLMP 2 SMRTSITE

## (undated) DEVICE — NEEDLE SPNL 22GA L3.5IN BLK HUB S STL REG WALL FIT STYL W/

## (undated) DEVICE — KIT EVAC 400CC PVC RADPQ Y CONN

## (undated) DEVICE — SYRINGE MED 10ML LUERLOCK TIP W/O SFTY DISP

## (undated) DEVICE — GLOVE ORANGE PI 7 1/2   MSG9075

## (undated) DEVICE — AGENT HEMSTAT W2XL4IN OXIDIZED REGENERATED CELOS ABSRB SFT

## (undated) DEVICE — Device

## (undated) DEVICE — TOWEL,OR,DSP,ST,BLUE,STD,4/PK,20PK/CS: Brand: MEDLINE

## (undated) DEVICE — SYRINGE MED 5ML STD CLR PLAS LUERLOCK TIP N CTRL DISP

## (undated) DEVICE — DRAPE C ARM W36XL30IN RECTANG BND BG AND TAPE

## (undated) DEVICE — RESERVOIR BLD COLLCTN BTM OUTLETXTRAXRES

## (undated) DEVICE — APPLICATOR MEDICATED 26 CC SOLUTION CLR STRL CHLORAPREP

## (undated) DEVICE — ZINACTIVE USE 2537982 PAD GRND FOR RF PAIN MGMT DISP

## (undated) DEVICE — PROBE STIM 3 MM FOR PEDCL SCREW DISP

## (undated) DEVICE — SPONGE LAP W18XL18IN WHT COT 4 PLY FLD STRUNG RADPQ DISP ST

## (undated) DEVICE — GAUZE,SPONGE,4"X4",12PLY,STERILE,LF,2'S: Brand: MEDLINE

## (undated) DEVICE — CODMAN® SURGICAL PATTIES 1" X 1" (2.54CM X 2.54CM): Brand: CODMAN®

## (undated) DEVICE — GLOVE SURG SZ 65 THK91MIL LTX FREE SYN POLYISOPRENE

## (undated) DEVICE — 3M™ BAIR HUGGER® MULTI ACCESS BLANKET, PEDIATRIC, FULL BODY, 10 PER CASE 31000: Brand: BAIR HUGGER™

## (undated) DEVICE — INTEGUSEAL MICROBIAL SEALANT: Brand: AVANOS

## (undated) DEVICE — SOLUTION IV 1000ML 0.9% SOD CHL PH 5 INJ USP VIAFLX PLAS

## (undated) DEVICE — NEEDLE SPNL L3.5IN PNK HUB S STL REG WALL FIT STYL W/ QNCKE

## (undated) DEVICE — 4.0MM PRECISION ROUND

## (undated) DEVICE — SUTURE VCRL SZ 1 L18IN ABSRB VLT CTX L48MM 1/2 CIR J765D

## (undated) DEVICE — CURVED SHARP RF CANNULA, RADIOPAQUE MARKER: Brand: RADIOPAQUE RADIOFREQUENCY CANNULA

## (undated) DEVICE — THE MILL DISPOSABLE - MEDIUM

## (undated) DEVICE — GLOVE ORANGE PI 8   MSG9080

## (undated) DEVICE — SUTURE NONABSORBABLE MONOFILAMENT 5-0 C-1 1X24 IN PROLENE 8725H

## (undated) DEVICE — GAUZE SPONGES,USP TYPE VII GAUZE, 12 PLY: Brand: CURITY

## (undated) DEVICE — GARMENT,MEDLINE,DVT,INT,CALF,LG, GEN2: Brand: MEDLINE

## (undated) DEVICE — PAD,NON-ADHERENT,3X8,STERILE,LF,1/PK: Brand: MEDLINE

## (undated) DEVICE — SKIN AFFIX SURG ADHESIVE 72/CS 0.55ML: Brand: MEDLINE

## (undated) DEVICE — C-ARMOR C-ARM EQUIPMENT COVERS CLEAR STERILE UNIVERSAL FIT 12 PER CASE: Brand: C-ARMOR

## (undated) DEVICE — 3 ML SYRINGE LUER-LOCK TIP: Brand: MONOJECT

## (undated) DEVICE — DRAIN SURG 10FR PVC TB W/ TRCR MID PERF NO RESVR HUBLESS

## (undated) DEVICE — BIPOLAR SEALER 23-112-1 AQM 6.0: Brand: AQUAMANTYS ®

## (undated) DEVICE — TAPE SURG W4INXL11YD 2IN PERF LINERLESS NONWOVEN MEDFIX EZ

## (undated) DEVICE — TUBING, SUCTION, 1/4" X 20', STRAIGHT: Brand: MEDLINE INDUSTRIES, INC.

## (undated) DEVICE — 3M™ WARMING BLANKET, UPPER BODY, 10 PER CASE, 42268: Brand: BAIR HUGGER™

## (undated) DEVICE — CHLORAPREP 26ML CLEAR

## (undated) DEVICE — SHEET,DRAPE,53X77,STERILE: Brand: MEDLINE

## (undated) DEVICE — 6 ML SYRINGE LUER-LOCK TIP: Brand: MONOJECT

## (undated) DEVICE — LINE ASPIR 1/4 ANTICOAG

## (undated) DEVICE — HEAD POSITIONER, SURGICAL: Brand: DEROYAL

## (undated) DEVICE — JCKSON TBL POSTNER NO HD REST: Brand: MEDLINE INDUSTRIES, INC.

## (undated) DEVICE — SOLUTION IV 1000ML LAC RINGERS PH 6.5 INJ USP VIAFLX PLAS

## (undated) DEVICE — TOTAL TRAY, DB, 100% SILI FOLEY, 16FR 10: Brand: MEDLINE

## (undated) DEVICE — YANKAUER,BULB TIP,W/O VENT,RIGID,STERILE: Brand: MEDLINE